# Patient Record
Sex: MALE | Race: WHITE | NOT HISPANIC OR LATINO | Employment: OTHER | URBAN - METROPOLITAN AREA
[De-identification: names, ages, dates, MRNs, and addresses within clinical notes are randomized per-mention and may not be internally consistent; named-entity substitution may affect disease eponyms.]

---

## 2023-10-02 ENCOUNTER — OFFICE VISIT (OUTPATIENT)
Dept: PRIMARY CARE | Facility: CLINIC | Age: 63
End: 2023-10-02
Payer: MEDICAID

## 2023-10-02 VITALS
DIASTOLIC BLOOD PRESSURE: 72 MMHG | BODY MASS INDEX: 22.76 KG/M2 | HEIGHT: 70 IN | SYSTOLIC BLOOD PRESSURE: 118 MMHG | WEIGHT: 159 LBS

## 2023-10-02 DIAGNOSIS — E78.00 HYPERCHOLESTEROLEMIA: ICD-10-CM

## 2023-10-02 DIAGNOSIS — F41.9 ANXIETY: ICD-10-CM

## 2023-10-02 DIAGNOSIS — F17.211 CIGARETTE NICOTINE DEPENDENCE IN REMISSION: ICD-10-CM

## 2023-10-02 DIAGNOSIS — Z13.220 LIPID SCREENING: ICD-10-CM

## 2023-10-02 DIAGNOSIS — C34.90 MALIGNANT NEOPLASM OF LUNG, UNSPECIFIED LATERALITY, UNSPECIFIED PART OF LUNG (MULTI): ICD-10-CM

## 2023-10-02 DIAGNOSIS — T78.40XA ALLERGY, INITIAL ENCOUNTER: ICD-10-CM

## 2023-10-02 DIAGNOSIS — J45.909 ASTHMA, UNSPECIFIED ASTHMA SEVERITY, UNSPECIFIED WHETHER COMPLICATED, UNSPECIFIED WHETHER PERSISTENT (HHS-HCC): ICD-10-CM

## 2023-10-02 DIAGNOSIS — E55.9 VITAMIN D DEFICIENCY: ICD-10-CM

## 2023-10-02 DIAGNOSIS — I67.1 BRAIN ANEURYSM (HHS-HCC): ICD-10-CM

## 2023-10-02 DIAGNOSIS — E53.9 VITAMIN B DEFICIENCY: ICD-10-CM

## 2023-10-02 DIAGNOSIS — G40.909 SEIZURE DISORDER (MULTI): Primary | ICD-10-CM

## 2023-10-02 DIAGNOSIS — R56.9 SEIZURES (MULTI): ICD-10-CM

## 2023-10-02 DIAGNOSIS — J44.9 CHRONIC OBSTRUCTIVE PULMONARY DISEASE, UNSPECIFIED COPD TYPE (MULTI): ICD-10-CM

## 2023-10-02 DIAGNOSIS — R51.9 INTRACTABLE HEADACHE, UNSPECIFIED CHRONICITY PATTERN, UNSPECIFIED HEADACHE TYPE: ICD-10-CM

## 2023-10-02 DIAGNOSIS — F10.21 HISTORY OF ALCOHOLISM (MULTI): ICD-10-CM

## 2023-10-02 DIAGNOSIS — R53.83 OTHER FATIGUE: ICD-10-CM

## 2023-10-02 PROCEDURE — 99204 OFFICE O/P NEW MOD 45 MIN: CPT | Performed by: INTERNAL MEDICINE

## 2023-10-02 RX ORDER — DIVALPROEX SODIUM 250 MG/1
250 TABLET, FILM COATED, EXTENDED RELEASE ORAL DAILY
Qty: 30 TABLET | Refills: 11 | COMMUNITY
Start: 2023-10-02 | End: 2023-10-02 | Stop reason: SDUPTHER

## 2023-10-02 RX ORDER — IPRATROPIUM BROMIDE AND ALBUTEROL SULFATE 2.5; .5 MG/3ML; MG/3ML
3 SOLUTION RESPIRATORY (INHALATION) 4 TIMES DAILY PRN
Qty: 90 ML | Refills: 1 | Status: SHIPPED | OUTPATIENT
Start: 2023-10-02 | End: 2024-10-01

## 2023-10-02 RX ORDER — ACETAMINOPHEN 500 MG
5000 TABLET ORAL DAILY
COMMUNITY
Start: 2023-10-02 | End: 2023-10-02 | Stop reason: SDUPTHER

## 2023-10-02 RX ORDER — LANOLIN ALCOHOL/MO/W.PET/CERES
100 CREAM (GRAM) TOPICAL DAILY
Qty: 90 TABLET | Refills: 1 | Status: SHIPPED | OUTPATIENT
Start: 2023-10-02 | End: 2024-04-29

## 2023-10-02 RX ORDER — LACOSAMIDE 200 MG/1
50 TABLET ORAL 2 TIMES DAILY
Refills: 0 | COMMUNITY
Start: 2023-10-02 | End: 2023-10-02 | Stop reason: SDUPTHER

## 2023-10-02 RX ORDER — NEBULIZER AND COMPRESSOR
EACH MISCELLANEOUS
Qty: 1 EACH | Refills: 0 | Status: SHIPPED | OUTPATIENT
Start: 2023-10-02

## 2023-10-02 RX ORDER — LANOLIN ALCOHOL/MO/W.PET/CERES
100 CREAM (GRAM) TOPICAL DAILY
Qty: 30 TABLET | Refills: 11 | COMMUNITY
Start: 2023-10-02 | End: 2023-10-02 | Stop reason: SDUPTHER

## 2023-10-02 RX ORDER — DIVALPROEX SODIUM 500 MG/1
500 TABLET, FILM COATED, EXTENDED RELEASE ORAL DAILY
Qty: 90 TABLET | Refills: 1 | Status: SHIPPED | OUTPATIENT
Start: 2023-10-02 | End: 2023-10-23 | Stop reason: DRUGHIGH

## 2023-10-02 RX ORDER — DIVALPROEX SODIUM 250 MG/1
250 TABLET, FILM COATED, EXTENDED RELEASE ORAL DAILY
Qty: 90 TABLET | Refills: 1 | Status: SHIPPED | OUTPATIENT
Start: 2023-10-02 | End: 2023-10-23 | Stop reason: ENTERED-IN-ERROR

## 2023-10-02 RX ORDER — ALBUTEROL SULFATE 90 UG/1
2 AEROSOL, METERED RESPIRATORY (INHALATION) EVERY 4 HOURS PRN
Qty: 6.7 G | Refills: 11 | COMMUNITY
Start: 2023-10-02 | End: 2023-10-02 | Stop reason: SDUPTHER

## 2023-10-02 RX ORDER — NAPROXEN SODIUM 220 MG/1
81 TABLET, FILM COATED ORAL DAILY
Qty: 90 TABLET | Refills: 1 | Status: SHIPPED | OUTPATIENT
Start: 2023-10-02 | End: 2023-11-02 | Stop reason: ALTCHOICE

## 2023-10-02 RX ORDER — DIVALPROEX SODIUM 500 MG/1
500 TABLET, FILM COATED, EXTENDED RELEASE ORAL DAILY
Qty: 30 TABLET | Refills: 11 | COMMUNITY
Start: 2023-10-02 | End: 2023-10-02 | Stop reason: SDUPTHER

## 2023-10-02 RX ORDER — FLUTICASONE FUROATE 27.5 UG/1
1 SPRAY, METERED NASAL
Qty: 10 G | Refills: 6 | Status: SHIPPED | OUTPATIENT
Start: 2023-10-02 | End: 2023-11-02 | Stop reason: ALTCHOICE

## 2023-10-02 RX ORDER — IBUPROFEN 200 MG
1 TABLET ORAL EVERY 24 HOURS
Qty: 30 PATCH | Refills: 0 | COMMUNITY
Start: 2023-10-02 | End: 2023-10-02 | Stop reason: SDUPTHER

## 2023-10-02 RX ORDER — NAPROXEN SODIUM 220 MG/1
81 TABLET, FILM COATED ORAL DAILY
Qty: 30 TABLET | Refills: 11 | COMMUNITY
Start: 2023-10-02 | End: 2023-10-02 | Stop reason: SDUPTHER

## 2023-10-02 RX ORDER — ATORVASTATIN CALCIUM 40 MG/1
40 TABLET, FILM COATED ORAL DAILY
Qty: 90 TABLET | Refills: 1 | Status: SHIPPED | OUTPATIENT
Start: 2023-10-02 | End: 2024-01-05 | Stop reason: SDUPTHER

## 2023-10-02 RX ORDER — FLUTICASONE FUROATE 27.5 UG/1
1 SPRAY, METERED NASAL
Qty: 10 G | Refills: 5 | COMMUNITY
Start: 2023-10-02 | End: 2023-10-02 | Stop reason: SDUPTHER

## 2023-10-02 RX ORDER — ALBUTEROL SULFATE 90 UG/1
2 AEROSOL, METERED RESPIRATORY (INHALATION) EVERY 4 HOURS PRN
Qty: 6.7 G | Refills: 6 | Status: SHIPPED | OUTPATIENT
Start: 2023-10-02 | End: 2024-02-05 | Stop reason: SDUPTHER

## 2023-10-02 RX ORDER — ATORVASTATIN CALCIUM 40 MG/1
40 TABLET, FILM COATED ORAL DAILY
Qty: 30 TABLET | Refills: 5 | COMMUNITY
Start: 2023-10-02 | End: 2023-10-02 | Stop reason: SDUPTHER

## 2023-10-02 RX ORDER — LACOSAMIDE 200 MG/1
50 TABLET ORAL 2 TIMES DAILY
Qty: 90 TABLET | Refills: 1 | Status: SHIPPED | OUTPATIENT
Start: 2023-10-02 | End: 2023-10-23 | Stop reason: SDUPTHER

## 2023-10-02 RX ORDER — ACETAMINOPHEN 500 MG
5000 TABLET ORAL DAILY
Qty: 90 TABLET | Refills: 1 | Status: SHIPPED | OUTPATIENT
Start: 2023-10-02 | End: 2023-12-12 | Stop reason: WASHOUT

## 2023-10-02 RX ORDER — IBUPROFEN 200 MG
1 TABLET ORAL EVERY 24 HOURS
Qty: 30 PATCH | Refills: 0 | Status: SHIPPED | OUTPATIENT
Start: 2023-10-02

## 2023-10-02 ASSESSMENT — ENCOUNTER SYMPTOMS
LOSS OF SENSATION IN FEET: 0
OCCASIONAL FEELINGS OF UNSTEADINESS: 0
DEPRESSION: 0

## 2023-10-02 NOTE — PROGRESS NOTES
"Subjective   Patient ID: Martínez Neri is a 63 y.o. male who presents for to [facility] care and multiple medical issues.    This 63-year-old white man came to my office first time, I welcomed him.  He is relocated from [place].  He is looking for a primary care physician.  1. He wants a refill on medication.  He is accompanied by his daughter with his permission.  2. He is undergoing through lung cancer treatment as well as brain aneurysm for which he already has a surgery.  He is looking for neurologist for the seizure.  He is feeling weak, tired, fatigued, and exhausted.     IMMUNIZATION:  We will check his immunization.         I have personally reviewed the patient's Past Medical History, Medications, Allergies, Social History, and Family History in the EMR.    Review of Systems   All other systems reviewed and are negative.  The patient denies any history of heart attack, stroke, or diabetes.    Objective   /72   Ht 1.765 m (5' 9.5\")   Wt 72.1 kg (159 lb)   BMI 23.14 kg/m²     Physical Exam  Vitals reviewed.   HENT:      Right Ear: Tympanic membrane, ear canal and external ear normal.      Left Ear: Tympanic membrane, ear canal and external ear normal.   Eyes:      General: No scleral icterus.     Pupils: Pupils are equal, round, and reactive to light.   Neck:      Vascular: No carotid bruit.   Cardiovascular:      Heart sounds: Normal heart sounds, S1 normal and S2 normal. No murmur heard.     No friction rub.   Pulmonary:      Effort: Pulmonary effort is normal.      Breath sounds: Normal breath sounds and air entry.   Abdominal:      Palpations: There is no hepatomegaly, splenomegaly or mass.   Genitourinary:     Comments: RECTAL: Deferred by the patient.  GENITAL: Deferred by the patient.  Musculoskeletal:         General: No swelling or deformity. Normal range of motion.      Cervical back: Neck supple.      Right lower leg: No edema.      Left lower leg: No edema.   Lymphadenopathy:      " Cervical: No cervical adenopathy.      Upper Body:      Right upper body: No axillary adenopathy.      Left upper body: No axillary adenopathy.      Lower Body: No right inguinal adenopathy. No left inguinal adenopathy.   Neurological:      Mental Status: He is oriented to person, place, and time.      Cranial Nerves: Cranial nerves 2-12 are intact. No cranial nerve deficit.      Sensory: No sensory deficit.      Motor: Motor function is intact. No weakness.      Gait: Gait is intact.      Deep Tendon Reflexes: Reflexes normal.   Psychiatric:         Mood and Affect: Mood normal. Mood is not anxious or depressed. Affect is not angry.         Behavior: Behavior is not agitated.         Thought Content: Thought content normal.         Judgment: Judgment normal.     LAB WORK: Laboratory testing ordered.    Assessment/Plan   Problem List Items Addressed This Visit    None  Visit Diagnoses         Codes    Asthma, unspecified asthma severity, unspecified whether complicated, unspecified whether persistent    -  Primary J45.909    Relevant Medications    ipratropium-albuteroL (Duo-Neb) 0.5-2.5 mg/3 mL nebulizer solution    nebulizer and compressor device    nebulizer accessories misc    Cigarette nicotine dependence in remission     F17.211    Relevant Medications    nicotine (Nicoderm CQ) 21 mg/24 hr patch    Lipid screening     Z13.220    Relevant Orders    Comprehensive metabolic panel    Lipid panel    Intractable headache, unspecified chronicity pattern, unspecified headache type     R51.9    Other fatigue     R53.83    Relevant Orders    CBC    Urinalysis with Reflex Microscopic    TSH    Vitamin B12    Vitamin D deficiency     E55.9    Relevant Medications    cholecalciferol (Vitamin D3) 5,000 Units tablet    Other Relevant Orders    Vitamin D 25-Hydroxy,Total (for eval of Vitamin D levels)    Vitamin B deficiency     E53.9    Relevant Medications    thiamine (Vitamin B-1) 100 mg tablet    Seizures (CMS/HCC)      R56.9    Relevant Medications    lacosamide (Vimpat) 200 mg tablet tablet    divalproex (Depakote ER) 500 mg 24 hr tablet    divalproex (Depakote ER) 250 mg 24 hr tablet    Allergy, initial encounter     T78.40XA    Relevant Medications    fluticasone (Flonase Sensimist) 27.5 mcg/actuation nasal spray    albuterol (Proventil HFA) 90 mcg/actuation inhaler    Hypercholesterolemia     E78.00    Relevant Medications    atorvastatin (Lipitor) 40 mg tablet    aspirin 81 mg chewable tablet    Seizure disorder (CMS/HCC)     G40.909    Brain aneurysm     I67.1    Malignant neoplasm of lung, unspecified laterality, unspecified part of lung (CMS/HCC)     C34.90    Chronic obstructive pulmonary disease, unspecified COPD type (CMS/HCC)     J44.9    History of alcoholism (CMS/HCC)     F10.21    Anxiety     F41.9        1. Seizure disorder, on Depakote.  We will check level.  He sees a specialist.  2. Brain aneurysm.  He sees a specialist.  3. Lung cancer.  He is going to see lung cancer team right away.  4. High cholesterol.  We will check liver.  5. COPD, on breathing treatment.  6. History of alcoholism, doing good.  7. Anxiety and stress, okay.  Blood work ordered.  Referred to a specialist.  8. I shall see him in a week after the testing.  9. Welcome to my office and [facility].    Scribe Attestation  By signing my name below, I, Chantell Guerra, Scribe attest that this documentation has been prepared under the direction and in the presence of Jayme Casey MD.

## 2023-10-03 ENCOUNTER — LAB (OUTPATIENT)
Dept: LAB | Facility: LAB | Age: 63
End: 2023-10-03
Payer: MEDICAID

## 2023-10-03 DIAGNOSIS — E55.9 VITAMIN D DEFICIENCY: ICD-10-CM

## 2023-10-03 DIAGNOSIS — Z13.220 LIPID SCREENING: ICD-10-CM

## 2023-10-03 DIAGNOSIS — R53.83 OTHER FATIGUE: ICD-10-CM

## 2023-10-03 LAB
25(OH)D3 SERPL-MCNC: 17 NG/ML (ref 30–100)
ALBUMIN SERPL BCP-MCNC: 4.4 G/DL (ref 3.4–5)
ALP SERPL-CCNC: 62 U/L (ref 33–136)
ALT SERPL W P-5'-P-CCNC: 21 U/L (ref 10–52)
ANION GAP SERPL CALC-SCNC: 14 MMOL/L (ref 10–20)
APPEARANCE UR: CLEAR
AST SERPL W P-5'-P-CCNC: 23 U/L (ref 9–39)
BILIRUB SERPL-MCNC: 0.5 MG/DL (ref 0–1.2)
BILIRUB UR STRIP.AUTO-MCNC: NEGATIVE MG/DL
BUN SERPL-MCNC: 15 MG/DL (ref 6–23)
CALCIUM SERPL-MCNC: 9.8 MG/DL (ref 8.6–10.6)
CHLORIDE SERPL-SCNC: 103 MMOL/L (ref 98–107)
CHOLEST SERPL-MCNC: 162 MG/DL (ref 0–199)
CHOLESTEROL/HDL RATIO: 2.4
CO2 SERPL-SCNC: 27 MMOL/L (ref 21–32)
COLOR UR: YELLOW
CREAT SERPL-MCNC: 0.68 MG/DL (ref 0.5–1.3)
ERYTHROCYTE [DISTWIDTH] IN BLOOD BY AUTOMATED COUNT: 13.6 % (ref 11.5–14.5)
GFR SERPL CREATININE-BSD FRML MDRD: >90 ML/MIN/1.73M*2
GLUCOSE SERPL-MCNC: 72 MG/DL (ref 74–99)
GLUCOSE UR STRIP.AUTO-MCNC: NEGATIVE MG/DL
HCT VFR BLD AUTO: 40.5 % (ref 41–52)
HDLC SERPL-MCNC: 67.1 MG/DL
HGB BLD-MCNC: 13.3 G/DL (ref 13.5–17.5)
KETONES UR STRIP.AUTO-MCNC: NEGATIVE MG/DL
LDLC SERPL CALC-MCNC: 78 MG/DL (ref 140–190)
LEUKOCYTE ESTERASE UR QL STRIP.AUTO: ABNORMAL
MCH RBC QN AUTO: 33.6 PG (ref 26–34)
MCHC RBC AUTO-ENTMCNC: 32.8 G/DL (ref 32–36)
MCV RBC AUTO: 102 FL (ref 80–100)
MUCOUS THREADS #/AREA URNS AUTO: NORMAL /LPF
NITRITE UR QL STRIP.AUTO: NEGATIVE
NON HDL CHOLESTEROL: 95 MG/DL (ref 0–149)
NRBC BLD-RTO: 0 /100 WBCS (ref 0–0)
PH UR STRIP.AUTO: 7 [PH]
PLATELET # BLD AUTO: 176 X10*3/UL (ref 150–450)
PMV BLD AUTO: 13.2 FL (ref 7.5–11.5)
POTASSIUM SERPL-SCNC: 4.8 MMOL/L (ref 3.5–5.3)
PROT SERPL-MCNC: 7.3 G/DL (ref 6.4–8.2)
PROT UR STRIP.AUTO-MCNC: NEGATIVE MG/DL
RBC # BLD AUTO: 3.96 X10*6/UL (ref 4.5–5.9)
RBC # UR STRIP.AUTO: NEGATIVE /UL
RBC #/AREA URNS AUTO: NORMAL /HPF
SODIUM SERPL-SCNC: 139 MMOL/L (ref 136–145)
SP GR UR STRIP.AUTO: 1.01
TRIGL SERPL-MCNC: 87 MG/DL (ref 0–149)
TSH SERPL-ACNC: 5.54 MIU/L (ref 0.44–3.98)
UROBILINOGEN UR STRIP.AUTO-MCNC: <2 MG/DL
VIT B12 SERPL-MCNC: 488 PG/ML (ref 211–911)
VLDL: 17 MG/DL (ref 0–40)
WBC # BLD AUTO: 7.2 X10*3/UL (ref 4.4–11.3)
WBC #/AREA URNS AUTO: NORMAL /HPF

## 2023-10-03 PROCEDURE — 36415 COLL VENOUS BLD VENIPUNCTURE: CPT

## 2023-10-05 ENCOUNTER — OFFICE VISIT (OUTPATIENT)
Dept: HEMATOLOGY/ONCOLOGY | Facility: HOSPITAL | Age: 63
End: 2023-10-05
Payer: MEDICAID

## 2023-10-05 VITALS
TEMPERATURE: 96.8 F | RESPIRATION RATE: 20 BRPM | BODY MASS INDEX: 23.35 KG/M2 | OXYGEN SATURATION: 98 % | HEIGHT: 69 IN | SYSTOLIC BLOOD PRESSURE: 132 MMHG | HEART RATE: 65 BPM | DIASTOLIC BLOOD PRESSURE: 65 MMHG | WEIGHT: 157.63 LBS

## 2023-10-05 DIAGNOSIS — C34.12 MALIGNANT NEOPLASM OF UPPER LOBE OF LEFT LUNG (MULTI): Primary | ICD-10-CM

## 2023-10-05 DIAGNOSIS — C34.12: ICD-10-CM

## 2023-10-05 PROCEDURE — 99205 OFFICE O/P NEW HI 60 MIN: CPT | Performed by: INTERNAL MEDICINE

## 2023-10-05 PROCEDURE — 99417 PROLNG OP E/M EACH 15 MIN: CPT | Performed by: INTERNAL MEDICINE

## 2023-10-05 PROCEDURE — 99215 OFFICE O/P EST HI 40 MIN: CPT | Performed by: INTERNAL MEDICINE

## 2023-10-05 ASSESSMENT — PAIN SCALES - GENERAL: PAINLEVEL: 0-NO PAIN

## 2023-10-06 ENCOUNTER — HOSPITAL ENCOUNTER (OUTPATIENT)
Dept: RADIOLOGY | Facility: HOSPITAL | Age: 63
Discharge: HOME | End: 2023-10-06
Payer: MEDICAID

## 2023-10-06 DIAGNOSIS — C34.12 MALIGNANT NEOPLASM OF UPPER LOBE OF LEFT LUNG (MULTI): ICD-10-CM

## 2023-10-06 LAB — GLUCOSE BLD MANUAL STRIP-MCNC: 80 MG/DL (ref 74–99)

## 2023-10-06 PROCEDURE — 71260 CT THORAX DX C+: CPT

## 2023-10-06 PROCEDURE — 78815 PET IMAGE W/CT SKULL-THIGH: CPT

## 2023-10-06 PROCEDURE — A9552 F18 FDG: HCPCS | Mod: SE | Performed by: INTERNAL MEDICINE

## 2023-10-06 PROCEDURE — 82947 ASSAY GLUCOSE BLOOD QUANT: CPT

## 2023-10-06 PROCEDURE — 71260 CT THORAX DX C+: CPT | Performed by: STUDENT IN AN ORGANIZED HEALTH CARE EDUCATION/TRAINING PROGRAM

## 2023-10-06 PROCEDURE — 3430000001 HC RX 343 DIAGNOSTIC RADIOPHARMACEUTICALS: Mod: SE | Performed by: INTERNAL MEDICINE

## 2023-10-06 PROCEDURE — 2550000001 HC RX 255 CONTRASTS: Mod: SE | Performed by: INTERNAL MEDICINE

## 2023-10-06 PROCEDURE — 78815 PET IMAGE W/CT SKULL-THIGH: CPT | Performed by: RADIOLOGY

## 2023-10-06 RX ORDER — FLUDEOXYGLUCOSE F 18 200 MCI/ML
10.1 INJECTION, SOLUTION INTRAVENOUS
Status: COMPLETED | OUTPATIENT
Start: 2023-10-06 | End: 2023-10-06

## 2023-10-06 RX ADMIN — FLUDEOXYGLUCOSE F 18 10.1 MILLICURIE: 200 INJECTION, SOLUTION INTRAVENOUS at 10:45

## 2023-10-06 RX ADMIN — IOHEXOL 69 ML: 350 INJECTION, SOLUTION INTRAVENOUS at 11:23

## 2023-10-07 ENCOUNTER — APPOINTMENT (OUTPATIENT)
Dept: RADIOLOGY | Facility: HOSPITAL | Age: 63
End: 2023-10-07
Payer: MEDICAID

## 2023-10-09 ENCOUNTER — APPOINTMENT (OUTPATIENT)
Dept: NEUROLOGY | Facility: CLINIC | Age: 63
End: 2023-10-09
Payer: MEDICAID

## 2023-10-09 PROBLEM — C34.12 PRIMARY CANCER OF LEFT UPPER LOBE OF LUNG (MULTI): Status: ACTIVE | Noted: 2023-10-09

## 2023-10-09 PROBLEM — R56.9 SEIZURES (MULTI): Status: ACTIVE | Noted: 2023-10-09

## 2023-10-09 ASSESSMENT — ENCOUNTER SYMPTOMS
HEMATURIA: 0
EXTREMITY WEAKNESS: 0
COUGH: 1
UNEXPECTED WEIGHT CHANGE: 1
PSYCHIATRIC NEGATIVE: 1
SEIZURES: 1
SORE THROAT: 0
PALPITATIONS: 0
GASTROINTESTINAL NEGATIVE: 1
SHORTNESS OF BREATH: 1
MUSCULOSKELETAL NEGATIVE: 1
APPETITE CHANGE: 1
HEMATOLOGIC/LYMPHATIC NEGATIVE: 1
FATIGUE: 1
DIZZINESS: 0
SCLERAL ICTERUS: 0

## 2023-10-09 NOTE — PROGRESS NOTES
[facility] - Medical Oncology New Patient Visit    Patient ID: Martínez Neri is a 63 y.o. male.  Referring Physician: No referring provider defined for this encounter.  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    HPI Mr. Neri is a 64-year-old male, smoker, 50-pack-year history), with history of COPD and seizures, who was diagnosed with a squamous cell carcinoma of the left upper lobe in [place], never treated.  Outpatient notes from [facility] and hematology clinic were reviewed.    On 06/17/2023 the patient had a CT chest for ongoing respiratory symptoms and was found to have a left suprahilar mass involving the left upper lobe bronchi with postobstructive pneumonia/collapse.  On 06/25/2023 CT chest/abdomen/pelvis demonstrated persistent masslike consolidation in the left upper lobe with no signs of extrathoracic metastatic disease.  On 06/26/2023 the patient underwent a bronchoscopy, and pathology was compatible with a squamous cell carcinoma at least in situ, with a foci highly suspicious for invasive squamous cell carcinoma.  On 07/18/2023 a PET/CT obtained, demonstrating a 6.6 cm left upper lobe mass with an SUV of 8.3 and no evidence of distant metastatic disease.  On 07/18/2023 to brain MRI was negative for intracranial metastasis.  On 07/27/2023 bronchoscopy/EBUS was performed.  Endobronchial biopsies of left upper lobe demonstrated an invasive moderately differentiated squamous cell carcinoma.  Left upper lobe hilar FNA showed atypical cells with squamous differentiation, suspicious for malignancy.  Subcarinal FNA was indeterminate for malignancy with extremely rare atypical cells with squamous differentiation.  Molecular profile: Lack of BRAF, EGFR, HER2, KRAS, MET, ALK, NTRK 1/2/3, RET or  ROS1 alterations.  PD-L1 TPS 20%.  The patient was seen by medical oncology and did not too frail for combined chemo RT (there is a performance status ECOG 3) documented on 08/17/2023.  At that time, he lived with some friends, while his family was primarily located in [place].  His daughter traveled and was able to relocate him to [place], and he presents today to clinic accompanied by his daughter, son, and daughter-in-law.  The patient states he is felt much better since his relocation, has been gaining weight, approximately 10 pounds since he moved here.  He is more active, going up and down the steps at home, but still tired and taking some breaks throughout the day.  He denies any headaches or new neurological symptoms.  His respiratory symptoms are stable.  There is no new localized pain.    Diagnosis: Non-small cell lung cancer of the left upper lobe (renal cell carcinoma), diagnosed on 06/26/2023  Current sites of disease: Left upper lobe  Reported molecular and ancillary testing performed upon diagnosis in [place]:   BRAF negative  JIPAX21D negative  EGFR classic mutations negative  HER2 negative  MET negative  ALK negative  In tract 1/2/3 negative  RET negative  ROS1 negative  PD-L1 20%    Oncologic History:  As above      Past Medical History:  No date: Allergic rhinitis  No date: Asthma  No date: Brain aneurysm  No date: Cancer (CMS/HCC)  No date: Carotid artery disease (CMS/HCC)  No date: COPD (chronic obstructive pulmonary disease) (CMS/HCC)  No date: Epilepsy (CMS/HCC)  No date: High cholesterol  No date: History of alcohol use  No date: Hypertension  No date: Lung cancer (CMS/HCC)     Past Surgical History:  No date: CAROTID ENDARTERECTOMY; N/A  No date: COLONOSCOPY  No date: HERNIA REPAIR     Social Hx:     Martínez Daron  reports that he has been smoking cigarettes. He has been exposed to tobacco smoke. He uses smokeless tobacco.  He  has no history on file for alcohol use.  He  reports no history of  drug use.    Family History   Problem Relation Name Age of Onset    Heart attack Mother      Prostate cancer Father      Aneurysm Other      Prostate cancer Other      Alcohol abuse Other          Meds (Current):    Current Outpatient Medications:     albuterol (Proventil HFA) 90 mcg/actuation inhaler, Inhale 2 puffs every 4 hours if needed for wheezing or shortness of breath., Disp: 6.7 g, Rfl: 6    aspirin 81 mg chewable tablet, Chew 1 tablet (81 mg) once daily., Disp: 90 tablet, Rfl: 1    atorvastatin (Lipitor) 40 mg tablet, Take 1 tablet (40 mg) by mouth once daily., Disp: 90 tablet, Rfl: 1    cholecalciferol (Vitamin D3) 5,000 Units tablet, Take 1 tablet (5,000 Units) by mouth once daily., Disp: 90 tablet, Rfl: 1    divalproex (Depakote ER) 250 mg 24 hr tablet, Take 1 tablet (250 mg) by mouth once daily. Do not crush, chew, or split., Disp: 90 tablet, Rfl: 1    divalproex (Depakote ER) 500 mg 24 hr tablet, Take 1 tablet (500 mg) by mouth once daily. Do not crush, chew, or split., Disp: 90 tablet, Rfl: 1    fluticasone (Flonase Sensimist) 27.5 mcg/actuation nasal spray, Administer 1 spray into each nostril once daily., Disp: 10 g, Rfl: 6    inhalat.spacing dev,med. mask spacer, , Disp: , Rfl:     ipratropium-albuteroL (Duo-Neb) 0.5-2.5 mg/3 mL nebulizer solution, Take 3 mL by nebulization 4 times a day as needed for wheezing or shortness of breath., Disp: 90 mL, Rfl: 1    lacosamide (Vimpat) 200 mg tablet tablet, Take 0.25 tablets (50 mg) by mouth 2 times a day., Disp: 90 tablet, Rfl: 1    nebulizer accessories misc, Use twice daily, Disp: 100 each, Rfl: 0    nebulizer and compressor device, Use as directed, Disp: 1 each, Rfl: 0    nicotine (Nicoderm CQ) 21 mg/24 hr patch, Place 1 patch over 24 hours on the skin once every 24 hours., Disp: 30 patch, Rfl: 0    thiamine (Vitamin B-1) 100 mg tablet, Take 1 tablet (100 mg) by mouth once daily., Disp: 90 tablet, Rfl: 1    No Known Allergies    Review of Systems  "  Constitutional:  Positive for appetite change, fatigue and unexpected weight change.   HENT:   Negative for mouth sores and sore throat.    Eyes:  Negative for icterus.   Respiratory:  Positive for cough and shortness of breath.    Cardiovascular:  Negative for chest pain and palpitations.   Gastrointestinal: Negative.    Genitourinary:  Negative for hematuria.    Musculoskeletal: Negative.  Negative for gait problem.   Skin: Negative.    Neurological:  Positive for seizures. Negative for dizziness, extremity weakness and gait problem.   Hematological: Negative.    Psychiatric/Behavioral: Negative.          Objective   BSA: 1.86 meters squared  /65 (BP Location: Left arm, Patient Position: Sitting)   Pulse 65   Temp 36 °C (96.8 °F) (Core)   Resp 20   Ht (S) 1.745 m (5' 8.7\")   Wt 71.5 kg (157 lb 10.1 oz)   SpO2 98%   BMI 23.48 kg/m²   Performance Status:  Symptomatic; in bed <50% of the day     Physical Exam     Results:  Labs:  Lab Results   Component Value Date    WBC 7.2 10/03/2023    HGB 13.3 (L) 10/03/2023    HCT 40.5 (L) 10/03/2023     (H) 10/03/2023     10/03/2023      No results found for: \"NEUTROABS\"   Lab Results   Component Value Date    GLUCOSE 72 (L) 10/03/2023    CALCIUM 9.8 10/03/2023     10/03/2023    K 4.8 10/03/2023    CO2 27 10/03/2023     10/03/2023    BUN 15 10/03/2023    CREATININE 0.68 10/03/2023     Lab Results   Component Value Date    ALT 21 10/03/2023    AST 23 10/03/2023    ALKPHOS 62 10/03/2023    BILITOT 0.5 10/03/2023        Imaging:  I have personally reviewed the below imaging and concur with the reported findings unless otherwise stated:    === Results for orders placed during the hospital encounter of 10/06/23 ===    CT chest w IV contrast [[id number]] 10/06/2023    Status: Normal  1. Redemonstrated masslike consolidation in the region of posterior  left upper lobe and lingula, which overall appears slightly smaller  as compared to prior PET-CT " 07/18/2023. Given the hypermetabolic  activity in this location, underlying viable neoplasm is not  excluded. Please correlate with prior oncological and treatment  history.  2. No other suspicious pulmonary nodules, although there is minimal  clustered micro nodularity now seen within posterior right upper  lobe, which may represent mild nonspecific bronchiolitis.  3. Redemonstrated moderate to severe diffuse emphysema within  bilateral lungs.  4. Redemonstrated severe coronary artery calcifications. Please note  that, the present study is not tailored for evaluation of coronary  arteries.  5. Additional stable chronic findings as above.    Signed by: Mariusz Terrell 10/6/2023 11:43 AM  Dictation workstation:   [id number]  No results found for this or any previous visit.  No results found for this or any previous visit.  No results found for this or any previous visit.  === Results for orders placed during the hospital encounter of 10/06/23 ===    NM PET CT lung CA staging [[id number]] 10/06/2023    Status: Normal  1. Slight interval decrease in size and hypermetabolic activity of a  masslike consolidation within the left upper lobe and lingula,  particularly along the more peripheral aspect, with similar  appearance of intense FDG uptake along the central component of the  mass.  2. No new hypermetabolic sites of disease within the neck, chest,  abdomen, or pelvis.  3. New cluster of micro nodularity/ground-glass appearance along the  posterior aspect of the right upper lobe with associated right hilar  lymph node with mild hypermetabolic activity, findings which are  likely infectious or inflammatory.    I personally reviewed the images/study, and I agree with the findings  as stated above. This study was interpreted at [facility], [place], [place].    MACRO:  None    Signed by: Flavio Lawler 10/6/2023 1:52 PM  Dictation workstation:   [id number]  No results found for this or any  "previous visit.    Pathology:    No results found for: \"KBXHX\", \"PATHREP\", \"INTERP\", \"ADD1\", \"ADD2\", \"ADD3\", \"CORRECTIONHX\", \"ASTUDIES\", \"ANCILLARY1\", \"ANCILLARY2\", \"FINALINTERP\", \"COMDX\"    Assessment/Plan      Martínez Neri is a 63 y.o. male here for recommendations and to establish care for non-small cell lung cancer (squamous cell carcinoma) of the left upper lobe, diagnosis on 06/26/2023 elsewhere ([place]),.  Patient is treatment naïve, and is seeking opinion on management of his condition, 3-1/2 months following his initial diagnosis.  I explained to the patient that at this point in time we need to repeat his staging imaging/procedures.  We requested a PET scan, as well as a brain MRI with and without contrast.  Would also like a CT chest with contrast to better evaluate the anatomy.  Depending on how his staging is confirmed, we may take the approach of concurrent chemotherapy and radiation versus neoadjuvant chemoimmunotherapy followed by surgical resection.  If this malignancy is deemed potentially resectable, PFTs will be obtained.  We are requesting a dietitian consult as well.  I will see him in follow-up in 1 to 1-1/2 weeks to discuss test results and therapy plans.        Elo Peters MD, MS  Thoracic Medical Oncology   [place] [id number]  Phone: [phone]  "

## 2023-10-10 ENCOUNTER — OFFICE VISIT (OUTPATIENT)
Dept: PRIMARY CARE | Facility: CLINIC | Age: 63
End: 2023-10-10
Payer: MEDICAID

## 2023-10-10 VITALS
BODY MASS INDEX: 24.55 KG/M2 | DIASTOLIC BLOOD PRESSURE: 76 MMHG | WEIGHT: 162 LBS | HEIGHT: 68 IN | SYSTOLIC BLOOD PRESSURE: 122 MMHG

## 2023-10-10 DIAGNOSIS — R56.9 SEIZURES (MULTI): ICD-10-CM

## 2023-10-10 DIAGNOSIS — I10 BENIGN HYPERTENSION: ICD-10-CM

## 2023-10-10 DIAGNOSIS — E78.00 HIGH CHOLESTEROL: ICD-10-CM

## 2023-10-10 DIAGNOSIS — D64.9 ANEMIA, UNSPECIFIED TYPE: ICD-10-CM

## 2023-10-10 DIAGNOSIS — F43.9 STRESS: ICD-10-CM

## 2023-10-10 DIAGNOSIS — J44.9 CHRONIC OBSTRUCTIVE PULMONARY DISEASE, UNSPECIFIED COPD TYPE (MULTI): ICD-10-CM

## 2023-10-10 DIAGNOSIS — E55.9 VITAMIN D DEFICIENCY: ICD-10-CM

## 2023-10-10 DIAGNOSIS — F41.9 ANXIETY: ICD-10-CM

## 2023-10-10 DIAGNOSIS — R79.89 HIGH THYROID STIMULATING HORMONE (TSH) LEVEL: Primary | ICD-10-CM

## 2023-10-10 PROCEDURE — 3078F DIAST BP <80 MM HG: CPT | Performed by: INTERNAL MEDICINE

## 2023-10-10 PROCEDURE — 99213 OFFICE O/P EST LOW 20 MIN: CPT | Performed by: INTERNAL MEDICINE

## 2023-10-10 PROCEDURE — 3074F SYST BP LT 130 MM HG: CPT | Performed by: INTERNAL MEDICINE

## 2023-10-10 RX ORDER — DIVALPROEX SODIUM 250 MG/1
250 TABLET, FILM COATED, EXTENDED RELEASE ORAL 2 TIMES DAILY
Qty: 180 TABLET | Refills: 1 | Status: CANCELLED | OUTPATIENT
Start: 2023-10-10

## 2023-10-10 RX ORDER — DIVALPROEX SODIUM 500 MG/1
500 TABLET, FILM COATED, EXTENDED RELEASE ORAL 2 TIMES DAILY
Qty: 180 TABLET | Refills: 1 | Status: CANCELLED | OUTPATIENT
Start: 2023-10-10

## 2023-10-10 RX ORDER — LACOSAMIDE 200 MG/1
50 TABLET ORAL 2 TIMES DAILY
Qty: 90 TABLET | Refills: 1 | Status: CANCELLED | OUTPATIENT
Start: 2023-10-10

## 2023-10-10 ASSESSMENT — ENCOUNTER SYMPTOMS
LOSS OF SENSATION IN FEET: 0
OCCASIONAL FEELINGS OF UNSTEADINESS: 0
DEPRESSION: 0

## 2023-10-11 NOTE — PROGRESS NOTES
"Subjective   Patient ID: Martínez eNri is a 63 y.o. male who presents for Follow-up (on other conditions).    1. Mr. Martínez Neri today with his daughter.  She made a survey that some prescriptions were not called in adequate quantity.  We are going to change it.  2. Follow-up on other conditions.  His cancer doctor thinks he probably has a sick thyroid syndrome and they want to recheck the thyroid.  He came for follow-up on various conditions.    I have personally reviewed the patient's Past Medical History, Medications, Allergies, Social History, and Family History in the EMR.    Review of Systems   All other systems reviewed and are negative.    Objective   /76   Ht 1.727 m (5' 8\")   Wt 73.5 kg (162 lb)   BMI 24.63 kg/m²     Physical Exam  Vitals reviewed.   HENT:      Nose: Congestion present.      Comments: Postnasal drip.     Mouth/Throat:      Comments: Throat congested.  Cardiovascular:      Heart sounds: Normal heart sounds, S1 normal and S2 normal. No murmur heard.     No friction rub.   Pulmonary:      Effort: Pulmonary effort is normal.      Breath sounds: Wheezing present.   Abdominal:      Palpations: There is no hepatomegaly, splenomegaly or mass.   Musculoskeletal:      Right lower leg: No edema.      Left lower leg: No edema.   Lymphadenopathy:      Lower Body: No right inguinal adenopathy. No left inguinal adenopathy.   Neurological:      Cranial Nerves: Cranial nerves 2-12 are intact.      Sensory: No sensory deficit.      Motor: Motor function is intact.      Deep Tendon Reflexes: Reflexes are normal and symmetric.     LAB WORK: Laboratory testing done, TSH seen.    Assessment/Plan   Problem List Items Addressed This Visit             ICD-10-CM       Neuro    Seizures (CMS/HCC) R56.9     Other Visit Diagnoses         Codes    High thyroid stimulating hormone (TSH) level    -  Primary R79.89    Anemia, unspecified type     D64.9    Relevant Orders    CBC and Auto Differential    " Comprehensive Metabolic Panel    Iron and TIBC    Thyroid Stimulating Hormone    Referral to Benign Hematology    Vitamin D deficiency     E55.9    High cholesterol     E78.00    Benign hypertension     I10    Anxiety     F41.9    Stress     F43.9    Chronic obstructive pulmonary disease, unspecified COPD type (CMS/HCC)     J44.9        1. High TSH.  It could be sick thyroid syndrome.  Repeat in a few days.  2. Anemia, it could be cancer related.  Anemia workup ordered.  Referred to GI for possible scope.  3. Seizure.  Corrected the dose of medication.  4. Low vitamin D.  Given.  5. High cholesterol, on medication.  Liver okay.  6. Hypertension, okay.  7. Anxiety and stress, okay.  8. COPD, on breathing treatment.  Monitor.  9. Refill given.  10. Follow-up in a week after tests if necessary.  Otherwise routine check in a month.    Scribe Attestation  By signing my name below, Chantell DIXON, David attest that this documentation has been prepared under the direction and in the presence of Jayme Casey MD.

## 2023-10-12 ENCOUNTER — OFFICE VISIT (OUTPATIENT)
Dept: HEMATOLOGY/ONCOLOGY | Facility: HOSPITAL | Age: 63
End: 2023-10-12
Payer: MEDICAID

## 2023-10-12 VITALS
DIASTOLIC BLOOD PRESSURE: 60 MMHG | SYSTOLIC BLOOD PRESSURE: 130 MMHG | HEIGHT: 69 IN | WEIGHT: 158.95 LBS | TEMPERATURE: 98.4 F | HEART RATE: 72 BPM | RESPIRATION RATE: 18 BRPM | BODY MASS INDEX: 23.54 KG/M2 | OXYGEN SATURATION: 97 %

## 2023-10-12 DIAGNOSIS — C34.12 MALIGNANT NEOPLASM OF UPPER LOBE OF LEFT LUNG (MULTI): ICD-10-CM

## 2023-10-12 PROCEDURE — 99215 OFFICE O/P EST HI 40 MIN: CPT | Performed by: INTERNAL MEDICINE

## 2023-10-12 ASSESSMENT — ENCOUNTER SYMPTOMS
DEPRESSION: 0
OCCASIONAL FEELINGS OF UNSTEADINESS: 0
LOSS OF SENSATION IN FEET: 0

## 2023-10-12 ASSESSMENT — PAIN SCALES - GENERAL: PAINLEVEL: 8

## 2023-10-12 NOTE — PROGRESS NOTES
Patient ID: Martínez Neri is a 63 y.o. 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    PD-L1 TPS 20%     PRIOR THERAPIES  None         CURRENT THERAPY  None        CURRENT ONCOLOGICAL PROBLEMS  Cough         HISTORY OF PRESENT ILLNESS:    Smoker (50-pack-year history), with history of COPD and seizures, who was diagnosed with a squamous cell carcinoma of the left upper lobe in [place], never treated.  Outpatient notes from [facility] and hematology clinic were reviewed.     On 06/17/2023 the patient had a CT chest for ongoing respiratory symptoms and was found to have a left suprahilar mass involving the left upper lobe bronchi with postobstructive pneumonia/collapse.  On 06/25/2023 CT chest/abdomen/pelvis demonstrated persistent masslike consolidation in the left upper lobe with no signs of extrathoracic metastatic disease.  On 06/26/2023 the patient underwent a bronchoscopy, and pathology was compatible with a squamous cell carcinoma at least in situ, with a foci highly suspicious for invasive squamous cell carcinoma.  On 07/18/2023 a PET/CT obtained, demonstrating a 6.6 cm left upper lobe mass with an SUV of 8.3 and no evidence of distant metastatic disease.  On 07/18/2023 to brain MRI was negative for intracranial metastasis.  On 07/27/2023 bronchoscopy/EBUS was performed.  Endobronchial biopsies of left upper lobe demonstrated an invasive moderately differentiated squamous cell carcinoma.  Left upper lobe hilar FNA showed atypical cells with squamous differentiation, suspicious for malignancy.  Subcarinal FNA was indeterminate for malignancy with extremely rare atypical cells with  squamous differentiation.  Molecular profile: Lack of BRAF, EGFR, HER2, KRAS, MET, ALK, NTRK 1/2/3, RET or ROS1 alterations.  PD-L1 TPS 20%.  The patient was seen by medical oncology and did not too frail for combined chemo RT (there is a performance status ECOG 3) documented on 08/17/2023.  At that time, he lived with some friends, while his family was primarily located in [place].  His daughter traveled and was able to relocate him to [place], and he presents today to clinic accompanied by his daughter, son, and daughter-in-law.  The patient states he is felt much better since his relocation, has been gaining weight, approximately 10 pounds since he moved here.  He is more active, going up and down the steps at home, but still tired and taking some breaks throughout the day.  He denies any headaches or new neurological symptoms.  His respiratory symptoms are stable.  There is no new localized pain.    Reported molecular and ancillary testing performed upon diagnosis in [place]:   BRAF negative  [id number] negative  EGFR classic mutations negative  HER2 negative  MET negative  ALK negative  In tract 1/2/3 negative  RET negative  ROS1 negative  PD-L1 20%     PAST MEDICAL HISTORY  Brain aneurysm  COPD (chronic obstructive pulmonary disease) (CMS/HCC)  Epilepsy (CMS/HCC)  High cholesterol  History of alcohol use  Hypertension    PAST SURGICAL HISTORY:    CAROTID ENDARTERECTOMY  COLONOSCOPY  HERNIA REPAIR     SOCIAL HISTORY  Tobacco (50 pack-year history) use - quit after cancer dx      CURRENT MEDS REVIEWED:  Scheduled medications  Continuous medications  PRN medications     ALLERGIES  NKDA     FAMILY HISTORY  Father had prostate CA     SUBJECTIVE:  Doing very well   Continues to endorse improvement in stamina and appetite   Here with children to go over CT and PET scan results  MRI brain not yet done as Radiology needed information on mesh/coil use for treatment of cerebral aneurysm       OBJECTIVE:    Vitals:    10/12/23  "1318   BP: 130/60   Pulse: 72   Resp: 18   Temp: 36.9 °C (98.4 °F)   SpO2: 97%      Body surface area is 1.88 meters squared.     Diagnostic Results   Results:  Labs:  Lab Results   Component Value Date    WBC 7.2 10/03/2023    HGB 13.3 (L) 10/03/2023    HCT 40.5 (L) 10/03/2023     (H) 10/03/2023     10/03/2023      No results found for: \"NEUTROABS\"   Lab Results   Component Value Date    GLUCOSE 72 (L) 10/03/2023    CALCIUM 9.8 10/03/2023     10/03/2023    K 4.8 10/03/2023    CO2 27 10/03/2023     10/03/2023    BUN 15 10/03/2023    CREATININE 0.68 10/03/2023     Lab Results   Component Value Date    ALT 21 10/03/2023    AST 23 10/03/2023    ALKPHOS 62 10/03/2023    BILITOT 0.5 10/03/2023      Lab Results   Component Value Date    TSH 5.54 (H) 10/03/2023     CT CHEST W IV CONTRAST;  10/6/2023 11:24 am      INDICATION:  Signs/Symptoms:Staging lung cancer THOMAS. No other clinical history  available at the time of study interpretation.      COMPARISON:  PET-CT 07/18/2023      ACCESSION NUMBER(S):  [id number]      ORDERING CLINICIAN:  SENIA FUNES      TECHNIQUE:  Helical data acquisition of the chest was obtained following  intravenous administration of 75 mL of Omnipaque 350 contrast. Images  were reformatted in axial, coronal, and sagittal planes.      FINDINGS:  LUNGS AND AIRWAYS:  The trachea and central airways are patent. No endobronchial lesion  is seen, although there is mild multifocal mucous plugging present.  There is also minimal nonobstructive mucus material seen within mid  to distal trachea. There is diffuse bronchial wall thickening, which  is a nonspecific finding, but may represent changes of chronic  bronchitis.      There is again demonstration of a masslike consolidation centered in  the region of posterior left upper lobe and lingula, which is  associated with obliteration of multiple adjacent bronchi. As  compared to prior study, the overall aeration of inferior " lingular  segment has improved.      There is again demonstration of moderate to severe diffuse  emphysematous changes within bilateral lungs. There is mild bandlike  atelectasis along the right lung base and to lesser extent within  medial right middle lobe and left lower lobe, slightly increased from  prior. There is mild clustered micro nodularity within posterior  right upper lobe on axial image 128 of 359, series 2, which is new  from prior examination and may represent focal bronchiolitis.      MEDIASTINUM AND WILLIAM, LOWER NECK AND AXILLA:  The visualized thyroid gland is within normal limits.  No evidence of thoracic lymphadenopathy by CT criteria.  Esophagus appears within normal limits as seen.      HEART AND VESSELS:  The thoracic aorta normal in course and caliber.There is moderate  atherosclerosis present, including calcified and noncalcified  plaques. Main pulmonary artery and its branches are normal in  caliber. Severe coronary artery calcifications are seen. Please  note,the study is not optimized for evaluation of coronary arteries.  The cardiac chambers are not enlarged. There is no pericardial  effusion seen.      UPPER ABDOMEN:  Multiple hypodensities scattered throughout visualized liver  measuring up to 1.5 cm on axial image 328 of 359, series 2, are  unchanged and favor underlying benign etiology such as cysts and/or  hemangiomas. Rest of visualized abdominal structures are grossly  unremarkable. CHEST WALL AND OSSEOUS STRUCTURES:  Chest wall is within normal limits.  No acute osseous pathology.There are no suspicious osseous lesions.       Impression   1. Redemonstrated masslike consolidation in the region of posterior  left upper lobe and lingula, which overall appears slightly smaller  as compared to prior PET-CT 07/18/2023. Given the hypermetabolic  activity in this location, underlying viable neoplasm is not  excluded. Please correlate with prior oncological and treatment  history.  2. No  other suspicious pulmonary nodules, although there is minimal  clustered micro nodularity now seen within posterior right upper  lobe, which may represent mild nonspecific bronchiolitis.  3. Redemonstrated moderate to severe diffuse emphysema within  bilateral lungs.  4. Redemonstrated severe coronary artery calcifications. Please note  that, the present study is not tailored for evaluation of coronary  arteries.  5. Additional stable chronic findings as above.      Signed by: Mariusz Terrell 10/6/2023 11:43 AM     NM PET CT LUNG CA STAGING;  10/6/2023 12:09 pm      INDICATION:  Signs/Symptoms:Staging lung cancer THOMAS.      Per medical record: 63-year-old male with history of squamous cell  carcinoma of the left lung confirmed on biopsy 06/26/2023. No  treatment.      COMPARISON:  PET-CT 07/18/2023, CT chest 10/06/2023      ACCESSION NUMBER(S):  [id number]      ORDERING CLINICIAN:  SENIA FUNES      TECHNIQUE:  DIVISION OF NUCLEAR MEDICINE  POSITRON EMISSION TOMOGRAPHY (PET-CT)      The patient received an intravenous dose of 10.1 mCi of Fluorine-18  fluorodeoxyglucose (FDG). Positron emission tomographic (PET) images  from mid-thigh to skull base were then acquired after a one hour  delay. Also acquired was a contemporaneous low dose non-contrast CT  scan performed for attenuation correction of PET images and anatomic  localization.  The PET and CT images were digitally fused for  display.  All images were acquired on a combined PET-CT scanner unit.  Some areas of FDG accumulation may be described in standardized  uptake value (SUV) units.      CODING:  Subsequent Treatment Strategy (PS)      CALIBRATION:  Dose Injection-to-Scan Interval (mins): 73 min  Mediastinal bloodpool SUV (normal 1.5-2.5): 1.6  Blood glucose: 80 mg/dL      FINDINGS:  NECK:  No focal hypermetabolic soft tissue lesion is seen in the neck.  No hypermetabolic cervical lymphadenopathy is present.      CHEST:  Slight interval decrease in size of a  masslike consolidation within  the left upper lobe and lingula with associated obliteration of  several adjacent bronchi. There is interval decrease in  hypermetabolic activity of the mass, particularly along the more  peripheral aspect with an SUV max of 3.9, previously measuring up to  7.1. The more central aspect of the masslike consolidation exhibits  similar intense FDG uptake with a SUV max of 9.2. New cluster of  nodularity along the posterior aspect of the right upper lobe with  mild increased FDG uptake and a SUV max of 1.9. Nonenlarged right  hilar lymph node with new mild increased FDG uptake and a SUV max of  2.1.      ABDOMEN AND PELVIS:  No hypermetabolic soft tissue lesion is present in the abdomen and  pelvis. No evidence of hypermetabolic lymphadenopathy.  Sigmoid colonic diverticulosis without evidence of acute  diverticulitis on low-dose CT. Physiologic radiotracer uptake is  present in the liver and spleen with excretion into the bowel loops  and the genitourinary tract.      MUSCULOSKELETAL:  There is no focal hypermetabolic lesion to suggest osseous metastasis.      IMPRESSION:  1. Slight interval decrease in size and hypermetabolic activity of a  masslike consolidation within the left upper lobe and lingula,  particularly along the more peripheral aspect, with similar  appearance of intense FDG uptake along the central component of the  mass.  2. No new hypermetabolic sites of disease within the neck, chest,  abdomen, or pelvis.  3. New cluster of micro nodularity/ground-glass appearance along the  posterior aspect of the right upper lobe with associated right hilar  lymph node with mild hypermetabolic activity, findings which are  likely infectious or inflammatory.        Assessment/Plan   Discussed with the patient that his cancer - if MRI confirms negative - may be successfully treated with surgery.  We are referring him to Thoracic Surgery and obtaining PFTs.  Referrals to Rad Onc provided  in case he does not have adequate PFTs for a THOMAS lobectomy.  Importantly, we are referring the patient to [facility] for systematic mediastinal staging.  I will see him again after his surgical evaluation.  Explained he may be a candidate for neoadjuvant chemo-immunotherapy.       Elo Peters MD, MS  Thoracic Medical Oncology   [address] [place] [id number]  Phone: [phone]

## 2023-10-13 ENCOUNTER — HOSPITAL ENCOUNTER (OUTPATIENT)
Dept: RADIOLOGY | Facility: HOSPITAL | Age: 63
Discharge: HOME | End: 2023-10-13
Payer: MEDICAID

## 2023-10-13 DIAGNOSIS — C34.12 MALIGNANT NEOPLASM OF UPPER LOBE OF LEFT LUNG (MULTI): ICD-10-CM

## 2023-10-13 PROCEDURE — 70553 MRI BRAIN STEM W/O & W/DYE: CPT

## 2023-10-13 PROCEDURE — 70553 MRI BRAIN STEM W/O & W/DYE: CPT | Performed by: RADIOLOGY

## 2023-10-13 PROCEDURE — A9575 INJ GADOTERATE MEGLUMI 0.1ML: HCPCS | Mod: SE | Performed by: INTERNAL MEDICINE

## 2023-10-13 PROCEDURE — 2550000001 HC RX 255 CONTRASTS: Mod: SE | Performed by: INTERNAL MEDICINE

## 2023-10-13 RX ORDER — GADOTERATE MEGLUMINE 376.9 MG/ML
15 INJECTION INTRAVENOUS
Status: COMPLETED | OUTPATIENT
Start: 2023-10-13 | End: 2023-10-13

## 2023-10-13 RX ADMIN — GADOTERATE MEGLUMINE 15 ML: 376.9 INJECTION INTRAVENOUS at 19:38

## 2023-10-17 ENCOUNTER — HOSPITAL ENCOUNTER (OUTPATIENT)
Dept: RESPIRATORY THERAPY | Facility: HOSPITAL | Age: 63
Discharge: HOME | End: 2023-10-17
Payer: MEDICAID

## 2023-10-17 DIAGNOSIS — C34.12 MALIGNANT NEOPLASM OF UPPER LOBE OF LEFT LUNG (MULTI): ICD-10-CM

## 2023-10-17 PROCEDURE — 94726 PLETHYSMOGRAPHY LUNG VOLUMES: CPT

## 2023-10-19 ENCOUNTER — OFFICE VISIT (OUTPATIENT)
Dept: SURGERY | Facility: HOSPITAL | Age: 63
End: 2023-10-19
Payer: MEDICAID

## 2023-10-19 VITALS
BODY MASS INDEX: 23.69 KG/M2 | HEART RATE: 72 BPM | SYSTOLIC BLOOD PRESSURE: 105 MMHG | DIASTOLIC BLOOD PRESSURE: 60 MMHG | TEMPERATURE: 97.2 F | RESPIRATION RATE: 18 BRPM | OXYGEN SATURATION: 97 % | WEIGHT: 161.6 LBS

## 2023-10-19 DIAGNOSIS — C34.12 MALIGNANT NEOPLASM OF UPPER LOBE OF LEFT LUNG (MULTI): Primary | ICD-10-CM

## 2023-10-19 PROCEDURE — 99205 OFFICE O/P NEW HI 60 MIN: CPT | Performed by: STUDENT IN AN ORGANIZED HEALTH CARE EDUCATION/TRAINING PROGRAM

## 2023-10-19 PROCEDURE — 99215 OFFICE O/P EST HI 40 MIN: CPT | Performed by: STUDENT IN AN ORGANIZED HEALTH CARE EDUCATION/TRAINING PROGRAM

## 2023-10-19 ASSESSMENT — PAIN SCALES - GENERAL: PAINLEVEL: 0-NO PAIN

## 2023-10-23 ENCOUNTER — TELEPHONE (OUTPATIENT)
Dept: RADIATION ONCOLOGY | Facility: HOSPITAL | Age: 63
End: 2023-10-23
Payer: MEDICAID

## 2023-10-23 ENCOUNTER — OFFICE VISIT (OUTPATIENT)
Dept: NEUROLOGY | Facility: HOSPITAL | Age: 63
End: 2023-10-23
Payer: MEDICAID

## 2023-10-23 VITALS
TEMPERATURE: 97.1 F | SYSTOLIC BLOOD PRESSURE: 146 MMHG | HEIGHT: 69 IN | DIASTOLIC BLOOD PRESSURE: 86 MMHG | HEART RATE: 95 BPM | BODY MASS INDEX: 23.84 KG/M2 | WEIGHT: 160.94 LBS

## 2023-10-23 DIAGNOSIS — G40.109 FOCAL EPILEPSY (MULTI): ICD-10-CM

## 2023-10-23 DIAGNOSIS — R56.9 SEIZURES (MULTI): Primary | ICD-10-CM

## 2023-10-23 DIAGNOSIS — G40.209 PARTIAL SYMPTOMATIC EPILEPSY WITH COMPLEX PARTIAL SEIZURES, NOT INTRACTABLE, WITHOUT STATUS EPILEPTICUS (MULTI): ICD-10-CM

## 2023-10-23 PROCEDURE — 99205 OFFICE O/P NEW HI 60 MIN: CPT | Performed by: STUDENT IN AN ORGANIZED HEALTH CARE EDUCATION/TRAINING PROGRAM

## 2023-10-23 PROCEDURE — 99215 OFFICE O/P EST HI 40 MIN: CPT | Performed by: STUDENT IN AN ORGANIZED HEALTH CARE EDUCATION/TRAINING PROGRAM

## 2023-10-23 RX ORDER — DIVALPROEX SODIUM 250 MG/1
250 TABLET, FILM COATED, EXTENDED RELEASE ORAL 2 TIMES DAILY
Qty: 90 TABLET | Refills: 1 | Status: SHIPPED | OUTPATIENT
Start: 2023-10-23 | End: 2024-01-12 | Stop reason: HOSPADM

## 2023-10-23 RX ORDER — LACOSAMIDE 200 MG/1
200 TABLET ORAL 2 TIMES DAILY
Qty: 30 TABLET | Refills: 5 | Status: SHIPPED | OUTPATIENT
Start: 2023-10-23 | End: 2024-01-29

## 2023-10-23 RX ORDER — DIVALPROEX SODIUM 500 MG/1
500 TABLET, FILM COATED, EXTENDED RELEASE ORAL 2 TIMES DAILY
Qty: 90 TABLET | Refills: 1 | Status: SHIPPED | OUTPATIENT
Start: 2023-10-23 | End: 2024-01-12 | Stop reason: HOSPADM

## 2023-10-23 RX ORDER — LACOSAMIDE 200 MG/1
200 TABLET ORAL 2 TIMES DAILY
Qty: 90 TABLET | Refills: 1 | Status: SHIPPED | OUTPATIENT
Start: 2023-10-23 | End: 2023-10-23 | Stop reason: SDUPTHER

## 2023-10-23 NOTE — PATIENT INSTRUCTIONS
"It was a pleasure to see you in clinic today. We have refilled your lacosamide (Vimpat) and valproic acid (Depakote). We have ordered an EEG test and an EKG test.     Compliance education: It is important to continue to try and achieve seizure control because of the potential for injury and illness due to seizures. In a very small minority of patients with generalized tonic clonic seizures (\"grand mal\"), breathing or heart function can stop during a seizure and result in demise (sudden unexpected death in epilepsy or SUDEP). [place] from seizures prevents this kind of outcome.    If you have any questions, please call my office at [phone] (Dr. Valdes). If you have any sudden new concerning or worsening symptoms, call [phone] and go to the Emergency Room. Otherwise, it was good seeing you today, and we will see you back in about 3 months.    Sincerely,  [facility]    "

## 2023-10-23 NOTE — PROGRESS NOTES
63 y.o.male with SCC lung (dx 6/26/23), bipolar d/o, COPD, Acomm aneurysm s/p coiling presenting in initial consultation for seizures.    Patient presents today with his daughter. He recently moved here from [place] where he was diagnosed with Epilepsy in 2019 (he sees Dr. Angelica Dasilva in [place]). He initially started having 5 minute staring episodes around 2016 and was eventually diagnosed with an unruptured brain aneurysm which was repaired, and continued to have seizures afterwards.     He has been on current dose of medication for 2 years, and has been seizure free for 1.5 years. He has been hospitalized and intubated before with status per daughter.     He received a letter from [place] [employer] saying his prior authorization for lacosamide was denied, and he only has 3 days left of medication.    4D CLASSIFICATION - Epileptic Paroxysmal Event  Semiology: Automotor (D)* -> GTC**  Onset: 2019  Frequency: *daily **monthly, prior to 2022  Last event: 2022  EZ: Unknown, possibly temporal  Etiology: Acomm aneurysm s/p coiling  Comorbidities: Lung cancer, COPD, Bipolar d/o    Workup:  EEG: In [place], showed seizures.   MRI w/wo: 7/18/23, negative for intracranial metastasis, shows an Acomm aneurysm with GRE signal and R mesial temporal enlarged perivascular space    Prior ASMs: Keppra, became violent.   Current ASMs: Depakote 750 ER BID, lacosamide 200 BID (last to be added)    Handedness: Right-handed  -------------------------------------------------    ANAMNESIS  PER PATIENT: He feels anxious as if he is going to have a seizure, which lasts for hours. However, this does not reliably happen before seizures. He feels this right now during the appointment.     PER WITNESS: Per daughter, he initially will stare off aimlessly, sometimes lasting 5-10 minutes, several times a day to once a week. He would be unresponsive during them. She often noticed jaw movements, an eye flutter (not to any specific direction) and hand  "movements consistent with rolling fingers. He would have no recollection of this afterwards. They can progress to \"grand mal\" seizures where he is \"flopping on the ground.\" Afterwards he is combative and confused.       EPILEPSY RISK FACTORS:   Patient states he was pre-term and had \"kaylee gump\" leg braces. After 2 years he was able to walk normally. He took normal classes in school.     There was no history of febrile convulsions or CNS infections.  Development was normal and developmental milestones were up to par with age. No severe head trauma with loss of consciousness. CNS surgery as described above. No epilepsy in the family.  Denies EtOH or substance use. There are no other risk factors for epilepsy.    Currently Driving?: No    10 point review of systems negative except per HPI.     Past Medical History:   Diagnosis Date    Allergic rhinitis     Asthma     Brain aneurysm     Cancer (CMS/HCC)     Carotid artery disease (CMS/HCC)     COPD (chronic obstructive pulmonary disease) (CMS/HCC)     Epilepsy (CMS/HCC)     High cholesterol     History of alcohol use     Hypertension     Lung cancer (CMS/HCC)        Past Surgical History:   Procedure Laterality Date    CAROTID ENDARTERECTOMY N/A     COLONOSCOPY      HERNIA REPAIR         Family History   Problem Relation Name Age of Onset    Heart attack Mother      Other (Colorectal cancer) Father      Aneurysm Other      Prostate cancer Other      Alcohol abuse Other         Social History     Tobacco Use   Smoking Status Every Day    Packs/day: 1.00    Years: 50.00    Additional pack years: 0.00    Total pack years: 50.00    Types: Cigarettes    Passive exposure: Current   Smokeless Tobacco Current     Social History     Substance and Sexual Activity   Alcohol Use Never         No Known Allergies    Medications reviewed     EXAM   height is 1.753 m (5' 9\") and weight is 73 kg (160 lb 15 oz). His temperature is 36.2 °C (97.1 °F). His blood pressure is 146/86 and " his pulse is 95.     General Appearance:  No acute distress, appears stated age.    Mental Status:  The patient was alert and oriented to person, time, and place. Speech was fluent.   The patient remembered three out of three objects after approximately 5 minutes.     Cranial Nerves:  L pupil 3mm, R pupil 2mm, reacted briskly to light bilaterally. No visual field cuts were noted on confrontation testing.   EOMI, no pathological nystagmus.   Facial light-touch sensation and motor activation was symmetric bilaterally.   Hearing intact bilaterally to finger rub.   Tongue protruded mildline with intact bilateral movements.   Shoulder shrug symmetric.     Motor Exam:  Muscle tone was normal in upper and lower extremities, proximal and distal bilaterally.   Motor power was 5/5 in bilateral upper and 5- in lower extremities (limited by pain)  Fine tremor of R>L hand noted with hands outstretched and on finger to nose.     Sensory Exam:   Sensory system was intact. The sensory exam was intact to light touch throughout.    Coordination Exam:  Finger-to-nose and heel-to-shin testing without dysmetria.    Gait Exam:   Gait was slow and unsteady with slightly wide base.       ASSESSMENT AND PLAN  63 y.o.male with PMH of Acomm aneurysm s/p coiling presenting in initial consultation for Epilepsy, which appears to be temporal based on semiology. He is well-controlled on current regimen and last seizure was 1.5 yrs ago. He needs a prior authorization for lacosamide. We will obtain baseline routine EEG and EKG.     Epileptic Paroxysmal Event  Semiology: Automotor (D)* -> GTC**  Onset: 2019  Frequency: *daily **monthly, prior to 2022  Last event: 2022  EZ: Unknown, possibly temporal  Etiology: Acomm aneurysm s/p coiling  Comorbidities: Lung cancer, COPD, Bipolar d/o    Plan:  - continue Depakote 750mg ER BID   - continue lacosamide 200mg BID (will work on prior authorization)  - routine EEG  - EKG (NE interval for lacosamide)   - RTC  3-6mo   - currently not driving, does not need seizure precautions     Tiara Sullivan MD   PGY5 Epilepsy Fellow

## 2023-10-23 NOTE — TELEPHONE ENCOUNTER
Called pt to remind of NPV appointment on 10/26/23 at 10:30. Pt's phone went to voicemail left number if needs to reschedule.

## 2023-10-25 NOTE — PROGRESS NOTES
Chief complaint:  Left upper lobe cancer    History Of Present Illness  Martínez Neri is a 63 y.o. male presenting with a left upper lobe lung cancer. Patient was referred by Dr. Peters, Oncology. In short, patient was staying with his sister out [place] and was seen in the  ED for worsening shortness of breath and at that time and found to have a THOMAS mass with post-obstructive collapse. He ultimately underwent PET and bx confirming a SCC with (+) level 11 lymph node.   His daughter traveled and was able to relocate him to [place], he presents today with his wife. Did have some weight loss over the summer, this has stabilized/slightly improved over the last few weeks. He is more active, going up and down the steps at home, but still tired and taking some breaks throughout the day.  He denies any headaches or new neurological symptoms.  His respiratory symptoms are stable.  There is no new localized pain.    Full evaluation as below:    On 06/17/2023 the patient had a CT chest for ongoing respiratory symptoms and was found to have a left suprahilar mass involving the left upper lobe bronchi with postobstructive pneumonia/collapse.  On 06/25/2023 CT chest/abdomen/pelvis demonstrated persistent masslike consolidation in the left upper lobe with no signs of extrathoracic metastatic disease.  On 06/26/2023 the patient underwent a bronchoscopy, and pathology was compatible with a squamous cell carcinoma at least in situ, with a foci highly suspicious for invasive squamous cell carcinoma.  On 07/18/2023 a PET/CT obtained, demonstrating a 6.6 cm left upper lobe mass with an SUV of 8.3 and no evidence of distant metastatic disease.  On 07/18/2023 to brain MRI was negative for intracranial metastasis.  On 07/27/2023 bronchoscopy/EBUS was performed.  Endobronchial biopsies of left upper lobe demonstrated an invasive moderately differentiated squamous cell carcinoma.  Left upper lobe hilar FNA showed atypical cells with squamous  differentiation, suspicious for malignancy.  Subcarinal FNA was indeterminate for malignancy with extremely rare atypical cells with squamous differentiation.      No history of MI or CVA. H/o  Could walk a mile or climb 2 flights of stairs: yes     Past Medical History  He has a past medical history of Allergic rhinitis, Asthma, Brain aneurysm, Cancer (CMS/HCC), Carotid artery disease (CMS/HCC), COPD (chronic obstructive pulmonary disease) (CMS/HCC), Epilepsy (CMS/HCC), High cholesterol, History of alcohol use, Hypertension, and Lung cancer (CMS/HCC).    Surgical History  He has a past surgical history that includes Carotid endarterectomy (N/A); Colonoscopy; and Hernia repair.     Social History  He reports that he has been smoking cigarettes. He has a 50.00 pack-year smoking history. He has been exposed to tobacco smoke. He uses smokeless tobacco. He reports that he does not drink alcohol and does not use drugs.    Family History  Family history of cancer: Yes  Family History   Problem Relation Name Age of Onset    Heart attack Mother      Other (Colorectal cancer) Father      Aneurysm Other      Prostate cancer Other      Alcohol abuse Other          Medications    Current Outpatient Medications:     albuterol (Proventil HFA) 90 mcg/actuation inhaler, Inhale 2 puffs every 4 hours if needed for wheezing or shortness of breath., Disp: 6.7 g, Rfl: 6    aspirin 81 mg chewable tablet, Chew 1 tablet (81 mg) once daily. (Patient not taking: Reported on 10/12/2023), Disp: 90 tablet, Rfl: 1    atorvastatin (Lipitor) 40 mg tablet, Take 1 tablet (40 mg) by mouth once daily., Disp: 90 tablet, Rfl: 1    cholecalciferol (Vitamin D3) 5,000 Units tablet, Take 1 tablet (5,000 Units) by mouth once daily., Disp: 90 tablet, Rfl: 1    divalproex (Depakote ER) 250 mg 24 hr tablet, Take 1 tablet (250 mg) by mouth 2 times a day. Do not crush, chew, or split., Disp: 90 tablet, Rfl: 1    divalproex (Depakote ER) 500 mg 24 hr tablet, Take  1 tablet (500 mg) by mouth 2 times a day. Do not crush, chew, or split., Disp: 90 tablet, Rfl: 1    fluticasone (Flonase Sensimist) 27.5 mcg/actuation nasal spray, Administer 1 spray into each nostril once daily., Disp: 10 g, Rfl: 6    inhalat.spacing dev,med. mask spacer, , Disp: , Rfl:     ipratropium-albuteroL (Duo-Neb) 0.5-2.5 mg/3 mL nebulizer solution, Take 3 mL by nebulization 4 times a day as needed for wheezing or shortness of breath., Disp: 90 mL, Rfl: 1    lacosamide (Vimpat) 200 mg tablet tablet, Take 1 tablet (200 mg) by mouth 2 times a day., Disp: 30 tablet, Rfl: 5    nebulizer accessories misc, Use twice daily, Disp: 100 each, Rfl: 0    nebulizer and compressor device, Use as directed (Patient not taking: Reported on 10/24/2023), Disp: 1 each, Rfl: 0    nicotine (Nicoderm CQ) 21 mg/24 hr patch, Place 1 patch over 24 hours on the skin once every 24 hours., Disp: 30 patch, Rfl: 0    thiamine (Vitamin B-1) 100 mg tablet, Take 1 tablet (100 mg) by mouth once daily., Disp: 90 tablet, Rfl: 1    Allergies  Patient has no allergy information on record.    Review of Systems:  Review of Systems   Constitutional: No fevers, chills, unexpected weight change  HENT: No sore throat, congestion, or nasal drainage  Eyes: No visual changes or eye itching  Respiratory:  see HPI. No cough, worsening dyspnea, wheezing  Cardiac: No chest pain, palpitations, or lower extremity edema  Gastrointestinal: No nausea, vomiting, diarrhea. No abdominal pain  Genitourinary: No dysuria or hematuria  Musculoskeletal: No back pain. No significant myalgias or arthralgias  Neurologic: No headaches, dizziness, or seizures.  Hematologic: No east bleeding or bruising.  Psychiatric: No anxiety or depression.    Physical Exam:  Physical Exam  /60 (BP Location: Left arm, Patient Position: Sitting)   Pulse 72   Temp 36.2 °C (97.2 °F) (Core)   Resp 18   Wt 73.3 kg (161 lb 9.6 oz)   SpO2 97%   BMI 23.69 kg/m²   Constitutional:        General: Patient is not in acute distress.     Appearance: Normal appearance; not ill-appearing.   HENT:      Head: Normocephalic.      Nose: No congestion or rhinorrhea.   Cardiovascular:      Rate and Rhythm: Normal rate and regular rhythm.      Pulses: Normal pulses.   Pulmonary:      Effort: Pulmonary effort is normal. No respiratory distress.  No conversational dyspnea     Breath sounds: No stridor. No wheezing.   Abdominal:      General: There is no distension.      Palpations: Abdomen is soft.      Tenderness: There is no abdominal tenderness.   Musculoskeletal:         General: No swelling, tenderness or deformity. Normal range of motion.      Cervical back: Normal range of motion. No rigidity.   Lymphadenopathy:      Cervical: No cervical adenopathy.   Skin:     General: Skin is warm and dry.   Neurological:      General: No focal deficit present.      Mental Status: Patient is alert and oriented to person, place, and time.   Psychiatric:         Mood and Affect: Mood normal.     Relevant Results  MR brain w and wo IV contrast    Result Date: 10/13/2023  Interpreted By:  Dharmesh Reddy and Ebai Jerky STUDY: MR BRAIN W AND WO IV CONTRAST;  10/13/2023 8:01 pm   INDICATION: Signs/Symptoms:Staging lung cancer THOMAS. Per EMR: 64-year-old male, smoker, 50-pack-year history), with history of COPD and seizures, who was diagnosed with a squamous cell carcinoma of the left upper lobe in [place], never treated.   COMPARISON: None.   ACCESSION NUMBER(S): [id number]   ORDERING CLINICIAN: SENIA FUNES   TECHNIQUE: Multiplanar, multi-sequence images of the brain were obtained without contrast. before and after the intravenous administration of 15 mL Dotarem gadolinium.   FINDINGS: Normal morphology of midline structures.   Diffusion weighted images show no evidence of acute ischemic infarct.   Mild generalized parenchymal volume loss. Nonspecific patchy periventricular and subcortical T2/FLAIR hyperintensity, likely  represents a sequela of chronic microvascular ischemic disease.Small focus of blooming artifact within the parasagittal left frontal lobe, may represent hemosiderin, mineralization, or a tiny cavernoma. 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   There is no abnormal intracranial enhancement or mass.   There is no evidence of an acute intraparenchymal hemorrhage, mass, mass-effect, midline shift or extra-axial fluid collection.   The ventricular size and cerebral volume are age-concordant.   Mild mucosal thickening is present within the ethmoid air cells and there are tiny mucosal retention cysts located within the left maxillary sinus. There is partial opacification of the bilateral mastoid air cells with nonspecific fluid.       1. No abnormal intracranial enhancement or mass to suggest metastases.   2. Chronic intracranial findings as above.   I personally reviewed the images/study and I agree with the findings as stated. This study was interpreted at [place], [place].   Signed by: Dharmesh Reddy 10/13/2023 8:27 PM Dictation workstation:   [id number]    NM PET CT lung CA staging    Result Date: 10/6/2023  Interpreted By:  Flavio Lawler and Meyers Emily STUDY: NM PET CT LUNG CA STAGING;  10/6/2023 12:09 pm   INDICATION: Signs/Symptoms:Staging lung cancer THOMAS.   Per medical record: 63-year-old male with history of squamous cell carcinoma of the left lung confirmed on biopsy 06/26/2023. No treatment.   COMPARISON: PET-CT 07/18/2023, CT chest 10/06/2023   ACCESSION NUMBER(S): [id number]   ORDERING CLINICIAN: SENIA FUNES   TECHNIQUE: DIVISION OF NUCLEAR MEDICINE POSITRON EMISSION TOMOGRAPHY (PET-CT)   The patient received an intravenous dose of 10.1 mCi of Fluorine-18 fluorodeoxyglucose (FDG). Positron emission tomographic (PET) images from mid-thigh to skull base were then acquired after a one hour delay. Also acquired was a contemporaneous low dose non-contrast CT  scan performed for attenuation correction of PET images and anatomic localization.  The PET and CT images were digitally fused for display.  All images were acquired on a combined PET-CT scanner unit. Some areas of FDG accumulation may be described in standardized uptake value (SUV) units.   CODING: Subsequent Treatment Strategy (PS)   CALIBRATION: Dose Injection-to-Scan Interval (mins): 73 min Mediastinal bloodpool SUV (normal 1.5-2.5): 1.6 Blood glucose: 80 mg/dL   FINDINGS: NECK: No focal hypermetabolic soft tissue lesion is seen in the neck. No hypermetabolic cervical lymphadenopathy is present.   CHEST: Slight interval decrease in size of a masslike consolidation within the left upper lobe and lingula with associated obliteration of several adjacent bronchi. There is interval decrease in hypermetabolic activity of the mass, particularly along the more peripheral aspect with an SUV max of 3.9, previously measuring up to 7.1. The more central aspect of the masslike consolidation exhibits similar intense FDG uptake with a SUV max of 9.2. New cluster of nodularity along the posterior aspect of the right upper lobe with mild increased FDG uptake and a SUV max of 1.9. Nonenlarged right hilar lymph node with new mild increased FDG uptake and a SUV max of 2.1.   ABDOMEN AND PELVIS: No hypermetabolic soft tissue lesion is present in the abdomen and pelvis. No evidence of hypermetabolic lymphadenopathy. Sigmoid colonic diverticulosis without evidence of acute diverticulitis on low-dose CT. Physiologic radiotracer uptake is present in the liver and spleen with excretion into the bowel loops and the genitourinary tract.   MUSCULOSKELETAL: There is no focal hypermetabolic lesion to suggest osseous metastasis.       1. Slight interval decrease in size and hypermetabolic activity of a masslike consolidation within the left upper lobe and lingula, particularly along the more peripheral aspect, with similar appearance of  intense FDG uptake along the central component of the mass. 2. No new hypermetabolic sites of disease within the neck, chest, abdomen, or pelvis. 3. New cluster of micro nodularity/ground-glass appearance along the posterior aspect of the right upper lobe with associated right hilar lymph node with mild hypermetabolic activity, findings which are likely infectious or inflammatory.   I personally reviewed the images/study, and I agree with the findings as stated above. This study was interpreted at [place], [place].   MACRO: None   Signed by: Flavio Lawler 10/6/2023 1:52 PM Dictation workstation:   [id number]    CT chest w IV contrast    Result Date: 10/6/2023  Interpreted By:  Mariusz Terrell, STUDY: CT CHEST W IV CONTRAST;  10/6/2023 11:24 am   INDICATION: Signs/Symptoms:Staging lung cancer THOMAS. No other clinical history available at the time of study interpretation.   COMPARISON: PET-CT 07/18/2023   ACCESSION NUMBER(S): [id number]   ORDERING CLINICIAN: SENIA FUNES   TECHNIQUE: Helical data acquisition of the chest was obtained following intravenous administration of 75 mL of Omnipaque 350 contrast. Images were reformatted in axial, coronal, and sagittal planes.   FINDINGS: LUNGS AND AIRWAYS: The trachea and central airways are patent. No endobronchial lesion is seen, although there is mild multifocal mucous plugging present. There is also minimal nonobstructive mucus material seen within mid to distal trachea. There is diffuse bronchial wall thickening, which is a nonspecific finding, but may represent changes of chronic bronchitis.   There is again demonstration of a masslike consolidation centered in the region of posterior left upper lobe and lingula, which is associated with obliteration of multiple adjacent bronchi. As compared to prior study, the overall aeration of inferior lingular segment has improved.   There is again demonstration of moderate to severe  diffuse emphysematous changes within bilateral lungs. There is mild bandlike atelectasis along the right lung base and to lesser extent within medial right middle lobe and left lower lobe, slightly increased from prior. There is mild clustered micro nodularity within posterior right upper lobe on axial image 128 of 359, series 2, which is new from prior examination and may represent focal bronchiolitis.   MEDIASTINUM AND WILLIAM, LOWER NECK AND AXILLA: The visualized thyroid gland is within normal limits. No evidence of thoracic lymphadenopathy by CT criteria. Esophagus appears within normal limits as seen.   HEART AND VESSELS: The thoracic aorta normal in course and caliber.There is moderate atherosclerosis present, including calcified and noncalcified plaques. Main pulmonary artery and its branches are normal in caliber. Severe coronary artery calcifications are seen. Please note,the study is not optimized for evaluation of coronary arteries. The cardiac chambers are not enlarged. There is no pericardial effusion seen.   UPPER ABDOMEN: Multiple hypodensities scattered throughout visualized liver measuring up to 1.5 cm on axial image 328 of 359, series 2, are unchanged and favor underlying benign etiology such as cysts and/or hemangiomas. Rest of visualized abdominal structures are grossly unremarkable. CHEST WALL AND OSSEOUS STRUCTURES: Chest wall is within normal limits. No acute osseous pathology.There are no suspicious osseous lesions.       1. Redemonstrated masslike consolidation in the region of posterior left upper lobe and lingula, which overall appears slightly smaller as compared to prior PET-CT 07/18/2023. Given the hypermetabolic activity in this location, underlying viable neoplasm is not excluded. Please correlate with prior oncological and treatment history. 2. No other suspicious pulmonary nodules, although there is minimal clustered micro nodularity now seen within posterior right upper lobe, which  may represent mild nonspecific bronchiolitis. 3. Redemonstrated moderate to severe diffuse emphysema within bilateral lungs. 4. Redemonstrated severe coronary artery calcifications. Please note that, the present study is not tailored for evaluation of coronary arteries. 5. Additional stable chronic findings as above.   Signed by: Mariusz Terrell 10/6/2023 11:43 AM Dictation workstation:   [id number]       Pulmonary Functions Testing Results:    FEV1 <30, DLCO <30.      Assessment/Plan   Diagnoses and all orders for this visit:  Malignant neoplasm of upper lobe of left lung (CMS/HCC)  -     Referral to Thoracic Surgery  -     Transthoracic Echo (TTE) Complete; Future      Initially d/w patient and wife lobectomy with adjuvant treatment. Unfortunately PFTs came through at the end of our conversation. His numbers are prohibitive for surgical resection which I did d/w them in detail. Will refer back to Oncology/Radiation Oncology for definitive treatment.        I spent 50 minutes in the professional and overall care of this patient.      Annemarie St, DO  Thoracic & Esophageal Surgery

## 2023-10-26 ENCOUNTER — HOSPITAL ENCOUNTER (OUTPATIENT)
Dept: RADIATION ONCOLOGY | Facility: HOSPITAL | Age: 63
Setting detail: RADIATION/ONCOLOGY SERIES
Discharge: STILL ADMITTED OR ADMINISTRATIVE | End: 2023-10-26
Payer: MEDICAID

## 2023-10-26 VITALS
OXYGEN SATURATION: 95 % | TEMPERATURE: 96.8 F | BODY MASS INDEX: 24.26 KG/M2 | HEIGHT: 69 IN | DIASTOLIC BLOOD PRESSURE: 78 MMHG | WEIGHT: 163.8 LBS | RESPIRATION RATE: 18 BRPM | SYSTOLIC BLOOD PRESSURE: 131 MMHG | HEART RATE: 80 BPM

## 2023-10-26 DIAGNOSIS — C34.12 MALIGNANT NEOPLASM OF UPPER LOBE OF LEFT LUNG (MULTI): ICD-10-CM

## 2023-10-26 PROCEDURE — 99205 OFFICE O/P NEW HI 60 MIN: CPT | Performed by: RADIOLOGY

## 2023-10-26 PROCEDURE — 99215 OFFICE O/P EST HI 40 MIN: CPT | Performed by: RADIOLOGY

## 2023-10-26 RX ORDER — TIOTROPIUM BROMIDE 18 UG/1
1 CAPSULE ORAL; RESPIRATORY (INHALATION) 2 TIMES DAILY
COMMUNITY
End: 2023-12-12 | Stop reason: WASHOUT

## 2023-10-26 ASSESSMENT — PATIENT HEALTH QUESTIONNAIRE - PHQ9
1. LITTLE INTEREST OR PLEASURE IN DOING THINGS: SEVERAL DAYS
SUM OF ALL RESPONSES TO PHQ9 QUESTIONS 1 AND 2: 2
10. IF YOU CHECKED OFF ANY PROBLEMS, HOW DIFFICULT HAVE THESE PROBLEMS MADE IT FOR YOU TO DO YOUR WORK, TAKE CARE OF THINGS AT HOME, OR GET ALONG WITH OTHER PEOPLE: NOT DIFFICULT AT ALL
2. FEELING DOWN, DEPRESSED OR HOPELESS: SEVERAL DAYS

## 2023-10-26 ASSESSMENT — COLUMBIA-SUICIDE SEVERITY RATING SCALE - C-SSRS
1. IN THE PAST MONTH, HAVE YOU WISHED YOU WERE DEAD OR WISHED YOU COULD GO TO SLEEP AND NOT WAKE UP?: NO
6. HAVE YOU EVER DONE ANYTHING, STARTED TO DO ANYTHING, OR PREPARED TO DO ANYTHING TO END YOUR LIFE?: NO
2. HAVE YOU ACTUALLY HAD ANY THOUGHTS OF KILLING YOURSELF?: NO

## 2023-10-26 ASSESSMENT — PAIN SCALES - GENERAL: PAINLEVEL: 0-NO PAIN

## 2023-10-26 NOTE — PROGRESS NOTES
Radiation Oncology Outpatient Consult    Patient Name:  Martínez Neri  MRN:  [id number]  :  1960    Referring Provider: Elo Peters MD  Primary Care Provider: Jayme Casey MD  Care Team: Patient Care Team:  Jayme Casey MD as PCP - General (Internal Medicine)  Elo Peters MD as Consulting Physician (Hematology and Oncology)    Date of Service: 10/26/2023     SUBJECTIVE  History of Present Illness:  Martínez Neri is a 63 y.o. male who was referred by Elo Peters MD, for a consultation to the [facility] Department of Radiation Oncology.  He is presenting for evaluation and management of a lung cancer, at least [id number] versus [id number] (outside bronchoscopy noted left upper lobe FNA with atypical cells with squamous differentiation).    His full oncology history is a follows:    Mr. Neri is a 63 year old male who was found to have a left lung mass during work-up for a syncopal episode in [facility]. At the time her was felt to have an early stage lung cancer. He was then seen by his primary care provider and specialists in [place].    His records from the [facility] were summarized by Dr. Peters from medical oncology, and are repeated below.    On 2023 he underwent a CT of his chest and was found to have a left suprahilar mass involving the left upper lobe bronchi with postobstructive pneumonia/collapse. CT chest, abdomen, and pelvis on 2023 demonstrated a persistent mass-like consolidation in the left upper lobe with no signs of extranodal disease. He underwent a bronchoscopy on 2023 with pathology being consistent with squamous cell carcinoma. PET/CT scan on 2023 demonstrated a 6.6 cm left upper lobe mass with an SUV of 8.3 and no evidence of distant metastatic disease. A bronchoscopy with EBUS was performed on 2023. Pathology demonstrated invasive moderately differentiated squamous cell carcinoma.  Left upper lobe hilar fine needle aspiration should atypical cells with squamous differentiation, and subcarinal fine needle aspiration was indeterminate for malignancy with extremely rare atypical cells with squamous differentiation. Molecular testing did not reveal any actionable mutations. PD-L1 TPS was 20%. He was felt not to be a candidate for chemotherapy and radiation due to his performance status at the time. He ultimately relocated to [place] at the request of his daughter and son.    He was seen by Dr. Peters at [facility] on 10/05/2023 with recommendation for repeat CT of his chest with contrast along with PET/CT and brain MRI. A CT of his chest with contrast was performed on 10/06/2023 that demonstrated mass-like consolidation in the region of the posterior upper lobe and lingula, which overall appears slightly smaller when compared to his prior PET/CT scan from 07/18/2023. No other suspicious pulmonary nodules were noted. PET/CT scan on 10/06/2023 demonstrated a slight interval decrease in the size and hypermetabolic activity of a mass-like consolidation within the left upper lobe and lingula, particularly along the more peripheral aspect, with similar appearance of intense FDG uptake along the central component of the mass. No new hypermetabolic sites of disease within the neck, chest, abdominal, or pelvis were noted. An MRI of the brain was performed on 10/13/2023 that did not demonstrate any intracranial metastases.    He was seen by pulmonology and underwent PFTs. He was seen by thoracic surgery on 10/19/2023, and was noted to have PFTs demonstrating an FEV1 of 30% and a DLCO of 30% (final report not available at this time).. He was not felt to be a surgical candidate due to his poor lung function. He is scheduled for bronchoscopy with EBUS on 10/31/2023.    He now presents for outpatient radiation oncology consultation with his wife. He is active smoker, and smokes about 1--3 cigarettes  per day and also uses a nicotine patch. He is able to ambulate and go up 16 flights of stairs 10 times per day without shortness of breath. He reports an intermittent cough, but denies any productive cough, hemoptysis, chest pain, abdominal pain, back pain, weight loss, nausea, vomiting, fevers, or chills.    Prior Radiotherapy:  No    Current Systemic Treatment:  No     Presence of Pacemaker or ICD:  No    Past Medical History:    Past Medical History:   Diagnosis Date    Allergic rhinitis     Asthma     Brain aneurysm     Cancer (CMS/HCC)     Carotid artery disease (CMS/HCC)     COPD (chronic obstructive pulmonary disease) (CMS/HCC)     Epilepsy (CMS/HCC)     High cholesterol     History of alcohol use     Hypertension     Lung cancer (CMS/formerly [facility])         Past Surgical History:    Past Surgical History:   Procedure Laterality Date    CAROTID ENDARTERECTOMY N/A     CEREBRAL ANEURYSM REPAIR      COLONOSCOPY      HERNIA REPAIR        Family History:  Cancer-related family history includes Prostate cancer in an other family member.    Social History:    Social History     Tobacco Use    Smoking status: Every Day     Packs/day: 1.00     Years: 50.00     Additional pack years: 0.00     Total pack years: 50.00     Types: Cigarettes     Passive exposure: Current    Smokeless tobacco: Never   Vaping Use    Vaping Use: Never used   Substance Use Topics    Alcohol use: Not Currently     Comment: quit 7 years ago    Drug use: Yes     Frequency: 4.0 times per week     Types: Marijuana       Allergies:  No Known Allergies     Medications:    Current Outpatient Medications:     albuterol (Proventil HFA) 90 mcg/actuation inhaler, Inhale 2 puffs every 4 hours if needed for wheezing or shortness of breath., Disp: 6.7 g, Rfl: 6    atorvastatin (Lipitor) 40 mg tablet, Take 1 tablet (40 mg) by mouth once daily., Disp: 90 tablet, Rfl: 1    cholecalciferol (Vitamin D3) 5,000 Units tablet, Take 1 tablet (5,000 Units) by mouth once daily.,  Disp: 90 tablet, Rfl: 1    divalproex (Depakote ER) 250 mg 24 hr tablet, Take 1 tablet (250 mg) by mouth 2 times a day. Do not crush, chew, or split., Disp: 90 tablet, Rfl: 1    divalproex (Depakote ER) 500 mg 24 hr tablet, Take 1 tablet (500 mg) by mouth 2 times a day. Do not crush, chew, or split., Disp: 90 tablet, Rfl: 1    ipratropium-albuteroL (Duo-Neb) 0.5-2.5 mg/3 mL nebulizer solution, Take 3 mL by nebulization 4 times a day as needed for wheezing or shortness of breath., Disp: 90 mL, Rfl: 1    lacosamide (Vimpat) 200 mg tablet tablet, Take 1 tablet (200 mg) by mouth 2 times a day., Disp: 30 tablet, Rfl: 5    nicotine (Nicoderm CQ) 21 mg/24 hr patch, Place 1 patch over 24 hours on the skin once every 24 hours., Disp: 30 patch, Rfl: 0    thiamine (Vitamin B-1) 100 mg tablet, Take 1 tablet (100 mg) by mouth once daily., Disp: 90 tablet, Rfl: 1    tiotropium (Spiriva) 18 mcg inhalation capsule, Place 1 capsule (18 mcg) into inhaler and inhale 2 times a day., Disp: , Rfl:     aspirin 81 mg chewable tablet, Chew 1 tablet (81 mg) once daily. (Patient not taking: Reported on 10/12/2023), Disp: 90 tablet, Rfl: 1    fluticasone (Flonase Sensimist) 27.5 mcg/actuation nasal spray, Administer 1 spray into each nostril once daily. (Patient not taking: Reported on 10/26/2023), Disp: 10 g, Rfl: 6    inhalat.spacing dev,med. mask spacer, , Disp: , Rfl:     nebulizer accessories misc, Use twice daily (Patient not taking: Reported on 10/26/2023), Disp: 100 each, Rfl: 0    nebulizer and compressor device, Use as directed (Patient not taking: Reported on 10/24/2023), Disp: 1 each, Rfl: 0      Review of Systems:  Review of Systems   All other systems reviewed and are negative.    The patient's current pain level was assessed.  They report currently having a pain of 0 out of 10.  They feel their pain is under control without the use of pain medications.    Performance Status:  The Karnofsky performance scale today is 90, Able to  "carry on normal activity; minor signs or symptoms of disease (ECOG equivalent 0).      OBJECTIVE  Physical Exam:  /78 (BP Location: Right arm, Patient Position: Sitting, BP Cuff Size: Adult)   Pulse 80   Temp 36 °C (96.8 °F) (Temporal)   Resp 18   Ht 1.753 m (5' 9\")   Wt 74.3 kg (163 lb 12.8 oz)   SpO2 95%   BMI 24.19 kg/m²    Physical Exam  Constitutional:       Appearance: Normal appearance. He is not ill-appearing.   HENT:      Head: Normocephalic and atraumatic.      Mouth/Throat:      Mouth: Mucous membranes are moist.   Eyes:      Extraocular Movements: Extraocular movements intact.      Pupils: Pupils are equal, round, and reactive to light.   Cardiovascular:      Rate and Rhythm: Normal rate and regular rhythm.   Pulmonary:      Effort: Pulmonary effort is normal.      Breath sounds: Wheezing present.      Comments: Expiratory wheezes throughout lungs bilaterally, more prominent in lung bases  Abdominal:      General: Abdomen is flat. Bowel sounds are normal.      Palpations: Abdomen is soft.   Musculoskeletal:         General: Normal range of motion.      Cervical back: Normal range of motion and neck supple.      Right lower leg: No edema.      Left lower leg: No edema.   Skin:     General: Skin is warm.   Neurological:      General: No focal deficit present.      Mental Status: He is alert.   Psychiatric:         Mood and Affect: Mood normal.      Laboratory Review:  There are no laboratory contraindications to radiation therapy.    The pertinent lab results were reviewed and discussed with the patient.      Pathology Review:  The pertinent pathology results were reviewed and discussed with the patient.      Imaging:  The pertinent imaging results were reviewed and discussed with the patient.      CT chest w IV contrast 10/06/2023  Impression  1. Redemonstrated masslike consolidation in the region of posterior  left upper lobe and lingula, which overall appears slightly smaller  as compared " to prior PET-CT 07/18/2023. Given the hypermetabolic  activity in this location, underlying viable neoplasm is not  excluded. Please correlate with prior oncological and treatment  history.  2. No other suspicious pulmonary nodules, although there is minimal  clustered micro nodularity now seen within posterior right upper  lobe, which may represent mild nonspecific bronchiolitis.  3. Redemonstrated moderate to severe diffuse emphysema within  bilateral lungs.  4. Redemonstrated severe coronary artery calcifications. Please note  that, the present study is not tailored for evaluation of coronary  arteries.  5. Additional stable chronic findings as above.      NM PET CT lung CA staging 10/06/2023  Impression  1. Slight interval decrease in size and hypermetabolic activity of a  masslike consolidation within the left upper lobe and lingula,  particularly along the more peripheral aspect, with similar  appearance of intense FDG uptake along the central component of the  mass.  2. No new hypermetabolic sites of disease within the neck, chest,  abdomen, or pelvis.  3. New cluster of micro nodularity/ground-glass appearance along the  posterior aspect of the right upper lobe with associated right hilar  lymph node with mild hypermetabolic activity, findings which are  likely infectious or inflammatory.         ASSESSMENT:  Martínez Neri is a 63 y.o. male with a [id number] versus [id number] (outside bronchoscopy noted left upper lobe hilar FNA with atypical cells with squamous differentiation) non-small cell squamous cell lung cancer. Final staging is pending a bronchoscopy with EBUS, which is scheduled for 10/31/2023. However, he is not felt to be a surgical candidate based upon his recent pulmonary function testing.     He presents today with his wife to talk about radiation treatment for his lung cancer.    We reviewed his imaging done in early October 2023, which shows a left upper lobe mass about 4-5 cm involving a left  upper lobe. We also noted small subcarinal lymph nodes. Per outside notes, a left hilar FNA was performed which showed atypical cells with squamous differentiation. However, it is unclear whether this was an extension of the mass the was biopsied or a hilar lymph node. Thus, we await his upcoming bronchoscopy and EBUS to complete his staging. However, he appears to a [id number] versus cP1qP0T0.     We discussed several treatment options for his lung cancer. While early stage node negative tumors could be treated with radiation alone, given the post-obstructive atelactasis extending to hilum, and possible concern for danny disease, we would recommend concurrent chemoradiotherapy. Goals of radiation in the setting of concurrent therapy were reviewed.    We discussed the radiation treatment planning process, including performing a radiation treatment planning CT for target delineation. We discussed that the treatment planning process takes about two weeks. We discussed that treatment is given on daily basis Monday through Friday. We recommended 30-33 radiation treatments to a total dose of 60-66 Gy with weekly carbo/taxol.    We discussed possible treatment at our [place] facility, but the patient is interested in coming to [facility].     We then reviewed possible side effects (Acute and long-term). Common and uncommon side effects with risks as relevant for his case were discussed.  After detailed discussion of risks/benefits/goals/alternatives, the patient signed the informed consent.    PLAN:    -Await results of bronchoscopy and EBUS on 10/31/2023 to complete his staging  -Schedule radiation treatment planning CT at [facility]  -Radiation treatment to left lung mass and any involved lymph nodes to 60-66 Gy in 30-33 fractions with weekly carbo/taxol versus radiation treatment alone in 10-15 fractions if node negative.    NCCN Guidelines were applicable to guide this patients treatment plan.     Thank you for allowing us to  participate in the care of this kind patient.  Patient seen and discussed with attending physician Dr. Xi Wakefield MD PGY5 Radiation Oncology   For  Jorge Layne MD, M  [facility]  Faculty, [employer]  [email or web address]  Our [place]: “To Heal, To Teach, To Discover.”  RN partner: [email or web address]    Phone (scheduling): [phone]/ [email or web address]  Phone (office): [phone]  Phone (after hours): [phone]    ATTENDING ADDENDUM:  I saw and evaluated the patient with the resident. I personally obtained the key and critical portions of the history and physical exam and directly counseled the patient of the treatment plan. I reviewed the resident documentation and discussed the patient with the resident. I agree with the resident's medical decision making as documented in the note.

## 2023-10-30 ENCOUNTER — APPOINTMENT (OUTPATIENT)
Dept: NEUROLOGY | Facility: CLINIC | Age: 63
End: 2023-10-30
Payer: MEDICAID

## 2023-10-31 ENCOUNTER — HOSPITAL ENCOUNTER (OUTPATIENT)
Dept: GASTROENTEROLOGY | Facility: HOSPITAL | Age: 63
Setting detail: OUTPATIENT SURGERY
Discharge: HOME | End: 2023-10-31
Payer: MEDICAID

## 2023-10-31 ENCOUNTER — ANESTHESIA EVENT (OUTPATIENT)
Dept: GASTROENTEROLOGY | Facility: HOSPITAL | Age: 63
End: 2023-10-31
Payer: MEDICAID

## 2023-10-31 ENCOUNTER — ANESTHESIA (OUTPATIENT)
Dept: GASTROENTEROLOGY | Facility: HOSPITAL | Age: 63
End: 2023-10-31
Payer: MEDICAID

## 2023-10-31 ENCOUNTER — TELEPHONE (OUTPATIENT)
Dept: RADIATION ONCOLOGY | Facility: HOSPITAL | Age: 63
End: 2023-10-31
Payer: MEDICAID

## 2023-10-31 VITALS
OXYGEN SATURATION: 91 % | SYSTOLIC BLOOD PRESSURE: 114 MMHG | DIASTOLIC BLOOD PRESSURE: 71 MMHG | HEART RATE: 69 BPM | TEMPERATURE: 96.8 F | RESPIRATION RATE: 20 BRPM | BODY MASS INDEX: 24.14 KG/M2 | HEIGHT: 69 IN | WEIGHT: 163 LBS

## 2023-10-31 DIAGNOSIS — C34.12 PRIMARY CANCER OF LEFT UPPER LOBE OF LUNG (MULTI): Primary | ICD-10-CM

## 2023-10-31 DIAGNOSIS — R91.1 LUNG NODULE: ICD-10-CM

## 2023-10-31 PROCEDURE — A31625 PR BRONCHOSCOPY,BIOPSY: Performed by: NURSE ANESTHETIST, CERTIFIED REGISTERED

## 2023-10-31 PROCEDURE — 88305 TISSUE EXAM BY PATHOLOGIST: CPT | Performed by: PATHOLOGY

## 2023-10-31 PROCEDURE — 2500000001 HC RX 250 WO HCPCS SELF ADMINISTERED DRUGS (ALT 637 FOR MEDICARE OP): Mod: SE

## 2023-10-31 PROCEDURE — 2720000007 HC OR 272 NO HCPCS

## 2023-10-31 PROCEDURE — 88341 IMHCHEM/IMCYTCHM EA ADD ANTB: CPT | Mod: TC,MCY | Performed by: INTERNAL MEDICINE

## 2023-10-31 PROCEDURE — 88342 IMHCHEM/IMCYTCHM 1ST ANTB: CPT | Performed by: PATHOLOGY

## 2023-10-31 PROCEDURE — 31625 BRONCHOSCOPY W/BIOPSY(S): CPT | Performed by: INTERNAL MEDICINE

## 2023-10-31 PROCEDURE — A31625 PR BRONCHOSCOPY,BIOPSY: Performed by: ANESTHESIOLOGY

## 2023-10-31 PROCEDURE — 7100000002 HC RECOVERY ROOM TIME - EACH INCREMENTAL 1 MINUTE

## 2023-10-31 PROCEDURE — 31653 BRONCH EBUS SAMPLNG 3/> NODE: CPT | Performed by: INTERNAL MEDICINE

## 2023-10-31 PROCEDURE — 2500000005 HC RX 250 GENERAL PHARMACY W/O HCPCS: Mod: SE | Performed by: NURSE ANESTHETIST, CERTIFIED REGISTERED

## 2023-10-31 PROCEDURE — 88305 TISSUE EXAM BY PATHOLOGIST: CPT | Mod: TC,SUR | Performed by: INTERNAL MEDICINE

## 2023-10-31 PROCEDURE — 31645 BRNCHSC W/THER ASPIR 1ST: CPT | Performed by: INTERNAL MEDICINE

## 2023-10-31 PROCEDURE — 2500000004 HC RX 250 GENERAL PHARMACY W/ HCPCS (ALT 636 FOR OP/ED): Mod: SE | Performed by: NURSE ANESTHETIST, CERTIFIED REGISTERED

## 2023-10-31 PROCEDURE — 7100000010 HC PHASE TWO TIME - EACH INCREMENTAL 1 MINUTE

## 2023-10-31 PROCEDURE — 3700000002 HC GENERAL ANESTHESIA TIME - EACH INCREMENTAL 1 MINUTE

## 2023-10-31 PROCEDURE — 3700000001 HC GENERAL ANESTHESIA TIME - INITIAL BASE CHARGE

## 2023-10-31 PROCEDURE — 7100000001 HC RECOVERY ROOM TIME - INITIAL BASE CHARGE

## 2023-10-31 PROCEDURE — 7100000009 HC PHASE TWO TIME - INITIAL BASE CHARGE

## 2023-10-31 PROCEDURE — P9045 ALBUMIN (HUMAN), 5%, 250 ML: HCPCS | Mod: JZ,SE | Performed by: NURSE ANESTHETIST, CERTIFIED REGISTERED

## 2023-10-31 PROCEDURE — 88173 CYTOPATH EVAL FNA REPORT: CPT | Performed by: PATHOLOGY

## 2023-10-31 PROCEDURE — 88172 CYTP DX EVAL FNA 1ST EA SITE: CPT | Performed by: PATHOLOGY

## 2023-10-31 RX ORDER — NORETHINDRONE AND ETHINYL ESTRADIOL 0.5-0.035
KIT ORAL AS NEEDED
Status: DISCONTINUED | OUTPATIENT
Start: 2023-10-31 | End: 2023-10-31

## 2023-10-31 RX ORDER — LIDOCAINE HYDROCHLORIDE 10 MG/ML
0.1 INJECTION, SOLUTION EPIDURAL; INFILTRATION; INTRACAUDAL; PERINEURAL ONCE
OUTPATIENT
Start: 2023-10-31 | End: 2023-10-31

## 2023-10-31 RX ORDER — DEXAMETHASONE SODIUM PHOSPHATE 4 MG/ML
INJECTION, SOLUTION INTRA-ARTICULAR; INTRALESIONAL; INTRAMUSCULAR; INTRAVENOUS; SOFT TISSUE AS NEEDED
Status: DISCONTINUED | OUTPATIENT
Start: 2023-10-31 | End: 2023-10-31

## 2023-10-31 RX ORDER — PROPOFOL 10 MG/ML
INJECTION, EMULSION INTRAVENOUS CONTINUOUS PRN
Status: DISCONTINUED | OUTPATIENT
Start: 2023-10-31 | End: 2023-10-31

## 2023-10-31 RX ORDER — SODIUM CHLORIDE, SODIUM LACTATE, POTASSIUM CHLORIDE, CALCIUM CHLORIDE 600; 310; 30; 20 MG/100ML; MG/100ML; MG/100ML; MG/100ML
100 INJECTION, SOLUTION INTRAVENOUS CONTINUOUS
OUTPATIENT
Start: 2023-10-31

## 2023-10-31 RX ORDER — LIDOCAINE HYDROCHLORIDE 20 MG/ML
INJECTION, SOLUTION INFILTRATION; PERINEURAL AS NEEDED
Status: DISCONTINUED | OUTPATIENT
Start: 2023-10-31 | End: 2023-10-31

## 2023-10-31 RX ORDER — ACETAMINOPHEN 325 MG/1
650 TABLET ORAL EVERY 4 HOURS PRN
OUTPATIENT
Start: 2023-10-31

## 2023-10-31 RX ORDER — OXYCODONE AND ACETAMINOPHEN 5; 325 MG/1; MG/1
1 TABLET ORAL EVERY 4 HOURS PRN
OUTPATIENT
Start: 2023-10-31

## 2023-10-31 RX ORDER — HYDROMORPHONE HYDROCHLORIDE 1 MG/ML
0.5 INJECTION, SOLUTION INTRAMUSCULAR; INTRAVENOUS; SUBCUTANEOUS EVERY 5 MIN PRN
OUTPATIENT
Start: 2023-10-31

## 2023-10-31 RX ORDER — DEXMEDETOMIDINE HYDROCHLORIDE 4 UG/ML
INJECTION, SOLUTION INTRAVENOUS CONTINUOUS PRN
Status: DISCONTINUED | OUTPATIENT
Start: 2023-10-31 | End: 2023-10-31

## 2023-10-31 RX ORDER — PROPOFOL 10 MG/ML
INJECTION, EMULSION INTRAVENOUS AS NEEDED
Status: DISCONTINUED | OUTPATIENT
Start: 2023-10-31 | End: 2023-10-31

## 2023-10-31 RX ORDER — DROPERIDOL 2.5 MG/ML
0.62 INJECTION, SOLUTION INTRAMUSCULAR; INTRAVENOUS ONCE AS NEEDED
OUTPATIENT
Start: 2023-10-31

## 2023-10-31 RX ORDER — ALBUMIN HUMAN 50 G/1000ML
SOLUTION INTRAVENOUS AS NEEDED
Status: DISCONTINUED | OUTPATIENT
Start: 2023-10-31 | End: 2023-10-31

## 2023-10-31 RX ORDER — MIDAZOLAM HYDROCHLORIDE 1 MG/ML
1 INJECTION INTRAMUSCULAR; INTRAVENOUS ONCE AS NEEDED
OUTPATIENT
Start: 2023-10-31

## 2023-10-31 RX ORDER — SODIUM CHLORIDE, SODIUM LACTATE, POTASSIUM CHLORIDE, CALCIUM CHLORIDE 600; 310; 30; 20 MG/100ML; MG/100ML; MG/100ML; MG/100ML
INJECTION, SOLUTION INTRAVENOUS CONTINUOUS PRN
Status: DISCONTINUED | OUTPATIENT
Start: 2023-10-31 | End: 2023-10-31

## 2023-10-31 RX ORDER — LABETALOL HYDROCHLORIDE 5 MG/ML
5 INJECTION, SOLUTION INTRAVENOUS ONCE AS NEEDED
OUTPATIENT
Start: 2023-10-31

## 2023-10-31 RX ORDER — METOCLOPRAMIDE HYDROCHLORIDE 5 MG/ML
10 INJECTION INTRAMUSCULAR; INTRAVENOUS ONCE AS NEEDED
OUTPATIENT
Start: 2023-10-31

## 2023-10-31 RX ORDER — HYDROMORPHONE HYDROCHLORIDE 1 MG/ML
0.2 INJECTION, SOLUTION INTRAMUSCULAR; INTRAVENOUS; SUBCUTANEOUS EVERY 5 MIN PRN
OUTPATIENT
Start: 2023-10-31

## 2023-10-31 RX ORDER — GLYCOPYRROLATE 0.2 MG/ML
INJECTION INTRAMUSCULAR; INTRAVENOUS AS NEEDED
Status: DISCONTINUED | OUTPATIENT
Start: 2023-10-31 | End: 2023-10-31

## 2023-10-31 RX ORDER — PHENYLEPHRINE HCL IN 0.9% NACL 0.4MG/10ML
SYRINGE (ML) INTRAVENOUS AS NEEDED
Status: DISCONTINUED | OUTPATIENT
Start: 2023-10-31 | End: 2023-10-31

## 2023-10-31 RX ORDER — FENTANYL CITRATE 50 UG/ML
INJECTION, SOLUTION INTRAMUSCULAR; INTRAVENOUS AS NEEDED
Status: DISCONTINUED | OUTPATIENT
Start: 2023-10-31 | End: 2023-10-31

## 2023-10-31 RX ORDER — OXYCODONE HYDROCHLORIDE 5 MG/1
10 TABLET ORAL EVERY 4 HOURS PRN
OUTPATIENT
Start: 2023-10-31

## 2023-10-31 RX ORDER — ROCURONIUM BROMIDE 10 MG/ML
INJECTION, SOLUTION INTRAVENOUS AS NEEDED
Status: DISCONTINUED | OUTPATIENT
Start: 2023-10-31 | End: 2023-10-31

## 2023-10-31 RX ORDER — ONDANSETRON HYDROCHLORIDE 2 MG/ML
INJECTION, SOLUTION INTRAVENOUS AS NEEDED
Status: DISCONTINUED | OUTPATIENT
Start: 2023-10-31 | End: 2023-10-31

## 2023-10-31 RX ORDER — LIDOCAINE HYDROCHLORIDE 40 MG/ML
SOLUTION TOPICAL AS NEEDED
Status: DISCONTINUED | OUTPATIENT
Start: 2023-10-31 | End: 2023-10-31

## 2023-10-31 RX ORDER — MIDAZOLAM HYDROCHLORIDE 1 MG/ML
INJECTION, SOLUTION INTRAMUSCULAR; INTRAVENOUS AS NEEDED
Status: DISCONTINUED | OUTPATIENT
Start: 2023-10-31 | End: 2023-10-31

## 2023-10-31 RX ADMIN — LIDOCAINE HYDROCHLORIDE 100 MG: 20 INJECTION, SOLUTION INFILTRATION; PERINEURAL at 10:02

## 2023-10-31 RX ADMIN — Medication 80 MCG: at 11:00

## 2023-10-31 RX ADMIN — FENTANYL CITRATE 100 MCG: 50 INJECTION, SOLUTION INTRAMUSCULAR; INTRAVENOUS at 10:02

## 2023-10-31 RX ADMIN — Medication 80 MCG: at 10:45

## 2023-10-31 RX ADMIN — PROPOFOL 80 MG: 10 INJECTION, EMULSION INTRAVENOUS at 10:02

## 2023-10-31 RX ADMIN — Medication 120 MCG: at 10:08

## 2023-10-31 RX ADMIN — EPHEDRINE SULFATE 5 MG: 50 INJECTION, SOLUTION INTRAVENOUS at 11:14

## 2023-10-31 RX ADMIN — PROPOFOL 100 MCG/KG/MIN: 10 INJECTION, EMULSION INTRAVENOUS at 10:03

## 2023-10-31 RX ADMIN — ALBUMIN (HUMAN) 250 ML: 12.5 SOLUTION INTRAVENOUS at 10:19

## 2023-10-31 RX ADMIN — EPHEDRINE SULFATE 10 MG: 50 INJECTION, SOLUTION INTRAVENOUS at 10:19

## 2023-10-31 RX ADMIN — ROCURONIUM BROMIDE 50 MG: 50 INJECTION, SOLUTION INTRAVENOUS at 10:02

## 2023-10-31 RX ADMIN — EPHEDRINE SULFATE 5 MG: 50 INJECTION, SOLUTION INTRAVENOUS at 11:11

## 2023-10-31 RX ADMIN — Medication 80 MCG: at 11:14

## 2023-10-31 RX ADMIN — Medication 120 MCG: at 10:27

## 2023-10-31 RX ADMIN — SUGAMMADEX 400 MG: 100 INJECTION, SOLUTION INTRAVENOUS at 11:07

## 2023-10-31 RX ADMIN — MIDAZOLAM 2 MG: 1 INJECTION INTRAMUSCULAR; INTRAVENOUS at 09:59

## 2023-10-31 RX ADMIN — ONDANSETRON 4 MG: 2 INJECTION INTRAMUSCULAR; INTRAVENOUS at 11:04

## 2023-10-31 RX ADMIN — DEXAMETHASONE SODIUM PHOSPHATE 4 MG: 4 INJECTION, SOLUTION INTRAMUSCULAR; INTRAVENOUS at 10:09

## 2023-10-31 RX ADMIN — Medication 200 MCG: at 10:11

## 2023-10-31 RX ADMIN — SODIUM CHLORIDE, POTASSIUM CHLORIDE, SODIUM LACTATE AND CALCIUM CHLORIDE: 600; 310; 30; 20 INJECTION, SOLUTION INTRAVENOUS at 09:56

## 2023-10-31 RX ADMIN — ONDANSETRON 4 MG: 2 INJECTION INTRAMUSCULAR; INTRAVENOUS at 10:56

## 2023-10-31 RX ADMIN — LIDOCAINE HYDROCHLORIDE 160 ML: 40 SOLUTION TOPICAL at 10:04

## 2023-10-31 RX ADMIN — ROCURONIUM BROMIDE 20 MG: 50 INJECTION, SOLUTION INTRAVENOUS at 10:32

## 2023-10-31 RX ADMIN — DEXMEDETOMIDINE HYDROCHLORIDE 4 MCG: 100 INJECTION, SOLUTION INTRAVENOUS at 10:59

## 2023-10-31 RX ADMIN — Medication 120 MCG: at 10:51

## 2023-10-31 RX ADMIN — DEXMEDETOMIDINE HYDROCHLORIDE 4 MCG: 100 INJECTION, SOLUTION INTRAVENOUS at 11:02

## 2023-10-31 RX ADMIN — DEXMEDETOMIDINE HYDROCHLORIDE 4 MCG: 100 INJECTION, SOLUTION INTRAVENOUS at 10:57

## 2023-10-31 RX ADMIN — GLYCOPYRROLATE 0.2 MG: 0.2 INJECTION INTRAMUSCULAR; INTRAVENOUS at 10:12

## 2023-10-31 RX ADMIN — Medication 80 MCG: at 10:16

## 2023-10-31 SDOH — HEALTH STABILITY: MENTAL HEALTH: CURRENT SMOKER: 1

## 2023-10-31 ASSESSMENT — COLUMBIA-SUICIDE SEVERITY RATING SCALE - C-SSRS
1. IN THE PAST MONTH, HAVE YOU WISHED YOU WERE DEAD OR WISHED YOU COULD GO TO SLEEP AND NOT WAKE UP?: NO
2. HAVE YOU ACTUALLY HAD ANY THOUGHTS OF KILLING YOURSELF?: NO
6. HAVE YOU EVER DONE ANYTHING, STARTED TO DO ANYTHING, OR PREPARED TO DO ANYTHING TO END YOUR LIFE?: NO

## 2023-10-31 ASSESSMENT — PAIN SCALES - GENERAL
PAINLEVEL_OUTOF10: 0 - NO PAIN
PAIN_LEVEL: 0
PAINLEVEL_OUTOF10: 0 - NO PAIN

## 2023-10-31 ASSESSMENT — ENCOUNTER SYMPTOMS
EYES NEGATIVE: 1
RESPIRATORY NEGATIVE: 1
GASTROINTESTINAL NEGATIVE: 1
ENDOCRINE NEGATIVE: 1
NEUROLOGICAL NEGATIVE: 1
CARDIOVASCULAR NEGATIVE: 1
CONSTITUTIONAL NEGATIVE: 1
PSYCHIATRIC NEGATIVE: 1

## 2023-10-31 ASSESSMENT — PAIN - FUNCTIONAL ASSESSMENT
PAIN_FUNCTIONAL_ASSESSMENT: 0-10

## 2023-10-31 NOTE — TELEPHONE ENCOUNTER
Called pt to remind of CT SIM appointment on 11/1/23 at 1:30. Pt's phone went to voicemail left number if needs to reschedule.

## 2023-10-31 NOTE — DISCHARGE INSTRUCTIONS
The anesthetics, sedatives or narcotics which were given to you today will be acting in your body for the next 24 hours, so you might feel a little sleepy or groggy. This feeling should slowly wear off.   Carefully read and follow the instructions below:   You received sedation today.   Do not drive or operate machinery or power tools of any kind.   No alcoholic beverages today, not even beer or wine.   No over the counter medications that contain alcohol or may cause drowsiness.   Do not make important decisions or sign legal documents.     Do not use Aspirin containing products or non-steroidal medications for the next 24 hours.  (Examples of these types of medications include: Advil, Aleve, Ecotrin, Ibuprofen, Motrin or Naprosyn.  This list is not all-inclusive.  Check with your physician or pharmacist before resuming these medications. ***  Tylenol, cough medicine, cough drops or throat lozenges may be used when you are allowed to resume eating and drinking.     Call your physician if any of these symptoms occur:   High fever over 101 degrees or chills (a low grade fever is common for 24 hours)   Rash or hives   Persistent nausea or vomiting   Inability to urinate within 8 hours after the procedure    Go directly to the emergency room if you notice any of the following:   Shortness of breath   Chest pain              Coughing up large amounts of bright red blood greater than a teaspoonful of blood clots (about a teaspoonful for the next 24-48 hours is normal, especially if you had a biopsy)    Resume all normal medications unless directed otherwise by your doctor.     Your doctor recommends these additional instructions:    Follow up with your referring physician as previously scheduled.    If you experience any problems or have any questions following discharge, please call:   Before 5 pm: [phone]   After 5pm and on weekends: [phone] / [phone] and ask for the Pulmonary Fellow on-call (Pager  Number: [phone])

## 2023-10-31 NOTE — ANESTHESIA PROCEDURE NOTES
Airway  Date/Time: 10/31/2023 10:06 AM  Urgency: elective    Difficult airway    Staffing  Performed: MARIA ELENA, CRNA and attending   Authorized by: Jorge Curry MD    Performed by: ARIC Chapman-MICKEY  Patient location during procedure: OR    Indications and Patient Condition  Indications for airway management: anesthesia and airway protection  Spontaneous ventilation: present  Sedation level: deep  Preoxygenated: yes  Patient position: ramp  Mask difficulty assessment: 1 - vent by mask  Planned trial extubation    Final Airway Details  Final airway type: endotracheal airway      Successful airway: ETT     Successful intubation technique: direct laryngoscopy  Blade: Sean  Blade size: #4  ETT size (mm): 8.5  Cormack-Lehane Classification: grade IIa - partial view of glottis  Placement verified by: chest auscultation, capnometry and palpation of cuff   Measured from: lips  ETT to lips (cm): 21  Number of attempts at approach: 2    Additional Comments  Lips and teeth in preinduction condition.

## 2023-10-31 NOTE — H&P
"History Of Present Illness  Martínez Neri is a 63 y.o. male with 50 pack-year history with Tentative stage IIB ([id number]) Non-small cell lung cancer of the left upper lobe (squamous cell carcinoma), diagnosed on 06/26/2023.  Patient presents for Bronchoscopy for restaging.     Past Medical History  Past Medical History:   Diagnosis Date    Allergic rhinitis     Asthma     Brain aneurysm     Cancer (CMS/HCC)     Carotid artery disease (CMS/HCC)     COPD (chronic obstructive pulmonary disease) (CMS/HCC)     Epilepsy (CMS/HCC)     High cholesterol     History of alcohol use     Hypertension     Lung cancer (CMS/HCC)        Surgical History  Past Surgical History:   Procedure Laterality Date    CAROTID ENDARTERECTOMY N/A     CEREBRAL ANEURYSM REPAIR      COLONOSCOPY      HERNIA REPAIR          Social History  He reports that he has been smoking cigarettes. He has a 50.00 pack-year smoking history. He has been exposed to tobacco smoke. He has never used smokeless tobacco. He reports that he does not currently use alcohol. He reports current drug use. Frequency: 4.00 times per week. Drug: Marijuana.    Family History  Family History   Problem Relation Name Age of Onset    Heart attack Mother      Other (Colorectal cancer) Father      Aneurysm Other      Prostate cancer Other      Alcohol abuse Other          Allergies  Patient has no known allergies.    Review of Systems   Constitutional: Negative.    HENT: Negative.     Eyes: Negative.    Respiratory: Negative.     Cardiovascular: Negative.    Gastrointestinal: Negative.    Endocrine: Negative.    Genitourinary: Negative.    Neurological: Negative.    Psychiatric/Behavioral: Negative.          Physical Exam     Last Recorded Vitals  Blood pressure 116/66, pulse 70, temperature 36.4 °C (97.6 °F), temperature source Temporal, resp. rate 18, height 1.753 m (5' 9.02\"), weight 73.9 kg (163 lb), SpO2 96 %.    Relevant Results  CT chest 10/6/23  Impression   1. Redemonstrated " masslike consolidation in the region of posterior  left upper lobe and lingula, which overall appears slightly smaller  as compared to prior PET-CT 07/18/2023. Given the hypermetabolic  activity in this location, underlying viable neoplasm is not  excluded. Please correlate with prior oncological and treatment  history.  2. No other suspicious pulmonary nodules, although there is minimal  clustered micro nodularity now seen within posterior right upper  lobe, which may represent mild nonspecific bronchiolitis.  3. Redemonstrated moderate to severe diffuse emphysema within  bilateral lungs.  4. Redemonstrated severe coronary artery calcifications. Please note  that, the present study is not tailored for evaluation of coronary  arteries.  5. Additional stable chronic findings as above.       PET scan 10/6/23  IMPRESSION:  1. Slight interval decrease in size and hypermetabolic activity of a  masslike consolidation within the left upper lobe and lingula,  particularly along the more peripheral aspect, with similar  appearance of intense FDG uptake along the central component of the  mass.  2. No new hypermetabolic sites of disease within the neck, chest,  abdomen, or pelvis.  3. New cluster of micro nodularity/ground-glass appearance along the  posterior aspect of the right upper lobe with associated right hilar  lymph node with mild hypermetabolic activity, findings which are  likely infectious or inflammatory.     Assessment/Plan   Active Problems:  There are no active Hospital Problems.      Martínez Neri is a 63 y.o. male with 50 pack-year history with Tentative stage IIB ([id number]) Non-small cell lung cancer of the left upper lobe (squamous cell carcinoma), diagnosed on 06/26/2023.  Patient presents for Bronchoscopy for restaging.  Will proceed for bronchoscopy with biopsy & staging EBUS.           Tyrone Zamora MD

## 2023-10-31 NOTE — ANESTHESIA POSTPROCEDURE EVALUATION
Patient: Martínez Neri    Procedure Summary       Date: 10/31/23 Room / Location: [facility]    Anesthesia Start: 0940 Anesthesia Stop: 1135    Procedure: BRONCHOSCOPY Diagnosis: Lung nodule    Scheduled Providers: Los Dickens MD; Andre Fairchild, RN Responsible Provider: Jorge Curry MD    Anesthesia Type: general ASA Status: 3            Anesthesia Type: general    Vitals Value Taken Time   /68 10/31/23 1135   Temp 36.0 10/31/23 1135   Pulse 73 10/31/23 1135   Resp 18 10/31/23 1135   SpO2 100 10/31/23 1135       Anesthesia Post Evaluation    Patient location during evaluation: PACU  Patient participation: complete - patient participated  Level of consciousness: awake and alert  Pain score: 0  Pain management: adequate  Airway patency: patent  Two or more strategies used to mitigate risk of obstructive sleep apnea  Cardiovascular status: acceptable  Respiratory status: acceptable and face mask  Hydration status: acceptable  Comments: Pt I in pacu, report given to PACU RN, VVS, denies any pain.        No notable events documented.

## 2023-10-31 NOTE — ANESTHESIA PREPROCEDURE EVALUATION
Patient: Martínez Neri    Procedure Information       Date/Time: 10/31/23 0900    Scheduled providers: Los Dickens MD; Andre Fairchild RN    Procedure: BRONCHOSCOPY    Location: [facility]            Relevant Problems   Neuro/Psych   (+) Partial symptomatic epilepsy with complex partial seizures, not intractable, without status epilepticus (CMS/HCC)   (+) Seizures (CMS/HCC)      Pulmonary   (+) Malignant neoplasm of upper lobe of left lung (CMS/HCC)   (+) Primary cancer of left upper lobe of lung (CMS/HCC)       Clinical information reviewed:   Tobacco  Allergies  Meds   Med Hx  Surg Hx   Fam Hx  Soc Hx        NPO Detail:  NPO/Void Status  Carbonhydrate Drink Given Prior to Surgery? : N  Date of Last Liquid: 10/31/23  Time of Last Liquid: 0600  Date of Last Solid: 10/30/23  Time of Last Solid: 2000  Last Intake Type: Clear fluids  Time of Last Void: 0600         Physical Exam    Airway  Mallampati: III  TM distance: >3 FB  Neck ROM: full     Cardiovascular - normal exam     Dental   Comments: Missing upper and lower teeth; patient reports none loose   Pulmonary - normal exam     Abdominal - normal exam         Anesthesia Plan    ASA 3     general     The patient is a current smoker. (1 cigarette a day)    intravenous induction   Anesthetic plan and risks discussed with patient.  Use of blood products discussed with patient who consented to blood products.    Plan discussed with attending and CAA.

## 2023-11-01 ENCOUNTER — HOSPITAL ENCOUNTER (OUTPATIENT)
Dept: CARDIOLOGY | Facility: HOSPITAL | Age: 63
Discharge: HOME | End: 2023-11-01
Payer: MEDICAID

## 2023-11-01 ENCOUNTER — HOSPITAL ENCOUNTER (OUTPATIENT)
Dept: RADIOLOGY | Facility: EXTERNAL LOCATION | Age: 63
Discharge: HOME | End: 2023-11-01

## 2023-11-01 ENCOUNTER — HOSPITAL ENCOUNTER (OUTPATIENT)
Dept: RADIATION ONCOLOGY | Facility: HOSPITAL | Age: 63
Setting detail: RADIATION/ONCOLOGY SERIES
Discharge: STILL ADMITTED OR ADMINISTRATIVE | End: 2023-11-01
Payer: MEDICAID

## 2023-11-01 ENCOUNTER — HOSPITAL ENCOUNTER (OUTPATIENT)
Dept: NEUROLOGY | Facility: HOSPITAL | Age: 63
Discharge: HOME | End: 2023-11-01
Payer: MEDICAID

## 2023-11-01 DIAGNOSIS — G40.209 PARTIAL SYMPTOMATIC EPILEPSY WITH COMPLEX PARTIAL SEIZURES, NOT INTRACTABLE, WITHOUT STATUS EPILEPTICUS (MULTI): ICD-10-CM

## 2023-11-01 DIAGNOSIS — C34.12 PANCOASTS SYNDROME, LEFT (MULTI): ICD-10-CM

## 2023-11-01 DIAGNOSIS — G40.109 FOCAL EPILEPSY (MULTI): ICD-10-CM

## 2023-11-01 DIAGNOSIS — C34.12 PRIMARY CANCER OF LEFT UPPER LOBE OF LUNG (MULTI): Primary | ICD-10-CM

## 2023-11-01 PROCEDURE — 95816 EEG AWAKE AND DROWSY: CPT | Performed by: PSYCHIATRY & NEUROLOGY

## 2023-11-01 PROCEDURE — 93010 ELECTROCARDIOGRAM REPORT: CPT | Performed by: INTERNAL MEDICINE

## 2023-11-01 PROCEDURE — 93005 ELECTROCARDIOGRAM TRACING: CPT

## 2023-11-01 RX ORDER — LORAZEPAM 0.5 MG/1
0.5 TABLET ORAL AS NEEDED
Status: DISCONTINUED | OUTPATIENT
Start: 2023-11-13 | End: 2023-11-30

## 2023-11-01 RX ORDER — LORAZEPAM 0.5 MG/1
0.5 TABLET ORAL AS NEEDED
Status: DISCONTINUED | OUTPATIENT
Start: 2023-11-13 | End: 2023-11-01

## 2023-11-02 ENCOUNTER — OFFICE VISIT (OUTPATIENT)
Dept: HEMATOLOGY/ONCOLOGY | Facility: HOSPITAL | Age: 63
End: 2023-11-02
Payer: MEDICAID

## 2023-11-02 ENCOUNTER — ONCOLOGY MEDICATION OUTREACH (OUTPATIENT)
Dept: HEMATOLOGY/ONCOLOGY | Facility: HOSPITAL | Age: 63
End: 2023-11-02
Payer: MEDICAID

## 2023-11-02 VITALS
TEMPERATURE: 97.5 F | OXYGEN SATURATION: 95 % | BODY MASS INDEX: 23.89 KG/M2 | HEART RATE: 70 BPM | WEIGHT: 161.82 LBS | SYSTOLIC BLOOD PRESSURE: 119 MMHG | RESPIRATION RATE: 20 BRPM | DIASTOLIC BLOOD PRESSURE: 73 MMHG

## 2023-11-02 DIAGNOSIS — C34.12 MALIGNANT NEOPLASM OF UPPER LOBE OF LEFT LUNG (MULTI): Primary | ICD-10-CM

## 2023-11-02 PROCEDURE — 99215 OFFICE O/P EST HI 40 MIN: CPT | Performed by: INTERNAL MEDICINE

## 2023-11-02 RX ORDER — ONDANSETRON HYDROCHLORIDE 8 MG/1
8 TABLET, FILM COATED ORAL EVERY 8 HOURS PRN
Qty: 30 TABLET | Refills: 5 | Status: SHIPPED | OUTPATIENT
Start: 2023-11-02

## 2023-11-02 RX ORDER — PROCHLORPERAZINE MALEATE 10 MG
10 TABLET ORAL EVERY 6 HOURS PRN
Qty: 30 TABLET | Refills: 5 | Status: SHIPPED | OUTPATIENT
Start: 2023-11-02

## 2023-11-02 ASSESSMENT — COLUMBIA-SUICIDE SEVERITY RATING SCALE - C-SSRS
6. HAVE YOU EVER DONE ANYTHING, STARTED TO DO ANYTHING, OR PREPARED TO DO ANYTHING TO END YOUR LIFE?: NO
2. HAVE YOU ACTUALLY HAD ANY THOUGHTS OF KILLING YOURSELF?: NO
1. IN THE PAST MONTH, HAVE YOU WISHED YOU WERE DEAD OR WISHED YOU COULD GO TO SLEEP AND NOT WAKE UP?: NO

## 2023-11-02 ASSESSMENT — ENCOUNTER SYMPTOMS
OCCASIONAL FEELINGS OF UNSTEADINESS: 0
LOSS OF SENSATION IN FEET: 0
DEPRESSION: 0

## 2023-11-02 ASSESSMENT — PAIN SCALES - GENERAL: PAINLEVEL: 0-NO PAIN

## 2023-11-02 ASSESSMENT — PATIENT HEALTH QUESTIONNAIRE - PHQ9
2. FEELING DOWN, DEPRESSED OR HOPELESS: NOT AT ALL
SUM OF ALL RESPONSES TO PHQ9 QUESTIONS 1 AND 2: 0
1. LITTLE INTEREST OR PLEASURE IN DOING THINGS: NOT AT ALL

## 2023-11-02 NOTE — PROGRESS NOTES
ONCOLOGY CLINICAL PHARMACY NOTE     Subjective  Martínez Neri is a 63 y.o. male with squamous NSCLC, here for education.        Objective  There were no vitals taken for this visit.  Lab Results   Component Value Date    WBC 7.2 10/03/2023    HGB 13.3 (L) 10/03/2023    HCT 40.5 (L) 10/03/2023     (H) 10/03/2023     10/03/2023      Lab Results   Component Value Date    GLUCOSE 72 (L) 10/03/2023    CALCIUM 9.8 10/03/2023     10/03/2023    K 4.8 10/03/2023    CO2 27 10/03/2023     10/03/2023    BUN 15 10/03/2023    CREATININE 0.68 10/03/2023     Lab Results   Component Value Date    ALT 21 10/03/2023    AST 23 10/03/2023    ALKPHOS 62 10/03/2023    BILITOT 0.5 10/03/2023       Allergies  No Known Allergies    Medications  Were medications reconciled this encounter: Yes (of note, patient is no longer taking ASA 81 mg)    Current Outpatient Medications:     albuterol (Proventil HFA) 90 mcg/actuation inhaler, Inhale 2 puffs every 4 hours if needed for wheezing or shortness of breath., Disp: 6.7 g, Rfl: 6    atorvastatin (Lipitor) 40 mg tablet, Take 1 tablet (40 mg) by mouth once daily., Disp: 90 tablet, Rfl: 1    cholecalciferol (Vitamin D3) 5,000 Units tablet, Take 1 tablet (5,000 Units) by mouth once daily., Disp: 90 tablet, Rfl: 1    divalproex (Depakote ER) 250 mg 24 hr tablet, Take 1 tablet (250 mg) by mouth 2 times a day. Do not crush, chew, or split., Disp: 90 tablet, Rfl: 1    divalproex (Depakote ER) 500 mg 24 hr tablet, Take 1 tablet (500 mg) by mouth 2 times a day. Do not crush, chew, or split., Disp: 90 tablet, Rfl: 1    inhalat.spacing dev,med. mask spacer, , Disp: , Rfl:     ipratropium-albuteroL (Duo-Neb) 0.5-2.5 mg/3 mL nebulizer solution, Take 3 mL by nebulization 4 times a day as needed for wheezing or shortness of breath., Disp: 90 mL, Rfl: 1    lacosamide (Vimpat) 200 mg tablet tablet, Take 1 tablet (200 mg) by mouth 2 times a day., Disp: 30 tablet, Rfl: 5    nebulizer  accessories misc, Use twice daily, Disp: 100 each, Rfl: 0    nebulizer and compressor device, Use as directed, Disp: 1 each, Rfl: 0    nicotine (Nicoderm CQ) 21 mg/24 hr patch, Place 1 patch over 24 hours on the skin once every 24 hours., Disp: 30 patch, Rfl: 0    thiamine (Vitamin B-1) 100 mg tablet, Take 1 tablet (100 mg) by mouth once daily., Disp: 90 tablet, Rfl: 1    tiotropium (Spiriva) 18 mcg inhalation capsule, Place 1 capsule (18 mcg) into inhaler and inhale 2 times a day., Disp: , Rfl:     Current Facility-Administered Medications:     [START ON 11/13/2023] LORazepam (Ativan) tablet 0.5 mg, 0.5 mg, oral, PRN, Felisha Knox MD    Assessment and Plan  Martínez Neri is a 63 y.o. male with recently diagnosed squamous NSCLC (tentative stage IIB), to be treated with chemoRT (determined not to be a surgical candidate d/t PFTs).    Chemotherapy:  Tentative start date 11/13  Drug interactions identified: None  Dose adjustments (renal/hepatic, toxicities): None  Patient was counseled on the following:   Carboplatin  Administration schedule: goal AUC of 2 weekly with concurrent radiation  Side effects counseled on: decreased cell counts (thrombocytopenia, neutropenia, anemia), nausea  Paclitaxel  Administration schedule: 45 mg/m2 weekly with concurrent radiation  Side effects counseled on: decreased cell counts, infusion reactions (dizziness/hypotension, fever/chills, urticaria, flushing, swelling, back pain), full body alopecia, neuropathy, edema    Supportive Care:  CINV:  Pre-meds day of treatment (prevention):  Infusion reactions: Benadryl, Famotidine, dexamethasone  Nausea: Aloxi, dexamethasone  Prescriptions released from [place] to patient's home pharmacy: Zofran/Compazine prn  Growth Factor Support: n/a    Instructed patient to alert team and go to Emergency Department for fevers of at least 100.4 degrees F, or other changes in clinical status. All questions were answered and my contact information was provided  to patient.    Kierra Mendez [facility]  Clinical Pharmacist - Thoracic

## 2023-11-02 NOTE — PROGRESS NOTES
Patient ID: Martínez Neri is a 63 y.o. male    Primary Care Provider: Jayme Casey MD    DIAGNOSIS AND STAGING  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    PD-L1 TPS 20%     PRIOR THERAPIES  None         CURRENT THERAPY  Anticipating concurrent chemo-RT using weekly carbo/taxol followed by consolidative durvalumab        CURRENT ONCOLOGICAL PROBLEMS  Cough         HISTORY OF PRESENT ILLNESS:  Current smoker (50-pack-year history), with history of COPD and seizures, who was diagnosed with a squamous cell carcinoma of the left upper lobe in [place], never treated.  Outpatient notes from [facility] and hematology clinic were reviewed.     On 06/17/2023 the patient had a CT chest for ongoing respiratory symptoms and was found to have a left suprahilar mass involving the left upper lobe bronchi with postobstructive pneumonia/collapse.  On 06/25/2023 CT chest/abdomen/pelvis demonstrated persistent masslike consolidation in the left upper lobe with no signs of extrathoracic metastatic disease.    On 06/26/2023 the patient underwent a bronchoscopy, and pathology was compatible with a squamous cell carcinoma at least in situ, with a foci highly suspicious for invasive squamous cell carcinoma.    On 07/18/2023 a PET/CT obtained, demonstrating a 6.6 cm left upper lobe mass with an SUV of 8.3 and no evidence of distant metastatic disease.    On 07/18/2023 to brain MRI was negative for intracranial metastasis.    On 07/27/2023 bronchoscopy/EBUS was performed.  Endobronchial biopsies of left upper lobe demonstrated an invasive moderately differentiated squamous cell carcinoma.  Left upper lobe hilar FNA showed atypical cells with squamous differentiation,  suspicious for malignancy.     Subcarinal FNA was indeterminate for malignancy with extremely rare atypical cells with squamous differentiation.  Molecular profile: Lack of BRAF, EGFR, HER2, KRAS, MET, ALK, NTRK 1/2/3, RET or ROS1 alterations.  PD-L1 TPS 20%.  The patient was seen by medical oncology and did not too frail for combined chemo RT (there is a performance status ECOG 3) documented on 08/17/2023.  At that time, he lived with some friends, while his family was primarily located in [place].    His daughter traveled and was able to relocate him to [place], and he presents today to clinic accompanied by his daughter, son, and daughter-in-law.    The patient states he is felt much better since his relocation, has been gaining weight, approximately 10 pounds since he moved here.  He is more active, going up and down the steps at home, but still tired and taking some breaks throughout the day.    He denies any headaches or new neurological symptoms.  His respiratory symptoms are stable. There is no new localized pain.    Reported molecular and ancillary testing performed upon diagnosis in [place]:   BRAF negative  BBVMO61C negative  EGFR classic mutations negative  HER2 negative  MET negative  ALK negative  In tract 1/2/3 negative  RET negative  ROS1 negative  PD-L1 20%     Referred to surgery - PFTs were considered prohibitive for surgical resection-     10/31/23: Repeat EBUS for systematic staging:  Final Cytological Interpretation      A. LYMPH NODE 7 PULMONARY FINE NEEDLE ASPIRATION, CYTOLOGY AND CELL BLOCK:  --  No malignant cells identified.  --  Lymphoid sample.     B. LYMPH NODE 4 L PULMONARY FINE NEEDLE ASPIRATION, CYTOLOGY AND CELL BLOCK:  --  No malignant cells identified.  --  Lymphoid sample.           C. LYMPH NODE 11 L PULMONARY FINE NEEDLE ASPIRATION, CYTOLOGY AND CELL BLOCK:  --  A rare cluster of malignant cells derived from squamous cell carcinoma, see note  -- Also, please refer to concurrent  "biopsy surgical pathology report ([id number]).     Note:  The malignant squamous cell carcinoma cells are represented in the cell block only.  Immunostain for p40 is positive. The cytomorphology and immunoprofile support the above diagnosis.  The material is not sufficient for molecular testing.     Component    FINAL DIAGNOSIS   Left lung, upper lobe, biopsy:  -- Superficial fragment of squamous cell carcinoma, keratinizing; see note.     Note: According to clinician's note, PD-L1 and NGS have been performed at an outside institution. These studies can be repeated, if clinically indicated.     NGS and PD-L1 not repeated in this specimen as it was done in outside institution already and the purpose of this procedure was not for diagnosis but systematic mediastinal re-staging.     PAST MEDICAL HISTORY  Brain aneurysm  COPD (chronic obstructive pulmonary disease) (CMS/HCC)  Epilepsy (CMS/HCC)  High cholesterol  History of alcohol use  Hypertension    PAST SURGICAL HISTORY:  CAROTID ENDARTERECTOMY  COLONOSCOPY  HERNIA REPAIR     SOCIAL HISTORY  Tobacco (50 pack-year history) use - quit after cancer dx      CURRENT MEDS REVIEWED:  Scheduled medications  Continuous medications  PRN medications     ALLERGIES  NKDA     FAMILY HISTORY  Father had prostate CA     SUBJECTIVE:  Here to go over treatment plans   Has met with surgery and not considered operable        OBJECTIVE:    Vitals:    11/02/23 1351   BP: 119/73   Pulse: 70   Resp: 20   Temp: 36.4 °C (97.5 °F)   SpO2: 95%      Body surface area is 1.89 meters squared.     Diagnostic Results   Results:  Labs:  Lab Results   Component Value Date    WBC 7.2 10/03/2023    HGB 13.3 (L) 10/03/2023    HCT 40.5 (L) 10/03/2023     (H) 10/03/2023     10/03/2023      No results found for: \"NEUTROABS\"   Lab Results   Component Value Date    GLUCOSE 72 (L) 10/03/2023    CALCIUM 9.8 10/03/2023     10/03/2023    K 4.8 10/03/2023    CO2 27 10/03/2023     " 10/03/2023    BUN 15 10/03/2023    CREATININE 0.68 10/03/2023     Lab Results   Component Value Date    ALT 21 10/03/2023    AST 23 10/03/2023    ALKPHOS 62 10/03/2023    BILITOT 0.5 10/03/2023      Lab Results   Component Value Date    TSH 5.54 (H) 10/03/2023     CT CHEST W IV CONTRAST;  10/6/2023 11:24 am      INDICATION:  Signs/Symptoms:Staging lung cancer THOMAS. No other clinical history  available at the time of study interpretation.      COMPARISON:  PET-CT 07/18/2023      ACCESSION NUMBER(S):  [id number]      ORDERING CLINICIAN:  SENIA FUNES      TECHNIQUE:  Helical data acquisition of the chest was obtained following  intravenous administration of 75 mL of Omnipaque 350 contrast. Images  were reformatted in axial, coronal, and sagittal planes.      FINDINGS:  LUNGS AND AIRWAYS:  The trachea and central airways are patent. No endobronchial lesion  is seen, although there is mild multifocal mucous plugging present.  There is also minimal nonobstructive mucus material seen within mid  to distal trachea. There is diffuse bronchial wall thickening, which  is a nonspecific finding, but may represent changes of chronic  bronchitis.      There is again demonstration of a masslike consolidation centered in  the region of posterior left upper lobe and lingula, which is  associated with obliteration of multiple adjacent bronchi. As  compared to prior study, the overall aeration of inferior lingular  segment has improved.      There is again demonstration of moderate to severe diffuse  emphysematous changes within bilateral lungs. There is mild bandlike  atelectasis along the right lung base and to lesser extent within  medial right middle lobe and left lower lobe, slightly increased from  prior. There is mild clustered micro nodularity within posterior  right upper lobe on axial image 128 of 359, series 2, which is new  from prior examination and may represent focal bronchiolitis.      MEDIASTINUM AND WILLIAM, LOWER NECK  AND AXILLA:  The visualized thyroid gland is within normal limits.  No evidence of thoracic lymphadenopathy by CT criteria.  Esophagus appears within normal limits as seen.      HEART AND VESSELS:  The thoracic aorta normal in course and caliber.There is moderate  atherosclerosis present, including calcified and noncalcified  plaques. Main pulmonary artery and its branches are normal in  caliber. Severe coronary artery calcifications are seen. Please  note,the study is not optimized for evaluation of coronary arteries.  The cardiac chambers are not enlarged. There is no pericardial  effusion seen.      UPPER ABDOMEN:  Multiple hypodensities scattered throughout visualized liver  measuring up to 1.5 cm on axial image 328 of 359, series 2, are  unchanged and favor underlying benign etiology such as cysts and/or  hemangiomas. Rest of visualized abdominal structures are grossly  unremarkable. CHEST WALL AND OSSEOUS STRUCTURES:  Chest wall is within normal limits.  No acute osseous pathology.There are no suspicious osseous lesions.       Impression   1. Redemonstrated masslike consolidation in the region of posterior  left upper lobe and lingula, which overall appears slightly smaller  as compared to prior PET-CT 07/18/2023. Given the hypermetabolic  activity in this location, underlying viable neoplasm is not  excluded. Please correlate with prior oncological and treatment  history.  2. No other suspicious pulmonary nodules, although there is minimal  clustered micro nodularity now seen within posterior right upper  lobe, which may represent mild nonspecific bronchiolitis.  3. Redemonstrated moderate to severe diffuse emphysema within  bilateral lungs.  4. Redemonstrated severe coronary artery calcifications. Please note  that, the present study is not tailored for evaluation of coronary  arteries.  5. Additional stable chronic findings as above.      Signed by: Mariusz Terrell 10/6/2023 11:43 AM     NM PET CT LUNG CA  STAGING;  10/6/2023 12:09 pm      INDICATION:  Signs/Symptoms:Staging lung cancer THOMAS.      Per medical record: 63-year-old male with history of squamous cell  carcinoma of the left lung confirmed on biopsy 06/26/2023. No  treatment.      COMPARISON:  PET-CT 07/18/2023, CT chest 10/06/2023      ACCESSION NUMBER(S):  [id number]      ORDERING CLINICIAN:  SENIA FUNES      TECHNIQUE:  DIVISION OF NUCLEAR MEDICINE  POSITRON EMISSION TOMOGRAPHY (PET-CT)      The patient received an intravenous dose of 10.1 mCi of Fluorine-18  fluorodeoxyglucose (FDG). Positron emission tomographic (PET) images  from mid-thigh to skull base were then acquired after a one hour  delay. Also acquired was a contemporaneous low dose non-contrast CT  scan performed for attenuation correction of PET images and anatomic  localization.  The PET and CT images were digitally fused for  display.  All images were acquired on a combined PET-CT scanner unit.  Some areas of FDG accumulation may be described in standardized  uptake value (SUV) units.      CODING:  Subsequent Treatment Strategy (PS)      CALIBRATION:  Dose Injection-to-Scan Interval (mins): 73 min  Mediastinal bloodpool SUV (normal 1.5-2.5): 1.6  Blood glucose: 80 mg/dL      FINDINGS:  NECK:  No focal hypermetabolic soft tissue lesion is seen in the neck.  No hypermetabolic cervical lymphadenopathy is present.      CHEST:  Slight interval decrease in size of a masslike consolidation within  the left upper lobe and lingula with associated obliteration of  several adjacent bronchi. There is interval decrease in  hypermetabolic activity of the mass, particularly along the more  peripheral aspect with an SUV max of 3.9, previously measuring up to  7.1. The more central aspect of the masslike consolidation exhibits  similar intense FDG uptake with a SUV max of 9.2. New cluster of  nodularity along the posterior aspect of the right upper lobe with  mild increased FDG uptake and a SUV max of  1.9. Nonenlarged right  hilar lymph node with new mild increased FDG uptake and a SUV max of  2.1.      ABDOMEN AND PELVIS:  No hypermetabolic soft tissue lesion is present in the abdomen and  pelvis. No evidence of hypermetabolic lymphadenopathy.  Sigmoid colonic diverticulosis without evidence of acute  diverticulitis on low-dose CT. Physiologic radiotracer uptake is  present in the liver and spleen with excretion into the bowel loops  and the genitourinary tract.      MUSCULOSKELETAL:  There is no focal hypermetabolic lesion to suggest osseous metastasis.      IMPRESSION:  1. Slight interval decrease in size and hypermetabolic activity of a  masslike consolidation within the left upper lobe and lingula,  particularly along the more peripheral aspect, with similar  appearance of intense FDG uptake along the central component of the  mass.  2. No new hypermetabolic sites of disease within the neck, chest,  abdomen, or pelvis.  3. New cluster of micro nodularity/ground-glass appearance along the  posterior aspect of the right upper lobe with associated right hilar  lymph node with mild hypermetabolic activity, findings which are  likely infectious or inflammatory.    MR BRAIN W AND WO IV CONTRAST;  10/13/2023 8:01 pm      INDICATION:  Signs/Symptoms:Staging lung cancer THOMAS.  Per EMR: 64-year-old male, smoker, 50-pack-year history), with  history of COPD and seizures, who was diagnosed with a squamous cell  carcinoma of the left upper lobe in [place], never treated.      COMPARISON:  None.      ACCESSION NUMBER(S):  [id number]      ORDERING CLINICIAN:  SENIA FUNES      TECHNIQUE:  Multiplanar, multi-sequence images of the brain were obtained without  contrast. before and after the intravenous administration of 15 mL  Dotarem gadolinium.      FINDINGS:  Normal morphology of midline structures.      Diffusion weighted images show no evidence of acute ischemic infarct.      Mild generalized parenchymal volume loss.  Nonspecific patchy  periventricular and subcortical T2/FLAIR hyperintensity, likely  represents a sequela of chronic microvascular ischemic disease.Small  focus of blooming artifact within the parasagittal left frontal lobe,  may represent hemosiderin, mineralization, or a tiny cavernoma. 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      There is no abnormal intracranial enhancement or mass.      There is no evidence of an acute intraparenchymal hemorrhage, mass,  mass-effect, midline shift or extra-axial fluid collection.      The ventricular size and cerebral volume are age-concordant.      Mild mucosal thickening is present within the ethmoid air cells and  there are tiny mucosal retention cysts located within the left  maxillary sinus. There is partial opacification of the bilateral  mastoid air cells with nonspecific fluid.      IMPRESSION:  1. No abnormal intracranial enhancement or mass to suggest metastases.      2. Chronic intracranial findings as above.        Assessment/Plan   Reviewed with the patient the treatment plan consisting of concurrent chemo-RT.  This will be followed by consolidative durvalumab.  We discussed potential side effects related to carboplatin and paclitaxel weekly and he was able to sign a consent.  He has been seen by radiation oncology (Dr. Layne) and underwent CT simulation yesterday.       Elo Peters MD, MS  Thoracic Medical Oncology   [address] [place] [address]  Phone: [phone]

## 2023-11-03 LAB
LAB AP ASR DISCLAIMER: NORMAL
LABORATORY COMMENT REPORT: NORMAL
PATH REPORT.FINAL DX SPEC: NORMAL
PATH REPORT.FINAL DX SPEC: NORMAL
PATH REPORT.GROSS SPEC: NORMAL
PATH REPORT.GROSS SPEC: NORMAL
PATH REPORT.INTRAOP OBS SPEC DOC: NORMAL
PATH REPORT.TOTAL CANCER: NORMAL
PATH REPORT.TOTAL CANCER: NORMAL

## 2023-11-05 LAB
ATRIAL RATE: 73 BPM
P AXIS: 72 DEGREES
P OFFSET: 186 MS
P ONSET: 135 MS
PR INTERVAL: 150 MS
Q ONSET: 210 MS
QRS COUNT: 12 BEATS
QRS DURATION: 90 MS
QT INTERVAL: 376 MS
QTC CALCULATION(BAZETT): 414 MS
QTC FREDERICIA: 401 MS
R AXIS: -62 DEGREES
T AXIS: 45 DEGREES
T OFFSET: 398 MS
VENTRICULAR RATE: 73 BPM

## 2023-11-06 DIAGNOSIS — C34.12 MALIGNANT NEOPLASM OF UPPER LOBE OF LEFT LUNG (MULTI): Primary | ICD-10-CM

## 2023-11-08 RX ORDER — DIPHENHYDRAMINE HYDROCHLORIDE 50 MG/ML
50 INJECTION INTRAMUSCULAR; INTRAVENOUS AS NEEDED
Status: CANCELLED | OUTPATIENT
Start: 2023-12-12

## 2023-11-08 RX ORDER — EPINEPHRINE 0.3 MG/.3ML
0.3 INJECTION SUBCUTANEOUS EVERY 5 MIN PRN
Status: CANCELLED | OUTPATIENT
Start: 2023-12-19

## 2023-11-08 RX ORDER — DIPHENHYDRAMINE HCL 50 MG
50 CAPSULE ORAL ONCE
Status: CANCELLED | OUTPATIENT
Start: 2023-11-13

## 2023-11-08 RX ORDER — FAMOTIDINE 10 MG/ML
20 INJECTION INTRAVENOUS ONCE AS NEEDED
Status: CANCELLED | OUTPATIENT
Start: 2024-01-08

## 2023-11-08 RX ORDER — FAMOTIDINE 10 MG/ML
20 INJECTION INTRAVENOUS ONCE AS NEEDED
Status: CANCELLED | OUTPATIENT
Start: 2023-12-19

## 2023-11-08 RX ORDER — FAMOTIDINE 10 MG/ML
20 INJECTION INTRAVENOUS ONCE
Status: CANCELLED | OUTPATIENT
Start: 2024-01-08

## 2023-11-08 RX ORDER — PROCHLORPERAZINE MALEATE 10 MG
10 TABLET ORAL EVERY 6 HOURS PRN
Status: CANCELLED | OUTPATIENT
Start: 2023-11-28

## 2023-11-08 RX ORDER — PALONOSETRON 0.05 MG/ML
0.25 INJECTION, SOLUTION INTRAVENOUS ONCE
Status: CANCELLED | OUTPATIENT
Start: 2023-12-19

## 2023-11-08 RX ORDER — PROCHLORPERAZINE EDISYLATE 5 MG/ML
10 INJECTION INTRAMUSCULAR; INTRAVENOUS EVERY 6 HOURS PRN
Status: CANCELLED | OUTPATIENT
Start: 2023-11-13

## 2023-11-08 RX ORDER — PROCHLORPERAZINE EDISYLATE 5 MG/ML
10 INJECTION INTRAMUSCULAR; INTRAVENOUS EVERY 6 HOURS PRN
Status: CANCELLED | OUTPATIENT
Start: 2023-12-12

## 2023-11-08 RX ORDER — FAMOTIDINE 10 MG/ML
20 INJECTION INTRAVENOUS ONCE
Status: CANCELLED | OUTPATIENT
Start: 2023-11-28

## 2023-11-08 RX ORDER — DIPHENHYDRAMINE HYDROCHLORIDE 50 MG/ML
50 INJECTION INTRAMUSCULAR; INTRAVENOUS AS NEEDED
Status: CANCELLED | OUTPATIENT
Start: 2023-12-05

## 2023-11-08 RX ORDER — DIPHENHYDRAMINE HYDROCHLORIDE 50 MG/ML
50 INJECTION INTRAMUSCULAR; INTRAVENOUS AS NEEDED
Status: CANCELLED | OUTPATIENT
Start: 2023-12-19

## 2023-11-08 RX ORDER — ALBUTEROL SULFATE 0.83 MG/ML
3 SOLUTION RESPIRATORY (INHALATION) AS NEEDED
Status: CANCELLED | OUTPATIENT
Start: 2023-11-21

## 2023-11-08 RX ORDER — PROCHLORPERAZINE MALEATE 10 MG
10 TABLET ORAL EVERY 6 HOURS PRN
Status: CANCELLED | OUTPATIENT
Start: 2024-01-08

## 2023-11-08 RX ORDER — EPINEPHRINE 0.3 MG/.3ML
0.3 INJECTION SUBCUTANEOUS EVERY 5 MIN PRN
Status: CANCELLED | OUTPATIENT
Start: 2023-11-13

## 2023-11-08 RX ORDER — EPINEPHRINE 0.3 MG/.3ML
0.3 INJECTION SUBCUTANEOUS EVERY 5 MIN PRN
Status: CANCELLED | OUTPATIENT
Start: 2024-01-08

## 2023-11-08 RX ORDER — ALBUTEROL SULFATE 0.83 MG/ML
3 SOLUTION RESPIRATORY (INHALATION) AS NEEDED
Status: CANCELLED | OUTPATIENT
Start: 2023-11-13

## 2023-11-08 RX ORDER — PROCHLORPERAZINE EDISYLATE 5 MG/ML
10 INJECTION INTRAMUSCULAR; INTRAVENOUS EVERY 6 HOURS PRN
Status: CANCELLED | OUTPATIENT
Start: 2023-11-28

## 2023-11-08 RX ORDER — ALBUTEROL SULFATE 0.83 MG/ML
3 SOLUTION RESPIRATORY (INHALATION) AS NEEDED
Status: CANCELLED | OUTPATIENT
Start: 2024-01-08

## 2023-11-08 RX ORDER — FAMOTIDINE 10 MG/ML
20 INJECTION INTRAVENOUS ONCE
Status: CANCELLED | OUTPATIENT
Start: 2023-11-13

## 2023-11-08 RX ORDER — FAMOTIDINE 10 MG/ML
20 INJECTION INTRAVENOUS ONCE
Status: CANCELLED | OUTPATIENT
Start: 2023-12-05

## 2023-11-08 RX ORDER — ALBUTEROL SULFATE 0.83 MG/ML
3 SOLUTION RESPIRATORY (INHALATION) AS NEEDED
Status: CANCELLED | OUTPATIENT
Start: 2023-11-28

## 2023-11-08 RX ORDER — DIPHENHYDRAMINE HCL 50 MG
50 CAPSULE ORAL ONCE
Status: CANCELLED | OUTPATIENT
Start: 2023-11-21

## 2023-11-08 RX ORDER — DIPHENHYDRAMINE HCL 50 MG
50 CAPSULE ORAL ONCE
Status: CANCELLED | OUTPATIENT
Start: 2024-01-08

## 2023-11-08 RX ORDER — DIPHENHYDRAMINE HCL 50 MG
50 CAPSULE ORAL ONCE
Status: CANCELLED | OUTPATIENT
Start: 2023-12-19

## 2023-11-08 RX ORDER — DIPHENHYDRAMINE HCL 50 MG
50 CAPSULE ORAL ONCE
Status: CANCELLED | OUTPATIENT
Start: 2023-12-12

## 2023-11-08 RX ORDER — FAMOTIDINE 10 MG/ML
20 INJECTION INTRAVENOUS ONCE AS NEEDED
Status: CANCELLED | OUTPATIENT
Start: 2023-11-28

## 2023-11-08 RX ORDER — PROCHLORPERAZINE EDISYLATE 5 MG/ML
10 INJECTION INTRAMUSCULAR; INTRAVENOUS EVERY 6 HOURS PRN
Status: CANCELLED | OUTPATIENT
Start: 2023-12-05

## 2023-11-08 RX ORDER — EPINEPHRINE 0.3 MG/.3ML
0.3 INJECTION SUBCUTANEOUS EVERY 5 MIN PRN
Status: CANCELLED | OUTPATIENT
Start: 2023-12-12

## 2023-11-08 RX ORDER — PALONOSETRON 0.05 MG/ML
0.25 INJECTION, SOLUTION INTRAVENOUS ONCE
Status: CANCELLED | OUTPATIENT
Start: 2023-12-05

## 2023-11-08 RX ORDER — PROCHLORPERAZINE EDISYLATE 5 MG/ML
10 INJECTION INTRAMUSCULAR; INTRAVENOUS EVERY 6 HOURS PRN
Status: CANCELLED | OUTPATIENT
Start: 2023-12-19

## 2023-11-08 RX ORDER — ALBUTEROL SULFATE 0.83 MG/ML
3 SOLUTION RESPIRATORY (INHALATION) AS NEEDED
Status: CANCELLED | OUTPATIENT
Start: 2023-12-05

## 2023-11-08 RX ORDER — PROCHLORPERAZINE MALEATE 10 MG
10 TABLET ORAL EVERY 6 HOURS PRN
Status: CANCELLED | OUTPATIENT
Start: 2023-12-19

## 2023-11-08 RX ORDER — DIPHENHYDRAMINE HCL 50 MG
50 CAPSULE ORAL ONCE
Status: CANCELLED | OUTPATIENT
Start: 2023-12-05

## 2023-11-08 RX ORDER — DIPHENHYDRAMINE HCL 50 MG
50 CAPSULE ORAL ONCE
Status: CANCELLED | OUTPATIENT
Start: 2023-11-28

## 2023-11-08 RX ORDER — FAMOTIDINE 10 MG/ML
20 INJECTION INTRAVENOUS ONCE AS NEEDED
Status: CANCELLED | OUTPATIENT
Start: 2023-12-05

## 2023-11-08 RX ORDER — EPINEPHRINE 0.3 MG/.3ML
0.3 INJECTION SUBCUTANEOUS EVERY 5 MIN PRN
Status: CANCELLED | OUTPATIENT
Start: 2023-12-05

## 2023-11-08 RX ORDER — FAMOTIDINE 10 MG/ML
20 INJECTION INTRAVENOUS ONCE AS NEEDED
Status: CANCELLED | OUTPATIENT
Start: 2023-11-13

## 2023-11-08 RX ORDER — DIPHENHYDRAMINE HYDROCHLORIDE 50 MG/ML
50 INJECTION INTRAMUSCULAR; INTRAVENOUS AS NEEDED
Status: CANCELLED | OUTPATIENT
Start: 2023-11-21

## 2023-11-08 RX ORDER — FAMOTIDINE 10 MG/ML
20 INJECTION INTRAVENOUS ONCE
Status: CANCELLED | OUTPATIENT
Start: 2023-11-21

## 2023-11-08 RX ORDER — PROCHLORPERAZINE MALEATE 10 MG
10 TABLET ORAL EVERY 6 HOURS PRN
Status: CANCELLED | OUTPATIENT
Start: 2023-12-12

## 2023-11-08 RX ORDER — DIPHENHYDRAMINE HYDROCHLORIDE 50 MG/ML
50 INJECTION INTRAMUSCULAR; INTRAVENOUS AS NEEDED
Status: CANCELLED | OUTPATIENT
Start: 2023-11-28

## 2023-11-08 RX ORDER — PROCHLORPERAZINE EDISYLATE 5 MG/ML
10 INJECTION INTRAMUSCULAR; INTRAVENOUS EVERY 6 HOURS PRN
Status: CANCELLED | OUTPATIENT
Start: 2023-11-21

## 2023-11-08 RX ORDER — PALONOSETRON 0.05 MG/ML
0.25 INJECTION, SOLUTION INTRAVENOUS ONCE
Status: CANCELLED | OUTPATIENT
Start: 2023-12-12

## 2023-11-08 RX ORDER — EPINEPHRINE 0.3 MG/.3ML
0.3 INJECTION SUBCUTANEOUS EVERY 5 MIN PRN
Status: CANCELLED | OUTPATIENT
Start: 2023-11-21

## 2023-11-08 RX ORDER — PROCHLORPERAZINE EDISYLATE 5 MG/ML
10 INJECTION INTRAMUSCULAR; INTRAVENOUS EVERY 6 HOURS PRN
Status: CANCELLED | OUTPATIENT
Start: 2024-01-08

## 2023-11-08 RX ORDER — PALONOSETRON 0.05 MG/ML
0.25 INJECTION, SOLUTION INTRAVENOUS ONCE
Status: CANCELLED | OUTPATIENT
Start: 2023-11-13

## 2023-11-08 RX ORDER — ALBUTEROL SULFATE 0.83 MG/ML
3 SOLUTION RESPIRATORY (INHALATION) AS NEEDED
Status: CANCELLED | OUTPATIENT
Start: 2023-12-19

## 2023-11-08 RX ORDER — PROCHLORPERAZINE MALEATE 10 MG
10 TABLET ORAL EVERY 6 HOURS PRN
Status: CANCELLED | OUTPATIENT
Start: 2023-12-05

## 2023-11-08 RX ORDER — DIPHENHYDRAMINE HYDROCHLORIDE 50 MG/ML
50 INJECTION INTRAMUSCULAR; INTRAVENOUS AS NEEDED
Status: CANCELLED | OUTPATIENT
Start: 2023-11-13

## 2023-11-08 RX ORDER — FAMOTIDINE 10 MG/ML
20 INJECTION INTRAVENOUS ONCE AS NEEDED
Status: CANCELLED | OUTPATIENT
Start: 2023-11-21

## 2023-11-08 RX ORDER — DIPHENHYDRAMINE HYDROCHLORIDE 50 MG/ML
50 INJECTION INTRAMUSCULAR; INTRAVENOUS AS NEEDED
Status: CANCELLED | OUTPATIENT
Start: 2024-01-08

## 2023-11-08 RX ORDER — FAMOTIDINE 10 MG/ML
20 INJECTION INTRAVENOUS ONCE
Status: CANCELLED | OUTPATIENT
Start: 2023-12-19

## 2023-11-08 RX ORDER — EPINEPHRINE 0.3 MG/.3ML
0.3 INJECTION SUBCUTANEOUS EVERY 5 MIN PRN
Status: CANCELLED | OUTPATIENT
Start: 2023-11-28

## 2023-11-08 RX ORDER — PALONOSETRON 0.05 MG/ML
0.25 INJECTION, SOLUTION INTRAVENOUS ONCE
Status: CANCELLED | OUTPATIENT
Start: 2023-11-28

## 2023-11-08 RX ORDER — PALONOSETRON 0.05 MG/ML
0.25 INJECTION, SOLUTION INTRAVENOUS ONCE
Status: CANCELLED | OUTPATIENT
Start: 2023-11-21

## 2023-11-08 RX ORDER — FAMOTIDINE 10 MG/ML
20 INJECTION INTRAVENOUS ONCE AS NEEDED
Status: CANCELLED | OUTPATIENT
Start: 2023-12-12

## 2023-11-08 RX ORDER — PROCHLORPERAZINE MALEATE 10 MG
10 TABLET ORAL EVERY 6 HOURS PRN
Status: CANCELLED | OUTPATIENT
Start: 2023-11-13

## 2023-11-08 RX ORDER — ALBUTEROL SULFATE 0.83 MG/ML
3 SOLUTION RESPIRATORY (INHALATION) AS NEEDED
Status: CANCELLED | OUTPATIENT
Start: 2023-12-12

## 2023-11-08 RX ORDER — PALONOSETRON 0.05 MG/ML
0.25 INJECTION, SOLUTION INTRAVENOUS ONCE
Status: CANCELLED | OUTPATIENT
Start: 2024-01-08

## 2023-11-08 RX ORDER — PROCHLORPERAZINE MALEATE 10 MG
10 TABLET ORAL EVERY 6 HOURS PRN
Status: CANCELLED | OUTPATIENT
Start: 2023-11-21

## 2023-11-08 RX ORDER — FAMOTIDINE 10 MG/ML
20 INJECTION INTRAVENOUS ONCE
Status: CANCELLED | OUTPATIENT
Start: 2023-12-12

## 2023-11-09 ENCOUNTER — TELEPHONE (OUTPATIENT)
Dept: HEMATOLOGY/ONCOLOGY | Facility: HOSPITAL | Age: 63
End: 2023-11-09
Payer: MEDICAID

## 2023-11-13 ENCOUNTER — APPOINTMENT (OUTPATIENT)
Dept: HEMATOLOGY/ONCOLOGY | Facility: HOSPITAL | Age: 63
End: 2023-11-13
Payer: MEDICAID

## 2023-11-13 ENCOUNTER — LAB (OUTPATIENT)
Dept: LAB | Facility: LAB | Age: 63
End: 2023-11-13
Payer: MEDICAID

## 2023-11-13 DIAGNOSIS — C34.12 MALIGNANT NEOPLASM OF UPPER LOBE OF LEFT LUNG (MULTI): ICD-10-CM

## 2023-11-13 LAB
ALBUMIN SERPL BCP-MCNC: 4 G/DL (ref 3.4–5)
ALP SERPL-CCNC: 59 U/L (ref 33–136)
ALT SERPL W P-5'-P-CCNC: 12 U/L (ref 10–52)
ANION GAP SERPL CALC-SCNC: 12 MMOL/L (ref 10–20)
AST SERPL W P-5'-P-CCNC: 19 U/L (ref 9–39)
BASOPHILS # BLD AUTO: 0.05 X10*3/UL (ref 0–0.1)
BASOPHILS NFR BLD AUTO: 0.7 %
BILIRUB SERPL-MCNC: 0.3 MG/DL (ref 0–1.2)
BUN SERPL-MCNC: 12 MG/DL (ref 6–23)
CALCIUM SERPL-MCNC: 9.6 MG/DL (ref 8.6–10.6)
CHLORIDE SERPL-SCNC: 103 MMOL/L (ref 98–107)
CO2 SERPL-SCNC: 31 MMOL/L (ref 21–32)
CREAT SERPL-MCNC: 0.84 MG/DL (ref 0.5–1.3)
EOSINOPHIL # BLD AUTO: 0.05 X10*3/UL (ref 0–0.7)
EOSINOPHIL NFR BLD AUTO: 0.7 %
ERYTHROCYTE [DISTWIDTH] IN BLOOD BY AUTOMATED COUNT: 13.2 % (ref 11.5–14.5)
GFR SERPL CREATININE-BSD FRML MDRD: >90 ML/MIN/1.73M*2
GLUCOSE SERPL-MCNC: 102 MG/DL (ref 74–99)
HBV CORE AB SER QL: NONREACTIVE
HBV SURFACE AB SER-ACNC: <3.1 MIU/ML
HBV SURFACE AG SERPL QL IA: NONREACTIVE
HCT VFR BLD AUTO: 38.2 % (ref 41–52)
HGB BLD-MCNC: 12.1 G/DL (ref 13.5–17.5)
IMM GRANULOCYTES # BLD AUTO: 0.02 X10*3/UL (ref 0–0.7)
IMM GRANULOCYTES NFR BLD AUTO: 0.3 % (ref 0–0.9)
LYMPHOCYTES # BLD AUTO: 2.88 X10*3/UL (ref 1.2–4.8)
LYMPHOCYTES NFR BLD AUTO: 38.7 %
MCH RBC QN AUTO: 32.3 PG (ref 26–34)
MCHC RBC AUTO-ENTMCNC: 31.7 G/DL (ref 32–36)
MCV RBC AUTO: 102 FL (ref 80–100)
MONOCYTES # BLD AUTO: 0.67 X10*3/UL (ref 0.1–1)
MONOCYTES NFR BLD AUTO: 9 %
NEUTROPHILS # BLD AUTO: 3.77 X10*3/UL (ref 1.2–7.7)
NEUTROPHILS NFR BLD AUTO: 50.6 %
NRBC BLD-RTO: 0 /100 WBCS (ref 0–0)
PLATELET # BLD AUTO: 298 X10*3/UL (ref 150–450)
POTASSIUM SERPL-SCNC: 4.5 MMOL/L (ref 3.5–5.3)
PROT SERPL-MCNC: 6.5 G/DL (ref 6.4–8.2)
RBC # BLD AUTO: 3.75 X10*6/UL (ref 4.5–5.9)
SODIUM SERPL-SCNC: 141 MMOL/L (ref 136–145)
WBC # BLD AUTO: 7.4 X10*3/UL (ref 4.4–11.3)

## 2023-11-13 PROCEDURE — 80053 COMPREHEN METABOLIC PANEL: CPT

## 2023-11-13 PROCEDURE — 36415 COLL VENOUS BLD VENIPUNCTURE: CPT

## 2023-11-13 PROCEDURE — 87340 HEPATITIS B SURFACE AG IA: CPT

## 2023-11-13 PROCEDURE — 86704 HEP B CORE ANTIBODY TOTAL: CPT

## 2023-11-13 PROCEDURE — 86706 HEP B SURFACE ANTIBODY: CPT

## 2023-11-13 PROCEDURE — 85025 COMPLETE CBC W/AUTO DIFF WBC: CPT

## 2023-11-13 NOTE — RESULT ENCOUNTER NOTE
Hollis Maldonado,    I called and discussed the results with Seble Neri (Martínez's daughter) on 11/13/23. Feel free to let me know if there is anything else I can do to assist with their care.    Los

## 2023-11-14 ENCOUNTER — APPOINTMENT (OUTPATIENT)
Dept: HEMATOLOGY/ONCOLOGY | Facility: HOSPITAL | Age: 63
End: 2023-11-14
Payer: MEDICAID

## 2023-11-17 RX ORDER — HEPARIN SODIUM,PORCINE/PF 10 UNIT/ML
50 SYRINGE (ML) INTRAVENOUS AS NEEDED
Status: CANCELLED | OUTPATIENT
Start: 2023-11-20

## 2023-11-17 RX ORDER — HEPARIN 100 UNIT/ML
500 SYRINGE INTRAVENOUS AS NEEDED
Status: CANCELLED | OUTPATIENT
Start: 2023-11-20

## 2023-11-20 ENCOUNTER — INFUSION (OUTPATIENT)
Dept: HEMATOLOGY/ONCOLOGY | Facility: HOSPITAL | Age: 63
End: 2023-11-20
Payer: MEDICAID

## 2023-11-20 ENCOUNTER — NUTRITION (OUTPATIENT)
Dept: HEMATOLOGY/ONCOLOGY | Facility: HOSPITAL | Age: 63
End: 2023-11-20

## 2023-11-20 VITALS
OXYGEN SATURATION: 100 % | HEIGHT: 69 IN | BODY MASS INDEX: 24.36 KG/M2 | RESPIRATION RATE: 16 BRPM | SYSTOLIC BLOOD PRESSURE: 131 MMHG | WEIGHT: 164.46 LBS | TEMPERATURE: 96.6 F | HEART RATE: 79 BPM | DIASTOLIC BLOOD PRESSURE: 73 MMHG

## 2023-11-20 DIAGNOSIS — C34.12 MALIGNANT NEOPLASM OF UPPER LOBE OF LEFT LUNG (MULTI): ICD-10-CM

## 2023-11-20 PROCEDURE — 96417 CHEMO IV INFUS EACH ADDL SEQ: CPT

## 2023-11-20 PROCEDURE — 2500000004 HC RX 250 GENERAL PHARMACY W/ HCPCS (ALT 636 FOR OP/ED): Performed by: INTERNAL MEDICINE

## 2023-11-20 PROCEDURE — 96375 TX/PRO/DX INJ NEW DRUG ADDON: CPT | Mod: INF

## 2023-11-20 PROCEDURE — 96413 CHEMO IV INFUSION 1 HR: CPT

## 2023-11-20 PROCEDURE — 2500000001 HC RX 250 WO HCPCS SELF ADMINISTERED DRUGS (ALT 637 FOR MEDICARE OP): Performed by: INTERNAL MEDICINE

## 2023-11-20 RX ORDER — ALBUTEROL SULFATE 0.83 MG/ML
3 SOLUTION RESPIRATORY (INHALATION) AS NEEDED
Status: DISCONTINUED | OUTPATIENT
Start: 2023-11-20 | End: 2023-11-20 | Stop reason: HOSPADM

## 2023-11-20 RX ORDER — DIPHENHYDRAMINE HCL 50 MG
50 CAPSULE ORAL ONCE
Status: COMPLETED | OUTPATIENT
Start: 2023-11-20 | End: 2023-11-20

## 2023-11-20 RX ORDER — DIPHENHYDRAMINE HYDROCHLORIDE 50 MG/ML
50 INJECTION INTRAMUSCULAR; INTRAVENOUS AS NEEDED
Status: DISCONTINUED | OUTPATIENT
Start: 2023-11-20 | End: 2023-11-20 | Stop reason: HOSPADM

## 2023-11-20 RX ORDER — HEPARIN SODIUM,PORCINE/PF 10 UNIT/ML
50 SYRINGE (ML) INTRAVENOUS AS NEEDED
Status: DISCONTINUED | OUTPATIENT
Start: 2023-11-20 | End: 2023-11-20 | Stop reason: HOSPADM

## 2023-11-20 RX ORDER — FAMOTIDINE 10 MG/ML
20 INJECTION INTRAVENOUS ONCE
Status: COMPLETED | OUTPATIENT
Start: 2023-11-20 | End: 2023-11-20

## 2023-11-20 RX ORDER — PROCHLORPERAZINE EDISYLATE 5 MG/ML
10 INJECTION INTRAMUSCULAR; INTRAVENOUS EVERY 6 HOURS PRN
Status: DISCONTINUED | OUTPATIENT
Start: 2023-11-20 | End: 2023-11-20 | Stop reason: HOSPADM

## 2023-11-20 RX ORDER — PROCHLORPERAZINE MALEATE 10 MG
10 TABLET ORAL EVERY 6 HOURS PRN
Status: DISCONTINUED | OUTPATIENT
Start: 2023-11-20 | End: 2023-11-20 | Stop reason: HOSPADM

## 2023-11-20 RX ORDER — PALONOSETRON 0.05 MG/ML
0.25 INJECTION, SOLUTION INTRAVENOUS ONCE
Status: COMPLETED | OUTPATIENT
Start: 2023-11-20 | End: 2023-11-20

## 2023-11-20 RX ORDER — HEPARIN 100 UNIT/ML
500 SYRINGE INTRAVENOUS AS NEEDED
Status: CANCELLED | OUTPATIENT
Start: 2023-11-20

## 2023-11-20 RX ORDER — HEPARIN SODIUM,PORCINE/PF 10 UNIT/ML
50 SYRINGE (ML) INTRAVENOUS AS NEEDED
Status: CANCELLED | OUTPATIENT
Start: 2023-11-20

## 2023-11-20 RX ORDER — EPINEPHRINE 0.3 MG/.3ML
0.3 INJECTION SUBCUTANEOUS EVERY 5 MIN PRN
Status: DISCONTINUED | OUTPATIENT
Start: 2023-11-20 | End: 2023-11-20 | Stop reason: HOSPADM

## 2023-11-20 RX ORDER — FAMOTIDINE 10 MG/ML
20 INJECTION INTRAVENOUS ONCE AS NEEDED
Status: DISCONTINUED | OUTPATIENT
Start: 2023-11-20 | End: 2023-11-20 | Stop reason: HOSPADM

## 2023-11-20 RX ORDER — HEPARIN 100 UNIT/ML
500 SYRINGE INTRAVENOUS AS NEEDED
Status: DISCONTINUED | OUTPATIENT
Start: 2023-11-20 | End: 2023-11-20 | Stop reason: HOSPADM

## 2023-11-20 RX ADMIN — PACLITAXEL 84 MG: 6 INJECTION, SOLUTION INTRAVENOUS at 13:38

## 2023-11-20 RX ADMIN — FAMOTIDINE 20 MG: 10 INJECTION INTRAVENOUS at 12:47

## 2023-11-20 RX ADMIN — CARBOPLATIN 240 MG: 10 INJECTION INTRAVENOUS at 14:42

## 2023-11-20 RX ADMIN — DIPHENHYDRAMINE HYDROCHLORIDE 50 MG: 50 CAPSULE ORAL at 12:47

## 2023-11-20 RX ADMIN — DEXAMETHASONE SODIUM PHOSPHATE 12 MG: 10 INJECTION, SOLUTION INTRAMUSCULAR; INTRAVENOUS at 12:47

## 2023-11-20 RX ADMIN — PALONOSETRON HYDROCHLORIDE 250 MCG: 0.25 INJECTION INTRAVENOUS at 12:47

## 2023-11-20 ASSESSMENT — PAIN SCALES - GENERAL: PAINLEVEL: 0-NO PAIN

## 2023-11-20 NOTE — PROGRESS NOTES
Patient arrived this morning at 0815. Prior to calling PICC team, patient consented to having Nurse Costello placed peripheral IV which was done successfully in her first attempt. Patient made aware blood will be sent to the lab and will await results. Patient irately stated he is not waiting more than one hour to get treated. Lab resulted a few minutes and Treatment plan was released to pharmacy. Pre-hydration started and pre-medications given as released by pharmacy. Ten minutes after Pre-hydration finished patient called out requesting for his IV to be discontinued as he is not waiting for his Chemo to come out. Spoke to pharmacy and asked how much longer for his chemo to be ready. Pharmacy stated it shouldn't take long. Charge nurse came and spoke to patient where he stated he is not coming back for any future treatment. Peripheral IV discontinued and discharged to home. Provider and nurse practitioner made aware.

## 2023-11-20 NOTE — PROGRESS NOTES
"NUTRITION Assessment NOTE  Reason for Visit:  Martínez Neri is a 63 y.o. 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     Spoke with patient and family in infusion. Patient on C1D1 carbo/taxol. Family interested in education for nutrition during cancer treatment.     Lab Results   Component Value Date/Time    GLUCOSE 102 (H) 11/13/2023 1318     11/13/2023 1318    K 4.5 11/13/2023 1318     11/13/2023 1318    CO2 31 11/13/2023 1318    ANIONGAP 12 11/13/2023 1318    BUN 12 11/13/2023 1318    CREATININE 0.84 11/13/2023 1318    EGFR >90 11/13/2023 1318    CALCIUM 9.6 11/13/2023 1318    ALBUMIN 4.0 11/13/2023 1318    ALKPHOS 59 11/13/2023 1318    PROT 6.5 11/13/2023 1318    AST 19 11/13/2023 1318    BILITOT 0.3 11/13/2023 1318    ALT 12 11/13/2023 1318     Lab Results   Component Value Date/Time    VITD25 17 (L) 10/03/2023 0828       Nutrition Assessment     Anthropometrics:  Anthropometrics  IBW/kg (Dietitian Calculated): 73.4 kg  Percent of IBW: 101 %  Weight Change  Weight History / % Weight Change: UBW of 147 lbs; since moving back to [place] in August has risen to 164# to prepare for treatment. Discussed goal of maintaining weight throughout treatment.  Significant Weight Loss: No  Significant Weight Gain: Non-fluid related        Wt Readings from Last 10 Encounters:   11/20/23 74.6 kg (164 lb 7.4 oz)   11/02/23 73.4 kg (161 lb 13.1 oz)   10/31/23 73.9 kg (163 lb)   10/26/23 74.3 kg (163 lb 12.8 oz)   10/23/23 73 kg (160 lb 15 oz)   10/19/23 73.3 kg (161 lb 9.6 oz)   10/12/23 72.1 kg (158 lb 15.2 oz)   10/10/23 73.5 kg (162 lb)   10/05/23 71.5 kg (157 lb 10.1 oz)   10/02/23 72.1 kg (159 lb)        Food And Nutrient Intake:  Food and Nutrient History  Food and Nutrient History: Patient likes \"junk\" food. Reports foods such as potato chips and McDonalds. Family states they are following a diet similar to the Whole 30 at home trying to eliminate " sugar. Patient reports some depression which has left him hungrier. Eats 4x/day (breakfast, dinner, and snacks).  Energy Intake: Good > 75 %  Fluid Intake: Family pushes patient to drink more water. Quit drinking alcohol 7 years ago. Drinks 4 cups of caffeinated coffee + some decaf occasionally. Drinks 1 bottle of coke daily.  Food Allergy: NFKA.  GI Symptoms: none  Oral Problems: denies  Dentition: own     Food Intake  Meal 1: Sausage biscuit, eggs  Meal 2: Devan sarina sandwich  Meal 3: Cheddar bratwurst  Snacks: Mcdonalds, chips    Food Preparation  Dining Out: More than 3 times a week    Micronutrient Intake  Vitamin Intake: Multivitamin, Thiamin, D    Food Supplement Intake  Oral Nutrition Supplements:  (Had questions about protein shakes)      Nutrition Focused Physical Exam:  Subcutaneous Fat Loss  Orbital Fat Pads: Well nourshed (slightly bulging fat pads)  Buccal Fat Pads: Well nourished (full, rounded cheeks)  Triceps: Defer  Ribs: Defer  Muscle Wasting  Temporalis: Well nourished (well-defined muscle)  Pectoralis (Clavicular Region): Well nourished (clavicle not visible)  Deltoid/Trapezius: Well nourished (rounded appearance at arm, shoulder, neck)  Interosseous: Defer  Trapezius/Infraspinatus/Supraspinatus (Scapular Region): Defer  Quadriceps: Defer  Gastrocnemius: Defer        Energy Needs  Estimated Energy Needs  Total Energy Estimated Needs (kCal): 2200 kCal  Method for Estimating Needs: 30 kcal/kg IBW  Estimated Fluid Needs  Total Fluid Estimated Needs (mL): 2200 mL  Method for Estimating Needs: 1 ml/kcal or per team  Estimated Protein Needs  Total Protein Estimated Needs (g): 80 g  Method for Estimating Needs: 1.1 g/kg IBW        Nutrition Diagnosis   Malnutrition Diagnosis  Patient has Malnutrition Diagnosis: No  Nutrition Diagnosis  Patient has Nutrition Diagnosis: Yes  Diagnosis Status (1): New  Nutrition Diagnosis 1: Food and nutrition related knowledge deficit  Related to (1): cancer  treatment  As Evidenced by (1): current intake and questions regarding modifications.    Nutrition Interventions/Recommendations   Food and Nutrition Delivery  Meals & Snacks: General Healthful Diet, Protein-modified diet, Fluid-modified diet  Goals: Include protein with each meal/snack; aim for 8 cups of water daily (may use Carroll if this helps improve intake).  Nutrition Education  Nutrition Education Content: Content related nutrition education  Goals: Food safety; protein needs; importance of weight maintenance        There are no Patient Instructions on file for this visit.    Nutrition Monitoring and Evaluation   Food/Nutrient Related History Monitoring  Monitoring and Evaluation Plan: Energy intake, Fluid intake, Protein intake, Vitamin intake  Energy Intake: Estimated energy intake  Criteria: 24 hour recall  Fluid Intake: Estimated fluid intake  Criteria: 24 hour recall  Estimated protein intake: Estimated protein intake  Criteria: 24 hour recall  Vitamin Intake: Measured vitamin intake  Criteria: 24 h our recall  Knowledge/Beliefs/Attitudes  Monitoring and Evaluation Plan: Food and nutrition knowledge  Food and nutrition knowledge: Nutrition knowledge of individual client        Time Spent  Prep time on day of patient encounter: 10 minutes  Time spent directly with patient, family or caregiver: 20 minutes  Additional Time Spent on Patient Care Activities: 0 minutes  Documentation Time: 20 minutes  Other Time Spent: 0 minutes  Total: 50 minutes

## 2023-11-21 ENCOUNTER — HOSPITAL ENCOUNTER (OUTPATIENT)
Dept: RADIATION ONCOLOGY | Facility: HOSPITAL | Age: 63
Setting detail: RADIATION/ONCOLOGY SERIES
Discharge: HOME | End: 2023-11-21
Payer: MEDICAID

## 2023-11-21 ENCOUNTER — OFFICE VISIT (OUTPATIENT)
Dept: HEMATOLOGY/ONCOLOGY | Facility: HOSPITAL | Age: 63
End: 2023-11-21
Payer: MEDICAID

## 2023-11-21 VITALS
OXYGEN SATURATION: 98 % | DIASTOLIC BLOOD PRESSURE: 51 MMHG | TEMPERATURE: 96.1 F | BODY MASS INDEX: 24.16 KG/M2 | WEIGHT: 164.6 LBS | SYSTOLIC BLOOD PRESSURE: 121 MMHG | RESPIRATION RATE: 18 BRPM | HEART RATE: 77 BPM

## 2023-11-21 DIAGNOSIS — C34.12 MALIGNANT NEOPLASM OF UPPER LOBE OF LEFT LUNG (MULTI): ICD-10-CM

## 2023-11-21 DIAGNOSIS — C34.12 MALIGNANT NEOPLASM OF UPPER LOBE, LEFT BRONCHUS OR LUNG (MULTI): ICD-10-CM

## 2023-11-21 DIAGNOSIS — Z51.11 ENCOUNTER FOR ANTINEOPLASTIC CHEMOTHERAPY: Primary | ICD-10-CM

## 2023-11-21 LAB
RAD ONC MSQ ACTUAL FRACTIONS DELIVERED: 2
RAD ONC MSQ ACTUAL SESSION DELIVERED DOSE: 200 CGRAY
RAD ONC MSQ ACTUAL TOTAL DOSE: 400 CGRAY
RAD ONC MSQ ELAPSED DAYS: 1
RAD ONC MSQ LAST DATE: NORMAL
RAD ONC MSQ PRESCRIBED FRACTIONAL DOSE: 200 CGRAY
RAD ONC MSQ PRESCRIBED NUMBER OF FRACTIONS: 33
RAD ONC MSQ PRESCRIBED TECHNIQUE: NORMAL
RAD ONC MSQ PRESCRIBED TOTAL DOSE: 6600 CGRAY
RAD ONC MSQ PRESCRIPTION PATTERN COMMENT: NORMAL
RAD ONC MSQ START DATE: NORMAL
RAD ONC MSQ TREATMENT COURSE NUMBER: 1
RAD ONC MSQ TREATMENT SITE: NORMAL

## 2023-11-21 PROCEDURE — 77336 RADIATION PHYSICS CONSULT: CPT | Performed by: RADIOLOGY

## 2023-11-21 PROCEDURE — 99214 OFFICE O/P EST MOD 30 MIN: CPT | Performed by: NURSE PRACTITIONER

## 2023-11-21 PROCEDURE — 77014 CHG CT GUIDANCE RADIATION THERAPY FLDS PLACEMENT: CPT | Performed by: STUDENT IN AN ORGANIZED HEALTH CARE EDUCATION/TRAINING PROGRAM

## 2023-11-21 PROCEDURE — 77386 HC INTENSITY-MODULATED RADIATION THERAPY (IMRT), COMPLEX: CPT | Performed by: RADIOLOGY

## 2023-11-21 PROCEDURE — 99214 OFFICE O/P EST MOD 30 MIN: CPT | Mod: 25 | Performed by: NURSE PRACTITIONER

## 2023-11-21 ASSESSMENT — PAIN SCALES - GENERAL: PAINLEVEL: 0-NO PAIN

## 2023-11-21 NOTE — PROGRESS NOTES
[facility] - Medical Oncology Follow-Up Visit    Patient ID: Martínez Neri is a 63 y.o. male      Current therapy: CARBOplatin (Paraplatin) 240 mg in sodium chloride 0.9% 124 mL IV, 240 mg (87.3 % of original dose 271.4 mg), intravenous, Once, 1 of 1 cycle    Dose modification: 237 mg (original dose 271.4 mg, Cycle 1, Reason: Protocol Driven)    Administration: 240 mg (11/20/2023)        PACLitaxeL (Taxol) 84 mg in dextrose 5 % in water (D5W) 114 mL IV, 45 mg/m2 = 84 mg, intravenous, Once, 1 of 1 cycle    Administration: 84 mg (11/20/2023)        palonosetron (Aloxi) injection 250 mcg, 250 mcg, intravenous, Once, 1 of 1 cycle    Administration: 250 mcg (11/20/2023)      Oncologic History:     DIAGNOSIS AND STAGING  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    PD-L1 TPS 20%      PRIOR THERAPIES  None         CURRENT THERAPY  Anticipating concurrent chemo-RT using weekly carbo/taxol followed by consolidative durvalumab         CURRENT ONCOLOGICAL PROBLEMS  Cough         HISTORY OF PRESENT ILLNESS:  Current smoker (50-pack-year history), with history of COPD and seizures, who was diagnosed with a squamous cell carcinoma of the left upper lobe in [place], never treated.  Outpatient notes from [facility] cancer care and hematology clinic were reviewed.     On 06/17/2023 the patient had a CT chest for ongoing respiratory symptoms and was found to have a left suprahilar mass involving the left upper lobe bronchi with postobstructive pneumonia/collapse.  On 06/25/2023 CT chest/abdomen/pelvis demonstrated persistent masslike consolidation in the left upper lobe with no signs of extrathoracic metastatic disease.     On 06/26/2023 the patient  underwent a bronchoscopy, and pathology was compatible with a squamous cell carcinoma at least in situ, with a foci highly suspicious for invasive squamous cell carcinoma.     On 07/18/2023 a PET/CT obtained, demonstrating a 6.6 cm left upper lobe mass with an SUV of 8.3 and no evidence of distant metastatic disease.     On 07/18/2023 to brain MRI was negative for intracranial metastasis.     On 07/27/2023 bronchoscopy/EBUS was performed.  Endobronchial biopsies of left upper lobe demonstrated an invasive moderately differentiated squamous cell carcinoma.  Left upper lobe hilar FNA showed atypical cells with squamous differentiation, suspicious for malignancy.      Subcarinal FNA was indeterminate for malignancy with extremely rare atypical cells with squamous differentiation.  Molecular profile: Lack of BRAF, EGFR, HER2, KRAS, MET, ALK, NTRK 1/2/3, RET or ROS1 alterations.  PD-L1 TPS 20%.  The patient was seen by medical oncology and did not too frail for combined chemo RT (there is a performance status ECOG 3) documented on 08/17/2023.  At that time, he lived with some friends, while his family was primarily located in [place].     His daughter traveled and was able to relocate him to [place], and he presents today to clinic accompanied by his daughter, son, and daughter-in-law.     The patient states he is felt much better since his relocation, has been gaining weight, approximately 10 pounds since he moved here.  He is more active, going up and down the steps at home, but still tired and taking some breaks throughout the day.     He denies any headaches or new neurological symptoms.  His respiratory symptoms are stable. There is no new localized pain.     Reported molecular and ancillary testing performed upon diagnosis in [place]:   BRAF negative  [id number] negative  EGFR classic mutations negative  HER2 negative  MET negative  ALK negative  In tract 1/2/3 negative  RET negative  ROS1 negative  PD-L1 20%     Referred  to surgery - PFTs were considered prohibitive for surgical resection-      10/31/23: Repeat EBUS for systematic staging:  Final Cytological Interpretation      A. LYMPH NODE 7 PULMONARY FINE NEEDLE ASPIRATION, CYTOLOGY AND CELL BLOCK:  --  No malignant cells identified.  --  Lymphoid sample.     B. LYMPH NODE 4 L PULMONARY FINE NEEDLE ASPIRATION, CYTOLOGY AND CELL BLOCK:  --  No malignant cells identified.  --  Lymphoid sample.           C. LYMPH NODE 11 L PULMONARY FINE NEEDLE ASPIRATION, CYTOLOGY AND CELL BLOCK:  --  A rare cluster of malignant cells derived from squamous cell carcinoma, see note  -- Also, please refer to concurrent biopsy surgical pathology report ([id number]).     Note:  The malignant squamous cell carcinoma cells are represented in the cell block only.  Immunostain for p40 is positive. The cytomorphology and immunoprofile support the above diagnosis.  The material is not sufficient for molecular testing.      Component     FINAL DIAGNOSIS   Left lung, upper lobe, biopsy:  -- Superficial fragment of squamous cell carcinoma, keratinizing; see note.     Note: According to clinician's note, PD-L1 and NGS have been performed at an outside institution. These studies can be repeated, if clinically indicated.      NGS and PD-L1 not repeated in this specimen as it was done in outside institution already and the purpose of this procedure was not for diagnosis but systematic mediastinal re-staging.      PAST MEDICAL HISTORY  Brain aneurysm  COPD (chronic obstructive pulmonary disease) (CMS/HCC)  Epilepsy (CMS/HCC)  High cholesterol  History of alcohol use  Hypertension     PAST SURGICAL HISTORY:  CAROTID ENDARTERECTOMY  COLONOSCOPY  HERNIA REPAIR      SOCIAL HISTORY  Tobacco (50 pack-year history) use - quit after cancer dx      CURRENT MEDS REVIEWED:  Scheduled medications  Continuous medications  PRN medications     ALLERGIES  NKDA     FAMILY HISTORY  Father had prostate CA        Chief Concern: Here in  "clinic for follow-up on concurrent chemo/RT with weekly Carbo/Taxol (week 1)    HPI Patient \"Amaury\" is here today in clinic with his wife Keri for follow-up on concurrent chemo/RT with weekly Carbo/Taxol (week 1). He is feeling well. Breathing is good, no CP or SOB. No GI symptoms. Eating/drinking ok. No new aches/pains. No fevers, chills, or cold symptoms. No other concerns or complaints.       Meds (Current):    Current Outpatient Medications:     albuterol (Proventil HFA) 90 mcg/actuation inhaler, Inhale 2 puffs every 4 hours if needed for wheezing or shortness of breath., Disp: 6.7 g, Rfl: 6    atorvastatin (Lipitor) 40 mg tablet, Take 1 tablet (40 mg) by mouth once daily., Disp: 90 tablet, Rfl: 1    cholecalciferol (Vitamin D3) 5,000 Units tablet, Take 1 tablet (5,000 Units) by mouth once daily., Disp: 90 tablet, Rfl: 1    divalproex (Depakote ER) 250 mg 24 hr tablet, Take 1 tablet (250 mg) by mouth 2 times a day. Do not crush, chew, or split., Disp: 90 tablet, Rfl: 1    divalproex (Depakote ER) 500 mg 24 hr tablet, Take 1 tablet (500 mg) by mouth 2 times a day. Do not crush, chew, or split., Disp: 90 tablet, Rfl: 1    inhalat.spacing dev,med. mask spacer, , Disp: , Rfl:     ipratropium-albuteroL (Duo-Neb) 0.5-2.5 mg/3 mL nebulizer solution, Take 3 mL by nebulization 4 times a day as needed for wheezing or shortness of breath., Disp: 90 mL, Rfl: 1    lacosamide (Vimpat) 200 mg tablet tablet, Take 1 tablet (200 mg) by mouth 2 times a day., Disp: 30 tablet, Rfl: 5    nebulizer accessories misc, Use twice daily, Disp: 100 each, Rfl: 0    nebulizer and compressor device, Use as directed, Disp: 1 each, Rfl: 0    nicotine (Nicoderm CQ) 21 mg/24 hr patch, Place 1 patch over 24 hours on the skin once every 24 hours., Disp: 30 patch, Rfl: 0    ondansetron (Zofran) 8 mg tablet, Take 1 tablet (8 mg) by mouth every 8 hours if needed for nausea or vomiting., Disp: 30 tablet, Rfl: 5    prochlorperazine (Compazine) 10 mg " tablet, Take 1 tablet (10 mg) by mouth every 6 hours if needed for nausea or vomiting., Disp: 30 tablet, Rfl: 5    thiamine (Vitamin B-1) 100 mg tablet, Take 1 tablet (100 mg) by mouth once daily., Disp: 90 tablet, Rfl: 1    tiotropium (Spiriva) 18 mcg inhalation capsule, Place 1 capsule (18 mcg) into inhaler and inhale 2 times a day., Disp: , Rfl:     Current Facility-Administered Medications:     LORazepam (Ativan) tablet 0.5 mg, 0.5 mg, oral, PRN, Felisha Knox MD    Review of Systems - Oncology 12 point ROS was obtained and negative unless otherwise mentioned in the above HPI.      Objective   BSA: 1.91 meters squared  Wt Readings from Last 5 Encounters:   11/21/23 74.7 kg (164 lb 9.6 oz)   11/20/23 74.6 kg (164 lb 7.4 oz)   11/02/23 73.4 kg (161 lb 13.1 oz)   10/31/23 73.9 kg (163 lb)   10/26/23 74.3 kg (163 lb 12.8 oz)     /51 (BP Location: Left arm, Patient Position: Sitting)   Pulse 77   Temp 35.6 °C (96.1 °F) (Core)   Resp 18   Wt 74.7 kg (164 lb 9.6 oz)   SpO2 98%   BMI 24.16 kg/m²          Physical Exam  Constitutional:       Appearance: Normal appearance.   Eyes:      Pupils: Pupils are equal, round, and reactive to light.   Cardiovascular:      Rate and Rhythm: Normal rate and regular rhythm.   Pulmonary:      Effort: Pulmonary effort is normal.      Breath sounds: Normal breath sounds.   Abdominal:      General: Bowel sounds are normal.      Palpations: Abdomen is soft.   Musculoskeletal:         General: No swelling.   Skin:     General: Skin is warm and dry.   Neurological:      General: No focal deficit present.      Mental Status: He is alert and oriented to person, place, and time.   Psychiatric:         Mood and Affect: Mood normal.         Behavior: Behavior normal.          Results:  Labs:  Lab Results   Component Value Date    WBC 7.4 11/13/2023    HGB 12.1 (L) 11/13/2023    HCT 38.2 (L) 11/13/2023     (H) 11/13/2023     11/13/2023      Lab Results   Component Value Date     NEUTROABS 3.77 11/13/2023      Lab Results   Component Value Date    GLUCOSE 102 (H) 11/13/2023    CALCIUM 9.6 11/13/2023     11/13/2023    K 4.5 11/13/2023    CO2 31 11/13/2023     11/13/2023    BUN 12 11/13/2023    CREATININE 0.84 11/13/2023     Lab Results   Component Value Date    ALT 12 11/13/2023    AST 19 11/13/2023    ALKPHOS 59 11/13/2023    BILITOT 0.3 11/13/2023      Lab Results   Component Value Date    TSH 5.54 (H) 10/03/2023       Imaging:           === Results for orders placed during the hospital encounter of 10/13/23 ===    MR brain w and wo IV contrast [[id number]] 10/13/2023    Status: Normal  1. No abnormal intracranial enhancement or mass to suggest metastases.    2. Chronic intracranial findings as above.    I personally reviewed the images/study and I agree with the findings  as stated. This study was interpreted at [place], [place].    Signed by: Dharmesh Reddy 10/13/2023 8:27 PM  Dictation workstation:   [id number]    Assessment/Plan      Martínez Neri is a 63 y.o. male here for follow up     Reviewed with the patient the treatment plan consisting of concurrent chemo-RT.  This will be followed by consolidative durvalumab.  We discussed potential side effects related to carboplatin and paclitaxel weekly and he was able to sign a consent.  He has been seen by radiation oncology (Dr. Layne) and underwent CT simulation.  RT start date 11/20/23 and C1 Carbo/Taxol

## 2023-11-22 ENCOUNTER — HOSPITAL ENCOUNTER (OUTPATIENT)
Dept: RADIATION ONCOLOGY | Facility: HOSPITAL | Age: 63
Setting detail: RADIATION/ONCOLOGY SERIES
Discharge: HOME | End: 2023-11-22
Payer: MEDICAID

## 2023-11-22 DIAGNOSIS — Z51.0 ENCOUNTER FOR ANTINEOPLASTIC RADIATION THERAPY: ICD-10-CM

## 2023-11-22 DIAGNOSIS — C34.12 MALIGNANT NEOPLASM OF UPPER LOBE, LEFT BRONCHUS OR LUNG (MULTI): ICD-10-CM

## 2023-11-22 LAB
RAD ONC MSQ ACTUAL FRACTIONS DELIVERED: 3
RAD ONC MSQ ACTUAL SESSION DELIVERED DOSE: 200 CGRAY
RAD ONC MSQ ACTUAL TOTAL DOSE: 600 CGRAY
RAD ONC MSQ ELAPSED DAYS: 2
RAD ONC MSQ LAST DATE: NORMAL
RAD ONC MSQ PRESCRIBED FRACTIONAL DOSE: 200 CGRAY
RAD ONC MSQ PRESCRIBED NUMBER OF FRACTIONS: 33
RAD ONC MSQ PRESCRIBED TECHNIQUE: NORMAL
RAD ONC MSQ PRESCRIBED TOTAL DOSE: 6600 CGRAY
RAD ONC MSQ PRESCRIPTION PATTERN COMMENT: NORMAL
RAD ONC MSQ START DATE: NORMAL
RAD ONC MSQ TREATMENT COURSE NUMBER: 1
RAD ONC MSQ TREATMENT SITE: NORMAL

## 2023-11-22 PROCEDURE — 77386 HC INTENSITY-MODULATED RADIATION THERAPY (IMRT), COMPLEX: CPT | Performed by: RADIOLOGY

## 2023-11-22 PROCEDURE — 77014 CHG CT GUIDANCE RADIATION THERAPY FLDS PLACEMENT: CPT | Performed by: RADIOLOGY

## 2023-11-27 ENCOUNTER — NUTRITION (OUTPATIENT)
Dept: HEMATOLOGY/ONCOLOGY | Facility: HOSPITAL | Age: 63
End: 2023-11-27

## 2023-11-27 ENCOUNTER — INFUSION (OUTPATIENT)
Dept: HEMATOLOGY/ONCOLOGY | Facility: HOSPITAL | Age: 63
End: 2023-11-27
Payer: MEDICAID

## 2023-11-27 ENCOUNTER — HOSPITAL ENCOUNTER (OUTPATIENT)
Dept: RADIATION ONCOLOGY | Facility: HOSPITAL | Age: 63
Setting detail: RADIATION/ONCOLOGY SERIES
Discharge: HOME | End: 2023-11-27
Payer: MEDICAID

## 2023-11-27 VITALS
BODY MASS INDEX: 23.68 KG/M2 | OXYGEN SATURATION: 98 % | SYSTOLIC BLOOD PRESSURE: 101 MMHG | HEART RATE: 78 BPM | RESPIRATION RATE: 18 BRPM | DIASTOLIC BLOOD PRESSURE: 58 MMHG | WEIGHT: 161.38 LBS | TEMPERATURE: 98.6 F

## 2023-11-27 DIAGNOSIS — C34.12 PRIMARY CANCER OF LEFT UPPER LOBE OF LUNG (MULTI): ICD-10-CM

## 2023-11-27 DIAGNOSIS — Z51.0 ENCOUNTER FOR ANTINEOPLASTIC RADIATION THERAPY: ICD-10-CM

## 2023-11-27 DIAGNOSIS — C34.12 PRIMARY CANCER OF LEFT UPPER LOBE OF LUNG (MULTI): Primary | ICD-10-CM

## 2023-11-27 DIAGNOSIS — C34.12 MALIGNANT NEOPLASM OF UPPER LOBE OF LEFT LUNG (MULTI): ICD-10-CM

## 2023-11-27 DIAGNOSIS — C34.12 MALIGNANT NEOPLASM OF UPPER LOBE, LEFT BRONCHUS OR LUNG (MULTI): ICD-10-CM

## 2023-11-27 LAB
ALBUMIN SERPL BCP-MCNC: 3.9 G/DL (ref 3.4–5)
ALP SERPL-CCNC: 52 U/L (ref 33–136)
ALT SERPL W P-5'-P-CCNC: 19 U/L (ref 10–52)
ANION GAP SERPL CALC-SCNC: 14 MMOL/L (ref 10–20)
AST SERPL W P-5'-P-CCNC: 43 U/L (ref 9–39)
BASOPHILS # BLD AUTO: 0.02 X10*3/UL (ref 0–0.1)
BASOPHILS NFR BLD AUTO: 0.5 %
BILIRUB SERPL-MCNC: 0.5 MG/DL (ref 0–1.2)
BUN SERPL-MCNC: 12 MG/DL (ref 6–23)
CALCIUM SERPL-MCNC: 9.2 MG/DL (ref 8.6–10.3)
CHLORIDE SERPL-SCNC: 100 MMOL/L (ref 98–107)
CO2 SERPL-SCNC: 27 MMOL/L (ref 21–32)
CREAT SERPL-MCNC: 0.67 MG/DL (ref 0.5–1.3)
EOSINOPHIL # BLD AUTO: 0.03 X10*3/UL (ref 0–0.7)
EOSINOPHIL NFR BLD AUTO: 0.7 %
ERYTHROCYTE [DISTWIDTH] IN BLOOD BY AUTOMATED COUNT: 13.4 % (ref 11.5–14.5)
GFR SERPL CREATININE-BSD FRML MDRD: >90 ML/MIN/1.73M*2
GLUCOSE SERPL-MCNC: 87 MG/DL (ref 74–99)
HCT VFR BLD AUTO: 35.1 % (ref 41–52)
HGB BLD-MCNC: 11.9 G/DL (ref 13.5–17.5)
IMM GRANULOCYTES # BLD AUTO: 0.02 X10*3/UL (ref 0–0.7)
IMM GRANULOCYTES NFR BLD AUTO: 0.5 % (ref 0–0.9)
LYMPHOCYTES # BLD AUTO: 1.32 X10*3/UL (ref 1.2–4.8)
LYMPHOCYTES NFR BLD AUTO: 30.1 %
MCH RBC QN AUTO: 33 PG (ref 26–34)
MCHC RBC AUTO-ENTMCNC: 33.9 G/DL (ref 32–36)
MCV RBC AUTO: 97 FL (ref 80–100)
MONOCYTES # BLD AUTO: 0.59 X10*3/UL (ref 0.1–1)
MONOCYTES NFR BLD AUTO: 13.5 %
NEUTROPHILS # BLD AUTO: 2.4 X10*3/UL (ref 1.2–7.7)
NEUTROPHILS NFR BLD AUTO: 54.7 %
NRBC BLD-RTO: 0 /100 WBCS (ref 0–0)
PLATELET # BLD AUTO: 174 X10*3/UL (ref 150–450)
POTASSIUM SERPL-SCNC: 5.4 MMOL/L (ref 3.5–5.3)
PROT SERPL-MCNC: 7.1 G/DL (ref 6.4–8.2)
RAD ONC MSQ ACTUAL FRACTIONS DELIVERED: 4
RAD ONC MSQ ACTUAL SESSION DELIVERED DOSE: 200 CGRAY
RAD ONC MSQ ACTUAL TOTAL DOSE: 800 CGRAY
RAD ONC MSQ ELAPSED DAYS: 7
RAD ONC MSQ LAST DATE: NORMAL
RAD ONC MSQ PRESCRIBED FRACTIONAL DOSE: 200 CGRAY
RAD ONC MSQ PRESCRIBED NUMBER OF FRACTIONS: 33
RAD ONC MSQ PRESCRIBED TECHNIQUE: NORMAL
RAD ONC MSQ PRESCRIBED TOTAL DOSE: 6600 CGRAY
RAD ONC MSQ PRESCRIPTION PATTERN COMMENT: NORMAL
RAD ONC MSQ START DATE: NORMAL
RAD ONC MSQ TREATMENT COURSE NUMBER: 1
RAD ONC MSQ TREATMENT SITE: NORMAL
RBC # BLD AUTO: 3.61 X10*6/UL (ref 4.5–5.9)
SODIUM SERPL-SCNC: 136 MMOL/L (ref 136–145)
WBC # BLD AUTO: 4.4 X10*3/UL (ref 4.4–11.3)

## 2023-11-27 PROCEDURE — 2500000004 HC RX 250 GENERAL PHARMACY W/ HCPCS (ALT 636 FOR OP/ED)

## 2023-11-27 PROCEDURE — 2500000004 HC RX 250 GENERAL PHARMACY W/ HCPCS (ALT 636 FOR OP/ED): Performed by: INTERNAL MEDICINE

## 2023-11-27 PROCEDURE — 80053 COMPREHEN METABOLIC PANEL: CPT

## 2023-11-27 PROCEDURE — 36415 COLL VENOUS BLD VENIPUNCTURE: CPT

## 2023-11-27 PROCEDURE — 96413 CHEMO IV INFUSION 1 HR: CPT

## 2023-11-27 PROCEDURE — 2500000001 HC RX 250 WO HCPCS SELF ADMINISTERED DRUGS (ALT 637 FOR MEDICARE OP)

## 2023-11-27 PROCEDURE — 77014 CHG CT GUIDANCE RADIATION THERAPY FLDS PLACEMENT: CPT | Performed by: RADIOLOGY

## 2023-11-27 PROCEDURE — 85025 COMPLETE CBC W/AUTO DIFF WBC: CPT

## 2023-11-27 PROCEDURE — 96417 CHEMO IV INFUS EACH ADDL SEQ: CPT

## 2023-11-27 PROCEDURE — 96375 TX/PRO/DX INJ NEW DRUG ADDON: CPT | Mod: INF

## 2023-11-27 PROCEDURE — 77386 HC INTENSITY-MODULATED RADIATION THERAPY (IMRT), COMPLEX: CPT | Performed by: RADIOLOGY

## 2023-11-27 RX ORDER — PROCHLORPERAZINE EDISYLATE 5 MG/ML
10 INJECTION INTRAMUSCULAR; INTRAVENOUS EVERY 6 HOURS PRN
Status: DISCONTINUED | OUTPATIENT
Start: 2023-11-27 | End: 2023-11-27 | Stop reason: HOSPADM

## 2023-11-27 RX ORDER — FAMOTIDINE 10 MG/ML
20 INJECTION INTRAVENOUS ONCE AS NEEDED
Status: DISCONTINUED | OUTPATIENT
Start: 2023-11-27 | End: 2023-11-27 | Stop reason: HOSPADM

## 2023-11-27 RX ORDER — HEPARIN 100 UNIT/ML
500 SYRINGE INTRAVENOUS AS NEEDED
Status: CANCELLED | OUTPATIENT
Start: 2023-11-27

## 2023-11-27 RX ORDER — DIPHENHYDRAMINE HCL 50 MG
50 CAPSULE ORAL ONCE
Status: COMPLETED | OUTPATIENT
Start: 2023-11-27 | End: 2023-11-27

## 2023-11-27 RX ORDER — PROCHLORPERAZINE MALEATE 10 MG
10 TABLET ORAL EVERY 6 HOURS PRN
Status: DISCONTINUED | OUTPATIENT
Start: 2023-11-27 | End: 2023-11-27 | Stop reason: HOSPADM

## 2023-11-27 RX ORDER — FAMOTIDINE 10 MG/ML
INJECTION INTRAVENOUS
Status: COMPLETED
Start: 2023-11-27 | End: 2023-11-27

## 2023-11-27 RX ORDER — PALONOSETRON 0.05 MG/ML
0.25 INJECTION, SOLUTION INTRAVENOUS ONCE
Status: COMPLETED | OUTPATIENT
Start: 2023-11-27 | End: 2023-11-27

## 2023-11-27 RX ORDER — DIPHENHYDRAMINE HCL 50 MG
CAPSULE ORAL
Status: COMPLETED
Start: 2023-11-27 | End: 2023-11-27

## 2023-11-27 RX ORDER — PALONOSETRON 0.05 MG/ML
INJECTION, SOLUTION INTRAVENOUS
Status: COMPLETED
Start: 2023-11-27 | End: 2023-11-27

## 2023-11-27 RX ORDER — FAMOTIDINE 10 MG/ML
20 INJECTION INTRAVENOUS ONCE
Status: COMPLETED | OUTPATIENT
Start: 2023-11-27 | End: 2023-11-27

## 2023-11-27 RX ORDER — DIPHENHYDRAMINE HYDROCHLORIDE 50 MG/ML
50 INJECTION INTRAMUSCULAR; INTRAVENOUS AS NEEDED
Status: DISCONTINUED | OUTPATIENT
Start: 2023-11-27 | End: 2023-11-27 | Stop reason: HOSPADM

## 2023-11-27 RX ORDER — EPINEPHRINE 0.3 MG/.3ML
0.3 INJECTION SUBCUTANEOUS EVERY 5 MIN PRN
Status: DISCONTINUED | OUTPATIENT
Start: 2023-11-27 | End: 2023-11-27 | Stop reason: HOSPADM

## 2023-11-27 RX ORDER — HEPARIN SODIUM,PORCINE/PF 10 UNIT/ML
50 SYRINGE (ML) INTRAVENOUS AS NEEDED
Status: CANCELLED | OUTPATIENT
Start: 2023-11-27

## 2023-11-27 RX ORDER — ALBUTEROL SULFATE 0.83 MG/ML
3 SOLUTION RESPIRATORY (INHALATION) AS NEEDED
Status: DISCONTINUED | OUTPATIENT
Start: 2023-11-27 | End: 2023-11-27 | Stop reason: HOSPADM

## 2023-11-27 RX ADMIN — DIPHENHYDRAMINE HYDROCHLORIDE 50 MG: 50 CAPSULE ORAL at 12:09

## 2023-11-27 RX ADMIN — PALONOSETRON 250 MCG: 0.05 INJECTION, SOLUTION INTRAVENOUS at 12:05

## 2023-11-27 RX ADMIN — FAMOTIDINE 20 MG: 10 INJECTION INTRAVENOUS at 12:07

## 2023-11-27 RX ADMIN — CARBOPLATIN 271 MG: 10 INJECTION INTRAVENOUS at 13:46

## 2023-11-27 RX ADMIN — PALONOSETRON HYDROCHLORIDE 250 MCG: 0.25 INJECTION INTRAVENOUS at 12:05

## 2023-11-27 RX ADMIN — Medication 50 MG: at 12:09

## 2023-11-27 RX ADMIN — PACLITAXEL 84 MG: 6 INJECTION, SOLUTION INTRAVENOUS at 12:43

## 2023-11-27 RX ADMIN — DEXAMETHASONE SODIUM PHOSPHATE 12 MG: 10 INJECTION, SOLUTION INTRAMUSCULAR; INTRAVENOUS at 12:07

## 2023-11-27 ASSESSMENT — PAIN SCALES - GENERAL: PAINLEVEL: 0-NO PAIN

## 2023-11-27 NOTE — PROGRESS NOTES
"NUTRITION Follow-up NOTE  Reason for Visit:  Martínez Neri is a 63 y.o. male with stage IIB malignant neoplasm of upper lobe of left lung who presents for nutrition follow up.     Spoke with patient and family in infusion. Patient on C1D18 carbo/taxol.      Lab Results   Component Value Date/Time    GLUCOSE 87 11/27/2023 1032     11/27/2023 1032    K 5.4 (H) 11/27/2023 1032     11/27/2023 1032    CO2 27 11/27/2023 1032    ANIONGAP 14 11/27/2023 1032    BUN 12 11/27/2023 1032    CREATININE 0.67 11/27/2023 1032    EGFR >90 11/27/2023 1032    CALCIUM 9.2 11/27/2023 1032    ALBUMIN 3.9 11/27/2023 1032    ALKPHOS 52 11/27/2023 1032    PROT 7.1 11/27/2023 1032    AST 43 (H) 11/27/2023 1032    BILITOT 0.5 11/27/2023 1032    ALT 19 11/27/2023 1032     Lab Results   Component Value Date/Time    VITD25 17 (L) 10/03/2023 0828       Nutrition Assessment     Anthropometrics:  Anthropometrics  IBW/kg (Dietitian Calculated): 73.4 kg  Percent of IBW: 99 %  Weight Change  Weight History / % Weight Change: UBW of 147 lbs; since moving back to [place] in August has risen to 164# to prepare for treatment. Discussed goal of maintaining weight throughout treatment.3 lb weight loss in 1 week.  Significant Weight Loss: No        Wt Readings from Last 10 Encounters:   11/27/23 73.2 kg (161 lb 6 oz)   11/21/23 74.7 kg (164 lb 9.6 oz)   11/20/23 74.6 kg (164 lb 7.4 oz)   11/02/23 73.4 kg (161 lb 13.1 oz)   10/31/23 73.9 kg (163 lb)   10/26/23 74.3 kg (163 lb 12.8 oz)   10/23/23 73 kg (160 lb 15 oz)   10/19/23 73.3 kg (161 lb 9.6 oz)   10/12/23 72.1 kg (158 lb 15.2 oz)   10/10/23 73.5 kg (162 lb)        Food And Nutrient Intake:  Food and Nutrient History  Food and Nutrient History: No changes in intake. Patient likes \"junk\" food. Reports foods such as potato chips and McDonalds. Tried eating doritos today but stated they tasted weird.  Energy Intake: Good > 75 %  Fluid Intake: Family pushes patient to drink more water. Quit " drinking alcohol 7 years ago. Drinks 4 cups of caffeinated coffee + some decaf occasionally. Drinks 1 bottle of coke daily.  Food Allergy: NFKA.  GI Symptoms: none  Oral Problems: dysgeusia  Dentition: own     Food Intake  Meal 1: Sausage biscuit, eggs  Meal 2: Devan sarina sandwich  Meal 3: Cheddar bratwurst  Snacks: Mcdonalds, chips    Food Preparation  Dining Out: More than 3 times a week    Micronutrient Intake  Vitamin Intake: Multivitamin, Thiamin, D    Food Supplement Intake  Oral Nutrition Supplements:  (Had questions about protein shakes)      Nutrition Focused Physical Exam:     See note from 11/21.       Energy Needs  Estimated Energy Needs  Total Energy Estimated Needs (kCal): 2200 kCal  Method for Estimating Needs: 30 kcal/kg IBW  Estimated Fluid Needs  Total Fluid Estimated Needs (mL): 2200 mL  Method for Estimating Needs: 1 ml/kcal or per team  Estimated Protein Needs  Total Protein Estimated Needs (g): 80 g  Method for Estimating Needs: 1.1 g/kg IBW        Nutrition Diagnosis   Malnutrition Diagnosis  Patient has Malnutrition Diagnosis: No  Nutrition Diagnosis  Patient has Nutrition Diagnosis: Yes  Diagnosis Status (1): Ongoing  Nutrition Diagnosis 1: Food and nutrition related knowledge deficit  Related to (1): cancer treatment  As Evidenced by (1): current intake and questions regarding modifications.    Nutrition Interventions/Recommendations   Food and Nutrition Delivery  Meals & Snacks: General Healthful Diet, Protein-modified diet, Fluid-modified diet  Goals: Include protein with each meal/snack; aim for 8 cups of water daily (may use Carroll if this helps improve intake).  Nutrition Education  Nutrition Education Content: Content related nutrition education  Goals: Food safety; protein needs; importance of weight maintenance; taste changes        There are no Patient Instructions on file for this visit.    Nutrition Monitoring and Evaluation   Food/Nutrient Related History Monitoring  Monitoring  and Evaluation Plan: Energy intake, Fluid intake, Protein intake, Vitamin intake  Energy Intake: Estimated energy intake  Criteria: 24 hour recall  Fluid Intake: Estimated fluid intake  Criteria: 24 hour recall  Estimated protein intake: Estimated protein intake  Criteria: 24 hour recall  Vitamin Intake: Measured vitamin intake  Criteria: 24 h our recall  Knowledge/Beliefs/Attitudes  Monitoring and Evaluation Plan: Food and nutrition knowledge  Food and nutrition knowledge: Nutrition knowledge of individual client        Time Spent  Prep time on day of patient encounter: 10 minutes  Time spent directly with patient, family or caregiver: 10 minutes  Additional Time Spent on Patient Care Activities: 5 minutes  Documentation Time: 15 minutes  Other Time Spent: 0 minutes  Total: 40 minutes

## 2023-11-28 ENCOUNTER — APPOINTMENT (OUTPATIENT)
Dept: RADIATION ONCOLOGY | Facility: HOSPITAL | Age: 63
End: 2023-11-28
Payer: MEDICAID

## 2023-11-28 ENCOUNTER — APPOINTMENT (OUTPATIENT)
Dept: HEMATOLOGY/ONCOLOGY | Facility: HOSPITAL | Age: 63
End: 2023-11-28
Payer: MEDICAID

## 2023-11-29 ENCOUNTER — HOSPITAL ENCOUNTER (OUTPATIENT)
Dept: RADIATION ONCOLOGY | Facility: HOSPITAL | Age: 63
Setting detail: RADIATION/ONCOLOGY SERIES
Discharge: HOME | End: 2023-11-29
Payer: MEDICAID

## 2023-11-29 DIAGNOSIS — Z51.0 ENCOUNTER FOR ANTINEOPLASTIC RADIATION THERAPY: ICD-10-CM

## 2023-11-29 DIAGNOSIS — C34.12 MALIGNANT NEOPLASM OF UPPER LOBE, LEFT BRONCHUS OR LUNG (MULTI): ICD-10-CM

## 2023-11-29 LAB
RAD ONC MSQ ACTUAL FRACTIONS DELIVERED: 5
RAD ONC MSQ ACTUAL SESSION DELIVERED DOSE: 200 CGRAY
RAD ONC MSQ ACTUAL TOTAL DOSE: 1000 CGRAY
RAD ONC MSQ ELAPSED DAYS: 9
RAD ONC MSQ LAST DATE: NORMAL
RAD ONC MSQ PRESCRIBED FRACTIONAL DOSE: 200 CGRAY
RAD ONC MSQ PRESCRIBED NUMBER OF FRACTIONS: 33
RAD ONC MSQ PRESCRIBED TECHNIQUE: NORMAL
RAD ONC MSQ PRESCRIBED TOTAL DOSE: 6600 CGRAY
RAD ONC MSQ PRESCRIPTION PATTERN COMMENT: NORMAL
RAD ONC MSQ START DATE: NORMAL
RAD ONC MSQ TREATMENT COURSE NUMBER: 1
RAD ONC MSQ TREATMENT SITE: NORMAL

## 2023-11-29 PROCEDURE — 77386 HC INTENSITY-MODULATED RADIATION THERAPY (IMRT), COMPLEX: CPT | Performed by: RADIOLOGY

## 2023-11-29 PROCEDURE — 77014 CHG CT GUIDANCE RADIATION THERAPY FLDS PLACEMENT: CPT | Performed by: RADIOLOGY

## 2023-11-30 ENCOUNTER — OFFICE VISIT (OUTPATIENT)
Dept: HEMATOLOGY/ONCOLOGY | Facility: HOSPITAL | Age: 63
End: 2023-11-30
Payer: MEDICAID

## 2023-11-30 ENCOUNTER — HOSPITAL ENCOUNTER (OUTPATIENT)
Dept: RADIATION ONCOLOGY | Facility: HOSPITAL | Age: 63
Setting detail: RADIATION/ONCOLOGY SERIES
Discharge: HOME | End: 2023-11-30
Payer: MEDICAID

## 2023-11-30 ENCOUNTER — HOSPITAL ENCOUNTER (OUTPATIENT)
Dept: RADIOLOGY | Facility: EXTERNAL LOCATION | Age: 63
Discharge: HOME | End: 2023-11-30

## 2023-11-30 ENCOUNTER — RADIATION ONCOLOGY OTV (OUTPATIENT)
Dept: RADIATION ONCOLOGY | Facility: HOSPITAL | Age: 63
End: 2023-11-30

## 2023-11-30 VITALS
OXYGEN SATURATION: 97 % | SYSTOLIC BLOOD PRESSURE: 125 MMHG | RESPIRATION RATE: 18 BRPM | HEIGHT: 69 IN | WEIGHT: 162.48 LBS | HEART RATE: 94 BPM | DIASTOLIC BLOOD PRESSURE: 80 MMHG | TEMPERATURE: 96.6 F | BODY MASS INDEX: 24.07 KG/M2

## 2023-11-30 DIAGNOSIS — Z51.0 ENCOUNTER FOR ANTINEOPLASTIC RADIATION THERAPY: ICD-10-CM

## 2023-11-30 DIAGNOSIS — C34.12 MALIGNANT NEOPLASM OF UPPER LOBE OF LEFT LUNG (MULTI): ICD-10-CM

## 2023-11-30 DIAGNOSIS — Z51.11 ENCOUNTER FOR CHEMOTHERAPY MANAGEMENT: Primary | ICD-10-CM

## 2023-11-30 DIAGNOSIS — C34.12 MALIGNANT NEOPLASM OF UPPER LOBE OF LEFT LUNG (MULTI): Primary | ICD-10-CM

## 2023-11-30 DIAGNOSIS — C34.12 MALIGNANT NEOPLASM OF UPPER LOBE, LEFT BRONCHUS OR LUNG (MULTI): ICD-10-CM

## 2023-11-30 LAB
RAD ONC MSQ ACTUAL FRACTIONS DELIVERED: 6
RAD ONC MSQ ACTUAL SESSION DELIVERED DOSE: 200 CGRAY
RAD ONC MSQ ACTUAL TOTAL DOSE: 1200 CGRAY
RAD ONC MSQ ELAPSED DAYS: 10
RAD ONC MSQ LAST DATE: NORMAL
RAD ONC MSQ PRESCRIBED FRACTIONAL DOSE: 200 CGRAY
RAD ONC MSQ PRESCRIBED NUMBER OF FRACTIONS: 33
RAD ONC MSQ PRESCRIBED TECHNIQUE: NORMAL
RAD ONC MSQ PRESCRIBED TOTAL DOSE: 6600 CGRAY
RAD ONC MSQ PRESCRIPTION PATTERN COMMENT: NORMAL
RAD ONC MSQ START DATE: NORMAL
RAD ONC MSQ TREATMENT COURSE NUMBER: 1
RAD ONC MSQ TREATMENT SITE: NORMAL

## 2023-11-30 PROCEDURE — 77386 HC INTENSITY-MODULATED RADIATION THERAPY (IMRT), COMPLEX: CPT | Performed by: RADIOLOGY

## 2023-11-30 PROCEDURE — 99213 OFFICE O/P EST LOW 20 MIN: CPT | Performed by: NURSE PRACTITIONER

## 2023-11-30 PROCEDURE — 77427 RADIATION TX MANAGEMENT X5: CPT | Performed by: NEUROLOGICAL SURGERY

## 2023-11-30 PROCEDURE — 77014 CHG CT GUIDANCE RADIATION THERAPY FLDS PLACEMENT: CPT | Performed by: RADIOLOGY

## 2023-11-30 RX ORDER — LORAZEPAM 0.5 MG/1
0.5 TABLET ORAL DAILY PRN
Qty: 5 TABLET | Refills: 0 | Status: SHIPPED | OUTPATIENT
Start: 2023-11-30 | End: 2023-12-05

## 2023-11-30 NOTE — PROGRESS NOTES
"Radiation Oncology On Treatment Visit    Patient Name:  Martínez Neri  MRN:  [id number]  :  1960    Referring Provider: No ref. provider found  Primary Care Provider: Jayme Casey MD  Care Team: Patient Care Team:  Jayme Casey MD as PCP - General (Internal Medicine)  Elo Peters MD as Consulting Physician (Hematology and Oncology)    Date of Service: 2023     Diagnosis:   Specialty Problems          Radiation Oncology Problems    Primary cancer of left upper lobe of lung (CMS/HCC)        Malignant neoplasm of upper lobe of left lung (CMS/HCC)         Treatment Summary:  Radiation Treatments       Active   LUL_Hilum (Started on 2023)   Most recent fraction: 200 cGy given on 2023   Total given: 1,200 cGy / 6,600 cGy  (6 of 33 fractions)   Elapsed Days: 10   Technique: IMRT                   SUBJECTIVE: Mr. Neri is accompanied by his daughter at today's visit. He believes he caught a cold recently and has had slightly increased coughing and fatigue. Is continuing his attempts to quit smoking.      OBJECTIVE:   Vital Signs:  /80 (BP Location: Right arm, Patient Position: Sitting, BP Cuff Size: Adult)   Pulse 94   Temp 35.9 °C (96.6 °F) (Temporal)   Resp 18   Ht 1.753 m (5' 9\")   Wt 73.7 kg (162 lb 7.7 oz)   SpO2 97%   BMI 23.99 kg/m²    Pain Scale: The patient's current pain level was assessed.  They report currently having a pain of 0 out of 10.    Other Pertinent Findings:     Toxicity Assessment          2023    11:25   Toxicity Assessment   Adverse Events Reviewed (WDL) No (Exceptions to WDL)   Treatment Site Thoracic   Anxiety Grade 1       pt. given Ativan for anxiety before treatments   Dehydration Grade 0   Dermatitis Radiation Grade 0   Fatigue Grade 0   Nausea Grade 0   Pain Grade 0   Dysphagia Grade 0   Cough Grade 1       mild \"smokers\" cough   Dyspnea Grade 1       chronic CHAPIN   Hypoxia Grade 0        Assessment / Plan:  The patient is tolerating " radiation therapy as anticipated. Discussed with the patient and his daughter Seble the plan to redo simulation on 12/2/23 due to setup difficulties. Lorazepam ordered for PRN prior to re-simulation and subsequent treatments. Continue per current treatment plan.       The patient was assessed and plan discussed with the attending radiation oncologist Dr. Christie.    Felisha Knox MD  PGY-2 Radiation Oncology Resident  On-call pager [phone]  Available on Epic Secure Chat      63yoM s/p 6/33 fx of IMRT for LUL_hilar disease with weekly carbo/taxol.  Discussed radiation therapy risks of fatigue and pneumonitis with patient and his daughter to their understanding.   Resim tomorrow (12/2/23) as planned  Continue weekly carbo/taxol    Raffy Christie III, M.D.  Director of Spine Oncology   of Radiation Oncology and Neurological Surgery  [facility] [employer]

## 2023-11-30 NOTE — PROGRESS NOTES
[facility] - Medical Oncology Follow-Up Visit    Patient ID: Martínez Neri is a 63 y.o. male      Current therapy: CARBOplatin (Paraplatin) 240 mg in sodium chloride 0.9% 124 mL IV, 240 mg (87.3 % of original dose 271.4 mg), intravenous, Once, 1 of 1 cycle    Dose modification: 237 mg (original dose 271.4 mg, Cycle 1, Reason: Protocol Driven), 271 mg (original dose 271.4 mg, Cycle 1, Reason: Protocol Driven)    Administration: 240 mg (11/20/2023), 271 mg (11/27/2023)        PACLitaxeL (Taxol) 84 mg in dextrose 5 % in water (D5W) 114 mL IV, 45 mg/m2 = 84 mg, intravenous, Once, 1 of 1 cycle    Administration: 84 mg (11/20/2023), 84 mg (11/27/2023)        palonosetron (Aloxi) injection 250 mcg, 250 mcg, intravenous, Once, 1 of 1 cycle    Administration: 250 mcg (11/20/2023), 250 mcg (11/27/2023)      Oncologic History:     DIAGNOSIS AND STAGING  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    PD-L1 TPS 20%      PRIOR THERAPIES  None         CURRENT THERAPY  concurrent chemo-RT using weekly carbo/taxol followed by consolidative durvalumab         CURRENT ONCOLOGICAL PROBLEMS  Cough         HISTORY OF PRESENT ILLNESS:  Current smoker (50-pack-year history), with history of COPD and seizures, who was diagnosed with a squamous cell carcinoma of the left upper lobe in [place], never treated.  Outpatient notes from [facility] cancer care and hematology clinic were reviewed.     On 06/17/2023 the patient had a CT chest for ongoing respiratory symptoms and was found to have a left suprahilar mass involving the left upper lobe bronchi with postobstructive pneumonia/collapse.  On 06/25/2023 CT chest/abdomen/pelvis demonstrated persistent masslike  consolidation in the left upper lobe with no signs of extrathoracic metastatic disease.     On 06/26/2023 the patient underwent a bronchoscopy, and pathology was compatible with a squamous cell carcinoma at least in situ, with a foci highly suspicious for invasive squamous cell carcinoma.     On 07/18/2023 a PET/CT obtained, demonstrating a 6.6 cm left upper lobe mass with an SUV of 8.3 and no evidence of distant metastatic disease.     On 07/18/2023 to brain MRI was negative for intracranial metastasis.     On 07/27/2023 bronchoscopy/EBUS was performed.  Endobronchial biopsies of left upper lobe demonstrated an invasive moderately differentiated squamous cell carcinoma.  Left upper lobe hilar FNA showed atypical cells with squamous differentiation, suspicious for malignancy.      Subcarinal FNA was indeterminate for malignancy with extremely rare atypical cells with squamous differentiation.  Molecular profile: Lack of BRAF, EGFR, HER2, KRAS, MET, ALK, NTRK 1/2/3, RET or ROS1 alterations.  PD-L1 TPS 20%.  The patient was seen by medical oncology and did not too frail for combined chemo RT (there is a performance status ECOG 3) documented on 08/17/2023.  At that time, he lived with some friends, while his family was primarily located in [place].     His daughter traveled and was able to relocate him to [place], and he presents today to clinic accompanied by his daughter, son, and daughter-in-law.     The patient states he is felt much better since his relocation, has been gaining weight, approximately 10 pounds since he moved here.  He is more active, going up and down the steps at home, but still tired and taking some breaks throughout the day.     He denies any headaches or new neurological symptoms.  His respiratory symptoms are stable. There is no new localized pain.     Reported molecular and ancillary testing performed upon diagnosis in [place]:   BRAF negative  [id number] negative  EGFR classic mutations  negative  HER2 negative  MET negative  ALK negative  In tract 1/2/3 negative  RET negative  ROS1 negative  PD-L1 20%     Referred to surgery - PFTs were considered prohibitive for surgical resection-      10/31/23: Repeat EBUS for systematic staging:  Final Cytological Interpretation      A. LYMPH NODE 7 PULMONARY FINE NEEDLE ASPIRATION, CYTOLOGY AND CELL BLOCK:  --  No malignant cells identified.  --  Lymphoid sample.     B. LYMPH NODE 4 L PULMONARY FINE NEEDLE ASPIRATION, CYTOLOGY AND CELL BLOCK:  --  No malignant cells identified.  --  Lymphoid sample.           C. LYMPH NODE 11 L PULMONARY FINE NEEDLE ASPIRATION, CYTOLOGY AND CELL BLOCK:  --  A rare cluster of malignant cells derived from squamous cell carcinoma, see note  -- Also, please refer to concurrent biopsy surgical pathology report ([id number]).     Note:  The malignant squamous cell carcinoma cells are represented in the cell block only.  Immunostain for p40 is positive. The cytomorphology and immunoprofile support the above diagnosis.  The material is not sufficient for molecular testing.      Component     FINAL DIAGNOSIS   Left lung, upper lobe, biopsy:  -- Superficial fragment of squamous cell carcinoma, keratinizing; see note.     Note: According to clinician's note, PD-L1 and NGS have been performed at an outside institution. These studies can be repeated, if clinically indicated.      NGS and PD-L1 not repeated in this specimen as it was done in outside institution already and the purpose of this procedure was not for diagnosis but systematic mediastinal re-staging.      PAST MEDICAL HISTORY  Brain aneurysm  COPD (chronic obstructive pulmonary disease) (CMS/HCC)  Epilepsy (CMS/HCC)  High cholesterol  History of alcohol use  Hypertension     PAST SURGICAL HISTORY:  CAROTID ENDARTERECTOMY  COLONOSCOPY  HERNIA REPAIR      SOCIAL HISTORY  Tobacco (50 pack-year history) use - quit after cancer dx      CURRENT MEDS REVIEWED:  Scheduled  "medications  Continuous medications  PRN medications     ALLERGIES  NKDA     FAMILY HISTORY  Father had prostate CA        Chief Concern: Here in clinic for follow-up on concurrent chemo/RT with weekly Carbo/Taxol (week 2)    HPI Patient \"Amaury\" is here today in clinic with his daughter Seble for follow-up on concurrent chemo/RT with weekly Carbo/Taxol (week 2). He is feeling well overall. Breathing is good, no CP or SOB. No GI symptoms. Eating/drinking ok. Weight stable. No new aches/pains. No fevers, chills, or cold symptoms. No other concerns or complaints.       Meds (Current):    Current Outpatient Medications:     albuterol (Proventil HFA) 90 mcg/actuation inhaler, Inhale 2 puffs every 4 hours if needed for wheezing or shortness of breath., Disp: 6.7 g, Rfl: 6    atorvastatin (Lipitor) 40 mg tablet, Take 1 tablet (40 mg) by mouth once daily., Disp: 90 tablet, Rfl: 1    cholecalciferol (Vitamin D3) 5,000 Units tablet, Take 1 tablet (5,000 Units) by mouth once daily., Disp: 90 tablet, Rfl: 1    divalproex (Depakote ER) 250 mg 24 hr tablet, Take 1 tablet (250 mg) by mouth 2 times a day. Do not crush, chew, or split., Disp: 90 tablet, Rfl: 1    divalproex (Depakote ER) 500 mg 24 hr tablet, Take 1 tablet (500 mg) by mouth 2 times a day. Do not crush, chew, or split., Disp: 90 tablet, Rfl: 1    inhalat.spacing dev,med. mask spacer, , Disp: , Rfl:     ipratropium-albuteroL (Duo-Neb) 0.5-2.5 mg/3 mL nebulizer solution, Take 3 mL by nebulization 4 times a day as needed for wheezing or shortness of breath., Disp: 90 mL, Rfl: 1    lacosamide (Vimpat) 200 mg tablet tablet, Take 1 tablet (200 mg) by mouth 2 times a day., Disp: 30 tablet, Rfl: 5    LORazepam (Ativan) 0.5 mg tablet, Take 1 tablet (0.5 mg) by mouth once daily as needed for anxiety for up to 5 days., Disp: 5 tablet, Rfl: 0    nebulizer accessories misc, Use twice daily, Disp: 100 each, Rfl: 0    nebulizer and compressor device, Use as directed, Disp: 1 each, " Rfl: 0    nicotine (Nicoderm CQ) 21 mg/24 hr patch, Place 1 patch over 24 hours on the skin once every 24 hours., Disp: 30 patch, Rfl: 0    ondansetron (Zofran) 8 mg tablet, Take 1 tablet (8 mg) by mouth every 8 hours if needed for nausea or vomiting., Disp: 30 tablet, Rfl: 5    prochlorperazine (Compazine) 10 mg tablet, Take 1 tablet (10 mg) by mouth every 6 hours if needed for nausea or vomiting., Disp: 30 tablet, Rfl: 5    thiamine (Vitamin B-1) 100 mg tablet, Take 1 tablet (100 mg) by mouth once daily., Disp: 90 tablet, Rfl: 1    tiotropium (Spiriva) 18 mcg inhalation capsule, Place 1 capsule (18 mcg) into inhaler and inhale 2 times a day., Disp: , Rfl:   No current facility-administered medications for this visit.    Review of Systems - Oncology 12 point ROS was obtained and negative unless otherwise mentioned in the above HPI.      Objective   BSA: There is no height or weight on file to calculate BSA.  Wt Readings from Last 5 Encounters:   11/30/23 73.7 kg (162 lb 7.7 oz)   11/27/23 73.2 kg (161 lb 6 oz)   11/21/23 74.7 kg (164 lb 9.6 oz)   11/20/23 74.6 kg (164 lb 7.4 oz)   11/02/23 73.4 kg (161 lb 13.1 oz)     There were no vitals taken for this visit.         Physical Exam  Constitutional:       Appearance: Normal appearance.   Eyes:      Pupils: Pupils are equal, round, and reactive to light.   Cardiovascular:      Rate and Rhythm: Normal rate and regular rhythm.   Pulmonary:      Effort: Pulmonary effort is normal.      Breath sounds: Normal breath sounds.   Abdominal:      General: Bowel sounds are normal.      Palpations: Abdomen is soft.   Musculoskeletal:         General: No swelling.   Skin:     General: Skin is warm and dry.   Neurological:      General: No focal deficit present.      Mental Status: He is alert and oriented to person, place, and time.   Psychiatric:         Mood and Affect: Mood normal.         Behavior: Behavior normal.        Results:  Labs:  Lab Results   Component Value Date     WBC 4.4 11/27/2023    HGB 11.9 (L) 11/27/2023    HCT 35.1 (L) 11/27/2023    MCV 97 11/27/2023     11/27/2023      Lab Results   Component Value Date    NEUTROABS 2.40 11/27/2023      Lab Results   Component Value Date    GLUCOSE 87 11/27/2023    CALCIUM 9.2 11/27/2023     11/27/2023    K 5.4 (H) 11/27/2023    CO2 27 11/27/2023     11/27/2023    BUN 12 11/27/2023    CREATININE 0.67 11/27/2023     Lab Results   Component Value Date    ALT 19 11/27/2023    AST 43 (H) 11/27/2023    ALKPHOS 52 11/27/2023    BILITOT 0.5 11/27/2023      Lab Results   Component Value Date    TSH 5.54 (H) 10/03/2023       Imaging:           === Results for orders placed during the hospital encounter of 10/13/23 ===    MR brain w and wo IV contrast [[id number]] 10/13/2023    Status: Normal  1. No abnormal intracranial enhancement or mass to suggest metastases.    2. Chronic intracranial findings as above.    I personally reviewed the images/study and I agree with the findings  as stated. This study was interpreted at [facility], [place], [place].    Signed by: Dharmesh Reddy 10/13/2023 8:27 PM  Dictation workstation:   [id number]    Assessment/Plan      Martínez Neri is a 63 y.o. male here for follow up     Reviewed with the patient the treatment plan consisting of concurrent chemo-RT.  This will be followed by consolidative durvalumab.  We discussed potential side effects related to carboplatin and paclitaxel weekly and he was able to sign a consent.  He has been seen by radiation oncology (Dr. Layne) and underwent CT simulation.  RT start date 11/20/23 and C1 Carbo/Taxol

## 2023-12-01 ENCOUNTER — HOSPITAL ENCOUNTER (OUTPATIENT)
Dept: RADIATION ONCOLOGY | Facility: HOSPITAL | Age: 63
Setting detail: RADIATION/ONCOLOGY SERIES
Discharge: HOME | End: 2023-12-01
Payer: MEDICAID

## 2023-12-01 ENCOUNTER — SOCIAL WORK (OUTPATIENT)
Dept: CASE MANAGEMENT | Facility: HOSPITAL | Age: 63
End: 2023-12-01
Payer: MEDICAID

## 2023-12-01 DIAGNOSIS — C34.12 MALIGNANT NEOPLASM OF UPPER LOBE OF LEFT LUNG (MULTI): Primary | ICD-10-CM

## 2023-12-01 DIAGNOSIS — Z51.0 ENCOUNTER FOR ANTINEOPLASTIC RADIATION THERAPY: ICD-10-CM

## 2023-12-01 DIAGNOSIS — C34.12 MALIGNANT NEOPLASM OF UPPER LOBE, LEFT BRONCHUS OR LUNG (MULTI): ICD-10-CM

## 2023-12-01 LAB
RAD ONC MSQ ACTUAL FRACTIONS DELIVERED: 7
RAD ONC MSQ ACTUAL SESSION DELIVERED DOSE: 200 CGRAY
RAD ONC MSQ ACTUAL TOTAL DOSE: 1400 CGRAY
RAD ONC MSQ ELAPSED DAYS: 11
RAD ONC MSQ LAST DATE: NORMAL
RAD ONC MSQ PRESCRIBED FRACTIONAL DOSE: 200 CGRAY
RAD ONC MSQ PRESCRIBED NUMBER OF FRACTIONS: 33
RAD ONC MSQ PRESCRIBED TECHNIQUE: NORMAL
RAD ONC MSQ PRESCRIBED TOTAL DOSE: 6600 CGRAY
RAD ONC MSQ PRESCRIPTION PATTERN COMMENT: NORMAL
RAD ONC MSQ START DATE: NORMAL
RAD ONC MSQ TREATMENT COURSE NUMBER: 1
RAD ONC MSQ TREATMENT SITE: NORMAL

## 2023-12-01 PROCEDURE — 77014 CHG CT GUIDANCE RADIATION THERAPY FLDS PLACEMENT: CPT | Performed by: RADIOLOGY

## 2023-12-01 PROCEDURE — 77386 HC INTENSITY-MODULATED RADIATION THERAPY (IMRT), COMPLEX: CPT | Performed by: RADIOLOGY

## 2023-12-01 NOTE — PROGRESS NOTES
Pt's alberto had asked to speak with a SW; this SW met her during pt's radiation tx on this date. She reports feeling overwhelmed as pt's primary caregiver. She has a brother who is a full-time student and his wife, and relatives are visiting this weekend. Since pt has been ill, alberto had intermittent leave and has since lost her job.   SW provided Cancer-Now What? Book and [facility] brochure for general support and to learn what to expect, how to manage her own feelings as well as pt's behavior. Enc her to reach out to this SW as needed.   On 12/12/23, this SW checked in with pt and alberto to see how things were going. No particular concerns at this time.

## 2023-12-04 ENCOUNTER — HOSPITAL ENCOUNTER (OUTPATIENT)
Dept: RADIATION ONCOLOGY | Facility: HOSPITAL | Age: 63
Setting detail: RADIATION/ONCOLOGY SERIES
Discharge: HOME | End: 2023-12-04
Payer: MEDICAID

## 2023-12-04 ENCOUNTER — INFUSION (OUTPATIENT)
Dept: HEMATOLOGY/ONCOLOGY | Facility: HOSPITAL | Age: 63
End: 2023-12-04
Payer: MEDICAID

## 2023-12-04 VITALS
TEMPERATURE: 98.2 F | OXYGEN SATURATION: 91 % | BODY MASS INDEX: 23.64 KG/M2 | DIASTOLIC BLOOD PRESSURE: 75 MMHG | RESPIRATION RATE: 18 BRPM | SYSTOLIC BLOOD PRESSURE: 116 MMHG | WEIGHT: 160.1 LBS | HEART RATE: 81 BPM

## 2023-12-04 DIAGNOSIS — Z51.0 ENCOUNTER FOR ANTINEOPLASTIC RADIATION THERAPY: ICD-10-CM

## 2023-12-04 DIAGNOSIS — C34.12 MALIGNANT NEOPLASM OF UPPER LOBE OF LEFT LUNG (MULTI): ICD-10-CM

## 2023-12-04 DIAGNOSIS — C34.12 MALIGNANT NEOPLASM OF UPPER LOBE OF LEFT LUNG (MULTI): Primary | ICD-10-CM

## 2023-12-04 DIAGNOSIS — C34.12 MALIGNANT NEOPLASM OF UPPER LOBE, LEFT BRONCHUS OR LUNG (MULTI): ICD-10-CM

## 2023-12-04 LAB
ALBUMIN SERPL BCP-MCNC: 3.6 G/DL (ref 3.4–5)
ALP SERPL-CCNC: 51 U/L (ref 33–136)
ALT SERPL W P-5'-P-CCNC: 11 U/L (ref 10–52)
ANION GAP SERPL CALC-SCNC: 13 MMOL/L (ref 10–20)
AST SERPL W P-5'-P-CCNC: 17 U/L (ref 9–39)
BASOPHILS # BLD AUTO: 0.02 X10*3/UL (ref 0–0.1)
BASOPHILS NFR BLD AUTO: 0.4 %
BILIRUB SERPL-MCNC: 0.3 MG/DL (ref 0–1.2)
BUN SERPL-MCNC: 11 MG/DL (ref 6–23)
CALCIUM SERPL-MCNC: 9.1 MG/DL (ref 8.6–10.3)
CHLORIDE SERPL-SCNC: 102 MMOL/L (ref 98–107)
CO2 SERPL-SCNC: 24 MMOL/L (ref 21–32)
CREAT SERPL-MCNC: 0.7 MG/DL (ref 0.5–1.3)
EOSINOPHIL # BLD AUTO: 0.02 X10*3/UL (ref 0–0.7)
EOSINOPHIL NFR BLD AUTO: 0.4 %
ERYTHROCYTE [DISTWIDTH] IN BLOOD BY AUTOMATED COUNT: 13.2 % (ref 11.5–14.5)
GFR SERPL CREATININE-BSD FRML MDRD: >90 ML/MIN/1.73M*2
GLUCOSE SERPL-MCNC: 87 MG/DL (ref 74–99)
HCT VFR BLD AUTO: 31.6 % (ref 41–52)
HGB BLD-MCNC: 10.7 G/DL (ref 13.5–17.5)
HOLD SPECIMEN: NORMAL
IMM GRANULOCYTES # BLD AUTO: 0.03 X10*3/UL (ref 0–0.7)
IMM GRANULOCYTES NFR BLD AUTO: 0.6 % (ref 0–0.9)
LYMPHOCYTES # BLD AUTO: 0.94 X10*3/UL (ref 1.2–4.8)
LYMPHOCYTES NFR BLD AUTO: 17.5 %
MCH RBC QN AUTO: 32.9 PG (ref 26–34)
MCHC RBC AUTO-ENTMCNC: 33.9 G/DL (ref 32–36)
MCV RBC AUTO: 97 FL (ref 80–100)
MONOCYTES # BLD AUTO: 0.63 X10*3/UL (ref 0.1–1)
MONOCYTES NFR BLD AUTO: 11.7 %
NEUTROPHILS # BLD AUTO: 3.74 X10*3/UL (ref 1.2–7.7)
NEUTROPHILS NFR BLD AUTO: 69.4 %
NRBC BLD-RTO: 0 /100 WBCS (ref 0–0)
PLATELET # BLD AUTO: 387 X10*3/UL (ref 150–450)
POTASSIUM SERPL-SCNC: 4.4 MMOL/L (ref 3.5–5.3)
PROT SERPL-MCNC: 6.3 G/DL (ref 6.4–8.2)
RAD ONC MSQ ACTUAL FRACTIONS DELIVERED: 8
RAD ONC MSQ ACTUAL SESSION DELIVERED DOSE: 200 CGRAY
RAD ONC MSQ ACTUAL TOTAL DOSE: 1600 CGRAY
RAD ONC MSQ ELAPSED DAYS: 14
RAD ONC MSQ LAST DATE: NORMAL
RAD ONC MSQ PRESCRIBED FRACTIONAL DOSE: 200 CGRAY
RAD ONC MSQ PRESCRIBED NUMBER OF FRACTIONS: 33
RAD ONC MSQ PRESCRIBED TECHNIQUE: NORMAL
RAD ONC MSQ PRESCRIBED TOTAL DOSE: 6600 CGRAY
RAD ONC MSQ PRESCRIPTION PATTERN COMMENT: NORMAL
RAD ONC MSQ START DATE: NORMAL
RAD ONC MSQ TREATMENT COURSE NUMBER: 1
RAD ONC MSQ TREATMENT SITE: NORMAL
RBC # BLD AUTO: 3.25 X10*6/UL (ref 4.5–5.9)
SODIUM SERPL-SCNC: 135 MMOL/L (ref 136–145)
WBC # BLD AUTO: 5.4 X10*3/UL (ref 4.4–11.3)

## 2023-12-04 PROCEDURE — 96413 CHEMO IV INFUSION 1 HR: CPT

## 2023-12-04 PROCEDURE — 96367 TX/PROPH/DG ADDL SEQ IV INF: CPT

## 2023-12-04 PROCEDURE — 76937 US GUIDE VASCULAR ACCESS: CPT

## 2023-12-04 PROCEDURE — 96375 TX/PRO/DX INJ NEW DRUG ADDON: CPT | Mod: INF

## 2023-12-04 PROCEDURE — 80053 COMPREHEN METABOLIC PANEL: CPT

## 2023-12-04 PROCEDURE — 36415 COLL VENOUS BLD VENIPUNCTURE: CPT

## 2023-12-04 PROCEDURE — 77014 CHG CT GUIDANCE RADIATION THERAPY FLDS PLACEMENT: CPT | Performed by: RADIOLOGY

## 2023-12-04 PROCEDURE — 85025 COMPLETE CBC W/AUTO DIFF WBC: CPT

## 2023-12-04 PROCEDURE — 77386 HC INTENSITY-MODULATED RADIATION THERAPY (IMRT), COMPLEX: CPT | Performed by: RADIOLOGY

## 2023-12-04 PROCEDURE — 2500000004 HC RX 250 GENERAL PHARMACY W/ HCPCS (ALT 636 FOR OP/ED): Performed by: INTERNAL MEDICINE

## 2023-12-04 PROCEDURE — 2500000001 HC RX 250 WO HCPCS SELF ADMINISTERED DRUGS (ALT 637 FOR MEDICARE OP): Performed by: INTERNAL MEDICINE

## 2023-12-04 RX ORDER — FAMOTIDINE 10 MG/ML
20 INJECTION INTRAVENOUS ONCE AS NEEDED
Status: DISCONTINUED | OUTPATIENT
Start: 2023-12-04 | End: 2023-12-04 | Stop reason: HOSPADM

## 2023-12-04 RX ORDER — DIPHENHYDRAMINE HCL 50 MG
50 CAPSULE ORAL ONCE
Status: COMPLETED | OUTPATIENT
Start: 2023-12-04 | End: 2023-12-04

## 2023-12-04 RX ORDER — ALBUTEROL SULFATE 0.83 MG/ML
3 SOLUTION RESPIRATORY (INHALATION) AS NEEDED
Status: DISCONTINUED | OUTPATIENT
Start: 2023-12-04 | End: 2023-12-04 | Stop reason: HOSPADM

## 2023-12-04 RX ORDER — FAMOTIDINE 10 MG/ML
20 INJECTION INTRAVENOUS ONCE
Status: COMPLETED | OUTPATIENT
Start: 2023-12-04 | End: 2023-12-04

## 2023-12-04 RX ORDER — PROCHLORPERAZINE MALEATE 10 MG
10 TABLET ORAL EVERY 6 HOURS PRN
Status: DISCONTINUED | OUTPATIENT
Start: 2023-12-04 | End: 2023-12-04 | Stop reason: HOSPADM

## 2023-12-04 RX ORDER — EPINEPHRINE 0.3 MG/.3ML
0.3 INJECTION SUBCUTANEOUS EVERY 5 MIN PRN
Status: DISCONTINUED | OUTPATIENT
Start: 2023-12-04 | End: 2023-12-04 | Stop reason: HOSPADM

## 2023-12-04 RX ORDER — PALONOSETRON 0.05 MG/ML
0.25 INJECTION, SOLUTION INTRAVENOUS ONCE
Status: COMPLETED | OUTPATIENT
Start: 2023-12-04 | End: 2023-12-04

## 2023-12-04 RX ORDER — DIPHENHYDRAMINE HYDROCHLORIDE 50 MG/ML
50 INJECTION INTRAMUSCULAR; INTRAVENOUS AS NEEDED
Status: DISCONTINUED | OUTPATIENT
Start: 2023-12-04 | End: 2023-12-04 | Stop reason: HOSPADM

## 2023-12-04 RX ORDER — PROCHLORPERAZINE EDISYLATE 5 MG/ML
10 INJECTION INTRAMUSCULAR; INTRAVENOUS EVERY 6 HOURS PRN
Status: DISCONTINUED | OUTPATIENT
Start: 2023-12-04 | End: 2023-12-04 | Stop reason: HOSPADM

## 2023-12-04 RX ADMIN — PALONOSETRON HYDROCHLORIDE 250 MCG: 0.25 INJECTION INTRAVENOUS at 11:51

## 2023-12-04 RX ADMIN — FAMOTIDINE 20 MG: 10 INJECTION INTRAVENOUS at 11:51

## 2023-12-04 RX ADMIN — CARBOPLATIN 271.4 MG: 10 INJECTION INTRAVENOUS at 14:52

## 2023-12-04 RX ADMIN — DIPHENHYDRAMINE HYDROCHLORIDE 50 MG: 50 CAPSULE ORAL at 11:59

## 2023-12-04 RX ADMIN — PACLITAXEL 84 MG: 6 INJECTION, SOLUTION INTRAVENOUS at 12:34

## 2023-12-04 RX ADMIN — DEXAMETHASONE SODIUM PHOSPHATE 12 MG: 10 INJECTION, SOLUTION INTRAMUSCULAR; INTRAVENOUS at 11:58

## 2023-12-04 ASSESSMENT — PAIN SCALES - GENERAL: PAINLEVEL: 0-NO PAIN

## 2023-12-04 NOTE — PROGRESS NOTES
Martínez Neri, 63 y.o. male is here for chemotherapy infusion of: Taxol. A&Ox4. VSS. PIV placed in RAC. Blood return noted and flushed.   Cycle: 1,  Day: 22     O:   Lab Results   Component Value Date    WBC 5.4 12/04/2023    HGB 10.7 (L) 12/04/2023    HCT 31.6 (L) 12/04/2023    MCV 97 12/04/2023     12/04/2023     Lab Results   Component Value Date    NEUTROABS 3.74 12/04/2023     Lab Results   Component Value Date    CREATININE 0.70 12/04/2023     A:  Encounter Diagnosis   Name Primary?    Malignant neoplasm of upper lobe of left lung (CMS/HCC)      Meets parameters to proceed with treatment.  Prechemo check completed.   IV device: PIV RAC  IV therapy site and patency: RAC, CDI  IV Fluid given: normal saline 250  Premeds given: Yes (Benedryl 50 mg., Pepcid 20 IV, Aloxi IV, Dex 12 mg bag IV)  Taxol infusion starting at: 1234, stopped at 1300. Pt fell in route to the bathroom. PIV pulled out. Pt Stable, A&O x 4. Staff at chairside. Mgr and NP.   PIV team started new PIV at 1358. Taxol restarted. And finished at 1445.  Carboplatin 271.4 mg started at 1452. Blood return noted in PIV. Completed at 1525.   Followed by:  flush.  Tolerated procedure well. IV removed. Pressure dressing applied.  Time patient discharged: 1535

## 2023-12-05 ENCOUNTER — HOSPITAL ENCOUNTER (OUTPATIENT)
Dept: RADIATION ONCOLOGY | Facility: HOSPITAL | Age: 63
Setting detail: RADIATION/ONCOLOGY SERIES
Discharge: HOME | End: 2023-12-05
Payer: MEDICAID

## 2023-12-05 ENCOUNTER — OFFICE VISIT (OUTPATIENT)
Dept: HEMATOLOGY/ONCOLOGY | Facility: HOSPITAL | Age: 63
End: 2023-12-05
Payer: MEDICAID

## 2023-12-05 ENCOUNTER — RADIATION ONCOLOGY OTV (OUTPATIENT)
Dept: RADIATION ONCOLOGY | Facility: HOSPITAL | Age: 63
End: 2023-12-05
Payer: MEDICAID

## 2023-12-05 VITALS
OXYGEN SATURATION: 97 % | WEIGHT: 160.5 LBS | DIASTOLIC BLOOD PRESSURE: 64 MMHG | HEIGHT: 69 IN | BODY MASS INDEX: 23.77 KG/M2 | SYSTOLIC BLOOD PRESSURE: 106 MMHG | TEMPERATURE: 96.6 F | HEART RATE: 80 BPM | RESPIRATION RATE: 18 BRPM

## 2023-12-05 DIAGNOSIS — Z51.0 ENCOUNTER FOR ANTINEOPLASTIC RADIATION THERAPY: ICD-10-CM

## 2023-12-05 DIAGNOSIS — Z51.11 ENCOUNTER FOR CHEMOTHERAPY MANAGEMENT: Primary | ICD-10-CM

## 2023-12-05 DIAGNOSIS — C34.12 MALIGNANT NEOPLASM OF UPPER LOBE OF LEFT LUNG (MULTI): ICD-10-CM

## 2023-12-05 DIAGNOSIS — C34.12 MALIGNANT NEOPLASM OF UPPER LOBE, LEFT BRONCHUS OR LUNG (MULTI): ICD-10-CM

## 2023-12-05 LAB
RAD ONC MSQ ACTUAL FRACTIONS DELIVERED: 9
RAD ONC MSQ ACTUAL SESSION DELIVERED DOSE: 200 CGRAY
RAD ONC MSQ ACTUAL TOTAL DOSE: 1800 CGRAY
RAD ONC MSQ ELAPSED DAYS: 15
RAD ONC MSQ LAST DATE: NORMAL
RAD ONC MSQ PRESCRIBED FRACTIONAL DOSE: 200 CGRAY
RAD ONC MSQ PRESCRIBED NUMBER OF FRACTIONS: 33
RAD ONC MSQ PRESCRIBED TECHNIQUE: NORMAL
RAD ONC MSQ PRESCRIBED TOTAL DOSE: 6600 CGRAY
RAD ONC MSQ PRESCRIPTION PATTERN COMMENT: NORMAL
RAD ONC MSQ START DATE: NORMAL
RAD ONC MSQ TREATMENT COURSE NUMBER: 1
RAD ONC MSQ TREATMENT SITE: NORMAL

## 2023-12-05 PROCEDURE — 77014 CHG CT GUIDANCE RADIATION THERAPY FLDS PLACEMENT: CPT | Performed by: RADIOLOGY

## 2023-12-05 PROCEDURE — 77386 HC INTENSITY-MODULATED RADIATION THERAPY (IMRT), COMPLEX: CPT | Performed by: RADIOLOGY

## 2023-12-05 PROCEDURE — 99214 OFFICE O/P EST MOD 30 MIN: CPT | Performed by: NURSE PRACTITIONER

## 2023-12-05 ASSESSMENT — PAIN SCALES - GENERAL: PAINLEVEL: 0-NO PAIN

## 2023-12-05 NOTE — PROGRESS NOTES
[facility] - Medical Oncology Follow-Up Visit    Patient ID: Martínez Neri is a 63 y.o. male      Current therapy: CARBOplatin (Paraplatin) 240 mg in sodium chloride 0.9% 124 mL IV, 240 mg (87.3 % of original dose 271.4 mg), intravenous, Once, 1 of 1 cycle    Dose modification: 237 mg (original dose 271.4 mg, Cycle 1, Reason: Protocol Driven), 271 mg (original dose 271.4 mg, Cycle 1, Reason: Protocol Driven)    Administration: 240 mg (11/20/2023), 271.4 mg (12/4/2023), 271 mg (11/27/2023)        PACLitaxeL (Taxol) 84 mg in dextrose 5 % in water (D5W) 114 mL IV, 45 mg/m2 = 84 mg, intravenous, Once, 1 of 1 cycle    Administration: 84 mg (11/20/2023), 84 mg (11/27/2023), 84 mg (12/4/2023)        palonosetron (Aloxi) injection 250 mcg, 250 mcg, intravenous, Once, 1 of 1 cycle    Administration: 250 mcg (11/20/2023), 250 mcg (11/27/2023), 250 mcg (12/4/2023)      Oncologic History:     DIAGNOSIS AND STAGING  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    PD-L1 TPS 20%      PRIOR THERAPIES  None         CURRENT THERAPY  concurrent chemo-RT using weekly carbo/taxol followed by consolidative durvalumab         CURRENT ONCOLOGICAL PROBLEMS  Cough         HISTORY OF PRESENT ILLNESS:  Current smoker (50-pack-year history), with history of COPD and seizures, who was diagnosed with a squamous cell carcinoma of the left upper lobe in [place], never treated.  Outpatient notes from [facility] cancer care and hematology clinic were reviewed.     On 06/17/2023 the patient had a CT chest for ongoing respiratory symptoms and was found to have a left suprahilar mass involving the left upper lobe bronchi with postobstructive pneumonia/collapse.  On 06/25/2023  CT chest/abdomen/pelvis demonstrated persistent masslike consolidation in the left upper lobe with no signs of extrathoracic metastatic disease.     On 06/26/2023 the patient underwent a bronchoscopy, and pathology was compatible with a squamous cell carcinoma at least in situ, with a foci highly suspicious for invasive squamous cell carcinoma.     On 07/18/2023 a PET/CT obtained, demonstrating a 6.6 cm left upper lobe mass with an SUV of 8.3 and no evidence of distant metastatic disease.     On 07/18/2023 to brain MRI was negative for intracranial metastasis.     On 07/27/2023 bronchoscopy/EBUS was performed.  Endobronchial biopsies of left upper lobe demonstrated an invasive moderately differentiated squamous cell carcinoma.  Left upper lobe hilar FNA showed atypical cells with squamous differentiation, suspicious for malignancy.      Subcarinal FNA was indeterminate for malignancy with extremely rare atypical cells with squamous differentiation.  Molecular profile: Lack of BRAF, EGFR, HER2, KRAS, MET, ALK, NTRK 1/2/3, RET or ROS1 alterations.  PD-L1 TPS 20%.  The patient was seen by medical oncology and did not too frail for combined chemo RT (there is a performance status ECOG 3) documented on 08/17/2023.  At that time, he lived with some friends, while his family was primarily located in [place].     His daughter traveled and was able to relocate him to [place], and he presents today to clinic accompanied by his daughter, son, and daughter-in-law.     The patient states he is felt much better since his relocation, has been gaining weight, approximately 10 pounds since he moved here.  He is more active, going up and down the steps at home, but still tired and taking some breaks throughout the day.     He denies any headaches or new neurological symptoms.  His respiratory symptoms are stable. There is no new localized pain.     Reported molecular and ancillary testing performed upon diagnosis in [place]:   BRAF  negative  DECKC06L negative  EGFR classic mutations negative  HER2 negative  MET negative  ALK negative  In tract 1/2/3 negative  RET negative  ROS1 negative  PD-L1 20%     Referred to surgery - PFTs were considered prohibitive for surgical resection-      10/31/23: Repeat EBUS for systematic staging:  Final Cytological Interpretation      A. LYMPH NODE 7 PULMONARY FINE NEEDLE ASPIRATION, CYTOLOGY AND CELL BLOCK:  --  No malignant cells identified.  --  Lymphoid sample.     B. LYMPH NODE 4 L PULMONARY FINE NEEDLE ASPIRATION, CYTOLOGY AND CELL BLOCK:  --  No malignant cells identified.  --  Lymphoid sample.           C. LYMPH NODE 11 L PULMONARY FINE NEEDLE ASPIRATION, CYTOLOGY AND CELL BLOCK:  --  A rare cluster of malignant cells derived from squamous cell carcinoma, see note  -- Also, please refer to concurrent biopsy surgical pathology report ([id number]).     Note:  The malignant squamous cell carcinoma cells are represented in the cell block only.  Immunostain for p40 is positive. The cytomorphology and immunoprofile support the above diagnosis.  The material is not sufficient for molecular testing.      Component     FINAL DIAGNOSIS   Left lung, upper lobe, biopsy:  -- Superficial fragment of squamous cell carcinoma, keratinizing; see note.     Note: According to clinician's note, PD-L1 and NGS have been performed at an outside institution. These studies can be repeated, if clinically indicated.      NGS and PD-L1 not repeated in this specimen as it was done in outside institution already and the purpose of this procedure was not for diagnosis but systematic mediastinal re-staging.      PAST MEDICAL HISTORY  Brain aneurysm  COPD (chronic obstructive pulmonary disease) (CMS/HCC)  Epilepsy (CMS/HCC)  High cholesterol  History of alcohol use  Hypertension     PAST SURGICAL HISTORY:  CAROTID ENDARTERECTOMY  COLONOSCOPY  HERNIA REPAIR      SOCIAL HISTORY  Tobacco (50 pack-year history) use - quit after cancer dx       CURRENT MEDS REVIEWED:  Scheduled medications  Continuous medications  PRN medications     ALLERGIES  NKDA     FAMILY HISTORY  Father had prostate CA        Chief Concern: Here in clinic for follow-up on concurrent chemo/RT with weekly Carbo/Taxol (week 3)    HPI Patient is here today in clinic with his daughter Seble for follow-up on concurrent chemo/RT with weekly Carbo/Taxol (week 3). He continues to feel well overall. Breathing is good, no CP or SOB. Cough has improved. No GI symptoms. Eating/drinking ok. Weight stable. No new aches/pains. No fevers, chills, or cold symptoms. No other concerns or complaints.       Meds (Current):    Current Outpatient Medications:     albuterol (Proventil HFA) 90 mcg/actuation inhaler, Inhale 2 puffs every 4 hours if needed for wheezing or shortness of breath., Disp: 6.7 g, Rfl: 6    atorvastatin (Lipitor) 40 mg tablet, Take 1 tablet (40 mg) by mouth once daily., Disp: 90 tablet, Rfl: 1    cholecalciferol (Vitamin D3) 5,000 Units tablet, Take 1 tablet (5,000 Units) by mouth once daily., Disp: 90 tablet, Rfl: 1    divalproex (Depakote ER) 250 mg 24 hr tablet, Take 1 tablet (250 mg) by mouth 2 times a day. Do not crush, chew, or split., Disp: 90 tablet, Rfl: 1    divalproex (Depakote ER) 500 mg 24 hr tablet, Take 1 tablet (500 mg) by mouth 2 times a day. Do not crush, chew, or split., Disp: 90 tablet, Rfl: 1    inhalat.spacing dev,med. mask spacer, , Disp: , Rfl:     ipratropium-albuteroL (Duo-Neb) 0.5-2.5 mg/3 mL nebulizer solution, Take 3 mL by nebulization 4 times a day as needed for wheezing or shortness of breath., Disp: 90 mL, Rfl: 1    lacosamide (Vimpat) 200 mg tablet tablet, Take 1 tablet (200 mg) by mouth 2 times a day., Disp: 30 tablet, Rfl: 5    LORazepam (Ativan) 0.5 mg tablet, Take 1 tablet (0.5 mg) by mouth once daily as needed for anxiety for up to 5 days., Disp: 5 tablet, Rfl: 0    nebulizer accessories misc, Use twice daily, Disp: 100 each, Rfl: 0    nebulizer  and compressor device, Use as directed, Disp: 1 each, Rfl: 0    nicotine (Nicoderm CQ) 21 mg/24 hr patch, Place 1 patch over 24 hours on the skin once every 24 hours., Disp: 30 patch, Rfl: 0    ondansetron (Zofran) 8 mg tablet, Take 1 tablet (8 mg) by mouth every 8 hours if needed for nausea or vomiting., Disp: 30 tablet, Rfl: 5    prochlorperazine (Compazine) 10 mg tablet, Take 1 tablet (10 mg) by mouth every 6 hours if needed for nausea or vomiting., Disp: 30 tablet, Rfl: 5    thiamine (Vitamin B-1) 100 mg tablet, Take 1 tablet (100 mg) by mouth once daily., Disp: 90 tablet, Rfl: 1    tiotropium (Spiriva) 18 mcg inhalation capsule, Place 1 capsule (18 mcg) into inhaler and inhale 2 times a day., Disp: , Rfl:   No current facility-administered medications for this visit.    Review of Systems - Oncology 12 point ROS was obtained and negative unless otherwise mentioned in the above HPI.      Objective   BSA: There is no height or weight on file to calculate BSA.  Wt Readings from Last 5 Encounters:   12/05/23 72.8 kg (160 lb 7.9 oz)   12/04/23 72.6 kg (160 lb 1.6 oz)   11/30/23 73.7 kg (162 lb 7.7 oz)   11/27/23 73.2 kg (161 lb 6 oz)   11/21/23 74.7 kg (164 lb 9.6 oz)     There were no vitals taken for this visit.         Physical Exam  Constitutional:       Appearance: Normal appearance.   Eyes:      Pupils: Pupils are equal, round, and reactive to light.   Cardiovascular:      Rate and Rhythm: Normal rate and regular rhythm.   Pulmonary:      Effort: Pulmonary effort is normal.      Breath sounds: Normal breath sounds.   Abdominal:      General: Bowel sounds are normal.      Palpations: Abdomen is soft.   Musculoskeletal:         General: No swelling.   Skin:     General: Skin is warm and dry.   Neurological:      General: No focal deficit present.      Mental Status: He is alert and oriented to person, place, and time.   Psychiatric:         Mood and Affect: Mood normal.         Behavior: Behavior normal.           Results:  Labs:  Lab Results   Component Value Date    WBC 5.4 12/04/2023    HGB 10.7 (L) 12/04/2023    HCT 31.6 (L) 12/04/2023    MCV 97 12/04/2023     12/04/2023      Lab Results   Component Value Date    NEUTROABS 3.74 12/04/2023      Lab Results   Component Value Date    GLUCOSE 87 12/04/2023    CALCIUM 9.1 12/04/2023     (L) 12/04/2023    K 4.4 12/04/2023    CO2 24 12/04/2023     12/04/2023    BUN 11 12/04/2023    CREATININE 0.70 12/04/2023     Lab Results   Component Value Date    ALT 11 12/04/2023    AST 17 12/04/2023    ALKPHOS 51 12/04/2023    BILITOT 0.3 12/04/2023      Lab Results   Component Value Date    TSH 5.54 (H) 10/03/2023       Imaging:           === Results for orders placed during the hospital encounter of 10/13/23 ===    MR brain w and wo IV contrast [[id number]] 10/13/2023    Status: Normal  1. No abnormal intracranial enhancement or mass to suggest metastases.    2. Chronic intracranial findings as above.    I personally reviewed the images/study and I agree with the findings  as stated. This study was interpreted at [place] [facility], [place], [place].    Signed by: Dharmesh Reddy 10/13/2023 8:27 PM  Dictation workstation:   [id number]    Assessment/Plan      Martínez Neri is a 63 y.o. male here for follow up     Reviewed with the patient the treatment plan consisting of concurrent chemo-RT.  This will be followed by consolidative durvalumab.  We discussed potential side effects related to carboplatin and paclitaxel weekly and he was able to sign a consent.  He has been seen by radiation oncology (Dr. Layne) and underwent CT simulation.  RT start date 11/20/23 and C1 Carbo/Taxol   -RTC weekly for follow-up  -[facility] paperwork filled out for spouse, signed, faxed and original copy given to patient/daughter

## 2023-12-05 NOTE — PROGRESS NOTES
"Radiation Oncology On Treatment Visit    Patient Name:  Martínez Neri  MRN:  [id number]  :  1960    Referring Provider: Erica Peters MD  Primary Care Provider: Jayme Casey MD  Care Team: Patient Care Team:  Jayme Casey MD as PCP - General (Internal Medicine)  Elo Peters MD as Consulting Physician (Hematology and Oncology)    Date of Service: 2023     Diagnosis:   Specialty Problems          Radiation Oncology Problems    Primary cancer of left upper lobe of lung (CMS/HCC)        Malignant neoplasm of upper lobe of left lung (CMS/HCC)         Treatment Summary:  Radiation Treatments       Active   LUL_Hilum (Started on 2023)   Most recent fraction: 200 cGy given on 2023   Total given: 1,800 cGy / 6,600 cGy  (9 of 33 fractions)   Elapsed Days: 15   Technique: IMRT           Completed   No historical radiation treatments to show.             SUBJECTIVE: Tolerating well. Symptoms at baseline (cough, SOB). Some fatigue. Denies new esophagitis, airway or breathing symptoms. Tolerating chemotherapy fairly with some loss of appetite.    OBJECTIVE:   Vital Signs:  /64 (BP Location: Right arm, Patient Position: Sitting, BP Cuff Size: Adult)   Pulse 80   Temp 35.9 °C (96.6 °F) (Temporal)   Resp 18   Ht 1.753 m (5' 9\")   Wt 72.8 kg (160 lb 7.9 oz)   SpO2 97%   BMI 23.70 kg/m²    Pain Scale: The patient's current pain level was assessed.  They report currently having a pain of 0 out of 10.    Other Pertinent Findings: Sitting comfortably. Alert, oriented. No wheezing. Breathing well on room air.     Toxicity Assessment          2023    11:25 2023    11:12   Toxicity Assessment   Adverse Events Reviewed (WDL) No (Exceptions to WDL) No (Exceptions to WDL)   Treatment Site Thoracic Thoracic   Anorexia  Grade 0   Anxiety Grade 1       pt. given Ativan for anxiety before treatments Grade 1       pt taking ativan prior to each treatment   Dehydration Grade 0 Grade 0 " "  Depression  Grade 0   Dermatitis Radiation Grade 0 Grade 0   Diarrhea  Grade 0   Fatigue Grade 0 Grade 1       pt with increased fatigue, pt napping during day   Nausea Grade 0 Grade 0   Pain Grade 0 Grade 0   Tumor Pain  Grade 0   Vomiting  Grade 0   Constipation  Grade 0   Dysphagia Grade 0 Grade 0   Esophagitis  Grade 0   Mucositis Oral  Grade 0   Myocardial Infarction  Grade 0   Pericardial Effusion  Grade 0   Pericarditis  Grade 0   Esophageal Obstruction  Grade 0   Esophageal Pain  Grade 0   Cough Grade 1       mild \"smokers\" cough Grade 1       baseline coughing with white phlem   Dyspnea Grade 1       chronic CHAPIN Grade 1       pt with SOB with exertion   Epistaxis  Grade 0   Hypoxia Grade 0 Grade 0        Assessment / Plan:  The patient is tolerating radiation therapy as anticipated.  Continue per current treatment plan.     Dosimetry/IGRT reviewed.      Jorge Layne MD, MMM  [facility]  Faculty, [employer]  [email or web address]  Our [place]: “To Heal, To Teach, To Discover.”  RN partner: [email or web address]    Phone (scheduling): [phone]/ [email or web address]  Phone (office): [phone]  Phone (after hours): [phone]         "

## 2023-12-06 ENCOUNTER — HOSPITAL ENCOUNTER (OUTPATIENT)
Dept: RADIATION ONCOLOGY | Facility: HOSPITAL | Age: 63
Setting detail: RADIATION/ONCOLOGY SERIES
Discharge: HOME | End: 2023-12-06
Payer: MEDICAID

## 2023-12-06 DIAGNOSIS — Z51.0 ENCOUNTER FOR ANTINEOPLASTIC RADIATION THERAPY: ICD-10-CM

## 2023-12-06 DIAGNOSIS — C34.12 MALIGNANT NEOPLASM OF UPPER LOBE, LEFT BRONCHUS OR LUNG (MULTI): ICD-10-CM

## 2023-12-06 LAB
RAD ONC MSQ ACTUAL FRACTIONS DELIVERED: 10
RAD ONC MSQ ACTUAL SESSION DELIVERED DOSE: 200 CGRAY
RAD ONC MSQ ACTUAL TOTAL DOSE: 2000 CGRAY
RAD ONC MSQ ELAPSED DAYS: 16
RAD ONC MSQ LAST DATE: NORMAL
RAD ONC MSQ PRESCRIBED FRACTIONAL DOSE: 200 CGRAY
RAD ONC MSQ PRESCRIBED NUMBER OF FRACTIONS: 10
RAD ONC MSQ PRESCRIBED TECHNIQUE: NORMAL
RAD ONC MSQ PRESCRIBED TOTAL DOSE: 2000 CGRAY
RAD ONC MSQ PRESCRIPTION PATTERN COMMENT: NORMAL
RAD ONC MSQ START DATE: NORMAL
RAD ONC MSQ TREATMENT COURSE NUMBER: 1
RAD ONC MSQ TREATMENT SITE: NORMAL

## 2023-12-06 PROCEDURE — 77293 RESPIRATOR MOTION MGMT SIMUL: CPT | Performed by: RADIOLOGY

## 2023-12-06 PROCEDURE — 77338 DESIGN MLC DEVICE FOR IMRT: CPT | Performed by: RADIOLOGY

## 2023-12-06 PROCEDURE — 77301 RADIOTHERAPY DOSE PLAN IMRT: CPT | Performed by: RADIOLOGY

## 2023-12-06 PROCEDURE — 77386 HC INTENSITY-MODULATED RADIATION THERAPY (IMRT), COMPLEX: CPT | Performed by: RADIOLOGY

## 2023-12-06 PROCEDURE — 77336 RADIATION PHYSICS CONSULT: CPT | Mod: 59 | Performed by: RADIOLOGY

## 2023-12-06 PROCEDURE — 77300 RADIATION THERAPY DOSE PLAN: CPT | Performed by: RADIOLOGY

## 2023-12-07 ENCOUNTER — HOSPITAL ENCOUNTER (OUTPATIENT)
Dept: RADIATION ONCOLOGY | Facility: HOSPITAL | Age: 63
Setting detail: RADIATION/ONCOLOGY SERIES
Discharge: HOME | End: 2023-12-07
Payer: MEDICAID

## 2023-12-07 DIAGNOSIS — Z51.0 ENCOUNTER FOR ANTINEOPLASTIC RADIATION THERAPY: ICD-10-CM

## 2023-12-07 DIAGNOSIS — C34.12 MALIGNANT NEOPLASM OF UPPER LOBE, LEFT BRONCHUS OR LUNG (MULTI): ICD-10-CM

## 2023-12-07 LAB
RAD ONC MSQ ACTUAL FRACTIONS DELIVERED: 1
RAD ONC MSQ ACTUAL SESSION DELIVERED DOSE: 200 CGRAY
RAD ONC MSQ ACTUAL TOTAL DOSE: 200 CGRAY
RAD ONC MSQ ELAPSED DAYS: 0
RAD ONC MSQ LAST DATE: NORMAL
RAD ONC MSQ PRESCRIBED FRACTIONAL DOSE: 200 CGRAY
RAD ONC MSQ PRESCRIBED NUMBER OF FRACTIONS: 23
RAD ONC MSQ PRESCRIBED TECHNIQUE: NORMAL
RAD ONC MSQ PRESCRIBED TOTAL DOSE: 4600 CGRAY
RAD ONC MSQ PRESCRIPTION PATTERN COMMENT: NORMAL
RAD ONC MSQ START DATE: NORMAL
RAD ONC MSQ TREATMENT COURSE NUMBER: 1
RAD ONC MSQ TREATMENT SITE: NORMAL

## 2023-12-07 PROCEDURE — 77014 CHG CT GUIDANCE RADIATION THERAPY FLDS PLACEMENT: CPT | Performed by: RADIOLOGY

## 2023-12-07 PROCEDURE — 77386 HC INTENSITY-MODULATED RADIATION THERAPY (IMRT), COMPLEX: CPT | Performed by: RADIOLOGY

## 2023-12-08 ENCOUNTER — HOSPITAL ENCOUNTER (OUTPATIENT)
Dept: RADIATION ONCOLOGY | Facility: HOSPITAL | Age: 63
Setting detail: RADIATION/ONCOLOGY SERIES
Discharge: HOME | End: 2023-12-08
Payer: MEDICAID

## 2023-12-08 DIAGNOSIS — C34.12 MALIGNANT NEOPLASM OF UPPER LOBE, LEFT BRONCHUS OR LUNG (MULTI): ICD-10-CM

## 2023-12-08 DIAGNOSIS — Z51.0 ENCOUNTER FOR ANTINEOPLASTIC RADIATION THERAPY: ICD-10-CM

## 2023-12-08 LAB
RAD ONC MSQ ACTUAL FRACTIONS DELIVERED: 2
RAD ONC MSQ ACTUAL SESSION DELIVERED DOSE: 200 CGRAY
RAD ONC MSQ ACTUAL TOTAL DOSE: 400 CGRAY
RAD ONC MSQ ELAPSED DAYS: 1
RAD ONC MSQ LAST DATE: NORMAL
RAD ONC MSQ PRESCRIBED FRACTIONAL DOSE: 200 CGRAY
RAD ONC MSQ PRESCRIBED NUMBER OF FRACTIONS: 23
RAD ONC MSQ PRESCRIBED TECHNIQUE: NORMAL
RAD ONC MSQ PRESCRIBED TOTAL DOSE: 4600 CGRAY
RAD ONC MSQ PRESCRIPTION PATTERN COMMENT: NORMAL
RAD ONC MSQ START DATE: NORMAL
RAD ONC MSQ TREATMENT COURSE NUMBER: 1
RAD ONC MSQ TREATMENT SITE: NORMAL

## 2023-12-08 PROCEDURE — 77386 HC INTENSITY-MODULATED RADIATION THERAPY (IMRT), COMPLEX: CPT | Performed by: RADIOLOGY

## 2023-12-08 PROCEDURE — 77014 CHG CT GUIDANCE RADIATION THERAPY FLDS PLACEMENT: CPT | Performed by: RADIOLOGY

## 2023-12-11 ENCOUNTER — RADIATION ONCOLOGY OTV (OUTPATIENT)
Dept: RADIATION ONCOLOGY | Facility: HOSPITAL | Age: 63
End: 2023-12-11
Payer: MEDICAID

## 2023-12-11 ENCOUNTER — APPOINTMENT (OUTPATIENT)
Dept: HEMATOLOGY/ONCOLOGY | Facility: HOSPITAL | Age: 63
End: 2023-12-11
Payer: MEDICAID

## 2023-12-11 ENCOUNTER — NUTRITION (OUTPATIENT)
Dept: HEMATOLOGY/ONCOLOGY | Facility: HOSPITAL | Age: 63
End: 2023-12-11
Payer: MEDICAID

## 2023-12-11 ENCOUNTER — TELEPHONE (OUTPATIENT)
Dept: ADMISSION | Facility: HOSPITAL | Age: 63
End: 2023-12-11
Payer: MEDICAID

## 2023-12-11 ENCOUNTER — HOSPITAL ENCOUNTER (OUTPATIENT)
Dept: RADIATION ONCOLOGY | Facility: HOSPITAL | Age: 63
Setting detail: RADIATION/ONCOLOGY SERIES
Discharge: HOME | End: 2023-12-11
Payer: MEDICAID

## 2023-12-11 VITALS
TEMPERATURE: 97.3 F | SYSTOLIC BLOOD PRESSURE: 116 MMHG | DIASTOLIC BLOOD PRESSURE: 65 MMHG | RESPIRATION RATE: 18 BRPM | WEIGHT: 157.41 LBS | BODY MASS INDEX: 23.25 KG/M2 | HEART RATE: 93 BPM | OXYGEN SATURATION: 97 %

## 2023-12-11 DIAGNOSIS — Z51.0 ENCOUNTER FOR ANTINEOPLASTIC RADIATION THERAPY: ICD-10-CM

## 2023-12-11 DIAGNOSIS — C34.12 MALIGNANT NEOPLASM OF UPPER LOBE, LEFT BRONCHUS OR LUNG (MULTI): ICD-10-CM

## 2023-12-11 LAB
RAD ONC MSQ ACTUAL FRACTIONS DELIVERED: 3
RAD ONC MSQ ACTUAL SESSION DELIVERED DOSE: 200 CGRAY
RAD ONC MSQ ACTUAL TOTAL DOSE: 600 CGRAY
RAD ONC MSQ ELAPSED DAYS: 4
RAD ONC MSQ LAST DATE: NORMAL
RAD ONC MSQ PRESCRIBED FRACTIONAL DOSE: 200 CGRAY
RAD ONC MSQ PRESCRIBED NUMBER OF FRACTIONS: 23
RAD ONC MSQ PRESCRIBED TECHNIQUE: NORMAL
RAD ONC MSQ PRESCRIBED TOTAL DOSE: 4600 CGRAY
RAD ONC MSQ PRESCRIPTION PATTERN COMMENT: NORMAL
RAD ONC MSQ START DATE: NORMAL
RAD ONC MSQ TREATMENT COURSE NUMBER: 1
RAD ONC MSQ TREATMENT SITE: NORMAL

## 2023-12-11 PROCEDURE — 77386 HC INTENSITY-MODULATED RADIATION THERAPY (IMRT), COMPLEX: CPT | Performed by: RADIOLOGY

## 2023-12-11 PROCEDURE — 77014 CHG CT GUIDANCE RADIATION THERAPY FLDS PLACEMENT: CPT | Performed by: RADIOLOGY

## 2023-12-11 PROCEDURE — 77427 RADIATION TX MANAGEMENT X5: CPT | Performed by: RADIOLOGY

## 2023-12-11 ASSESSMENT — PAIN SCALES - GENERAL: PAINLEVEL: 0-NO PAIN

## 2023-12-11 NOTE — TELEPHONE ENCOUNTER
Pt is receiving concurrent chemo-radiation and was supposed to have infusion today but it was not scheduled.  He was offered an infusion spot today at 1245 but they went home while waiting to hear if infusion could add him on.  Infusion was subsequently rescheduled for tomorrow at 0830.  Keri agreeable.  She asks about scheduling subsequent infusions.  Request sent to scheduling to complete outstanding requests.

## 2023-12-11 NOTE — PROGRESS NOTES
Radiation Oncology On Treatment Visit    Patient Name:  Martínez Neri  MRN:  [id number]  :  1960    Referring Provider: Elo Peters MD  Primary Care Provider: Jayme Casey MD  Care Team: Patient Care Team:  Jayme Casey MD as PCP - General (Internal Medicine)  Elo Peters MD as Consulting Physician (Hematology and Oncology)    Date of Service: 2023     Diagnosis:   Specialty Problems          Radiation Oncology Problems    Primary cancer of left upper lobe of lung (CMS/HCC)        Malignant neoplasm of upper lobe of left lung (CMS/HCC)         Treatment Summary:  Radiation Treatments       Active   Replan_LUL Hilum (Started on 2023)   Most recent fraction: 200 cGy given on 2023   Total given: 600 cGy / 4,600 cGy  (3 of 23 fractions)   Elapsed Days: 4   Technique: IMRT           Completed   LUL_Hilum (Started on 2023)   Most recent fraction: 200 cGy given on 2023   Total given: 2,000 cGy / 2,000 cGy  (10 of 10 fractions)   Elapsed Days: 16   Technique: IMRT                   SUBJECTIVE: Patient presents today accompanied by his wife. Of note, he has lost about 7lb since 23. He endorses fatigue, decreased appetite, and decreased fluid intake since starting chemotheapy. Denies dysphagia and esophagitis symptoms, breathing changes, or other side effects from radiation.       OBJECTIVE:   Vital Signs:  /65   Pulse 93   Temp 36.3 °C (97.3 °F)   Resp 18   Wt 71.4 kg (157 lb 6.5 oz)   SpO2 97%   BMI 23.25 kg/m²    Pain Scale: The patient's current pain level was assessed.  They report currently having a pain of 0 out of 10.    Other Pertinent Findings: Sitting comfortably. Alert, oriented. No wheezing. Looks more fatigued than baseline. Ambulating well. Not in distress.  Accompanied by wife.    Toxicity Assessment          2023    11:25 2023    11:12 2023    11:52   Toxicity Assessment   Adverse Events Reviewed (WDL) No (Exceptions to WDL)  "No (Exceptions to WDL) No (Exceptions to WDL)   Treatment Site Thoracic Thoracic Thoracic   Anorexia  Grade 0 Grade 1       pt. with decreased appetite   Anxiety Grade 1       pt. given Ativan for anxiety before treatments Grade 1       pt taking ativan prior to each treatment    Dehydration Grade 0 Grade 0 Grade 0   Depression  Grade 0 Grade 0   Dermatitis Radiation Grade 0 Grade 0    Diarrhea  Grade 0    Fatigue Grade 0 Grade 1       pt with increased fatigue, pt napping during day Grade 1       pt. mildly fatigued   Nausea Grade 0 Grade 0 Grade 0   Pain Grade 0 Grade 0 Grade 0   Tumor Pain  Grade 0    Vomiting  Grade 0 Grade 0   Constipation  Grade 0    Dysphagia Grade 0 Grade 0    Esophagitis  Grade 0    Mucositis Oral  Grade 0    Myocardial Infarction  Grade 0    Pericardial Effusion  Grade 0    Pericarditis  Grade 0    Esophageal Obstruction  Grade 0 Grade 0   Esophageal Pain  Grade 0 Grade 0   Cough Grade 1       mild \"smokers\" cough Grade 1       baseline coughing with white phlem Grade 1       chronic cough   Dyspnea Grade 1       chronic CHAPIN Grade 1       pt with SOB with exertion Grade 0   Epistaxis  Grade 0    Hypoxia Grade 0 Grade 0 Grade 0        Assessment / Plan:  The patient is tolerating radiation therapy as anticipated, however, his weight loss and poor PO intake are of concern. We will monitor closely during upcoming on-treatment visits. Nutritionist consult took place at today's visit. Continue per current treatment plan.       The plan of care was discussed with the attending radiation oncologist Dr. Layne.    Felisha Knox MD  PGY-2 Radiation Oncology Resident  On-call pager [phone]  Available on Epic Secure Chat  For  Jorge Layne MD, MMM  [facility] [place]  Faculty, [employer]  [email or web address]  Our [place]: “To Heal, To Teach, To Discover.”  RN partner: [email or web address]    Phone (scheduling): " [phone]/ [email or web address]  Phone (office): [phone]  Phone (after hours): [phone]      ATTENDING ADDENDUM:  I saw and evaluated the patient with the resident. I personally obtained the key and critical portions of the history and physical exam, reviewed IGRT/dosimetry and directly counseled the patient of the treatment plan. I reviewed the resident documentation and discussed the patient with the resident. I agree with the resident/fellow's medical decision making as documented in the note.

## 2023-12-11 NOTE — PROGRESS NOTES
NUTRITION Follow-up NOTE    Nutrition Assessment     Reason for Visit:  Martínez Neri is a 63 y.o. male with stage IIB malignant neoplasm of upper lobe of left lung who presents for nutrition follow up.      Spoke with patient and family in [facility]. Patient receiving 23 fractions; 3 completed. Patient on carbo/taxol.    Lab Results   Component Value Date/Time    GLUCOSE 87 12/04/2023 1055     (L) 12/04/2023 1055    K 4.4 12/04/2023 1055     12/04/2023 1055    CO2 24 12/04/2023 1055    ANIONGAP 13 12/04/2023 1055    BUN 11 12/04/2023 1055    CREATININE 0.70 12/04/2023 1055    EGFR >90 12/04/2023 1055    CALCIUM 9.1 12/04/2023 1055    ALBUMIN 3.6 12/04/2023 1055    ALKPHOS 51 12/04/2023 1055    PROT 6.3 (L) 12/04/2023 1055    AST 17 12/04/2023 1055    BILITOT 0.3 12/04/2023 1055    ALT 11 12/04/2023 1055     Lab Results   Component Value Date/Time    VITD25 17 (L) 10/03/2023 0828       Anthropometrics:  Anthropometrics  IBW/kg (Dietitian Calculated): 73.4 kg  Percent of IBW: 96 %  Weight Change  Weight History / % Weight Change: 3# weight loss in 1 week.  Significant Weight Loss: Yes  Interpretation of Weight Loss: 1-2% in 1 week        Wt Readings from Last 10 Encounters:   12/12/23 71.7 kg (158 lb 1.6 oz)   12/11/23 71.4 kg (157 lb 6.5 oz)   12/05/23 72.8 kg (160 lb 7.9 oz)   12/04/23 72.6 kg (160 lb 1.6 oz)   11/30/23 73.7 kg (162 lb 7.7 oz)   11/27/23 73.2 kg (161 lb 6 oz)   11/21/23 74.7 kg (164 lb 9.6 oz)   11/20/23 74.6 kg (164 lb 7.4 oz)   11/02/23 73.4 kg (161 lb 13.1 oz)   10/31/23 73.9 kg (163 lb)        Food And Nutrient Intake:  Food and Nutrient History  Food and Nutrient History: Patient states one day, foods are okay and then the same food will taste bad 3 days later. Eating tacos often. Struggling to find foods that don't cause symptoms.  Energy Intake: Fair 50-75 %  Fluid Intake: Quit drinking alcohol 7 years ago. 48 oz water. Coffee. Coke.  Food Allergy: NKFA.  GI Symptoms:  reflux, nausea (Taking zofran.)  Oral Problems: dysgeusia  Dentition: own     Food Intake  Amount of Food: See 11/27 note for full diet recall.    Food Preparation  Dining Out: More than 3 times a week    Micronutrient Intake  Vitamin Intake: Multivitamin, Thiamin, D      Nutrition Focused Physical Exam Findings:  defer: See note from 11/21                        Energy Needs  Estimated Energy Needs  Total Energy Estimated Needs (kCal): 2200 kCal  Method for Estimating Needs: 30 kcal/kg IBW  Estimated Fluid Needs  Total Fluid Estimated Needs (mL): 2200 mL  Method for Estimating Needs: 1 ml/kcal or per team  Estimated Protein Needs  Total Protein Estimated Needs (g): 80 g  Method for Estimating Needs: 1.1 g/kg IBW        Nutrition Diagnosis   Malnutrition Diagnosis  Patient has Malnutrition Diagnosis: No    Nutrition Diagnosis  Patient has Nutrition Diagnosis: No  Diagnosis Status (1): New  Nutrition Diagnosis 1: Increased nutrient needs  Related to (1): cancer treatment  As Evidenced by (1): unintended weight loss.    Nutrition Interventions/Recommendations   Food and Nutrition Delivery  Food and Nutrition Delivery  Meals & Snacks: Specific foods/beverages or groups:, Energy-modified diet  Goals: Reviewed foods that increase reflux; recommend limiting carbonation, caffeine, fried foods, spicy foods, acidic foods. Add calories with olive oil.    Nutrition Education  Nutrition Education  Nutrition Education Content: Content related nutrition education  Goals: Importance of taking zofran regularly.    Coordination of Care       There are no Patient Instructions on file for this visit.    Nutrition Monitoring and Evaluation   Food/Nutrient Related History Monitoring  Monitoring and Evaluation Plan: Energy intake, Fluid intake, Protein intake, Vitamin intake  Energy Intake: Estimated energy intake  Criteria: 24 hour recall  Fluid Intake: Estimated fluid intake  Criteria: 24 hour recall  Estimated protein intake:  Estimated protein intake  Criteria: 24 hour recall  Vitamin Intake: Measured vitamin intake  Criteria: 24 h our recall  Knowledge/Beliefs/Attitudes  Monitoring and Evaluation Plan: Food and nutrition knowledge  Food and nutrition knowledge: Nutrition knowledge of individual client        Time Spent  Prep time on day of patient encounter: 10 minutes  Time spent directly with patient, family or caregiver: 20 minutes  Additional Time Spent on Patient Care Activities: 5 minutes  Documentation Time: 20 minutes  Other Time Spent: 0 minutes  Total: 55 minutes        Readiness to Change : Good  Level of Understanding : Good  Anticipated Compliant : Good

## 2023-12-12 ENCOUNTER — TELEPHONE (OUTPATIENT)
Dept: HEMATOLOGY/ONCOLOGY | Facility: HOSPITAL | Age: 63
End: 2023-12-12

## 2023-12-12 ENCOUNTER — OFFICE VISIT (OUTPATIENT)
Dept: HEMATOLOGY/ONCOLOGY | Facility: HOSPITAL | Age: 63
End: 2023-12-12
Payer: MEDICAID

## 2023-12-12 ENCOUNTER — HOSPITAL ENCOUNTER (OUTPATIENT)
Dept: RADIATION ONCOLOGY | Facility: HOSPITAL | Age: 63
Setting detail: RADIATION/ONCOLOGY SERIES
Discharge: HOME | End: 2023-12-12
Payer: MEDICAID

## 2023-12-12 ENCOUNTER — INFUSION (OUTPATIENT)
Dept: HEMATOLOGY/ONCOLOGY | Facility: HOSPITAL | Age: 63
End: 2023-12-12
Payer: MEDICAID

## 2023-12-12 VITALS
BODY MASS INDEX: 23.35 KG/M2 | HEART RATE: 95 BPM | WEIGHT: 158.1 LBS | SYSTOLIC BLOOD PRESSURE: 115 MMHG | TEMPERATURE: 97.7 F | OXYGEN SATURATION: 99 % | DIASTOLIC BLOOD PRESSURE: 68 MMHG | RESPIRATION RATE: 18 BRPM

## 2023-12-12 VITALS — BODY MASS INDEX: 23.31 KG/M2 | WEIGHT: 157.85 LBS

## 2023-12-12 DIAGNOSIS — C34.12 MALIGNANT NEOPLASM OF UPPER LOBE, LEFT BRONCHUS OR LUNG (MULTI): ICD-10-CM

## 2023-12-12 DIAGNOSIS — C34.12 MALIGNANT NEOPLASM OF UPPER LOBE OF LEFT LUNG (MULTI): Primary | ICD-10-CM

## 2023-12-12 DIAGNOSIS — C34.12 MALIGNANT NEOPLASM OF UPPER LOBE OF LEFT LUNG (MULTI): ICD-10-CM

## 2023-12-12 DIAGNOSIS — Z51.0 ENCOUNTER FOR ANTINEOPLASTIC RADIATION THERAPY: ICD-10-CM

## 2023-12-12 DIAGNOSIS — Z51.11 ENCOUNTER FOR CHEMOTHERAPY MANAGEMENT: ICD-10-CM

## 2023-12-12 LAB
ALBUMIN SERPL BCP-MCNC: 3.7 G/DL (ref 3.4–5)
ALP SERPL-CCNC: 60 U/L (ref 33–136)
ALT SERPL W P-5'-P-CCNC: 10 U/L (ref 10–52)
ANION GAP SERPL CALC-SCNC: 14 MMOL/L (ref 10–20)
AST SERPL W P-5'-P-CCNC: 21 U/L (ref 9–39)
BASOPHILS # BLD AUTO: 0.02 X10*3/UL (ref 0–0.1)
BASOPHILS NFR BLD AUTO: 0.6 %
BILIRUB SERPL-MCNC: 0.3 MG/DL (ref 0–1.2)
BUN SERPL-MCNC: 12 MG/DL (ref 6–23)
CALCIUM SERPL-MCNC: 8.7 MG/DL (ref 8.6–10.3)
CHLORIDE SERPL-SCNC: 99 MMOL/L (ref 98–107)
CO2 SERPL-SCNC: 24 MMOL/L (ref 21–32)
CREAT SERPL-MCNC: 0.67 MG/DL (ref 0.5–1.3)
EOSINOPHIL # BLD AUTO: 0.01 X10*3/UL (ref 0–0.7)
EOSINOPHIL NFR BLD AUTO: 0.3 %
ERYTHROCYTE [DISTWIDTH] IN BLOOD BY AUTOMATED COUNT: 13.3 % (ref 11.5–14.5)
GFR SERPL CREATININE-BSD FRML MDRD: >90 ML/MIN/1.73M*2
GLUCOSE SERPL-MCNC: 77 MG/DL (ref 74–99)
HCT VFR BLD AUTO: 34.3 % (ref 41–52)
HGB BLD-MCNC: 11.7 G/DL (ref 13.5–17.5)
IMM GRANULOCYTES # BLD AUTO: 0.02 X10*3/UL (ref 0–0.7)
IMM GRANULOCYTES NFR BLD AUTO: 0.6 % (ref 0–0.9)
LYMPHOCYTES # BLD AUTO: 0.59 X10*3/UL (ref 1.2–4.8)
LYMPHOCYTES NFR BLD AUTO: 18.2 %
MCH RBC QN AUTO: 33.4 PG (ref 26–34)
MCHC RBC AUTO-ENTMCNC: 34.1 G/DL (ref 32–36)
MCV RBC AUTO: 98 FL (ref 80–100)
MONOCYTES # BLD AUTO: 0.34 X10*3/UL (ref 0.1–1)
MONOCYTES NFR BLD AUTO: 10.5 %
NEUTROPHILS # BLD AUTO: 2.26 X10*3/UL (ref 1.2–7.7)
NEUTROPHILS NFR BLD AUTO: 69.8 %
NRBC BLD-RTO: 0 /100 WBCS (ref 0–0)
PLATELET # BLD AUTO: 331 X10*3/UL (ref 150–450)
POTASSIUM SERPL-SCNC: 4.7 MMOL/L (ref 3.5–5.3)
PROT SERPL-MCNC: 6.5 G/DL (ref 6.4–8.2)
RAD ONC MSQ ACTUAL FRACTIONS DELIVERED: 4
RAD ONC MSQ ACTUAL SESSION DELIVERED DOSE: 200 CGRAY
RAD ONC MSQ ACTUAL TOTAL DOSE: 800 CGRAY
RAD ONC MSQ ELAPSED DAYS: 5
RAD ONC MSQ LAST DATE: NORMAL
RAD ONC MSQ PRESCRIBED FRACTIONAL DOSE: 200 CGRAY
RAD ONC MSQ PRESCRIBED NUMBER OF FRACTIONS: 23
RAD ONC MSQ PRESCRIBED TECHNIQUE: NORMAL
RAD ONC MSQ PRESCRIBED TOTAL DOSE: 4600 CGRAY
RAD ONC MSQ PRESCRIPTION PATTERN COMMENT: NORMAL
RAD ONC MSQ START DATE: NORMAL
RAD ONC MSQ TREATMENT COURSE NUMBER: 1
RAD ONC MSQ TREATMENT SITE: NORMAL
RBC # BLD AUTO: 3.5 X10*6/UL (ref 4.5–5.9)
SODIUM SERPL-SCNC: 132 MMOL/L (ref 136–145)
WBC # BLD AUTO: 3.2 X10*3/UL (ref 4.4–11.3)

## 2023-12-12 PROCEDURE — 96417 CHEMO IV INFUS EACH ADDL SEQ: CPT

## 2023-12-12 PROCEDURE — 99214 OFFICE O/P EST MOD 30 MIN: CPT | Performed by: NURSE PRACTITIONER

## 2023-12-12 PROCEDURE — 36415 COLL VENOUS BLD VENIPUNCTURE: CPT

## 2023-12-12 PROCEDURE — 77386 HC INTENSITY-MODULATED RADIATION THERAPY (IMRT), COMPLEX: CPT | Performed by: RADIOLOGY

## 2023-12-12 PROCEDURE — 80053 COMPREHEN METABOLIC PANEL: CPT

## 2023-12-12 PROCEDURE — 96375 TX/PRO/DX INJ NEW DRUG ADDON: CPT | Mod: INF

## 2023-12-12 PROCEDURE — 2500000001 HC RX 250 WO HCPCS SELF ADMINISTERED DRUGS (ALT 637 FOR MEDICARE OP): Performed by: INTERNAL MEDICINE

## 2023-12-12 PROCEDURE — 85025 COMPLETE CBC W/AUTO DIFF WBC: CPT

## 2023-12-12 PROCEDURE — 77014 CHG CT GUIDANCE RADIATION THERAPY FLDS PLACEMENT: CPT | Performed by: RADIOLOGY

## 2023-12-12 PROCEDURE — 2500000004 HC RX 250 GENERAL PHARMACY W/ HCPCS (ALT 636 FOR OP/ED): Performed by: INTERNAL MEDICINE

## 2023-12-12 PROCEDURE — 2500000004 HC RX 250 GENERAL PHARMACY W/ HCPCS (ALT 636 FOR OP/ED): Mod: JZ | Performed by: INTERNAL MEDICINE

## 2023-12-12 PROCEDURE — 96413 CHEMO IV INFUSION 1 HR: CPT

## 2023-12-12 PROCEDURE — 96367 TX/PROPH/DG ADDL SEQ IV INF: CPT

## 2023-12-12 RX ORDER — ALBUTEROL SULFATE 0.83 MG/ML
3 SOLUTION RESPIRATORY (INHALATION) AS NEEDED
Status: DISCONTINUED | OUTPATIENT
Start: 2023-12-12 | End: 2023-12-12 | Stop reason: HOSPADM

## 2023-12-12 RX ORDER — DIPHENHYDRAMINE HCL 50 MG
50 CAPSULE ORAL ONCE
Status: COMPLETED | OUTPATIENT
Start: 2023-12-12 | End: 2023-12-12

## 2023-12-12 RX ORDER — PROCHLORPERAZINE EDISYLATE 5 MG/ML
10 INJECTION INTRAMUSCULAR; INTRAVENOUS EVERY 6 HOURS PRN
Status: DISCONTINUED | OUTPATIENT
Start: 2023-12-12 | End: 2023-12-12 | Stop reason: HOSPADM

## 2023-12-12 RX ORDER — FAMOTIDINE 10 MG/ML
20 INJECTION INTRAVENOUS ONCE
Status: COMPLETED | OUTPATIENT
Start: 2023-12-12 | End: 2023-12-12

## 2023-12-12 RX ORDER — PALONOSETRON 0.05 MG/ML
0.25 INJECTION, SOLUTION INTRAVENOUS ONCE
Status: COMPLETED | OUTPATIENT
Start: 2023-12-12 | End: 2023-12-12

## 2023-12-12 RX ORDER — HEPARIN 100 UNIT/ML
500 SYRINGE INTRAVENOUS AS NEEDED
Status: CANCELLED | OUTPATIENT
Start: 2023-12-12

## 2023-12-12 RX ORDER — PROCHLORPERAZINE MALEATE 10 MG
10 TABLET ORAL EVERY 6 HOURS PRN
Status: DISCONTINUED | OUTPATIENT
Start: 2023-12-12 | End: 2023-12-12 | Stop reason: HOSPADM

## 2023-12-12 RX ORDER — DIPHENHYDRAMINE HYDROCHLORIDE 50 MG/ML
50 INJECTION INTRAMUSCULAR; INTRAVENOUS AS NEEDED
Status: DISCONTINUED | OUTPATIENT
Start: 2023-12-12 | End: 2023-12-12 | Stop reason: HOSPADM

## 2023-12-12 RX ORDER — TIOTROPIUM BROMIDE INHALATION SPRAY 3.12 UG/1
SPRAY, METERED RESPIRATORY (INHALATION)
COMMUNITY
Start: 2023-10-03

## 2023-12-12 RX ORDER — HEPARIN SODIUM,PORCINE/PF 10 UNIT/ML
50 SYRINGE (ML) INTRAVENOUS AS NEEDED
Status: CANCELLED | OUTPATIENT
Start: 2023-12-12

## 2023-12-12 RX ORDER — FAMOTIDINE 10 MG/ML
20 INJECTION INTRAVENOUS ONCE AS NEEDED
Status: DISCONTINUED | OUTPATIENT
Start: 2023-12-12 | End: 2023-12-12 | Stop reason: HOSPADM

## 2023-12-12 RX ORDER — CHOLECALCIFEROL (VITAMIN D3) 125 MCG
CAPSULE ORAL
COMMUNITY
Start: 2023-10-02

## 2023-12-12 RX ORDER — EPINEPHRINE 0.3 MG/.3ML
0.3 INJECTION SUBCUTANEOUS EVERY 5 MIN PRN
Status: DISCONTINUED | OUTPATIENT
Start: 2023-12-12 | End: 2023-12-12 | Stop reason: HOSPADM

## 2023-12-12 RX ADMIN — CARBOPLATIN 271.4 MG: 10 INJECTION INTRAVENOUS at 12:44

## 2023-12-12 RX ADMIN — PALONOSETRON HYDROCHLORIDE 250 MCG: 0.25 INJECTION INTRAVENOUS at 11:10

## 2023-12-12 RX ADMIN — DIPHENHYDRAMINE HYDROCHLORIDE 50 MG: 50 CAPSULE ORAL at 11:10

## 2023-12-12 RX ADMIN — FAMOTIDINE 20 MG: 10 INJECTION INTRAVENOUS at 11:10

## 2023-12-12 RX ADMIN — DEXAMETHASONE SODIUM PHOSPHATE 12 MG: 10 INJECTION, SOLUTION INTRAMUSCULAR; INTRAVENOUS at 11:10

## 2023-12-12 RX ADMIN — PACLITAXEL 84 MG: 6 INJECTION, SOLUTION INTRAVENOUS at 11:35

## 2023-12-12 ASSESSMENT — PAIN SCALES - GENERAL
PAINLEVEL: 0-NO PAIN
PAINLEVEL: 0-NO PAIN

## 2023-12-12 ASSESSMENT — ENCOUNTER SYMPTOMS: OCCASIONAL FEELINGS OF UNSTEADINESS: 1

## 2023-12-12 NOTE — PROGRESS NOTES
[facility] - Medical Oncology Follow-Up Visit    Patient ID: Martínez Neri is a 63 y.o. male      Current therapy: CARBOplatin (Paraplatin) 240 mg in sodium chloride 0.9% 124 mL IV, 240 mg (87.3 % of original dose 271.4 mg), intravenous, Once, 1 of 1 cycle    Dose modification: 237 mg (original dose 271.4 mg, Cycle 1, Reason: Protocol Driven), 271 mg (original dose 271.4 mg, Cycle 1, Reason: Protocol Driven)    Administration: 240 mg (11/20/2023), 271.4 mg (12/4/2023), 271 mg (11/27/2023)        PACLitaxeL (Taxol) 84 mg in dextrose 5 % in water (D5W) 114 mL IV, 45 mg/m2 = 84 mg, intravenous, Once, 1 of 1 cycle    Administration: 84 mg (11/20/2023), 84 mg (11/27/2023), 84 mg (12/4/2023)        palonosetron (Aloxi) injection 250 mcg, 250 mcg, intravenous, Once, 1 of 1 cycle    Administration: 250 mcg (11/20/2023), 250 mcg (11/27/2023), 250 mcg (12/4/2023)      Oncologic History:     DIAGNOSIS AND STAGING  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    PD-L1 TPS 20%      PRIOR THERAPIES  None         CURRENT THERAPY  concurrent chemo-RT using weekly carbo/taxol followed by consolidative durvalumab         CURRENT ONCOLOGICAL PROBLEMS  Cough         HISTORY OF PRESENT ILLNESS:  Current smoker (50-pack-year history), with history of COPD and seizures, who was diagnosed with a squamous cell carcinoma of the left upper lobe in [place], never treated.  Outpatient notes from [facility] cancer care and hematology clinic were reviewed.     On 06/17/2023 the patient had a CT chest for ongoing respiratory symptoms and was found to have a left suprahilar mass involving the left upper lobe bronchi with postobstructive pneumonia/collapse.  On 06/25/2023  CT chest/abdomen/pelvis demonstrated persistent masslike consolidation in the left upper lobe with no signs of extrathoracic metastatic disease.     On 06/26/2023 the patient underwent a bronchoscopy, and pathology was compatible with a squamous cell carcinoma at least in situ, with a foci highly suspicious for invasive squamous cell carcinoma.     On 07/18/2023 a PET/CT obtained, demonstrating a 6.6 cm left upper lobe mass with an SUV of 8.3 and no evidence of distant metastatic disease.     On 07/18/2023 to brain MRI was negative for intracranial metastasis.     On 07/27/2023 bronchoscopy/EBUS was performed.  Endobronchial biopsies of left upper lobe demonstrated an invasive moderately differentiated squamous cell carcinoma.  Left upper lobe hilar FNA showed atypical cells with squamous differentiation, suspicious for malignancy.      Subcarinal FNA was indeterminate for malignancy with extremely rare atypical cells with squamous differentiation.  Molecular profile: Lack of BRAF, EGFR, HER2, KRAS, MET, ALK, NTRK 1/2/3, RET or ROS1 alterations.  PD-L1 TPS 20%.  The patient was seen by medical oncology and did not too frail for combined chemo RT (there is a performance status ECOG 3) documented on 08/17/2023.  At that time, he lived with some friends, while his family was primarily located in [place].     His daughter traveled and was able to relocate him to [place], and he presents today to clinic accompanied by his daughter, son, and daughter-in-law.     The patient states he is felt much better since his relocation, has been gaining weight, approximately 10 pounds since he moved here.  He is more active, going up and down the steps at home, but still tired and taking some breaks throughout the day.     He denies any headaches or new neurological symptoms.  His respiratory symptoms are stable. There is no new localized pain.     Reported molecular and ancillary testing performed upon diagnosis in [place]:   BRAF  negative  [id number] negative  EGFR classic mutations negative  HER2 negative  MET negative  ALK negative  In tract 1/2/3 negative  RET negative  ROS1 negative  PD-L1 20%     Referred to surgery - PFTs were considered prohibitive for surgical resection-      10/31/23: Repeat EBUS for systematic staging:  Final Cytological Interpretation      A. LYMPH NODE 7 PULMONARY FINE NEEDLE ASPIRATION, CYTOLOGY AND CELL BLOCK:  --  No malignant cells identified.  --  Lymphoid sample.     B. LYMPH NODE 4 L PULMONARY FINE NEEDLE ASPIRATION, CYTOLOGY AND CELL BLOCK:  --  No malignant cells identified.  --  Lymphoid sample.           C. LYMPH NODE 11 L PULMONARY FINE NEEDLE ASPIRATION, CYTOLOGY AND CELL BLOCK:  --  A rare cluster of malignant cells derived from squamous cell carcinoma, see note  -- Also, please refer to concurrent biopsy surgical pathology report ([id number]).     Note:  The malignant squamous cell carcinoma cells are represented in the cell block only.  Immunostain for p40 is positive. The cytomorphology and immunoprofile support the above diagnosis.  The material is not sufficient for molecular testing.      Component     FINAL DIAGNOSIS   Left lung, upper lobe, biopsy:  -- Superficial fragment of squamous cell carcinoma, keratinizing; see note.     Note: According to clinician's note, PD-L1 and NGS have been performed at an outside institution. These studies can be repeated, if clinically indicated.      NGS and PD-L1 not repeated in this specimen as it was done in outside institution already and the purpose of this procedure was not for diagnosis but systematic mediastinal re-staging.      PAST MEDICAL HISTORY  Brain aneurysm  COPD (chronic obstructive pulmonary disease) (CMS/HCC)  Epilepsy (CMS/HCC)  High cholesterol  History of alcohol use  Hypertension     PAST SURGICAL HISTORY:  CAROTID ENDARTERECTOMY  COLONOSCOPY  HERNIA REPAIR      SOCIAL HISTORY  Tobacco (50 pack-year history) use - quit after cancer dx       CURRENT MEDS REVIEWED:  Scheduled medications  Continuous medications  PRN medications     ALLERGIES  NKDA     FAMILY HISTORY  Father had prostate CA        Chief Concern: Here in clinic for follow-up on concurrent chemo/RT with weekly Carbo/Taxol (week 4)    HPI Patient is here today in clinic with his daughter Seble for follow-up on concurrent chemo/RT with weekly Carbo/Taxol (week 4). He is feeling well overall. Breathing stable, no CP or SOB. Reports reflux symptoms, was prescribed PPI from rad onc and symptoms have resolved. Cough continues. Denies N/V/D/C.  Eating/drinking ok, following with nutrition.  No new aches/pains. No fevers, chills, or cold symptoms. No other concerns or complaints.       Meds (Current):    Current Outpatient Medications:     albuterol (Proventil HFA) 90 mcg/actuation inhaler, Inhale 2 puffs every 4 hours if needed for wheezing or shortness of breath., Disp: 6.7 g, Rfl: 6    atorvastatin (Lipitor) 40 mg tablet, Take 1 tablet (40 mg) by mouth once daily., Disp: 90 tablet, Rfl: 1    cholecalciferol (Vitamin D-3) 125 MCG (5000 UT) capsule, , Disp: , Rfl:     divalproex (Depakote ER) 250 mg 24 hr tablet, Take 1 tablet (250 mg) by mouth 2 times a day. Do not crush, chew, or split., Disp: 90 tablet, Rfl: 1    divalproex (Depakote ER) 500 mg 24 hr tablet, Take 1 tablet (500 mg) by mouth 2 times a day. Do not crush, chew, or split., Disp: 90 tablet, Rfl: 1    inhalat.spacing dev,med. mask spacer, , Disp: , Rfl:     ipratropium-albuteroL (Duo-Neb) 0.5-2.5 mg/3 mL nebulizer solution, Take 3 mL by nebulization 4 times a day as needed for wheezing or shortness of breath., Disp: 90 mL, Rfl: 1    lacosamide (Vimpat) 200 mg tablet tablet, Take 1 tablet (200 mg) by mouth 2 times a day., Disp: 30 tablet, Rfl: 5    nebulizer accessories misc, Use twice daily, Disp: 100 each, Rfl: 0    nebulizer and compressor device, Use as directed, Disp: 1 each, Rfl: 0    nicotine (Nicoderm CQ) 21 mg/24 hr patch,  Place 1 patch over 24 hours on the skin once every 24 hours., Disp: 30 patch, Rfl: 0    ondansetron (Zofran) 8 mg tablet, Take 1 tablet (8 mg) by mouth every 8 hours if needed for nausea or vomiting., Disp: 30 tablet, Rfl: 5    prochlorperazine (Compazine) 10 mg tablet, Take 1 tablet (10 mg) by mouth every 6 hours if needed for nausea or vomiting., Disp: 30 tablet, Rfl: 5    Spiriva Respimat 2.5 mcg/actuation inhaler, , Disp: , Rfl:     thiamine (Vitamin B-1) 100 mg tablet, Take 1 tablet (100 mg) by mouth once daily., Disp: 90 tablet, Rfl: 1    LORazepam (Ativan) 0.5 mg tablet, Take 1 tablet (0.5 mg) by mouth once daily as needed for anxiety for up to 5 days., Disp: 5 tablet, Rfl: 0  No current facility-administered medications for this visit.    Facility-Administered Medications Ordered in Other Visits:     albuterol 2.5 mg /3 mL (0.083 %) nebulizer solution 3 mL, 3 mL, nebulization, PRN, Elo Peters MD    CARBOplatin (Paraplatin) 271.4 mg in sodium chloride 0.9% 127.14 mL IV, 271.4 mg, intravenous, Once, Elo Peters MD    dexAMETHasone (Decadron) in dextrose 5% 50 mL IV 12 mg, 12 mg, intravenous, Once, Elo Peters MD    dextrose 5 % in water (D5W) bolus, 500 mL, intravenous, PRN, Elo Peters MD    diphenhydrAMINE (BENADryl) capsule 50 mg, 50 mg, oral, Once, Elo Peters MD    diphenhydrAMINE (BENADryl) injection 50 mg, 50 mg, intravenous, PRN, Elo Peters MD    EPINEPHrine (Epipen) injection syringe 0.3 mg, 0.3 mg, intramuscular, q5 min PRN, Elo Peters MD    famotidine PF (Pepcid) injection 20 mg, 20 mg, intravenous, Once, Elo Peters MD    famotidine PF (Pepcid) injection 20 mg, 20 mg, intravenous, Once PRN, Elo Petesr MD    methylPREDNISolone sod succinate (PF) (SOLU-Medrol) 40 mg/mL injection 40 mg, 40 mg, intravenous, PRN, Elo Peters MD    PACLitaxeL (Taxol) 84 mg in dextrose 5 % in water (D5W) 114 mL IV, 45 mg/m2 (Treatment Plan Recorded), intravenous, Once, Elo CLARK  MD Fran    palonosetron (Aloxi) injection 250 mcg, 0.25 mg, intravenous, Once, Elo Peters MD    prochlorperazine (Compazine) injection 10 mg, 10 mg, intravenous, q6h PRN, Elo Peters MD    prochlorperazine (Compazine) tablet 10 mg, 10 mg, oral, q6h PRN, Eol Peters MD    sodium chloride 0.9 % bolus 500 mL, 500 mL, intravenous, PRN, Elo Peters MD    Review of Systems - Oncology 12 point ROS was obtained and negative unless otherwise mentioned in the above HPI.      Objective   BSA: 1.87 meters squared  Wt Readings from Last 5 Encounters:   12/12/23 71.6 kg (157 lb 13.6 oz)   12/12/23 71.7 kg (158 lb 1.6 oz)   12/11/23 71.4 kg (157 lb 6.5 oz)   12/05/23 72.8 kg (160 lb 7.9 oz)   12/04/23 72.6 kg (160 lb 1.6 oz)     Wt 71.6 kg (157 lb 13.6 oz)   BMI 23.31 kg/m²          Physical Exam  Constitutional:       Appearance: Normal appearance.   Eyes:      Conjunctiva/sclera: Conjunctivae normal.      Pupils: Pupils are equal, round, and reactive to light.   Cardiovascular:      Rate and Rhythm: Normal rate and regular rhythm.   Pulmonary:      Effort: Pulmonary effort is normal.      Breath sounds: Normal breath sounds.   Abdominal:      General: Bowel sounds are normal.      Palpations: Abdomen is soft.   Musculoskeletal:         General: No swelling.      Right lower leg: No edema.      Left lower leg: No edema.   Skin:     General: Skin is warm and dry.   Neurological:      General: No focal deficit present.      Mental Status: He is alert and oriented to person, place, and time.   Psychiatric:         Mood and Affect: Mood normal.         Behavior: Behavior normal.          Results:  Labs:  Lab Results   Component Value Date    WBC 3.2 (L) 12/12/2023    HGB 11.7 (L) 12/12/2023    HCT 34.3 (L) 12/12/2023    MCV 98 12/12/2023     12/12/2023      Lab Results   Component Value Date    NEUTROABS 2.26 12/12/2023      Lab Results   Component Value Date    GLUCOSE 77 12/12/2023    CALCIUM 8.7  12/12/2023     (L) 12/12/2023    K 4.7 12/12/2023    CO2 24 12/12/2023    CL 99 12/12/2023    BUN 12 12/12/2023    CREATININE 0.67 12/12/2023     Lab Results   Component Value Date    ALT 10 12/12/2023    AST 21 12/12/2023    ALKPHOS 60 12/12/2023    BILITOT 0.3 12/12/2023      Lab Results   Component Value Date    TSH 5.54 (H) 10/03/2023       Imaging:           === Results for orders placed during the hospital encounter of 10/13/23 ===    MR brain w and wo IV contrast [[id number]] 10/13/2023    Status: Normal  1. No abnormal intracranial enhancement or mass to suggest metastases.    2. Chronic intracranial findings as above.    I personally reviewed the images/study and I agree with the findings  as stated. This study was interpreted at [place], [place].    Signed by: Dharmesh Reddy 10/13/2023 8:27 PM  Dictation workstation:   [id number]    Assessment/Plan      Martínez Neri is a 63 y.o. male here for follow up     Reviewed with the patient the treatment plan consisting of concurrent chemo-RT.  This will be followed by consolidative durvalumab.  We discussed potential side effects related to carboplatin and paclitaxel weekly and he was able to sign a consent.  He has been seen by radiation oncology (Dr. Layne) and underwent CT simulation.  RT start date 11/20/23 and C1 Carbo/Taxol   -RTC weekly for follow-up  -[facility] paperwork filled out for spouse, signed, faxed and original copy given to patient/daughter

## 2023-12-12 NOTE — TELEPHONE ENCOUNTER
Patient spouse is requesting orders for Infusion and labs in January. She would like 01/02 and 01/08. Please call to schedule.

## 2023-12-13 ENCOUNTER — HOSPITAL ENCOUNTER (OUTPATIENT)
Dept: RADIATION ONCOLOGY | Facility: HOSPITAL | Age: 63
Setting detail: RADIATION/ONCOLOGY SERIES
Discharge: HOME | End: 2023-12-13
Payer: MEDICAID

## 2023-12-13 DIAGNOSIS — C34.12 MALIGNANT NEOPLASM OF UPPER LOBE, LEFT BRONCHUS OR LUNG (MULTI): ICD-10-CM

## 2023-12-13 DIAGNOSIS — Z51.0 ENCOUNTER FOR ANTINEOPLASTIC RADIATION THERAPY: ICD-10-CM

## 2023-12-13 LAB
RAD ONC MSQ ACTUAL FRACTIONS DELIVERED: 5
RAD ONC MSQ ACTUAL SESSION DELIVERED DOSE: 200 CGRAY
RAD ONC MSQ ACTUAL TOTAL DOSE: 1000 CGRAY
RAD ONC MSQ ELAPSED DAYS: 6
RAD ONC MSQ LAST DATE: NORMAL
RAD ONC MSQ PRESCRIBED FRACTIONAL DOSE: 200 CGRAY
RAD ONC MSQ PRESCRIBED NUMBER OF FRACTIONS: 23
RAD ONC MSQ PRESCRIBED TECHNIQUE: NORMAL
RAD ONC MSQ PRESCRIBED TOTAL DOSE: 4600 CGRAY
RAD ONC MSQ PRESCRIPTION PATTERN COMMENT: NORMAL
RAD ONC MSQ START DATE: NORMAL
RAD ONC MSQ TREATMENT COURSE NUMBER: 1
RAD ONC MSQ TREATMENT SITE: NORMAL

## 2023-12-13 PROCEDURE — 77300 RADIATION THERAPY DOSE PLAN: CPT | Performed by: RADIOLOGY

## 2023-12-13 PROCEDURE — 77338 DESIGN MLC DEVICE FOR IMRT: CPT | Performed by: RADIOLOGY

## 2023-12-13 PROCEDURE — 77386 HC INTENSITY-MODULATED RADIATION THERAPY (IMRT), COMPLEX: CPT | Performed by: RADIOLOGY

## 2023-12-13 PROCEDURE — 77293 RESPIRATOR MOTION MGMT SIMUL: CPT | Performed by: RADIOLOGY

## 2023-12-13 PROCEDURE — 77301 RADIOTHERAPY DOSE PLAN IMRT: CPT | Performed by: RADIOLOGY

## 2023-12-13 PROCEDURE — 77338 DESIGN MLC DEVICE FOR IMRT: CPT | Mod: 59 | Performed by: RADIOLOGY

## 2023-12-13 PROCEDURE — 77336 RADIATION PHYSICS CONSULT: CPT | Mod: 59 | Performed by: RADIOLOGY

## 2023-12-13 PROCEDURE — 77336 RADIATION PHYSICS CONSULT: CPT | Mod: 59,MUE | Performed by: RADIOLOGY

## 2023-12-14 ENCOUNTER — HOSPITAL ENCOUNTER (OUTPATIENT)
Dept: RADIATION ONCOLOGY | Facility: HOSPITAL | Age: 63
Setting detail: RADIATION/ONCOLOGY SERIES
Discharge: HOME | End: 2023-12-14
Payer: MEDICAID

## 2023-12-14 DIAGNOSIS — C34.12 MALIGNANT NEOPLASM OF UPPER LOBE, LEFT BRONCHUS OR LUNG (MULTI): ICD-10-CM

## 2023-12-14 DIAGNOSIS — Z51.0 ENCOUNTER FOR ANTINEOPLASTIC RADIATION THERAPY: ICD-10-CM

## 2023-12-14 LAB
RAD ONC MSQ ACTUAL FRACTIONS DELIVERED: 1
RAD ONC MSQ ACTUAL SESSION DELIVERED DOSE: 200 CGRAY
RAD ONC MSQ ACTUAL TOTAL DOSE: 200 CGRAY
RAD ONC MSQ ELAPSED DAYS: 0
RAD ONC MSQ LAST DATE: NORMAL
RAD ONC MSQ PRESCRIBED FRACTIONAL DOSE: 200 CGRAY
RAD ONC MSQ PRESCRIBED NUMBER OF FRACTIONS: 18
RAD ONC MSQ PRESCRIBED TECHNIQUE: NORMAL
RAD ONC MSQ PRESCRIBED TOTAL DOSE: 3600 CGRAY
RAD ONC MSQ PRESCRIPTION PATTERN COMMENT: NORMAL
RAD ONC MSQ START DATE: NORMAL
RAD ONC MSQ TREATMENT COURSE NUMBER: 1
RAD ONC MSQ TREATMENT SITE: NORMAL

## 2023-12-14 PROCEDURE — 77386 HC INTENSITY-MODULATED RADIATION THERAPY (IMRT), COMPLEX: CPT | Performed by: RADIOLOGY

## 2023-12-14 PROCEDURE — 77014 CHG CT GUIDANCE RADIATION THERAPY FLDS PLACEMENT: CPT | Performed by: RADIOLOGY

## 2023-12-15 ENCOUNTER — HOSPITAL ENCOUNTER (OUTPATIENT)
Dept: RADIATION ONCOLOGY | Facility: HOSPITAL | Age: 63
Setting detail: RADIATION/ONCOLOGY SERIES
Discharge: HOME | End: 2023-12-15
Payer: MEDICAID

## 2023-12-15 DIAGNOSIS — Z51.0 ENCOUNTER FOR ANTINEOPLASTIC RADIATION THERAPY: ICD-10-CM

## 2023-12-15 DIAGNOSIS — C34.12 MALIGNANT NEOPLASM OF UPPER LOBE, LEFT BRONCHUS OR LUNG (MULTI): ICD-10-CM

## 2023-12-15 LAB
RAD ONC MSQ ACTUAL FRACTIONS DELIVERED: 2
RAD ONC MSQ ACTUAL SESSION DELIVERED DOSE: 200 CGRAY
RAD ONC MSQ ACTUAL TOTAL DOSE: 400 CGRAY
RAD ONC MSQ ELAPSED DAYS: 1
RAD ONC MSQ LAST DATE: NORMAL
RAD ONC MSQ PRESCRIBED FRACTIONAL DOSE: 200 CGRAY
RAD ONC MSQ PRESCRIBED NUMBER OF FRACTIONS: 18
RAD ONC MSQ PRESCRIBED TECHNIQUE: NORMAL
RAD ONC MSQ PRESCRIBED TOTAL DOSE: 3600 CGRAY
RAD ONC MSQ PRESCRIPTION PATTERN COMMENT: NORMAL
RAD ONC MSQ START DATE: NORMAL
RAD ONC MSQ TREATMENT COURSE NUMBER: 1
RAD ONC MSQ TREATMENT SITE: NORMAL

## 2023-12-15 PROCEDURE — 77014 CHG CT GUIDANCE RADIATION THERAPY FLDS PLACEMENT: CPT | Performed by: STUDENT IN AN ORGANIZED HEALTH CARE EDUCATION/TRAINING PROGRAM

## 2023-12-15 PROCEDURE — 77386 HC INTENSITY-MODULATED RADIATION THERAPY (IMRT), COMPLEX: CPT | Performed by: RADIOLOGY

## 2023-12-18 ENCOUNTER — INFUSION (OUTPATIENT)
Dept: HEMATOLOGY/ONCOLOGY | Facility: HOSPITAL | Age: 63
End: 2023-12-18
Payer: MEDICAID

## 2023-12-18 ENCOUNTER — HOSPITAL ENCOUNTER (OUTPATIENT)
Dept: RADIATION ONCOLOGY | Facility: HOSPITAL | Age: 63
Setting detail: RADIATION/ONCOLOGY SERIES
Discharge: HOME | End: 2023-12-18
Payer: MEDICAID

## 2023-12-18 ENCOUNTER — NUTRITION (OUTPATIENT)
Dept: HEMATOLOGY/ONCOLOGY | Facility: HOSPITAL | Age: 63
End: 2023-12-18
Payer: MEDICAID

## 2023-12-18 ENCOUNTER — RADIATION ONCOLOGY OTV (OUTPATIENT)
Dept: RADIATION ONCOLOGY | Facility: HOSPITAL | Age: 63
End: 2023-12-18

## 2023-12-18 VITALS
BODY MASS INDEX: 23.48 KG/M2 | OXYGEN SATURATION: 95 % | DIASTOLIC BLOOD PRESSURE: 72 MMHG | HEIGHT: 69 IN | RESPIRATION RATE: 18 BRPM | SYSTOLIC BLOOD PRESSURE: 121 MMHG | WEIGHT: 158.51 LBS | HEART RATE: 72 BPM | TEMPERATURE: 97.5 F

## 2023-12-18 DIAGNOSIS — C34.12 MALIGNANT NEOPLASM OF UPPER LOBE OF LEFT LUNG (MULTI): ICD-10-CM

## 2023-12-18 DIAGNOSIS — Z51.0 ENCOUNTER FOR ANTINEOPLASTIC RADIATION THERAPY: ICD-10-CM

## 2023-12-18 DIAGNOSIS — C34.12 MALIGNANT NEOPLASM OF UPPER LOBE, LEFT BRONCHUS OR LUNG (MULTI): ICD-10-CM

## 2023-12-18 LAB
ALBUMIN SERPL BCP-MCNC: 3.6 G/DL (ref 3.4–5)
ALP SERPL-CCNC: 50 U/L (ref 33–136)
ALT SERPL W P-5'-P-CCNC: 10 U/L (ref 10–52)
ANION GAP SERPL CALC-SCNC: 13 MMOL/L (ref 10–20)
AST SERPL W P-5'-P-CCNC: 23 U/L (ref 9–39)
BASOPHILS # BLD AUTO: 0.01 X10*3/UL (ref 0–0.1)
BASOPHILS NFR BLD AUTO: 0.4 %
BILIRUB SERPL-MCNC: 0.4 MG/DL (ref 0–1.2)
BUN SERPL-MCNC: 14 MG/DL (ref 6–23)
CALCIUM SERPL-MCNC: 8.9 MG/DL (ref 8.6–10.3)
CHLORIDE SERPL-SCNC: 100 MMOL/L (ref 98–107)
CO2 SERPL-SCNC: 24 MMOL/L (ref 21–32)
CREAT SERPL-MCNC: 0.6 MG/DL (ref 0.5–1.3)
EOSINOPHIL # BLD AUTO: 0.01 X10*3/UL (ref 0–0.7)
EOSINOPHIL NFR BLD AUTO: 0.4 %
ERYTHROCYTE [DISTWIDTH] IN BLOOD BY AUTOMATED COUNT: 13.6 % (ref 11.5–14.5)
GFR SERPL CREATININE-BSD FRML MDRD: >90 ML/MIN/1.73M*2
GLUCOSE SERPL-MCNC: 89 MG/DL (ref 74–99)
HCT VFR BLD AUTO: 30 % (ref 41–52)
HGB BLD-MCNC: 10.1 G/DL (ref 13.5–17.5)
IMM GRANULOCYTES # BLD AUTO: 0.02 X10*3/UL (ref 0–0.7)
IMM GRANULOCYTES NFR BLD AUTO: 0.7 % (ref 0–0.9)
LYMPHOCYTES # BLD AUTO: 0.66 X10*3/UL (ref 1.2–4.8)
LYMPHOCYTES NFR BLD AUTO: 23.7 %
MCH RBC QN AUTO: 32.7 PG (ref 26–34)
MCHC RBC AUTO-ENTMCNC: 33.7 G/DL (ref 32–36)
MCV RBC AUTO: 97 FL (ref 80–100)
MONOCYTES # BLD AUTO: 0.32 X10*3/UL (ref 0.1–1)
MONOCYTES NFR BLD AUTO: 11.5 %
NEUTROPHILS # BLD AUTO: 1.76 X10*3/UL (ref 1.2–7.7)
NEUTROPHILS NFR BLD AUTO: 63.3 %
NRBC BLD-RTO: 0 /100 WBCS (ref 0–0)
PLATELET # BLD AUTO: 170 X10*3/UL (ref 150–450)
POTASSIUM SERPL-SCNC: 5.3 MMOL/L (ref 3.5–5.3)
PROT SERPL-MCNC: 6.7 G/DL (ref 6.4–8.2)
RAD ONC MSQ ACTUAL FRACTIONS DELIVERED: 3
RAD ONC MSQ ACTUAL SESSION DELIVERED DOSE: 200 CGRAY
RAD ONC MSQ ACTUAL TOTAL DOSE: 600 CGRAY
RAD ONC MSQ ELAPSED DAYS: 4
RAD ONC MSQ LAST DATE: NORMAL
RAD ONC MSQ PRESCRIBED FRACTIONAL DOSE: 200 CGRAY
RAD ONC MSQ PRESCRIBED NUMBER OF FRACTIONS: 18
RAD ONC MSQ PRESCRIBED TECHNIQUE: NORMAL
RAD ONC MSQ PRESCRIBED TOTAL DOSE: 3600 CGRAY
RAD ONC MSQ PRESCRIPTION PATTERN COMMENT: NORMAL
RAD ONC MSQ START DATE: NORMAL
RAD ONC MSQ TREATMENT COURSE NUMBER: 1
RAD ONC MSQ TREATMENT SITE: NORMAL
RBC # BLD AUTO: 3.09 X10*6/UL (ref 4.5–5.9)
SODIUM SERPL-SCNC: 132 MMOL/L (ref 136–145)
WBC # BLD AUTO: 2.8 X10*3/UL (ref 4.4–11.3)

## 2023-12-18 PROCEDURE — 96413 CHEMO IV INFUSION 1 HR: CPT

## 2023-12-18 PROCEDURE — 96375 TX/PRO/DX INJ NEW DRUG ADDON: CPT | Mod: INF

## 2023-12-18 PROCEDURE — 96417 CHEMO IV INFUS EACH ADDL SEQ: CPT

## 2023-12-18 PROCEDURE — 80053 COMPREHEN METABOLIC PANEL: CPT

## 2023-12-18 PROCEDURE — 2500000001 HC RX 250 WO HCPCS SELF ADMINISTERED DRUGS (ALT 637 FOR MEDICARE OP): Performed by: INTERNAL MEDICINE

## 2023-12-18 PROCEDURE — 85025 COMPLETE CBC W/AUTO DIFF WBC: CPT

## 2023-12-18 PROCEDURE — 2500000004 HC RX 250 GENERAL PHARMACY W/ HCPCS (ALT 636 FOR OP/ED): Mod: JZ | Performed by: INTERNAL MEDICINE

## 2023-12-18 PROCEDURE — 77386 HC INTENSITY-MODULATED RADIATION THERAPY (IMRT), COMPLEX: CPT | Performed by: RADIOLOGY

## 2023-12-18 PROCEDURE — 77427 RADIATION TX MANAGEMENT X5: CPT | Performed by: RADIOLOGY

## 2023-12-18 PROCEDURE — 36415 COLL VENOUS BLD VENIPUNCTURE: CPT

## 2023-12-18 PROCEDURE — 77014 CHG CT GUIDANCE RADIATION THERAPY FLDS PLACEMENT: CPT | Performed by: RADIOLOGY

## 2023-12-18 PROCEDURE — 2500000004 HC RX 250 GENERAL PHARMACY W/ HCPCS (ALT 636 FOR OP/ED): Performed by: INTERNAL MEDICINE

## 2023-12-18 RX ORDER — HEPARIN SODIUM,PORCINE/PF 10 UNIT/ML
50 SYRINGE (ML) INTRAVENOUS AS NEEDED
OUTPATIENT
Start: 2023-12-18

## 2023-12-18 RX ORDER — PROCHLORPERAZINE EDISYLATE 5 MG/ML
10 INJECTION INTRAMUSCULAR; INTRAVENOUS EVERY 6 HOURS PRN
Status: DISCONTINUED | OUTPATIENT
Start: 2023-12-18 | End: 2023-12-18 | Stop reason: HOSPADM

## 2023-12-18 RX ORDER — EPINEPHRINE 0.3 MG/.3ML
0.3 INJECTION SUBCUTANEOUS EVERY 5 MIN PRN
Status: DISCONTINUED | OUTPATIENT
Start: 2023-12-18 | End: 2023-12-18 | Stop reason: HOSPADM

## 2023-12-18 RX ORDER — PROCHLORPERAZINE MALEATE 10 MG
10 TABLET ORAL EVERY 6 HOURS PRN
Status: DISCONTINUED | OUTPATIENT
Start: 2023-12-18 | End: 2023-12-18 | Stop reason: HOSPADM

## 2023-12-18 RX ORDER — DIPHENHYDRAMINE HCL 50 MG
50 CAPSULE ORAL ONCE
Status: COMPLETED | OUTPATIENT
Start: 2023-12-18 | End: 2023-12-18

## 2023-12-18 RX ORDER — FAMOTIDINE 10 MG/ML
20 INJECTION INTRAVENOUS ONCE AS NEEDED
Status: DISCONTINUED | OUTPATIENT
Start: 2023-12-18 | End: 2023-12-18 | Stop reason: HOSPADM

## 2023-12-18 RX ORDER — HEPARIN 100 UNIT/ML
500 SYRINGE INTRAVENOUS AS NEEDED
OUTPATIENT
Start: 2023-12-18

## 2023-12-18 RX ORDER — PALONOSETRON 0.05 MG/ML
0.25 INJECTION, SOLUTION INTRAVENOUS ONCE
Status: COMPLETED | OUTPATIENT
Start: 2023-12-18 | End: 2023-12-18

## 2023-12-18 RX ORDER — ALBUTEROL SULFATE 0.83 MG/ML
3 SOLUTION RESPIRATORY (INHALATION) AS NEEDED
Status: DISCONTINUED | OUTPATIENT
Start: 2023-12-18 | End: 2023-12-18 | Stop reason: HOSPADM

## 2023-12-18 RX ORDER — DIPHENHYDRAMINE HYDROCHLORIDE 50 MG/ML
50 INJECTION INTRAMUSCULAR; INTRAVENOUS AS NEEDED
Status: DISCONTINUED | OUTPATIENT
Start: 2023-12-18 | End: 2023-12-18 | Stop reason: HOSPADM

## 2023-12-18 RX ORDER — FAMOTIDINE 10 MG/ML
20 INJECTION INTRAVENOUS ONCE
Status: COMPLETED | OUTPATIENT
Start: 2023-12-18 | End: 2023-12-18

## 2023-12-18 RX ADMIN — PACLITAXEL 84 MG: 6 INJECTION, SOLUTION INTRAVENOUS at 14:05

## 2023-12-18 RX ADMIN — DEXAMETHASONE SODIUM PHOSPHATE 12 MG: 10 INJECTION, SOLUTION INTRAMUSCULAR; INTRAVENOUS at 13:11

## 2023-12-18 RX ADMIN — CARBOPLATIN 271.4 MG: 10 INJECTION INTRAVENOUS at 15:19

## 2023-12-18 RX ADMIN — FAMOTIDINE 20 MG: 10 INJECTION INTRAVENOUS at 13:11

## 2023-12-18 RX ADMIN — PALONOSETRON HYDROCHLORIDE 250 MCG: 0.25 INJECTION INTRAVENOUS at 13:11

## 2023-12-18 RX ADMIN — DIPHENHYDRAMINE HYDROCHLORIDE 50 MG: 50 CAPSULE ORAL at 13:11

## 2023-12-18 ASSESSMENT — PAIN SCALES - GENERAL: PAINLEVEL: 0-NO PAIN

## 2023-12-18 NOTE — PROGRESS NOTES
NUTRITION Follow-up NOTE    Nutrition Assessment     Reason for Visit:  Martínez Neri is a 63 y.o. male with stage IIB malignant neoplasm of upper lobe of left lung who presents for nutrition follow up.       Spoke with patient and family in [facility] clinic. Patient on carbo/taxol.    Lab Results   Component Value Date/Time    GLUCOSE 77 12/12/2023 1017     (L) 12/12/2023 1017    K 4.7 12/12/2023 1017    CL 99 12/12/2023 1017    CO2 24 12/12/2023 1017    ANIONGAP 14 12/12/2023 1017    BUN 12 12/12/2023 1017    CREATININE 0.67 12/12/2023 1017    EGFR >90 12/12/2023 1017    CALCIUM 8.7 12/12/2023 1017    ALBUMIN 3.7 12/12/2023 1017    ALKPHOS 60 12/12/2023 1017    PROT 6.5 12/12/2023 1017    AST 21 12/12/2023 1017    BILITOT 0.3 12/12/2023 1017    ALT 10 12/12/2023 1017     Lab Results   Component Value Date/Time    VITD25 17 (L) 10/03/2023 0828       Anthropometrics:  Anthropometrics  IBW/kg (Dietitian Calculated): 73.4 kg  Percent of IBW: 97.9 %  Weight Change  Weight History / % Weight Change: Weight has stabilized since initial drop at beginning of treatment.        Wt Readings from Last 10 Encounters:   12/18/23 71.9 kg (158 lb 8.2 oz)   12/12/23 71.6 kg (157 lb 13.6 oz)   12/12/23 71.7 kg (158 lb 1.6 oz)   12/11/23 71.4 kg (157 lb 6.5 oz)   12/05/23 72.8 kg (160 lb 7.9 oz)   12/04/23 72.6 kg (160 lb 1.6 oz)   11/30/23 73.7 kg (162 lb 7.7 oz)   11/27/23 73.2 kg (161 lb 6 oz)   11/21/23 74.7 kg (164 lb 9.6 oz)   11/20/23 74.6 kg (164 lb 7.4 oz)        Food And Nutrient Intake:  Food and Nutrient History  Food and Nutrient History: Patient has been consistent with using antiacids and antinausea medication, which has improved intake and symptoms of acid reflux and nausea. Eating lots of chicken. Patient had questions regading V8. Discussed trying small amount, and discontinuing if it causes any discomfort. Eating plenty of chicken. Answered questions regarding red meat intake.  Energy Intake: Good > 75 %  Fluid  Intake: Quit drinking alcohol 7 years ago. Patient's water intake has improved (closer to 48 oz). Some coke and 7 up. Discussed letting the carbonation out before drinking due to heartburn.  Food Allergy: NKFA.  GI Symptoms: reflux, nausea  GI Symptoms greater than 2 weeks:  (Symptoms improving. Will continue to monitor.)  Oral Problems: dysgeusia (Taste changes are day-to-day.)     Food Intake  Amount of Food: See 11/27 note for full diet recall.    Food Preparation  Dining Out: More than 3 times a week    Micronutrient Intake  Vitamin Intake: Multivitamin, Thiamin, D    Nutrition Focused Physical Exam Findings:  defer: See note from 11/21                        Energy Needs  Estimated Energy Needs  Total Energy Estimated Needs (kCal): 2200 kCal  Method for Estimating Needs: 30 kcal/kg IBW  Estimated Fluid Needs  Total Fluid Estimated Needs (mL): 2200 mL  Method for Estimating Needs: 1 ml/kcal or per team  Estimated Protein Needs  Total Protein Estimated Needs (g): 80 g  Method for Estimating Needs: 1.1 g/kg IBW        Nutrition Diagnosis   Malnutrition Diagnosis  Patient has Malnutrition Diagnosis: No    Nutrition Diagnosis  Patient has Nutrition Diagnosis: Yes  Diagnosis Status (1): Ongoing  Nutrition Diagnosis 1: Increased nutrient needs  Related to (1): cancer treatment  As Evidenced by (1): unintended weight loss.    Nutrition Interventions/Recommendations   Nutrition Prescription       Food and Nutrition Delivery  Food and Nutrition Delivery  Meals & Snacks: Specific foods/beverages or groups:, Energy-modified diet  Goals: Reviewed foods that increase reflux; recommend limiting carbonation, caffeine, fried foods, spicy foods, acidic foods. Add calories with olive oil.    Nutrition Education  Nutrition Education  Nutrition Education Content: Content related nutrition education  Goals: Health benefits of different protein sources.    Coordination of Care       There are no Patient Instructions on file for this  visit.    Nutrition Monitoring and Evaluation   Food/Nutrient Related History Monitoring  Monitoring and Evaluation Plan: Energy intake, Fluid intake, Protein intake, Vitamin intake  Energy Intake: Estimated energy intake  Criteria: 24 hour recall  Fluid Intake: Estimated fluid intake  Criteria: 24 hour recall  Estimated protein intake: Estimated protein intake  Criteria: 24 hour recall  Vitamin Intake: Measured vitamin intake  Criteria: 24 h our recall  Knowledge/Beliefs/Attitudes  Monitoring and Evaluation Plan: Food and nutrition knowledge  Food and nutrition knowledge: Nutrition knowledge of individual client        Time Spent  Prep time on day of patient encounter: 5 minutes  Time spent directly with patient, family or caregiver: 10 minutes  Additional Time Spent on Patient Care Activities: 0 minutes  Documentation Time: 15 minutes  Other Time Spent: 0 minutes  Total: 30 minutes        Readiness to Change : Good  Level of Understanding : Good  Anticipated Compliant : Good

## 2023-12-19 ENCOUNTER — OFFICE VISIT (OUTPATIENT)
Dept: HEMATOLOGY/ONCOLOGY | Facility: HOSPITAL | Age: 63
End: 2023-12-19
Payer: MEDICAID

## 2023-12-19 ENCOUNTER — HOSPITAL ENCOUNTER (OUTPATIENT)
Dept: RADIATION ONCOLOGY | Facility: HOSPITAL | Age: 63
Setting detail: RADIATION/ONCOLOGY SERIES
Discharge: HOME | End: 2023-12-19
Payer: MEDICAID

## 2023-12-19 VITALS
BODY MASS INDEX: 22.59 KG/M2 | WEIGHT: 153 LBS | RESPIRATION RATE: 19 BRPM | TEMPERATURE: 96.6 F | SYSTOLIC BLOOD PRESSURE: 120 MMHG | DIASTOLIC BLOOD PRESSURE: 67 MMHG | OXYGEN SATURATION: 96 % | HEART RATE: 91 BPM

## 2023-12-19 DIAGNOSIS — C34.12 MALIGNANT NEOPLASM OF UPPER LOBE, LEFT BRONCHUS OR LUNG (MULTI): ICD-10-CM

## 2023-12-19 DIAGNOSIS — C34.12 MALIGNANT NEOPLASM OF UPPER LOBE OF LEFT LUNG (MULTI): ICD-10-CM

## 2023-12-19 DIAGNOSIS — Z51.0 ENCOUNTER FOR ANTINEOPLASTIC RADIATION THERAPY: ICD-10-CM

## 2023-12-19 DIAGNOSIS — Z51.11 ENCOUNTER FOR CHEMOTHERAPY MANAGEMENT: Primary | ICD-10-CM

## 2023-12-19 LAB
RAD ONC MSQ ACTUAL FRACTIONS DELIVERED: 4
RAD ONC MSQ ACTUAL SESSION DELIVERED DOSE: 200 CGRAY
RAD ONC MSQ ACTUAL TOTAL DOSE: 800 CGRAY
RAD ONC MSQ ELAPSED DAYS: 5
RAD ONC MSQ LAST DATE: NORMAL
RAD ONC MSQ PRESCRIBED FRACTIONAL DOSE: 200 CGRAY
RAD ONC MSQ PRESCRIBED NUMBER OF FRACTIONS: 18
RAD ONC MSQ PRESCRIBED TECHNIQUE: NORMAL
RAD ONC MSQ PRESCRIBED TOTAL DOSE: 3600 CGRAY
RAD ONC MSQ PRESCRIPTION PATTERN COMMENT: NORMAL
RAD ONC MSQ START DATE: NORMAL
RAD ONC MSQ TREATMENT COURSE NUMBER: 1
RAD ONC MSQ TREATMENT SITE: NORMAL

## 2023-12-19 PROCEDURE — 77386 HC INTENSITY-MODULATED RADIATION THERAPY (IMRT), COMPLEX: CPT | Performed by: RADIOLOGY

## 2023-12-19 PROCEDURE — 99213 OFFICE O/P EST LOW 20 MIN: CPT | Performed by: NURSE PRACTITIONER

## 2023-12-19 PROCEDURE — 77014 CHG CT GUIDANCE RADIATION THERAPY FLDS PLACEMENT: CPT | Performed by: RADIOLOGY

## 2023-12-19 ASSESSMENT — PAIN SCALES - GENERAL: PAINLEVEL: 0-NO PAIN

## 2023-12-19 NOTE — PROGRESS NOTES
"Radiation Oncology On Treatment Visit    Patient Name:  Martínez Neri  MRN:  [id number]  :  1960    Referring Provider: Elo Peters MD   Primary Care Provider: Jayme Casey MD  Care Team: Patient Care Team:  Jayme Casey MD as PCP - General (Internal Medicine)  Elo Peters MD as Consulting Physician (Hematology and Oncology)  MARITZA Galeana as  ()    Date of Service: 2023     Diagnosis:   Specialty Problems          Radiation Oncology Problems    Primary cancer of left upper lobe of lung (CMS/HCC)        Malignant neoplasm of upper lobe of left lung (CMS/HCC)         Treatment Summary:  Radiation Treatments       Active   THOMAS Hilum RePlan2 (Started on 2023)   Most recent fraction: 200 cGy given on 2023   Total given: 600 cGy / 3,600 cGy  (3 of 18 fractions)   Elapsed Days: 4   Technique: IMRT        Replan_LUL Hilum (Started on 2023)   Most recent fraction: 200 cGy given on 2023   Total given: 1,000 cGy / 4,600 cGy  (5 of 23 fractions)   Elapsed Days: 6   Technique: IMRT           Completed   LUL_Hilum (Started on 2023)   Most recent fraction: 200 cGy given on 2023   Total given: 2,000 cGy / 2,000 cGy  (10 of 10 fractions)   Elapsed Days: 16   Technique: IMRT                   SUBJECTIVE: Patient presents with his sister at this visit. Endorses feeling well, having some coughing when he laughs very hard. Denies new chest pain or shortness of breath, dysphagia, or skin irritation. Wt loss still an issue, though getting better with dietary recommendations. Denies esophagitis symptoms or worsening cough.     OBJECTIVE:   Vital Signs:  /72   Pulse 72   Temp 36.4 °C (97.5 °F) (Skin)   Resp 18   Ht 1.753 m (5' 9\")   Wt 71.9 kg (158 lb 8.2 oz)   SpO2 95%   BMI 23.41 kg/m²    Pain Scale: The patient's current pain level was assessed.  They report currently having a pain of 0 out of 10.    Other Pertinent Findings: " "Sitting comfortably. Alert, oriented. No wheezing. Ambulating well. Not in distress.     Toxicity Assessment          11/30/2023    11:25 12/5/2023    11:12 12/11/2023    11:52 12/18/2023    11:14   Toxicity Assessment   Adverse Events Reviewed (WDL) No (Exceptions to WDL) No (Exceptions to WDL) No (Exceptions to WDL) No (Exceptions to WDL)   Treatment Site Thoracic Thoracic Thoracic Thoracic   Anorexia  Grade 0 Grade 1       pt. with decreased appetite Grade 0   Anxiety Grade 1       pt. given Ativan for anxiety before treatments Grade 1       pt taking ativan prior to each treatment     Dehydration Grade 0 Grade 0 Grade 0 Grade 0   Depression  Grade 0 Grade 0    Dermatitis Radiation Grade 0 Grade 0     Diarrhea  Grade 0     Fatigue Grade 0 Grade 1       pt with increased fatigue, pt napping during day Grade 1       pt. mildly fatigued Grade 1   Nausea Grade 0 Grade 0 Grade 0 Grade 0   Pain Grade 0 Grade 0 Grade 0 Grade 0   Tumor Pain  Grade 0     Vomiting  Grade 0 Grade 0 Grade 0   Constipation  Grade 0     Dysphagia Grade 0 Grade 0     Esophagitis  Grade 0     Mucositis Oral  Grade 0     Myocardial Infarction  Grade 0     Pericardial Effusion  Grade 0     Pericarditis  Grade 0     Esophageal Obstruction  Grade 0 Grade 0    Esophageal Pain  Grade 0 Grade 0 Grade 0   Cough Grade 1       mild \"smokers\" cough Grade 1       baseline coughing with white phlem Grade 1       chronic cough Grade 1   Dyspnea Grade 1       chronic CHAPIN Grade 1       pt with SOB with exertion Grade 0 Grade 0   Epistaxis  Grade 0     Hypoxia Grade 0 Grade 0 Grade 0 Grade 0        Assessment / Plan:  The patient is tolerating radiation therapy as anticipated.  Continue per current treatment plan.       The patient was assessed and plan discussed with the attending radiation oncologist Dr. Layne.    Felisha Knox MD  PGY-2 Radiation Oncology Resident  On-call pager [phone]  Available on Epic Secure Chat    For  Jorge Layne MD, [facility]  Faculty, [employer]  [email or web address]  Our [place]: “To Heal, To Teach, To Discover.”  RN partner: [email or web address]    Phone (scheduling): [phone]/ [email or web address]  Phone (office): [phone]  Phone (after hours): [phone]      ATTENDING ADDENDUM:  I saw and evaluated the patient with the resident. I personally obtained the key and critical portions of the history and physical exam, reviewed IGRT/dosimetry and directly counseled the patient of the treatment plan. I reviewed the resident documentation and discussed the patient with the resident. I agree with the resident/fellow's medical decision making as documented in the note.

## 2023-12-19 NOTE — PROGRESS NOTES
[facility] - Medical Oncology Follow-Up Visit    Patient ID: Martínez Neri is a 63 y.o. male      Current therapy: CARBOplatin (Paraplatin) 240 mg in sodium chloride 0.9% 124 mL IV, 240 mg (87.3 % of original dose 271.4 mg), intravenous, Once, 1 of 1 cycle    Dose modification: 237 mg (original dose 271.4 mg, Cycle 1, Reason: Protocol Driven), 271 mg (original dose 271.4 mg, Cycle 1, Reason: Protocol Driven)    Administration: 240 mg (11/20/2023), 271.4 mg (12/4/2023), 271.4 mg (12/12/2023), 271.4 mg (12/18/2023), 271 mg (11/27/2023)        PACLitaxeL (Taxol) 84 mg in dextrose 5 % in water (D5W) 114 mL IV, 45 mg/m2 = 84 mg, intravenous, Once, 1 of 1 cycle    Administration: 84 mg (11/20/2023), 84 mg (11/27/2023), 84 mg (12/4/2023), 84 mg (12/12/2023), 84 mg (12/18/2023)        palonosetron (Aloxi) injection 250 mcg, 250 mcg, intravenous, Once, 1 of 1 cycle    Administration: 250 mcg (11/20/2023), 250 mcg (11/27/2023), 250 mcg (12/4/2023), 250 mcg (12/12/2023), 250 mcg (12/18/2023)      Oncologic History:     DIAGNOSIS AND STAGING  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    PD-L1 TPS 20%      PRIOR THERAPIES  None         CURRENT THERAPY  concurrent chemo-RT using weekly carbo/taxol followed by consolidative durvalumab         CURRENT ONCOLOGICAL PROBLEMS  Cough         HISTORY OF PRESENT ILLNESS:  Current smoker (50-pack-year history), with history of COPD and seizures, who was diagnosed with a squamous cell carcinoma of the left upper lobe in [place], never treated.  Outpatient notes from [facility] cancer care and hematology clinic were reviewed.     On 06/17/2023 the patient had a CT chest for ongoing respiratory symptoms and was  found to have a left suprahilar mass involving the left upper lobe bronchi with postobstructive pneumonia/collapse.  On 06/25/2023 CT chest/abdomen/pelvis demonstrated persistent masslike consolidation in the left upper lobe with no signs of extrathoracic metastatic disease.     On 06/26/2023 the patient underwent a bronchoscopy, and pathology was compatible with a squamous cell carcinoma at least in situ, with a foci highly suspicious for invasive squamous cell carcinoma.     On 07/18/2023 a PET/CT obtained, demonstrating a 6.6 cm left upper lobe mass with an SUV of 8.3 and no evidence of distant metastatic disease.     On 07/18/2023 to brain MRI was negative for intracranial metastasis.     On 07/27/2023 bronchoscopy/EBUS was performed.  Endobronchial biopsies of left upper lobe demonstrated an invasive moderately differentiated squamous cell carcinoma.  Left upper lobe hilar FNA showed atypical cells with squamous differentiation, suspicious for malignancy.      Subcarinal FNA was indeterminate for malignancy with extremely rare atypical cells with squamous differentiation.  Molecular profile: Lack of BRAF, EGFR, HER2, KRAS, MET, ALK, NTRK 1/2/3, RET or ROS1 alterations.  PD-L1 TPS 20%.  The patient was seen by medical oncology and did not too frail for combined chemo RT (there is a performance status ECOG 3) documented on 08/17/2023.  At that time, he lived with some friends, while his family was primarily located in [place].     His daughter traveled and was able to relocate him to [place], and he presents today to clinic accompanied by his daughter, son, and daughter-in-law.     The patient states he is felt much better since his relocation, has been gaining weight, approximately 10 pounds since he moved here.  He is more active, going up and down the steps at home, but still tired and taking some breaks throughout the day.     He denies any headaches or new neurological symptoms.  His respiratory symptoms are  stable. There is no new localized pain.     Reported molecular and ancillary testing performed upon diagnosis in [place]:   BRAF negative  [id number] negative  EGFR classic mutations negative  HER2 negative  MET negative  ALK negative  In tract 1/2/3 negative  RET negative  ROS1 negative  PD-L1 20%     Referred to surgery - PFTs were considered prohibitive for surgical resection-      10/31/23: Repeat EBUS for systematic staging:  Final Cytological Interpretation      A. LYMPH NODE 7 PULMONARY FINE NEEDLE ASPIRATION, CYTOLOGY AND CELL BLOCK:  --  No malignant cells identified.  --  Lymphoid sample.     B. LYMPH NODE 4 L PULMONARY FINE NEEDLE ASPIRATION, CYTOLOGY AND CELL BLOCK:  --  No malignant cells identified.  --  Lymphoid sample.           C. LYMPH NODE 11 L PULMONARY FINE NEEDLE ASPIRATION, CYTOLOGY AND CELL BLOCK:  --  A rare cluster of malignant cells derived from squamous cell carcinoma, see note  -- Also, please refer to concurrent biopsy surgical pathology report ([id number]).     Note:  The malignant squamous cell carcinoma cells are represented in the cell block only.  Immunostain for p40 is positive. The cytomorphology and immunoprofile support the above diagnosis.  The material is not sufficient for molecular testing.      Component     FINAL DIAGNOSIS   Left lung, upper lobe, biopsy:  -- Superficial fragment of squamous cell carcinoma, keratinizing; see note.     Note: According to clinician's note, PD-L1 and NGS have been performed at an outside institution. These studies can be repeated, if clinically indicated.      NGS and PD-L1 not repeated in this specimen as it was done in outside institution already and the purpose of this procedure was not for diagnosis but systematic mediastinal re-staging.      PAST MEDICAL HISTORY  Brain aneurysm  COPD (chronic obstructive pulmonary disease) (CMS/HCC)  Epilepsy (CMS/HCC)  High cholesterol  History of alcohol use  Hypertension     PAST SURGICAL  HISTORY:  CAROTID ENDARTERECTOMY  COLONOSCOPY  HERNIA REPAIR      SOCIAL HISTORY  Tobacco (50 pack-year history) use - quit after cancer dx      CURRENT MEDS REVIEWED:  Scheduled medications  Continuous medications  PRN medications     ALLERGIES  NKDA     FAMILY HISTORY  Father had prostate CA        Chief Concern: Here in clinic for follow-up on concurrent chemo/RT with weekly Carbo/Taxol (week 5)    HPI Patient is here today in clinic with his daughter Seble for follow-up on concurrent chemo/RT with weekly Carbo/Taxol (week 5). He continues to feel well overall. Breathing stable, no CP or SOB. States he is eating/drinking ok, daughter thinks he could eat more, we discussed supplementing with boost/ensure. He is following with nutrition as well. He states it is getting harder to obtain IV access, will provide information on PORT placement, he will discuss with his family. No new aches/pains. No fevers, chills, or cold symptoms. No other concerns or complaints.       Meds (Current):    Current Outpatient Medications:     albuterol (Proventil HFA) 90 mcg/actuation inhaler, Inhale 2 puffs every 4 hours if needed for wheezing or shortness of breath., Disp: 6.7 g, Rfl: 6    atorvastatin (Lipitor) 40 mg tablet, Take 1 tablet (40 mg) by mouth once daily., Disp: 90 tablet, Rfl: 1    cholecalciferol (Vitamin D-3) 125 MCG (5000 UT) capsule, , Disp: , Rfl:     divalproex (Depakote ER) 250 mg 24 hr tablet, Take 1 tablet (250 mg) by mouth 2 times a day. Do not crush, chew, or split., Disp: 90 tablet, Rfl: 1    divalproex (Depakote ER) 500 mg 24 hr tablet, Take 1 tablet (500 mg) by mouth 2 times a day. Do not crush, chew, or split., Disp: 90 tablet, Rfl: 1    inhalat.spacing dev,med. mask spacer, , Disp: , Rfl:     ipratropium-albuteroL (Duo-Neb) 0.5-2.5 mg/3 mL nebulizer solution, Take 3 mL by nebulization 4 times a day as needed for wheezing or shortness of breath., Disp: 90 mL, Rfl: 1    lacosamide (Vimpat) 200 mg tablet  tablet, Take 1 tablet (200 mg) by mouth 2 times a day., Disp: 30 tablet, Rfl: 5    LORazepam (Ativan) 0.5 mg tablet, Take 1 tablet (0.5 mg) by mouth once daily as needed for anxiety for up to 5 days., Disp: 5 tablet, Rfl: 0    nebulizer accessories misc, Use twice daily, Disp: 100 each, Rfl: 0    nebulizer and compressor device, Use as directed, Disp: 1 each, Rfl: 0    nicotine (Nicoderm CQ) 21 mg/24 hr patch, Place 1 patch over 24 hours on the skin once every 24 hours., Disp: 30 patch, Rfl: 0    ondansetron (Zofran) 8 mg tablet, Take 1 tablet (8 mg) by mouth every 8 hours if needed for nausea or vomiting., Disp: 30 tablet, Rfl: 5    prochlorperazine (Compazine) 10 mg tablet, Take 1 tablet (10 mg) by mouth every 6 hours if needed for nausea or vomiting., Disp: 30 tablet, Rfl: 5    Spiriva Respimat 2.5 mcg/actuation inhaler, , Disp: , Rfl:     thiamine (Vitamin B-1) 100 mg tablet, Take 1 tablet (100 mg) by mouth once daily., Disp: 90 tablet, Rfl: 1  No current facility-administered medications for this visit.    Review of Systems - Oncology 12 point ROS was obtained and negative unless otherwise mentioned in the above HPI.      Objective   BSA: 1.84 meters squared  Wt Readings from Last 5 Encounters:   12/19/23 69.4 kg (153 lb)   12/18/23 71.9 kg (158 lb 8.2 oz)   12/12/23 71.6 kg (157 lb 13.6 oz)   12/12/23 71.7 kg (158 lb 1.6 oz)   12/11/23 71.4 kg (157 lb 6.5 oz)     /67 (BP Location: Left arm, Patient Position: Sitting)   Pulse 91   Temp 35.9 °C (96.6 °F) (Core)   Resp 19   Wt 69.4 kg (153 lb)   SpO2 96%   BMI 22.59 kg/m²          Physical Exam  Constitutional:       Appearance: Normal appearance.   Eyes:      Conjunctiva/sclera: Conjunctivae normal.      Pupils: Pupils are equal, round, and reactive to light.   Cardiovascular:      Rate and Rhythm: Normal rate and regular rhythm.   Pulmonary:      Effort: Pulmonary effort is normal.      Breath sounds: Normal breath sounds.      Comments: +rhonci,  clears with cough  Abdominal:      General: Bowel sounds are normal.      Palpations: Abdomen is soft.   Musculoskeletal:         General: No swelling.      Right lower leg: No edema.      Left lower leg: No edema.   Skin:     General: Skin is warm and dry.   Neurological:      General: No focal deficit present.      Mental Status: He is alert and oriented to person, place, and time.   Psychiatric:         Mood and Affect: Mood normal.         Behavior: Behavior normal.          Results:  Labs:  Lab Results   Component Value Date    WBC 2.8 (L) 12/18/2023    HGB 10.1 (L) 12/18/2023    HCT 30.0 (L) 12/18/2023    MCV 97 12/18/2023     12/18/2023      Lab Results   Component Value Date    NEUTROABS 1.76 12/18/2023      Lab Results   Component Value Date    GLUCOSE 89 12/18/2023    CALCIUM 8.9 12/18/2023     (L) 12/18/2023    K 5.3 12/18/2023    CO2 24 12/18/2023     12/18/2023    BUN 14 12/18/2023    CREATININE 0.60 12/18/2023     Lab Results   Component Value Date    ALT 10 12/18/2023    AST 23 12/18/2023    ALKPHOS 50 12/18/2023    BILITOT 0.4 12/18/2023      Lab Results   Component Value Date    TSH 5.54 (H) 10/03/2023       Imaging:           === Results for orders placed during the hospital encounter of 10/13/23 ===    MR brain w and wo IV contrast [[id number]] 10/13/2023    Status: Normal  1. No abnormal intracranial enhancement or mass to suggest metastases.    2. Chronic intracranial findings as above.    I personally reviewed the images/study and I agree with the findings  as stated. This study was interpreted at [facility], [place], [place].    Signed by: Dharmesh Reddy 10/13/2023 8:27 PM  Dictation workstation:   [id number]    Assessment/Plan      Martínez Neri is a 63 y.o. male here for follow up     Reviewed with the patient the treatment plan consisting of concurrent chemo-RT.  This will be followed by consolidative durvalumab.  We discussed potential  side effects related to carboplatin and paclitaxel weekly and he was able to sign a consent.  He has been seen by radiation oncology (Dr. Layne) and underwent CT simulation.  RT start date 11/20/23 and C1 Carbo/Taxol   -RTC weekly for follow-up  -[facility] paperwork filled out for spouse, signed, faxed and original copy given to patient/daughter  -RTC next week (week 6)            GENERAL: NAD  HEENT: Abrasion present in the back of the head, no active bleeding  CHEST/LUNG: Chest rise equal bilaterally  HEART: Regular rate and rhythm  ABDOMEN: Soft, Nontender, Nondistended  EXTREMITIES:  Extremities warm  PSYCH: A&Ox3  SKIN: No obvious rashes or lesions  MSK: No cervical spine TTP, able to range neck to the left and right/ No midline spinal TTP  NEUROLOGY: strength and sensation intact in all extremities. Ambulatory without difficulty.

## 2023-12-20 ENCOUNTER — HOSPITAL ENCOUNTER (OUTPATIENT)
Dept: RADIATION ONCOLOGY | Facility: HOSPITAL | Age: 63
Setting detail: RADIATION/ONCOLOGY SERIES
Discharge: HOME | End: 2023-12-20
Payer: MEDICAID

## 2023-12-20 DIAGNOSIS — C34.12 MALIGNANT NEOPLASM OF UPPER LOBE, LEFT BRONCHUS OR LUNG (MULTI): ICD-10-CM

## 2023-12-20 DIAGNOSIS — Z51.0 ENCOUNTER FOR ANTINEOPLASTIC RADIATION THERAPY: ICD-10-CM

## 2023-12-20 LAB
RAD ONC MSQ ACTUAL FRACTIONS DELIVERED: 5
RAD ONC MSQ ACTUAL SESSION DELIVERED DOSE: 200 CGRAY
RAD ONC MSQ ACTUAL TOTAL DOSE: 1000 CGRAY
RAD ONC MSQ ELAPSED DAYS: 6
RAD ONC MSQ LAST DATE: NORMAL
RAD ONC MSQ PRESCRIBED FRACTIONAL DOSE: 200 CGRAY
RAD ONC MSQ PRESCRIBED NUMBER OF FRACTIONS: 18
RAD ONC MSQ PRESCRIBED TECHNIQUE: NORMAL
RAD ONC MSQ PRESCRIBED TOTAL DOSE: 3600 CGRAY
RAD ONC MSQ PRESCRIPTION PATTERN COMMENT: NORMAL
RAD ONC MSQ START DATE: NORMAL
RAD ONC MSQ TREATMENT COURSE NUMBER: 1
RAD ONC MSQ TREATMENT SITE: NORMAL

## 2023-12-20 PROCEDURE — 77386 HC INTENSITY-MODULATED RADIATION THERAPY (IMRT), COMPLEX: CPT | Performed by: RADIOLOGY

## 2023-12-20 PROCEDURE — 77014 CHG CT GUIDANCE RADIATION THERAPY FLDS PLACEMENT: CPT | Performed by: RADIOLOGY

## 2023-12-21 ENCOUNTER — HOSPITAL ENCOUNTER (OUTPATIENT)
Dept: RADIATION ONCOLOGY | Facility: HOSPITAL | Age: 63
Setting detail: RADIATION/ONCOLOGY SERIES
Discharge: HOME | End: 2023-12-21
Payer: MEDICAID

## 2023-12-21 DIAGNOSIS — Z51.0 ENCOUNTER FOR ANTINEOPLASTIC RADIATION THERAPY: ICD-10-CM

## 2023-12-21 DIAGNOSIS — C34.12 MALIGNANT NEOPLASM OF UPPER LOBE, LEFT BRONCHUS OR LUNG (MULTI): ICD-10-CM

## 2023-12-21 LAB
RAD ONC MSQ ACTUAL FRACTIONS DELIVERED: 6
RAD ONC MSQ ACTUAL SESSION DELIVERED DOSE: 200 CGRAY
RAD ONC MSQ ACTUAL TOTAL DOSE: 1200 CGRAY
RAD ONC MSQ ELAPSED DAYS: 7
RAD ONC MSQ LAST DATE: NORMAL
RAD ONC MSQ PRESCRIBED FRACTIONAL DOSE: 200 CGRAY
RAD ONC MSQ PRESCRIBED NUMBER OF FRACTIONS: 18
RAD ONC MSQ PRESCRIBED TECHNIQUE: NORMAL
RAD ONC MSQ PRESCRIBED TOTAL DOSE: 3600 CGRAY
RAD ONC MSQ PRESCRIPTION PATTERN COMMENT: NORMAL
RAD ONC MSQ START DATE: NORMAL
RAD ONC MSQ TREATMENT COURSE NUMBER: 1
RAD ONC MSQ TREATMENT SITE: NORMAL

## 2023-12-21 PROCEDURE — 77014 CHG CT GUIDANCE RADIATION THERAPY FLDS PLACEMENT: CPT | Performed by: RADIOLOGY

## 2023-12-21 PROCEDURE — 77386 HC INTENSITY-MODULATED RADIATION THERAPY (IMRT), COMPLEX: CPT | Performed by: RADIOLOGY

## 2023-12-22 ENCOUNTER — HOSPITAL ENCOUNTER (OUTPATIENT)
Dept: RADIATION ONCOLOGY | Facility: HOSPITAL | Age: 63
Setting detail: RADIATION/ONCOLOGY SERIES
Discharge: HOME | End: 2023-12-22
Payer: MEDICAID

## 2023-12-22 DIAGNOSIS — C34.12 MALIGNANT NEOPLASM OF UPPER LOBE, LEFT BRONCHUS OR LUNG (MULTI): ICD-10-CM

## 2023-12-22 DIAGNOSIS — Z51.0 ENCOUNTER FOR ANTINEOPLASTIC RADIATION THERAPY: ICD-10-CM

## 2023-12-22 LAB
RAD ONC MSQ ACTUAL FRACTIONS DELIVERED: 7
RAD ONC MSQ ACTUAL SESSION DELIVERED DOSE: 200 CGRAY
RAD ONC MSQ ACTUAL TOTAL DOSE: 1400 CGRAY
RAD ONC MSQ ELAPSED DAYS: 8
RAD ONC MSQ LAST DATE: NORMAL
RAD ONC MSQ PRESCRIBED FRACTIONAL DOSE: 200 CGRAY
RAD ONC MSQ PRESCRIBED NUMBER OF FRACTIONS: 18
RAD ONC MSQ PRESCRIBED TECHNIQUE: NORMAL
RAD ONC MSQ PRESCRIBED TOTAL DOSE: 3600 CGRAY
RAD ONC MSQ PRESCRIPTION PATTERN COMMENT: NORMAL
RAD ONC MSQ START DATE: NORMAL
RAD ONC MSQ TREATMENT COURSE NUMBER: 1
RAD ONC MSQ TREATMENT SITE: NORMAL

## 2023-12-22 PROCEDURE — 77014 CHG CT GUIDANCE RADIATION THERAPY FLDS PLACEMENT: CPT | Performed by: RADIOLOGY

## 2023-12-22 PROCEDURE — 77386 HC INTENSITY-MODULATED RADIATION THERAPY (IMRT), COMPLEX: CPT | Performed by: RADIOLOGY

## 2023-12-26 ENCOUNTER — HOSPITAL ENCOUNTER (OUTPATIENT)
Dept: RADIATION ONCOLOGY | Facility: HOSPITAL | Age: 63
Setting detail: RADIATION/ONCOLOGY SERIES
Discharge: HOME | End: 2023-12-26
Payer: MEDICAID

## 2023-12-26 ENCOUNTER — OFFICE VISIT (OUTPATIENT)
Dept: HEMATOLOGY/ONCOLOGY | Facility: HOSPITAL | Age: 63
End: 2023-12-26
Payer: MEDICAID

## 2023-12-26 ENCOUNTER — LAB (OUTPATIENT)
Dept: LAB | Facility: HOSPITAL | Age: 63
End: 2023-12-26
Payer: MEDICAID

## 2023-12-26 ENCOUNTER — INFUSION (OUTPATIENT)
Dept: HEMATOLOGY/ONCOLOGY | Facility: HOSPITAL | Age: 63
End: 2023-12-26
Payer: MEDICAID

## 2023-12-26 VITALS
TEMPERATURE: 98.1 F | HEART RATE: 75 BPM | WEIGHT: 157.7 LBS | RESPIRATION RATE: 18 BRPM | OXYGEN SATURATION: 94 % | BODY MASS INDEX: 23.29 KG/M2 | SYSTOLIC BLOOD PRESSURE: 93 MMHG | DIASTOLIC BLOOD PRESSURE: 59 MMHG

## 2023-12-26 DIAGNOSIS — Z51.0 ENCOUNTER FOR ANTINEOPLASTIC RADIATION THERAPY: ICD-10-CM

## 2023-12-26 DIAGNOSIS — C34.12 MALIGNANT NEOPLASM OF UPPER LOBE OF LEFT LUNG (MULTI): Primary | ICD-10-CM

## 2023-12-26 DIAGNOSIS — C34.12 MALIGNANT NEOPLASM OF UPPER LOBE OF LEFT LUNG (MULTI): ICD-10-CM

## 2023-12-26 DIAGNOSIS — Z51.11 ENCOUNTER FOR CHEMOTHERAPY MANAGEMENT: ICD-10-CM

## 2023-12-26 DIAGNOSIS — C34.12 MALIGNANT NEOPLASM OF UPPER LOBE, LEFT BRONCHUS OR LUNG (MULTI): ICD-10-CM

## 2023-12-26 DIAGNOSIS — D64.9 ANEMIA, UNSPECIFIED TYPE: ICD-10-CM

## 2023-12-26 LAB
ALBUMIN SERPL BCP-MCNC: 3.9 G/DL (ref 3.4–5)
ALP SERPL-CCNC: 55 U/L (ref 33–136)
ALT SERPL W P-5'-P-CCNC: 9 U/L (ref 10–52)
ANION GAP SERPL CALC-SCNC: 14 MMOL/L (ref 10–20)
AST SERPL W P-5'-P-CCNC: 17 U/L (ref 9–39)
BASOPHILS # BLD AUTO: 0.01 X10*3/UL (ref 0–0.1)
BASOPHILS NFR BLD AUTO: 0.6 %
BILIRUB SERPL-MCNC: 0.5 MG/DL (ref 0–1.2)
BUN SERPL-MCNC: 13 MG/DL (ref 6–23)
CALCIUM SERPL-MCNC: 9.2 MG/DL (ref 8.6–10.3)
CHLORIDE SERPL-SCNC: 98 MMOL/L (ref 98–107)
CO2 SERPL-SCNC: 25 MMOL/L (ref 21–32)
CREAT SERPL-MCNC: 0.68 MG/DL (ref 0.5–1.3)
DACRYOCYTES BLD QL SMEAR: NORMAL
EOSINOPHIL # BLD AUTO: 0.01 X10*3/UL (ref 0–0.7)
EOSINOPHIL NFR BLD AUTO: 0.6 %
ERYTHROCYTE [DISTWIDTH] IN BLOOD BY AUTOMATED COUNT: 13.7 % (ref 11.5–14.5)
GFR SERPL CREATININE-BSD FRML MDRD: >90 ML/MIN/1.73M*2
GLUCOSE SERPL-MCNC: 72 MG/DL (ref 74–99)
HCT VFR BLD AUTO: 27.9 % (ref 41–52)
HGB BLD-MCNC: 9.5 G/DL (ref 13.5–17.5)
IMM GRANULOCYTES # BLD AUTO: 0 X10*3/UL (ref 0–0.7)
IMM GRANULOCYTES NFR BLD AUTO: 0 % (ref 0–0.9)
IRON SATN MFR SERPL: 12 % (ref 25–45)
IRON SERPL-MCNC: 45 UG/DL (ref 35–150)
LYMPHOCYTES # BLD AUTO: 0.3 X10*3/UL (ref 1.2–4.8)
LYMPHOCYTES NFR BLD AUTO: 17 %
MCH RBC QN AUTO: 33.2 PG (ref 26–34)
MCHC RBC AUTO-ENTMCNC: 34.1 G/DL (ref 32–36)
MCV RBC AUTO: 98 FL (ref 80–100)
MONOCYTES # BLD AUTO: 0.08 X10*3/UL (ref 0.1–1)
MONOCYTES NFR BLD AUTO: 4.5 %
NEUTROPHILS # BLD AUTO: 1.36 X10*3/UL (ref 1.2–7.7)
NEUTROPHILS NFR BLD AUTO: 77.3 %
NRBC BLD-RTO: 0 /100 WBCS (ref 0–0)
OVALOCYTES BLD QL SMEAR: NORMAL
PLATELET # BLD AUTO: 80 X10*3/UL (ref 150–450)
POLYCHROMASIA BLD QL SMEAR: NORMAL
POTASSIUM SERPL-SCNC: 4.8 MMOL/L (ref 3.5–5.3)
PROT SERPL-MCNC: 6.9 G/DL (ref 6.4–8.2)
RAD ONC MSQ ACTUAL FRACTIONS DELIVERED: 8
RAD ONC MSQ ACTUAL SESSION DELIVERED DOSE: 200 CGRAY
RAD ONC MSQ ACTUAL TOTAL DOSE: 1600 CGRAY
RAD ONC MSQ ELAPSED DAYS: 12
RAD ONC MSQ LAST DATE: NORMAL
RAD ONC MSQ PRESCRIBED FRACTIONAL DOSE: 200 CGRAY
RAD ONC MSQ PRESCRIBED NUMBER OF FRACTIONS: 18
RAD ONC MSQ PRESCRIBED TECHNIQUE: NORMAL
RAD ONC MSQ PRESCRIBED TOTAL DOSE: 3600 CGRAY
RAD ONC MSQ PRESCRIPTION PATTERN COMMENT: NORMAL
RAD ONC MSQ START DATE: NORMAL
RAD ONC MSQ TREATMENT COURSE NUMBER: 1
RAD ONC MSQ TREATMENT SITE: NORMAL
RBC # BLD AUTO: 2.86 X10*6/UL (ref 4.5–5.9)
RBC MORPH BLD: NORMAL
SODIUM SERPL-SCNC: 132 MMOL/L (ref 136–145)
TIBC SERPL-MCNC: 375 UG/DL (ref 240–445)
TSH SERPL-ACNC: 2.27 MIU/L (ref 0.44–3.98)
UIBC SERPL-MCNC: 330 UG/DL (ref 110–370)
WBC # BLD AUTO: 1.8 X10*3/UL (ref 4.4–11.3)

## 2023-12-26 PROCEDURE — 85025 COMPLETE CBC W/AUTO DIFF WBC: CPT

## 2023-12-26 PROCEDURE — 80053 COMPREHEN METABOLIC PANEL: CPT

## 2023-12-26 PROCEDURE — 77386 HC INTENSITY-MODULATED RADIATION THERAPY (IMRT), COMPLEX: CPT | Performed by: RADIOLOGY

## 2023-12-26 PROCEDURE — 84443 ASSAY THYROID STIM HORMONE: CPT

## 2023-12-26 PROCEDURE — 77014 CHG CT GUIDANCE RADIATION THERAPY FLDS PLACEMENT: CPT | Performed by: RADIOLOGY

## 2023-12-26 PROCEDURE — 99213 OFFICE O/P EST LOW 20 MIN: CPT | Performed by: NURSE PRACTITIONER

## 2023-12-26 PROCEDURE — 36415 COLL VENOUS BLD VENIPUNCTURE: CPT

## 2023-12-26 PROCEDURE — 83540 ASSAY OF IRON: CPT

## 2023-12-26 PROCEDURE — 99211 OFF/OP EST MAY X REQ PHY/QHP: CPT | Mod: 25

## 2023-12-26 ASSESSMENT — PATIENT HEALTH QUESTIONNAIRE - PHQ9
2. FEELING DOWN, DEPRESSED OR HOPELESS: SEVERAL DAYS
1. LITTLE INTEREST OR PLEASURE IN DOING THINGS: NOT AT ALL
10. IF YOU CHECKED OFF ANY PROBLEMS, HOW DIFFICULT HAVE THESE PROBLEMS MADE IT FOR YOU TO DO YOUR WORK, TAKE CARE OF THINGS AT HOME, OR GET ALONG WITH OTHER PEOPLE: SOMEWHAT DIFFICULT
SUM OF ALL RESPONSES TO PHQ9 QUESTIONS 1 AND 2: 1

## 2023-12-26 ASSESSMENT — ENCOUNTER SYMPTOMS
DEPRESSION: 1
LOSS OF SENSATION IN FEET: 0
OCCASIONAL FEELINGS OF UNSTEADINESS: 1

## 2023-12-26 ASSESSMENT — PAIN SCALES - GENERAL: PAINLEVEL: 5

## 2023-12-26 NOTE — PROGRESS NOTES
[facility] - Medical Oncology Follow-Up Visit    Patient ID: Martínez Neri is a 63 y.o. male      Current therapy: CARBOplatin (Paraplatin) 240 mg in sodium chloride 0.9% 124 mL IV, 240 mg (87.3 % of original dose 271.4 mg), intravenous, Once, 1 of 1 cycle    Dose modification: 237 mg (original dose 271.4 mg, Cycle 1, Reason: Protocol Driven), 271 mg (original dose 271.4 mg, Cycle 1, Reason: Protocol Driven)    Administration: 240 mg (11/20/2023), 271.4 mg (12/4/2023), 271.4 mg (12/12/2023), 271.4 mg (12/18/2023), 271 mg (11/27/2023)        PACLitaxeL (Taxol) 84 mg in dextrose 5 % in water (D5W) 114 mL IV, 45 mg/m2 = 84 mg, intravenous, Once, 1 of 1 cycle    Administration: 84 mg (11/20/2023), 84 mg (11/27/2023), 84 mg (12/4/2023), 84 mg (12/12/2023), 84 mg (12/18/2023)        palonosetron (Aloxi) injection 250 mcg, 250 mcg, intravenous, Once, 1 of 1 cycle    Administration: 250 mcg (11/20/2023), 250 mcg (11/27/2023), 250 mcg (12/4/2023), 250 mcg (12/12/2023), 250 mcg (12/18/2023)      Oncologic History:     DIAGNOSIS AND STAGING  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    PD-L1 TPS 20%      PRIOR THERAPIES  None         CURRENT THERAPY  concurrent chemo-RT using weekly carbo/taxol followed by consolidative durvalumab         CURRENT ONCOLOGICAL PROBLEMS  Cough         HISTORY OF PRESENT ILLNESS:  Current smoker (50-pack-year history), with history of COPD and seizures, who was diagnosed with a squamous cell carcinoma of the left upper lobe in [place], never treated.  Outpatient notes from [facility] cancer care and hematology clinic were reviewed.     On 06/17/2023 the patient had a CT chest for ongoing respiratory symptoms and was  found to have a left suprahilar mass involving the left upper lobe bronchi with postobstructive pneumonia/collapse.  On 06/25/2023 CT chest/abdomen/pelvis demonstrated persistent masslike consolidation in the left upper lobe with no signs of extrathoracic metastatic disease.     On 06/26/2023 the patient underwent a bronchoscopy, and pathology was compatible with a squamous cell carcinoma at least in situ, with a foci highly suspicious for invasive squamous cell carcinoma.     On 07/18/2023 a PET/CT obtained, demonstrating a 6.6 cm left upper lobe mass with an SUV of 8.3 and no evidence of distant metastatic disease.     On 07/18/2023 to brain MRI was negative for intracranial metastasis.     On 07/27/2023 bronchoscopy/EBUS was performed.  Endobronchial biopsies of left upper lobe demonstrated an invasive moderately differentiated squamous cell carcinoma.  Left upper lobe hilar FNA showed atypical cells with squamous differentiation, suspicious for malignancy.      Subcarinal FNA was indeterminate for malignancy with extremely rare atypical cells with squamous differentiation.  Molecular profile: Lack of BRAF, EGFR, HER2, KRAS, MET, ALK, NTRK 1/2/3, RET or ROS1 alterations.  PD-L1 TPS 20%.  The patient was seen by medical oncology and did not too frail for combined chemo RT (there is a performance status ECOG 3) documented on 08/17/2023.  At that time, he lived with some friends, while his family was primarily located in [place].     His daughter traveled and was able to relocate him to [place], and he presents today to clinic accompanied by his daughter, son, and daughter-in-law.     The patient states he is felt much better since his relocation, has been gaining weight, approximately 10 pounds since he moved here.  He is more active, going up and down the steps at home, but still tired and taking some breaks throughout the day.     He denies any headaches or new neurological symptoms.  His respiratory symptoms are  stable. There is no new localized pain.     Reported molecular and ancillary testing performed upon diagnosis in [place]:   BRAF negative  GYEDB21E negative  EGFR classic mutations negative  HER2 negative  MET negative  ALK negative  In tract 1/2/3 negative  RET negative  ROS1 negative  PD-L1 20%     Referred to surgery - PFTs were considered prohibitive for surgical resection-      10/31/23: Repeat EBUS for systematic staging:  Final Cytological Interpretation      A. LYMPH NODE 7 PULMONARY FINE NEEDLE ASPIRATION, CYTOLOGY AND CELL BLOCK:  --  No malignant cells identified.  --  Lymphoid sample.     B. LYMPH NODE 4 L PULMONARY FINE NEEDLE ASPIRATION, CYTOLOGY AND CELL BLOCK:  --  No malignant cells identified.  --  Lymphoid sample.           C. LYMPH NODE 11 L PULMONARY FINE NEEDLE ASPIRATION, CYTOLOGY AND CELL BLOCK:  --  A rare cluster of malignant cells derived from squamous cell carcinoma, see note  -- Also, please refer to concurrent biopsy surgical pathology report ([id number]).     Note:  The malignant squamous cell carcinoma cells are represented in the cell block only.  Immunostain for p40 is positive. The cytomorphology and immunoprofile support the above diagnosis.  The material is not sufficient for molecular testing.      Component     FINAL DIAGNOSIS   Left lung, upper lobe, biopsy:  -- Superficial fragment of squamous cell carcinoma, keratinizing; see note.     Note: According to clinician's note, PD-L1 and NGS have been performed at an outside institution. These studies can be repeated, if clinically indicated.      NGS and PD-L1 not repeated in this specimen as it was done in outside institution already and the purpose of this procedure was not for diagnosis but systematic mediastinal re-staging.      PAST MEDICAL HISTORY  Brain aneurysm  COPD (chronic obstructive pulmonary disease) (CMS/HCC)  Epilepsy (CMS/HCC)  High cholesterol  History of alcohol use  Hypertension     PAST SURGICAL  HISTORY:  CAROTID ENDARTERECTOMY  COLONOSCOPY  HERNIA REPAIR      SOCIAL HISTORY  Tobacco (50 pack-year history) use - quit after cancer dx      CURRENT MEDS REVIEWED:  Scheduled medications  Continuous medications  PRN medications     ALLERGIES  NKDA     FAMILY HISTORY  Father had prostate CA        Chief Concern: Here in clinic for follow-up on concurrent chemo/RT with weekly Carbo/Taxol (week 6)    HPI Patient is here today in clinic with his daughter Seble for follow-up on concurrent chemo/RT with weekly Carbo/Taxol (week 6). Feeling well for treatment today. States he didn't sleep well last night, endorses fatigue, not interfering with ADL's. Breathing stable, no CP or SOB. States he is eating/drinking ok, +dysgeusia. No new aches/pains.       Meds (Current):    Current Outpatient Medications:     albuterol (Proventil HFA) 90 mcg/actuation inhaler, Inhale 2 puffs every 4 hours if needed for wheezing or shortness of breath., Disp: 6.7 g, Rfl: 6    atorvastatin (Lipitor) 40 mg tablet, Take 1 tablet (40 mg) by mouth once daily., Disp: 90 tablet, Rfl: 1    cholecalciferol (Vitamin D-3) 125 MCG (5000 UT) capsule, , Disp: , Rfl:     divalproex (Depakote ER) 250 mg 24 hr tablet, Take 1 tablet (250 mg) by mouth 2 times a day. Do not crush, chew, or split., Disp: 90 tablet, Rfl: 1    divalproex (Depakote ER) 500 mg 24 hr tablet, Take 1 tablet (500 mg) by mouth 2 times a day. Do not crush, chew, or split., Disp: 90 tablet, Rfl: 1    inhalat.spacing dev,med. mask spacer, , Disp: , Rfl:     ipratropium-albuteroL (Duo-Neb) 0.5-2.5 mg/3 mL nebulizer solution, Take 3 mL by nebulization 4 times a day as needed for wheezing or shortness of breath., Disp: 90 mL, Rfl: 1    lacosamide (Vimpat) 200 mg tablet tablet, Take 1 tablet (200 mg) by mouth 2 times a day., Disp: 30 tablet, Rfl: 5    LORazepam (Ativan) 0.5 mg tablet, Take 1 tablet (0.5 mg) by mouth once daily as needed for anxiety for up to 5 days., Disp: 5 tablet, Rfl: 0     nebulizer accessories misc, Use twice daily, Disp: 100 each, Rfl: 0    nebulizer and compressor device, Use as directed, Disp: 1 each, Rfl: 0    nicotine (Nicoderm CQ) 21 mg/24 hr patch, Place 1 patch over 24 hours on the skin once every 24 hours., Disp: 30 patch, Rfl: 0    ondansetron (Zofran) 8 mg tablet, Take 1 tablet (8 mg) by mouth every 8 hours if needed for nausea or vomiting., Disp: 30 tablet, Rfl: 5    prochlorperazine (Compazine) 10 mg tablet, Take 1 tablet (10 mg) by mouth every 6 hours if needed for nausea or vomiting., Disp: 30 tablet, Rfl: 5    Spiriva Respimat 2.5 mcg/actuation inhaler, , Disp: , Rfl:     thiamine (Vitamin B-1) 100 mg tablet, Take 1 tablet (100 mg) by mouth once daily., Disp: 90 tablet, Rfl: 1    Review of Systems - Oncology 12 point ROS was obtained and negative unless otherwise mentioned in the above HPI.      Objective   BSA: 1.87 meters squared  Wt Readings from Last 5 Encounters:   12/26/23 71.5 kg (157 lb 11.2 oz)   12/19/23 69.4 kg (153 lb)   12/18/23 71.9 kg (158 lb 8.2 oz)   12/12/23 71.6 kg (157 lb 13.6 oz)   12/12/23 71.7 kg (158 lb 1.6 oz)     BP 93/59 (BP Location: Left arm, Patient Position: Sitting, BP Cuff Size: Adult)   Pulse 75   Temp 36.7 °C (98.1 °F)   Resp 18   Wt 71.5 kg (157 lb 11.2 oz)   SpO2 94%   BMI 23.29 kg/m²          Physical Exam  Constitutional:       Appearance: Normal appearance.   Eyes:      Conjunctiva/sclera: Conjunctivae normal.      Pupils: Pupils are equal, round, and reactive to light.   Cardiovascular:      Rate and Rhythm: Normal rate and regular rhythm.   Pulmonary:      Effort: Pulmonary effort is normal.      Breath sounds: Normal breath sounds.   Abdominal:      General: Bowel sounds are normal.      Palpations: Abdomen is soft.   Musculoskeletal:         General: No swelling.      Right lower leg: No edema.      Left lower leg: No edema.   Skin:     General: Skin is warm and dry.   Neurological:      General: No focal deficit  present.      Mental Status: He is alert and oriented to person, place, and time.   Psychiatric:         Mood and Affect: Mood normal.         Behavior: Behavior normal.          Results:  Labs:  Lab Results   Component Value Date    WBC 2.8 (L) 12/18/2023    HGB 10.1 (L) 12/18/2023    HCT 30.0 (L) 12/18/2023    MCV 97 12/18/2023     12/18/2023      Lab Results   Component Value Date    NEUTROABS 1.76 12/18/2023      Lab Results   Component Value Date    GLUCOSE 89 12/18/2023    CALCIUM 8.9 12/18/2023     (L) 12/18/2023    K 5.3 12/18/2023    CO2 24 12/18/2023     12/18/2023    BUN 14 12/18/2023    CREATININE 0.60 12/18/2023     Lab Results   Component Value Date    ALT 10 12/18/2023    AST 23 12/18/2023    ALKPHOS 50 12/18/2023    BILITOT 0.4 12/18/2023      Lab Results   Component Value Date    TSH 5.54 (H) 10/03/2023       Imaging:           === Results for orders placed during the hospital encounter of 10/13/23 ===    MR brain w and wo IV contrast [[id number]] 10/13/2023    Status: Normal  1. No abnormal intracranial enhancement or mass to suggest metastases.    2. Chronic intracranial findings as above.    I personally reviewed the images/study and I agree with the findings  as stated. This study was interpreted at [place], [place].    Signed by: Dharmesh Reddy 10/13/2023 8:27 PM  Dictation workstation:   [id number]    Assessment/Plan      Martínez Neri is a 63 y.o. male here for follow up     Reviewed with the patient the treatment plan consisting of concurrent chemo-RT.  This will be followed by consolidative durvalumab.  We discussed potential side effects related to carboplatin and paclitaxel weekly and he was able to sign a consent.  He has been seen by radiation oncology (Dr. Layne) and underwent CT simulation.  RT start date 11/20/23 and C1 Carbo/Taxol   -RTC weekly for follow-up  -[facility] paperwork filled out for spouse, signed, faxed and  original copy given to patient/daughter  -12/26/23 Treatment held today for neutropenia (ANC 1.36) and thrombocytopenia (platelets 80)  -RTC next week (week 7)

## 2023-12-27 ENCOUNTER — RADIATION ONCOLOGY OTV (OUTPATIENT)
Dept: RADIATION ONCOLOGY | Facility: HOSPITAL | Age: 63
End: 2023-12-27

## 2023-12-27 ENCOUNTER — HOSPITAL ENCOUNTER (OUTPATIENT)
Dept: RADIATION ONCOLOGY | Facility: HOSPITAL | Age: 63
Setting detail: RADIATION/ONCOLOGY SERIES
Discharge: HOME | End: 2023-12-27
Payer: MEDICAID

## 2023-12-27 VITALS
HEART RATE: 85 BPM | BODY MASS INDEX: 23.05 KG/M2 | OXYGEN SATURATION: 95 % | RESPIRATION RATE: 18 BRPM | TEMPERATURE: 97.3 F | DIASTOLIC BLOOD PRESSURE: 57 MMHG | SYSTOLIC BLOOD PRESSURE: 104 MMHG | WEIGHT: 155.65 LBS | HEIGHT: 69 IN

## 2023-12-27 DIAGNOSIS — C34.12 MALIGNANT NEOPLASM OF UPPER LOBE, LEFT BRONCHUS OR LUNG (MULTI): ICD-10-CM

## 2023-12-27 DIAGNOSIS — Z51.0 ENCOUNTER FOR ANTINEOPLASTIC RADIATION THERAPY: ICD-10-CM

## 2023-12-27 LAB
RAD ONC MSQ ACTUAL FRACTIONS DELIVERED: 9
RAD ONC MSQ ACTUAL SESSION DELIVERED DOSE: 200 CGRAY
RAD ONC MSQ ACTUAL TOTAL DOSE: 1800 CGRAY
RAD ONC MSQ ELAPSED DAYS: 13
RAD ONC MSQ LAST DATE: NORMAL
RAD ONC MSQ PRESCRIBED FRACTIONAL DOSE: 200 CGRAY
RAD ONC MSQ PRESCRIBED NUMBER OF FRACTIONS: 18
RAD ONC MSQ PRESCRIBED TECHNIQUE: NORMAL
RAD ONC MSQ PRESCRIBED TOTAL DOSE: 3600 CGRAY
RAD ONC MSQ PRESCRIPTION PATTERN COMMENT: NORMAL
RAD ONC MSQ START DATE: NORMAL
RAD ONC MSQ TREATMENT COURSE NUMBER: 1
RAD ONC MSQ TREATMENT SITE: NORMAL

## 2023-12-27 PROCEDURE — 77336 RADIATION PHYSICS CONSULT: CPT | Performed by: RADIOLOGY

## 2023-12-27 PROCEDURE — 77386 HC INTENSITY-MODULATED RADIATION THERAPY (IMRT), COMPLEX: CPT | Performed by: RADIOLOGY

## 2023-12-27 PROCEDURE — 77427 RADIATION TX MANAGEMENT X5: CPT | Performed by: RADIOLOGY

## 2023-12-27 PROCEDURE — 77014 CHG CT GUIDANCE RADIATION THERAPY FLDS PLACEMENT: CPT | Performed by: RADIOLOGY

## 2023-12-27 ASSESSMENT — PAIN SCALES - GENERAL: PAINLEVEL: 0-NO PAIN

## 2023-12-27 NOTE — PROGRESS NOTES
"RADIATION ONCOLOGY ON-TREATMENT VISIT NOTE  Patient Name:  Martínez Neri  MRN:  [id number]  :  1960    Radiation Oncologist: Rosanna Hart MD, MSCI  Resident Physician: Fred Alcantara MD PGY-3  Referring Provider: No ref. provider found  Primary Care Provider: Jayme Casey MD  Care Team: Patient Care Team:  Jayme Casey MD as PCP - General (Internal Medicine)  Elo Peters MD as Consulting Physician (Hematology and Oncology)  MARITZA Galeana as  ()    Date of Service: 2023     Martínez Neri is a 63 y.o.-year-old with:  No matching staging information was found for the patient.    Specialty Problems          Radiation Oncology Problems    Primary cancer of left upper lobe of lung (CMS/HCC)        Malignant neoplasm of upper lobe of left lung (CMS/HCC)           Treatment Summary:  Radiation Treatments       Active   THOMAS Hilum RePlan2 (Started on 2023)   Most recent fraction: 200 cGy given on 2023   Total given: 1,800 cGy / 3,600 cGy  (9 of 18 fractions)   Elapsed Days: 13   Technique: IMRT        Replan_LUL Hilum (Started on 2023)   Most recent fraction: 200 cGy given on 2023   Total given: 1,000 cGy / 4,600 cGy  (5 of 23 fractions)   Elapsed Days: 6   Technique: IMRT                     SUBJECTIVE: The patient was seen and examined. The patient noted to have some ongoing constipation, but has had a BM yesterday. Denies nausea/vomiting. Endorses ongoing GERD symptoms, has been intermittently taking omeprazole but felt it was causing the constipation. He notes to have had some frequent falls in the setting of an epilepsy disorder, but has not had an LOC or head trauma. Has not seen his neurologist in quite sometime.     OBJECTIVE:   Vital Signs:  /57   Pulse 85   Temp 36.3 °C (97.3 °F) (Temporal)   Resp 18   Ht 1.753 m (5' 9\")   Wt 70.6 kg (155 lb 10.3 oz)   SpO2 95%   BMI 22.98 kg/m²    Pain Scale: 5 /10.    Vitals WNLs, " "labs reviewed, thrombocytopenia to 80 and ANC to 1.38, otherwise WNL. Grade 1 fatigue, grade 2 esophagitis. Lungs clear with some mild congestion. Breathing comfortably on room air.     Toxicity Assessment          11/30/2023    11:25 12/5/2023    11:12 12/11/2023    11:52 12/18/2023    11:14 12/27/2023    11:03   Toxicity Assessment   Adverse Events Reviewed (WDL) No (Exceptions to WDL) No (Exceptions to WDL) No (Exceptions to WDL) No (Exceptions to WDL) No (Exceptions to WDL)   Treatment Site Thoracic Thoracic Thoracic Thoracic Thoracic   Anorexia  Grade 0 Grade 1       pt. with decreased appetite Grade 0 Grade 0   Anxiety Grade 1       pt. given Ativan for anxiety before treatments Grade 1       pt taking ativan prior to each treatment      Dehydration Grade 0 Grade 0 Grade 0 Grade 0 Grade 0   Depression  Grade 0 Grade 0     Dermatitis Radiation Grade 0 Grade 0   Grade 0   Diarrhea  Grade 0      Fatigue Grade 0 Grade 1       pt with increased fatigue, pt napping during day Grade 1       pt. mildly fatigued Grade 1 Grade 1   Nausea Grade 0 Grade 0 Grade 0 Grade 0 Grade 0   Pain Grade 0 Grade 0 Grade 0 Grade 0 Grade 0   Tumor Pain  Grade 0      Vomiting  Grade 0 Grade 0 Grade 0    Constipation  Grade 0      Dysphagia Grade 0 Grade 0      Esophagitis  Grade 0      Mucositis Oral  Grade 0      Myocardial Infarction  Grade 0      Pericardial Effusion  Grade 0      Pericarditis  Grade 0      Esophageal Obstruction  Grade 0 Grade 0     Esophageal Pain  Grade 0 Grade 0 Grade 0 Grade 0   Cough Grade 1       mild \"smokers\" cough Grade 1       baseline coughing with white phlem Grade 1       chronic cough Grade 1 Grade 1   Dyspnea Grade 1       chronic CHAPIN Grade 1       pt with SOB with exertion Grade 0 Grade 0 Grade 1   Epistaxis  Grade 0      Hypoxia Grade 0 Grade 0 Grade 0 Grade 0 Grade 0        ASSESSMENT/PLAN:  The patient is tolerating radiation therapy as anticipated. Discussed with patient to reach out to neurology " for a FUV due to concerns for falling in the setting of epilespy. Discussed taking omeprazole for GERD, miralax to help with constipation, and adjusting diet to assist with his ongoing reflux symptoms. Encouraged patient to continue with Robitussin DM at night for cough and mucinex to assist with sputum clearance.  Continue per current treatment plan.        Fred Alcantara MD   PGY-3 Radiation Oncology

## 2023-12-28 ENCOUNTER — HOSPITAL ENCOUNTER (OUTPATIENT)
Dept: RADIATION ONCOLOGY | Facility: HOSPITAL | Age: 63
Setting detail: RADIATION/ONCOLOGY SERIES
Discharge: HOME | End: 2023-12-28
Payer: MEDICAID

## 2023-12-28 DIAGNOSIS — Z51.0 ENCOUNTER FOR ANTINEOPLASTIC RADIATION THERAPY: ICD-10-CM

## 2023-12-28 DIAGNOSIS — C34.12 MALIGNANT NEOPLASM OF UPPER LOBE, LEFT BRONCHUS OR LUNG (MULTI): ICD-10-CM

## 2023-12-28 LAB
RAD ONC MSQ ACTUAL FRACTIONS DELIVERED: 10
RAD ONC MSQ ACTUAL SESSION DELIVERED DOSE: 200 CGRAY
RAD ONC MSQ ACTUAL TOTAL DOSE: 2000 CGRAY
RAD ONC MSQ ELAPSED DAYS: 14
RAD ONC MSQ LAST DATE: NORMAL
RAD ONC MSQ PRESCRIBED FRACTIONAL DOSE: 200 CGRAY
RAD ONC MSQ PRESCRIBED NUMBER OF FRACTIONS: 18
RAD ONC MSQ PRESCRIBED TECHNIQUE: NORMAL
RAD ONC MSQ PRESCRIBED TOTAL DOSE: 3600 CGRAY
RAD ONC MSQ PRESCRIPTION PATTERN COMMENT: NORMAL
RAD ONC MSQ START DATE: NORMAL
RAD ONC MSQ TREATMENT COURSE NUMBER: 1
RAD ONC MSQ TREATMENT SITE: NORMAL

## 2023-12-28 PROCEDURE — 77386 HC INTENSITY-MODULATED RADIATION THERAPY (IMRT), COMPLEX: CPT | Performed by: RADIOLOGY

## 2023-12-28 PROCEDURE — 77014 CHG CT GUIDANCE RADIATION THERAPY FLDS PLACEMENT: CPT | Performed by: RADIOLOGY

## 2023-12-29 ENCOUNTER — HOSPITAL ENCOUNTER (OUTPATIENT)
Dept: RADIATION ONCOLOGY | Facility: HOSPITAL | Age: 63
Setting detail: RADIATION/ONCOLOGY SERIES
Discharge: HOME | End: 2023-12-29
Payer: MEDICAID

## 2023-12-29 DIAGNOSIS — C34.12 MALIGNANT NEOPLASM OF UPPER LOBE, LEFT BRONCHUS OR LUNG (MULTI): ICD-10-CM

## 2023-12-29 DIAGNOSIS — Z51.0 ENCOUNTER FOR ANTINEOPLASTIC RADIATION THERAPY: ICD-10-CM

## 2023-12-29 LAB
RAD ONC MSQ ACTUAL FRACTIONS DELIVERED: 11
RAD ONC MSQ ACTUAL SESSION DELIVERED DOSE: 200 CGRAY
RAD ONC MSQ ACTUAL TOTAL DOSE: 2200 CGRAY
RAD ONC MSQ ELAPSED DAYS: 15
RAD ONC MSQ LAST DATE: NORMAL
RAD ONC MSQ PRESCRIBED FRACTIONAL DOSE: 200 CGRAY
RAD ONC MSQ PRESCRIBED NUMBER OF FRACTIONS: 18
RAD ONC MSQ PRESCRIBED TECHNIQUE: NORMAL
RAD ONC MSQ PRESCRIBED TOTAL DOSE: 3600 CGRAY
RAD ONC MSQ PRESCRIPTION PATTERN COMMENT: NORMAL
RAD ONC MSQ START DATE: NORMAL
RAD ONC MSQ TREATMENT COURSE NUMBER: 1
RAD ONC MSQ TREATMENT SITE: NORMAL

## 2023-12-29 PROCEDURE — 77386 HC INTENSITY-MODULATED RADIATION THERAPY (IMRT), COMPLEX: CPT | Performed by: RADIOLOGY

## 2023-12-29 PROCEDURE — 77014 CHG CT GUIDANCE RADIATION THERAPY FLDS PLACEMENT: CPT | Performed by: RADIOLOGY

## 2024-01-02 ENCOUNTER — NUTRITION (OUTPATIENT)
Dept: HEMATOLOGY/ONCOLOGY | Facility: HOSPITAL | Age: 64
End: 2024-01-02
Payer: MEDICAID

## 2024-01-02 ENCOUNTER — APPOINTMENT (OUTPATIENT)
Dept: HEMATOLOGY/ONCOLOGY | Facility: HOSPITAL | Age: 64
End: 2024-01-02
Payer: MEDICAID

## 2024-01-02 ENCOUNTER — LAB (OUTPATIENT)
Dept: LAB | Facility: HOSPITAL | Age: 64
End: 2024-01-02
Payer: MEDICAID

## 2024-01-02 ENCOUNTER — TELEPHONE (OUTPATIENT)
Dept: HEMATOLOGY/ONCOLOGY | Facility: CLINIC | Age: 64
End: 2024-01-02
Payer: MEDICAID

## 2024-01-02 ENCOUNTER — TELEPHONE (OUTPATIENT)
Dept: ADMISSION | Facility: HOSPITAL | Age: 64
End: 2024-01-02
Payer: MEDICAID

## 2024-01-02 ENCOUNTER — HOSPITAL ENCOUNTER (OUTPATIENT)
Dept: RADIATION ONCOLOGY | Facility: HOSPITAL | Age: 64
Setting detail: RADIATION/ONCOLOGY SERIES
Discharge: HOME | End: 2024-01-02
Payer: MEDICAID

## 2024-01-02 DIAGNOSIS — D64.9 ANEMIA, UNSPECIFIED TYPE: ICD-10-CM

## 2024-01-02 DIAGNOSIS — C34.12 MALIGNANT NEOPLASM OF UPPER LOBE OF LEFT LUNG (MULTI): ICD-10-CM

## 2024-01-02 DIAGNOSIS — C34.12 MALIGNANT NEOPLASM OF UPPER LOBE, LEFT BRONCHUS OR LUNG (MULTI): ICD-10-CM

## 2024-01-02 DIAGNOSIS — Z51.0 ENCOUNTER FOR ANTINEOPLASTIC RADIATION THERAPY: ICD-10-CM

## 2024-01-02 LAB
ALBUMIN SERPL BCP-MCNC: 4.1 G/DL (ref 3.4–5)
ALP SERPL-CCNC: 63 U/L (ref 33–136)
ALT SERPL W P-5'-P-CCNC: 7 U/L (ref 10–52)
ANION GAP SERPL CALC-SCNC: 15 MMOL/L (ref 10–20)
AST SERPL W P-5'-P-CCNC: 15 U/L (ref 9–39)
BASOPHILS # BLD AUTO: 0.01 X10*3/UL (ref 0–0.1)
BASOPHILS NFR BLD AUTO: 0.5 %
BILIRUB SERPL-MCNC: 0.3 MG/DL (ref 0–1.2)
BUN SERPL-MCNC: 13 MG/DL (ref 6–23)
BURR CELLS BLD QL SMEAR: NORMAL
CALCIUM SERPL-MCNC: 9.3 MG/DL (ref 8.6–10.3)
CHLORIDE SERPL-SCNC: 99 MMOL/L (ref 98–107)
CO2 SERPL-SCNC: 25 MMOL/L (ref 21–32)
CREAT SERPL-MCNC: 0.73 MG/DL (ref 0.5–1.3)
EOSINOPHIL # BLD AUTO: 0.02 X10*3/UL (ref 0–0.7)
EOSINOPHIL NFR BLD AUTO: 1 %
ERYTHROCYTE [DISTWIDTH] IN BLOOD BY AUTOMATED COUNT: 14.7 % (ref 11.5–14.5)
GFR SERPL CREATININE-BSD FRML MDRD: >90 ML/MIN/1.73M*2
GLUCOSE SERPL-MCNC: 66 MG/DL (ref 74–99)
HCT VFR BLD AUTO: 30.6 % (ref 41–52)
HGB BLD-MCNC: 10.3 G/DL (ref 13.5–17.5)
IMM GRANULOCYTES # BLD AUTO: 0.01 X10*3/UL (ref 0–0.7)
IMM GRANULOCYTES NFR BLD AUTO: 0.5 % (ref 0–0.9)
LYMPHOCYTES # BLD AUTO: 0.61 X10*3/UL (ref 1.2–4.8)
LYMPHOCYTES NFR BLD AUTO: 31.9 %
MCH RBC QN AUTO: 33.4 PG (ref 26–34)
MCHC RBC AUTO-ENTMCNC: 33.7 G/DL (ref 32–36)
MCV RBC AUTO: 99 FL (ref 80–100)
MONOCYTES # BLD AUTO: 0.33 X10*3/UL (ref 0.1–1)
MONOCYTES NFR BLD AUTO: 17.3 %
NEUTROPHILS # BLD AUTO: 0.93 X10*3/UL (ref 1.2–7.7)
NEUTROPHILS NFR BLD AUTO: 48.8 %
NRBC BLD-RTO: 0 /100 WBCS (ref 0–0)
OVALOCYTES BLD QL SMEAR: NORMAL
PLATELET # BLD AUTO: 144 X10*3/UL (ref 150–450)
POLYCHROMASIA BLD QL SMEAR: NORMAL
POTASSIUM SERPL-SCNC: 4.4 MMOL/L (ref 3.5–5.3)
PROT SERPL-MCNC: 7.3 G/DL (ref 6.4–8.2)
RAD ONC MSQ ACTUAL FRACTIONS DELIVERED: 12
RAD ONC MSQ ACTUAL SESSION DELIVERED DOSE: 200 CGRAY
RAD ONC MSQ ACTUAL TOTAL DOSE: 2400 CGRAY
RAD ONC MSQ ELAPSED DAYS: 19
RAD ONC MSQ LAST DATE: NORMAL
RAD ONC MSQ PRESCRIBED FRACTIONAL DOSE: 200 CGRAY
RAD ONC MSQ PRESCRIBED NUMBER OF FRACTIONS: 18
RAD ONC MSQ PRESCRIBED TECHNIQUE: NORMAL
RAD ONC MSQ PRESCRIBED TOTAL DOSE: 3600 CGRAY
RAD ONC MSQ PRESCRIPTION PATTERN COMMENT: NORMAL
RAD ONC MSQ START DATE: NORMAL
RAD ONC MSQ TREATMENT COURSE NUMBER: 1
RAD ONC MSQ TREATMENT SITE: NORMAL
RBC # BLD AUTO: 3.08 X10*6/UL (ref 4.5–5.9)
RBC MORPH BLD: NORMAL
SODIUM SERPL-SCNC: 135 MMOL/L (ref 136–145)
WBC # BLD AUTO: 1.9 X10*3/UL (ref 4.4–11.3)

## 2024-01-02 PROCEDURE — 77014 CHG CT GUIDANCE RADIATION THERAPY FLDS PLACEMENT: CPT | Performed by: RADIOLOGY

## 2024-01-02 PROCEDURE — 85025 COMPLETE CBC W/AUTO DIFF WBC: CPT

## 2024-01-02 PROCEDURE — 77386 HC INTENSITY-MODULATED RADIATION THERAPY (IMRT), COMPLEX: CPT | Performed by: RADIOLOGY

## 2024-01-02 PROCEDURE — 80053 COMPREHEN METABOLIC PANEL: CPT

## 2024-01-02 PROCEDURE — 36415 COLL VENOUS BLD VENIPUNCTURE: CPT

## 2024-01-02 NOTE — TELEPHONE ENCOUNTER
Spoke at length with wife who was unaware of the encounter between daughter and nursing staff at [facility] today. Patient was offered an appointment and infusion spot for Thursday which daughter said she no longer cared anymore and walked away. Thus appointment was not scheduled. Advsied wife that patient would not have been able to receive treatment today due to his blood counts which were not in range to get chemotherapy. Wife verbalized understanding and asked for NP FUV and Infusion to be scheduled for Thursday and she would discuss with her daughter when she gets home. Voicemail left for wife explaining Thursdsays appts as follows: 845a labs, Della visit at 930, tx at 10.

## 2024-01-02 NOTE — TELEPHONE ENCOUNTER
Spouse called today to state that patient will not be at 3pm appt today with АНДРЕЙ Tejeda NP.  Spouse states that the patient was due for infusion today, and her daughter brought the patient, but there was not an infusion appt scheduled.   Spouse is asking that someone from the team follow up with her today.

## 2024-01-02 NOTE — PROGRESS NOTES
NUTRITION Follow-up NOTE    Nutrition Assessment     Reason for Visit:  Martínez Neri is a 63 y.o. male with stage IIB malignant neoplasm of upper lobe of left lung who presents for nutrition follow up.       Spoke with patient and family in [facility] clinic. Patient on carbo/taxol.    Patient with no nutritional concerns at this time.    Will meet with patient again if symptoms resurface or weight drops.     Lab Results   Component Value Date/Time    GLUCOSE 66 (L) 01/02/2024 1126     (L) 01/02/2024 1126    K 4.4 01/02/2024 1126    CL 99 01/02/2024 1126    CO2 25 01/02/2024 1126    ANIONGAP 15 01/02/2024 1126    BUN 13 01/02/2024 1126    CREATININE 0.73 01/02/2024 1126    EGFR >90 01/02/2024 1126    CALCIUM 9.3 01/02/2024 1126    ALBUMIN 4.1 01/02/2024 1126    ALKPHOS 63 01/02/2024 1126    PROT 7.3 01/02/2024 1126    AST 15 01/02/2024 1126    BILITOT 0.3 01/02/2024 1126    ALT 7 (L) 01/02/2024 1126     Lab Results   Component Value Date/Time    VITD25 17 (L) 10/03/2023 0828       Anthropometrics:  Anthropometrics  IBW/kg (Dietitian Calculated): 73.4 kg  Percent of IBW: 97.4 %  Weight Change  Weight History / % Weight Change: Weight has stabilized since initial drop at beginning of treatment.        Wt Readings from Last 10 Encounters:   12/27/23 70.6 kg (155 lb 10.3 oz)   12/26/23 71.5 kg (157 lb 11.2 oz)   12/19/23 69.4 kg (153 lb)   12/18/23 71.9 kg (158 lb 8.2 oz)   12/12/23 71.6 kg (157 lb 13.6 oz)   12/12/23 71.7 kg (158 lb 1.6 oz)   12/11/23 71.4 kg (157 lb 6.5 oz)   12/05/23 72.8 kg (160 lb 7.9 oz)   12/04/23 72.6 kg (160 lb 1.6 oz)   11/30/23 73.7 kg (162 lb 7.7 oz)        Food And Nutrient Intake:  Food and Nutrient History  Food and Nutrient History: Patient has been consistent with using antiacids and antinausea medication, which has improved intake and symptoms of acid reflux and nausea. Regular bowel movements. No troubles with swallowing and continues eating all foods.  Energy Intake: Good > 75  %  Fluid Intake: Quit drinking alcohol 7 years ago. Patient's water intake has improved (2 large 32 oz water bottles daily).  Food Allergy: NKFA.  GI Symptoms: none  GI Symptoms greater than 2 weeks:  (Symptoms of acid reflux and nausea have subsided with consistent use of medication.)  Oral Problems: denies  Dentition: own     Food Intake  Amount of Food: See 11/27 note for full diet recall.    Food Preparation  Dining Out: More than 3 times a week    Micronutrient Intake  Vitamin Intake: Multivitamin, Thiamin, D    Nutrition Focused Physical Exam Findings:  defer: See note from 11/21                        Energy Needs  Estimated Energy Needs  Total Energy Estimated Needs (kCal): 2200 kCal  Method for Estimating Needs: 30 kcal/kg IBW  Estimated Fluid Needs  Total Fluid Estimated Needs (mL): 2200 mL  Method for Estimating Needs: 1 ml/kcal or per team  Estimated Protein Needs  Total Protein Estimated Needs (g): 80 g  Method for Estimating Needs: 1.1 g/kg IBW        Nutrition Diagnosis   Malnutrition Diagnosis  Patient has Malnutrition Diagnosis: No    Nutrition Diagnosis  Patient has Nutrition Diagnosis: Yes  Diagnosis Status (1): Resolved  Nutrition Diagnosis 1: Increased nutrient needs  Related to (1): cancer treatment  As Evidenced by (1): unintended weight loss  Additional Nutrition Diagnosis: Diagnosis 2  Diagnosis Status (2): New  Nutrition Diagnosis 2: Increased nutrient needs  Related to (2): cancer treatment  As Evidenced by (2): need for increased calorie intake to maintain weight.    Nutrition Interventions/Recommendations   Nutrition Prescription       Food and Nutrition Delivery  Food and Nutrition Delivery  Meals & Snacks: Specific foods/beverages or groups:, Energy-modified diet  Goals: Reviewed foods that increase reflux; recommend limiting carbonation, caffeine, fried foods, spicy foods, acidic foods. Add calories with olive oil.    Nutrition Education  Nutrition Education  Nutrition Education  Content: Content related nutrition education  Goals: Health benefits of different protein sources.    Coordination of Care       There are no Patient Instructions on file for this visit.    Nutrition Monitoring and Evaluation   Food/Nutrient Related History Monitoring  Monitoring and Evaluation Plan: Energy intake, Fluid intake, Protein intake, Vitamin intake  Energy Intake: Estimated energy intake  Criteria: 24 hour recall  Fluid Intake: Estimated fluid intake  Criteria: 24 hour recall  Estimated protein intake: Estimated protein intake  Criteria: 24 hour recall  Vitamin Intake: Measured vitamin intake  Criteria: 24 h our recall  Knowledge/Beliefs/Attitudes  Monitoring and Evaluation Plan: Food and nutrition knowledge  Food and nutrition knowledge: Nutrition knowledge of individual client        Time Spent  Prep time on day of patient encounter: 10 minutes  Time spent directly with patient, family or caregiver: 10 minutes  Additional Time Spent on Patient Care Activities: 0 minutes  Documentation Time: 10 minutes  Other Time Spent: 0 minutes  Total: 30 minutes        Readiness to Change : Good  Level of Understanding : Good  Anticipated Compliant : Good

## 2024-01-03 ENCOUNTER — HOSPITAL ENCOUNTER (OUTPATIENT)
Dept: RADIATION ONCOLOGY | Facility: HOSPITAL | Age: 64
Setting detail: RADIATION/ONCOLOGY SERIES
Discharge: HOME | End: 2024-01-03
Payer: MEDICAID

## 2024-01-03 ENCOUNTER — RADIATION ONCOLOGY OTV (OUTPATIENT)
Dept: RADIATION ONCOLOGY | Facility: HOSPITAL | Age: 64
End: 2024-01-03
Payer: MEDICAID

## 2024-01-03 VITALS
WEIGHT: 157.19 LBS | HEART RATE: 103 BPM | RESPIRATION RATE: 18 BRPM | BODY MASS INDEX: 23.28 KG/M2 | TEMPERATURE: 96.8 F | OXYGEN SATURATION: 99 % | SYSTOLIC BLOOD PRESSURE: 112 MMHG | HEIGHT: 69 IN | DIASTOLIC BLOOD PRESSURE: 72 MMHG

## 2024-01-03 DIAGNOSIS — C34.12 MALIGNANT NEOPLASM OF UPPER LOBE, LEFT BRONCHUS OR LUNG (MULTI): ICD-10-CM

## 2024-01-03 DIAGNOSIS — Z51.0 ENCOUNTER FOR ANTINEOPLASTIC RADIATION THERAPY: ICD-10-CM

## 2024-01-03 LAB
RAD ONC MSQ ACTUAL FRACTIONS DELIVERED: 13
RAD ONC MSQ ACTUAL SESSION DELIVERED DOSE: 200 CGRAY
RAD ONC MSQ ACTUAL TOTAL DOSE: 2600 CGRAY
RAD ONC MSQ ELAPSED DAYS: 20
RAD ONC MSQ LAST DATE: NORMAL
RAD ONC MSQ PRESCRIBED FRACTIONAL DOSE: 200 CGRAY
RAD ONC MSQ PRESCRIBED NUMBER OF FRACTIONS: 18
RAD ONC MSQ PRESCRIBED TECHNIQUE: NORMAL
RAD ONC MSQ PRESCRIBED TOTAL DOSE: 3600 CGRAY
RAD ONC MSQ PRESCRIPTION PATTERN COMMENT: NORMAL
RAD ONC MSQ START DATE: NORMAL
RAD ONC MSQ TREATMENT COURSE NUMBER: 1
RAD ONC MSQ TREATMENT SITE: NORMAL

## 2024-01-03 PROCEDURE — 77427 RADIATION TX MANAGEMENT X5: CPT | Performed by: RADIOLOGY

## 2024-01-03 PROCEDURE — 77014 CHG CT GUIDANCE RADIATION THERAPY FLDS PLACEMENT: CPT | Performed by: RADIOLOGY

## 2024-01-03 PROCEDURE — 77386 HC INTENSITY-MODULATED RADIATION THERAPY (IMRT), COMPLEX: CPT | Performed by: RADIOLOGY

## 2024-01-03 PROCEDURE — 77336 RADIATION PHYSICS CONSULT: CPT | Performed by: RADIOLOGY

## 2024-01-03 ASSESSMENT — PAIN SCALES - GENERAL: PAINLEVEL: 0-NO PAIN

## 2024-01-03 NOTE — PROGRESS NOTES
RADIATION ONCOLOGY ON-TREATMENT VISIT NOTE  Patient Name:  Martínez Neri  MRN:  [id number]  :  1960    Radiation Oncologist: Jorge Layne MD MM  Referring Provider: Elo Peters MD    Primary Care Provider: Jayme Casey MD  Care Team: Patient Care Team:  Jayme Casey MD as PCP - General (Internal Medicine)  Elo Peters MD as Consulting Physician (Hematology and Oncology)  MARITZA Galeana as  ()    Date of Service: 1/3/2024     Martínez Neri is a 63 y.o.-year-old with:  Specialty Problems          Radiation Oncology Problems    Primary cancer of left upper lobe of lung (CMS/HCC)        Malignant neoplasm of upper lobe of left lung (CMS/HCC)         Treatment Summary:  Radiation Treatments       Active   THOMAS Hilum RePlan2 (Started on 2023)   Most recent fraction: 200 cGy given on 1/3/2024   Total given: 2,600 cGy / 3,600 cGy  (13 of 18 fractions)   Elapsed Days: 20   Technique: IMRT        Replan_LUL Hilum (Started on 2023)   Most recent fraction: 200 cGy given on 2023   Total given: 1,000 cGy / 4,600 cGy  (5 of 23 fractions)   Elapsed Days: 6   Technique: IMRT                     Chemotherapy Summary (if applicable):   CARBOplatin (Paraplatin) 240 mg in sodium chloride 0.9% 124 mL IV, 240 mg (87.3 % of original dose 271.4 mg), intravenous, Once, 1 of 1 cycle    Dose modification: 237 mg (original dose 271.4 mg, Cycle 1, Reason: Protocol Driven), 271 mg (original dose 271.4 mg, Cycle 1, Reason: Protocol Driven)    Administration: 240 mg (2023), 271.4 mg (2023), 271.4 mg (2023), 271.4 mg (2023), 271 mg (2023)        PACLitaxeL (Taxol) 84 mg in dextrose 5 % in water (D5W) 114 mL IV, 45 mg/m2 = 84 mg, intravenous, Once, 1 of 1 cycle    Administration: 84 mg (2023), 84 mg (2023), 84 mg (2023), 84 mg (2023), 84 mg (2023)        palonosetron (Aloxi) injection 250 mcg, 250 mcg,  "intravenous, Once, 1 of 1 cycle    Administration: 250 mcg (11/20/2023), 250 mcg (11/27/2023), 250 mcg (12/4/2023), 250 mcg (12/12/2023), 250 mcg (12/18/2023)       SUBJECTIVE: Patient was seen and examined, currently doing well. The patient notes to have not received chemotherapy yesterday due to ongoing neutropenia with ANC 0.93. His platelet counts are improving.  Primary symptom is continued fatigue, cough at baseline.  He notes to have no issues with fevers, chills, lightheadedness. He notes to have no issues with swallowing, concern with esophagitis, and is eating his diet without significant changes. His appetite is stable, and skin without any itching.      OBJECTIVE:   Vital Signs:  /72 (BP Location: Right arm, Patient Position: Sitting, BP Cuff Size: Small adult)   Pulse 103   Temp 36 °C (96.8 °F) (Skin)   Resp 18   Ht 1.753 m (5' 9\")   Wt 71.3 kg (157 lb 3 oz)   SpO2 99%   BMI 23.21 kg/m²    Pain Scale: 2 /10.    Vitals WNL, weight stable. Labs reviewed, PLT improved to 144, ANC 0.93. Lungs with some congestion in bilateral lung bases but no SOB, respirating well on RA.     Toxicity Assessment          12/5/2023    11:12 12/11/2023    11:52 12/18/2023    11:14 12/27/2023    11:03 1/3/2024    11:41   Toxicity Assessment   Adverse Events Reviewed (WDL) No (Exceptions to WDL) No (Exceptions to WDL) No (Exceptions to WDL) No (Exceptions to WDL) No (Exceptions to WDL)   Treatment Site Thoracic Thoracic Thoracic Thoracic Thoracic   Anorexia Grade 0 Grade 1       pt. with decreased appetite Grade 0 Grade 0 Grade 0   Anxiety Grade 1       pt taking ativan prior to each treatment       Dehydration Grade 0 Grade 0 Grade 0 Grade 0    Depression Grade 0 Grade 0      Dermatitis Radiation Grade 0   Grade 0 Grade 0   Diarrhea Grade 0       Fatigue Grade 1       pt with increased fatigue, pt napping during day Grade 1       pt. mildly fatigued Grade 1 Grade 1 Grade 1   Nausea Grade 0 Grade 0 Grade 0 Grade 0 " Grade 0   Pain Grade 0 Grade 0 Grade 0 Grade 0 Grade 0   Tumor Pain Grade 0       Vomiting Grade 0 Grade 0 Grade 0     Constipation Grade 0       Dysphagia Grade 0       Esophagitis Grade 0       Mucositis Oral Grade 0       Myocardial Infarction Grade 0       Pericardial Effusion Grade 0       Pericarditis Grade 0       Esophageal Obstruction Grade 0 Grade 0   Grade 0   Esophageal Pain Grade 0 Grade 0 Grade 0 Grade 0 Grade 0   Cough Grade 1       baseline coughing with white phlem Grade 1       chronic cough Grade 1 Grade 1 Grade 1   Dyspnea Grade 1       pt with SOB with exertion Grade 0 Grade 0 Grade 1 Grade 1   Epistaxis Grade 0       Hypoxia Grade 0 Grade 0 Grade 0 Grade 0 Grade 0        ASSESSMENT/PLAN:  The patient is tolerating radiation therapy as anticipated. Reviewed with patient that we will continue to watch his CBC for his white count and platelets, and despite not receiving chemotherapy, will continue with RT at this time. Encouraged patient to continue with diet modification if he has pain with swallowing, and if he has any increasing pain we can prescribe BMX.     Fred Alcantara MD  PGY-3 Radiation Oncology    For  Jorge Layne MD, MMM  [facility]  Faculty, [employer]  [email or web address]  Our [place]: “To Heal, To Teach, To Discover.”  RN partner: [email or web address]    Phone (scheduling): [phone]/ [email or web address]  Phone (office): [phone]  Phone (after hours): [phone]    ATTENDING ADDENDUM:  I saw and evaluated the patient with the resident. I personally obtained the key and critical portions of the history and physical exam, reviewed IGRT/dosimetry and directly counseled the patient of the treatment plan. I reviewed the resident documentation and discussed the patient with the resident. I agree with the resident/fellow's medical decision making as documented  in the note.

## 2024-01-04 ENCOUNTER — LAB (OUTPATIENT)
Dept: LAB | Facility: HOSPITAL | Age: 64
End: 2024-01-04
Payer: MEDICAID

## 2024-01-04 ENCOUNTER — HOSPITAL ENCOUNTER (OUTPATIENT)
Dept: RADIATION ONCOLOGY | Facility: HOSPITAL | Age: 64
Setting detail: RADIATION/ONCOLOGY SERIES
Discharge: HOME | End: 2024-01-04
Payer: MEDICAID

## 2024-01-04 ENCOUNTER — OFFICE VISIT (OUTPATIENT)
Dept: HEMATOLOGY/ONCOLOGY | Facility: HOSPITAL | Age: 64
End: 2024-01-04
Payer: MEDICAID

## 2024-01-04 VITALS
DIASTOLIC BLOOD PRESSURE: 56 MMHG | HEART RATE: 86 BPM | SYSTOLIC BLOOD PRESSURE: 97 MMHG | WEIGHT: 158.29 LBS | OXYGEN SATURATION: 96 % | BODY MASS INDEX: 23.38 KG/M2 | RESPIRATION RATE: 20 BRPM | TEMPERATURE: 97.7 F

## 2024-01-04 DIAGNOSIS — C34.12 PRIMARY CANCER OF LEFT UPPER LOBE OF LUNG (MULTI): Primary | ICD-10-CM

## 2024-01-04 DIAGNOSIS — C34.12 PRIMARY CANCER OF LEFT UPPER LOBE OF LUNG (MULTI): ICD-10-CM

## 2024-01-04 DIAGNOSIS — C34.12 MALIGNANT NEOPLASM OF UPPER LOBE, LEFT BRONCHUS OR LUNG (MULTI): ICD-10-CM

## 2024-01-04 DIAGNOSIS — Z51.0 ENCOUNTER FOR ANTINEOPLASTIC RADIATION THERAPY: ICD-10-CM

## 2024-01-04 DIAGNOSIS — Z51.11 ENCOUNTER FOR CHEMOTHERAPY MANAGEMENT: ICD-10-CM

## 2024-01-04 DIAGNOSIS — C34.12 MALIGNANT NEOPLASM OF UPPER LOBE OF LEFT LUNG (MULTI): ICD-10-CM

## 2024-01-04 LAB
ALBUMIN SERPL BCP-MCNC: 3.9 G/DL (ref 3.4–5)
ALP SERPL-CCNC: 55 U/L (ref 33–136)
ALT SERPL W P-5'-P-CCNC: 8 U/L (ref 10–52)
ANION GAP SERPL CALC-SCNC: 12 MMOL/L (ref 10–20)
AST SERPL W P-5'-P-CCNC: 14 U/L (ref 9–39)
BASOPHILS # BLD AUTO: 0.01 X10*3/UL (ref 0–0.1)
BASOPHILS NFR BLD AUTO: 0.6 %
BILIRUB SERPL-MCNC: 0.3 MG/DL (ref 0–1.2)
BUN SERPL-MCNC: 16 MG/DL (ref 6–23)
BURR CELLS BLD QL SMEAR: NORMAL
CALCIUM SERPL-MCNC: 9.5 MG/DL (ref 8.6–10.3)
CHLORIDE SERPL-SCNC: 99 MMOL/L (ref 98–107)
CO2 SERPL-SCNC: 27 MMOL/L (ref 21–32)
CREAT SERPL-MCNC: 0.75 MG/DL (ref 0.5–1.3)
EOSINOPHIL # BLD AUTO: 0.02 X10*3/UL (ref 0–0.7)
EOSINOPHIL NFR BLD AUTO: 1.1 %
ERYTHROCYTE [DISTWIDTH] IN BLOOD BY AUTOMATED COUNT: 15 % (ref 11.5–14.5)
GFR SERPL CREATININE-BSD FRML MDRD: >90 ML/MIN/1.73M*2
GLUCOSE SERPL-MCNC: 87 MG/DL (ref 74–99)
HCT VFR BLD AUTO: 30 % (ref 41–52)
HGB BLD-MCNC: 10.1 G/DL (ref 13.5–17.5)
IMM GRANULOCYTES # BLD AUTO: 0.01 X10*3/UL (ref 0–0.7)
IMM GRANULOCYTES NFR BLD AUTO: 0.6 % (ref 0–0.9)
LYMPHOCYTES # BLD AUTO: 0.57 X10*3/UL (ref 1.2–4.8)
LYMPHOCYTES NFR BLD AUTO: 32.2 %
MCH RBC QN AUTO: 32.9 PG (ref 26–34)
MCHC RBC AUTO-ENTMCNC: 33.7 G/DL (ref 32–36)
MCV RBC AUTO: 98 FL (ref 80–100)
MONOCYTES # BLD AUTO: 0.26 X10*3/UL (ref 0.1–1)
MONOCYTES NFR BLD AUTO: 14.7 %
NEUTROPHILS # BLD AUTO: 0.9 X10*3/UL (ref 1.2–7.7)
NEUTROPHILS NFR BLD AUTO: 50.8 %
NRBC BLD-RTO: 0 /100 WBCS (ref 0–0)
OVALOCYTES BLD QL SMEAR: NORMAL
PLATELET # BLD AUTO: 188 X10*3/UL (ref 150–450)
POLYCHROMASIA BLD QL SMEAR: NORMAL
POTASSIUM SERPL-SCNC: 4.4 MMOL/L (ref 3.5–5.3)
PROT SERPL-MCNC: 6.9 G/DL (ref 6.4–8.2)
RAD ONC MSQ ACTUAL FRACTIONS DELIVERED: 14
RAD ONC MSQ ACTUAL SESSION DELIVERED DOSE: 200 CGRAY
RAD ONC MSQ ACTUAL TOTAL DOSE: 2800 CGRAY
RAD ONC MSQ ELAPSED DAYS: 21
RAD ONC MSQ LAST DATE: NORMAL
RAD ONC MSQ PRESCRIBED FRACTIONAL DOSE: 200 CGRAY
RAD ONC MSQ PRESCRIBED NUMBER OF FRACTIONS: 18
RAD ONC MSQ PRESCRIBED TECHNIQUE: NORMAL
RAD ONC MSQ PRESCRIBED TOTAL DOSE: 3600 CGRAY
RAD ONC MSQ PRESCRIPTION PATTERN COMMENT: NORMAL
RAD ONC MSQ START DATE: NORMAL
RAD ONC MSQ TREATMENT COURSE NUMBER: 1
RAD ONC MSQ TREATMENT SITE: NORMAL
RBC # BLD AUTO: 3.07 X10*6/UL (ref 4.5–5.9)
RBC MORPH BLD: NORMAL
SCHISTOCYTES BLD QL SMEAR: NORMAL
SODIUM SERPL-SCNC: 134 MMOL/L (ref 136–145)
WBC # BLD AUTO: 1.8 X10*3/UL (ref 4.4–11.3)

## 2024-01-04 PROCEDURE — 80053 COMPREHEN METABOLIC PANEL: CPT

## 2024-01-04 PROCEDURE — 99214 OFFICE O/P EST MOD 30 MIN: CPT | Performed by: NURSE PRACTITIONER

## 2024-01-04 PROCEDURE — 77014 CHG CT GUIDANCE RADIATION THERAPY FLDS PLACEMENT: CPT | Performed by: RADIOLOGY

## 2024-01-04 PROCEDURE — 36415 COLL VENOUS BLD VENIPUNCTURE: CPT

## 2024-01-04 PROCEDURE — 85025 COMPLETE CBC W/AUTO DIFF WBC: CPT

## 2024-01-04 PROCEDURE — 77386 HC INTENSITY-MODULATED RADIATION THERAPY (IMRT), COMPLEX: CPT | Performed by: RADIOLOGY

## 2024-01-04 RX ORDER — ALBUTEROL SULFATE 0.83 MG/ML
3 SOLUTION RESPIRATORY (INHALATION) AS NEEDED
Status: CANCELLED | OUTPATIENT
Start: 2024-01-08

## 2024-01-04 RX ORDER — DIPHENHYDRAMINE HYDROCHLORIDE 50 MG/ML
50 INJECTION INTRAMUSCULAR; INTRAVENOUS AS NEEDED
Status: CANCELLED | OUTPATIENT
Start: 2024-01-08

## 2024-01-04 RX ORDER — EPINEPHRINE 0.3 MG/.3ML
0.3 INJECTION SUBCUTANEOUS EVERY 5 MIN PRN
Status: CANCELLED | OUTPATIENT
Start: 2024-01-08

## 2024-01-04 RX ORDER — FAMOTIDINE 10 MG/ML
20 INJECTION INTRAVENOUS ONCE
Status: CANCELLED | OUTPATIENT
Start: 2024-01-08

## 2024-01-04 RX ORDER — PROCHLORPERAZINE MALEATE 10 MG
10 TABLET ORAL EVERY 6 HOURS PRN
Status: CANCELLED | OUTPATIENT
Start: 2024-01-08

## 2024-01-04 RX ORDER — PROCHLORPERAZINE EDISYLATE 5 MG/ML
10 INJECTION INTRAMUSCULAR; INTRAVENOUS EVERY 6 HOURS PRN
Status: CANCELLED | OUTPATIENT
Start: 2024-01-08

## 2024-01-04 RX ORDER — PALONOSETRON 0.05 MG/ML
0.25 INJECTION, SOLUTION INTRAVENOUS ONCE
Status: CANCELLED | OUTPATIENT
Start: 2024-01-08

## 2024-01-04 RX ORDER — FAMOTIDINE 10 MG/ML
20 INJECTION INTRAVENOUS ONCE AS NEEDED
Status: CANCELLED | OUTPATIENT
Start: 2024-01-08

## 2024-01-04 RX ORDER — DIPHENHYDRAMINE HCL 50 MG
50 CAPSULE ORAL ONCE
Status: CANCELLED | OUTPATIENT
Start: 2024-01-08

## 2024-01-04 ASSESSMENT — COLUMBIA-SUICIDE SEVERITY RATING SCALE - C-SSRS
2. HAVE YOU ACTUALLY HAD ANY THOUGHTS OF KILLING YOURSELF?: NO
6. HAVE YOU EVER DONE ANYTHING, STARTED TO DO ANYTHING, OR PREPARED TO DO ANYTHING TO END YOUR LIFE?: NO
1. IN THE PAST MONTH, HAVE YOU WISHED YOU WERE DEAD OR WISHED YOU COULD GO TO SLEEP AND NOT WAKE UP?: NO

## 2024-01-04 ASSESSMENT — ENCOUNTER SYMPTOMS
LOSS OF SENSATION IN FEET: 0
DEPRESSION: 0
OCCASIONAL FEELINGS OF UNSTEADINESS: 0

## 2024-01-04 ASSESSMENT — PAIN SCALES - GENERAL: PAINLEVEL: 0-NO PAIN

## 2024-01-04 NOTE — PROGRESS NOTES
[facility] - Medical Oncology Follow-Up Visit    Patient ID: Martínez Neri is a 63 y.o. male      Current therapy: CARBOplatin (Paraplatin) 240 mg in sodium chloride 0.9% 124 mL IV, 240 mg (87.3 % of original dose 271.4 mg), intravenous, Once, 1 of 1 cycle    Dose modification: 237 mg (original dose 271.4 mg, Cycle 1, Reason: Protocol Driven), 271 mg (original dose 271.4 mg, Cycle 1, Reason: Protocol Driven)    Administration: 240 mg (11/20/2023), 271.4 mg (12/4/2023), 271.4 mg (12/12/2023), 271.4 mg (12/18/2023), 271 mg (11/27/2023)        PACLitaxeL (Taxol) 84 mg in dextrose 5 % in water (D5W) 114 mL IV, 45 mg/m2 = 84 mg, intravenous, Once, 1 of 1 cycle    Administration: 84 mg (11/20/2023), 84 mg (11/27/2023), 84 mg (12/4/2023), 84 mg (12/12/2023), 84 mg (12/18/2023)        palonosetron (Aloxi) injection 250 mcg, 250 mcg, intravenous, Once, 1 of 1 cycle    Administration: 250 mcg (11/20/2023), 250 mcg (11/27/2023), 250 mcg (12/4/2023), 250 mcg (12/12/2023), 250 mcg (12/18/2023)      Oncologic History:     DIAGNOSIS AND STAGING  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    PD-L1 TPS 20%      PRIOR THERAPIES  None         CURRENT THERAPY  concurrent chemo-RT using weekly carbo/taxol followed by consolidative durvalumab         CURRENT ONCOLOGICAL PROBLEMS  Cough         HISTORY OF PRESENT ILLNESS:  Current smoker (50-pack-year history), with history of COPD and seizures, who was diagnosed with a squamous cell carcinoma of the left upper lobe in [place], never treated.  Outpatient notes from [facility] cancer care and hematology clinic were reviewed.     On 06/17/2023 the patient had a CT chest for ongoing respiratory symptoms and was  found to have a left suprahilar mass involving the left upper lobe bronchi with postobstructive pneumonia/collapse.  On 06/25/2023 CT chest/abdomen/pelvis demonstrated persistent masslike consolidation in the left upper lobe with no signs of extrathoracic metastatic disease.     On 06/26/2023 the patient underwent a bronchoscopy, and pathology was compatible with a squamous cell carcinoma at least in situ, with a foci highly suspicious for invasive squamous cell carcinoma.     On 07/18/2023 a PET/CT obtained, demonstrating a 6.6 cm left upper lobe mass with an SUV of 8.3 and no evidence of distant metastatic disease.     On 07/18/2023 to brain MRI was negative for intracranial metastasis.     On 07/27/2023 bronchoscopy/EBUS was performed.  Endobronchial biopsies of left upper lobe demonstrated an invasive moderately differentiated squamous cell carcinoma.  Left upper lobe hilar FNA showed atypical cells with squamous differentiation, suspicious for malignancy.      Subcarinal FNA was indeterminate for malignancy with extremely rare atypical cells with squamous differentiation.  Molecular profile: Lack of BRAF, EGFR, HER2, KRAS, MET, ALK, NTRK 1/2/3, RET or ROS1 alterations.  PD-L1 TPS 20%.  The patient was seen by medical oncology and did not too frail for combined chemo RT (there is a performance status ECOG 3) documented on 08/17/2023.  At that time, he lived with some friends, while his family was primarily located in [place].     His daughter traveled and was able to relocate him to [place], and he presents today to clinic accompanied by his daughter, son, and daughter-in-law.     The patient states he is felt much better since his relocation, has been gaining weight, approximately 10 pounds since he moved here.  He is more active, going up and down the steps at home, but still tired and taking some breaks throughout the day.     He denies any headaches or new neurological symptoms.  His respiratory symptoms are  stable. There is no new localized pain.     Reported molecular and ancillary testing performed upon diagnosis in [place]:   BRAF negative  [id number] negative  EGFR classic mutations negative  HER2 negative  MET negative  ALK negative  In tract 1/2/3 negative  RET negative  ROS1 negative  PD-L1 20%     Referred to surgery - PFTs were considered prohibitive for surgical resection-      10/31/23: Repeat EBUS for systematic staging:  Final Cytological Interpretation      A. LYMPH NODE 7 PULMONARY FINE NEEDLE ASPIRATION, CYTOLOGY AND CELL BLOCK:  --  No malignant cells identified.  --  Lymphoid sample.     B. LYMPH NODE 4 L PULMONARY FINE NEEDLE ASPIRATION, CYTOLOGY AND CELL BLOCK:  --  No malignant cells identified.  --  Lymphoid sample.           C. LYMPH NODE 11 L PULMONARY FINE NEEDLE ASPIRATION, CYTOLOGY AND CELL BLOCK:  --  A rare cluster of malignant cells derived from squamous cell carcinoma, see note  -- Also, please refer to concurrent biopsy surgical pathology report ([id number]).     Note:  The malignant squamous cell carcinoma cells are represented in the cell block only.  Immunostain for p40 is positive. The cytomorphology and immunoprofile support the above diagnosis.  The material is not sufficient for molecular testing.      Component     FINAL DIAGNOSIS   Left lung, upper lobe, biopsy:  -- Superficial fragment of squamous cell carcinoma, keratinizing; see note.     Note: According to clinician's note, PD-L1 and NGS have been performed at an outside institution. These studies can be repeated, if clinically indicated.      NGS and PD-L1 not repeated in this specimen as it was done in outside institution already and the purpose of this procedure was not for diagnosis but systematic mediastinal re-staging.      PAST MEDICAL HISTORY  Brain aneurysm  COPD (chronic obstructive pulmonary disease) (CMS/HCC)  Epilepsy (CMS/HCC)  High cholesterol  History of alcohol use  Hypertension     PAST SURGICAL  HISTORY:  CAROTID ENDARTERECTOMY  COLONOSCOPY  HERNIA REPAIR      SOCIAL HISTORY  Tobacco (50 pack-year history) use - quit after cancer dx      CURRENT MEDS REVIEWED:  Scheduled medications  Continuous medications  PRN medications     ALLERGIES  NKDA     FAMILY HISTORY  Father had prostate CA        Chief Concern: Here in clinic for follow-up on concurrent chemo/RT with weekly Carbo/Taxol (week 7)    HPI Patient is here today in clinic with his wife for follow-up on concurrent chemo/RT with weekly Carbo/Taxol (week 7). Treatment held this week d/t decreased ANC. Fatigued, not interfering with ADL's. Mildly hypotensive today upon arrival, states he is staying well hydrated, declined IVF today. Breathing stable, no CP or SOB. States his skin is intact. No new aches/pains.       Meds (Current):    Current Outpatient Medications:     albuterol (Proventil HFA) 90 mcg/actuation inhaler, Inhale 2 puffs every 4 hours if needed for wheezing or shortness of breath., Disp: 6.7 g, Rfl: 6    atorvastatin (Lipitor) 40 mg tablet, Take 1 tablet (40 mg) by mouth once daily., Disp: 90 tablet, Rfl: 1    cholecalciferol (Vitamin D-3) 125 MCG (5000 UT) capsule, , Disp: , Rfl:     divalproex (Depakote ER) 250 mg 24 hr tablet, Take 1 tablet (250 mg) by mouth 2 times a day. Do not crush, chew, or split., Disp: 90 tablet, Rfl: 1    divalproex (Depakote ER) 500 mg 24 hr tablet, Take 1 tablet (500 mg) by mouth 2 times a day. Do not crush, chew, or split., Disp: 90 tablet, Rfl: 1    inhalat.spacing dev,med. mask spacer, , Disp: , Rfl:     ipratropium-albuteroL (Duo-Neb) 0.5-2.5 mg/3 mL nebulizer solution, Take 3 mL by nebulization 4 times a day as needed for wheezing or shortness of breath., Disp: 90 mL, Rfl: 1    lacosamide (Vimpat) 200 mg tablet tablet, Take 1 tablet (200 mg) by mouth 2 times a day., Disp: 30 tablet, Rfl: 5    LORazepam (Ativan) 0.5 mg tablet, Take 1 tablet (0.5 mg) by mouth once daily as needed for anxiety for up to 5  days., Disp: 5 tablet, Rfl: 0    nebulizer accessories misc, Use twice daily, Disp: 100 each, Rfl: 0    nebulizer and compressor device, Use as directed, Disp: 1 each, Rfl: 0    nicotine (Nicoderm CQ) 21 mg/24 hr patch, Place 1 patch over 24 hours on the skin once every 24 hours., Disp: 30 patch, Rfl: 0    ondansetron (Zofran) 8 mg tablet, Take 1 tablet (8 mg) by mouth every 8 hours if needed for nausea or vomiting., Disp: 30 tablet, Rfl: 5    prochlorperazine (Compazine) 10 mg tablet, Take 1 tablet (10 mg) by mouth every 6 hours if needed for nausea or vomiting., Disp: 30 tablet, Rfl: 5    Spiriva Respimat 2.5 mcg/actuation inhaler, , Disp: , Rfl:     thiamine (Vitamin B-1) 100 mg tablet, Take 1 tablet (100 mg) by mouth once daily., Disp: 90 tablet, Rfl: 1    Review of Systems - Oncology 12 point ROS was obtained and negative unless otherwise mentioned in the above HPI.      Objective   BSA: 1.87 meters squared  Wt Readings from Last 5 Encounters:   01/04/24 71.8 kg (158 lb 4.6 oz)   01/03/24 71.3 kg (157 lb 3 oz)   12/27/23 70.6 kg (155 lb 10.3 oz)   12/26/23 71.5 kg (157 lb 11.2 oz)   12/19/23 69.4 kg (153 lb)     BP 97/56 Comment: standing bp is 93/56 pulse 98  Pulse 86   Temp 36.5 °C (97.7 °F) (Core)   Resp 20   Wt 71.8 kg (158 lb 4.6 oz)   SpO2 96%   BMI 23.38 kg/m²          Physical Exam  Constitutional:       Appearance: Normal appearance.   Eyes:      Conjunctiva/sclera: Conjunctivae normal.      Pupils: Pupils are equal, round, and reactive to light.   Cardiovascular:      Rate and Rhythm: Normal rate and regular rhythm.   Pulmonary:      Effort: Pulmonary effort is normal.      Breath sounds: Normal breath sounds.   Abdominal:      General: Bowel sounds are normal.      Palpations: Abdomen is soft.   Musculoskeletal:         General: No swelling.      Right lower leg: No edema.      Left lower leg: No edema.   Skin:     General: Skin is warm and dry.   Neurological:      General: No focal deficit  present.      Mental Status: He is alert and oriented to person, place, and time.   Psychiatric:         Mood and Affect: Mood normal.         Behavior: Behavior normal.        Results:  Labs:  Lab Results   Component Value Date    WBC 1.8 (L) 01/04/2024    HGB 10.1 (L) 01/04/2024    HCT 30.0 (L) 01/04/2024    MCV 98 01/04/2024     01/04/2024      Lab Results   Component Value Date    NEUTROABS 0.90 (L) 01/04/2024      Lab Results   Component Value Date    GLUCOSE 87 01/04/2024    CALCIUM 9.5 01/04/2024     (L) 01/04/2024    K 4.4 01/04/2024    CO2 27 01/04/2024    CL 99 01/04/2024    BUN 16 01/04/2024    CREATININE 0.75 01/04/2024     Lab Results   Component Value Date    ALT 8 (L) 01/04/2024    AST 14 01/04/2024    ALKPHOS 55 01/04/2024    BILITOT 0.3 01/04/2024      Lab Results   Component Value Date    TSH 2.27 12/26/2023       Imaging:           === Results for orders placed during the hospital encounter of 10/13/23 ===    MR brain w and wo IV contrast [[id number]] 10/13/2023    Status: Normal  1. No abnormal intracranial enhancement or mass to suggest metastases.    2. Chronic intracranial findings as above.    I personally reviewed the images/study and I agree with the findings  as stated. This study was interpreted at [facility], [place], [place].    Signed by: Dharmesh Reddy 10/13/2023 8:27 PM  Dictation workstation:   [id number]    Assessment/Plan      Martínez Neri is a 63 y.o. male here for follow up     Reviewed with the patient the treatment plan consisting of concurrent chemo-RT.  This will be followed by consolidative durvalumab.  We discussed potential side effects related to carboplatin and paclitaxel weekly and he was able to sign a consent.  He has been seen by radiation oncology (Dr. Layne) and underwent CT simulation.  RT start date 11/20/23 and C1 Carbo/Taxol   -RTC weekly for follow-up  -[facility] paperwork filled out for spouse, signed, faxed and  original copy given to patient/daughter  -12/26/23 Treatment held today for neutropenia (ANC 1.36) and thrombocytopenia (platelets 80)  -RTC next week (week 8), anticipated completion of radiation 1/10/24, will arrange for treatment Monday/Tuesday next week if labs allow.

## 2024-01-05 ENCOUNTER — DOCUMENTATION (OUTPATIENT)
Dept: RADIATION ONCOLOGY | Facility: HOSPITAL | Age: 64
End: 2024-01-05

## 2024-01-05 ENCOUNTER — APPOINTMENT (OUTPATIENT)
Dept: CARDIOLOGY | Facility: HOSPITAL | Age: 64
End: 2024-01-05
Payer: MEDICAID

## 2024-01-05 ENCOUNTER — APPOINTMENT (OUTPATIENT)
Dept: RADIOLOGY | Facility: HOSPITAL | Age: 64
End: 2024-01-05
Payer: MEDICAID

## 2024-01-05 ENCOUNTER — HOSPITAL ENCOUNTER (INPATIENT)
Facility: HOSPITAL | Age: 64
LOS: 4 days | Discharge: HOME | End: 2024-01-12
Attending: INTERNAL MEDICINE | Admitting: INTERNAL MEDICINE
Payer: MEDICAID

## 2024-01-05 ENCOUNTER — HOSPITAL ENCOUNTER (OUTPATIENT)
Dept: RADIATION ONCOLOGY | Facility: HOSPITAL | Age: 64
Setting detail: RADIATION/ONCOLOGY SERIES
Discharge: HOME | End: 2024-01-05
Payer: MEDICAID

## 2024-01-05 ENCOUNTER — OFFICE VISIT (OUTPATIENT)
Dept: PRIMARY CARE | Facility: CLINIC | Age: 64
End: 2024-01-05
Payer: MEDICAID

## 2024-01-05 VITALS
BODY MASS INDEX: 23.85 KG/M2 | HEIGHT: 69 IN | WEIGHT: 161 LBS | DIASTOLIC BLOOD PRESSURE: 74 MMHG | SYSTOLIC BLOOD PRESSURE: 128 MMHG

## 2024-01-05 DIAGNOSIS — J44.9 CHRONIC OBSTRUCTIVE PULMONARY DISEASE, UNSPECIFIED COPD TYPE (MULTI): Primary | ICD-10-CM

## 2024-01-05 DIAGNOSIS — R53.1 GENERALIZED WEAKNESS: Primary | ICD-10-CM

## 2024-01-05 DIAGNOSIS — C34.90 MALIGNANT NEOPLASM OF LUNG, UNSPECIFIED LATERALITY, UNSPECIFIED PART OF LUNG (MULTI): ICD-10-CM

## 2024-01-05 DIAGNOSIS — Z51.0 ENCOUNTER FOR ANTINEOPLASTIC RADIATION THERAPY: ICD-10-CM

## 2024-01-05 DIAGNOSIS — C34.12 MALIGNANT NEOPLASM OF UPPER LOBE OF LEFT LUNG (MULTI): ICD-10-CM

## 2024-01-05 DIAGNOSIS — E78.00 HYPERCHOLESTEROLEMIA: ICD-10-CM

## 2024-01-05 DIAGNOSIS — C34.12 MALIGNANT NEOPLASM OF UPPER LOBE, LEFT BRONCHUS OR LUNG (MULTI): ICD-10-CM

## 2024-01-05 DIAGNOSIS — R50.9 FEVER, UNSPECIFIED FEVER CAUSE: ICD-10-CM

## 2024-01-05 DIAGNOSIS — D70.9 NEUTROPENIC FEVER (CMS-HCC): ICD-10-CM

## 2024-01-05 DIAGNOSIS — D72.819 LEUKOPENIA, UNSPECIFIED TYPE: ICD-10-CM

## 2024-01-05 DIAGNOSIS — R50.81 NEUTROPENIC FEVER (CMS-HCC): ICD-10-CM

## 2024-01-05 DIAGNOSIS — R56.9 SEIZURES (MULTI): ICD-10-CM

## 2024-01-05 LAB
ALBUMIN SERPL-MCNC: 3.9 G/DL (ref 3.5–5)
ALP BLD-CCNC: 62 U/L (ref 35–125)
ALT SERPL-CCNC: 9 U/L (ref 5–40)
ANION GAP SERPL CALC-SCNC: 11 MMOL/L
APPEARANCE UR: CLEAR
AST SERPL-CCNC: 19 U/L (ref 5–40)
BASOPHILS # BLD MANUAL: 0 X10*3/UL (ref 0–0.1)
BASOPHILS NFR BLD MANUAL: 0 %
BILIRUB SERPL-MCNC: 0.3 MG/DL (ref 0.1–1.2)
BILIRUB UR STRIP.AUTO-MCNC: NEGATIVE MG/DL
BUN SERPL-MCNC: 15 MG/DL (ref 8–25)
CALCIUM SERPL-MCNC: 9.5 MG/DL (ref 8.5–10.4)
CHLORIDE SERPL-SCNC: 96 MMOL/L (ref 97–107)
CO2 SERPL-SCNC: 22 MMOL/L (ref 24–31)
COLOR UR: YELLOW
CREAT SERPL-MCNC: 0.7 MG/DL (ref 0.4–1.6)
EOSINOPHIL # BLD MANUAL: 0 X10*3/UL (ref 0–0.7)
EOSINOPHIL NFR BLD MANUAL: 0 %
ERYTHROCYTE [DISTWIDTH] IN BLOOD BY AUTOMATED COUNT: 15.6 % (ref 11.5–14.5)
FLUAV RNA RESP QL NAA+PROBE: NOT DETECTED
FLUBV RNA RESP QL NAA+PROBE: NOT DETECTED
GFR SERPL CREATININE-BSD FRML MDRD: >90 ML/MIN/1.73M*2
GLUCOSE SERPL-MCNC: 142 MG/DL (ref 65–99)
GLUCOSE UR STRIP.AUTO-MCNC: NORMAL MG/DL
HCT VFR BLD AUTO: 26.4 % (ref 41–52)
HGB BLD-MCNC: 9.2 G/DL (ref 13.5–17.5)
IMM GRANULOCYTES # BLD AUTO: 0.01 X10*3/UL (ref 0–0.7)
IMM GRANULOCYTES NFR BLD AUTO: 0.6 % (ref 0–0.9)
KETONES UR STRIP.AUTO-MCNC: NEGATIVE MG/DL
LACTATE BLDV-SCNC: 0.8 MMOL/L (ref 0.4–2)
LACTATE BLDV-SCNC: 2.7 MMOL/L (ref 0.4–2)
LEUKOCYTE ESTERASE UR QL STRIP.AUTO: NEGATIVE
LYMPHOCYTES # BLD MANUAL: 0.18 X10*3/UL (ref 1.2–4.8)
LYMPHOCYTES NFR BLD MANUAL: 11 %
MCH RBC QN AUTO: 33.6 PG (ref 26–34)
MCHC RBC AUTO-ENTMCNC: 34.8 G/DL (ref 32–36)
MCV RBC AUTO: 96 FL (ref 80–100)
MONOCYTES # BLD MANUAL: 0.18 X10*3/UL (ref 0.1–1)
MONOCYTES NFR BLD MANUAL: 11 %
NEUTROPHILS # BLD MANUAL: 1.24 X10*3/UL (ref 1.2–7.7)
NEUTS BAND # BLD MANUAL: 0.14 X10*3/UL (ref 0–0.7)
NEUTS BAND NFR BLD MANUAL: 9 %
NEUTS SEG # BLD MANUAL: 1.1 X10*3/UL (ref 1.2–7)
NEUTS SEG NFR BLD MANUAL: 69 %
NITRITE UR QL STRIP.AUTO: NEGATIVE
NRBC BLD-RTO: 0 /100 WBCS (ref 0–0)
PH UR STRIP.AUTO: 7.5 [PH]
PLATELET # BLD AUTO: 200 X10*3/UL (ref 150–450)
POLYCHROMASIA BLD QL SMEAR: ABNORMAL
POTASSIUM SERPL-SCNC: 4.3 MMOL/L (ref 3.4–5.1)
PROT SERPL-MCNC: 7.3 G/DL (ref 5.9–7.9)
PROT UR STRIP.AUTO-MCNC: NORMAL MG/DL
RAD ONC MSQ ACTUAL FRACTIONS DELIVERED: 15
RAD ONC MSQ ACTUAL SESSION DELIVERED DOSE: 200 CGRAY
RAD ONC MSQ ACTUAL TOTAL DOSE: 3000 CGRAY
RAD ONC MSQ ELAPSED DAYS: 22
RAD ONC MSQ LAST DATE: NORMAL
RAD ONC MSQ PRESCRIBED FRACTIONAL DOSE: 200 CGRAY
RAD ONC MSQ PRESCRIBED NUMBER OF FRACTIONS: 18
RAD ONC MSQ PRESCRIBED TECHNIQUE: NORMAL
RAD ONC MSQ PRESCRIBED TOTAL DOSE: 3600 CGRAY
RAD ONC MSQ PRESCRIPTION PATTERN COMMENT: NORMAL
RAD ONC MSQ START DATE: NORMAL
RAD ONC MSQ TREATMENT COURSE NUMBER: 1
RAD ONC MSQ TREATMENT SITE: NORMAL
RBC # BLD AUTO: 2.74 X10*6/UL (ref 4.5–5.9)
RBC # UR STRIP.AUTO: NEGATIVE /UL
RBC #/AREA URNS AUTO: NORMAL /HPF
RBC MORPH BLD: ABNORMAL
ROULEAUX BLD QL SMEAR: PRESENT
RSV RNA RESP QL NAA+PROBE: NOT DETECTED
SARS-COV-2 RNA RESP QL NAA+PROBE: NOT DETECTED
SODIUM SERPL-SCNC: 129 MMOL/L (ref 133–145)
SP GR UR STRIP.AUTO: 1.02
TOTAL CELLS COUNTED BLD: 100
UROBILINOGEN UR STRIP.AUTO-MCNC: NORMAL MG/DL
WBC # BLD AUTO: 1.6 X10*3/UL (ref 4.4–11.3)
WBC #/AREA URNS AUTO: NORMAL /HPF

## 2024-01-05 PROCEDURE — 93005 ELECTROCARDIOGRAM TRACING: CPT

## 2024-01-05 PROCEDURE — 36415 COLL VENOUS BLD VENIPUNCTURE: CPT | Performed by: PHYSICIAN ASSISTANT

## 2024-01-05 PROCEDURE — 2500000004 HC RX 250 GENERAL PHARMACY W/ HCPCS (ALT 636 FOR OP/ED): Performed by: PHYSICIAN ASSISTANT

## 2024-01-05 PROCEDURE — 71045 X-RAY EXAM CHEST 1 VIEW: CPT

## 2024-01-05 PROCEDURE — 85027 COMPLETE CBC AUTOMATED: CPT | Performed by: PHYSICIAN ASSISTANT

## 2024-01-05 PROCEDURE — G0378 HOSPITAL OBSERVATION PER HR: HCPCS

## 2024-01-05 PROCEDURE — 85007 BL SMEAR W/DIFF WBC COUNT: CPT | Performed by: PHYSICIAN ASSISTANT

## 2024-01-05 PROCEDURE — 99285 EMERGENCY DEPT VISIT HI MDM: CPT

## 2024-01-05 PROCEDURE — 81001 URINALYSIS AUTO W/SCOPE: CPT | Performed by: PHYSICIAN ASSISTANT

## 2024-01-05 PROCEDURE — 99213 OFFICE O/P EST LOW 20 MIN: CPT | Performed by: INTERNAL MEDICINE

## 2024-01-05 PROCEDURE — 96365 THER/PROPH/DIAG IV INF INIT: CPT

## 2024-01-05 PROCEDURE — 83605 ASSAY OF LACTIC ACID: CPT | Performed by: PHYSICIAN ASSISTANT

## 2024-01-05 PROCEDURE — 77386 HC INTENSITY-MODULATED RADIATION THERAPY (IMRT), COMPLEX: CPT | Performed by: RADIOLOGY

## 2024-01-05 PROCEDURE — 87637 SARSCOV2&INF A&B&RSV AMP PRB: CPT | Performed by: PHYSICIAN ASSISTANT

## 2024-01-05 PROCEDURE — 77014 CHG CT GUIDANCE RADIATION THERAPY FLDS PLACEMENT: CPT | Performed by: RADIOLOGY

## 2024-01-05 PROCEDURE — 84075 ASSAY ALKALINE PHOSPHATASE: CPT | Performed by: PHYSICIAN ASSISTANT

## 2024-01-05 PROCEDURE — 71045 X-RAY EXAM CHEST 1 VIEW: CPT | Mod: FOREIGN READ | Performed by: RADIOLOGY

## 2024-01-05 PROCEDURE — 93010 ELECTROCARDIOGRAM REPORT: CPT | Performed by: INTERNAL MEDICINE

## 2024-01-05 PROCEDURE — 87040 BLOOD CULTURE FOR BACTERIA: CPT | Mod: TRILAB | Performed by: PHYSICIAN ASSISTANT

## 2024-01-05 RX ORDER — ATORVASTATIN CALCIUM 40 MG/1
40 TABLET, FILM COATED ORAL DAILY
Qty: 90 TABLET | Refills: 1 | Status: SHIPPED | OUTPATIENT
Start: 2024-01-05

## 2024-01-05 RX ORDER — ACETAMINOPHEN 325 MG/1
975 TABLET ORAL ONCE
Status: COMPLETED | OUTPATIENT
Start: 2024-01-05 | End: 2024-01-05

## 2024-01-05 RX ADMIN — ACETAMINOPHEN 975 MG: 325 TABLET ORAL at 20:54

## 2024-01-05 RX ADMIN — PIPERACILLIN SODIUM AND TAZOBACTAM SODIUM 3.38 G: 3; .375 INJECTION, SOLUTION INTRAVENOUS at 21:20

## 2024-01-05 RX ADMIN — SODIUM CHLORIDE 1000 ML: 900 INJECTION, SOLUTION INTRAVENOUS at 20:54

## 2024-01-05 ASSESSMENT — ENCOUNTER SYMPTOMS
OCCASIONAL FEELINGS OF UNSTEADINESS: 0
LOSS OF SENSATION IN FEET: 0
DEPRESSION: 0

## 2024-01-05 ASSESSMENT — PAIN SCALES - GENERAL
PAINLEVEL_OUTOF10: 8
PAINLEVEL_OUTOF10: 4

## 2024-01-05 ASSESSMENT — PAIN - FUNCTIONAL ASSESSMENT
PAIN_FUNCTIONAL_ASSESSMENT: 0-10
PAIN_FUNCTIONAL_ASSESSMENT: 0-10

## 2024-01-05 ASSESSMENT — COLUMBIA-SUICIDE SEVERITY RATING SCALE - C-SSRS
2. HAVE YOU ACTUALLY HAD ANY THOUGHTS OF KILLING YOURSELF?: NO
1. IN THE PAST MONTH, HAVE YOU WISHED YOU WERE DEAD OR WISHED YOU COULD GO TO SLEEP AND NOT WAKE UP?: NO
6. HAVE YOU EVER DONE ANYTHING, STARTED TO DO ANYTHING, OR PREPARED TO DO ANYTHING TO END YOUR LIFE?: NO

## 2024-01-06 LAB
ALBUMIN SERPL-MCNC: 3.2 G/DL (ref 3.5–5)
ALP BLD-CCNC: 49 U/L (ref 35–125)
ALT SERPL-CCNC: 8 U/L (ref 5–40)
ANION GAP SERPL CALC-SCNC: 12 MMOL/L
APPEARANCE UR: CLEAR
AST SERPL-CCNC: 24 U/L (ref 5–40)
BASOPHILS # BLD MANUAL: 0.01 X10*3/UL (ref 0–0.1)
BASOPHILS NFR BLD MANUAL: 1 %
BILIRUB SERPL-MCNC: 0.3 MG/DL (ref 0.1–1.2)
BILIRUB UR STRIP.AUTO-MCNC: NEGATIVE MG/DL
BUN SERPL-MCNC: 12 MG/DL (ref 8–25)
CALCIUM SERPL-MCNC: 8.9 MG/DL (ref 8.5–10.4)
CHLORIDE SERPL-SCNC: 100 MMOL/L (ref 97–107)
CO2 SERPL-SCNC: 21 MMOL/L (ref 24–31)
COLOR UR: NORMAL
CREAT SERPL-MCNC: 0.7 MG/DL (ref 0.4–1.6)
EOSINOPHIL # BLD MANUAL: 0 X10*3/UL (ref 0–0.7)
EOSINOPHIL NFR BLD MANUAL: 0 %
ERYTHROCYTE [DISTWIDTH] IN BLOOD BY AUTOMATED COUNT: 15.8 % (ref 11.5–14.5)
GFR SERPL CREATININE-BSD FRML MDRD: >90 ML/MIN/1.73M*2
GLUCOSE BLD MANUAL STRIP-MCNC: 111 MG/DL (ref 74–99)
GLUCOSE BLD MANUAL STRIP-MCNC: 111 MG/DL (ref 74–99)
GLUCOSE BLD MANUAL STRIP-MCNC: 119 MG/DL (ref 74–99)
GLUCOSE BLD MANUAL STRIP-MCNC: 85 MG/DL (ref 74–99)
GLUCOSE BLD MANUAL STRIP-MCNC: 95 MG/DL (ref 74–99)
GLUCOSE SERPL-MCNC: 109 MG/DL (ref 65–99)
GLUCOSE UR STRIP.AUTO-MCNC: NORMAL MG/DL
HCT VFR BLD AUTO: 25.3 % (ref 41–52)
HGB BLD-MCNC: 8.4 G/DL (ref 13.5–17.5)
IMM GRANULOCYTES # BLD AUTO: 0.01 X10*3/UL (ref 0–0.7)
IMM GRANULOCYTES NFR BLD AUTO: 0.7 % (ref 0–0.9)
KETONES UR STRIP.AUTO-MCNC: NEGATIVE MG/DL
LEUKOCYTE ESTERASE UR QL STRIP.AUTO: NEGATIVE
LYMPHOCYTES # BLD MANUAL: 0.13 X10*3/UL (ref 1.2–4.8)
LYMPHOCYTES NFR BLD MANUAL: 10 %
MCH RBC QN AUTO: 32.7 PG (ref 26–34)
MCHC RBC AUTO-ENTMCNC: 33.2 G/DL (ref 32–36)
MCV RBC AUTO: 98 FL (ref 80–100)
METAMYELOCYTES # BLD MANUAL: 0.01 X10*3/UL
METAMYELOCYTES NFR BLD MANUAL: 1 %
MONOCYTES # BLD MANUAL: 0.13 X10*3/UL (ref 0.1–1)
MONOCYTES NFR BLD MANUAL: 10 %
MYELOCYTES # BLD MANUAL: 0.03 X10*3/UL
MYELOCYTES NFR BLD MANUAL: 2 %
NEUTROPHILS # BLD MANUAL: 0.99 X10*3/UL (ref 1.2–7.7)
NEUTS BAND # BLD MANUAL: 0.61 X10*3/UL (ref 0–0.7)
NEUTS BAND NFR BLD MANUAL: 47 %
NEUTS SEG # BLD MANUAL: 0.38 X10*3/UL (ref 1.2–7)
NEUTS SEG NFR BLD MANUAL: 29 %
NEUTS VAC BLD QL SMEAR: PRESENT
NITRITE UR QL STRIP.AUTO: NEGATIVE
NRBC BLD-RTO: 0 /100 WBCS (ref 0–0)
PH UR STRIP.AUTO: 7 [PH]
PLATELET # BLD AUTO: 178 X10*3/UL (ref 150–450)
POLYCHROMASIA BLD QL SMEAR: ABNORMAL
POTASSIUM SERPL-SCNC: 4 MMOL/L (ref 3.4–5.1)
PROT SERPL-MCNC: 6.2 G/DL (ref 5.9–7.9)
PROT UR STRIP.AUTO-MCNC: NEGATIVE MG/DL
RBC # BLD AUTO: 2.57 X10*6/UL (ref 4.5–5.9)
RBC # UR STRIP.AUTO: NEGATIVE /UL
RBC MORPH BLD: ABNORMAL
SODIUM SERPL-SCNC: 133 MMOL/L (ref 133–145)
SP GR UR STRIP.AUTO: 1.02
TOTAL CELLS COUNTED BLD: 100
UROBILINOGEN UR STRIP.AUTO-MCNC: NORMAL MG/DL
VANCOMYCIN SERPL-MCNC: 13 UG/ML (ref 10–20)
WBC # BLD AUTO: 1.3 X10*3/UL (ref 4.4–11.3)

## 2024-01-06 PROCEDURE — 2500000002 HC RX 250 W HCPCS SELF ADMINISTERED DRUGS (ALT 637 FOR MEDICARE OP, ALT 636 FOR OP/ED): Performed by: INTERNAL MEDICINE

## 2024-01-06 PROCEDURE — G0378 HOSPITAL OBSERVATION PER HR: HCPCS

## 2024-01-06 PROCEDURE — 97165 OT EVAL LOW COMPLEX 30 MIN: CPT | Mod: GO

## 2024-01-06 PROCEDURE — 96367 TX/PROPH/DG ADDL SEQ IV INF: CPT

## 2024-01-06 PROCEDURE — 85007 BL SMEAR W/DIFF WBC COUNT: CPT | Performed by: INTERNAL MEDICINE

## 2024-01-06 PROCEDURE — 96375 TX/PRO/DX INJ NEW DRUG ADDON: CPT

## 2024-01-06 PROCEDURE — 2500000001 HC RX 250 WO HCPCS SELF ADMINISTERED DRUGS (ALT 637 FOR MEDICARE OP): Performed by: INTERNAL MEDICINE

## 2024-01-06 PROCEDURE — 80053 COMPREHEN METABOLIC PANEL: CPT | Performed by: INTERNAL MEDICINE

## 2024-01-06 PROCEDURE — 81003 URINALYSIS AUTO W/O SCOPE: CPT | Performed by: INTERNAL MEDICINE

## 2024-01-06 PROCEDURE — 85027 COMPLETE CBC AUTOMATED: CPT | Performed by: INTERNAL MEDICINE

## 2024-01-06 PROCEDURE — 82947 ASSAY GLUCOSE BLOOD QUANT: CPT

## 2024-01-06 PROCEDURE — 2500000004 HC RX 250 GENERAL PHARMACY W/ HCPCS (ALT 636 FOR OP/ED): Performed by: INTERNAL MEDICINE

## 2024-01-06 PROCEDURE — 2500000004 HC RX 250 GENERAL PHARMACY W/ HCPCS (ALT 636 FOR OP/ED)

## 2024-01-06 PROCEDURE — 36415 COLL VENOUS BLD VENIPUNCTURE: CPT | Performed by: INTERNAL MEDICINE

## 2024-01-06 PROCEDURE — 9420000001 HC RT PATIENT EDUCATION 5 MIN

## 2024-01-06 PROCEDURE — 94664 DEMO&/EVAL PT USE INHALER: CPT

## 2024-01-06 PROCEDURE — 80202 ASSAY OF VANCOMYCIN: CPT

## 2024-01-06 PROCEDURE — 94640 AIRWAY INHALATION TREATMENT: CPT

## 2024-01-06 RX ORDER — DEXTROSE MONOHYDRATE AND SODIUM CHLORIDE 5; .45 G/100ML; G/100ML
80 INJECTION, SOLUTION INTRAVENOUS CONTINUOUS
Status: DISCONTINUED | OUTPATIENT
Start: 2024-01-06 | End: 2024-01-12 | Stop reason: HOSPADM

## 2024-01-06 RX ORDER — ACETAMINOPHEN 325 MG/1
650 TABLET ORAL EVERY 4 HOURS PRN
Status: DISCONTINUED | OUTPATIENT
Start: 2024-01-06 | End: 2024-01-12 | Stop reason: HOSPADM

## 2024-01-06 RX ORDER — IPRATROPIUM BROMIDE AND ALBUTEROL SULFATE 2.5; .5 MG/3ML; MG/3ML
3 SOLUTION RESPIRATORY (INHALATION) EVERY 2 HOUR PRN
Status: DISCONTINUED | OUTPATIENT
Start: 2024-01-06 | End: 2024-01-12 | Stop reason: HOSPADM

## 2024-01-06 RX ORDER — VANCOMYCIN 1 G/200ML
1000 INJECTION, SOLUTION INTRAVENOUS EVERY 12 HOURS
Status: DISCONTINUED | OUTPATIENT
Start: 2024-01-06 | End: 2024-01-11

## 2024-01-06 RX ORDER — ONDANSETRON 4 MG/1
8 TABLET, FILM COATED ORAL EVERY 8 HOURS PRN
Status: DISCONTINUED | OUTPATIENT
Start: 2024-01-06 | End: 2024-01-06 | Stop reason: CLARIF

## 2024-01-06 RX ORDER — POLYETHYLENE GLYCOL 3350 17 G/17G
17 POWDER, FOR SOLUTION ORAL DAILY PRN
Status: DISCONTINUED | OUTPATIENT
Start: 2024-01-06 | End: 2024-01-12 | Stop reason: HOSPADM

## 2024-01-06 RX ORDER — ONDANSETRON 4 MG/1
8 TABLET, ORALLY DISINTEGRATING ORAL EVERY 8 HOURS PRN
Status: DISCONTINUED | OUTPATIENT
Start: 2024-01-06 | End: 2024-01-12 | Stop reason: HOSPADM

## 2024-01-06 RX ORDER — PROCHLORPERAZINE MALEATE 10 MG
10 TABLET ORAL EVERY 6 HOURS PRN
Status: DISCONTINUED | OUTPATIENT
Start: 2024-01-06 | End: 2024-01-12 | Stop reason: HOSPADM

## 2024-01-06 RX ORDER — IBUPROFEN 200 MG
1 TABLET ORAL EVERY 24 HOURS
Status: DISCONTINUED | OUTPATIENT
Start: 2024-01-06 | End: 2024-01-12 | Stop reason: HOSPADM

## 2024-01-06 RX ORDER — GUAIFENESIN 600 MG/1
600 TABLET, EXTENDED RELEASE ORAL EVERY 12 HOURS PRN
Status: DISCONTINUED | OUTPATIENT
Start: 2024-01-06 | End: 2024-01-12 | Stop reason: HOSPADM

## 2024-01-06 RX ORDER — ATORVASTATIN CALCIUM 40 MG/1
40 TABLET, FILM COATED ORAL DAILY
Status: DISCONTINUED | OUTPATIENT
Start: 2024-01-06 | End: 2024-01-12 | Stop reason: HOSPADM

## 2024-01-06 RX ORDER — ALBUTEROL SULFATE 0.83 MG/ML
2.5 SOLUTION RESPIRATORY (INHALATION) EVERY 4 HOURS PRN
Status: DISCONTINUED | OUTPATIENT
Start: 2024-01-06 | End: 2024-01-06

## 2024-01-06 RX ORDER — DIVALPROEX SODIUM 250 MG/1
250 TABLET, FILM COATED, EXTENDED RELEASE ORAL 2 TIMES DAILY
Status: DISCONTINUED | OUTPATIENT
Start: 2024-01-06 | End: 2024-01-06 | Stop reason: DRUGHIGH

## 2024-01-06 RX ORDER — CHOLECALCIFEROL (VITAMIN D3) 50 MCG
2000 TABLET ORAL DAILY
Status: DISCONTINUED | OUTPATIENT
Start: 2024-01-06 | End: 2024-01-12 | Stop reason: HOSPADM

## 2024-01-06 RX ORDER — ACETAMINOPHEN 650 MG/1
650 SUPPOSITORY RECTAL EVERY 4 HOURS PRN
Status: DISCONTINUED | OUTPATIENT
Start: 2024-01-06 | End: 2024-01-12 | Stop reason: HOSPADM

## 2024-01-06 RX ORDER — IPRATROPIUM BROMIDE AND ALBUTEROL SULFATE 2.5; .5 MG/3ML; MG/3ML
3 SOLUTION RESPIRATORY (INHALATION) 4 TIMES DAILY PRN
Status: DISCONTINUED | OUTPATIENT
Start: 2024-01-06 | End: 2024-01-06

## 2024-01-06 RX ORDER — LANOLIN ALCOHOL/MO/W.PET/CERES
100 CREAM (GRAM) TOPICAL DAILY
Status: DISCONTINUED | OUTPATIENT
Start: 2024-01-06 | End: 2024-01-12 | Stop reason: HOSPADM

## 2024-01-06 RX ORDER — ALBUTEROL SULFATE 90 UG/1
2 AEROSOL, METERED RESPIRATORY (INHALATION) EVERY 4 HOURS PRN
Status: DISCONTINUED | OUTPATIENT
Start: 2024-01-06 | End: 2024-01-06 | Stop reason: CLARIF

## 2024-01-06 RX ORDER — LACOSAMIDE 100 MG/1
200 TABLET ORAL 2 TIMES DAILY
Status: DISCONTINUED | OUTPATIENT
Start: 2024-01-06 | End: 2024-01-12 | Stop reason: HOSPADM

## 2024-01-06 RX ORDER — HEPARIN SODIUM 5000 [USP'U]/ML
5000 INJECTION, SOLUTION INTRAVENOUS; SUBCUTANEOUS EVERY 8 HOURS SCHEDULED
Status: DISCONTINUED | OUTPATIENT
Start: 2024-01-06 | End: 2024-01-07

## 2024-01-06 RX ORDER — GUAIFENESIN/DEXTROMETHORPHAN 100-10MG/5
5 SYRUP ORAL EVERY 4 HOURS PRN
Status: DISCONTINUED | OUTPATIENT
Start: 2024-01-06 | End: 2024-01-12 | Stop reason: HOSPADM

## 2024-01-06 RX ORDER — DIVALPROEX SODIUM 500 MG/1
500 TABLET, FILM COATED, EXTENDED RELEASE ORAL 2 TIMES DAILY
Status: DISCONTINUED | OUTPATIENT
Start: 2024-01-06 | End: 2024-01-06 | Stop reason: DRUGHIGH

## 2024-01-06 RX ORDER — LORAZEPAM 0.5 MG/1
0.5 TABLET ORAL DAILY PRN
Status: DISCONTINUED | OUTPATIENT
Start: 2024-01-06 | End: 2024-01-12 | Stop reason: HOSPADM

## 2024-01-06 RX ORDER — ACETAMINOPHEN 160 MG/5ML
650 SOLUTION ORAL EVERY 4 HOURS PRN
Status: DISCONTINUED | OUTPATIENT
Start: 2024-01-06 | End: 2024-01-12 | Stop reason: HOSPADM

## 2024-01-06 RX ADMIN — VANCOMYCIN 1000 MG: 1 INJECTION, SOLUTION INTRAVENOUS at 02:19

## 2024-01-06 RX ADMIN — THIAMINE HCL TAB 100 MG 100 MG: 100 TAB at 09:07

## 2024-01-06 RX ADMIN — ATORVASTATIN CALCIUM 40 MG: 40 TABLET, FILM COATED ORAL at 09:07

## 2024-01-06 RX ADMIN — LACOSAMIDE 200 MG: 100 TABLET, FILM COATED ORAL at 09:07

## 2024-01-06 RX ADMIN — PIPERACILLIN SODIUM AND TAZOBACTAM SODIUM 4.5 G: 4; .5 INJECTION, SOLUTION INTRAVENOUS at 09:07

## 2024-01-06 RX ADMIN — PIPERACILLIN SODIUM AND TAZOBACTAM SODIUM 4.5 G: 4; .5 INJECTION, SOLUTION INTRAVENOUS at 16:00

## 2024-01-06 RX ADMIN — DIVALPROEX SODIUM 750 MG: 250 TABLET, EXTENDED RELEASE ORAL at 19:45

## 2024-01-06 RX ADMIN — LACOSAMIDE 200 MG: 100 TABLET, FILM COATED ORAL at 19:50

## 2024-01-06 RX ADMIN — PIPERACILLIN SODIUM AND TAZOBACTAM SODIUM 4.5 G: 4; .5 INJECTION, SOLUTION INTRAVENOUS at 22:16

## 2024-01-06 RX ADMIN — DIVALPROEX SODIUM 750 MG: 250 TABLET, EXTENDED RELEASE ORAL at 09:07

## 2024-01-06 RX ADMIN — TIOTROPIUM BROMIDE INHALATION SPRAY 2 PUFF: 3.12 SPRAY, METERED RESPIRATORY (INHALATION) at 08:13

## 2024-01-06 RX ADMIN — ACETAMINOPHEN 650 MG: 325 TABLET ORAL at 11:00

## 2024-01-06 RX ADMIN — HEPARIN SODIUM 5000 UNITS: 5000 INJECTION, SOLUTION INTRAVENOUS; SUBCUTANEOUS at 19:51

## 2024-01-06 RX ADMIN — HEPARIN SODIUM 5000 UNITS: 5000 INJECTION, SOLUTION INTRAVENOUS; SUBCUTANEOUS at 06:32

## 2024-01-06 RX ADMIN — DEXTROSE MONOHYDRATE AND SODIUM CHLORIDE 80 ML/HR: 5; .45 INJECTION, SOLUTION INTRAVENOUS at 02:49

## 2024-01-06 RX ADMIN — Medication 2000 UNITS: at 09:07

## 2024-01-06 RX ADMIN — VANCOMYCIN 1000 MG: 1 INJECTION, SOLUTION INTRAVENOUS at 14:00

## 2024-01-06 RX ADMIN — PIPERACILLIN SODIUM AND TAZOBACTAM SODIUM 4.5 G: 4; .5 INJECTION, SOLUTION INTRAVENOUS at 04:28

## 2024-01-06 SDOH — SOCIAL STABILITY: SOCIAL INSECURITY: HAVE YOU HAD THOUGHTS OF HARMING ANYONE ELSE?: NO

## 2024-01-06 SDOH — SOCIAL STABILITY: SOCIAL INSECURITY: DO YOU FEEL ANYONE HAS EXPLOITED OR TAKEN ADVANTAGE OF YOU FINANCIALLY OR OF YOUR PERSONAL PROPERTY?: NO

## 2024-01-06 SDOH — SOCIAL STABILITY: SOCIAL INSECURITY: ARE THERE ANY APPARENT SIGNS OF INJURIES/BEHAVIORS THAT COULD BE RELATED TO ABUSE/NEGLECT?: NO

## 2024-01-06 SDOH — HEALTH STABILITY: MENTAL HEALTH: EXPERIENCED ANY OF THE FOLLOWING LIFE EVENTS: DEATH OF FAMILY/FRIEND;LIFE-THREATENING ILLNESS OR INJURY

## 2024-01-06 SDOH — SOCIAL STABILITY: SOCIAL INSECURITY: WERE YOU ABLE TO COMPLETE ALL THE BEHAVIORAL HEALTH SCREENINGS?: NO

## 2024-01-06 SDOH — SOCIAL STABILITY: SOCIAL INSECURITY: DOES ANYONE TRY TO KEEP YOU FROM HAVING/CONTACTING OTHER FRIENDS OR DOING THINGS OUTSIDE YOUR HOME?: NO

## 2024-01-06 SDOH — SOCIAL STABILITY: SOCIAL INSECURITY: ARE YOU OR HAVE YOU BEEN THREATENED OR ABUSED PHYSICALLY, EMOTIONALLY, OR SEXUALLY BY ANYONE?: NO

## 2024-01-06 SDOH — SOCIAL STABILITY: SOCIAL INSECURITY: ABUSE: ADULT

## 2024-01-06 SDOH — SOCIAL STABILITY: SOCIAL INSECURITY: DO YOU FEEL UNSAFE GOING BACK TO THE PLACE WHERE YOU ARE LIVING?: NO

## 2024-01-06 SDOH — SOCIAL STABILITY: SOCIAL INSECURITY: HAS ANYONE EVER THREATENED TO HURT YOUR FAMILY OR YOUR PETS?: NO

## 2024-01-06 ASSESSMENT — COGNITIVE AND FUNCTIONAL STATUS - GENERAL
CLIMB 3 TO 5 STEPS WITH RAILING: A LOT
PATIENT BASELINE BEDBOUND: NO
HELP NEEDED FOR BATHING: A LITTLE
TURNING FROM BACK TO SIDE WHILE IN FLAT BAD: A LITTLE
MOVING TO AND FROM BED TO CHAIR: A LITTLE
STANDING UP FROM CHAIR USING ARMS: A LITTLE
TOILETING: A LITTLE
EATING MEALS: A LITTLE
DRESSING REGULAR UPPER BODY CLOTHING: A LITTLE
HELP NEEDED FOR BATHING: A LITTLE
DAILY ACTIVITIY SCORE: 19
DAILY ACTIVITIY SCORE: 18
DRESSING REGULAR UPPER BODY CLOTHING: A LITTLE
DRESSING REGULAR LOWER BODY CLOTHING: A LITTLE
PERSONAL GROOMING: A LITTLE
TOILETING: A LITTLE
PERSONAL GROOMING: A LITTLE
DRESSING REGULAR LOWER BODY CLOTHING: A LITTLE
MOBILITY SCORE: 18
WALKING IN HOSPITAL ROOM: A LITTLE

## 2024-01-06 ASSESSMENT — ACTIVITIES OF DAILY LIVING (ADL)
FEEDING YOURSELF: INDEPENDENT
TOILETING: NEEDS ASSISTANCE
PATIENT'S MEMORY ADEQUATE TO SAFELY COMPLETE DAILY ACTIVITIES?: YES
HEARING - RIGHT EAR: FUNCTIONAL
JUDGMENT_ADEQUATE_SAFELY_COMPLETE_DAILY_ACTIVITIES: NO
ADL_ASSISTANCE: INDEPENDENT
ADEQUATE_TO_COMPLETE_ADL: NO
BATHING: NEEDS ASSISTANCE
HEARING - LEFT EAR: FUNCTIONAL
DRESSING YOURSELF: NEEDS ASSISTANCE
WALKS IN HOME: INDEPENDENT
BATHING_ASSISTANCE: MINIMAL
GROOMING: NEEDS ASSISTANCE
LACK_OF_TRANSPORTATION: PATIENT DECLINED

## 2024-01-06 ASSESSMENT — PATIENT HEALTH QUESTIONNAIRE - PHQ9
1. LITTLE INTEREST OR PLEASURE IN DOING THINGS: SEVERAL DAYS
2. FEELING DOWN, DEPRESSED OR HOPELESS: SEVERAL DAYS
SUM OF ALL RESPONSES TO PHQ9 QUESTIONS 1 & 2: 2

## 2024-01-06 ASSESSMENT — PAIN - FUNCTIONAL ASSESSMENT: PAIN_FUNCTIONAL_ASSESSMENT: 0-10

## 2024-01-06 ASSESSMENT — PAIN SCALES - GENERAL
PAINLEVEL_OUTOF10: 0 - NO PAIN
PAINLEVEL_OUTOF10: 0 - NO PAIN

## 2024-01-06 ASSESSMENT — LIFESTYLE VARIABLES
PRESCIPTION_ABUSE_PAST_12_MONTHS: NO
SUBSTANCE_ABUSE_PAST_12_MONTHS: NO

## 2024-01-06 NOTE — NURSING NOTE
Late entry:  0700 assumed care of pt, pt is agitated and doesn't want nursing staff to get vitals, informed patient that it is important we measure his vitals. VSS.   0930 went to give pt medications that are ordered. Wife had informed me she had already given his medications... I informed the wife that he shouldn't be taking any medications that are not verified b pharmacy and that we have medications here for him. She understood and bagged up his home medications and is packing them to take home with her. He did not take any narcotics just his seizure medications and statin.   1100 pt spiked fever 102. Gave prn tylenol and informed MD.   1200 pt responded to tylenol to 99. MD aware.   Pt has not been pleasant to work with - refusing most therapy and assistance with bathroom- pt attempted to get up by himself and ripped IV out. This RN replaced IV using aseptic technique- good blood return and flushes appropriately. Pt continues IVF and IV AB.  Will continue to monitor.

## 2024-01-06 NOTE — CONSULTS
"Nutrition Assessment Note    Pt seen for MST 2. Pt asleep, spoke with Pt's wife. Since chemo radiation started for Pt on November 21, 2023, Pt's wife reports, “Appetite is a little less these days, but he still eats the same foods, just in slightly smaller portions.” Pt drinks Boost at home, when he is up to drinking them.    Nutrition Assessment    Reason for Assessment: Admission nursing screening (MST 2)    Reason for Hospital Admission:  Martínez Neri is a 63 y.o. male who is admitted for Fever in adult. Pt has lung cancer and Hx of seizures, COPD, and HTN.    Nutrition History:  Food and Nutrient History: Pt's wife notes decreased appetite since chemo radiation began 11/21/23, but isn't down \"too much\".  Energy Intake: Fair 50-75 %      Anthropometrics:  Ht: 175.3 cm (5' 9.02\"), Wt: 76.5 kg (168 lb 10.4 oz), BMI: 24.89  IBW/kg (Dietitian Calculated): 72.72 kg          Weight Change:  Weight History / % Weight Change: Pt's wife reports Pt UBW of 163#, and was down to 158#.             Nutrition Focused Physical Exam Findings: defer: Unable at this time       Nutrition Significant Labs:  Lab Results   Component Value Date    WBC 1.3 (L) 01/06/2024    HGB 8.4 (L) 01/06/2024    HCT 25.3 (L) 01/06/2024     01/06/2024    CHOL 162 10/03/2023    TRIG 87 10/03/2023    HDL 67.1 10/03/2023    ALT 8 01/06/2024    AST 24 01/06/2024     01/06/2024    K 4.0 01/06/2024     01/06/2024    CREATININE 0.70 01/06/2024    BUN 12 01/06/2024    CO2 21 (L) 01/06/2024    TSH 2.27 12/26/2023       Current Facility-Administered Medications:     acetaminophen (Tylenol) tablet 650 mg, 650 mg, oral, q4h PRN, 650 mg at 01/06/24 1100 **OR** acetaminophen (Tylenol) oral liquid 650 mg, 650 mg, oral, q4h PRN **OR** acetaminophen (Tylenol) suppository 650 mg, 650 mg, rectal, q4h PRN, Sarath Alvares MD    atorvastatin (Lipitor) tablet 40 mg, 40 mg, oral, Daily, Sarath Alvares MD, 40 mg at 01/06/24 0907    " benzocaine-menthol (Cepastat Sore Throat) 15-3.6 mg lozenge 1 lozenge, 1 lozenge, Mouth/Throat, q2h PRN, Sarath Alvares MD    cholecalciferol (Vitamin D-3) tablet 2,000 Units, 2,000 Units, oral, Daily, Sarath Alvares MD, 2,000 Units at 01/06/24 0907    dextromethorphan-guaifenesin (Robitussin DM)  mg/5 mL oral liquid 5 mL, 5 mL, oral, q4h PRN, Sarath Alvares MD    dextrose 5%-0.45 % sodium chloride infusion, 80 mL/hr, intravenous, Continuous, Sarath Alvares MD, Last Rate: 80 mL/hr at 01/06/24 0249, 80 mL/hr at 01/06/24 0249    divalproex (Depakote ER) 24 hr tablet 750 mg, 750 mg, oral, BID, Sarath Alvares MD, 750 mg at 01/06/24 0907    guaiFENesin (Mucinex) 12 hr tablet 600 mg, 600 mg, oral, q12h PRN, Sarath Alvares MD    heparin (porcine) injection 5,000 Units, 5,000 Units, subcutaneous, q8h CHAPARRITA, Sarath Alvares MD, 5,000 Units at 01/06/24 0632    ipratropium-albuteroL (Duo-Neb) 0.5-2.5 mg/3 mL nebulizer solution 3 mL, 3 mL, nebulization, q2h PRN, Sarath Alvares MD    lacosamide (Vimpat) tablet 200 mg, 200 mg, oral, BID, Sarath Alvares MD, 200 mg at 01/06/24 0907    LORazepam (Ativan) tablet 0.5 mg, 0.5 mg, oral, Daily PRN, Sarath Alvares MD    nicotine (Nicoderm CQ) 21 mg/24 hr patch 1 patch, 1 patch, transdermal, q24h, Sarath Alvares MD    ondansetron ODT (Zofran-ODT) disintegrating tablet 8 mg, 8 mg, oral, q8h PRN, Sarath Alvares MD    piperacillin-tazobactam-dextrose (Zosyn) IV 4.5 g, 4.5 g, intravenous, q6h, Sarath Alvares MD, Stopped at 01/06/24 1630    polyethylene glycol (Glycolax, Miralax) packet 17 g, 17 g, oral, Daily PRN, Sarath Alvares MD    prochlorperazine (Compazine) tablet 10 mg, 10 mg, oral, q6h PRN, Sarath Alvares MD    thiamine (Vitamin B-1) tablet 100 mg, 100 mg, oral, Daily, Sarath Alvares MD, 100 mg at 01/06/24 0907    tiotropium (Spiriva Respimat) 2.5 mcg/actuation inhaler 2 puff, 2 Inhalation, inhalation,  Daily, Sarath Alvares MD, 2 puff at 01/06/24 0813    vancomycin (Xellia) 1 g in 200 mL (Xellia) IVPB 1,000 mg, 1,000 mg, intravenous, q12h, Lakeshia Oconnor, PharmD, Stopped at 01/06/24 1500    Dietary Orders (From admission, onward)       Start     Ordered    01/06/24 0341  Adult diet Regular  Diet effective now        Question:  Diet type  Answer:  Regular    01/06/24 0340                  Estimated Needs:   Estimated Energy Needs  Total Energy Estimated Needs (kCal):  (1329-7240 kcals)  Total Estimated Energy Need per Day (kCal/kg):  (30-35 kcals/kg)  Method for Estimating Needs: actual wt    Estimated Protein Needs  Total Protein Estimated Needs (g):  (92-115g Protein)  Total Protein Estimated Needs (g/kg):  (1.2-1.5 g/kg)  Method for Estimating Needs: actual wt    Estimated Fluid Needs  Total Fluid Estimated Needs (mL):  (0324-1998 mL fluid)  Method for Estimating Needs: 1mL/kcal        Nutrition Diagnosis   Nutrition Diagnosis:       Nutrition Diagnosis  Patient has Nutrition Diagnosis: Yes  Diagnosis Status (1): New  Nutrition Diagnosis 1: Inadequate energy intake  Related to (1): cancer treatments  As Evidenced by (1): intake less than estimated needs with appetite changes       Nutrition Interventions/Recommendations   Nutrition Interventions and Recommendations:    Nutrition Prescription:  Individualized Nutrition Prescription Provided for : 9353-8021 kcals, 92-115g Protein, 1144-7201 mL fluid, to be provided with diet and supplements    Nutrition Interventions:   Food and/or Nutrient Delivery Interventions  Interventions: Meals and snacks, Medical food supplement  Meals and Snacks: General healthful diet  Goal: Provide diet as ordered  Medical Food Supplement: Commercial beverage  Goal: Ensure PLUS HP TID, provides 350 kcals and 20g Protein per carton.    Education Documentation  No documentation found.           Nutrition Monitoring and Evaluation   Monitoring/Evaluation:   Food/Nutrient Related  History Monitoring  Monitoring and Evaluation Plan: Energy intake, Amount of food  Energy Intake: Estimated energy intake  Criteria: Patient will consume >/= 75% of estimated needs with PO intake  Amount of Food: Estimated amout of food  Criteria: Patient will consume >/= 75% of meals, snacks, and supplements    Body Composition/Growth/Weight History  Monitoring and Evaluation Plan: Weight  Weight: Measured weight  Criteria: Patient will maintain/gain weight    __       Time Spent/Follow-up:   Follow Up  Time Spent (min): 30 minutes  Last Date of Nutrition Visit: 01/06/24  Nutrition Follow-Up Needed?: 3-5 days  Follow up Comment: 1/9/24

## 2024-01-06 NOTE — CARE PLAN
The patient's goals for the shift include get stronger to walk    The clinical goals for the shift include monitor labs      Problem: Fall/Injury  Goal: Verbalize understanding of risk factor reduction measures to prevent injury from fall in the home  Outcome: Progressing     Problem: Fall/Injury  Goal: Verbalize understanding of personal risk factors for fall in the hospital  Outcome: Progressing     Problem: Fall/Injury  Goal: Be free from injury by end of the shift  Outcome: Progressing

## 2024-01-06 NOTE — CARE PLAN
Problem: Respiratory  Goal: Clear secretions with interventions this shift  Outcome: Progressing  Goal: Minimal/no exertional discomfort or dyspnea this shift  Outcome: Progressing  Goal: No signs of respiratory distress (eg. Use of accessory muscles. Peds grunting)  Outcome: Progressing  Goal: Wean oxygen to maintain O2 saturation per order/standard this shift  Outcome: Progressing

## 2024-01-06 NOTE — PROGRESS NOTES
"Subjective   Patient ID: Martínez Neri is a 63 y.o. male who presents for Follow-up (Various conditions) and multiple medical issues.     Mr. Neri today came here for multiple medical issues.  He is undergoing through the radiation therapy and chemo.  He is recovering.  Very tired, fatigued, and exhausted.  He came here for follow-up on various conditions.    I have personally reviewed the patient's Past Medical History, Medications, Allergies, Social History, and Family History in the EMR.    Review of Systems   All other systems reviewed and are negative.    Objective   /74   Ht 1.753 m (5' 9\")   Wt 73 kg (161 lb)   BMI 23.78 kg/m²     Physical Exam  Vitals reviewed.   Cardiovascular:      Heart sounds: Normal heart sounds, S1 normal and S2 normal. No murmur heard.     No friction rub.   Pulmonary:      Effort: Pulmonary effort is normal.      Breath sounds: Normal breath sounds and air entry.   Abdominal:      Palpations: There is no hepatomegaly, splenomegaly or mass.   Musculoskeletal:      Right lower leg: No edema.      Left lower leg: No edema.   Lymphadenopathy:      Lower Body: No right inguinal adenopathy. No left inguinal adenopathy.   Neurological:      Cranial Nerves: Cranial nerves 2-12 are intact.      Sensory: No sensory deficit.      Motor: Motor function is intact.      Deep Tendon Reflexes: Reflexes are normal and symmetric.     LAB WORK:  Laboratory testing discussed.    Assessment/Plan   Problem List Items Addressed This Visit             ICD-10-CM       Neuro    Seizures (CMS/HCC) R56.9    Relevant Orders    Valproic Acid, Free Serum     Other Visit Diagnoses         Codes    Chronic obstructive pulmonary disease, unspecified COPD type (CMS/HCC)    -  Primary J44.9    Hypercholesterolemia     E78.00    Relevant Medications    atorvastatin (Lipitor) 40 mg tablet    Other Relevant Orders    Comprehensive Metabolic Panel    Lipid Panel    Malignant neoplasm of lung, unspecified " laterality, unspecified part of lung (CMS/HCC)     C34.90        1. Seizure disorder.  Monitor.  2. Lung cancer, under treatment.  3. High cholesterol, on medication.  4. ______.  5. COPD, on breathing treatment.  6. Blood work ordered.  7. ______ other blood work.  8. I shall see him in a month.  9. Today’s blood work we will communicate over the phone.    Scribe Attestation  By signing my name below, I, David Chapman attest that this documentation has been prepared under the direction and in the presence of Jayme Casey MD.

## 2024-01-06 NOTE — PROGRESS NOTES
Vancomycin Dosing by Pharmacy- INITIAL    Martínez Neri is a 63 y.o. year old male who Pharmacy has been consulted for vancomycin dosing for medical prophylaxis for neutropenic fever. Based on the patient's indication and renal status this patient will be dosed based on a goal AUC of 400-600.     Renal function is currently stable.    Visit Vitals  /51 (BP Location: Left arm, Patient Position: Lying)   Pulse 107   Temp 38.2 °C (100.8 °F) (Oral)   Resp 20        Lab Results   Component Value Date    CREATININE 0.70 01/05/2024    CREATININE 0.75 01/04/2024    CREATININE 0.73 01/02/2024    CREATININE 0.68 12/26/2023        Patient weight is 73kg        Assessment/Plan     Patient will not be given a loading dose.  Will initiate vancomycin maintenance,  1000 mg every 12 hours.    This dosing regimen is predicted by [employer] to result in the following pharmacokinetic parameters:  Loading dose: N/A  Regimen: 1000 mg IV every 12 hours.  Start time: 01:24 on 01/06/2024  Exposure target: AUC24 (range)400-600 mg/L.hr   AUC24,ss: 467 mg/L.hr  Probability of AUC24 > 400: 67 %  Ctrough,ss: 14.1 mg/L  Probability of Ctrough,ss > 20: 22 %  Probability of nephrotoxicity (Lodise ANGE 2009): 9 %    Follow-up level will be ordered on 1/6/24 at 0500 unless clinically indicated sooner.  Will continue to monitor renal function daily while on vancomycin and order serum creatinine at least every 48 hours if not already ordered.  Follow for continued vancomycin needs, clinical response, and signs/symptoms of toxicity.       Lakeshia Oconnor, PharmD

## 2024-01-06 NOTE — H&P
History Of Present Illness  Martínez Neri is a 63 y.o. male presenting with fever.  Patient was diagnosed with lung cancer in the end of June 2023.  Patient was in [place] at that time.  Patient came to [place].  Patient was started on treatment for lung cancer in November 2023.  Patient has non-small cell carcinoma of the lung.  Patient is undergoing chemotherapy and radiation therapy.  On the day of admission patient was not feeling well.  He was not eating or drinking and he developed fever.  With this complaint he was brought to the emergency room.  Emergency room initial workup was done and patient was found to have leukopenia with a white cell count of 1.2.  Patient has been admitted to the floor for further management.  Patient denies any nausea or vomiting.  No diarrhea, dysuria, hematuria or frequency.  No hematemesis, hematochezia or melena.     Past Medical History  Past Medical History:   Diagnosis Date    Allergic rhinitis     Asthma     Brain aneurysm     Cancer (CMS/HCC)     Carotid artery disease (CMS/HCC)     COPD (chronic obstructive pulmonary disease) (CMS/HCC)     Epilepsy (CMS/HCC)     High cholesterol     History of alcohol use     Hypertension     Lung cancer (CMS/HCC)    Non-small cell carcinoma of lung  Carotid stenosis  Brain aneurysm  Coronary artery disease    Surgical History  Past Surgical History:   Procedure Laterality Date    CAROTID ENDARTERECTOMY N/A     CEREBRAL ANEURYSM REPAIR      COLONOSCOPY      HERNIA REPAIR          Social History  He reports that he has been smoking cigarettes. He has a 50.00 pack-year smoking history. He has been exposed to tobacco smoke. He has never used smokeless tobacco. He reports that he does not currently use alcohol. He reports current drug use. Frequency: 4.00 times per week. Drug: Marijuana.  Is an alcoholic but quit 7 years ago.  He used to be a .  Now on disability.  Lives with his wife.  Family History  Family History   Problem  "Relation Name Age of Onset    Heart attack Mother      Other (Colorectal cancer) Father      Aneurysm Other      Prostate cancer Other      Alcohol abuse Other          Allergies  Patient has no known allergies.    Review of Systems  I reviewed all systems reviewed as above otherwise is negative.  Physical Exam  HEENT:  Head externally atraumatic, no pallor, no icterus, extraocular movements intact, pupils reactive to light, oral mucosa moist and throat clear.  Neck:  Supple, no JVP, no palpable adenopathy or thyromegaly.  No carotid bruit.  Chest:  Clear to auscultation and resonant.  Heart:  Regular rate and rhythm, no murmur or gallop could be appreciated.  Abdomen:  Soft, nontender, bowel sounds present, normoactive, no palpable hepatosplenomegaly.  Extremities:  No edema, pulses present, no cyanosis or clubbing.  CNS:  Patient alert, oriented to time, place and person.  Power 5/5 all over and deep tendon reflexes symmetrical, cranial nerves 2-12 grossly intact.  Skin:  No active rash.  Musculoskeletal:  No joint swelling or erythema, range of movement normal.  Last Recorded Vitals  Heart Rate:  []   Temp:  [37 °C (98.6 °F)-39.1 °C (102.4 °F)]   Resp:  [18-22]   BP: ()/(45-65)   Height:  [175.3 cm (5' 9\")-175.3 cm (5' 9.02\")]   Weight:  [73 kg (161 lb)-76.5 kg (168 lb 10.4 oz)]   SpO2:  [90 %-99 %]       Relevant Results        Results for orders placed or performed during the hospital encounter of 01/05/24 (from the past 24 hour(s))   CBC and Auto Differential   Result Value Ref Range    WBC 1.6 (L) 4.4 - 11.3 x10*3/uL    nRBC 0.0 0.0 - 0.0 /100 WBCs    RBC 2.74 (L) 4.50 - 5.90 x10*6/uL    Hemoglobin 9.2 (L) 13.5 - 17.5 g/dL    Hematocrit 26.4 (L) 41.0 - 52.0 %    MCV 96 80 - 100 fL    MCH 33.6 26.0 - 34.0 pg    MCHC 34.8 32.0 - 36.0 g/dL    RDW 15.6 (H) 11.5 - 14.5 %    Platelets 200 150 - 450 x10*3/uL    Immature Granulocytes %, Automated 0.6 0.0 - 0.9 %    Immature Granulocytes Absolute, " Automated 0.01 0.00 - 0.70 x10*3/uL   Comprehensive metabolic panel   Result Value Ref Range    Glucose 142 (H) 65 - 99 mg/dL    Sodium 129 (L) 133 - 145 mmol/L    Potassium 4.3 3.4 - 5.1 mmol/L    Chloride 96 (L) 97 - 107 mmol/L    Bicarbonate 22 (L) 24 - 31 mmol/L    Urea Nitrogen 15 8 - 25 mg/dL    Creatinine 0.70 0.40 - 1.60 mg/dL    eGFR >90 >60 mL/min/1.73m*2    Calcium 9.5 8.5 - 10.4 mg/dL    Albumin 3.9 3.5 - 5.0 g/dL    Alkaline Phosphatase 62 35 - 125 U/L    Total Protein 7.3 5.9 - 7.9 g/dL    AST 19 5 - 40 U/L    Bilirubin, Total 0.3 0.1 - 1.2 mg/dL    ALT 9 5 - 40 U/L    Anion Gap 11 <=19 mmol/L   Blood Gas Lactic Acid, Venous   Result Value Ref Range    POCT Lactate, Venous 2.7 (H) 0.4 - 2.0 mmol/L   Manual Differential   Result Value Ref Range    Neutrophils %, Manual 69.0 40.0 - 80.0 %    Bands %, Manual 9.0 0.0 - 5.0 %    Lymphocytes %, Manual 11.0 13.0 - 44.0 %    Monocytes %, Manual 11.0 2.0 - 10.0 %    Eosinophils %, Manual 0.0 0.0 - 6.0 %    Basophils %, Manual 0.0 0.0 - 2.0 %    Seg Neutrophils Absolute, Manual 1.10 (L) 1.20 - 7.00 x10*3/uL    Bands Absolute, Manual 0.14 0.00 - 0.70 x10*3/uL    Lymphocytes Absolute, Manual 0.18 (L) 1.20 - 4.80 x10*3/uL    Monocytes Absolute, Manual 0.18 0.10 - 1.00 x10*3/uL    Eosinophils Absolute, Manual 0.00 0.00 - 0.70 x10*3/uL    Basophils Absolute, Manual 0.00 0.00 - 0.10 x10*3/uL    Total Cells Counted 100     Neutrophils Absolute, Manual 1.24 1.20 - 7.70 x10*3/uL    RBC Morphology See Below     Polychromasia Mild     Rouleaux Present    RSV PCR   Result Value Ref Range    RSV PCR Not Detected Not Detected   Sars-CoV-2 and Influenza A/B PCR   Result Value Ref Range    Flu A Result Not Detected Not Detected    Flu B Result Not Detected Not Detected    Coronavirus 2019, PCR Not Detected Not Detected   Urinalysis with Reflex Microscopic   Result Value Ref Range    Color, Urine Yellow Light-Yellow, Yellow, Dark-Yellow    Appearance, Urine Clear Clear     Specific Gravity, Urine 1.023 1.005 - 1.035    pH, Urine 7.5 5.0, 5.5, 6.0, 6.5, 7.0, 7.5, 8.0    Protein, Urine 10 (TRACE) NEGATIVE, 10 (TRACE), 20 (TRACE) mg/dL    Glucose, Urine Normal Normal mg/dL    Blood, Urine NEGATIVE NEGATIVE    Ketones, Urine NEGATIVE NEGATIVE mg/dL    Bilirubin, Urine NEGATIVE NEGATIVE    Urobilinogen, Urine Normal Normal mg/dL    Nitrite, Urine NEGATIVE NEGATIVE    Leukocyte Esterase, Urine NEGATIVE NEGATIVE   Microscopic Only, Urine   Result Value Ref Range    WBC, Urine NONE 1-5, NONE /HPF    RBC, Urine NONE NONE, 1-2, 3-5 /HPF   Blood Gas Lactic Acid, Venous   Result Value Ref Range    POCT Lactate, Venous 0.8 0.4 - 2.0 mmol/L   POCT GLUCOSE   Result Value Ref Range    POCT Glucose 85 74 - 99 mg/dL   Vancomycin   Result Value Ref Range    Vancomycin 13.0 10.0 - 20.0 ug/mL   Comprehensive metabolic panel   Result Value Ref Range    Glucose 109 (H) 65 - 99 mg/dL    Sodium 133 133 - 145 mmol/L    Potassium 4.0 3.4 - 5.1 mmol/L    Chloride 100 97 - 107 mmol/L    Bicarbonate 21 (L) 24 - 31 mmol/L    Urea Nitrogen 12 8 - 25 mg/dL    Creatinine 0.70 0.40 - 1.60 mg/dL    eGFR >90 >60 mL/min/1.73m*2    Calcium 8.9 8.5 - 10.4 mg/dL    Albumin 3.2 (L) 3.5 - 5.0 g/dL    Alkaline Phosphatase 49 35 - 125 U/L    Total Protein 6.2 5.9 - 7.9 g/dL    AST 24 5 - 40 U/L    Bilirubin, Total 0.3 0.1 - 1.2 mg/dL    ALT 8 5 - 40 U/L    Anion Gap 12 <=19 mmol/L   CBC and Auto Differential   Result Value Ref Range    WBC 1.3 (L) 4.4 - 11.3 x10*3/uL    nRBC 0.0 0.0 - 0.0 /100 WBCs    RBC 2.57 (L) 4.50 - 5.90 x10*6/uL    Hemoglobin 8.4 (L) 13.5 - 17.5 g/dL    Hematocrit 25.3 (L) 41.0 - 52.0 %    MCV 98 80 - 100 fL    MCH 32.7 26.0 - 34.0 pg    MCHC 33.2 32.0 - 36.0 g/dL    RDW 15.8 (H) 11.5 - 14.5 %    Platelets 178 150 - 450 x10*3/uL    Immature Granulocytes %, Automated 0.7 0.0 - 0.9 %    Immature Granulocytes Absolute, Automated 0.01 0.00 - 0.70 x10*3/uL   Manual Differential   Result Value Ref Range     Neutrophils %, Manual 29.0 40.0 - 80.0 %    Bands %, Manual 47.0 0.0 - 5.0 %    Lymphocytes %, Manual 10.0 13.0 - 44.0 %    Monocytes %, Manual 10.0 2.0 - 10.0 %    Eosinophils %, Manual 0.0 0.0 - 6.0 %    Basophils %, Manual 1.0 0.0 - 2.0 %    Metamyelocytes %, Manual 1.0 0.0 - 0.0 %    Myelocytes %, Manual 2.0 0.0 - 0.0 %    Seg Neutrophils Absolute, Manual 0.38 (L) 1.20 - 7.00 x10*3/uL    Bands Absolute, Manual 0.61 0.00 - 0.70 x10*3/uL    Lymphocytes Absolute, Manual 0.13 (L) 1.20 - 4.80 x10*3/uL    Monocytes Absolute, Manual 0.13 0.10 - 1.00 x10*3/uL    Eosinophils Absolute, Manual 0.00 0.00 - 0.70 x10*3/uL    Basophils Absolute, Manual 0.01 0.00 - 0.10 x10*3/uL    Metamyelocytes Absolute, Manual 0.01 0.00 - 0.00 x10*3/uL    Myelocytes Absolute, Manual 0.03 0.00 - 0.00 x10*3/uL    Total Cells Counted 100     Neutrophils Absolute, Manual 0.99 (L) 1.20 - 7.70 x10*3/uL    RBC Morphology See Below     Polychromasia Mild     Vacuolated Neutrophils Present    Urinalysis with Reflex Microscopic   Result Value Ref Range    Color, Urine Light-Yellow Light-Yellow, Yellow, Dark-Yellow    Appearance, Urine Clear Clear    Specific Gravity, Urine 1.020 1.005 - 1.035    pH, Urine 7.0 5.0, 5.5, 6.0, 6.5, 7.0, 7.5, 8.0    Protein, Urine NEGATIVE NEGATIVE, 10 (TRACE), 20 (TRACE) mg/dL    Glucose, Urine Normal Normal mg/dL    Blood, Urine NEGATIVE NEGATIVE    Ketones, Urine NEGATIVE NEGATIVE mg/dL    Bilirubin, Urine NEGATIVE NEGATIVE    Urobilinogen, Urine Normal Normal mg/dL    Nitrite, Urine NEGATIVE NEGATIVE    Leukocyte Esterase, Urine NEGATIVE NEGATIVE   POCT GLUCOSE   Result Value Ref Range    POCT Glucose 95 74 - 99 mg/dL   POCT GLUCOSE   Result Value Ref Range    POCT Glucose 111 (H) 74 - 99 mg/dL     Prior to Admission medications    Medication Sig Start Date End Date Taking? Authorizing Provider   albuterol (Proventil HFA) 90 mcg/actuation inhaler Inhale 2 puffs every 4 hours if needed for wheezing or shortness of  breath. 10/2/23   Jayme Casey MD   atorvastatin (Lipitor) 40 mg tablet Take 1 tablet (40 mg) by mouth once daily. 1/5/24   Jayme Casey MD   cholecalciferol (Vitamin D-3) 125 MCG (5000 UT) capsule  10/2/23   Historical Provider, MD   divalproex (Depakote ER) 250 mg 24 hr tablet Take 1 tablet (250 mg) by mouth 2 times a day. Do not crush, chew, or split. 10/23/23   Garret Ghotra MD   divalproex (Depakote ER) 500 mg 24 hr tablet Take 1 tablet (500 mg) by mouth 2 times a day. Do not crush, chew, or split. 10/23/23   Garret Ghotra MD   inhalat.spacing dev,med. mask spacer  10/2/23   Jayme Casey MD   ipratropium-albuteroL (Duo-Neb) 0.5-2.5 mg/3 mL nebulizer solution Take 3 mL by nebulization 4 times a day as needed for wheezing or shortness of breath. 10/2/23 10/1/24  Jayme Casey MD   lacosamide (Vimpat) 200 mg tablet tablet Take 1 tablet (200 mg) by mouth 2 times a day. 10/23/23   Garret Ghotra MD   LORazepam (Ativan) 0.5 mg tablet Take 1 tablet (0.5 mg) by mouth once daily as needed for anxiety for up to 5 days. 11/30/23 12/5/23  Felisha Knox MD   nebulizer accessories misc Use twice daily 10/2/23   Jayme Casey MD   nebulizer and compressor device Use as directed 10/2/23   Jayme Casey MD   nicotine (Nicoderm CQ) 21 mg/24 hr patch Place 1 patch over 24 hours on the skin once every 24 hours. 10/2/23   Jayme Casey MD   ondansetron (Zofran) 8 mg tablet Take 1 tablet (8 mg) by mouth every 8 hours if needed for nausea or vomiting. 11/2/23   Elo Peters MD   prochlorperazine (Compazine) 10 mg tablet Take 1 tablet (10 mg) by mouth every 6 hours if needed for nausea or vomiting. 11/2/23   Elo Peters MD   Spiriva Respimat 2.5 mcg/actuation inhaler  10/3/23   Historical Provider, MD   thiamine (Vitamin B-1) 100 mg tablet Take 1 tablet (100 mg) by mouth once daily. 10/2/23   Jayme Casey MD   atorvastatin (Lipitor) 40 mg tablet Take 1 tablet (40 mg) by mouth  once daily. 10/2/23 1/5/24  Jayme Casey MD       Current Facility-Administered Medications:     acetaminophen (Tylenol) tablet 650 mg, 650 mg, oral, q4h PRN, 650 mg at 01/06/24 1100 **OR** acetaminophen (Tylenol) oral liquid 650 mg, 650 mg, oral, q4h PRN **OR** acetaminophen (Tylenol) suppository 650 mg, 650 mg, rectal, q4h PRN, Sarath Alvares MD    atorvastatin (Lipitor) tablet 40 mg, 40 mg, oral, Daily, Sarath Alvares MD, 40 mg at 01/06/24 0907    benzocaine-menthol (Cepastat Sore Throat) 15-3.6 mg lozenge 1 lozenge, 1 lozenge, Mouth/Throat, q2h PRN, Sarath Alvares MD    cholecalciferol (Vitamin D-3) tablet 2,000 Units, 2,000 Units, oral, Daily, Sarath Alvares MD, 2,000 Units at 01/06/24 0907    dextromethorphan-guaifenesin (Robitussin DM)  mg/5 mL oral liquid 5 mL, 5 mL, oral, q4h PRN, Sarath Alvares MD    dextrose 5%-0.45 % sodium chloride infusion, 80 mL/hr, intravenous, Continuous, Sarath Alvares MD, Last Rate: 80 mL/hr at 01/06/24 0249, 80 mL/hr at 01/06/24 0249    divalproex (Depakote ER) 24 hr tablet 750 mg, 750 mg, oral, BID, Sarath Alvares MD, 750 mg at 01/06/24 0907    guaiFENesin (Mucinex) 12 hr tablet 600 mg, 600 mg, oral, q12h PRN, Sarath Alvares MD    heparin (porcine) injection 5,000 Units, 5,000 Units, subcutaneous, q8h CHAPARRITA, Sarath Alvares MD, 5,000 Units at 01/06/24 0632    ipratropium-albuteroL (Duo-Neb) 0.5-2.5 mg/3 mL nebulizer solution 3 mL, 3 mL, nebulization, q2h PRN, Sarath Alvares MD    lacosamide (Vimpat) tablet 200 mg, 200 mg, oral, BID, Sarath Alvares MD, 200 mg at 01/06/24 0907    LORazepam (Ativan) tablet 0.5 mg, 0.5 mg, oral, Daily PRN, Sarath Alvares MD    nicotine (Nicoderm CQ) 21 mg/24 hr patch 1 patch, 1 patch, transdermal, q24h, Sarath Alvares MD    ondansetron ODT (Zofran-ODT) disintegrating tablet 8 mg, 8 mg, oral, q8h PRN, Sarath Alvares MD    piperacillin-tazobactam-dextrose (Zosyn) IV 4.5 g,  4.5 g, intravenous, q6h, Sarath Alvares MD, Stopped at 01/06/24 0937    polyethylene glycol (Glycolax, Miralax) packet 17 g, 17 g, oral, Daily PRN, Sarath Alvares MD    prochlorperazine (Compazine) tablet 10 mg, 10 mg, oral, q6h PRN, Sarath Alvares MD    thiamine (Vitamin B-1) tablet 100 mg, 100 mg, oral, Daily, Saarth Alvares MD, 100 mg at 01/06/24 0907    tiotropium (Spiriva Respimat) 2.5 mcg/actuation inhaler 2 puff, 2 Inhalation, inhalation, Daily, Sarath Alvares MD, 2 puff at 01/06/24 0813    vancomycin (Xellia) 1 g in 200 mL (Xellia) IVPB 1,000 mg, 1,000 mg, intravenous, q12h, Lakeshia Oconnor, PharmD, Stopped at 01/06/24 1500  XR chest 1 view    Result Date: 1/5/2024  STUDY: Chest Radiograph;  01/05/2024 9:39 PM INDICATION: Cough, fever.  Additional History:  Lung cancer. COMPARISON: CT chest 10/06/2023.  NM PET/CT 10/06/2023.  ACCESSION NUMBER(S): [id number] ORDERING CLINICIAN: ARTEM MOSER TECHNIQUE:  Frontal chest was obtained at 2139 hours. FINDINGS: CARDIOMEDIASTINAL SILHOUETTE: Cardiomediastinal silhouette is normal in size and configuration.  LUNGS: Bandlike scarring/atelectasis LEFT midlung noted.  ABDOMEN: No remarkable upper abdominal findings.  BONES: No acute osseous changes.    Bandlike scarring/atelectasis LEFT midlung. Signed by Artem Krause II, MD    No results found for the last 90 days.       Assessment/Plan   Principal Problem:    Fever in adult  Neutropenic fever  Non-small cell cancer of lung  Seizure disorder intracranial aneurysm status post coiling  COPD  Seizure disorder  Asthma  Tobacco abuse  Peripheral vascular disease  Carotid stenosis status post endarterectomy  Coronary artery disease    Plan: Continue current medication.  Supportive care.  Patient has been started on broad-spectrum antibiotics.  Patient was also hypoglycemic last night and has been started on D5 half-normal saline drip for volume maintenance and hypoglycemia.  Encourage p.o.  intake.  Continue the Vimpat and take seizure precautions.  Patient got neutropenic.  Patient has been started on broad-spectrum antibiotic.  Check blood culture.  Check sputum culture and urine culture.  Consult ID.  Consult oncology for lung cancer.  We will take DVT, fall, aspiration, decubitus and DVT precautions.  For details see the order sheet.  This has been discussed with the patient and his wife at bedside.  They are agreeable to it.        Sarath Alvares MD

## 2024-01-06 NOTE — PROGRESS NOTES
Occupational Therapy    Evaluation    Patient Name: Martínez Neri  MRN: [id number]  Today's Date: 1/6/2024  Time Calculation  Start Time: 1425  Stop Time: 1437  Time Calculation (min): 12 min    Assessment  IP OT Assessment  OT Assessment: Patient is a 63 year old male admitted with fever. Patient is presenting with a decline in strength, balance, activity tolerance, and function, resulting in an increased need for assistance with ADL tasks. Skilled therapy to address the above deficits in order to increase patient's safety and independence with daily tasks.  Prognosis: Good  Evaluation/Treatment Tolerance: Treatment limited secondary to agitation  End of Session Communication: Bedside nurse  End of Session Patient Position: Bed, 4 rail up, Alarm on  Plan:  Treatment Interventions: ADL retraining, Functional transfer training, UE strengthening/ROM, Endurance training, Patient/family training, Compensatory technique education  OT Frequency: 2 times per week  OT Discharge Recommendations: Low intensity level of continued care, 24 hr supervision due to cognition  OT Recommended Transfer Status: Assist of 1  OT - OK to Discharge: Yes    Subjective   Current Problem:  1. Generalized weakness        2. Fever, unspecified fever cause        3. Leukopenia, unspecified type          General:  General  Reason for Referral: decline in ADLs  Referred By: Dr. Alvares  Past Medical History Relevant to Rehab: Patient is a 63 year old male who presented to the ED with c/o of fever. Patient admitted with fever in adult. PMH: lung cancer  Family/Caregiver Present: Yes  Caregiver Feedback: Spouse  Prior to Session Communication: Bedside nurse  Patient Position Received: Bed, 4 rail up, Alarm on  Preferred Learning Style: verbal  General Comment: Patient cleared by nursing for therapy. Therapist responds to patient's call light and he reports he has to use the restroom  Precautions:  Medical Precautions: Fall precautions  Pain:  Pain  Assessment  Pain Assessment: 0-10  Pain Score: 0 - No pain    Objective   Cognition:  Overall Cognitive Status: Impaired (therapist does not ask AxO questions as patient is agitated by therapists questions and does not want to respond)  Insight: Mild  Impulsive: Mildly     Home Living:  Type of Home: House  Lives With: Spouse, Adult children, Grandchildren (Son, his wife, their 3 kids. daughter)  Home Adaptive Equipment: None  Home Layout: Multi-level  Home Access: Stairs to enter without rails  Entrance Stairs-Rails: None  Entrance Stairs-Number of Steps: 2  Bathroom Shower/Tub: Tub/shower unit, Walk-in shower  Bathroom Toilet: Standard  Bathroom Equipment: None   Prior Function:  Level of [place]: Independent with homemaking with ambulation, Independent with ADLs and functional transfers  Receives Help From: Family  ADL Assistance: Independent  Homemaking Assistance: Independent  Ambulatory Assistance: Independent  Prior Function Comments: family completes IADLs  IADL History:  Homemaking Responsibilities: No  IADL Comments: family completes IADLs  ADL:  Eating Assistance: Stand by  Eating Deficit: Setup (seated, items within reach, per clincial judgement)  Grooming Assistance: Stand by  Grooming Deficit: Steadying, Increased time to complete, Supervision/safety  Bathing Assistance: Minimal  Bathing Deficit: Buttocks, Setup, Supervision/safety, Increased time to complete , Left lower leg including foot, Right lower leg including foot (per clincial judgement)  UE Dressing Assistance: Stand by  UE Dressing Deficit: Setup (per clincial judgement)  LE Dressing Assistance: Total  LE Dressing Deficit: Don/doff L sock, Don/doff R sock  Toileting Assistance with Device: Stand by  Toileting Deficit: Steadying, Supervison/safety  Activity Tolerance:  Endurance: Decreased tolerance for upright activites  Bed Mobility/Transfers: Bed Mobility  Bed Mobility: Yes  Bed Mobility 1  Bed Mobility 1: Supine to sitting  Level of  Assistance 1: Close supervision  Bed Mobility 2  Bed Mobility  2: Sitting to supine  Level of Assistance 2: Close supervision    Transfers  Transfer: Yes  Transfer 1  Transfer From 1: Bed to  Transfer to 1: Stand  Technique 1: Sit to stand  Transfer Device 1:  (no AD)  Transfer Level of Assistance 1: Contact guard  Trials/Comments 1: patient completes functional mobility to/from the bathroom with CGA without AD, stands at toilet to use restroom. Supervision for safety    IADL's:   Homemaking Responsibilities: No  IADL Comments: family completes IADLs  Vision: Vision - Basic Assessment  Current Vision: Wears glasses only for reading  Sensation:  Sensation Comment: patient denies numbness/tingling  Strength:  Strength Comments: B UE at least >/= 3/5 grossly. observed functionally    Extremities: RUE   RUE : Within Functional Limits (observed functionally) and LUE   LUE: Within Functional Limits (observed functionally)    Outcome Measures: [facility] Daily Activity  Putting on and taking off regular lower body clothing: A little  Bathing (including washing, rinsing, drying): A little  Putting on and taking off regular upper body clothing: A little  Toileting, which includes using toilet, bedpan or urinal: A little  Taking care of personal grooming such as brushing teeth: A little  Eating Meals: A little  Daily Activity - Total Score: 18    Education Documentation  Body Mechanics, taught by Cecilia Jasso OT at 1/6/2024  2:56 PM.  Learner: Patient  Readiness: Acceptance  Method: Explanation, Demonstration  Response: Needs Reinforcement    Precautions, taught by Cecilia Jasso OT at 1/6/2024  2:56 PM.  Learner: Patient  Readiness: Acceptance  Method: Explanation, Demonstration  Response: Needs Reinforcement    ADL Training, taught by Cecilia Jasso OT at 1/6/2024  2:56 PM.  Learner: Patient  Readiness: Acceptance  Method: Explanation, Demonstration  Response: Needs Reinforcement    Education Comments  No comments  found.      Goals:   Encounter Problems       Encounter Problems (Active)       OT Goals       ADLs (Progressing)       Start:  01/06/24    Expected End:  01/31/24       Patient will complete ADL tasks with [place] in order to safely return to PLOF.         Functional Transfers (Progressing)       Start:  01/06/24    Expected End:  01/31/24       Patient will complete functional transfers with [place] in order to increase patient's safety and independence with daily tasks.         B UE Strength  (Progressing)       Start:  01/06/24    Expected End:  01/31/24       Patient will increase B UE strength to 4+/5 for functional transfers.         Functional Mobility (Progressing)       Start:  01/06/24    Expected End:  01/31/24       Patient will demonstrate the ability to complete item retrieval and functional mobility with [place] in order to increase patient's safety and independence with daily tasks.

## 2024-01-06 NOTE — PROGRESS NOTES
Physical Therapy                 Therapy Communication Note    Patient Name: Martínez Neri  MRN: [id number]  Today's Date: 1/6/2024     Discipline: Physical Therapy    Missed Visit Reason:  pt sound asleep, unable to awake despite two different attempts, will try another date    Missed Time: Attempt    Comment:

## 2024-01-06 NOTE — PROGRESS NOTES
"Vancomycin Dosing by Pharmacy- FOLLOW UP    Martínez Neri is a 63 y.o. year old male who Pharmacy has been consulted for vancomycin dosing for neutropenic fever by Dr Alvares. Based on the patient's indication and renal status this patient is being dosed based on a goal AUC of 400-600.     Renal function is currently stable.    Current vancomycin dose: 1000 mg given every 12 hours    Estimated vancomycin AUC on current dose: 458 mg/L.hr     Visit Vitals  BP (!) 91/45 (BP Location: Left arm, Patient Position: Lying)   Pulse 88   Temp 39.1 °C (102.4 °F) (Rectal)   Resp 20        Lab Results   Component Value Date    CREATININE 0.70 01/06/2024    CREATININE 0.70 01/05/2024    CREATININE 0.75 01/04/2024    CREATININE 0.73 01/02/2024        Patient weight is No results found for: \"PTWEIGHT\"    No results found for: \"CULTURE\"     I/O last 3 completed shifts:  In: 598 (7.8 mL/kg) [P.O.:50; I.V.:248 (3.2 mL/kg); IV Piggyback:300]  Out: 150 (2 mL/kg) [Urine:150 (0.1 mL/kg/hr)]  Weight: 76.5 kg   [unfilled]    No results found for: \"PATIENTTEMP\"     Assessment/Plan    Within goal AUC range. Continue current vancomycin regimen.    This dosing regimen is predicted by [employer] to result in the following pharmacokinetic parameters:  Loading dose: N/A  Regimen: 1000 mg IV every 12 hours.  Start time: 14:19 on 01/06/2024  Exposure target: AUC24 (range)400-600 mg/L.hr   AUC24,ss: 458 mg/L.hr  Probability of AUC24 > 400: 70 %  Ctrough,ss: 13.7 mg/L  Probability of Ctrough,ss > 20: 13 %  Probability of nephrotoxicity (Lodise ANGE 2009): 9 %    The next level will be obtained on 1/13 at 0500. May be obtained sooner if clinically indicated.   Will continue to monitor renal function daily while on vancomycin and order serum creatinine at least every 48 hours if not already ordered.  Follow for continued vancomycin needs, clinical response, and signs/symptoms of toxicity.       Dora Higuera, PharmD           "

## 2024-01-06 NOTE — NURSING NOTE
Pt arrived to room via cart. Wife at bedside. Oriented to room AND Call bell. Bed in lowest  locked position. Pt is BR at this time. PT/OT to evaluate.

## 2024-01-07 ENCOUNTER — APPOINTMENT (OUTPATIENT)
Dept: RADIOLOGY | Facility: HOSPITAL | Age: 64
End: 2024-01-07
Payer: MEDICAID

## 2024-01-07 ENCOUNTER — DOCUMENTATION (OUTPATIENT)
Dept: HEMATOLOGY/ONCOLOGY | Facility: CLINIC | Age: 64
End: 2024-01-07
Payer: MEDICAID

## 2024-01-07 PROBLEM — D70.9 NEUTROPENIC FEVER (CMS-HCC): Status: ACTIVE | Noted: 2024-01-07

## 2024-01-07 PROBLEM — K55.069 SUPERIOR MESENTERIC ARTERY THROMBOSIS (MULTI): Status: ACTIVE | Noted: 2024-01-07

## 2024-01-07 PROBLEM — E87.1 HYPONATREMIA: Status: ACTIVE | Noted: 2024-01-07

## 2024-01-07 PROBLEM — R50.81 NEUTROPENIC FEVER (CMS-HCC): Status: ACTIVE | Noted: 2024-01-07

## 2024-01-07 LAB
ALBUMIN SERPL-MCNC: 2.8 G/DL (ref 3.5–5)
ALP BLD-CCNC: 42 U/L (ref 35–125)
ALT SERPL-CCNC: 40 U/L (ref 5–40)
ANION GAP SERPL CALC-SCNC: 8 MMOL/L
AST SERPL-CCNC: 178 U/L (ref 5–40)
BASOPHILS # BLD MANUAL: 0 X10*3/UL (ref 0–0.1)
BASOPHILS NFR BLD MANUAL: 0 %
BILIRUB SERPL-MCNC: 0.3 MG/DL (ref 0.1–1.2)
BUN SERPL-MCNC: 8 MG/DL (ref 8–25)
CALCIUM SERPL-MCNC: 8.3 MG/DL (ref 8.5–10.4)
CHLORIDE SERPL-SCNC: 100 MMOL/L (ref 97–107)
CO2 SERPL-SCNC: 24 MMOL/L (ref 24–31)
CREAT SERPL-MCNC: 0.9 MG/DL (ref 0.4–1.6)
D DIMER PPP FEU-MCNC: 0.48 MG/L FEU (ref 0.19–0.5)
EOSINOPHIL # BLD MANUAL: 0 X10*3/UL (ref 0–0.7)
EOSINOPHIL NFR BLD MANUAL: 0 %
ERYTHROCYTE [DISTWIDTH] IN BLOOD BY AUTOMATED COUNT: 15.8 % (ref 11.5–14.5)
GFR SERPL CREATININE-BSD FRML MDRD: >90 ML/MIN/1.73M*2
GLUCOSE BLD MANUAL STRIP-MCNC: 108 MG/DL (ref 74–99)
GLUCOSE BLD MANUAL STRIP-MCNC: 121 MG/DL (ref 74–99)
GLUCOSE BLD MANUAL STRIP-MCNC: 132 MG/DL (ref 74–99)
GLUCOSE BLD MANUAL STRIP-MCNC: 140 MG/DL (ref 74–99)
GLUCOSE SERPL-MCNC: 115 MG/DL (ref 65–99)
HCT VFR BLD AUTO: 22.3 % (ref 41–52)
HGB BLD-MCNC: 7.7 G/DL (ref 13.5–17.5)
IMM GRANULOCYTES # BLD AUTO: 0.28 X10*3/UL (ref 0–0.7)
IMM GRANULOCYTES NFR BLD AUTO: 7.8 % (ref 0–0.9)
LYMPHOCYTES # BLD MANUAL: 0.29 X10*3/UL (ref 1.2–4.8)
LYMPHOCYTES NFR BLD MANUAL: 8 %
MCH RBC QN AUTO: 33.9 PG (ref 26–34)
MCHC RBC AUTO-ENTMCNC: 34.5 G/DL (ref 32–36)
MCV RBC AUTO: 98 FL (ref 80–100)
METAMYELOCYTES # BLD MANUAL: 0.04 X10*3/UL
METAMYELOCYTES NFR BLD MANUAL: 1 %
MONOCYTES # BLD MANUAL: 0.11 X10*3/UL (ref 0.1–1)
MONOCYTES NFR BLD MANUAL: 3 %
MYELOCYTES # BLD MANUAL: 0.07 X10*3/UL
MYELOCYTES NFR BLD MANUAL: 2 %
NEUTROPHILS # BLD MANUAL: 3.09 X10*3/UL (ref 1.2–7.7)
NEUTS BAND # BLD MANUAL: 0.61 X10*3/UL (ref 0–0.7)
NEUTS BAND NFR BLD MANUAL: 17 %
NEUTS SEG # BLD MANUAL: 2.48 X10*3/UL (ref 1.2–7)
NEUTS SEG NFR BLD MANUAL: 69 %
NRBC BLD-RTO: 0 /100 WBCS (ref 0–0)
PLATELET # BLD AUTO: 195 X10*3/UL (ref 150–450)
POTASSIUM SERPL-SCNC: 3.4 MMOL/L (ref 3.4–5.1)
PROT SERPL-MCNC: 5.5 G/DL (ref 5.9–7.9)
RBC # BLD AUTO: 2.27 X10*6/UL (ref 4.5–5.9)
RBC MORPH BLD: ABNORMAL
SODIUM SERPL-SCNC: 132 MMOL/L (ref 133–145)
TOTAL CELLS COUNTED BLD: 100
WBC # BLD AUTO: 3.6 X10*3/UL (ref 4.4–11.3)

## 2024-01-07 PROCEDURE — G0378 HOSPITAL OBSERVATION PER HR: HCPCS

## 2024-01-07 PROCEDURE — 87070 CULTURE OTHR SPECIMN AEROBIC: CPT | Mod: TRILAB,WESLAB | Performed by: INTERNAL MEDICINE

## 2024-01-07 PROCEDURE — 2500000004 HC RX 250 GENERAL PHARMACY W/ HCPCS (ALT 636 FOR OP/ED): Performed by: INTERNAL MEDICINE

## 2024-01-07 PROCEDURE — 85300 ANTITHROMBIN III ACTIVITY: CPT | Performed by: INTERNAL MEDICINE

## 2024-01-07 PROCEDURE — 2550000001 HC RX 255 CONTRASTS: Performed by: INTERNAL MEDICINE

## 2024-01-07 PROCEDURE — 82947 ASSAY GLUCOSE BLOOD QUANT: CPT

## 2024-01-07 PROCEDURE — 80053 COMPREHEN METABOLIC PANEL: CPT | Performed by: INTERNAL MEDICINE

## 2024-01-07 PROCEDURE — 85027 COMPLETE CBC AUTOMATED: CPT | Performed by: INTERNAL MEDICINE

## 2024-01-07 PROCEDURE — 2500000004 HC RX 250 GENERAL PHARMACY W/ HCPCS (ALT 636 FOR OP/ED): Mod: JZ

## 2024-01-07 PROCEDURE — 36415 COLL VENOUS BLD VENIPUNCTURE: CPT | Performed by: INTERNAL MEDICINE

## 2024-01-07 PROCEDURE — 2500000001 HC RX 250 WO HCPCS SELF ADMINISTERED DRUGS (ALT 637 FOR MEDICARE OP): Performed by: INTERNAL MEDICINE

## 2024-01-07 PROCEDURE — 9420000001 HC RT PATIENT EDUCATION 5 MIN

## 2024-01-07 PROCEDURE — 94640 AIRWAY INHALATION TREATMENT: CPT

## 2024-01-07 PROCEDURE — 74177 CT ABD & PELVIS W/CONTRAST: CPT

## 2024-01-07 PROCEDURE — 85007 BL SMEAR W/DIFF WBC COUNT: CPT | Performed by: INTERNAL MEDICINE

## 2024-01-07 PROCEDURE — 99233 SBSQ HOSP IP/OBS HIGH 50: CPT | Performed by: INTERNAL MEDICINE

## 2024-01-07 PROCEDURE — 2500000004 HC RX 250 GENERAL PHARMACY W/ HCPCS (ALT 636 FOR OP/ED)

## 2024-01-07 RX ORDER — HEPARIN SODIUM 5000 [USP'U]/ML
3000-6000 INJECTION, SOLUTION INTRAVENOUS; SUBCUTANEOUS AS NEEDED
Status: DISCONTINUED | OUTPATIENT
Start: 2024-01-07 | End: 2024-01-12 | Stop reason: HOSPADM

## 2024-01-07 RX ORDER — HEPARIN SODIUM 5000 [USP'U]/ML
80 INJECTION, SOLUTION INTRAVENOUS; SUBCUTANEOUS ONCE
Status: COMPLETED | OUTPATIENT
Start: 2024-01-07 | End: 2024-01-07

## 2024-01-07 RX ORDER — HEPARIN SODIUM 10000 [USP'U]/100ML
0-4500 INJECTION, SOLUTION INTRAVENOUS CONTINUOUS
Status: DISCONTINUED | OUTPATIENT
Start: 2024-01-07 | End: 2024-01-12 | Stop reason: HOSPADM

## 2024-01-07 RX ADMIN — ACETAMINOPHEN 650 MG: 325 TABLET ORAL at 11:58

## 2024-01-07 RX ADMIN — HEPARIN SODIUM 6000 UNITS: 5000 INJECTION, SOLUTION INTRAVENOUS; SUBCUTANEOUS at 20:12

## 2024-01-07 RX ADMIN — THIAMINE HCL TAB 100 MG 100 MG: 100 TAB at 08:00

## 2024-01-07 RX ADMIN — LACOSAMIDE 200 MG: 100 TABLET, FILM COATED ORAL at 07:44

## 2024-01-07 RX ADMIN — PIPERACILLIN SODIUM AND TAZOBACTAM SODIUM 4.5 G: 4; .5 INJECTION, SOLUTION INTRAVENOUS at 16:00

## 2024-01-07 RX ADMIN — HEPARIN SODIUM 1400 UNITS/HR: 10000 INJECTION, SOLUTION INTRAVENOUS at 20:13

## 2024-01-07 RX ADMIN — BENZOCAINE AND MENTHOL 1 LOZENGE: 15; 3.6 LOZENGE ORAL at 06:17

## 2024-01-07 RX ADMIN — TIOTROPIUM BROMIDE INHALATION SPRAY 2 PUFF: 3.12 SPRAY, METERED RESPIRATORY (INHALATION) at 07:42

## 2024-01-07 RX ADMIN — SARGRAMOSTIM 500 MCG: 250 INJECTION, POWDER, LYOPHILIZED, FOR SOLUTION INTRAVENOUS; SUBCUTANEOUS at 10:00

## 2024-01-07 RX ADMIN — ATORVASTATIN CALCIUM 40 MG: 40 TABLET, FILM COATED ORAL at 08:00

## 2024-01-07 RX ADMIN — HEPARIN SODIUM 5000 UNITS: 5000 INJECTION, SOLUTION INTRAVENOUS; SUBCUTANEOUS at 14:00

## 2024-01-07 RX ADMIN — VANCOMYCIN 1000 MG: 1 INJECTION, SOLUTION INTRAVENOUS at 14:00

## 2024-01-07 RX ADMIN — DIVALPROEX SODIUM 750 MG: 250 TABLET, EXTENDED RELEASE ORAL at 07:44

## 2024-01-07 RX ADMIN — PIPERACILLIN SODIUM AND TAZOBACTAM SODIUM 4.5 G: 4; .5 INJECTION, SOLUTION INTRAVENOUS at 05:04

## 2024-01-07 RX ADMIN — LACOSAMIDE 200 MG: 100 TABLET, FILM COATED ORAL at 19:00

## 2024-01-07 RX ADMIN — HEPARIN SODIUM 5000 UNITS: 5000 INJECTION, SOLUTION INTRAVENOUS; SUBCUTANEOUS at 06:24

## 2024-01-07 RX ADMIN — IOHEXOL 75 ML: 350 INJECTION, SOLUTION INTRAVENOUS at 12:21

## 2024-01-07 RX ADMIN — Medication 2000 UNITS: at 08:00

## 2024-01-07 RX ADMIN — PIPERACILLIN SODIUM AND TAZOBACTAM SODIUM 4.5 G: 4; .5 INJECTION, SOLUTION INTRAVENOUS at 10:00

## 2024-01-07 RX ADMIN — PIPERACILLIN SODIUM AND TAZOBACTAM SODIUM 4.5 G: 4; .5 INJECTION, SOLUTION INTRAVENOUS at 22:21

## 2024-01-07 RX ADMIN — VANCOMYCIN 1000 MG: 1 INJECTION, SOLUTION INTRAVENOUS at 01:29

## 2024-01-07 RX ADMIN — DIVALPROEX SODIUM 750 MG: 250 TABLET, EXTENDED RELEASE ORAL at 19:00

## 2024-01-07 ASSESSMENT — COGNITIVE AND FUNCTIONAL STATUS - GENERAL
HELP NEEDED FOR BATHING: A LITTLE
PERSONAL GROOMING: A LITTLE
CLIMB 3 TO 5 STEPS WITH RAILING: A LOT
TOILETING: A LITTLE
WALKING IN HOSPITAL ROOM: A LITTLE
EATING MEALS: A LITTLE
MOBILITY SCORE: 18
DAILY ACTIVITIY SCORE: 18
HELP NEEDED FOR BATHING: A LITTLE
TURNING FROM BACK TO SIDE WHILE IN FLAT BAD: A LITTLE
CLIMB 3 TO 5 STEPS WITH RAILING: A LOT
STANDING UP FROM CHAIR USING ARMS: A LITTLE
TURNING FROM BACK TO SIDE WHILE IN FLAT BAD: A LITTLE
STANDING UP FROM CHAIR USING ARMS: A LITTLE
MOVING TO AND FROM BED TO CHAIR: A LITTLE
EATING MEALS: A LITTLE
MOBILITY SCORE: 18
WALKING IN HOSPITAL ROOM: A LITTLE
DRESSING REGULAR UPPER BODY CLOTHING: A LITTLE
DRESSING REGULAR UPPER BODY CLOTHING: A LITTLE
MOVING TO AND FROM BED TO CHAIR: A LITTLE
DRESSING REGULAR LOWER BODY CLOTHING: A LITTLE
DRESSING REGULAR LOWER BODY CLOTHING: A LITTLE
TOILETING: A LITTLE
PERSONAL GROOMING: A LITTLE
DAILY ACTIVITIY SCORE: 18

## 2024-01-07 ASSESSMENT — ENCOUNTER SYMPTOMS
FATIGUE: 1
FEVER: 1
CHILLS: 1
COUGH: 1

## 2024-01-07 ASSESSMENT — PAIN - FUNCTIONAL ASSESSMENT: PAIN_FUNCTIONAL_ASSESSMENT: 0-10

## 2024-01-07 ASSESSMENT — PAIN SCALES - GENERAL
PAINLEVEL_OUTOF10: 0 - NO PAIN
PAINLEVEL_OUTOF10: 0 - NO PAIN

## 2024-01-07 NOTE — NURSING NOTE
Family provided this RN with shunt info however with putting in implant info in mri safety form it didn't save because family is missing information - this rn sent what family provided to mri tech dilcia and also a copy in chart.

## 2024-01-07 NOTE — CARE PLAN
The patient's goals for the shift include get stronger to walk    The clinical goals for the shift include monitor labs , safety      Problem: Pain  Goal: Walks with improved pain control throughout the shift  Outcome: Progressing     Problem: Pain  Goal: Takes deep breaths with improved pain control throughout the shift  Outcome: Progressing     Problem: Safety  Goal: Patient will be injury free during hospitalization  Outcome: Progressing     Problem: Respiratory  Goal: No signs of respiratory distress (eg. Use of accessory muscles. Peds grunting)  Outcome: Progressing

## 2024-01-07 NOTE — PROGRESS NOTES
"Martínez Neri is a 63 y.o. male on day 0 of admission presenting with Neutropenic fever (CMS/HCC).    Subjective   H&P reviewed.  Patient PCP is Dr. SAE Casey.  Patient admitted to Dr. Parmer by mistake.    Today patient is more alert and more less confused  He has history of cough for a while today no fever.  Generalized weakness  Patient has history of lung cancer and receiving chemotherapy radiation therapy.  His last treatment was 2 weeks ago.  Day before evening he developed fever.  He was started on empirical antibiotics.  He denies any new symptoms       Objective     Physical Exam  Vitals reviewed.   Constitutional:       Appearance: Normal appearance.   HENT:      Head: Normocephalic and atraumatic.      Right Ear: Tympanic membrane, ear canal and external ear normal.      Left Ear: Tympanic membrane, ear canal and external ear normal.      Nose: Nose normal.      Mouth/Throat:      Pharynx: Oropharynx is clear.   Eyes:      Extraocular Movements: Extraocular movements intact.      Conjunctiva/sclera: Conjunctivae normal.      Pupils: Pupils are equal, round, and reactive to light.   Cardiovascular:      Rate and Rhythm: Normal rate and regular rhythm.      Pulses: Normal pulses.      Heart sounds: Normal heart sounds.   Pulmonary:      Effort: Pulmonary effort is normal.      Breath sounds: Normal breath sounds.   Abdominal:      General: Abdomen is flat. Bowel sounds are normal.      Palpations: Abdomen is soft.   Musculoskeletal:      Cervical back: Normal range of motion and neck supple.   Skin:     General: Skin is warm and dry.   Neurological:      General: No focal deficit present.      Mental Status: He is alert and oriented to person, place, and time.   Psychiatric:         Mood and Affect: Mood normal.         Last Recorded Vitals  Blood pressure (!) 97/48, pulse 81, temperature 37 °C (98.6 °F), temperature source Oral, resp. rate 20, height 1.753 m (5' 9.02\"), weight 76.5 kg (168 lb 10.4 oz), SpO2 " 97 %.  Intake/Output last 3 Shifts:  I/O last 3 completed shifts:  In: 648 (8.5 mL/kg) [P.O.:100; I.V.:248 (3.2 mL/kg); IV Piggyback:300]  Out: 350 (4.6 mL/kg) [Urine:350 (0.1 mL/kg/hr)]  Weight: 76.5 kg     Relevant Results              Scheduled medications  atorvastatin, 40 mg, oral, Daily  cholecalciferol, 2,000 Units, oral, Daily  divalproex, 750 mg, oral, BID  heparin (porcine), 5,000 Units, subcutaneous, q8h CHAPARRITA  lacosamide, 200 mg, oral, BID  nicotine, 1 patch, transdermal, q24h  piperacillin-tazobactam, 4.5 g, intravenous, q6h  sargramostim, 500 mcg, subcutaneous, q24h CHAPARRITA  thiamine, 100 mg, oral, Daily  tiotropium, 2 Inhalation, inhalation, Daily  vancomycin, 1,000 mg, intravenous, q12h      Continuous medications  dextrose 5%-0.45 % sodium chloride, 80 mL/hr, Last Rate: 80 mL/hr (01/06/24 0249)      PRN medications  PRN medications: acetaminophen **OR** acetaminophen **OR** acetaminophen, benzocaine-menthol, dextromethorphan-guaifenesin, guaiFENesin, ipratropium-albuteroL, LORazepam, ondansetron ODT, polyethylene glycol, prochlorperazine   Results for orders placed or performed during the hospital encounter of 01/05/24 (from the past 24 hour(s))   POCT GLUCOSE   Result Value Ref Range    POCT Glucose 119 (H) 74 - 99 mg/dL   POCT GLUCOSE   Result Value Ref Range    POCT Glucose 140 (H) 74 - 99 mg/dL   POCT GLUCOSE   Result Value Ref Range    POCT Glucose 121 (H) 74 - 99 mg/dL   POCT GLUCOSE   Result Value Ref Range    POCT Glucose 108 (H) 74 - 99 mg/dL   D-dimer, Non VTE   Result Value Ref Range    D-Dimer Non VTE, Quant (mg/L FEU) 0.48 0.19 - 0.50 mg/L FEU   CBC and Auto Differential   Result Value Ref Range    WBC 3.6 (L) 4.4 - 11.3 x10*3/uL    nRBC 0.0 0.0 - 0.0 /100 WBCs    RBC 2.27 (L) 4.50 - 5.90 x10*6/uL    Hemoglobin 7.7 (L) 13.5 - 17.5 g/dL    Hematocrit 22.3 (L) 41.0 - 52.0 %    MCV 98 80 - 100 fL    MCH 33.9 26.0 - 34.0 pg    MCHC 34.5 32.0 - 36.0 g/dL    RDW 15.8 (H) 11.5 - 14.5 %    Platelets  195 150 - 450 x10*3/uL    Immature Granulocytes %, Automated 7.8 (H) 0.0 - 0.9 %    Immature Granulocytes Absolute, Automated 0.28 0.00 - 0.70 x10*3/uL   Comprehensive Metabolic Panel   Result Value Ref Range    Glucose 115 (H) 65 - 99 mg/dL    Sodium 132 (L) 133 - 145 mmol/L    Potassium 3.4 3.4 - 5.1 mmol/L    Chloride 100 97 - 107 mmol/L    Bicarbonate 24 24 - 31 mmol/L    Urea Nitrogen 8 8 - 25 mg/dL    Creatinine 0.90 0.40 - 1.60 mg/dL    eGFR >90 >60 mL/min/1.73m*2    Calcium 8.3 (L) 8.5 - 10.4 mg/dL    Albumin 2.8 (L) 3.5 - 5.0 g/dL    Alkaline Phosphatase 42 35 - 125 U/L    Total Protein 5.5 (L) 5.9 - 7.9 g/dL     (H) 5 - 40 U/L    Bilirubin, Total 0.3 0.1 - 1.2 mg/dL    ALT 40 5 - 40 U/L    Anion Gap 8 <=19 mmol/L   Manual Differential   Result Value Ref Range    Neutrophils %, Manual 69.0 40.0 - 80.0 %    Bands %, Manual 17.0 0.0 - 5.0 %    Lymphocytes %, Manual 8.0 13.0 - 44.0 %    Monocytes %, Manual 3.0 2.0 - 10.0 %    Eosinophils %, Manual 0.0 0.0 - 6.0 %    Basophils %, Manual 0.0 0.0 - 2.0 %    Metamyelocytes %, Manual 1.0 0.0 - 0.0 %    Myelocytes %, Manual 2.0 0.0 - 0.0 %    Seg Neutrophils Absolute, Manual 2.48 1.20 - 7.00 x10*3/uL    Bands Absolute, Manual 0.61 0.00 - 0.70 x10*3/uL    Lymphocytes Absolute, Manual 0.29 (L) 1.20 - 4.80 x10*3/uL    Monocytes Absolute, Manual 0.11 0.10 - 1.00 x10*3/uL    Eosinophils Absolute, Manual 0.00 0.00 - 0.70 x10*3/uL    Basophils Absolute, Manual 0.00 0.00 - 0.10 x10*3/uL    Metamyelocytes Absolute, Manual 0.04 0.00 - 0.00 x10*3/uL    Myelocytes Absolute, Manual 0.07 0.00 - 0.00 x10*3/uL    Total Cells Counted 100     Neutrophils Absolute, Manual 3.09 1.20 - 7.70 x10*3/uL    RBC Morphology No significant RBC morphology present      CT abdomen pelvis w IV contrast    Result Date: 1/7/2024  Interpreted By:  Lisa Lezama, STUDY: CT ABDOMEN PELVIS W IV CONTRAST;  1/7/2024 12:26 pm   INDICATION: Abdominal pain   COMPARISON: Fused PET/CT study of 10/06/2023    ACCESSION NUMBER(S): [id number]   ORDERING CLINICIAN: JF ALEMAN   TECHNIQUE: CT of the abdomen and pelvis was performed with intravenous contrast administered. 75 mL of Omnipaque 350 was injected intravenously for the study with sagittal and coronal reformations performed by the technologist at the acquisition scanner.   All CT examinations are performed with 1 or more of the following dose reduction techniques: Automated exposure control, adjustment of mA and/or kv according to patient's size, or use of iterative reconstruction techniques.   FINDINGS: LOWER CHEST: There are severe emphysematous changes seen within the lower lung zones with minimal right pleural effusion and with mild atelectasis at the right lung base.   ABDOMEN:   LIVER: There are several hypodense space-occupying liver lesions measuring up to 2.3 cm in size without interval change compared with prior study. These are most consistent with stable hepatic cysts.   BILE DUCTS: No intrahepatic or extrahepatic biliary ductal dilatation.   GALLBLADDER: Unremarkable   PANCREAS: Unremarkable   SPLEEN: Unremarkable   ADRENAL GLANDS: Unremarkable   KIDNEYS AND URETERS: There is a 1.7 cm right renal cyst seen with left kidney unremarkable. There is mild bilateral perinephric scarring. MRCP   PELVIS:   BLADDER: No abnormality of the urinary bladder is seen.   REPRODUCTIVE ORGANS: Prostate gland is enlarged with a transverse diameter of 4.8 cm.   BOWEL: Colonic diverticulosis is seen without evidence for diverticulitis. The bowel is difficult to accurately evaluate due to patient motion. No pneumatosis intestinalis is seen. No portal venous gas is observed.   VESSELS: There is atherosclerosis of the abdominal aorta without aneurysmal dilatation. Calcified plaque is visible within the superior mesenteric artery. There is a filling defect seen within the proximal superior mesenteric artery identified roughly 7 mm distal to its origin. I believe that this  represents partial thrombosis. There is flow seen more distally within the superior mesenteric artery.   PERITONEUM/RETROPERITONEUM/LYMPH NODES: A small amount of free fluid within the pelvic cul-de-sac is identified.   BONES AND ABDOMINAL WALL: Small bilateral fat containing inguinal hernias are present with left inguinal hernia larger than right. A very tiny fat containing umbilical hernia is observed.       1.  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 However, there is flow noted within the distal SMA. 2. Small amount of free fluid within the pelvic cul-de-sac. 3. Colonic diverticulosis without diverticulitis. 4. Hepatic cysts measuring up to 2.3 cm in size with 1.7 cm right renal cyst. 5. Severe pulmonary emphysema with minimal right pleural effusion and with atelectasis at right lung base. 6. Small bilateral fat containing inguinal hernias with very tiny fat containing umbilical hernia     MACRO: None   Signed by: Lisa Lezama 1/7/2024 12:53 PM Dictation workstation:   [id number]    XR chest 1 view    Result Date: 1/5/2024  STUDY: Chest Radiograph;  01/05/2024 9:39 PM INDICATION: Cough, fever.  Additional History:  Lung cancer. COMPARISON: CT chest 10/06/2023.  NM PET/CT 10/06/2023.  ACCESSION NUMBER(S): [id number] ORDERING CLINICIAN: ARTEM MOSER TECHNIQUE:  Frontal chest was obtained at 2139 hours. FINDINGS: CARDIOMEDIASTINAL SILHOUETTE: Cardiomediastinal silhouette is normal in size and configuration.  LUNGS: Bandlike scarring/atelectasis LEFT midlung noted.  ABDOMEN: No remarkable upper abdominal findings.  BONES: No acute osseous changes.    Bandlike scarring/atelectasis LEFT midlung. Signed by Artem Krause II, MD                  Assessment/Plan   Principal Problem:    Neutropenic fever (CMS/HCC)  Active Problems:    Partial symptomatic epilepsy with complex partial seizures, not intractable, without status epilepticus (CMS/HCC)     Fever in adult    Superior mesenteric artery thrombosis (CMS/HCC)    Hyponatremia    Malignant neoplasm of upper lobe of left lung (CMS/HCC)    No definite source of infection  ID on consult  Hematology on consult  SMA thrombosis.  IV heparin started  Will check MRI of the brain to assess  shunt make sure there is no infection  Continue home medications  Discussed care with the family         I spent  minutes in the professional and overall care of this patient.      Radha Casey MD

## 2024-01-07 NOTE — NURSING NOTE
Recheck of vitals. Pt balligerent screaming at aid and myself.  Constant treatment throughout  the shift by the pt. Nursing supervisor aware.

## 2024-01-07 NOTE — CONSULTS
Inpatient consult to Infectious Diseases  Consult performed by: Roe Graham MD  Consult ordered by: Sarath Alvares MD            Primary MD: Jayme Casey MD    Reason For Consult  Febrile neutropenia    History Of Present Illness  Martínez Neri is a 63 y.o. male presenting with fever.  History was obtained from his wife mainly and also from patient.  He was diagnosed with cancer per chart in June 2023.  He was started on chemotherapy for non-small cell lung cancer in November 2023.  He is getting chemotherapy and radiation therapy.  His last treatment was about 2 weeks ago.  He is on a weekly regimen, but he did not get his chemotherapy last week because his numbers were off per his wife.  He still got his radiation therapy treatment.  He developed fever on day of admission.  He denies any chills.  He has mild intermittently productive cough.  His wife reports that some family members had the sniffles.  His workup for coronavirus, RSV and influenza were negative.  He is on empiric antibiotic therapy.  Chest imaging was negative for infiltrate, but remarkable COPD.  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    Surgical History  He has a past surgical history that includes Carotid endarterectomy (N/A); Colonoscopy; Hernia repair; and Cerebral aneurysm repair.     Social History     Occupational History    Not on file   Tobacco Use    Smoking status: Every Day     Packs/day: 1.00     Years: 50.00     Additional pack years: 0.00     Total pack years: 50.00     Types: Cigarettes     Passive exposure:  Current    Smokeless tobacco: Never   Vaping Use    Vaping Use: Never used   Substance and Sexual Activity    Alcohol use: Not Currently     Comment: quit 7 years ago    Drug use: Yes     Frequency: 4.0 times per week     Types: Marijuana    Sexual activity: Defer     Travel History   Travel since 12/07/23    No documented travel since 12/07/23        Service:  Branch Years Served Period          Comments:          Family History  Family History   Problem Relation Name Age of Onset    Heart attack Mother      Other (Colorectal cancer) Father      Aneurysm Other      Prostate cancer Other      Alcohol abuse Other       Allergies  Patient has no known allergies.       There is no immunization history on file for this patient.  Medications  Home medications:  Medications Prior to Admission   Medication Sig Dispense Refill Last Dose    albuterol (Proventil HFA) 90 mcg/actuation inhaler Inhale 2 puffs every 4 hours if needed for wheezing or shortness of breath. 6.7 g 6     atorvastatin (Lipitor) 40 mg tablet Take 1 tablet (40 mg) by mouth once daily. 90 tablet 1     cholecalciferol (Vitamin D-3) 125 MCG (5000 UT) capsule        divalproex (Depakote ER) 250 mg 24 hr tablet Take 1 tablet (250 mg) by mouth 2 times a day. Do not crush, chew, or split. 90 tablet 1     divalproex (Depakote ER) 500 mg 24 hr tablet Take 1 tablet (500 mg) by mouth 2 times a day. Do not crush, chew, or split. 90 tablet 1     inhalat.spacing dev,med. mask spacer        ipratropium-albuteroL (Duo-Neb) 0.5-2.5 mg/3 mL nebulizer solution Take 3 mL by nebulization 4 times a day as needed for wheezing or shortness of breath. 90 mL 1     lacosamide (Vimpat) 200 mg tablet tablet Take 1 tablet (200 mg) by mouth 2 times a day. 30 tablet 5     LORazepam (Ativan) 0.5 mg tablet Take 1 tablet (0.5 mg) by mouth once daily as needed for anxiety for up to 5 days. 5 tablet 0     nebulizer accessories misc Use twice daily 100 each 0     nebulizer and  compressor device Use as directed 1 each 0     nicotine (Nicoderm CQ) 21 mg/24 hr patch Place 1 patch over 24 hours on the skin once every 24 hours. 30 patch 0     ondansetron (Zofran) 8 mg tablet Take 1 tablet (8 mg) by mouth every 8 hours if needed for nausea or vomiting. 30 tablet 5     prochlorperazine (Compazine) 10 mg tablet Take 1 tablet (10 mg) by mouth every 6 hours if needed for nausea or vomiting. 30 tablet 5     Spiriva Respimat 2.5 mcg/actuation inhaler        thiamine (Vitamin B-1) 100 mg tablet Take 1 tablet (100 mg) by mouth once daily. 90 tablet 1      Current medications:  Scheduled medications  atorvastatin, 40 mg, oral, Daily  cholecalciferol, 2,000 Units, oral, Daily  divalproex, 750 mg, oral, BID  heparin (porcine), 5,000 Units, subcutaneous, q8h CHAPARRITA  lacosamide, 200 mg, oral, BID  nicotine, 1 patch, transdermal, q24h  piperacillin-tazobactam, 4.5 g, intravenous, q6h  sargramostim, 500 mcg, subcutaneous, q24h CHAPARRITA  thiamine, 100 mg, oral, Daily  tiotropium, 2 Inhalation, inhalation, Daily  vancomycin, 1,000 mg, intravenous, q12h      Continuous medications  dextrose 5%-0.45 % sodium chloride, 80 mL/hr, Last Rate: 80 mL/hr (01/06/24 0249)      PRN medications  PRN medications: acetaminophen **OR** acetaminophen **OR** acetaminophen, benzocaine-menthol, dextromethorphan-guaifenesin, guaiFENesin, ipratropium-albuteroL, LORazepam, ondansetron ODT, polyethylene glycol, prochlorperazine    Review of Systems   Constitutional:  Positive for chills, fatigue and fever.   Respiratory:  Positive for cough.         Objective  Range of Vitals (last 24 hours)  Heart Rate:  [77-87]   Temp:  [36.3 °C (97.3 °F)-37.3 °C (99.1 °F)]   Resp:  [18-20]   BP: ()/(43-62)   SpO2:  [87 %-97 %]   Daily Weight  01/06/24 : 76.5 kg (168 lb 10.4 oz)    Body mass index is 24.89 kg/m².     Physical Exam     Relevant Results  Outside Hospital Results    Labs  Results from last 72 hours   Lab Units 01/06/24  6726  "01/05/24 2050   WBC AUTO x10*3/uL 1.3* 1.6*   HEMOGLOBIN g/dL 8.4* 9.2*   HEMATOCRIT % 25.3* 26.4*   PLATELETS AUTO x10*3/uL 178 200   LYMPHO PCT MAN % 10.0 11.0   MONO PCT MAN % 10.0 11.0   EOSINO PCT MAN % 0.0 0.0     Results from last 72 hours   Lab Units 01/06/24 0443 01/05/24 2050   SODIUM mmol/L 133 129*   POTASSIUM mmol/L 4.0 4.3   CHLORIDE mmol/L 100 96*   CO2 mmol/L 21* 22*   BUN mg/dL 12 15   CREATININE mg/dL 0.70 0.70   GLUCOSE mg/dL 109* 142*   CALCIUM mg/dL 8.9 9.5   ANION GAP mmol/L 12 11   EGFR mL/min/1.73m*2 >90 >90     Results from last 72 hours   Lab Units 01/06/24 0443 01/05/24 2050   ALK PHOS U/L 49 62   BILIRUBIN TOTAL mg/dL 0.3 0.3   PROTEIN TOTAL g/dL 6.2 7.3   ALT U/L 8 9   AST U/L 24 19   ALBUMIN g/dL 3.2* 3.9     Estimated Creatinine Clearance: 106.6 mL/min (by C-G formula based on SCr of 0.7 mg/dL).  No results found for: \"CRP\", \"SEDRATE\"  No results found for: \"HIV1X2\", \"HIVCONF\", \"RQYARV4UI\"  No results found for: \"HEPCABINIT\", \"HEPCAB\", \"HCVPCRQUANT\"  Microbiology  Reviewed-blood cultures pending  Imaging  CT abdomen pelvis w IV contrast    Result Date: 1/7/2024  Interpreted By:  Lisa Lezama, STUDY: CT ABDOMEN PELVIS W IV CONTRAST;  1/7/2024 12:26 pm   INDICATION: Abdominal pain   COMPARISON: Fused PET/CT study of 10/06/2023   ACCESSION NUMBER(S): [id number]   ORDERING CLINICIAN: JF ALEMAN   TECHNIQUE: CT of the abdomen and pelvis was performed with intravenous contrast administered. 75 mL of Omnipaque 350 was injected intravenously for the study with sagittal and coronal reformations performed by the technologist at the acquisition scanner.   All CT examinations are performed with 1 or more of the following dose reduction techniques: Automated exposure control, adjustment of mA and/or kv according to patient's size, or use of iterative reconstruction techniques.   FINDINGS: LOWER CHEST: There are severe emphysematous changes seen within the lower lung zones with minimal right " pleural effusion and with mild atelectasis at the right lung base.   ABDOMEN:   LIVER: There are several hypodense space-occupying liver lesions measuring up to 2.3 cm in size without interval change compared with prior study. These are most consistent with stable hepatic cysts.   BILE DUCTS: No intrahepatic or extrahepatic biliary ductal dilatation.   GALLBLADDER: Unremarkable   PANCREAS: Unremarkable   SPLEEN: Unremarkable   ADRENAL GLANDS: Unremarkable   KIDNEYS AND URETERS: There is a 1.7 cm right renal cyst seen with left kidney unremarkable. There is mild bilateral perinephric scarring. MRCP   PELVIS:   BLADDER: No abnormality of the urinary bladder is seen.   REPRODUCTIVE ORGANS: Prostate gland is enlarged with a transverse diameter of 4.8 cm.   BOWEL: Colonic diverticulosis is seen without evidence for diverticulitis. The bowel is difficult to accurately evaluate due to patient motion. No pneumatosis intestinalis is seen. No portal venous gas is observed.   VESSELS: There is atherosclerosis of the abdominal aorta without aneurysmal dilatation. Calcified plaque is visible within the superior mesenteric artery. There is a filling defect seen within the proximal superior mesenteric artery identified roughly 7 mm distal to its origin. I believe that this represents partial thrombosis. There is flow seen more distally within the superior mesenteric artery.   PERITONEUM/RETROPERITONEUM/LYMPH NODES: A small amount of free fluid within the pelvic cul-de-sac is identified.   BONES AND ABDOMINAL WALL: Small bilateral fat containing inguinal hernias are present with left inguinal hernia larger than right. A very tiny fat containing umbilical hernia is observed.       1.  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 However, there is flow noted within the distal SMA. 2. Small amount of free fluid within the pelvic cul-de-sac. 3. Colonic diverticulosis  without diverticulitis. 4. Hepatic cysts measuring up to 2.3 cm in size with 1.7 cm right renal cyst. 5. Severe pulmonary emphysema with minimal right pleural effusion and with atelectasis at right lung base. 6. Small bilateral fat containing inguinal hernias with very tiny fat containing umbilical hernia     MACRO: None   Signed by: Lisa Lezama 1/7/2024 12:53 PM Dictation workstation:   [id number]    XR chest 1 view    Result Date: 1/5/2024  STUDY: Chest Radiograph;  01/05/2024 9:39 PM INDICATION: Cough, fever.  Additional History:  Lung cancer. COMPARISON: CT chest 10/06/2023.  NM PET/CT 10/06/2023.  ACCESSION NUMBER(S): [id number] ORDERING CLINICIAN: ARTEM MOSER TECHNIQUE:  Frontal chest was obtained at 2139 hours. FINDINGS: CARDIOMEDIASTINAL SILHOUETTE: Cardiomediastinal silhouette is normal in size and configuration.  LUNGS: Bandlike scarring/atelectasis LEFT midlung noted.  ABDOMEN: No remarkable upper abdominal findings.  BONES: No acute osseous changes.    Bandlike scarring/atelectasis LEFT midlung. 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

## 2024-01-08 ENCOUNTER — APPOINTMENT (OUTPATIENT)
Dept: RADIATION ONCOLOGY | Facility: HOSPITAL | Age: 64
End: 2024-01-08
Payer: MEDICAID

## 2024-01-08 PROBLEM — R53.1 GENERALIZED WEAKNESS: Status: ACTIVE | Noted: 2024-01-08

## 2024-01-08 LAB
ANION GAP SERPL CALC-SCNC: 8 MMOL/L
APTT PPP: 108.1 SECONDS (ref 22–32.5)
APTT PPP: 50.1 SECONDS (ref 22–32.5)
APTT PPP: 58.1 SECONDS (ref 22–32.5)
B2 GLYCOPROT1 IGA SER-ACNC: 1.3 U/ML
B2 GLYCOPROT1 IGG SER-ACNC: <1.4 U/ML
B2 GLYCOPROT1 IGM SER-ACNC: 4.7 U/ML
BUN SERPL-MCNC: 5 MG/DL (ref 8–25)
CALCIUM SERPL-MCNC: 8.1 MG/DL (ref 8.5–10.4)
CARDIOLIPIN IGA SERPL-ACNC: 1.5 APL U/ML
CARDIOLIPIN IGG SER IA-ACNC: <1.6 GPL U/ML
CARDIOLIPIN IGM SER IA-ACNC: 3 MPL U/ML
CHLORIDE SERPL-SCNC: 101 MMOL/L (ref 97–107)
CO2 SERPL-SCNC: 24 MMOL/L (ref 24–31)
CREAT SERPL-MCNC: 0.7 MG/DL (ref 0.4–1.6)
ERYTHROCYTE [DISTWIDTH] IN BLOOD BY AUTOMATED COUNT: 15.6 % (ref 11.5–14.5)
ERYTHROCYTE [DISTWIDTH] IN BLOOD BY AUTOMATED COUNT: 15.7 % (ref 11.5–14.5)
GFR SERPL CREATININE-BSD FRML MDRD: >90 ML/MIN/1.73M*2
GLUCOSE BLD MANUAL STRIP-MCNC: 103 MG/DL (ref 74–99)
GLUCOSE BLD MANUAL STRIP-MCNC: 104 MG/DL (ref 74–99)
GLUCOSE BLD MANUAL STRIP-MCNC: 118 MG/DL (ref 74–99)
GLUCOSE BLD MANUAL STRIP-MCNC: 160 MG/DL (ref 74–99)
GLUCOSE SERPL-MCNC: 120 MG/DL (ref 65–99)
HCT VFR BLD AUTO: 21.3 % (ref 41–52)
HCT VFR BLD AUTO: 23.3 % (ref 41–52)
HGB BLD-MCNC: 7.4 G/DL (ref 13.5–17.5)
HGB BLD-MCNC: 8.2 G/DL (ref 13.5–17.5)
HGB RETIC QN: 29 PG (ref 28–38)
IMMATURE RETIC FRACTION: 19.6 %
INR PPP: 1.1 (ref 0.9–1.2)
LDH SERPL-CCNC: 295 U/L (ref 91–227)
MCH RBC QN AUTO: 33.8 PG (ref 26–34)
MCH RBC QN AUTO: 34.3 PG (ref 26–34)
MCHC RBC AUTO-ENTMCNC: 34.7 G/DL (ref 32–36)
MCHC RBC AUTO-ENTMCNC: 35.2 G/DL (ref 32–36)
MCV RBC AUTO: 97 FL (ref 80–100)
MCV RBC AUTO: 98 FL (ref 80–100)
NRBC BLD-RTO: 0 /100 WBCS (ref 0–0)
NRBC BLD-RTO: 0 /100 WBCS (ref 0–0)
PLATELET # BLD AUTO: 192 X10*3/UL (ref 150–450)
PLATELET # BLD AUTO: 217 X10*3/UL (ref 150–450)
POTASSIUM SERPL-SCNC: 3.3 MMOL/L (ref 3.4–5.1)
PROTHROMBIN TIME: 11.4 SECONDS (ref 9.3–12.7)
RBC # BLD AUTO: 2.19 X10*6/UL (ref 4.5–5.9)
RBC # BLD AUTO: 2.39 X10*6/UL (ref 4.5–5.9)
RETICS #: 0.04 X10*6/UL (ref 0.02–0.12)
RETICS/RBC NFR AUTO: 1.9 % (ref 0.5–2)
SODIUM SERPL-SCNC: 133 MMOL/L (ref 133–145)
WBC # BLD AUTO: 4.9 X10*3/UL (ref 4.4–11.3)
WBC # BLD AUTO: 5.9 X10*3/UL (ref 4.4–11.3)

## 2024-01-08 PROCEDURE — 85730 THROMBOPLASTIN TIME PARTIAL: CPT | Performed by: INTERNAL MEDICINE

## 2024-01-08 PROCEDURE — 2500000004 HC RX 250 GENERAL PHARMACY W/ HCPCS (ALT 636 FOR OP/ED)

## 2024-01-08 PROCEDURE — 88185 FLOWCYTOMETRY/TC ADD-ON: CPT | Mod: TC,TRILAB,WESLAB | Performed by: INTERNAL MEDICINE

## 2024-01-08 PROCEDURE — 83615 LACTATE (LD) (LDH) ENZYME: CPT | Performed by: INTERNAL MEDICINE

## 2024-01-08 PROCEDURE — 86147 CARDIOLIPIN ANTIBODY EA IG: CPT | Mod: TRILAB | Performed by: INTERNAL MEDICINE

## 2024-01-08 PROCEDURE — 2500000001 HC RX 250 WO HCPCS SELF ADMINISTERED DRUGS (ALT 637 FOR MEDICARE OP): Performed by: INTERNAL MEDICINE

## 2024-01-08 PROCEDURE — 2500000004 HC RX 250 GENERAL PHARMACY W/ HCPCS (ALT 636 FOR OP/ED): Performed by: INTERNAL MEDICINE

## 2024-01-08 PROCEDURE — 2500000004 HC RX 250 GENERAL PHARMACY W/ HCPCS (ALT 636 FOR OP/ED): Mod: JZ

## 2024-01-08 PROCEDURE — 86146 BETA-2 GLYCOPROTEIN ANTIBODY: CPT | Mod: TRILAB | Performed by: INTERNAL MEDICINE

## 2024-01-08 PROCEDURE — 85027 COMPLETE CBC AUTOMATED: CPT | Performed by: INTERNAL MEDICINE

## 2024-01-08 PROCEDURE — 88187 FLOWCYTOMETRY/READ 2-8: CPT | Performed by: INTERNAL MEDICINE

## 2024-01-08 PROCEDURE — 36415 COLL VENOUS BLD VENIPUNCTURE: CPT | Performed by: INTERNAL MEDICINE

## 2024-01-08 PROCEDURE — 99233 SBSQ HOSP IP/OBS HIGH 50: CPT | Performed by: INTERNAL MEDICINE

## 2024-01-08 PROCEDURE — 80048 BASIC METABOLIC PNL TOTAL CA: CPT | Performed by: INTERNAL MEDICINE

## 2024-01-08 PROCEDURE — 1200000002 HC GENERAL ROOM WITH TELEMETRY DAILY

## 2024-01-08 PROCEDURE — 82947 ASSAY GLUCOSE BLOOD QUANT: CPT

## 2024-01-08 PROCEDURE — 9420000001 HC RT PATIENT EDUCATION 5 MIN

## 2024-01-08 PROCEDURE — 85045 AUTOMATED RETICULOCYTE COUNT: CPT | Performed by: INTERNAL MEDICINE

## 2024-01-08 PROCEDURE — 85610 PROTHROMBIN TIME: CPT | Performed by: INTERNAL MEDICINE

## 2024-01-08 PROCEDURE — 94640 AIRWAY INHALATION TREATMENT: CPT

## 2024-01-08 RX ADMIN — PIPERACILLIN SODIUM AND TAZOBACTAM SODIUM 4.5 G: 4; .5 INJECTION, SOLUTION INTRAVENOUS at 10:44

## 2024-01-08 RX ADMIN — PIPERACILLIN SODIUM AND TAZOBACTAM SODIUM 4.5 G: 4; .5 INJECTION, SOLUTION INTRAVENOUS at 21:50

## 2024-01-08 RX ADMIN — DEXTROSE MONOHYDRATE AND SODIUM CHLORIDE 80 ML/HR: 5; .45 INJECTION, SOLUTION INTRAVENOUS at 18:37

## 2024-01-08 RX ADMIN — HEPARIN SODIUM 1100 UNITS/HR: 10000 INJECTION, SOLUTION INTRAVENOUS at 18:38

## 2024-01-08 RX ADMIN — PIPERACILLIN SODIUM AND TAZOBACTAM SODIUM 4.5 G: 4; .5 INJECTION, SOLUTION INTRAVENOUS at 04:03

## 2024-01-08 RX ADMIN — THIAMINE HCL TAB 100 MG 100 MG: 100 TAB at 08:19

## 2024-01-08 RX ADMIN — DIVALPROEX SODIUM 750 MG: 250 TABLET, EXTENDED RELEASE ORAL at 18:39

## 2024-01-08 RX ADMIN — VANCOMYCIN 1000 MG: 1 INJECTION, SOLUTION INTRAVENOUS at 02:19

## 2024-01-08 RX ADMIN — LACOSAMIDE 200 MG: 100 TABLET, FILM COATED ORAL at 06:43

## 2024-01-08 RX ADMIN — SARGRAMOSTIM 500 MCG: 250 INJECTION, POWDER, LYOPHILIZED, FOR SOLUTION INTRAVENOUS; SUBCUTANEOUS at 10:44

## 2024-01-08 RX ADMIN — PIPERACILLIN SODIUM AND TAZOBACTAM SODIUM 4.5 G: 4; .5 INJECTION, SOLUTION INTRAVENOUS at 16:25

## 2024-01-08 RX ADMIN — DEXTROSE MONOHYDRATE AND SODIUM CHLORIDE 80 ML/HR: 5; .45 INJECTION, SOLUTION INTRAVENOUS at 06:47

## 2024-01-08 RX ADMIN — ATORVASTATIN CALCIUM 40 MG: 40 TABLET, FILM COATED ORAL at 08:20

## 2024-01-08 RX ADMIN — TIOTROPIUM BROMIDE INHALATION SPRAY 2 PUFF: 3.12 SPRAY, METERED RESPIRATORY (INHALATION) at 07:38

## 2024-01-08 RX ADMIN — DIVALPROEX SODIUM 750 MG: 250 TABLET, EXTENDED RELEASE ORAL at 06:43

## 2024-01-08 RX ADMIN — VANCOMYCIN 1000 MG: 1 INJECTION, SOLUTION INTRAVENOUS at 13:59

## 2024-01-08 RX ADMIN — LACOSAMIDE 200 MG: 100 TABLET, FILM COATED ORAL at 18:39

## 2024-01-08 RX ADMIN — Medication 2000 UNITS: at 08:19

## 2024-01-08 SDOH — SOCIAL STABILITY: SOCIAL NETWORK: ARE YOU MARRIED, WIDOWED, DIVORCED, SEPARATED, NEVER MARRIED, OR LIVING WITH A PARTNER?: MARRIED

## 2024-01-08 SDOH — SOCIAL STABILITY: SOCIAL NETWORK: HOW OFTEN DO YOU GET TOGETHER WITH FRIENDS OR RELATIVES?: MORE THAN THREE TIMES A WEEK

## 2024-01-08 SDOH — ECONOMIC STABILITY: FOOD INSECURITY: WITHIN THE PAST 12 MONTHS, THE FOOD YOU BOUGHT JUST DIDN'T LAST AND YOU DIDN'T HAVE MONEY TO GET MORE.: NEVER TRUE

## 2024-01-08 SDOH — SOCIAL STABILITY: SOCIAL INSECURITY: WITHIN THE LAST YEAR, HAVE YOU BEEN HUMILIATED OR EMOTIONALLY ABUSED IN OTHER WAYS BY YOUR PARTNER OR EX-PARTNER?: NO

## 2024-01-08 SDOH — HEALTH STABILITY: MENTAL HEALTH: HOW OFTEN DO YOU HAVE 6 OR MORE DRINKS ON ONE OCCASION?: PATIENT DECLINED

## 2024-01-08 SDOH — SOCIAL STABILITY: SOCIAL INSECURITY
WITHIN THE LAST YEAR, HAVE YOU BEEN KICKED, HIT, SLAPPED, OR OTHERWISE PHYSICALLY HURT BY YOUR PARTNER OR EX-PARTNER?: NO

## 2024-01-08 SDOH — ECONOMIC STABILITY: INCOME INSECURITY: IN THE PAST 12 MONTHS, HAS THE ELECTRIC, GAS, OIL, OR WATER COMPANY THREATENED TO SHUT OFF SERVICE IN YOUR HOME?: YES

## 2024-01-08 SDOH — SOCIAL STABILITY: SOCIAL INSECURITY
WITHIN THE LAST YEAR, HAVE TO BEEN RAPED OR FORCED TO HAVE ANY KIND OF SEXUAL ACTIVITY BY YOUR PARTNER OR EX-PARTNER?: NO

## 2024-01-08 SDOH — HEALTH STABILITY: PHYSICAL HEALTH: ON AVERAGE, HOW MANY MINUTES DO YOU ENGAGE IN EXERCISE AT THIS LEVEL?: 0 MIN

## 2024-01-08 SDOH — HEALTH STABILITY: MENTAL HEALTH: HOW OFTEN DO YOU HAVE A DRINK CONTAINING ALCOHOL?: NEVER

## 2024-01-08 SDOH — SOCIAL STABILITY: SOCIAL INSECURITY: WITHIN THE LAST YEAR, HAVE YOU BEEN AFRAID OF YOUR PARTNER OR EX-PARTNER?: NO

## 2024-01-08 SDOH — SOCIAL STABILITY: SOCIAL NETWORK
DO YOU BELONG TO ANY CLUBS OR ORGANIZATIONS SUCH AS CHURCH GROUPS UNIONS, FRATERNAL OR ATHLETIC GROUPS, OR SCHOOL GROUPS?: YES

## 2024-01-08 SDOH — ECONOMIC STABILITY: FOOD INSECURITY: WITHIN THE PAST 12 MONTHS, YOU WORRIED THAT YOUR FOOD WOULD RUN OUT BEFORE YOU GOT MONEY TO BUY MORE.: NEVER TRUE

## 2024-01-08 SDOH — SOCIAL STABILITY: SOCIAL NETWORK
IN A TYPICAL WEEK, HOW MANY TIMES DO YOU TALK ON THE PHONE WITH FAMILY, FRIENDS, OR NEIGHBORS?: MORE THAN THREE TIMES A WEEK

## 2024-01-08 SDOH — SOCIAL STABILITY: SOCIAL NETWORK: HOW OFTEN DO YOU ATTEND CHURCH OR RELIGIOUS SERVICES?: 1 TO 4 TIMES PER YEAR

## 2024-01-08 SDOH — HEALTH STABILITY: MENTAL HEALTH
STRESS IS WHEN SOMEONE FEELS TENSE, NERVOUS, ANXIOUS, OR CAN'T SLEEP AT NIGHT BECAUSE THEIR MIND IS TROUBLED. HOW STRESSED ARE YOU?: TO SOME EXTENT

## 2024-01-08 SDOH — HEALTH STABILITY: MENTAL HEALTH: HOW MANY STANDARD DRINKS CONTAINING ALCOHOL DO YOU HAVE ON A TYPICAL DAY?: PATIENT DECLINED

## 2024-01-08 SDOH — SOCIAL STABILITY: SOCIAL NETWORK: HOW OFTEN DO YOU ATTENT MEETINGS OF THE CLUB OR ORGANIZATION YOU BELONG TO?: 1 TO 4 TIMES PER YEAR

## 2024-01-08 SDOH — HEALTH STABILITY: PHYSICAL HEALTH: ON AVERAGE, HOW MANY DAYS PER WEEK DO YOU ENGAGE IN MODERATE TO STRENUOUS EXERCISE (LIKE A BRISK WALK)?: 0 DAYS

## 2024-01-08 ASSESSMENT — PAIN - FUNCTIONAL ASSESSMENT
PAIN_FUNCTIONAL_ASSESSMENT: 0-10
PAIN_FUNCTIONAL_ASSESSMENT: 0-10

## 2024-01-08 ASSESSMENT — COGNITIVE AND FUNCTIONAL STATUS - GENERAL
TOILETING: A LITTLE
DRESSING REGULAR UPPER BODY CLOTHING: A LITTLE
DAILY ACTIVITIY SCORE: 18
EATING MEALS: A LITTLE
DAILY ACTIVITIY SCORE: 19
TURNING FROM BACK TO SIDE WHILE IN FLAT BAD: A LITTLE
WALKING IN HOSPITAL ROOM: A LITTLE
HELP NEEDED FOR BATHING: A LITTLE
PERSONAL GROOMING: A LITTLE
MOBILITY SCORE: 18
MOBILITY SCORE: 18
PERSONAL GROOMING: A LITTLE
TURNING FROM BACK TO SIDE WHILE IN FLAT BAD: A LITTLE
STANDING UP FROM CHAIR USING ARMS: A LITTLE
MOVING TO AND FROM BED TO CHAIR: A LITTLE
CLIMB 3 TO 5 STEPS WITH RAILING: A LOT
MOVING TO AND FROM BED TO CHAIR: A LITTLE
CLIMB 3 TO 5 STEPS WITH RAILING: A LOT
DRESSING REGULAR LOWER BODY CLOTHING: A LITTLE
TOILETING: A LITTLE
WALKING IN HOSPITAL ROOM: A LITTLE
DRESSING REGULAR LOWER BODY CLOTHING: A LITTLE
HELP NEEDED FOR BATHING: A LITTLE
STANDING UP FROM CHAIR USING ARMS: A LITTLE
DRESSING REGULAR UPPER BODY CLOTHING: A LITTLE

## 2024-01-08 ASSESSMENT — LIFESTYLE VARIABLES
SKIP TO QUESTIONS 9-10: 0
AUDIT-C TOTAL SCORE: -1

## 2024-01-08 ASSESSMENT — PAIN SCALES - GENERAL
PAINLEVEL_OUTOF10: 0 - NO PAIN

## 2024-01-08 NOTE — NURSING NOTE
Assumed care of patient. Patient is A&Ox3 and is resting in bed. Call light in reach. Patient is anxious and wife is in room. Patient is on room air.

## 2024-01-08 NOTE — PROGRESS NOTES
Occupational Therapy                 Therapy Communication Note    Patient Name: Martínez Neri  MRN: [id number]  Today's Date: 1/8/2024     Discipline: Occupational Therapy    Missed Visit Reason: Missed Visit Reason: Patient placed on medical hold (Heparin started ~10pm last night. PTT over 100 on last draw. OT tx deferred)    Missed Time: Cancel    Comment: OT tx deferred

## 2024-01-08 NOTE — PROGRESS NOTES
Martínez Neri is a 63 y.o. male on day 0 of admission presenting with Neutropenic fever (CMS/HCC).    Subjective   Interval History:   Afebrile, no chills  On 4 liters oxygen  Reports mild to moderate intermittent productive cough  wife at bedside  Nursing at bedside  Denies nausea, vomiting diarrhea      Objective   Range of Vitals (last 24 hours)  Heart Rate:  [67-84]   Temp:  [36.6 °C (97.9 °F)-37.2 °C (99 °F)]   Resp:  [16-20]   BP: ()/(44-75)   SpO2:  [94 %-96 %]   Daily Weight  01/06/24 : 76.5 kg (168 lb 10.4 oz)    Body mass index is 24.89 kg/m².    Physical Exam  Constitutional:       Appearance: Normal appearance.   HENT:      Head: Normocephalic and atraumatic.      Nose: Nose normal.      Mouth/Throat:      Mouth: Mucous membranes are moist.      Pharynx: Oropharynx is clear.   Eyes:      Extraocular Movements: Extraocular movements intact.      Conjunctiva/sclera: Conjunctivae normal.   Cardiovascular:      Rate and Rhythm: Normal rate and regular rhythm.   Pulmonary:      Effort: Pulmonary effort is normal.      Breath sounds: Normal breath sounds.   Abdominal:      General: Bowel sounds are normal.      Palpations: Abdomen is soft.   Musculoskeletal:         General: Normal range of motion.      Cervical back: Normal range of motion and neck supple.   Skin:     General: Skin is warm and dry.   Neurological:      General: No focal deficit present.      Mental Status: He is alert and oriented to person, place, and time. Mental status is at baseline.   Psychiatric:         Mood and Affect: Mood normal.         Behavior: Behavior normal.           Antibiotics  acetaminophen (Tylenol) tablet 975 mg  sodium chloride 0.9 % bolus 1,000 mL  piperacillin-tazobactam-dextrose (Zosyn) IV 3.375 g  thiamine (Vitamin B-1) tablet 100 mg  nicotine (Nicoderm CQ) 21 mg/24 hr patch 1 patch  albuterol 90 mcg/actuation inhaler 2 puff  ipratropium-albuteroL (Duo-Neb) 0.5-2.5 mg/3 mL nebulizer solution 3 mL  divalproex  (Depakote ER) 24 hr tablet 250 mg  divalproex (Depakote ER) 24 hr tablet 500 mg  lacosamide (Vimpat) tablet 200 mg  ondansetron (Zofran) tablet 8 mg  prochlorperazine (Compazine) tablet 10 mg  cholecalciferol (Vitamin D-3) tablet 2,000 Units  tiotropium (Spiriva Respimat) 2.5 mcg/actuation inhaler 2 puff  atorvastatin (Lipitor) tablet 40 mg  LORazepam (Ativan) tablet 0.5 mg  acetaminophen (Tylenol) tablet 650 mg  acetaminophen (Tylenol) oral liquid 650 mg  acetaminophen (Tylenol) suppository 650 mg  polyethylene glycol (Glycolax, Miralax) packet 17 g  benzocaine-menthol (Cepastat Sore Throat) 15-3.6 mg lozenge 1 lozenge  dextromethorphan-guaifenesin (Robitussin DM)  mg/5 mL oral liquid 5 mL  guaiFENesin (Mucinex) 12 hr tablet 600 mg  heparin (porcine) injection 5,000 Units  piperacillin-tazobactam-dextrose (Zosyn) IV 4.5 g  vancomycin (Xellia) 1 g in 200 mL (Xellia) IVPB 1,000 mg  dextrose 5%-0.45 % sodium chloride infusion  ondansetron ODT (Zofran-ODT) disintegrating tablet 8 mg  albuterol 2.5 mg /3 mL (0.083 %) nebulizer solution 2.5 mg  divalproex (Depakote ER) 24 hr tablet 750 mg  ipratropium-albuteroL (Duo-Neb) 0.5-2.5 mg/3 mL nebulizer solution 3 mL  sargramostim (Leukine) 500 mcg in sodium chloride 0.9% 25 mL  sargramostim (Leukine) injection 500 mcg  iohexol (OMNIPaque) 350 mg iodine/mL solution 75 mL  heparin (porcine) injection 6,000 Units  heparin 25,000 Units in dextrose 5% 250 mL (100 Units/mL) infusion (premix)  heparin (porcine) injection 3,000-6,000 Units      Relevant Results  Labs  Results from last 72 hours   Lab Units 01/08/24  0428 01/08/24  0249 01/07/24  1724 01/06/24  0444 01/05/24  2050   WBC AUTO x10*3/uL 4.9 5.9 3.6* 1.3* 1.6*   HEMOGLOBIN g/dL 7.4* 8.2* 7.7* 8.4* 9.2*   HEMATOCRIT % 21.3* 23.3* 22.3* 25.3* 26.4*   PLATELETS AUTO x10*3/uL 192 217 195 178 200   LYMPHO PCT MAN %  --   --  8.0 10.0 11.0   MONO PCT MAN %  --   --  3.0 10.0 11.0   EOSINO PCT MAN %  --   --  0.0 0.0 0.0  "    Results from last 72 hours   Lab Units 01/08/24  0428 01/07/24  1724 01/06/24 0443   SODIUM mmol/L 133 132* 133   POTASSIUM mmol/L 3.3* 3.4 4.0   CHLORIDE mmol/L 101 100 100   CO2 mmol/L 24 24 21*   BUN mg/dL 5* 8 12   CREATININE mg/dL 0.70 0.90 0.70   GLUCOSE mg/dL 120* 115* 109*   CALCIUM mg/dL 8.1* 8.3* 8.9   ANION GAP mmol/L 8 8 12   EGFR mL/min/1.73m*2 >90 >90 >90     Results from last 72 hours   Lab Units 01/07/24  1724 01/06/24 0443 01/05/24 2050   ALK PHOS U/L 42 49 62   BILIRUBIN TOTAL mg/dL 0.3 0.3 0.3   PROTEIN TOTAL g/dL 5.5* 6.2 7.3   ALT U/L 40 8 9   AST U/L 178* 24 19   ALBUMIN g/dL 2.8* 3.2* 3.9     Estimated Creatinine Clearance: 106.6 mL/min (by C-G formula based on SCr of 0.7 mg/dL).  No results found for: \"CRP\"  Microbiology  reviewed  Imaging  CT abdomen pelvis w IV contrast    Result Date: 1/7/2024  Interpreted By:  Lisa Lezama, STUDY: CT ABDOMEN PELVIS W IV CONTRAST;  1/7/2024 12:26 pm   INDICATION: Abdominal pain   COMPARISON: Fused PET/CT study of 10/06/2023   ACCESSION NUMBER(S): [id number]   ORDERING CLINICIAN: JF ALEMAN   TECHNIQUE: CT of the abdomen and pelvis was performed with intravenous contrast administered. 75 mL of Omnipaque 350 was injected intravenously for the study with sagittal and coronal reformations performed by the technologist at the acquisition scanner.   All CT examinations are performed with 1 or more of the following dose reduction techniques: Automated exposure control, adjustment of mA and/or kv according to patient's size, or use of iterative reconstruction techniques.   FINDINGS: LOWER CHEST: There are severe emphysematous changes seen within the lower lung zones with minimal right pleural effusion and with mild atelectasis at the right lung base.   ABDOMEN:   LIVER: There are several hypodense space-occupying liver lesions measuring up to 2.3 cm in size without interval change compared with prior study. These are most consistent with stable hepatic " cysts.   BILE DUCTS: No intrahepatic or extrahepatic biliary ductal dilatation.   GALLBLADDER: Unremarkable   PANCREAS: Unremarkable   SPLEEN: Unremarkable   ADRENAL GLANDS: Unremarkable   KIDNEYS AND URETERS: There is a 1.7 cm right renal cyst seen with left kidney unremarkable. There is mild bilateral perinephric scarring. MRCP   PELVIS:   BLADDER: No abnormality of the urinary bladder is seen.   REPRODUCTIVE ORGANS: Prostate gland is enlarged with a transverse diameter of 4.8 cm.   BOWEL: Colonic diverticulosis is seen without evidence for diverticulitis. The bowel is difficult to accurately evaluate due to patient motion. No pneumatosis intestinalis is seen. No portal venous gas is observed.   VESSELS: There is atherosclerosis of the abdominal aorta without aneurysmal dilatation. Calcified plaque is visible within the superior mesenteric artery. There is a filling defect seen within the proximal superior mesenteric artery identified roughly 7 mm distal to its origin. I believe that this represents partial thrombosis. There is flow seen more distally within the superior mesenteric artery.   PERITONEUM/RETROPERITONEUM/LYMPH NODES: A small amount of free fluid within the pelvic cul-de-sac is identified.   BONES AND ABDOMINAL WALL: Small bilateral fat containing inguinal hernias are present with left inguinal hernia larger than right. A very tiny fat containing umbilical hernia is observed.       1.  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 However, there is flow noted within the distal SMA. 2. Small amount of free fluid within the pelvic cul-de-sac. 3. Colonic diverticulosis without diverticulitis. 4. Hepatic cysts measuring up to 2.3 cm in size with 1.7 cm right renal cyst. 5. Severe pulmonary emphysema with minimal right pleural effusion and with atelectasis at right lung base. 6. Small bilateral fat containing inguinal hernias with very tiny fat  containing umbilical hernia     MACRO: None   Signed by: Lisa Lezama 1/7/2024 12:53 PM Dictation workstation:   [id number]    XR chest 1 view    Result Date: 1/5/2024  STUDY: Chest Radiograph;  01/05/2024 9:39 PM INDICATION: Cough, fever.  Additional History:  Lung cancer. COMPARISON: CT chest 10/06/2023.  NM PET/CT 10/06/2023.  ACCESSION NUMBER(S): [id number] ORDERING CLINICIAN: ARTEM MOSER TECHNIQUE:  Frontal chest was obtained at 2139 hours. FINDINGS: CARDIOMEDIASTINAL SILHOUETTE: Cardiomediastinal silhouette is normal in size and configuration.  LUNGS: Bandlike scarring/atelectasis LEFT midlung noted.  ABDOMEN: No remarkable upper abdominal findings.  BONES: No acute osseous changes.    Bandlike scarring/atelectasis LEFT midlung. 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

## 2024-01-08 NOTE — CONSULTS
Reason For Consult  Hematology oncology is consulted for lung cancer with neutropenia     History Of Present Illness  Martínez Neri is a 63 y.o. 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     In clinic (1-4-24) noted to be mildly hypotensive.       Past Medical History  He has a past medical history of Allergic rhinitis, Asthma, Brain aneurysm, Cancer (CMS/HCC), Carotid artery disease (CMS/HCC), COPD (chronic obstructive pulmonary disease) (CMS/HCC), Epilepsy (CMS/HCC), High cholesterol, History of alcohol use, Hypertension, and Lung cancer (CMS/HCC).    Surgical History  He has a past surgical history that includes Carotid endarterectomy (N/A); Colonoscopy; Hernia repair; and Cerebral aneurysm repair.     Social History  He reports that he has been smoking cigarettes. He has a 50.00 pack-year smoking history. He has been exposed to tobacco smoke. He has never used smokeless tobacco. He reports that he does not currently use alcohol. He reports current drug use. Frequency: 4.00 times per week. Drug: Marijuana.    Family History  Family History   Problem Relation Name Age of Onset    Heart attack Mother      Other (Colorectal cancer) Father      Aneurysm Other      Prostate cancer Other      Alcohol abuse Other          Allergies  Patient has no known allergies.    Review of Systems  General: No weight changes , positive  "for fatigue , no difficulty sleeping , no chronic pain    Vision: No double vision , no loss of vision    Head and Neck (H&N), no sores masses or growths   Pulmonary: Some mildly productive cough, chest pain , no wheezing , some hoarseness , no hemoptysis    Cardiovascular (C/V): Some mild chest pain no palpitations    Gastrointestinal: No current abdominal pain , previously some nausea but no vomiting, no diarrhea or constipation, no blood in the stools   Genito-Urinary: No burning on urination , no blood in the urine , no blocked urine    Hematology/Oncology: No anemia    Ob/Gyn/Breast: No breast lumps    Neurological: No weakness or numbness on 1 side of the body versus another , no problem with thinking    Endocrine: No diabetes or thyroid disease    Infectious Diseases: No recent infections    Musculoskeletal: No arthritis    Mental Health: Some depression and irritable this  Skin : No rashes or itching       Physical Exam  Gen: no acute distress   HEENT: EOM+ PERRL   Nodes: not palpable in the neck, armpit, or groin   Lung: Clear to auscultation, resonant to percussion   CV: Regular rate and rhythm, no murmurs, rubs, or gallops   Abd: soft, nontender, nondistended, no Hepatosplenomegaly   Ext: no cyanosis, clubbing, or edema.   Neuro:CN 2-12 intact, coordination grossly intact   Skin: no bruises,   Affect: calm      Last Recorded Vitals  Blood pressure (!) 103/46, pulse 67, temperature 36.8 °C (98.2 °F), temperature source Oral, resp. rate 20, height 1.753 m (5' 9.02\"), weight 76.5 kg (168 lb 10.4 oz), SpO2 96 %.    Relevant Results  Laboratory review reveals glucose 109, creatinine 0.7, albumin 3.2, normal transaminases     TIBC 375 with 12% saturation     B12 488     TSH 2.27     White blood count 1.3 with absolute neutrophil count 900 and absolute lymphocyte count 570     Hemoglobin 8.4 with MCV 98, platelet count 178     Microbiology: Blood culture from 1/5/2024 pending venous lactate 0.8     Urinalysis " negative     Chest x-ray, 1/5/2024,Bandlike scarring/atelectasis LEFT midlung noted.     CT abdomen pelvis with IV contrast, 1/7/2024,     There are severe emphysematous changes seen within the lower lung     zones with minimal right pleural effusion     several hypodense space-occupying liver lesions measuring     up to 2.3 cm in size without interval change     PANCREAS:     Unremarkable     SPLEEN:     Unremarkable     ADRENAL GLANDS:     Unremarkable     1.7 cm right renal cyst seen with left kidney     Prostate gland is enlarged with a transverse diameter of 4.8 cm.     No pneumatosis intestinalis is seen. No portal venous gas is observed.     atherosclerosis of the abdominal aorta without aneurysmal     dilatation. Calcified plaque is visible within the superior     mesenteric artery. There is a filling defect seen within the proximal     superior mesenteric artery identified roughly 7 mm distal to its     origin. I believe that this represents partial thrombosis. There is     flow seen more distally within the superior mesenteric artery.     Small bilateral fat containing inguinal hernias are present with left     inguinal hernia larger than right. A very tiny fat containing     umbilical hernia is observed.               CT chest 1/6/2023 with IV contrast     Masslike consolidation in the posterior left upper lobe and lingula slightly smaller compared to PET scan 7/18/2023     Severe coronary artery calcifications     Emphysema           MRI brain 10/13/2023 no pathology     PET scan 10/6/2023 slight decrease in hypermetabolic activity in a masslike and elevation in the left upper lobe and lingula new cluster of micro nodularity with groundglass appearance along the posterior aspect right upper lobe           Left lung biopsy 10/31/2023      Assessment/Plan     IIB (cT2 pN1 cM0) Non-small cell lung cancer of the left upper lobe (squamous cell carcinoma), diagnosed on 06/26/2023   SITES OF DISEASE   Left  upper lobe     MOLECULAR GENOMICS (reported in [place]):   Lack of BRAF, EGFR, HER2, KRAS, MET, ALK, NTRK 1/2/3, RET or ROS1 alterations.     PD-L1 TPS 20%     PRIOR THERAPIES   None     CURRENT THERAPY   concurrent chemo-RT using weekly carbo/taxol followed by consolidative durvalumab   On 06/17/2023 the patient had a CT chest for ongoing respiratory symptoms and was found to have a left suprahilar mass involving the left upper lobe bronchi with postobstructive pneumonia/collapse.  On 06/25/2023 CT chest/abdomen/pelvis demonstrated persistent masslike consolidation in the left upper lobe with no signs of extrathoracic metastatic disease.   On 06/26/2023 the patient underwent a bronchoscopy, and pathology was compatible with a squamous cell carcinoma at least in situ, with a foci highly suspicious for invasive squamous cell carcinoma.   On 07/18/2023 a PET/CT obtained, demonstrating a 6.6 cm left upper lobe mass with an SUV of 8.3 and no evidence of distant metastatic disease.   On 07/18/2023 to brain MRI was negative for intracranial metastasis.   On 07/27/2023 bronchoscopy/EBUS was performed.  Endobronchial biopsies of left upper lobe demonstrated an invasive moderately differentiated squamous cell carcinoma.  Left upper lobe hilar FNA showed atypical cells with squamous differentiation, suspicious for malignancy.    Subcarinal FNA was indeterminate for malignancy with extremely rare atypical cells with squamous differentiation.  Molecular profile: Lack of BRAF, EGFR, HER2, KRAS, MET, ALK, NTRK 1/2/3, RET or ROS1 alterations.  PD-L1 TPS 20%.  The patient was seen by medical oncology and did not too frail for combined chemo RT (there is a performance status ECOG 3) documented on 08/17/2023.  At that time, he lived with some friends, while his family was primarily located in [place].   BRAF negative   IIWHP87J negative   EGFR classic mutations negative   HER2 negative   MET negative   ALK negative   In tract 1/2/3 negative    RET negative   ROS1 negative   PD-L1 20%   10/31/23: Repeat EBUS for systematic staging:   Final Cytological Interpretation   A. LYMPH NODE 7 PULMONARY FINE NEEDLE ASPIRATION, CYTOLOGY AND CELL BLOCK:   --  No malignant cells identified.   --  Lymphoid sample.   B. LYMPH NODE 4 L PULMONARY FINE NEEDLE ASPIRATION, CYTOLOGY AND CELL BLOCK:   --  No malignant cells identified.   --  Lymphoid sample.         C. LYMPH NODE 11 L PULMONARY FINE NEEDLE ASPIRATION, CYTOLOGY AND CELL BLOCK:   --  A rare cluster of malignant cells derived from squamous cell carcinoma, see note   -- Also, please refer to concurrent biopsy surgical pathology report ([id number]).   Note:  The malignant squamous cell carcinoma cells are represented in the cell block only.  Immunostain for p40 is positive. The cytomorphology and immunoprofile support the above diagnosis.  The material is not sufficient for molecular testing.   Plan:  Hold radiation therapy today(3 more doses due)  Patient Dr. Gemma Peters MD    Febrile neutropenia:   Continue Leukine   Patient on vancomycin and Zosyn   Monitor CBC daily   Discontinue Leukine when white blood count greater than 5000          Superior mesenteric artery thrombosis   CT abdomen pelvis with IV contrast, 1/7/2024,   atherosclerosis of the abdominal aorta without aneurysmal   dilatation. Calcified plaque is visible within the superior   mesenteric artery. There is a filling defect seen within the proximal   superior mesenteric artery identified roughly 7 mm distal to its   origin. I believe that this represents partial thrombosis. There is   flow seen more distally within the superior mesenteric artery.   Assessment: Hemodynamically stable, no signs of sepsis but admitted with febrile neutropenia   The patient presents with nonocclusive mesenteric arterial thrombosis currently denying abdominal symptoms of ischemia   Plan: Evaluate for atrial fibrillation, monitor for severe abdominal pain, assess  cholesterol, lupus anticoagulant assessment, PNH, D-dimer, PT, PTT   No sign of bowel infarction or peritonitis   Patient tends to lay on his right side   CT angiography is the initial study of choice   Will discuss with radiology whether ultrasound with Doppler should be performed   Recommend full dose anticoagulation with IV heparin   Recommend observation   No indication for embolectomy at this time     I spent 80 minutes in the professional and overall care of this patient.      Parish Alexandra MD

## 2024-01-08 NOTE — PROGRESS NOTES
Physical Therapy                 Therapy Communication Note    Patient Name: Martínez Neri  MRN: [id number]  Today's Date: 1/8/2024     Discipline: Physical Therapy    Missed Visit Reason:      Missed Time: Cancel    Comment: Acute Thrombosis. Discussed case with RN. Heparin started ~10pm last night. PTT over 100 on last draw. Hold eval for today.

## 2024-01-08 NOTE — PROGRESS NOTES
01/08/24 1623   Physical Activity   On average, how many days per week do you engage in moderate to strenuous exercise (like a brisk walk)? 0 days   On average, how many minutes do you engage in exercise at this level? 0 min   Food Insecurity   Within the past 12 months, you worried that your food would run out before you got the money to buy more. Never true   Within the past 12 months, the food you bought just didn't last and you didn't have money to get more. Never true   Stress   Do you feel stress - tense, restless, nervous, or anxious, or unable to sleep at night because your mind is troubled all the time - these days? To some exte   Social Connections   In a typical week, how many times do you talk on the phone with family, friends, or neighbors? More than 3   How often do you get together with friends or relatives? More than 3   How often do you attend Rastafari or Mosque services? 1 to 4   Do you belong to any clubs or organizations such as Rastafari groups, unions, fraternal or athletic groups, or school groups? Yes   How often do you attend meetings of the clubs or organizations you belong to? 1 to 4   Are you , , , , never , or living with a partner?    Intimate Partner Violence   Within the last year, have you been afraid of your partner or ex-partner? No   Within the last year, have you been humiliated or emotionally abused in other ways by your partner or ex-partner? No   Within the last year, have you been kicked, hit, slapped, or otherwise physically hurt by your partner or ex-partner? No   Within the last year, have you been raped or forced to have any kind of sexual activity by your partner or ex-partner? No   Alcohol Use   Q1: How often do you have a drink containing alcohol? Never   Q2: How many drinks containing alcohol do you have on a typical day when you are drinking? Pt Declined   Q3: How often do you have six or more drinks on one occasion? Pt  Declined   Utilities   In the past 12 months has the electric, gas, oil, or water company threatened to shut off services in your home? Yes

## 2024-01-08 NOTE — PROGRESS NOTES
01/08/24 1624   Discharge Planning   Living Arrangements Spouse/significant other   Support Systems Spouse/significant other   Type of Residence Private residence   Number of Stairs to Enter Residence 2   Number of Stairs Within Residence 13   Do you have animals or pets at home? Yes   Type of Animals or Pets 1 cat 1 dog   Who is requesting discharge planning? Patient   Home or Post Acute Services In home services   Type of Home Care Services Home OT;Home PT   Patient expects to be discharged to: Home with C   Does the patient need discharge transport arranged? No

## 2024-01-08 NOTE — PROGRESS NOTES
"Vancomycin Dosing by Pharmacy- FOLLOW UP    Martínez Neri is a 63 y.o. year old male who Pharmacy has been consulted for vancomycin dosing for other medical prophylaxis . Based on the patient's indication and renal status this patient is being dosed based on a goal AUC of 400-600.     Renal function is currently improving.    Current vancomycin dose: 1000 mg given every 12 hours    Estimated vancomycin AUC on current dose: 464 mg/L.hr     Visit Vitals  BP (!) 103/46 (BP Location: Left arm, Patient Position: Lying)   Pulse 67   Temp 36.8 °C (98.2 °F) (Oral)   Resp 20        Lab Results   Component Value Date    CREATININE 0.70 01/08/2024    CREATININE 0.90 01/07/2024    CREATININE 0.70 01/06/2024    CREATININE 0.70 01/05/2024        Patient weight is No results found for: \"PTWEIGHT\"    No results found for: \"CULTURE\"     I/O last 3 completed shifts:  In: 690 (9 mL/kg) [P.O.:290; IV Piggyback:400]  Out: 200 (2.6 mL/kg) [Urine:200 (0.1 mL/kg/hr)]  Weight: 76.5 kg   [unfilled]    No results found for: \"PATIENTTEMP\"     Assessment/Plan    Within goal AUC range. Continue current vancomycin regimen.    This dosing regimen is predicted by [employer] to result in the following pharmacokinetic parameters:  Loading dose: N/A  Regimen: 1000 mg IV every 12 hours.  Start time: 14:19 on 01/08/2024  Exposure target: AUC24 (range)400-600 mg/L.hr   AUC24,ss: 464 mg/L.hr  Probability of AUC24 > 400: 70 %  Ctrough,ss: 13.8 mg/L  Probability of Ctrough,ss > 20: 15 %  Probability of nephrotoxicity (Lodise ANGE 2009): 9 %    The next level will be obtained on 1/13 at 0500. May be obtained sooner if clinically indicated.   Will continue to monitor renal function daily while on vancomycin and order serum creatinine at least every 48 hours if not already ordered.  Follow for continued vancomycin needs, clinical response, and signs/symptoms of toxicity.       WHIT MONTELONGO, PharmD           "

## 2024-01-08 NOTE — PROGRESS NOTES
"Martínez Neri is a 63 y.o. male on day 0 of admission presenting with Neutropenic fever (CMS/HCC).    Subjective   TCSW engaged with pt and spouse at bedside to discuss discharge planning. Pt lives home with his spouse and is agreeable to [facility], but would like his daughter to oversee all the fine details.   TCSW will place TCT pt's daughter tomorrow to discuss the final details.        Objective     Physical Exam    Last Recorded Vitals  Blood pressure 104/75, pulse 73, temperature 36.6 °C (97.9 °F), temperature source Oral, resp. rate 18, height 1.753 m (5' 9.02\"), weight 76.5 kg (168 lb 10.4 oz), SpO2 94 %.  Intake/Output last 3 Shifts:  I/O last 3 completed shifts:  In: 690 (9 mL/kg) [P.O.:290; IV Piggyback:400]  Out: 200 (2.6 mL/kg) [Urine:200 (0.1 mL/kg/hr)]  Weight: 76.5 kg     Relevant Results                             Assessment/Plan   Principal Problem:    Neutropenic fever (CMS/HCC)  Active Problems:    Partial symptomatic epilepsy with complex partial seizures, not intractable, without status epilepticus (CMS/HCC)    Fever in adult    Superior mesenteric artery thrombosis (CMS/HCC)    Hyponatremia    Generalized weakness    Malignant neoplasm of upper lobe of left lung (CMS/HCC)               Chema Malik, LSW      "

## 2024-01-09 ENCOUNTER — DOCUMENTATION (OUTPATIENT)
Dept: RADIATION ONCOLOGY | Facility: HOSPITAL | Age: 64
End: 2024-01-09
Payer: MEDICAID

## 2024-01-09 ENCOUNTER — APPOINTMENT (OUTPATIENT)
Dept: RADIOLOGY | Facility: HOSPITAL | Age: 64
End: 2024-01-09
Payer: MEDICAID

## 2024-01-09 ENCOUNTER — APPOINTMENT (OUTPATIENT)
Dept: RADIATION ONCOLOGY | Facility: HOSPITAL | Age: 64
End: 2024-01-09
Payer: MEDICAID

## 2024-01-09 LAB
ANION GAP SERPL CALC-SCNC: 9 MMOL/L
APTT PPP: 60.5 SECONDS (ref 22–32.5)
BUN SERPL-MCNC: 3 MG/DL (ref 8–25)
CALCIUM SERPL-MCNC: 8.4 MG/DL (ref 8.5–10.4)
CHLORIDE SERPL-SCNC: 102 MMOL/L (ref 97–107)
CO2 SERPL-SCNC: 25 MMOL/L (ref 24–31)
CREAT SERPL-MCNC: 0.6 MG/DL (ref 0.4–1.6)
EGFRCR SERPLBLD CKD-EPI 2021: >90 ML/MIN/1.73M*2
ERYTHROCYTE [DISTWIDTH] IN BLOOD BY AUTOMATED COUNT: 15.9 % (ref 11.5–14.5)
GLUCOSE BLD MANUAL STRIP-MCNC: 107 MG/DL (ref 74–99)
GLUCOSE BLD MANUAL STRIP-MCNC: 111 MG/DL (ref 74–99)
GLUCOSE BLD MANUAL STRIP-MCNC: 131 MG/DL (ref 74–99)
GLUCOSE BLD MANUAL STRIP-MCNC: 96 MG/DL (ref 74–99)
GLUCOSE SERPL-MCNC: 113 MG/DL (ref 65–99)
HCT VFR BLD AUTO: 22.7 % (ref 41–52)
HGB BLD-MCNC: 7.5 G/DL (ref 13.5–17.5)
MCH RBC QN AUTO: 32.9 PG (ref 26–34)
MCHC RBC AUTO-ENTMCNC: 33 G/DL (ref 32–36)
MCV RBC AUTO: 100 FL (ref 80–100)
NRBC BLD-RTO: 0 /100 WBCS (ref 0–0)
PLATELET # BLD AUTO: 220 X10*3/UL (ref 150–450)
POTASSIUM SERPL-SCNC: 3.1 MMOL/L (ref 3.4–5.1)
RBC # BLD AUTO: 2.28 X10*6/UL (ref 4.5–5.9)
SODIUM SERPL-SCNC: 136 MMOL/L (ref 133–145)
WBC # BLD AUTO: 5.2 X10*3/UL (ref 4.4–11.3)

## 2024-01-09 PROCEDURE — 94640 AIRWAY INHALATION TREATMENT: CPT

## 2024-01-09 PROCEDURE — 82947 ASSAY GLUCOSE BLOOD QUANT: CPT

## 2024-01-09 PROCEDURE — 2500000004 HC RX 250 GENERAL PHARMACY W/ HCPCS (ALT 636 FOR OP/ED)

## 2024-01-09 PROCEDURE — 2550000001 HC RX 255 CONTRASTS: Performed by: INTERNAL MEDICINE

## 2024-01-09 PROCEDURE — 97110 THERAPEUTIC EXERCISES: CPT | Mod: GO

## 2024-01-09 PROCEDURE — 70250 X-RAY EXAM OF SKULL: CPT

## 2024-01-09 PROCEDURE — 97530 THERAPEUTIC ACTIVITIES: CPT | Mod: GP

## 2024-01-09 PROCEDURE — 97530 THERAPEUTIC ACTIVITIES: CPT | Mod: GO

## 2024-01-09 PROCEDURE — 70553 MRI BRAIN STEM W/O & W/DYE: CPT | Mod: FOREIGN READ | Performed by: RADIOLOGY

## 2024-01-09 PROCEDURE — 85730 THROMBOPLASTIN TIME PARTIAL: CPT | Performed by: INTERNAL MEDICINE

## 2024-01-09 PROCEDURE — 85027 COMPLETE CBC AUTOMATED: CPT | Performed by: INTERNAL MEDICINE

## 2024-01-09 PROCEDURE — 2500000001 HC RX 250 WO HCPCS SELF ADMINISTERED DRUGS (ALT 637 FOR MEDICARE OP): Performed by: INTERNAL MEDICINE

## 2024-01-09 PROCEDURE — 80048 BASIC METABOLIC PNL TOTAL CA: CPT | Performed by: INTERNAL MEDICINE

## 2024-01-09 PROCEDURE — 97162 PT EVAL MOD COMPLEX 30 MIN: CPT | Mod: GP

## 2024-01-09 PROCEDURE — 1100000001 HC PRIVATE ROOM DAILY

## 2024-01-09 PROCEDURE — 9420000001 HC RT PATIENT EDUCATION 5 MIN

## 2024-01-09 PROCEDURE — 2500000004 HC RX 250 GENERAL PHARMACY W/ HCPCS (ALT 636 FOR OP/ED): Performed by: INTERNAL MEDICINE

## 2024-01-09 PROCEDURE — 99233 SBSQ HOSP IP/OBS HIGH 50: CPT | Performed by: INTERNAL MEDICINE

## 2024-01-09 PROCEDURE — A9575 INJ GADOTERATE MEGLUMI 0.1ML: HCPCS | Performed by: INTERNAL MEDICINE

## 2024-01-09 PROCEDURE — 36415 COLL VENOUS BLD VENIPUNCTURE: CPT | Performed by: INTERNAL MEDICINE

## 2024-01-09 PROCEDURE — 2500000004 HC RX 250 GENERAL PHARMACY W/ HCPCS (ALT 636 FOR OP/ED): Mod: JZ

## 2024-01-09 PROCEDURE — 71275 CT ANGIOGRAPHY CHEST: CPT | Mod: FR

## 2024-01-09 PROCEDURE — 71275 CT ANGIOGRAPHY CHEST: CPT | Mod: FOREIGN READ | Performed by: RADIOLOGY

## 2024-01-09 PROCEDURE — 70553 MRI BRAIN STEM W/O & W/DYE: CPT

## 2024-01-09 PROCEDURE — 97116 GAIT TRAINING THERAPY: CPT | Mod: GP

## 2024-01-09 RX ORDER — POTASSIUM CHLORIDE 20 MEQ/1
40 TABLET, EXTENDED RELEASE ORAL ONCE
Status: COMPLETED | OUTPATIENT
Start: 2024-01-09 | End: 2024-01-09

## 2024-01-09 RX ORDER — POTASSIUM CHLORIDE 20 MEQ/1
40 TABLET, EXTENDED RELEASE ORAL ONCE
Status: DISCONTINUED | OUTPATIENT
Start: 2024-01-09 | End: 2024-01-12 | Stop reason: HOSPADM

## 2024-01-09 RX ORDER — GADOTERATE MEGLUMINE 376.9 MG/ML
20 INJECTION INTRAVENOUS
Status: COMPLETED | OUTPATIENT
Start: 2024-01-09 | End: 2024-01-09

## 2024-01-09 RX ADMIN — PIPERACILLIN SODIUM AND TAZOBACTAM SODIUM 4.5 G: 4; .5 INJECTION, SOLUTION INTRAVENOUS at 09:18

## 2024-01-09 RX ADMIN — DIVALPROEX SODIUM 750 MG: 250 TABLET, EXTENDED RELEASE ORAL at 18:55

## 2024-01-09 RX ADMIN — IOHEXOL 75 ML: 350 INJECTION, SOLUTION INTRAVENOUS at 20:47

## 2024-01-09 RX ADMIN — LACOSAMIDE 200 MG: 100 TABLET, FILM COATED ORAL at 18:55

## 2024-01-09 RX ADMIN — THIAMINE HCL TAB 100 MG 100 MG: 100 TAB at 08:09

## 2024-01-09 RX ADMIN — VANCOMYCIN 1000 MG: 1 INJECTION, SOLUTION INTRAVENOUS at 15:06

## 2024-01-09 RX ADMIN — DIVALPROEX SODIUM 750 MG: 250 TABLET, EXTENDED RELEASE ORAL at 08:08

## 2024-01-09 RX ADMIN — TIOTROPIUM BROMIDE INHALATION SPRAY 2 PUFF: 3.12 SPRAY, METERED RESPIRATORY (INHALATION) at 08:07

## 2024-01-09 RX ADMIN — Medication 2000 UNITS: at 08:09

## 2024-01-09 RX ADMIN — DEXTROSE MONOHYDRATE AND SODIUM CHLORIDE 80 ML/HR: 5; .45 INJECTION, SOLUTION INTRAVENOUS at 11:54

## 2024-01-09 RX ADMIN — HEPARIN SODIUM 1100 UNITS/HR: 10000 INJECTION, SOLUTION INTRAVENOUS at 18:13

## 2024-01-09 RX ADMIN — LACOSAMIDE 200 MG: 100 TABLET, FILM COATED ORAL at 08:09

## 2024-01-09 RX ADMIN — VANCOMYCIN 1000 MG: 1 INJECTION, SOLUTION INTRAVENOUS at 02:11

## 2024-01-09 RX ADMIN — ATORVASTATIN CALCIUM 40 MG: 40 TABLET, FILM COATED ORAL at 08:09

## 2024-01-09 RX ADMIN — PIPERACILLIN SODIUM AND TAZOBACTAM SODIUM 4.5 G: 4; .5 INJECTION, SOLUTION INTRAVENOUS at 16:35

## 2024-01-09 RX ADMIN — SARGRAMOSTIM 500 MCG: 250 INJECTION, POWDER, LYOPHILIZED, FOR SOLUTION INTRAVENOUS; SUBCUTANEOUS at 09:18

## 2024-01-09 RX ADMIN — PIPERACILLIN SODIUM AND TAZOBACTAM SODIUM 4.5 G: 4; .5 INJECTION, SOLUTION INTRAVENOUS at 04:36

## 2024-01-09 RX ADMIN — PIPERACILLIN SODIUM AND TAZOBACTAM SODIUM 4.5 G: 4; .5 INJECTION, SOLUTION INTRAVENOUS at 22:10

## 2024-01-09 RX ADMIN — GADOTERATE MEGLUMINE 16 ML: 376.9 INJECTION INTRAVENOUS at 21:38

## 2024-01-09 RX ADMIN — POTASSIUM CHLORIDE 40 MEQ: 1500 TABLET, EXTENDED RELEASE ORAL at 15:06

## 2024-01-09 ASSESSMENT — COGNITIVE AND FUNCTIONAL STATUS - GENERAL
MOBILITY SCORE: 20
DRESSING REGULAR UPPER BODY CLOTHING: A LITTLE
DRESSING REGULAR LOWER BODY CLOTHING: A LITTLE
CLIMB 3 TO 5 STEPS WITH RAILING: A LITTLE
WALKING IN HOSPITAL ROOM: A LITTLE
TOILETING: A LITTLE
STANDING UP FROM CHAIR USING ARMS: A LITTLE
PERSONAL GROOMING: A LITTLE
EATING MEALS: A LITTLE
DAILY ACTIVITIY SCORE: 18
MOVING TO AND FROM BED TO CHAIR: A LITTLE
HELP NEEDED FOR BATHING: A LITTLE

## 2024-01-09 ASSESSMENT — PAIN - FUNCTIONAL ASSESSMENT
PAIN_FUNCTIONAL_ASSESSMENT: 0-10
PAIN_FUNCTIONAL_ASSESSMENT: 0-10

## 2024-01-09 ASSESSMENT — PAIN SCALES - GENERAL
PAINLEVEL_OUTOF10: 0 - NO PAIN
PAINLEVEL_OUTOF10: 0 - NO PAIN

## 2024-01-09 ASSESSMENT — ACTIVITIES OF DAILY LIVING (ADL): ADL_ASSISTANCE: INDEPENDENT

## 2024-01-09 NOTE — PROGRESS NOTES
"Nutrition Follow up Note    Nutrition Assessment      Pt continues to eat well. Supplements being provided TID. Plan for discharge home with [facility]. Pt is followed by RD at [facility].     Nutrition History:  Energy Intake: Good > 75 %    Anthropometrics:  Ht: 175.3 cm (5' 9.02\"), Wt: 76.5 kg (168 lb 10.4 oz), BMI: 24.89  IBW/kg (Dietitian Calculated): 72.73 kg  Percent of IBW: 105 %    Weight Change:  Daily Weight  01/06/24 : 76.5 kg (168 lb 10.4 oz)  01/05/24 : 73 kg (161 lb)  01/04/24 : 71.8 kg (158 lb 4.6 oz)  01/03/24 : 71.3 kg (157 lb 3 oz)  12/27/23 : 70.6 kg (155 lb 10.3 oz)  12/26/23 : 71.5 kg (157 lb 11.2 oz)  12/19/23 : 69.4 kg (153 lb)  12/18/23 : 71.9 kg (158 lb 8.2 oz)  12/12/23 : 71.6 kg (157 lb 13.6 oz)  12/12/23 : 71.7 kg (158 lb 1.6 oz)    Weight History / % Weight Change: 10# (6.3%) wt gain noted during this admission - possibly fluid related. usual wt of 163# noted in initial nutrition assessment.    Nutrition Focused Physical Exam Findings:   Subcutaneous Fat Loss  Orbital Fat Pads: Well nourshed (slightly bulging fat pads)  Buccal Fat Pads: Well nourished (full, rounded cheeks)    Muscle Wasting  Temporalis: Well nourished (well-defined muscle)  Pectoralis (Clavicular Region): Well nourished (clavicle not visible)  Deltoid/Trapezius: Well nourished (rounded appearance at arm, shoulder, neck)    Nutrition Significant Labs:  Lab Results   Component Value Date    WBC 4.9 01/08/2024    HGB 7.4 (L) 01/08/2024    HCT 21.3 (L) 01/08/2024     01/08/2024    CHOL 162 10/03/2023    TRIG 87 10/03/2023    HDL 67.1 10/03/2023    ALT 40 01/07/2024     (H) 01/07/2024     01/09/2024    K 3.1 (L) 01/09/2024     01/09/2024    CREATININE 0.60 01/09/2024    BUN 3 (L) 01/09/2024    CO2 25 01/09/2024    TSH 2.27 12/26/2023    INR 1.1 01/08/2024       Current Facility-Administered Medications:     acetaminophen (Tylenol) tablet 650 mg, 650 mg, oral, q4h PRN, 650 mg at 01/07/24 1158 **OR** " acetaminophen (Tylenol) oral liquid 650 mg, 650 mg, oral, q4h PRN **OR** acetaminophen (Tylenol) suppository 650 mg, 650 mg, rectal, q4h PRN, Sarath Alvares MD    atorvastatin (Lipitor) tablet 40 mg, 40 mg, oral, Daily, Sarath Alvares MD, 40 mg at 01/09/24 0809    benzocaine-menthol (Cepastat Sore Throat) 15-3.6 mg lozenge 1 lozenge, 1 lozenge, Mouth/Throat, q2h PRN, Sarath Alvares MD, 1 lozenge at 01/07/24 0617    cholecalciferol (Vitamin D-3) tablet 2,000 Units, 2,000 Units, oral, Daily, Sarath Alvares MD, 2,000 Units at 01/09/24 0809    dextromethorphan-guaifenesin (Robitussin DM)  mg/5 mL oral liquid 5 mL, 5 mL, oral, q4h PRN, Sarath Alvares MD    dextrose 5%-0.45 % sodium chloride infusion, 80 mL/hr, intravenous, Continuous, Sarath Alvares MD, Last Rate: 80 mL/hr at 01/08/24 1837, 80 mL/hr at 01/08/24 1837    divalproex (Depakote ER) 24 hr tablet 750 mg, 750 mg, oral, BID, Sarath Alvares MD, 750 mg at 01/09/24 0808    guaiFENesin (Mucinex) 12 hr tablet 600 mg, 600 mg, oral, q12h PRN, Sarath Alvares MD    heparin (porcine) injection 3,000-6,000 Units, 3,000-6,000 Units, intravenous, PRN, Radha Casey MD    heparin 25,000 Units in dextrose 5% 250 mL (100 Units/mL) infusion (premix), 0-4,500 Units/hr, intravenous, Continuous, Radha Casey MD, Last Rate: 11 mL/hr at 01/09/24 0811, 1,100 Units/hr at 01/09/24 0811    ipratropium-albuteroL (Duo-Neb) 0.5-2.5 mg/3 mL nebulizer solution 3 mL, 3 mL, nebulization, q2h PRN, Sarath Alvares MD    lacosamide (Vimpat) tablet 200 mg, 200 mg, oral, BID, Sarath Alvares MD, 200 mg at 01/09/24 0809    LORazepam (Ativan) tablet 0.5 mg, 0.5 mg, oral, Daily PRN, Sarath Alvares MD    nicotine (Nicoderm CQ) 21 mg/24 hr patch 1 patch, 1 patch, transdermal, q24h, Sarath Alvares MD    ondansetron ODT (Zofran-ODT) disintegrating tablet 8 mg, 8 mg, oral, q8h PRN, Sarath Alvares MD    piperacillin-tazobactam-dextrose  (Zosyn) IV 4.5 g, 4.5 g, intravenous, q6h, Sarath Alvares MD, Stopped at 01/09/24 0948    polyethylene glycol (Glycolax, Miralax) packet 17 g, 17 g, oral, Daily PRN, Sarath Alvares MD    prochlorperazine (Compazine) tablet 10 mg, 10 mg, oral, q6h PRN, Sarath Alvares MD    sargramostim (Leukine) injection 500 mcg, 500 mcg, subcutaneous, q24h CHAPARRITA, Dora Higuera PharmD, 500 mcg at 01/09/24 0918    thiamine (Vitamin B-1) tablet 100 mg, 100 mg, oral, Daily, Sarath Alvares MD, 100 mg at 01/09/24 0809    tiotropium (Spiriva Respimat) 2.5 mcg/actuation inhaler 2 puff, 2 Inhalation, inhalation, Daily, Sarath Alvares MD, 2 puff at 01/09/24 0807    vancomycin (Xellia) 1 g in 200 mL (Xellia) IVPB 1,000 mg, 1,000 mg, intravenous, q12h, Lakeshia Oconnor, PharmD, Stopped at 01/09/24 0311    Dietary Orders (From admission, onward)       Start     Ordered    01/08/24 0815  Oral nutritional supplements  Until discontinued        Comments: chocolate   Question Answer Comment   Deliver with All meals    Select supplement: Ensure Plus High Protein        01/08/24 0814    01/06/24 0341  Adult diet Regular  Diet effective now        Question:  Diet type  Answer:  Regular    01/06/24 0340                  Estimated Needs:   Estimated Energy Needs  Total Energy Estimated Needs (kCal): 2291 kCal  Total Estimated Energy Need per Day (kCal/kg): 30 kCal/kg  Method for Estimating Needs: actual wt    Estimated Protein Needs  Total Protein Estimated Needs (g): 92 g  Total Protein Estimated Needs (g/kg): 1.2 g/kg  Method for Estimating Needs: actual wt    Estimated Fluid Needs  Total Fluid Estimated Needs (mL): 2291 mL  Method for Estimating Needs: 1 ml/kcal        Nutrition Diagnosis   Nutrition Diagnosis:       Nutrition Diagnosis  Patient has Nutrition Diagnosis: Yes  Diagnosis Status (1): Resolved  Nutrition Diagnosis 1: Inadequate energy intake  Related to (1): cancer treatments  As Evidenced by (1): intake less  than estimated needs with appetite changes  Additional Nutrition Diagnosis: Diagnosis 2  Diagnosis Status (2): New  Nutrition Diagnosis 2: Increased nutrient needs  Related to (2): increased demand for nutreints  As Evidenced by (2): conditions associated with dx       Nutrition Interventions/Recommendations   Nutrition Interventions and Recommendations:    Nutrition Prescription:  Individualized Nutrition Prescription Provided for : 2291 kcals and 92g protein to be provided via diet and supplements    Nutrition Interventions:   Food and/or Nutrient Delivery Interventions  Interventions: Meals and snacks  Meals and Snacks: General healthful diet  Goal: provide as ordered  Medical Food Supplement: Commercial beverage  Goal: ensure plus high protein TID to provide 350 kcals and 20g protein each    Education Documentation  No documentation found.           Nutrition Monitoring and Evaluation   Monitoring/Evaluation:   Food/Nutrient Related History Monitoring  Monitoring and Evaluation Plan: Energy intake  Energy Intake: Estimated energy intake  Criteria: pt to consume >/= 75% estimated needs    Body Composition/Growth/Weight History  Monitoring and Evaluation Plan: Weight  Weight: Measured weight  Criteria: Patient will maintain/gain weight       Time Spent/Follow-up:   Follow Up  Time Spent (min): 20 minutes  Last Date of Nutrition Visit: 01/09/24  Nutrition Follow-Up Needed?: 5-7 days  Follow up Comment: 1/15/24

## 2024-01-09 NOTE — PROGRESS NOTES
Physical Therapy    Physical Therapy Evaluation & Treatment    Patient Name: Martínez Neri  MRN: [id number]  Today's Date: 1/9/2024   Time Calculation  Start Time: 0830  Stop Time: 0913  Time Calculation (min): 43 min    Pt is agreeable to PT follow ups during acute care stay. Reports interest in future hallway ambulation, endurance building, and monitoring of O2    Assessment/Plan   PT Assessment  PT Assessment Results: Decreased strength, Decreased endurance, Impaired balance, Decreased mobility, Decreased safety awareness, Impaired judgement  Rehab Prognosis: Fair  Evaluation/Treatment Tolerance: Patient limited by fatigue  Medical Staff Made Aware: Yes  End of Session Communication: Bedside nurse  End of Session Patient Position: Bed, 3 rail up   IP OR SWING BED PT PLAN  Inpatient or Swing Bed: Inpatient  PT Plan  Treatment/Interventions: Transfer training, Gait training, Balance training, Strengthening, Endurance training, Therapeutic exercise, Therapeutic activity, Home exercise program  PT Plan: Skilled PT  PT Frequency: 4 times per week  PT Discharge Recommendations: Low intensity level of continued care  PT Recommended Transfer Status: Contact guard  PT - OK to Discharge: Yes      Subjective     General Visit Information:  General  Reason for Referral: Impaired Mobility  Referred By: Dr. Alvares  Family/Caregiver Present: Yes  Caregiver Feedback: spouse  Prior to Session Communication: Bedside nurse  Patient Position Received: Bed, 4 rail up  Preferred Learning Style: verbal  General Comment: 63 year old male admits with neutropenic fever, epilepsy, SMA thrombus, hyponatremia, generalized weakness, and lung neoplasm  Home Living:  Home Living  Type of Home: House  Lives With: Spouse, Adult children, Grandchildren  Home Adaptive Equipment: None  Home Layout: Multi-level  Home Access: Stairs to enter without rails  Entrance Stairs-Rails: None  Entrance Stairs-Number of Steps: 2  Bathroom Shower/Tub: Tub/shower  unit, Walk-in shower  Bathroom Toilet: Standard  Prior Level of Function:  Prior Function Per Pt/Caregiver Report  Level of [place]: Independent with ADLs and functional transfers, Independent with homemaking with ambulation  Receives Help From: Family  ADL Assistance: Independent  Homemaking Assistance: Independent  Ambulatory Assistance: Independent  Precautions:  Precautions  Medical Precautions: Fall precautions, Seizure precautions  Precautions Comment: Reverse Precautions  Vital Signs:   RN made aware of vitals today. 5L NC in room with SPO2 WNLs. Switched to O2 tank for mobilization. As the tank does not have a 5L NC option, Pt is temporarily given 6L--SPO2 99/100% throughout. SPO2 100% at end of session with Pt back supine in bed    Objective   Pain:  Pain Assessment  Pain Assessment: 0-10  Pain Score: 0 - No pain  Cognition:  Cognition  Overall Cognitive Status: Within Functional Limits (Pt easily aggitated. No AxO questions asked today to avoid further upsetting Pt. Limited interest in education being provided.)    General Assessments:  Activity Tolerance  Endurance: Decreased tolerance for upright activites (SOB with activity)    Sensation  Light Touch: No apparent deficits  Functional Assessments:  Bed Mobility  Bed Mobility: Yes  Bed Mobility 1  Bed Mobility 1: Supine to sitting (flat bed)  Level of Assistance 1: Independent  Bed Mobility 2  Bed Mobility  2: Sitting to supine  Level of Assistance 2: Independent  Bed Mobility Comments 2: flat bed    Transfers  Transfer: Yes  Transfer 1  Transfer From 1: Bed to  Transfer to 1: Stand  Technique 1: Sit to stand  Transfer Level of Assistance 1: Close supervision  Trials/Comments 1: laborous and shaky but self completed  Transfers 2  Transfer From 2: Commode-standard to  Transfer to 2: Stand  Technique 2: Sit to stand  Transfer Level of Assistance 2: Close supervision  Trials/Comments 2: laborous but self completed    Ambulation/Gait  Training  Ambulation/Gait Training Performed: Yes  Ambulation/Gait Training 1  Surface 1: Level tile  Device 1: No device  Assistance 1: Close supervision  Quality of Gait 1:  (slow with increased sway, increased trunk sway, intermittent shuffle without LOB)  Comments/Distance (ft) 1: 10'x2  Extremity/Trunk Assessments:  RLE   RLE : Exceptions to WFL  Strength RLE  RLE Overall Strength:  (Grossly at least 4/5)  LLE   LLE : Exceptions to WFL  Strength LLE  LLE Overall Strength:  (Grossly at least 4/5)  Treatments:  Ambulation/Gait Training  Ambulation/Gait Training Performed: Yes  Ambulation/Gait Training 1  Surface 1: Level tile  Device 1: No device  Assistance 1: Close supervision  Quality of Gait 1:  (slow with increased sway, increased trunk sway, intermittent shuffle without LOB)  Comments/Distance (ft) 1: 10'x2  Transfers  Transfer: Yes  Transfer 1  Transfer From 1: Bed to  Transfer to 1: Stand  Technique 1: Sit to stand  Transfer Level of Assistance 1: Close supervision  Trials/Comments 1: laborous and shaky but self completed  Transfers 2  Transfer From 2: Commode-standard to  Transfer to 2: Stand  Technique 2: Sit to stand  Transfer Level of Assistance 2: Close supervision  Trials/Comments 2: laborous but self completed    Significant time spent on education as detailed below       Outcome Measures:  [facility] Basic Mobility  Turning from your back to your side while in a flat bed without using bedrails: None  Moving from lying on your back to sitting on the side of a flat bed without using bedrails: None  Moving to and from bed to chair (including a wheelchair): A little  Standing up from a chair using your arms (e.g. wheelchair or bedside chair): A little  To walk in hospital room: A little  Climbing 3-5 steps with railing: A little  Basic Mobility - Total Score: 20    Encounter Problems       Encounter Problems (Active)       Balance       Standing Balance (Progressing)       Start:  01/09/24    Expected End:   01/23/24       Pt will demonstrate good static standing balance to promote safe participation with out of bed activity, transfers, and mobility              Mobility       Ambulation (Progressing)       Start:  01/09/24    Expected End:  01/23/24       Pt will ambulate 50' modified independent assist with no device to promote safe home mobility              Safety       Safe Mobility Techniques (Progressing)       Start:  01/09/24    Expected End:  01/23/24       Pt will correctly identify and demonstrate safe mobility techniques to reduce their risks for falls during their acute care stay            Fall Risk Management (Progressing)       Start:  01/09/24    Expected End:  01/23/24       Pt will demonstrate 14 stands from chair in 30s to achieve their CDC Steadi Goal and Reduce Risk for Falls    1.) May NOT use arms for the transfer  2.) Must achieve full standing position    Chair Stand Below Average Scores  Age-------Men----Women  (60-64)----(<14)----(<12)  (65-69)----(<12)----(<11)  (70-74)----(<12)----(<10)  (75-79)----(<11)----(<10)  (80-84)----(<10)----(<9 )  (85-89)----(<8 )----(<8 )  (90-94)----(<7 )----(<4 )    A below average score indicates a risk for falls  www.cdc.gov/steadi              Transfers       Sit to stand (Progressing)       Start:  01/09/24    Expected End:  01/23/24       Pt will transfer sit to standing position with modified independent assist and no device to promote safe out of bed activity                  Education Documentation  Mobility Training, taught by Rosendo Moses, PT at 1/9/2024  9:44 AM.  Learner: Significant Other, Patient  Readiness: Nonacceptance  Method: Explanation, Demonstration  Response: Needs Reinforcement  Comment: safe mobility techniques, SPO2 norms, SPO2 management with activity, endurance building, SOB management with activity, energy conservation, fall risk management, importance of OOB activity, risks of prolonged bedrest    Education Comments  No  comments found.

## 2024-01-09 NOTE — PROGRESS NOTES
Occupational Therapy    Occupational Therapy Treatment    Name: Martínez Neri  MRN: [id number]  : 1960  Date: 24  Time Calculation  Start Time: 1028  Stop Time: 1057  Time Calculation (min): 29 min    Assessment:  OT Assessment: Patient agreeable to work with therapy this date. More approachable and pleasant to work with. Showing progress with functional transfers. Continue to increase patient's endurance.  Prognosis: Good  Evaluation/Treatment Tolerance: Patient tolerated treatment well  End of Session Communication: Bedside nurse  End of Session Patient Position: Bed, 4 rail up  Plan:  Treatment Interventions: ADL retraining, Functional transfer training, UE strengthening/ROM, Endurance training, Patient/family training, Compensatory technique education  OT Frequency: 2 times per week  OT Discharge Recommendations: Low intensity level of continued care, 24 hr supervision due to cognition  OT Recommended Transfer Status: Assist of 1  OT - OK to Discharge: Yes    Subjective   Previous Visit Info:  OT Last Visit  OT Received On: 24  General:  General  Missed Visit: Yes  Missed Visit Reason: Patient placed on medical hold (Heparin started ~10pm last night. PTT over 100 on last draw. OT tx deferred)  Family/Caregiver Present: Yes  Caregiver Feedback: spouse - Keri  Prior to Session Communication: Bedside nurse  Patient Position Received: Bed, 4 rail up  Preferred Learning Style: verbal  General Comment: Patient cleared by nursing for therapy. Patient in bed upon arrival and agreeable to participate  Precautions:  Medical Precautions: Fall precautions, Seizure precautions, Oxygen therapy device and L/min (5L O2 via NC)  Precautions Comment: Reverse Precautions  Pain Assessment:  Pain Assessment  Pain Assessment: 0-10  Pain Score: 0 - No pain     Objective   Activities of Daily Living: Grooming  Grooming Comments: patient reports he completed earlier    Functional Standing Tolerance:  Functional  Standing Tolerance  Time: ~2 minutes and 30 seconds  Activity: Ther ex  Functional Standing Tolerance Comments: close supervision standing, x5 trials. best time of 2 minutes and 30 seconds. Fair+ balance. rest breaks during ther ex  Bed Mobility/Transfers: Bed Mobility  Bed Mobility: Yes  Bed Mobility 1  Bed Mobility 1: Supine to sitting  Level of Assistance 1: Independent  Bed Mobility 2  Bed Mobility  2: Sitting to supine  Level of Assistance 2: Independent    Transfers  Transfer: Yes  Transfer 1  Transfer From 1: Bed to  Transfer to 1: Stand  Technique 1: Sit to stand  Transfer Device 1:  (no AD)  Transfer Level of Assistance 1: Close supervision  Trials/Comments 1: x5 trials    Therapy/Activity: Therapeutic Exercise  Therapeutic Exercise Performed: Yes  Therapeutic Exercise Activity 1: patient completes 1x10 B UE AROM exercises. exercises completed include shoulder flex/ext, shoulder horizontal abduction/adduction, shoulder reverse rolls, shoulder elevation/depression, scapular protraction/retraction. patient encouraged to complete exercises in standing, seated rest breaks throughout required    Therapeutic Activity  Therapeutic Activity Performed: Yes  Therapeutic Activity 1: patient completes sit to stand txfer from EOB without AD 5x with close supervision    Outcome Measures:  [facility] Daily Activity  Putting on and taking off regular lower body clothing: A little  Bathing (including washing, rinsing, drying): A little  Putting on and taking off regular upper body clothing: A little  Toileting, which includes using toilet, bedpan or urinal: A little  Taking care of personal grooming such as brushing teeth: A little  Eating Meals: A little  Daily Activity - Total Score: 18    Education Documentation  Body Mechanics, taught by Cecilia Jasso OT at 1/9/2024 11:46 AM.  Learner: Patient  Readiness: Acceptance  Method: Explanation, Demonstration  Response: Needs Reinforcement    Precautions, taught by Cecilia Jasso  OT at 1/9/2024 11:46 AM.  Learner: Patient  Readiness: Acceptance  Method: Explanation, Demonstration  Response: Needs Reinforcement    Home Exercise Program, taught by Cecilia Jasso OT at 1/9/2024 11:46 AM.  Learner: Patient  Readiness: Acceptance  Method: Explanation, Demonstration  Response: Needs Reinforcement    Education Comments  No comments found.      Goals:  Encounter Problems       Encounter Problems (Active)       OT Goals       ADLs (Progressing)       Start:  01/06/24    Expected End:  01/31/24       Patient will complete ADL tasks with Dane in order to safely return to PLOF.         Functional Transfers (Progressing)       Start:  01/06/24    Expected End:  01/31/24       Patient will complete functional transfers with Dane in order to increase patient's safety and independence with daily tasks.         B UE Strength  (Progressing)       Start:  01/06/24    Expected End:  01/31/24       Patient will increase B UE strength to 4+/5 for functional transfers.         Functional Mobility (Progressing)       Start:  01/06/24    Expected End:  01/31/24       Patient will demonstrate the ability to complete item retrieval and functional mobility with Dane in order to increase patient's safety and independence with daily tasks.

## 2024-01-09 NOTE — NURSING NOTE
Notified dr stevens of mri/ radiology department needing ap lateral x ray in order to complete mri

## 2024-01-09 NOTE — NURSING NOTE
40 meq potassium given as per order, pharmacy had to re-enter order as one pill was dropped and I was not able to remove another from the omnicell without a new order.

## 2024-01-09 NOTE — NURSING NOTE
"Patient's wife asked to speak to RN, patient's wife asked RN why patient had not gotten the mri yet, rn explained to family that because the patient has a shunt there is more paperwork that needs to be done in order to receive the test    Patient's two family members and patient began to yell, asking why the shunt info had not been accepted as it has been two days since the info was given to the nurses    Rn explained to family that they had reached out to mri and that was the info they had been told    Family asked to speak to head of radiology, dr stevens, and the oncologist who per family \"told us he would be getting a cat scan 3 days ago\"    Rn confirmed with the family they would look into it  "

## 2024-01-09 NOTE — PROGRESS NOTES
Martínez Neri is a 63 y.o. male on day 1 of admission presenting with Neutropenic fever (CMS/HCC).    Subjective   Interval History:   Afebrile, no chills  On 5 liters oxygen  Reports mild more productive cough  wife at bedside  Nursing at bedside  Denies nausea, vomiting diarrhea  Awaiting imaging    Objective   Range of Vitals (last 24 hours)  Heart Rate:  [69-87]   Temp:  [36.6 °C (97.8 °F)-37.2 °C (99 °F)]   Resp:  [14-18]   BP: ()/(38-75)   SpO2:  [87 %-95 %]   Daily Weight  01/06/24 : 76.5 kg (168 lb 10.4 oz)    Body mass index is 24.89 kg/m².    Physical Exam  Constitutional:       Appearance: Normal appearance.   HENT:      Head: Normocephalic and atraumatic.      Nose: Nose normal.      Mouth/Throat:      Mouth: Mucous membranes are moist.      Pharynx: Oropharynx is clear.   Eyes:      Extraocular Movements: Extraocular movements intact.      Conjunctiva/sclera: Conjunctivae normal.   Cardiovascular:      Rate and Rhythm: Normal rate and regular rhythm.   Pulmonary:      Effort: Pulmonary effort is normal.      Breath sounds: Normal breath sounds.   Abdominal:      General: Bowel sounds are normal.      Palpations: Abdomen is soft.   Musculoskeletal:         General: Normal range of motion.      Cervical back: Normal range of motion and neck supple.   Skin:     General: Skin is warm and dry.   Neurological:      General: No focal deficit present.      Mental Status: He is alert and oriented to person, place, and time. Mental status is at baseline.   Psychiatric:         Mood and Affect: Mood normal.         Behavior: Behavior normal.         Antibiotics  acetaminophen (Tylenol) tablet 975 mg  sodium chloride 0.9 % bolus 1,000 mL  piperacillin-tazobactam-dextrose (Zosyn) IV 3.375 g  thiamine (Vitamin B-1) tablet 100 mg  nicotine (Nicoderm CQ) 21 mg/24 hr patch 1 patch  albuterol 90 mcg/actuation inhaler 2 puff  ipratropium-albuteroL (Duo-Neb) 0.5-2.5 mg/3 mL nebulizer solution 3 mL  divalproex  (Depakote ER) 24 hr tablet 250 mg  divalproex (Depakote ER) 24 hr tablet 500 mg  lacosamide (Vimpat) tablet 200 mg  ondansetron (Zofran) tablet 8 mg  prochlorperazine (Compazine) tablet 10 mg  cholecalciferol (Vitamin D-3) tablet 2,000 Units  tiotropium (Spiriva Respimat) 2.5 mcg/actuation inhaler 2 puff  atorvastatin (Lipitor) tablet 40 mg  LORazepam (Ativan) tablet 0.5 mg  acetaminophen (Tylenol) tablet 650 mg  acetaminophen (Tylenol) oral liquid 650 mg  acetaminophen (Tylenol) suppository 650 mg  polyethylene glycol (Glycolax, Miralax) packet 17 g  benzocaine-menthol (Cepastat Sore Throat) 15-3.6 mg lozenge 1 lozenge  dextromethorphan-guaifenesin (Robitussin DM)  mg/5 mL oral liquid 5 mL  guaiFENesin (Mucinex) 12 hr tablet 600 mg  heparin (porcine) injection 5,000 Units  piperacillin-tazobactam-dextrose (Zosyn) IV 4.5 g  vancomycin (Xellia) 1 g in 200 mL (Xellia) IVPB 1,000 mg  dextrose 5%-0.45 % sodium chloride infusion  ondansetron ODT (Zofran-ODT) disintegrating tablet 8 mg  albuterol 2.5 mg /3 mL (0.083 %) nebulizer solution 2.5 mg  divalproex (Depakote ER) 24 hr tablet 750 mg  ipratropium-albuteroL (Duo-Neb) 0.5-2.5 mg/3 mL nebulizer solution 3 mL  sargramostim (Leukine) 500 mcg in sodium chloride 0.9% 25 mL  sargramostim (Leukine) injection 500 mcg  iohexol (OMNIPaque) 350 mg iodine/mL solution 75 mL  heparin (porcine) injection 6,000 Units  heparin 25,000 Units in dextrose 5% 250 mL (100 Units/mL) infusion (premix)  heparin (porcine) injection 3,000-6,000 Units      Relevant Results  Labs  Results from last 72 hours   Lab Units 01/08/24  0428 01/08/24  0249 01/07/24  1724   WBC AUTO x10*3/uL 4.9 5.9 3.6*   HEMOGLOBIN g/dL 7.4* 8.2* 7.7*   HEMATOCRIT % 21.3* 23.3* 22.3*   PLATELETS AUTO x10*3/uL 192 217 195   LYMPHO PCT MAN %  --   --  8.0   MONO PCT MAN %  --   --  3.0   EOSINO PCT MAN %  --   --  0.0       Results from last 72 hours   Lab Units 01/09/24  0431 01/08/24  0428 01/07/24  1724   SODIUM  "mmol/L 136 133 132*   POTASSIUM mmol/L 3.1* 3.3* 3.4   CHLORIDE mmol/L 102 101 100   CO2 mmol/L 25 24 24   BUN mg/dL 3* 5* 8   CREATININE mg/dL 0.60 0.70 0.90   GLUCOSE mg/dL 113* 120* 115*   CALCIUM mg/dL 8.4* 8.1* 8.3*   ANION GAP mmol/L 9 8 8   EGFR mL/min/1.73m*2 >90 >90 >90       Results from last 72 hours   Lab Units 01/07/24  1724   ALK PHOS U/L 42   BILIRUBIN TOTAL mg/dL 0.3   PROTEIN TOTAL g/dL 5.5*   ALT U/L 40   AST U/L 178*   ALBUMIN g/dL 2.8*       Estimated Creatinine Clearance: 124.4 mL/min (by C-G formula based on SCr of 0.6 mg/dL).  No results found for: \"CRP\"  Microbiology  reviewed  Imaging  CT abdomen pelvis w IV contrast    Result Date: 1/7/2024  Interpreted By:  Lisa Lezama, STUDY: CT ABDOMEN PELVIS W IV CONTRAST;  1/7/2024 12:26 pm   INDICATION: Abdominal pain   COMPARISON: Fused PET/CT study of 10/06/2023   ACCESSION NUMBER(S): [id number]   ORDERING CLINICIAN: JF ALEMAN   TECHNIQUE: CT of the abdomen and pelvis was performed with intravenous contrast administered. 75 mL of Omnipaque 350 was injected intravenously for the study with sagittal and coronal reformations performed by the technologist at the acquisition scanner.   All CT examinations are performed with 1 or more of the following dose reduction techniques: Automated exposure control, adjustment of mA and/or kv according to patient's size, or use of iterative reconstruction techniques.   FINDINGS: LOWER CHEST: There are severe emphysematous changes seen within the lower lung zones with minimal right pleural effusion and with mild atelectasis at the right lung base.   ABDOMEN:   LIVER: There are several hypodense space-occupying liver lesions measuring up to 2.3 cm in size without interval change compared with prior study. These are most consistent with stable hepatic cysts.   BILE DUCTS: No intrahepatic or extrahepatic biliary ductal dilatation.   GALLBLADDER: Unremarkable   PANCREAS: Unremarkable   SPLEEN: Unremarkable   ADRENAL " GLANDS: Unremarkable   KIDNEYS AND URETERS: There is a 1.7 cm right renal cyst seen with left kidney unremarkable. There is mild bilateral perinephric scarring. MRCP   PELVIS:   BLADDER: No abnormality of the urinary bladder is seen.   REPRODUCTIVE ORGANS: Prostate gland is enlarged with a transverse diameter of 4.8 cm.   BOWEL: Colonic diverticulosis is seen without evidence for diverticulitis. The bowel is difficult to accurately evaluate due to patient motion. No pneumatosis intestinalis is seen. No portal venous gas is observed.   VESSELS: There is atherosclerosis of the abdominal aorta without aneurysmal dilatation. Calcified plaque is visible within the superior mesenteric artery. There is a filling defect seen within the proximal superior mesenteric artery identified roughly 7 mm distal to its origin. I believe that this represents partial thrombosis. There is flow seen more distally within the superior mesenteric artery.   PERITONEUM/RETROPERITONEUM/LYMPH NODES: A small amount of free fluid within the pelvic cul-de-sac is identified.   BONES AND ABDOMINAL WALL: Small bilateral fat containing inguinal hernias are present with left inguinal hernia larger than right. A very tiny fat containing umbilical hernia is observed.       1.  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 However, there is flow noted within the distal SMA. 2. Small amount of free fluid within the pelvic cul-de-sac. 3. Colonic diverticulosis without diverticulitis. 4. Hepatic cysts measuring up to 2.3 cm in size with 1.7 cm right renal cyst. 5. Severe pulmonary emphysema with minimal right pleural effusion and with atelectasis at right lung base. 6. Small bilateral fat containing inguinal hernias with very tiny fat containing umbilical hernia     MACRO: None   Signed by: Lisa Lezama 1/7/2024 12:53 PM Dictation workstation:   [id number]    XR chest 1 view    Result Date:  1/5/2024  STUDY: Chest Radiograph;  01/05/2024 9:39 PM INDICATION: Cough, fever.  Additional History:  Lung cancer. COMPARISON: CT chest 10/06/2023.  NM PET/CT 10/06/2023.  ACCESSION NUMBER(S): [id number] ORDERING CLINICIAN: ARTEM MOSER TECHNIQUE:  Frontal chest was obtained at 2139 hours. FINDINGS: CARDIOMEDIASTINAL SILHOUETTE: Cardiomediastinal silhouette is normal in size and configuration.  LUNGS: Bandlike scarring/atelectasis LEFT midlung noted.  ABDOMEN: No remarkable upper abdominal findings.  BONES: No acute osseous changes.    Bandlike scarring/atelectasis LEFT midlung. 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

## 2024-01-09 NOTE — NURSING NOTE
Assumed care of patient, bssr done, Wife at bedside, call light within reach, no needs at this time, will cont to monitor.

## 2024-01-09 NOTE — PROGRESS NOTES
"Martínez Neri is a 63 y.o. male on day 1 of admission presenting with Neutropenic fever (CMS/HCC).    Subjective   Patient is still requiring oxygen.  At home he does not need oxygen.  Patient is quite agitated today.  He says he thinks his diagnosis is wrong and he does not have lung cancer but has blood clot.  He is not getting checked for it       Objective     Physical Exam  Vitals reviewed.   Constitutional:       Appearance: Normal appearance.   HENT:      Head: Normocephalic and atraumatic.      Right Ear: Tympanic membrane, ear canal and external ear normal.      Left Ear: Tympanic membrane, ear canal and external ear normal.      Nose: Nose normal.      Mouth/Throat:      Pharynx: Oropharynx is clear.   Eyes:      Extraocular Movements: Extraocular movements intact.      Conjunctiva/sclera: Conjunctivae normal.      Pupils: Pupils are equal, round, and reactive to light.   Cardiovascular:      Rate and Rhythm: Normal rate and regular rhythm.      Pulses: Normal pulses.      Heart sounds: Normal heart sounds.   Pulmonary:      Effort: Pulmonary effort is normal.      Breath sounds: Rhonchi present.   Abdominal:      General: Abdomen is flat. Bowel sounds are normal.      Palpations: Abdomen is soft.   Musculoskeletal:      Cervical back: Normal range of motion and neck supple.   Skin:     General: Skin is warm and dry.   Neurological:      General: No focal deficit present.      Mental Status: He is alert and oriented to person, place, and time.   Psychiatric:         Mood and Affect: Mood normal.         Last Recorded Vitals  Blood pressure (!) 96/35, pulse 81, temperature 36.9 °C (98.4 °F), temperature source Oral, resp. rate 18, height 1.753 m (5' 9.02\"), weight 76.5 kg (168 lb 10.4 oz), SpO2 96 %.  Intake/Output last 3 Shifts:  I/O last 3 completed shifts:  In: 3247.9 (42.5 mL/kg) [P.O.:240; I.V.:2107.9 (27.6 mL/kg); IV Piggyback:900]  Out: 0 (0 mL/kg)   Weight: 76.5 kg     Relevant Results            "   Scheduled medications  atorvastatin, 40 mg, oral, Daily  cholecalciferol, 2,000 Units, oral, Daily  divalproex, 750 mg, oral, BID  lacosamide, 200 mg, oral, BID  nicotine, 1 patch, transdermal, q24h  piperacillin-tazobactam, 4.5 g, intravenous, q6h  potassium chloride CR, 40 mEq, oral, Once  sargramostim, 500 mcg, subcutaneous, q24h CHAPARRITA  thiamine, 100 mg, oral, Daily  tiotropium, 2 Inhalation, inhalation, Daily  vancomycin, 1,000 mg, intravenous, q12h      Continuous medications  dextrose 5%-0.45 % sodium chloride, 80 mL/hr, Last Rate: 80 mL/hr (01/09/24 1608)  heparin, 0-4,500 Units/hr, Last Rate: 1,100 Units/hr (01/09/24 1813)      PRN medications  PRN medications: acetaminophen **OR** acetaminophen **OR** acetaminophen, benzocaine-menthol, dextromethorphan-guaifenesin, guaiFENesin, heparin (porcine), ipratropium-albuteroL, LORazepam, ondansetron ODT, polyethylene glycol, prochlorperazine  Results for orders placed or performed during the hospital encounter of 01/05/24 (from the past 24 hour(s))   POCT GLUCOSE   Result Value Ref Range    POCT Glucose 160 (H) 74 - 99 mg/dL   Basic Metabolic Panel   Result Value Ref Range    Glucose 113 (H) 65 - 99 mg/dL    Sodium 136 133 - 145 mmol/L    Potassium 3.1 (L) 3.4 - 5.1 mmol/L    Chloride 102 97 - 107 mmol/L    Bicarbonate 25 24 - 31 mmol/L    Urea Nitrogen 3 (L) 8 - 25 mg/dL    Creatinine 0.60 0.40 - 1.60 mg/dL    eGFR >90 >60 mL/min/1.73m*2    Calcium 8.4 (L) 8.5 - 10.4 mg/dL    Anion Gap 9 <=19 mmol/L   aPTT   Result Value Ref Range    aPTT 60.5 (H) 22.0 - 32.5 seconds   POCT GLUCOSE   Result Value Ref Range    POCT Glucose 107 (H) 74 - 99 mg/dL   POCT GLUCOSE   Result Value Ref Range    POCT Glucose 96 74 - 99 mg/dL   POCT GLUCOSE   Result Value Ref Range    POCT Glucose 131 (H) 74 - 99 mg/dL   CBC   Result Value Ref Range    WBC 5.2 4.4 - 11.3 x10*3/uL    nRBC 0.0 0.0 - 0.0 /100 WBCs    RBC 2.28 (L) 4.50 - 5.90 x10*6/uL    Hemoglobin 7.5 (L) 13.5 - 17.5 g/dL     Hematocrit 22.7 (L) 41.0 - 52.0 %     80 - 100 fL    MCH 32.9 26.0 - 34.0 pg    MCHC 33.0 32.0 - 36.0 g/dL    RDW 15.9 (H) 11.5 - 14.5 %    Platelets 220 150 - 450 x10*3/uL                      Assessment/Plan   Principal Problem:    Neutropenic fever (CMS/HCC)  Active Problems:    Partial symptomatic epilepsy with complex partial seizures, not intractable, without status epilepticus (CMS/HCC)    Fever in adult    Superior mesenteric artery thrombosis (CMS/HCC)    Hyponatremia    Generalized weakness    Malignant neoplasm of upper lobe of left lung (CMS/HCC)    Family is very frustrated because MRI is not done yet  They are also upset that the oncologist has not come back to talk to them and has not repeated the test for the blood clot in abdomen  Explained that I had sent messages to the oncologist to discuss with him but I cannot make decision in that regard  Will do CT chest angio to rule out blood clot in the chest  Replace potassium  Encourage p.o. intake as patient's p.o. intake is still very poor  Sodium is doing better  Fever is gone  Empiric antibiotics per ID  Had long talks with the wife and daughter       I spent  minutes in the professional and overall care of this patient.      Radha Casey MD

## 2024-01-09 NOTE — NURSING NOTE
Assumed care of patient, family at bedside, no needs at this time, call light and possessions within reach    Dr stevens notified of potassium of 3.1

## 2024-01-09 NOTE — NURSING NOTE
Went in to obtain manual BP and the patient c/o not being able to sleep because the IV pumps were making a noise. He asked me to shut them off I explained that I couldn't, but I would look for some ear plugs for him so he could get some sleep. Returned to the room with ear plugs.

## 2024-01-09 NOTE — PROGRESS NOTES
"Martínez Neri is a 63 y.o. male on day 0 of admission presenting with Neutropenic fever (CMS/HCC).    Subjective   Patient is little upset about having home care at home.  He seems little confused at times       Objective     Physical Exam  Vitals reviewed.   Constitutional:       Appearance: Normal appearance.   HENT:      Head: Normocephalic and atraumatic.      Right Ear: Tympanic membrane, ear canal and external ear normal.      Left Ear: Tympanic membrane, ear canal and external ear normal.      Nose: Nose normal.      Mouth/Throat:      Pharynx: Oropharynx is clear.   Eyes:      Extraocular Movements: Extraocular movements intact.      Conjunctiva/sclera: Conjunctivae normal.      Pupils: Pupils are equal, round, and reactive to light.   Cardiovascular:      Rate and Rhythm: Normal rate and regular rhythm.      Pulses: Normal pulses.      Heart sounds: Normal heart sounds.   Pulmonary:      Effort: Pulmonary effort is normal.      Breath sounds: Normal breath sounds.   Abdominal:      General: Abdomen is flat. Bowel sounds are normal.      Palpations: Abdomen is soft.   Musculoskeletal:      Cervical back: Normal range of motion and neck supple.   Skin:     General: Skin is warm and dry.   Neurological:      General: No focal deficit present.      Mental Status: He is alert and oriented to person, place, and time.   Psychiatric:         Mood and Affect: Mood normal.         Last Recorded Vitals  Blood pressure 98/58, pulse 83, temperature 37.2 °C (99 °F), temperature source Oral, resp. rate 16, height 1.753 m (5' 9.02\"), weight 76.5 kg (168 lb 10.4 oz), SpO2 95 %.  Intake/Output last 3 Shifts:  I/O last 3 completed shifts:  In: 3387.9 (44.3 mL/kg) [P.O.:480; I.V.:2107.9 (27.6 mL/kg); IV Piggyback:800]  Out: 0 (0 mL/kg)   Weight: 76.5 kg     Relevant Results              Scheduled medications  atorvastatin, 40 mg, oral, Daily  cholecalciferol, 2,000 Units, oral, Daily  divalproex, 750 mg, oral, " BID  lacosamide, 200 mg, oral, BID  nicotine, 1 patch, transdermal, q24h  piperacillin-tazobactam, 4.5 g, intravenous, q6h  sargramostim, 500 mcg, subcutaneous, q24h CHAPARRITA  thiamine, 100 mg, oral, Daily  tiotropium, 2 Inhalation, inhalation, Daily  vancomycin, 1,000 mg, intravenous, q12h      Continuous medications  dextrose 5%-0.45 % sodium chloride, 80 mL/hr, Last Rate: 80 mL/hr (01/08/24 1837)  heparin, 0-4,500 Units/hr, Last Rate: 1,100 Units/hr (01/08/24 1838)      PRN medications  PRN medications: acetaminophen **OR** acetaminophen **OR** acetaminophen, benzocaine-menthol, dextromethorphan-guaifenesin, guaiFENesin, heparin (porcine), ipratropium-albuteroL, LORazepam, ondansetron ODT, polyethylene glycol, prochlorperazine  Results for orders placed or performed during the hospital encounter of 01/05/24 (from the past 24 hour(s))   POCT GLUCOSE   Result Value Ref Range    POCT Glucose 132 (H) 74 - 99 mg/dL   aPTT   Result Value Ref Range    aPTT 58.1 (H) 22.0 - 32.5 seconds   CBC   Result Value Ref Range    WBC 5.9 4.4 - 11.3 x10*3/uL    nRBC 0.0 0.0 - 0.0 /100 WBCs    RBC 2.39 (L) 4.50 - 5.90 x10*6/uL    Hemoglobin 8.2 (L) 13.5 - 17.5 g/dL    Hematocrit 23.3 (L) 41.0 - 52.0 %    MCV 98 80 - 100 fL    MCH 34.3 (H) 26.0 - 34.0 pg    MCHC 35.2 32.0 - 36.0 g/dL    RDW 15.6 (H) 11.5 - 14.5 %    Platelets 217 150 - 450 x10*3/uL   CBC   Result Value Ref Range    WBC 4.9 4.4 - 11.3 x10*3/uL    nRBC 0.0 0.0 - 0.0 /100 WBCs    RBC 2.19 (L) 4.50 - 5.90 x10*6/uL    Hemoglobin 7.4 (L) 13.5 - 17.5 g/dL    Hematocrit 21.3 (L) 41.0 - 52.0 %    MCV 97 80 - 100 fL    MCH 33.8 26.0 - 34.0 pg    MCHC 34.7 32.0 - 36.0 g/dL    RDW 15.7 (H) 11.5 - 14.5 %    Platelets 192 150 - 450 x10*3/uL   Basic Metabolic Panel   Result Value Ref Range    Glucose 120 (H) 65 - 99 mg/dL    Sodium 133 133 - 145 mmol/L    Potassium 3.3 (L) 3.4 - 5.1 mmol/L    Chloride 101 97 - 107 mmol/L    Bicarbonate 24 24 - 31 mmol/L    Urea Nitrogen 5 (L) 8 - 25  mg/dL    Creatinine 0.70 0.40 - 1.60 mg/dL    eGFR >90 >60 mL/min/1.73m*2    Calcium 8.1 (L) 8.5 - 10.4 mg/dL    Anion Gap 8 <=19 mmol/L   POCT GLUCOSE   Result Value Ref Range    POCT Glucose 118 (H) 74 - 99 mg/dL   aPTT   Result Value Ref Range    aPTT 108.1 (HH) 22.0 - 32.5 seconds   Reticulocytes   Result Value Ref Range    Retic % 1.9 0.5 - 2.0 %    Retic Absolute 0.042 0.022 - 0.118 x10*6/uL    Reticulocyte Hemoglobin 29 28 - 38 pg    Immature Retic fraction 19.6 (H) <=16.0 %   Lactate dehydrogenase   Result Value Ref Range     (H) 91 - 227 U/L   Beta-2 glycoprotein antibodies   Result Value Ref Range    Beta-2 Glyco 1 IgG <1.4 <20.0 U/mL    Beta-2 Glyco 1 IgA 1.3 <20.0 U/mL    Beta 2 Glyco 1 IgM 4.7 <20.0 U/mL   Cardiolipin antibody   Result Value Ref Range    Anticardiolipin IgA 1.5 <20.0 APL U/mL    Anticardiolipin IgG <1.6 <20.0 GPL U/mL    Anticardiolipin IgM 3.0 <20.0 MPL U/mL   POCT GLUCOSE   Result Value Ref Range    POCT Glucose 103 (H) 74 - 99 mg/dL   aPTT   Result Value Ref Range    aPTT 50.1 (H) 22.0 - 32.5 seconds   Protime-INR   Result Value Ref Range    Protime 11.4 9.3 - 12.7 seconds    INR 1.1 0.9 - 1.2   POCT GLUCOSE   Result Value Ref Range    POCT Glucose 104 (H) 74 - 99 mg/dL     CT abdomen pelvis w IV contrast    Result Date: 1/7/2024  Interpreted By:  Lisa Lezama, STUDY: CT ABDOMEN PELVIS W IV CONTRAST;  1/7/2024 12:26 pm   INDICATION: Abdominal pain   COMPARISON: Fused PET/CT study of 10/06/2023   ACCESSION NUMBER(S): [id number]   ORDERING CLINICIAN: JF ALEMAN   TECHNIQUE: CT of the abdomen and pelvis was performed with intravenous contrast administered. 75 mL of Omnipaque 350 was injected intravenously for the study with sagittal and coronal reformations performed by the technologist at the acquisition scanner.   All CT examinations are performed with 1 or more of the following dose reduction techniques: Automated exposure control, adjustment of mA and/or kv according to  patient's size, or use of iterative reconstruction techniques.   FINDINGS: LOWER CHEST: There are severe emphysematous changes seen within the lower lung zones with minimal right pleural effusion and with mild atelectasis at the right lung base.   ABDOMEN:   LIVER: There are several hypodense space-occupying liver lesions measuring up to 2.3 cm in size without interval change compared with prior study. These are most consistent with stable hepatic cysts.   BILE DUCTS: No intrahepatic or extrahepatic biliary ductal dilatation.   GALLBLADDER: Unremarkable   PANCREAS: Unremarkable   SPLEEN: Unremarkable   ADRENAL GLANDS: Unremarkable   KIDNEYS AND URETERS: There is a 1.7 cm right renal cyst seen with left kidney unremarkable. There is mild bilateral perinephric scarring. MRCP   PELVIS:   BLADDER: No abnormality of the urinary bladder is seen.   REPRODUCTIVE ORGANS: Prostate gland is enlarged with a transverse diameter of 4.8 cm.   BOWEL: Colonic diverticulosis is seen without evidence for diverticulitis. The bowel is difficult to accurately evaluate due to patient motion. No pneumatosis intestinalis is seen. No portal venous gas is observed.   VESSELS: There is atherosclerosis of the abdominal aorta without aneurysmal dilatation. Calcified plaque is visible within the superior mesenteric artery. There is a filling defect seen within the proximal superior mesenteric artery identified roughly 7 mm distal to its origin. I believe that this represents partial thrombosis. There is flow seen more distally within the superior mesenteric artery.   PERITONEUM/RETROPERITONEUM/LYMPH NODES: A small amount of free fluid within the pelvic cul-de-sac is identified.   BONES AND ABDOMINAL WALL: Small bilateral fat containing inguinal hernias are present with left inguinal hernia larger than right. A very tiny fat containing umbilical hernia is observed.       1.  There is partial thrombosis of the proximal superior mesenteric artery  identified 7 mm distal to the origin. However, there is flow noted within the distal SMA. 2. Small amount of free fluid within the pelvic cul-de-sac. 3. Colonic diverticulosis without diverticulitis. 4. Hepatic cysts measuring up to 2.3 cm in size with 1.7 cm right renal cyst. 5. Severe pulmonary emphysema with minimal right pleural effusion and with atelectasis at right lung base. 6. Small bilateral fat containing inguinal hernias with very tiny fat containing umbilical hernia     MACRO: None   Signed by: Lisa Lezama 1/7/2024 12:53 PM Dictation workstation:   [id number]                  Assessment/Plan   Principal Problem:    Neutropenic fever (CMS/HCC)  Active Problems:    Partial symptomatic epilepsy with complex partial seizures, not intractable, without status epilepticus (CMS/HCC)    Fever in adult    Superior mesenteric artery thrombosis (CMS/HCC)    Hyponatremia    Generalized weakness    Malignant neoplasm of upper lobe of left lung (CMS/HCC)    Patient on IV heparin for nonocclusive mesenteric arterial thrombosis  Oncology input noted  Plan for CT angiography per oncology  Clinically patient is asymptomatic  Neutropenic fever improving on broad-spectrum prophylactic antibiotics  On Leukine for neutropenia  Hyponatremia better  Seizures under control MRI brain pending  For evaluation of the patient  Spoke to him regarding home care along with the   He is okay to discuss with the daughter who is the primary care provider for him       I spent  minutes in the professional and overall care of this patient.      Radha Casey MD

## 2024-01-09 NOTE — CARE PLAN
Problem: Respiratory  Goal: No signs of respiratory distress (eg. Use of accessory muscles. Peds grunting)  Outcome: Not Progressing  Goal: Wean oxygen to maintain O2 saturation per order/standard this shift  Outcome: Not Progressing     Problem: Respiratory  Goal: Minimal/no exertional discomfort or dyspnea this shift  Outcome: Progressing     Problem: Respiratory  Goal: No signs of respiratory distress (eg. Use of accessory muscles. Peds grunting)  Outcome: Not Progressing  Goal: Wean oxygen to maintain O2 saturation per order/standard this shift  Outcome: Not Progressing

## 2024-01-09 NOTE — NURSING NOTE
"In to see pt and family regarding concerns over stay. Spoke to Dr Casey regarding this. Dr Casey is currently on the phone speaking with patients wife and daughter.     While in room, pt stated that \"If I hear her voice one more time I will find where she lives and rip her head off.\"     MRI has been reached out to multiple times regarding why pt has not had test yet. They informed myself as well as the patient and family, over the phone, that the MRI  is in the process of investigating the stent. Informed patient and family that we are required to do a full safety inquiry every time a patient receives an MRI due to policy and patient safety. Patient and family are not accepting of this information.   "

## 2024-01-10 ENCOUNTER — DOCUMENTATION (OUTPATIENT)
Dept: PALLIATIVE MEDICINE | Facility: CLINIC | Age: 64
End: 2024-01-10
Payer: MEDICAID

## 2024-01-10 ENCOUNTER — APPOINTMENT (OUTPATIENT)
Dept: RADIATION ONCOLOGY | Facility: HOSPITAL | Age: 64
End: 2024-01-10
Payer: MEDICAID

## 2024-01-10 LAB
AMMONIA PLAS-SCNC: 62 UMOL/L (ref 12–45)
APTT PPP: 33.1 SECONDS (ref 22–32.5)
APTT PPP: 47.9 SECONDS (ref 22–32.5)
APTT PPP: 57.8 SECONDS (ref 22–32.5)
APTT PPP: 62.1 SECONDS (ref 22–32.5)
APTT PPP: 62.2 SECONDS (ref 22–32.5)
BACTERIA BLD CULT: NORMAL
BACTERIA BLD CULT: NORMAL
BACTERIA SPEC RESP CULT: NORMAL
FERRITIN SERPL-MCNC: 442 NG/ML (ref 30–400)
FLOW CYTOMETRY SPECIALIST REVIEW: NORMAL
GLUCOSE BLD MANUAL STRIP-MCNC: 113 MG/DL (ref 74–99)
GLUCOSE BLD MANUAL STRIP-MCNC: 117 MG/DL (ref 74–99)
GLUCOSE BLD MANUAL STRIP-MCNC: 154 MG/DL (ref 74–99)
GRAM STN SPEC: NORMAL
GRAM STN SPEC: NORMAL
INR PPP: 1.2 (ref 0.9–1.2)
IRON SATN MFR SERPL: 27 % (ref 12–50)
IRON SERPL-MCNC: 51 UG/DL (ref 45–160)
PATH REVIEW, PNH: NORMAL
PNH RESULTS: NEGATIVE
PROTHROMBIN TIME: 12.1 SECONDS (ref 9.3–12.7)
TIBC SERPL-MCNC: 190 UG/DL (ref 228–428)
UIBC SERPL-MCNC: 139 UG/DL (ref 110–370)

## 2024-01-10 PROCEDURE — 2500000004 HC RX 250 GENERAL PHARMACY W/ HCPCS (ALT 636 FOR OP/ED)

## 2024-01-10 PROCEDURE — 85610 PROTHROMBIN TIME: CPT | Performed by: INTERNAL MEDICINE

## 2024-01-10 PROCEDURE — 36415 COLL VENOUS BLD VENIPUNCTURE: CPT | Performed by: INTERNAL MEDICINE

## 2024-01-10 PROCEDURE — 82947 ASSAY GLUCOSE BLOOD QUANT: CPT

## 2024-01-10 PROCEDURE — 82728 ASSAY OF FERRITIN: CPT | Performed by: INTERNAL MEDICINE

## 2024-01-10 PROCEDURE — 94640 AIRWAY INHALATION TREATMENT: CPT

## 2024-01-10 PROCEDURE — 2500000001 HC RX 250 WO HCPCS SELF ADMINISTERED DRUGS (ALT 637 FOR MEDICARE OP): Performed by: INTERNAL MEDICINE

## 2024-01-10 PROCEDURE — 97116 GAIT TRAINING THERAPY: CPT | Mod: GP

## 2024-01-10 PROCEDURE — 85730 THROMBOPLASTIN TIME PARTIAL: CPT | Performed by: INTERNAL MEDICINE

## 2024-01-10 PROCEDURE — 2500000004 HC RX 250 GENERAL PHARMACY W/ HCPCS (ALT 636 FOR OP/ED): Performed by: INTERNAL MEDICINE

## 2024-01-10 PROCEDURE — 92610 EVALUATE SWALLOWING FUNCTION: CPT | Mod: GN | Performed by: SPEECH-LANGUAGE PATHOLOGIST

## 2024-01-10 PROCEDURE — 99233 SBSQ HOSP IP/OBS HIGH 50: CPT | Performed by: INTERNAL MEDICINE

## 2024-01-10 PROCEDURE — 1200000002 HC GENERAL ROOM WITH TELEMETRY DAILY

## 2024-01-10 PROCEDURE — 2500000002 HC RX 250 W HCPCS SELF ADMINISTERED DRUGS (ALT 637 FOR MEDICARE OP, ALT 636 FOR OP/ED): Performed by: INTERNAL MEDICINE

## 2024-01-10 PROCEDURE — 9420000001 HC RT PATIENT EDUCATION 5 MIN

## 2024-01-10 PROCEDURE — 82140 ASSAY OF AMMONIA: CPT | Performed by: INTERNAL MEDICINE

## 2024-01-10 PROCEDURE — S4991 NICOTINE PATCH NONLEGEND: HCPCS | Performed by: INTERNAL MEDICINE

## 2024-01-10 PROCEDURE — 83540 ASSAY OF IRON: CPT | Performed by: INTERNAL MEDICINE

## 2024-01-10 RX ADMIN — PIPERACILLIN SODIUM AND TAZOBACTAM SODIUM 4.5 G: 4; .5 INJECTION, SOLUTION INTRAVENOUS at 04:31

## 2024-01-10 RX ADMIN — DIVALPROEX SODIUM 750 MG: 250 TABLET, EXTENDED RELEASE ORAL at 07:29

## 2024-01-10 RX ADMIN — TIOTROPIUM BROMIDE INHALATION SPRAY 2 PUFF: 3.12 SPRAY, METERED RESPIRATORY (INHALATION) at 08:27

## 2024-01-10 RX ADMIN — PIPERACILLIN SODIUM AND TAZOBACTAM SODIUM 4.5 G: 4; .5 INJECTION, SOLUTION INTRAVENOUS at 22:10

## 2024-01-10 RX ADMIN — PIPERACILLIN SODIUM AND TAZOBACTAM SODIUM 4.5 G: 4; .5 INJECTION, SOLUTION INTRAVENOUS at 16:00

## 2024-01-10 RX ADMIN — ATORVASTATIN CALCIUM 40 MG: 40 TABLET, FILM COATED ORAL at 07:29

## 2024-01-10 RX ADMIN — PIPERACILLIN SODIUM AND TAZOBACTAM SODIUM 4.5 G: 4; .5 INJECTION, SOLUTION INTRAVENOUS at 10:00

## 2024-01-10 RX ADMIN — LACOSAMIDE 200 MG: 100 TABLET, FILM COATED ORAL at 19:11

## 2024-01-10 RX ADMIN — Medication 2000 UNITS: at 07:29

## 2024-01-10 RX ADMIN — DEXTROSE MONOHYDRATE AND SODIUM CHLORIDE 80 ML/HR: 5; .45 INJECTION, SOLUTION INTRAVENOUS at 19:33

## 2024-01-10 RX ADMIN — LACOSAMIDE 200 MG: 100 TABLET, FILM COATED ORAL at 07:29

## 2024-01-10 RX ADMIN — HEPARIN SODIUM 1300 UNITS/HR: 10000 INJECTION, SOLUTION INTRAVENOUS at 19:33

## 2024-01-10 RX ADMIN — VANCOMYCIN 1000 MG: 1 INJECTION, SOLUTION INTRAVENOUS at 03:22

## 2024-01-10 RX ADMIN — ACETAMINOPHEN 650 MG: 325 TABLET ORAL at 19:33

## 2024-01-10 RX ADMIN — DEXTROSE MONOHYDRATE AND SODIUM CHLORIDE 80 ML/HR: 5; .45 INJECTION, SOLUTION INTRAVENOUS at 03:29

## 2024-01-10 RX ADMIN — VANCOMYCIN 1000 MG: 1 INJECTION, SOLUTION INTRAVENOUS at 14:00

## 2024-01-10 RX ADMIN — NICOTINE 1 PATCH: 21 PATCH, EXTENDED RELEASE TRANSDERMAL at 05:55

## 2024-01-10 RX ADMIN — DIVALPROEX SODIUM 750 MG: 250 TABLET, EXTENDED RELEASE ORAL at 19:11

## 2024-01-10 RX ADMIN — THIAMINE HCL TAB 100 MG 100 MG: 100 TAB at 07:29

## 2024-01-10 ASSESSMENT — COGNITIVE AND FUNCTIONAL STATUS - GENERAL
STANDING UP FROM CHAIR USING ARMS: A LITTLE
WALKING IN HOSPITAL ROOM: A LITTLE
MOBILITY SCORE: 20
TOILETING: A LITTLE
STANDING UP FROM CHAIR USING ARMS: A LITTLE
DRESSING REGULAR UPPER BODY CLOTHING: A LITTLE
CLIMB 3 TO 5 STEPS WITH RAILING: A LITTLE
MOBILITY SCORE: 20
CLIMB 3 TO 5 STEPS WITH RAILING: A LITTLE
WALKING IN HOSPITAL ROOM: A LITTLE
MOVING TO AND FROM BED TO CHAIR: A LITTLE
HELP NEEDED FOR BATHING: A LITTLE
DAILY ACTIVITIY SCORE: 18
EATING MEALS: A LITTLE
PERSONAL GROOMING: A LITTLE
DRESSING REGULAR LOWER BODY CLOTHING: A LITTLE
MOVING TO AND FROM BED TO CHAIR: A LITTLE

## 2024-01-10 ASSESSMENT — PAIN SCALES - GENERAL
PAINLEVEL_OUTOF10: 9
PAINLEVEL_OUTOF10: 0 - NO PAIN
PAINLEVEL_OUTOF10: 0 - NO PAIN
PAINLEVEL_OUTOF10: 7
PAINLEVEL_OUTOF10: 0 - NO PAIN

## 2024-01-10 ASSESSMENT — PAIN - FUNCTIONAL ASSESSMENT
PAIN_FUNCTIONAL_ASSESSMENT: 0-10
PAIN_FUNCTIONAL_ASSESSMENT: FLACC (FACE, LEGS, ACTIVITY, CRY, CONSOLABILITY)

## 2024-01-10 NOTE — CARE PLAN
Problem: Respiratory  Goal: No signs of respiratory distress (eg. Use of accessory muscles. Peds grunting)  Outcome: Progressing  Goal: Tolerate pulmonary toileting this shift  Outcome: Progressing

## 2024-01-10 NOTE — PROGRESS NOTES
"Martínez Neri is a 63 y.o. male on day 2 of admission presenting with Neutropenic fever (CMS/HCC).    Subjective   Patient is still quite agitated intermittently.  He is still mixing up information and having confusion.  Family is concerned that he is having memory issues.  Wife is present in the room and the daughter is present in the room.  They both have a lot of questions regarding MRI and CAT scan results.  Yesterday I had a long talk over the phone with them because they were very upset for not getting MRI done and not having Dr. Lugo seeing him yesterday.       Objective     Physical Exam  Vitals reviewed.   Constitutional:       Appearance: Normal appearance.   HENT:      Head: Normocephalic and atraumatic.      Right Ear: Tympanic membrane, ear canal and external ear normal.      Left Ear: Tympanic membrane, ear canal and external ear normal.      Nose: Nose normal.      Mouth/Throat:      Pharynx: Oropharynx is clear.   Eyes:      Extraocular Movements: Extraocular movements intact.      Conjunctiva/sclera: Conjunctivae normal.      Pupils: Pupils are equal, round, and reactive to light.   Cardiovascular:      Rate and Rhythm: Normal rate and regular rhythm.      Pulses: Normal pulses.      Heart sounds: Normal heart sounds.   Pulmonary:      Effort: Pulmonary effort is normal.      Breath sounds: Wheezing and rhonchi present.   Abdominal:      General: Abdomen is flat. Bowel sounds are normal.      Palpations: Abdomen is soft.   Musculoskeletal:      Cervical back: Normal range of motion and neck supple.   Skin:     General: Skin is warm and dry.   Neurological:      Mental Status: He is alert. Mental status is at baseline.   Psychiatric:         Mood and Affect: Mood normal.         Last Recorded Vitals  Blood pressure (!) 99/37, pulse 85, temperature 36.7 °C (98.1 °F), temperature source Oral, resp. rate 19, height 1.753 m (5' 9.02\"), weight 76.5 kg (168 lb 10.4 oz), SpO2 95 %.  Intake/Output last 3 " Shifts:  I/O last 3 completed shifts:  In: 2539 (33.2 mL/kg) [I.V.:2039 (26.7 mL/kg); IV Piggyback:500]  Out: - (0 mL/kg)   Weight: 76.5 kg     Relevant Results              Scheduled medications  atorvastatin, 40 mg, oral, Daily  cholecalciferol, 2,000 Units, oral, Daily  divalproex, 750 mg, oral, BID  lacosamide, 200 mg, oral, BID  nicotine, 1 patch, transdermal, q24h  piperacillin-tazobactam, 4.5 g, intravenous, q6h  potassium chloride CR, 40 mEq, oral, Once  thiamine, 100 mg, oral, Daily  tiotropium, 2 Inhalation, inhalation, Daily  vancomycin, 1,000 mg, intravenous, q12h      Continuous medications  dextrose 5%-0.45 % sodium chloride, 80 mL/hr, Last Rate: 80 mL/hr (01/10/24 0329)  heparin, 0-4,500 Units/hr, Last Rate: 1,300 Units/hr (01/10/24 0540)      PRN medications  PRN medications: acetaminophen **OR** acetaminophen **OR** acetaminophen, benzocaine-menthol, dextromethorphan-guaifenesin, guaiFENesin, heparin (porcine), ipratropium-albuteroL, LORazepam, ondansetron ODT, polyethylene glycol, prochlorperazine  Results for orders placed or performed during the hospital encounter of 01/05/24 (from the past 24 hour(s))   POCT GLUCOSE   Result Value Ref Range    POCT Glucose 131 (H) 74 - 99 mg/dL   CBC   Result Value Ref Range    WBC 5.2 4.4 - 11.3 x10*3/uL    nRBC 0.0 0.0 - 0.0 /100 WBCs    RBC 2.28 (L) 4.50 - 5.90 x10*6/uL    Hemoglobin 7.5 (L) 13.5 - 17.5 g/dL    Hematocrit 22.7 (L) 41.0 - 52.0 %     80 - 100 fL    MCH 32.9 26.0 - 34.0 pg    MCHC 33.0 32.0 - 36.0 g/dL    RDW 15.9 (H) 11.5 - 14.5 %    Platelets 220 150 - 450 x10*3/uL   POCT GLUCOSE   Result Value Ref Range    POCT Glucose 111 (H) 74 - 99 mg/dL   aPTT   Result Value Ref Range    aPTT 33.1 (H) 22.0 - 32.5 seconds   aPTT   Result Value Ref Range    aPTT 47.9 (H) 22.0 - 32.5 seconds   POCT GLUCOSE   Result Value Ref Range    POCT Glucose 113 (H) 74 - 99 mg/dL     MR brain w and wo IV contrast    Result Date: 1/9/2024  STUDY: MR BRAIN w + wo  CONTRAST; 01/09/2024 9:50 PM INDICATION:  Shunt infection.  Additional History:  Lung cancer. COMPARISON: XR skull 01/09/2024. MR brain 10/13/2023. NM PET/CT 10/06/2023.  ACCESSION NUMBER(S): [id number] ORDERING CLINICIAN: Radha Aleman TECHNIQUE:  Multi-planar, multi-sequence MR imaging of the brain was performed without and with intravenous contrast.  Dotarem 0.5 mL was administered intravenously. FINDINGS: Diffusion-weighted imaging demonstrates no area of restricted diffusion to suggest acute infarction.  There is no intracranial hemorrhage, midline shift, mass effect, or extra-axial fluid collection.  Generalized cerebral atrophy noted.  There are no areas of abnormal enhancement.  There are mild scattered patchy areas of T2 prolongation within the subcortical and deep periventricular white matter, suggesting changes of chronic microvascular ischemia  Brain parenchyma otherwise demonstrates normal morphology and signal characteristics.  Midline structures are within normal limits. Major intracranial vessels demonstrate preserved flow voids.  Brain volume and ventricular system are within normal limits for age.  Mild chronic secretions within the ethmoid air cells and maxillary sinuses noted.  Orbits, globes, and mastoid air cells are unremarkable.    Likely sequela of chronic small vessel ischemic disease. No acute intracranial findings. No suspicious region of abnormal enhancement. Signed by David Krause II, MD    CT angio chest for pulmonary embolism    Result Date: 1/9/2024  STUDY: CT Angiogram of the Chest; 01/09/2024 at 8:40 PM INDICATION: Shortness of breath. Evaluate for pulmonary embolism. COMPARISON: CT abdomen and pelvis 01/07/24. XR chest 01/05/24. CT chest, NM PET-CT 10/06/23. ACCESSION NUMBER(S): [id number] ORDERING CLINICIAN: RADHA ALEMAN TECHNIQUE:  CTA of the chest was performed with intravenous contrast. Images are reviewed and processed at a workstation according to the CT angiogram  protocol with 3-D and/or MIP post processing imaging generated.  Omnipaque-350 75 mL was administered intravenously. Automated mA/kV exposure control was utilized and patient examination was performed in strict accordance with principles of ALARA. FINDINGS: Pulmonary arteries are adequately opacified without acute or chronic filling defects.  The thoracic aorta is normal in course and caliber without dissection or aneurysm. The heart is normal in size without pericardial effusion. Atherosclerosis of the thoracic aorta and coronary arteries is present.  There appears to be thickening of the esophagus and/or adjacent adenopathy image 33 series 4.  This is new compared with prior exam. Follow-up endoscopy recommended for further assessment.  Layering debris within the LEFT mainstem bronchus appears to be present, possibly fluid.  Trace amount of fluid in the RIGHT mainstem bronchus also appears to be present.  Clinical correlation for aspiration recommended.  Follow-up endoscopy recommended for further assessment.  Small-to-moderate size right-sided pleural effusion is present.  Small LEFT effusion is present.  Emphysematous changes are present.  RIGHT middle lobe consolidation has developed.  RIGHT upper lobe consolidation also noted.  This appears related to volume loss. This is best appreciated on the coronal series.  Presumed postradiation scarring in the LEFT upper lobe also noted, similar prior study.  Bibasilar consolidation is also present.  Clinical correlation for signs of infection recommended.  Aspiration suspected. Small presumed cyst measuring about the anterior aspect hepatic lobe is present measuring 1.2 cm.  Additional smaller lesion about the anterior aspect of the medial segment LEFT hepatic lobe measures 0.9 cm. Senescent changes of the thoracic spine are present.  There are no acute fractures.  No suspicious bony lesions.    1. There is no evidence of pulmonary embolus. 2. Thickening of the  esophagus and/or adjacent adenopathy. Follow-up endoscopy recommended for further assessment. 3. Debris within the mainstem bronchi, LEFT greater than RIGHT. Bibasilar consolidation noted. Aspiration suspected. Follow-up bronchoscopy recommended. 4. Bilateral pleural effusions, RIGHT greater than LEFT. 5. Emphysematous changes are present. Presumed postradiation changes LEFT upper lobe noted. Signed by David Krause II, MD    XR skull 1-2 views    Result Date: 1/9/2024  Interpreted By:  Lisa Lezama, STUDY: XR SKULL 1-3 VIEWS 1/9/2024 7:35 pm   INDICATION: Signs/Symptoms:To check for shunt versus coil prior to MRI.   COMPARISON: None available.   ACCESSION NUMBER(S): [id number]   ORDERING CLINICIAN: JF ALEMAN   TECHNIQUE: Two views of the calvarium are completed.   FINDINGS: There is a 9 mm device implanted within the midline of the brain just anterior and superior to the sella turcica. The exact etiology of this device is unclear but clinical correlation with surgical history is advised. This does not have the appearance of a shunt device. Of note, the patient did have MRI of the brain on 10/13/2023.       A 9 mm rectangular shaped device is seen in the midline of the brain just anterior and superior to the sella turcica with exact etiology unclear. Clinical correlation is necessary. This does not have the appearance of a shunt device however.   MRI of the brain was performed on 10/13/2023.   Signed by: Lisa Lezama 1/9/2024 8:31 PM Dictation workstation:   [id number]                    Assessment/Plan   Principal Problem:    Neutropenic fever (CMS/HCC)  Active Problems:    Partial symptomatic epilepsy with complex partial seizures, not intractable, without status epilepticus (CMS/HCC)    Fever in adult    Superior mesenteric artery thrombosis (CMS/HCC)    Hyponatremia    Generalized weakness    Malignant neoplasm of upper lobe of left lung (CMS/HCC)    Had a long conversation with the wife and the daughter over  the phone last evening.  MRI of the brain was ordered for confusion  CT of the chest was ordered for persistent hypoxia.  Message sent to oncologist yesterday and he has seen the patient today and message me back  Patient can be off heparin and on aspirin at discharge  He wants him to follow-up with radiation oncologist  Patient still hypoxic and needs further workup for aspiration  He is still having confusion issues and needs further workup with neurology input  Will consult pulmonary  Consult neurology  Speech eval.  Swallow test  Today family said that patient does not have a patient he has a coil in the brain for aneurysm  Patient family has not been very polite in conversation  Spent a lot of time explaining all the results and meaning of every word from MRI and CAT scan of abdomen  Will also consult GI regarding esophageal abnormality which could be related to radiation therapy       I spent  minutes in the professional and overall care of this patient.      Radha Casey MD

## 2024-01-10 NOTE — PROGRESS NOTES
"Martínez Neri is a 63 y.o. male on day 2 of admission presenting with Neutropenic fever (CMS/HCC).    Subjective   Pt is still agreeable to [facility] upon discharge.   TCSW sent a message to Dr MINE Casey requesting internal HC orders prior to discharge. Pt is aware, his insurance in in network with [facility].        Objective     Physical Exam    Last Recorded Vitals  Blood pressure (!) 99/37, pulse 85, temperature 36.7 °C (98.1 °F), temperature source Oral, resp. rate 19, height 1.753 m (5' 9.02\"), weight 76.5 kg (168 lb 10.4 oz), SpO2 95 %.  Intake/Output last 3 Shifts:  I/O last 3 completed shifts:  In: 2539 (33.2 mL/kg) [I.V.:2039 (26.7 mL/kg); IV Piggyback:500]  Out: - (0 mL/kg)   Weight: 76.5 kg     Relevant Results                             Assessment/Plan   Principal Problem:    Neutropenic fever (CMS/HCC)  Active Problems:    Partial symptomatic epilepsy with complex partial seizures, not intractable, without status epilepticus (CMS/HCC)    Fever in adult    Superior mesenteric artery thrombosis (CMS/HCC)    Hyponatremia    Generalized weakness    Malignant neoplasm of upper lobe of left lung (CMS/HCC)               CHEMO Galindo      "

## 2024-01-10 NOTE — NURSING NOTE
"Got a call from CT tech stating patient is very upset about IV and does not want CT scan done tonight, patient wants to \"go upstairs and go to sleep\"  "

## 2024-01-10 NOTE — ED PROVIDER NOTES
HPI   Chief Complaint   Patient presents with    Weakness, Gen     History of lung cancer currently doing Chemo (last dose 12/26) and radiation (last radiation treatment today). States he woke up feeling bodyaches, SOB and all over weakness starting today       63-year-old male with a history of lung cancer on chemotherapy presented emergency department with a chief complaint of 1 to 2 days of shortness of breath, cough, body aches, fever.  He denies lightheadedness dizziness chest pain.  He is mildly short of breath.  He denies fall or injury or trauma.                          No data recorded                Patient History   Past Medical History:   Diagnosis Date    Allergic rhinitis     Asthma     Brain aneurysm     Cancer (CMS/HCC)     Carotid artery disease (CMS/HCC)     COPD (chronic obstructive pulmonary disease) (CMS/HCC)     Epilepsy (CMS/HCC)     High cholesterol     History of alcohol use     Hypertension     Lung cancer (CMS/HCC)      Past Surgical History:   Procedure Laterality Date    CAROTID ENDARTERECTOMY N/A     CEREBRAL ANEURYSM REPAIR      COLONOSCOPY      HERNIA REPAIR       Family History   Problem Relation Name Age of Onset    Heart attack Mother      Other (Colorectal cancer) Father      Aneurysm Other      Prostate cancer Other      Alcohol abuse Other       Social History     Tobacco Use    Smoking status: Every Day     Packs/day: 1.00     Years: 50.00     Additional pack years: 0.00     Total pack years: 50.00     Types: Cigarettes     Passive exposure: Current    Smokeless tobacco: Never   Vaping Use    Vaping Use: Never used   Substance Use Topics    Alcohol use: Not Currently     Comment: quit 7 years ago    Drug use: Yes     Frequency: 4.0 times per week     Types: Marijuana       Physical Exam   ED Triage Vitals [01/05/24 2034]   Temp Heart Rate Resp BP   38.2 °C (100.8 °F) (!) 121 20 129/65      SpO2 Temp Source Heart Rate Source Patient Position   99 % Oral Monitor Lying      BP  Location FiO2 (%)     Left arm --       Physical Exam  Vitals and nursing note reviewed.   Constitutional:       Appearance: Normal appearance.   HENT:      Head: Normocephalic and atraumatic.      Mouth/Throat:      Mouth: Mucous membranes are moist.   Cardiovascular:      Rate and Rhythm: Normal rate and regular rhythm.   Pulmonary:      Effort: Pulmonary effort is normal.      Breath sounds: Normal breath sounds.   Abdominal:      General: Abdomen is flat.      Palpations: Abdomen is soft.   Musculoskeletal:         General: Normal range of motion.      Cervical back: Normal range of motion.   Skin:     General: Skin is warm and dry.   Neurological:      General: No focal deficit present.      Mental Status: He is alert and oriented to person, place, and time.   Psychiatric:         Mood and Affect: Mood normal.         ED Course & MDM   Diagnoses as of 01/09/24 2026   Generalized weakness   Fever, unspecified fever cause   Leukopenia, unspecified type       Medical Decision Making  I have seen and evaluated this patient.  Physician available for consultation.  Vital signs have been reviewed.  All laboratory and diagnostic imaging is reviewed by myself and interpreted by myself unless otherwise stated.  Additionally imaging is interpreted by radiologist.    Patient is pancytopenic.  There is no identifiable bacterial infection on his emergency evaluation.  Blood cultures were sent, antibiotics were given.  He will be admitted for further treatment and management and heme-onc and infectious disease consultation.    Labs Reviewed  CBC WITH AUTO DIFFERENTIAL - Abnormal     WBC                           1.6 (*)                nRBC                          0.0                    RBC                           2.74 (*)               Hemoglobin                    9.2 (*)                Hematocrit                    26.4 (*)               MCV                           96                     MCH                            33.6                   MCHC                          34.8                   RDW                           15.6 (*)               Platelets                     200                    Immature Granulocytes %, Automated   0.6                    Immature Granulocytes Absolute, Au*   0.01                     Narrative: The previously reported component Neutrophils % is no longer being reported.The previously reported component Lymphocytes % is no longer being reported.The previously reported component Monocytes % is no longer being reported.The previously                 reported component Eosinophils % is no longer being reported.The previously reported component Basophils % is no longer being reported.The previously reported component Absolute Neutrophils is no longer being reported.The previously reported                 component Absolute Lymphocytes is no longer being reported.The previously reported component Absolute Monocytes is no longer being reported.The previously reported component Absolute Eosinophils is no longer being reported.The previously reported                 component Absolute Basophils is no longer being reported.  COMPREHENSIVE METABOLIC PANEL - Abnormal     Glucose                       142 (*)                Sodium                        129 (*)                Potassium                     4.3                    Chloride                      96 (*)                 Bicarbonate                   22 (*)                 Urea Nitrogen                 15                     Creatinine                    0.70                   eGFR                          >90                    Calcium                       9.5                    Albumin                       3.9                    Alkaline Phosphatase          62                     Total Protein                 7.3                    AST                           19                     Bilirubin, Total              0.3                    ALT                            9                      Anion Gap                     11                  BLOOD GAS LACTIC ACID, VENOUS - Abnormal     POCT Lactate, Venous          2.7 (*)             CBC WITH AUTO DIFFERENTIAL - Abnormal     WBC                           1.3 (*)                nRBC                          0.0                    RBC                           2.57 (*)               Hemoglobin                    8.4 (*)                Hematocrit                    25.3 (*)               MCV                           98                     MCH                           32.7                   MCHC                          33.2                   RDW                           15.8 (*)               Platelets                     178                    Immature Granulocytes %, Automated   0.7                    Immature Granulocytes Absolute, Au*   0.01                     Narrative: The previously reported component Neutrophils % is no longer being reported.The previously reported component Lymphocytes % is no longer being reported.The previously reported component Monocytes % is no longer being reported.The previously                 reported component Eosinophils % is no longer being reported.The previously reported component Basophils % is no longer being reported.The previously reported component Absolute Neutrophils is no longer being reported.The previously reported                 component Absolute Lymphocytes is no longer being reported.The previously reported component Absolute Monocytes is no longer being reported.The previously reported component Absolute Eosinophils is no longer being reported.The previously reported                 component Absolute Basophils is no longer being reported.  COMPREHENSIVE METABOLIC PANEL - Abnormal     Glucose                       109 (*)                Sodium                        133                    Potassium                     4.0                    Chloride                       100                    Bicarbonate                   21 (*)                 Urea Nitrogen                 12                     Creatinine                    0.70                   eGFR                          >90                    Calcium                       8.9                    Albumin                       3.2 (*)                Alkaline Phosphatase          49                     Total Protein                 6.2                    AST                           24                     Bilirubin, Total              0.3                    ALT                           8                      Anion Gap                     12                  CBC WITH AUTO DIFFERENTIAL - Abnormal     WBC                           3.6 (*)                nRBC                          0.0                    RBC                           2.27 (*)               Hemoglobin                    7.7 (*)                Hematocrit                    22.3 (*)               MCV                           98                     MCH                           33.9                   MCHC                          34.5                   RDW                           15.8 (*)               Platelets                     195                    Immature Granulocytes %, Automated   7.8 (*)                Immature Granulocytes Absolute, Au*   0.28                     Narrative: The previously reported component Neutrophils % is no longer being reported.The previously reported component Lymphocytes % is no longer being reported.The previously reported component Monocytes % is no longer being reported.The previously                 reported component Eosinophils % is no longer being reported.The previously reported component Basophils % is no longer being reported.The previously reported component Absolute Neutrophils is no longer being reported.The previously reported                 component Absolute Lymphocytes is no longer being  reported.The previously reported component Absolute Monocytes is no longer being reported.The previously reported component Absolute Eosinophils is no longer being reported.The previously reported                 component Absolute Basophils is no longer being reported.  COMPREHENSIVE METABOLIC PANEL - Abnormal     Glucose                       115 (*)                Sodium                        132 (*)                Potassium                     3.4                    Chloride                      100                    Bicarbonate                   24                     Urea Nitrogen                 8                      Creatinine                    0.90                   eGFR                          >90                    Calcium                       8.3 (*)                Albumin                       2.8 (*)                Alkaline Phosphatase          42                     Total Protein                 5.5 (*)                AST                           178 (*)                Bilirubin, Total              0.3                    ALT                           40                     Anion Gap                     8                   APTT - Abnormal     aPTT                          58.1 (*)            CBC - Abnormal     WBC                           5.9                    nRBC                          0.0                    RBC                           2.39 (*)               Hemoglobin                    8.2 (*)                Hematocrit                    23.3 (*)               MCV                           98                     MCH                           34.3 (*)               MCHC                          35.2                   RDW                           15.6 (*)               Platelets                     217                 CBC - Abnormal     WBC                           4.9                    nRBC                          0.0                    RBC                           2.19 (*)                Hemoglobin                    7.4 (*)                Hematocrit                    21.3 (*)               MCV                           97                     MCH                           33.8                   MCHC                          34.7                   RDW                           15.7 (*)               Platelets                     192                 BASIC METABOLIC PANEL - Abnormal     Glucose                       120 (*)                Sodium                        133                    Potassium                     3.3 (*)                Chloride                      101                    Bicarbonate                   24                     Urea Nitrogen                 5 (*)                  Creatinine                    0.70                   eGFR                          >90                    Calcium                       8.1 (*)                Anion Gap                     8                   APTT - Abnormal     aPTT                          50.1 (*)            APTT - Abnormal     aPTT                          108.1 (*)            RETICULOCYTES - Abnormal     Retic %                       1.9                    Retic Absolute                0.042                  Reticulocyte Hemoglobin       29                     Immature Retic fraction       19.6 (*)            LACTATE DEHYDROGENASE - Abnormal     LDH                           295 (*)             BASIC METABOLIC PANEL - Abnormal     Glucose                       113 (*)                Sodium                        136                    Potassium                     3.1 (*)                Chloride                      102                    Bicarbonate                   25                     Urea Nitrogen                 3 (*)                  Creatinine                    0.60                   eGFR                          >90                    Calcium                       8.4 (*)                Anion Gap                     9                    APTT - Abnormal     aPTT                          60.5 (*)            CBC - Abnormal     WBC                           5.2                    nRBC                          0.0                    RBC                           2.28 (*)               Hemoglobin                    7.5 (*)                Hematocrit                    22.7 (*)               MCV                           100                    MCH                           32.9                   MCHC                          33.0                   RDW                           15.9 (*)               Platelets                     220                 APTT - Abnormal     aPTT                          33.1 (*)            APTT - Abnormal     aPTT                          47.9 (*)            FERRITIN - Abnormal     Ferritin                      442 (*)             IRON AND TIBC - Abnormal     Iron                          51                     UIBC                          139                    TIBC                          190 (*)                % Saturation                  27                  APTT - Abnormal     aPTT                          57.8 (*)            AMMONIA - Abnormal     Ammonia                       62 (*)              APTT - Abnormal     aPTT                          62.2 (*)            CBC - Abnormal     WBC                           3.6 (*)                nRBC                          0.0                    RBC                           2.26 (*)               Hemoglobin                    7.4 (*)                Hematocrit                    21.9 (*)               MCV                           97                     MCH                           32.7                   MCHC                          33.8                   RDW                           15.9 (*)               Platelets                     252                 COMPREHENSIVE METABOLIC PANEL - Abnormal     Glucose                       114 (*)                Sodium                         137                    Potassium                     3.2 (*)                Chloride                      102                    Bicarbonate                   27                     Urea Nitrogen                 3 (*)                  Creatinine                    0.70                   eGFR                          >90                    Calcium                       8.4 (*)                Albumin                       2.3 (*)                Alkaline Phosphatase          43                     Total Protein                 5.2 (*)                AST                           60 (*)                 Bilirubin, Total              <0.2                   ALT                           63 (*)                 Anion Gap                     8                   APTT - Abnormal     aPTT                          62.1 (*)            APTT - Abnormal     aPTT                          71.7 (*)            TSH WITH REFLEX TO FREE T4 IF ABNORMAL - Abnormal     Thyroid Stimulating Hormone   7.63 (*)                 Narrative: TSH testing is performed using different testing methodology at [facility] than at other [facility]. Direct result comparisons should only be made within the same method.                  VITAMIN B12 - Abnormal     Vitamin B12                   1,403 (*)                 Narrative: Deficient= <174 pg/ml                Indeterminate= 175-242 pg/ml  LACOSAMIDE - Abnormal     Lacosamide                    12.8 (*)            AMMONIA - Abnormal     Ammonia                       69 (*)              APTT - Abnormal     aPTT                          45.8 (*)            CBC - Abnormal     WBC                           4.3 (*)                nRBC                          0.5 (*)                RBC                           2.25 (*)               Hemoglobin                    7.6 (*)                Hematocrit                    21.9 (*)               MCV                           97                      MCH                           33.8                   MCHC                          34.7                   RDW                           15.9 (*)               Platelets                     256                 APTT - Abnormal     aPTT                          70.3 (*)            APTT - Abnormal     aPTT                          53.2 (*)            CBC WITH AUTO DIFFERENTIAL - Abnormal     WBC                           4.4                    nRBC                          0.0                    RBC                           2.55 (*)               Hemoglobin                    8.4 (*)                Hematocrit                    25.1 (*)               MCV                           98                     MCH                           32.9                   MCHC                          33.5                   RDW                           16.6 (*)               Platelets                     289                    Immature Granulocytes %, Automated   11.7 (*)               Immature Granulocytes Absolute, Au*   0.52                     Narrative: The previously reported component Neutrophils % is no longer being reported.The previously reported component Lymphocytes % is no longer being reported.The previously reported component Monocytes % is no longer being reported.The previously                 reported component Eosinophils % is no longer being reported.The previously reported component Basophils % is no longer being reported.The previously reported component Absolute Neutrophils is no longer being reported.The previously reported                 component Absolute Lymphocytes is no longer being reported.The previously reported component Absolute Monocytes is no longer being reported.The previously reported component Absolute Eosinophils is no longer being reported.The previously reported                 component Absolute Basophils is no longer being reported.  APTT - Abnormal     aPTT                           49.7 (*)            POCT GLUCOSE - Abnormal     POCT Glucose                  111 (*)             POCT GLUCOSE - Abnormal     POCT Glucose                  111 (*)             POCT GLUCOSE - Abnormal     POCT Glucose                  119 (*)             POCT GLUCOSE - Abnormal     POCT Glucose                  140 (*)             POCT GLUCOSE - Abnormal     POCT Glucose                  121 (*)             POCT GLUCOSE - Abnormal     POCT Glucose                  108 (*)             POCT GLUCOSE - Abnormal     POCT Glucose                  132 (*)             POCT GLUCOSE - Abnormal     POCT Glucose                  118 (*)             POCT GLUCOSE - Abnormal     POCT Glucose                  103 (*)             POCT GLUCOSE - Abnormal     POCT Glucose                  104 (*)             POCT GLUCOSE - Abnormal     POCT Glucose                  160 (*)             POCT GLUCOSE - Abnormal     POCT Glucose                  107 (*)             POCT GLUCOSE - Abnormal     POCT Glucose                  131 (*)             POCT GLUCOSE - Abnormal     POCT Glucose                  111 (*)             POCT GLUCOSE - Abnormal     POCT Glucose                  113 (*)             POCT GLUCOSE - Abnormal     POCT Glucose                  117 (*)             POCT GLUCOSE - Abnormal     POCT Glucose                  154 (*)             POCT GLUCOSE - Abnormal     POCT Glucose                  108 (*)             POCT GLUCOSE - Abnormal     POCT Glucose                  111 (*)             POCT GLUCOSE - Abnormal     POCT Glucose                  115 (*)             POCT GLUCOSE - Abnormal     POCT Glucose                  108 (*)             POCT GLUCOSE - Abnormal     POCT Glucose                  107 (*)             MANUAL DIFFERENTIAL - Abnormal     Neutrophils %, Manual         69.0                   Bands %, Manual               9.0                    Lymphocytes %, Manual         11.0                    Monocytes %, Manual           11.0                   Eosinophils %, Manual         0.0                    Basophils %, Manual           0.0                    Seg Neutrophils Absolute, Manual   1.10 (*)               Bands Absolute, Manual        0.14                   Lymphocytes Absolute, Manual   0.18 (*)               Monocytes Absolute, Manual    0.18                   Eosinophils Absolute, Manual   0.00                   Basophils Absolute, Manual    0.00                   Total Cells Counted           100                    Neutrophils Absolute, Manual   1.24                   RBC Morphology                See Below                Polychromasia                 Mild                   Rouleaux                      Present             MANUAL DIFFERENTIAL - Abnormal     Neutrophils %, Manual         29.0                   Bands %, Manual               47.0                   Lymphocytes %, Manual         10.0                   Monocytes %, Manual           10.0                   Eosinophils %, Manual         0.0                    Basophils %, Manual           1.0                    Metamyelocytes %, Manual      1.0                    Myelocytes %, Manual          2.0                    Seg Neutrophils Absolute, Manual   0.38 (*)               Bands Absolute, Manual        0.61                   Lymphocytes Absolute, Manual   0.13 (*)               Monocytes Absolute, Manual    0.13                   Eosinophils Absolute, Manual   0.00                   Basophils Absolute, Manual    0.01                   Metamyelocytes Absolute, Manual   0.01                   Myelocytes Absolute, Manual   0.03                   Total Cells Counted           100                    Neutrophils Absolute, Manual   0.99 (*)               RBC Morphology                See Below                Polychromasia                 Mild                   Vacuolated Neutrophils        Present             MANUAL DIFFERENTIAL - Abnormal      Neutrophils %, Manual         69.0                   Bands %, Manual               17.0                   Lymphocytes %, Manual         8.0                    Monocytes %, Manual           3.0                    Eosinophils %, Manual         0.0                    Basophils %, Manual           0.0                    Metamyelocytes %, Manual      1.0                    Myelocytes %, Manual          2.0                    Seg Neutrophils Absolute, Manual   2.48                   Bands Absolute, Manual        0.61                   Lymphocytes Absolute, Manual   0.29 (*)               Monocytes Absolute, Manual    0.11                   Eosinophils Absolute, Manual   0.00                   Basophils Absolute, Manual    0.00                   Metamyelocytes Absolute, Manual   0.04                   Myelocytes Absolute, Manual   0.07                   Total Cells Counted           100                    Neutrophils Absolute, Manual   3.09                   RBC Morphology                                    MANUAL DIFFERENTIAL - Abnormal     Neutrophils %, Manual         44.0                   Bands %, Manual               12.0                   Lymphocytes %, Manual         15.0                   Monocytes %, Manual           19.0                   Eosinophils %, Manual         0.0                    Basophils %, Manual           0.0                    Metamyelocytes %, Manual      2.0                    Myelocytes %, Manual          8.0                    Seg Neutrophils Absolute, Manual   1.94                   Bands Absolute, Manual        0.53                   Lymphocytes Absolute, Manual   0.66 (*)               Monocytes Absolute, Manual    0.84                   Eosinophils Absolute, Manual   0.00                   Basophils Absolute, Manual    0.00                   Metamyelocytes Absolute, Manual   0.09                   Myelocytes Absolute, Manual   0.35                   Total Cells Counted           100                     Neutrophils Absolute, Manual   2.47                   RBC Morphology                See Below                Ovalocytes                    Few                    Stomatocytes                  Few                    Toxic Granulation             Present             BLOOD CULTURE - Normal     Blood Culture                                     BLOOD CULTURE - Normal     Blood Culture                                     RSV PCR - Normal     RSV PCR                                                Narrative: This assay is an FDA-cleared, in vitro diagnostic nucleic acid amplification test for the detection of RSV from nasopharyngeal specimens, and has been validated for use at [facility]. Negative results do not preclude RSV infections, and should not be used as the sole basis for diagnosis, treatment, or other management decisions. If Influenza A/B and RSV PCR results are negative, testing for Parainfluenza virus, Adenovirus and Metapneumovirus is routinely performed for [place] and [place] [facility] [place] and is available on other patients by placing an add-on request.                                  SARS-COV-2 AND INFLUENZA A/B PCR - Normal     Flu A Result                                         Flu B Result                                         Coronavirus 2019, PCR                                  Narrative: This assay has received FDA Emergency Use Authorization (EUA) and  is only authorized for the duration of time that circumstances exist to justify the authorization of the emergency use of in vitro diagnostic tests for the detection of SARS-CoV-2 virus and/or diagnosis of COVID-19 infection under section 564(b)(1) of the Act, 21 U.S.C. 360bbb-3(b)(1). Testing for SARS-CoV-2 is only recommended for patients who meet current clinical and/or epidemiological criteria as defined by federal, state, or local public health directives. This assay is an in vitro  diagnostic nucleic acid amplification test for the qualitative detection of SARS-CoV-2, Influenza A, and Influenza B from nasopharyngeal specimens and has been validated for use at [facility]. Negative results do not preclude COVID-19 infections or Influenza A/B infections, and should not be used as the sole basis for diagnosis, treatment, or other management decisions. If Influenza A/B and RSV PCR results are negative, testing for Parainfluenza virus, Adenovirus and Metapneumovirus is routinely performed for [facility] pediatric oncology and intensive care inpatients, and is available on other patients by placing an add-on request.   BLOOD GAS LACTIC ACID, VENOUS - Normal     POCT Lactate, Venous          0.8                 URINALYSIS WITH REFLEX MICROSCOPIC - Normal     Color, Urine                  Yellow                 Appearance, Urine             Clear                  Specific Gravity, Urine       1.023                  pH, Urine                     7.5                    Protein, Urine                10 (TRACE)                Glucose, Urine                Normal                 Blood, Urine                  NEGATIVE                Ketones, Urine                NEGATIVE                Bilirubin, Urine              NEGATIVE                Urobilinogen, Urine           Normal                 Nitrite, Urine                NEGATIVE                Leukocyte Esterase, Urine     NEGATIVE             MICROSCOPIC ONLY, URINE - Normal     WBC, Urine                    NONE                   RBC, Urine                    NONE                VANCOMYCIN - Normal     Vancomycin                    13.0                URINALYSIS WITH REFLEX MICROSCOPIC - Normal     Color, Urine                                         Appearance, Urine             Clear                  Specific Gravity, Urine       1.020                  pH, Urine                     7.0                    Protein, Urine                 NEGATIVE                Glucose, Urine                Normal                 Blood, Urine                  NEGATIVE                Ketones, Urine                NEGATIVE                Bilirubin, Urine              NEGATIVE                Urobilinogen, Urine           Normal                 Nitrite, Urine                NEGATIVE                Leukocyte Esterase, Urine     NEGATIVE             D-DIMER, NON VTE - Normal     D-Dimer Non VTE, Quant (mg/L FEU)   0.48                     Narrative: THROMBOEMBOLIC EVENTS CANNOT BE EXCLUDED SOLELY ON THE BASIS OF THE D-DIMER LEVEL BEING WITHIN THE NORMAL REFERENCE RANGE. D-DIMER LEVELS LESS THAN 0.5 MG/L FEU IN CONJUNCTION WITH A LOW CLINICAL PROBABILITY HAVE AN EXCELLENT NEGATIVE PREDICTIVE VALUE IN EXCLUDING A DIAGNOSIS OF PULMONARY EMBOLUS (PE) OR DEEP VEIN THROMBOSIS (DVT). ELEVATED D-DIMER LEVELS ARE NOT SPECIFIC TO PE OR DVT, AND MAY BE SEEN IN PATIENTS WITH DIC, ADVANCED AGE, PREGNANCY, MALIGNANCY, LIVER DISEASE, INFECTION, AND INFLAMMATORY CONDITIONS AMONG OTHERS. D-DIMER LEVELS MAY BE DECREASED IN PATIENTS RECEIVING ANTI-COAGULATION THERAPY.  PROTIME-INR - Normal     Protime                       11.4                   INR                           1.1                      Narrative: INR Therapeutic Range: 2.0-3.5  BETA-2 GLYCOPROTEIN ANTIBODIES - Normal     Beta-2 Glyco 1 IgG            <1.4                   Beta-2 Glyco 1 IgA            1.3                    Beta 2 Glyco 1 IgM            4.7                 ANTI-CARDIOLIPIN ANTIBODY (IGA, IGG, AND IGM) - Normal     Anticardiolipin IgA           1.5                    Anticardiolipin IgG           <1.6                   Anticardiolipin IgM           3.0                 PROTIME-INR - Normal     Protime                       12.1                   INR                           1.2                      Narrative: INR Therapeutic Range: 2.0-3.5  THYROXINE, FREE - Normal     Thyroxine, Free               0.90                 VALPROIC ACID - Normal     Valproic Acid                 70                  POCT GLUCOSE - Normal     POCT Glucose                  85                  POCT GLUCOSE - Normal     POCT Glucose                  95                  POCT GLUCOSE - Normal     POCT Glucose                  96                  POCT GLUCOSE - Normal     POCT Glucose                  96                  RESPIRATORY CULTURE/SMEAR     Respiratory Culture/Smear                            Gram Stain                                           Gram Stain                                        [facility] PROFILE  MR angio head wo IV contrast   Final Result    Susceptibility artifact is seen in the region of the anterior    communicating artery which may correlate to clinical history of    aneurysm coiling. Otherwise, unremarkable MRA of the head.          Signed by: Tonya Mccartney 1/11/2024 12:50 PM    Dictation workstation:   [id number]     MR brain w and wo IV contrast   Final Result    Likely sequela of chronic small vessel ischemic disease. No acute    intracranial findings. No suspicious region of abnormal enhancement.    Signed by David Krause II, MD     CT angio chest for pulmonary embolism   Final Result    1. There is no evidence of pulmonary embolus.    2. Thickening of the esophagus and/or adjacent adenopathy. Follow-up    endoscopy recommended for further assessment.    3. Debris within the mainstem bronchi, LEFT greater than RIGHT.    Bibasilar consolidation noted. Aspiration suspected. Follow-up    bronchoscopy recommended.    4. Bilateral pleural effusions, RIGHT greater than LEFT.    5. Emphysematous changes are present. Presumed postradiation changes    LEFT upper lobe noted.    Signed by David Krause II, MD     XR skull 1-2 views   Final Result    A 9 mm rectangular shaped device is seen in the midline of the brain    just anterior and superior to the sella turcica with exact etiology    unclear. Clinical correlation is  necessary. This does not have the    appearance of a shunt device however.          MRI of the brain was performed on 10/13/2023.          Signed by: Lisa Lezama 1/9/2024 8:31 PM    Dictation workstation:   [id number]     CT abdomen pelvis w IV contrast   Final Result    1.  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 However, there is flow    noted within the distal SMA.    2. Small amount of free fluid within the pelvic cul-de-sac.    3. Colonic diverticulosis without diverticulitis.    4. Hepatic cysts measuring up to 2.3 cm in size with 1.7 cm right    renal cyst.    5. Severe pulmonary emphysema with minimal right pleural effusion and    with atelectasis at right lung base.    6. Small bilateral fat containing inguinal hernias with very tiny fat    containing umbilical hernia                MACRO:    None          Signed by: Lisa Lezama 1/7/2024 12:53 PM    Dictation workstation:   [id number]     XR chest 1 view   Final Result    Bandlike scarring/atelectasis LEFT midlung.    Signed by David Krause II, MD     Medications  acetaminophen (Tylenol) tablet 975 mg (975 mg oral Given 1/5/24 2054)  sodium chloride 0.9 % bolus 1,000 mL (0 mL intravenous Stopped 1/5/24 2124)  piperacillin-tazobactam-dextrose (Zosyn) IV 3.375 g (0 g intravenous Stopped 1/5/24 2150)  iohexol (OMNIPaque) 350 mg iodine/mL solution 75 mL (75 mL intravenous Given 1/7/24 1221)  heparin (porcine) injection 6,000 Units (6,000 Units intravenous Given 1/7/24 2012)  potassium chloride CR (Klor-Con M20) ER tablet 40 mEq (40 mEq oral Given 1/9/24 1506)  iohexol (OMNIPaque) 350 mg iodine/mL solution 75 mL (75 mL intravenous Given 1/9/24 2047)  gadoterate meglumine (Dotarem) 0.5 mmol/mL contrast injection 20 mL (16 mL intravenous Given 1/9/24 2138)  Discharge Medication List as of 1/12/2024  2:48 PM    START taking these medications    amoxicillin-pot clavulanate (Augmentin) 875-125  mg tablet  Take 1 tablet (875 mg) by mouth 2 times a day for 14 days., Starting Fri 1/12/2024, Until Fri 1/26/2024, Normal    aspirin 81 mg chewable tablet  Chew 1 tablet (81 mg) once daily., Starting Fri 1/12/2024, Normal    sucralfate (Carafate) 100 mg/mL suspension  Take 10 mL (1 g) by mouth every 6 hours., Starting Fri 1/12/2024, Normal                  Procedure  Procedures     David Heredia PA-C  01/15/24 1466

## 2024-01-10 NOTE — PROGRESS NOTES
"Occupational Therapy    OT Treatment    Patient Name: Martínez Neri  MRN: [id number]  Today's Date: 1/10/2024    General:  General  Missed Visit: Yes  Missed Visit Reason: Parent refused  Prior to Session Communication: Bedside nurse  General Comment: Pt declined any and all therapy at this time despite encouragement and explanation of purpose/benefits:  \"It's been a revolving door today...I'm not going to do it!\"  Notified nursing of same.    "

## 2024-01-10 NOTE — PROGRESS NOTES
Speech-Language Pathology    Inpatient Speech-Language Pathology Clinical Swallow Evaluation       Patient Name: Martínez Neri  MRN: [id number]  : 1960  Today's Date: 01/10/24  Time Calculation  Start Time: 1245  Stop Time: 1320  Time Calculation (min): 35 min       Reason for Consultation:  Assess oropharyngeal swallow function in the setting of bronchial debris and esophageal thickening per CT chest.     Recommendations:  Risk for Aspiration: Yes   Solid Diet Recommendations : Regular (IDDSI Level 7)   Liquid Diet Recommendations: Thin (IDDSI Level 0)   Compensatory Swallowing Strategies:  (Oral care prior to PO trials)     Medication Administration Recommendations: Whole, With Pureed, With Liquid    Recommending pt have MBSS to further assess oropharyngeal swallow. Fluoro room currently under repair, no anticipated time of it being fixed yet per radiology. Test will be completed ASAP. Test can also be done as outpatient as needed as to not delay discharge.   Skilled dysphagia treatment is warranted  at next level of care.     Assessment:  Consistencies Trialed:     Pt participated in diagnostic trials of 5 oz thin water (3 oz successively swallowed), 3 tsp apple sauce, and 1 caterina cracker.     OME and CNE are grossly WFL. Vocal quality and cough are perceptually WFL. Pt denies any prior dysphagia, any recent rapid weight loss, and any recent pneumonias. Endorses intermittent choking on food at baseline (not a regular occurrence). Endorses period of extreme confusion and weakness on admission.     Intermittent, congested-sounding nonproductive cough at rest and throughout evaluation. Does not increase in amount/severity following swallow onset.     Oral phase - Oral mucosa moist and normal in coloration. Mastication, bolus manipulation, and oral clearance timely and functional.     Pharyngeal phase - Although it is a limited assessment technique; Hyolaryngeal elevation/excursion assessed via digital  palpation and appeared consistent across all trials. No coughing, choking, nor change in vocal quality present immediately following swallow throughout trials. No overt s/s of aspiration present at the bedside.     Difficult to discern s/s of aspiration in the setting of resting cough. Given results of CT chest in addition to pt's chronic and acute medical presentation, MBSS is indicated for further assessment of oropharyngeal swallow function. Pt and family in agreement with this plan at this time.     Relevant Imaging Results:  1/9/24 CT chest:   IMPRESSION:  1. There is no evidence of pulmonary embolus.  2. Thickening of the esophagus and/or adjacent adenopathy. Follow-up  endoscopy recommended for further assessment.  3. Debris within the mainstem bronchi, LEFT greater than RIGHT.  Bibasilar consolidation noted. Aspiration suspected. Follow-up  bronchoscopy recommended.  4. Bilateral pleural effusions, RIGHT greater than LEFT.  5. Emphysematous changes are present. Presumed postradiation changes LEFT upper lobe noted.  Signed by David Krause II, MD  1/7/24 CT chest:    IMPRESSION:  1.  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 However, there is flow noted within the distal SMA.  2. Small amount of free fluid within the pelvic cul-de-sac.  3. Colonic diverticulosis without diverticulitis.  4. Hepatic cysts measuring up to 2.3 cm in size with 1.7 cm right renal cyst.  5. Severe pulmonary emphysema with minimal right pleural effusion and  with atelectasis at right lung base.  6. Small bilateral fat containing inguinal hernias with very tiny fat containing umbilical hernia  Plan:  SLP Plan: Skilled SLP   SLP Frequency: 2x per week   Duration: Current admission       Discussed POC: Patient, Caregiver/family, Nursing, Physician   Discussed Risks/Benefits: Yes, Patient, Caregiver/Family, Nursing, Physician   Patient/Caregiver Agreeable: Yes     Skilled  dysphagia treatment is warranted due to for further education and training on dysphagia management including instruction on oropharyngeal therapeutic exercises and/or compensatory swallowing strategies    Goals:  1) Pt to tolerate recommended diet without overt s/s of aspiration in order to ensure pt is demonstrating adequate swallow function for meeting nutrition and hydration needs.   2) Pt to participate in oropharyngeal therapeutic exercises to improve oropharyngeal swallow function for return to baseline diet level.   3) Pt to participate in assessment of oropharyngeal swallow function via MBSS for assessment of possible aspiration and to determine least restrictive diet for meeting pt's nutritional and hydration needs.      Subjective   Pt is sitting upright in bed. Wants to go home. Spouse and daughter are present at bedside throughout and assist in providing pt history.       General Visit Information:  Past medical history:   Per H&P: History Of Present Illness  Martínez Neri is a 63 y.o. male presenting with fever.  Patient was diagnosed with lung cancer in the end of June 2023.  Patient was in [place] at that time.  Patient came to [place].  Patient was started on treatment for lung cancer in November 2023.  Patient has non-small cell carcinoma of the lung.  Patient is undergoing chemotherapy and radiation therapy.  On the day of admission patient was not feeling well.  He was not eating or drinking and he developed fever.  With this complaint he was brought to the emergency room.  Emergency room initial workup was done and patient was found to have leukopenia with a white cell count of 1.2.  Patient has been admitted to the floor for further management.  Patient denies any nausea or vomiting.  No diarrhea, dysuria, hematuria or frequency.  No hematemesis, hematochezia or melena.     Past Medical History  Medical History        Past Medical History:   Diagnosis Date    Allergic rhinitis      Asthma       Brain aneurysm      Cancer (CMS/HCC)      Carotid artery disease (CMS/HCC)      COPD (chronic obstructive pulmonary disease) (CMS/HCC)      Epilepsy (CMS/HCC)      High cholesterol      History of alcohol use      Hypertension      Lung cancer (CMS/HCC)        Non-small cell carcinoma of lung  Carotid stenosis  Brain aneurysm  Coronary artery disease       Prior to Session Communication: Bedside nurse       Pain:  Pain Assessment  Pain Assessment: 0-10  Pain Score: 0 - No pain       Education:   Patient education completed regarding results and recommendations of SLP evaluation. Pt verbalizes understanding with all patient questions answered to satisfaction.     Care Team involvement:   Communicated with bedside nurse prior to evaluation with clearance for patient participation obtained, Results of the bedside swallowing evaluation were discussed with nurse and verbalized understanding was noted. , and Results of bedside evaluation relayed to physician via [employer] secure chat

## 2024-01-10 NOTE — PROGRESS NOTES
"Martínez Neri is a 63 y.o. male on day 2 of admission presenting with Neutropenic fever (CMS/HCC).    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     In clinic (1-4-24) noted to be mildly hypotensive.         Objective     Physical Exam  General: Awake alert no acute distress  HEENT: EOM positive PERRL  Affect: Upset  Last Recorded Vitals  Blood pressure (!) 101/45, pulse 72, temperature 37 °C (98.6 °F), temperature source Oral, resp. rate 18, height 1.753 m (5' 9.02\"), weight 76.5 kg (168 lb 10.4 oz), SpO2 98 %.  Intake/Output last 3 Shifts:  I/O last 3 completed shifts:  In: 5187 (67.8 mL/kg) [P.O.:240; I.V.:4147 (54.2 mL/kg); IV Piggyback:800]  Out: 0 (0 mL/kg)   Weight: 76.5 kg     Relevant Results                    Glucose 113  Potassium 3.1  Albumin 2.8   with ALT 40    B12 488  Vitamin 25 D 17  Coags normal  Anticardiolipin antibodies    MRI brain with and without contrast, 1/7/2024:No suspicious region of abnormal enhancement.   Angio CT, 1/9/2024:  No pulmonary embolism  Thickening of the esophagus and adjacent adenopathy  Debris in the main same bronchi left greater than right  Bilateral pleural effusions right greater than left emphysematous changes         Assessment/Plan   Principal Problem:    Neutropenic fever (CMS/HCC)  Active Problems:    Partial symptomatic epilepsy with complex " partial seizures, not intractable, without status epilepticus (CMS/HCC)    Fever in adult    Superior mesenteric artery thrombosis (CMS/HCC)    Hyponatremia    Generalized weakness    Malignant neoplasm of upper lobe of left lung (CMS/HCC)    IIB (cT2 pN1 cM0) Non-small cell lung cancer of the left upper lobe (squamous cell carcinoma), diagnosed on 06/26/2023    SITES OF DISEASE    Left upper lobe     MOLECULAR GENOMICS (reported in [place]):    Lack of BRAF, EGFR, HER2, KRAS, MET, ALK, NTRK 1/2/3, RET or ROS1 alterations.      PD-L1 TPS 20%     CURRENT THERAPY    concurrent chemo-RT using weekly carbo/taxo  Plan:  If can be discharged today early he should follow-up with radiation oncology to complete 3 more radiation treatments  Followup with Dr. Peters    Febrile neutropenia:  White blood count 5.2, Leukine discontinued  Blood cultures negative  Has been on Zosyn and vancomycin  Recommend discharge on Augmentin p.o. to complete 2-week course  Aspiration risk is significant and therefore recommend speech therapy evaluation and their recommendations.    Superior mesenteric artery thrombosis    CT abdomen pelvis with IV contrast, 1/7/2024,    atherosclerosis of the abdominal aorta without aneurysmal    dilatation. Calcified plaque is visible within the superior    mesenteric artery. There is a filling defect seen within the proximal    superior mesenteric artery identified roughly 7 mm distal to its    origin. I believe that this represents partial thrombosis. There is    flow seen more distally within the superior mesenteric artery.    Assessment: The patient presents with nonocclusive mesenteric arterial thrombosis currently denying abdominal symptoms of ischemia    No sign of bowel infarction or peritonitis  No lupus anticoagulant  Significant smoking history  Plan:   Continue cholesterol medication  Anticoagulation: Discontinue IV heparin and transition to aspirin, if tolerated  monitor for severe abdominal pain,  I  instructed the patient and his wife that arterial disease to the bowels is analogous to arterial disease to the heart.  Should sudden severe abdominal pain develop that he should take an aspirin and go to the emergency room.    Persistently elevated AST  Patient shows no sign of delirium tremens but alcohol suspicion exists    Normocytic anemia  Date of onset: .  At least since 10/3/2023, hemoglobin 13.3  Every single CBC reveals anemia  MCV: .  100, severe.  Stable.  Associated CBC findings: none. Hgb deficit: No.   Ferritin: . Iron saturation: .  TIBC: .      Creatinine: .  0.6  GI bleeding history: no.  GI bleeding symptoms: no.  Colonoscopy: .  EGD: .     Transfusion history: not transfused   Hemolysis panel: reticulocytes: 1.9%.  LDH: 295.  Haptoglobin: not done.   Nutrition panel: B12: 48. Methylmalonic acid: not done.  Copper level: not done.   Inflammation panel: ESR: not done.  Rheumatoid factor: not done.  PRASHANT: not done.  CRP: not done.   Myeloma panel: SPEP/BENJY: not done.  FLC: not done.     flow cytometry: not done.   TSH: 2.27.   Plan:  Ferritin, TIBC  Transfuse 1 unit of packed red cells if hemoglobin less than 7.0 g/dL         I spent 60 minutes in the professional and overall care of this patient.      Parish Alexandra MD

## 2024-01-10 NOTE — PROGRESS NOTES
"Physical Therapy    Physical Therapy Treatment    Patient Name: Martínez Neri  MRN: [id number]  Today's Date: 1/10/2024  Time Calculation  Start Time: 1454  Stop Time: 1525  Time Calculation (min): 31 min       Assessment/Plan   PT Assessment  Evaluation/Treatment Tolerance: Patient limited by fatigue  Medical Staff Made Aware: Yes  End of Session Communication: Bedside nurse  End of Session Patient Position: Bed, 3 rail up, Alarm on  PT Plan  Inpatient/Swing Bed or Outpatient: Inpatient  PT Plan  Treatment/Interventions: Transfer training, Gait training, Balance training, Strengthening, Endurance training, Therapeutic exercise, Therapeutic activity, Home exercise program  PT Plan: Skilled PT  PT Frequency: 4 times per week  PT Discharge Recommendations: Low intensity level of continued care  PT Recommended Transfer Status: Contact guard  PT - OK to Discharge: Yes      General Visit Information:   PT  Visit  PT Received On: 01/10/24  Response to Previous Treatment: Patient with no complaints from previous session.  General  Family/Caregiver Present: Yes  Caregiver Feedback: Spouse+Dtr  Prior to Session Communication: Bedside nurse  Patient Position Received: Bed, 4 rail up  Preferred Learning Style: verbal  General Comment: Agreeable to PT follow up. Pleasant with me wanting to get up and ambulate in room    Subjective   Precautions:  Precautions  Medical Precautions: Fall precautions  Precautions Comment: Reverse Precautions    Objective   Pain:  Pain Assessment  Pain Assessment: 0-10  Pain Score: 0 - No pain  Cognition:  Cognition  Overall Cognitive Status: Within Functional Limits (can be impulsive at times but otherwise WNLs)    Activity Tolerance:  Activity Tolerance  Endurance: Decreased tolerance for upright activites  Rate of Perceived Exertion (RPE): \"8/10\" with gait trials    Vitals: Pt on 4L NC today with NC off of him initially when I find him supine in bed. SPO2 checked and is 99/100% on RA. NC replaced " on Pt with SPO2 still 99/100%. SPO2 checked again sitting EOB on 4L NC---100%.     Pt ambulates first trial of gait in room on RA as he was saturating well on RA in bed. SPO2 down to 73% but recovers back to WNLs ~45s. Second trial of gait occurs with 4L NC on. SPO2 93% at end of that trial. RN aware of all vitals.     Treatments:  Bed Mobility  Bed Mobility: Yes  Bed Mobility 1  Bed Mobility 1: Supine to sitting  Level of Assistance 1: Independent  Bed Mobility 2  Bed Mobility  2: Sitting to supine  Level of Assistance 2: Independent    Ambulation/Gait Training  Ambulation/Gait Training Performed: Yes  Ambulation/Gait Training 1  Surface 1: Level tile  Device 1: No device  Assistance 1: Close supervision  Quality of Gait 1:  (slow, laborous, short choppy shuffled steps, increased sway without LOB. CS for safety today)  Comments/Distance (ft) 1: '25'x2 (See vitals for details)  Transfers  Transfer: Yes  Transfer 1  Transfer From 1: Bed to  Transfer to 1: Stand  Technique 1: Sit to stand  Transfer Level of Assistance 1: Distant supervision  Trials/Comments 1: x2 trials    Outcome Measures:  [facility] Basic Mobility  Turning from your back to your side while in a flat bed without using bedrails: None  Moving from lying on your back to sitting on the side of a flat bed without using bedrails: None  Moving to and from bed to chair (including a wheelchair): A little  Standing up from a chair using your arms (e.g. wheelchair or bedside chair): A little  To walk in hospital room: A little  Climbing 3-5 steps with railing: A little  Basic Mobility - Total Score: 20    Education Documentation  Mobility Training, taught by Rosendo Moses, PT at 1/10/2024  4:32 PM.  Learner: Family, Significant Other, Patient  Readiness: Acceptance  Method: Explanation, Demonstration  Response: Verbalizes Understanding, Needs Reinforcement  Comment: safe mobility techniques, SPO2 norms, SPO2 management, endurance building  techniques    Education Comments  No comments found.        OP EDUCATION:  Outpatient Education  Individual(s) Educated: Patient, Spouse, Child  Education Provided: Fall Risk, Home Safety, Physiology  Patient/Caregiver Demonstrated Understanding: yes  Patient Response to Education: Patient/Caregiver Verbalized Understanding of Information    Encounter Problems       Encounter Problems (Active)       Balance       Standing Balance (Progressing)       Start:  01/09/24    Expected End:  01/23/24       Pt will demonstrate good static standing balance to promote safe participation with out of bed activity, transfers, and mobility              Mobility       Ambulation (Progressing)       Start:  01/09/24    Expected End:  01/23/24       Pt will ambulate 50' modified independent assist with no device to promote safe home mobility              Safety       Safe Mobility Techniques (Progressing)       Start:  01/09/24    Expected End:  01/23/24       Pt will correctly identify and demonstrate safe mobility techniques to reduce their risks for falls during their acute care stay            Fall Risk Management (Progressing)       Start:  01/09/24    Expected End:  01/23/24       Pt will demonstrate 14 stands from chair in 30s to achieve their CDC Steadi Goal and Reduce Risk for Falls    1.) May NOT use arms for the transfer  2.) Must achieve full standing position    Chair Stand Below Average Scores  Age-------Men----Women  (60-64)----(<14)----(<12)  (65-69)----(<12)----(<11)  (70-74)----(<12)----(<10)  (75-79)----(<11)----(<10)  (80-84)----(<10)----(<9 )  (85-89)----(<8 )----(<8 )  (90-94)----(<7 )----(<4 )    A below average score indicates a risk for falls  www.cdc.gov/steadi              Transfers       Sit to stand (Progressing)       Start:  01/09/24    Expected End:  01/23/24       Pt will transfer sit to standing position with modified independent assist and no device to promote safe out of bed activity

## 2024-01-10 NOTE — PROGRESS NOTES
Martínez Neri is a 63 y.o. male on day 2 of admission presenting with Neutropenic fever (CMS/HCC).    Subjective   Interval History:   Afebrile, no chills  Interval decrease to  4 liters oxygen  Reports mild more productive cough  wife at bedside  Denies nausea, vomiting diarrhea      Objective   Range of Vitals (last 24 hours)  Heart Rate:  [71-85]   Temp:  [36.7 °C (98.1 °F)-37 °C (98.6 °F)]   Resp:  [18-19]   BP: ()/(37-52)   SpO2:  [93 %-100 %]   Daily Weight  01/06/24 : 76.5 kg (168 lb 10.4 oz)    Body mass index is 24.89 kg/m².    Physical Exam  Constitutional:       Appearance: Normal appearance.   HENT:      Head: Normocephalic and atraumatic.      Nose: Nose normal.      Mouth/Throat:      Mouth: Mucous membranes are moist.      Pharynx: Oropharynx is clear.   Eyes:      Extraocular Movements: Extraocular movements intact.      Conjunctiva/sclera: Conjunctivae normal.   Cardiovascular:      Rate and Rhythm: Normal rate and regular rhythm.   Pulmonary:      Effort: Pulmonary effort is normal.      Breath sounds: Normal breath sounds.   Abdominal:      General: Bowel sounds are normal.      Palpations: Abdomen is soft.   Musculoskeletal:         General: Normal range of motion.      Cervical back: Normal range of motion and neck supple.   Skin:     General: Skin is warm and dry.   Neurological:      General: No focal deficit present.      Mental Status: He is alert and oriented to person, place, and time. Mental status is at baseline.   Psychiatric:         Mood and Affect: Mood normal.         Behavior: Behavior normal.           Antibiotics  acetaminophen (Tylenol) tablet 975 mg  sodium chloride 0.9 % bolus 1,000 mL  piperacillin-tazobactam-dextrose (Zosyn) IV 3.375 g  thiamine (Vitamin B-1) tablet 100 mg  nicotine (Nicoderm CQ) 21 mg/24 hr patch 1 patch  albuterol 90 mcg/actuation inhaler 2 puff  ipratropium-albuteroL (Duo-Neb) 0.5-2.5 mg/3 mL nebulizer solution 3 mL  divalproex (Depakote ER) 24 hr  tablet 250 mg  divalproex (Depakote ER) 24 hr tablet 500 mg  lacosamide (Vimpat) tablet 200 mg  ondansetron (Zofran) tablet 8 mg  prochlorperazine (Compazine) tablet 10 mg  cholecalciferol (Vitamin D-3) tablet 2,000 Units  tiotropium (Spiriva Respimat) 2.5 mcg/actuation inhaler 2 puff  atorvastatin (Lipitor) tablet 40 mg  LORazepam (Ativan) tablet 0.5 mg  acetaminophen (Tylenol) tablet 650 mg  acetaminophen (Tylenol) oral liquid 650 mg  acetaminophen (Tylenol) suppository 650 mg  polyethylene glycol (Glycolax, Miralax) packet 17 g  benzocaine-menthol (Cepastat Sore Throat) 15-3.6 mg lozenge 1 lozenge  dextromethorphan-guaifenesin (Robitussin DM)  mg/5 mL oral liquid 5 mL  guaiFENesin (Mucinex) 12 hr tablet 600 mg  heparin (porcine) injection 5,000 Units  piperacillin-tazobactam-dextrose (Zosyn) IV 4.5 g  vancomycin (Xellia) 1 g in 200 mL (Xellia) IVPB 1,000 mg  dextrose 5%-0.45 % sodium chloride infusion  ondansetron ODT (Zofran-ODT) disintegrating tablet 8 mg  albuterol 2.5 mg /3 mL (0.083 %) nebulizer solution 2.5 mg  divalproex (Depakote ER) 24 hr tablet 750 mg  ipratropium-albuteroL (Duo-Neb) 0.5-2.5 mg/3 mL nebulizer solution 3 mL  sargramostim (Leukine) 500 mcg in sodium chloride 0.9% 25 mL  sargramostim (Leukine) injection 500 mcg  iohexol (OMNIPaque) 350 mg iodine/mL solution 75 mL  heparin (porcine) injection 6,000 Units  heparin 25,000 Units in dextrose 5% 250 mL (100 Units/mL) infusion (premix)  heparin (porcine) injection 3,000-6,000 Units      Relevant Results  Labs  Results from last 72 hours   Lab Units 01/09/24  1749 01/08/24  0428 01/08/24  0249   WBC AUTO x10*3/uL 5.2 4.9 5.9   HEMOGLOBIN g/dL 7.5* 7.4* 8.2*   HEMATOCRIT % 22.7* 21.3* 23.3*   PLATELETS AUTO x10*3/uL 220 192 217       Results from last 72 hours   Lab Units 01/09/24  0431 01/08/24  0428   SODIUM mmol/L 136 133   POTASSIUM mmol/L 3.1* 3.3*   CHLORIDE mmol/L 102 101   CO2 mmol/L 25 24   BUN mg/dL 3* 5*   CREATININE mg/dL 0.60  "0.70   GLUCOSE mg/dL 113* 120*   CALCIUM mg/dL 8.4* 8.1*   ANION GAP mmol/L 9 8   EGFR mL/min/1.73m*2 >90 >90             Estimated Creatinine Clearance: 124.4 mL/min (by C-G formula based on SCr of 0.6 mg/dL).  No results found for: \"CRP\"  Microbiology  reviewed  Imaging  CT abdomen pelvis w IV contrast    Result Date: 1/7/2024  Interpreted By:  Lisa Lezama, STUDY: CT ABDOMEN PELVIS W IV CONTRAST;  1/7/2024 12:26 pm   INDICATION: Abdominal pain   COMPARISON: Fused PET/CT study of 10/06/2023   ACCESSION NUMBER(S): [id number]   ORDERING CLINICIAN: JF ALEMAN   TECHNIQUE: CT of the abdomen and pelvis was performed with intravenous contrast administered. 75 mL of Omnipaque 350 was injected intravenously for the study with sagittal and coronal reformations performed by the technologist at the acquisition scanner.   All CT examinations are performed with 1 or more of the following dose reduction techniques: Automated exposure control, adjustment of mA and/or kv according to patient's size, or use of iterative reconstruction techniques.   FINDINGS: LOWER CHEST: There are severe emphysematous changes seen within the lower lung zones with minimal right pleural effusion and with mild atelectasis at the right lung base.   ABDOMEN:   LIVER: There are several hypodense space-occupying liver lesions measuring up to 2.3 cm in size without interval change compared with prior study. These are most consistent with stable hepatic cysts.   BILE DUCTS: No intrahepatic or extrahepatic biliary ductal dilatation.   GALLBLADDER: Unremarkable   PANCREAS: Unremarkable   SPLEEN: Unremarkable   ADRENAL GLANDS: Unremarkable   KIDNEYS AND URETERS: There is a 1.7 cm right renal cyst seen with left kidney unremarkable. There is mild bilateral perinephric scarring. MRCP   PELVIS:   BLADDER: No abnormality of the urinary bladder is seen.   REPRODUCTIVE ORGANS: Prostate gland is enlarged with a transverse diameter of 4.8 cm.   BOWEL: Colonic " diverticulosis is seen without evidence for diverticulitis. The bowel is difficult to accurately evaluate due to patient motion. No pneumatosis intestinalis is seen. No portal venous gas is observed.   VESSELS: There is atherosclerosis of the abdominal aorta without aneurysmal dilatation. Calcified plaque is visible within the superior mesenteric artery. There is a filling defect seen within the proximal superior mesenteric artery identified roughly 7 mm distal to its origin. I believe that this represents partial thrombosis. There is flow seen more distally within the superior mesenteric artery.   PERITONEUM/RETROPERITONEUM/LYMPH NODES: A small amount of free fluid within the pelvic cul-de-sac is identified.   BONES AND ABDOMINAL WALL: Small bilateral fat containing inguinal hernias are present with left inguinal hernia larger than right. A very tiny fat containing umbilical hernia is observed.       1.  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 However, there is flow noted within the distal SMA. 2. Small amount of free fluid within the pelvic cul-de-sac. 3. Colonic diverticulosis without diverticulitis. 4. Hepatic cysts measuring up to 2.3 cm in size with 1.7 cm right renal cyst. 5. Severe pulmonary emphysema with minimal right pleural effusion and with atelectasis at right lung base. 6. Small bilateral fat containing inguinal hernias with very tiny fat containing umbilical hernia     MACRO: None   Signed by: Lisa Lezama 1/7/2024 12:53 PM Dictation workstation:   [id number]    XR chest 1 view    Result Date: 1/5/2024  STUDY: Chest Radiograph;  01/05/2024 9:39 PM INDICATION: Cough, fever.  Additional History:  Lung cancer. COMPARISON: CT chest 10/06/2023.  NM PET/CT 10/06/2023.  ACCESSION NUMBER(S): [id number] ORDERING CLINICIAN: ARTEM MOSER TECHNIQUE:  Frontal chest was obtained at 2139 hours. FINDINGS: CARDIOMEDIASTINAL SILHOUETTE:  Cardiomediastinal silhouette is normal in size and configuration.  LUNGS: Bandlike scarring/atelectasis LEFT midlung noted.  ABDOMEN: No remarkable upper abdominal findings.  BONES: No acute osseous changes.    Bandlike scarring/atelectasis LEFT midlung. Signed by David Krause II, MD           Assessment/Plan   Febrile neutropenia  Bronchitis-possibly viral  Abnormal CT abdomen pelvis-superior mesenteric artery thrombosis  Non-small cell lung cancer s/p chemoradiation     Discontinue IV vancomycin  IV Zosyn  Neutropenic precautions  Follow up blood culture  Hematology consult  Supportive care  Monitor temperature and WBC    Long term plan de-escalate to po augmentin for 2 more weeks     Lidia Estrada, APRN-CNP

## 2024-01-11 ENCOUNTER — APPOINTMENT (OUTPATIENT)
Dept: RADIATION ONCOLOGY | Facility: HOSPITAL | Age: 64
End: 2024-01-11
Payer: MEDICAID

## 2024-01-11 ENCOUNTER — APPOINTMENT (OUTPATIENT)
Dept: RADIOLOGY | Facility: HOSPITAL | Age: 64
End: 2024-01-11
Payer: MEDICAID

## 2024-01-11 PROBLEM — J43.9 EMPHYSEMA LUNG (MULTI): Status: ACTIVE | Noted: 2024-01-11

## 2024-01-11 PROBLEM — J96.00 ACUTE RESPIRATORY FAILURE (MULTI): Status: ACTIVE | Noted: 2024-01-11

## 2024-01-11 PROBLEM — I67.1 BRAIN ANEURYSM (HHS-HCC): Status: ACTIVE | Noted: 2024-01-11

## 2024-01-11 PROBLEM — J69.0 ASPIRATION PNEUMONIA (MULTI): Status: ACTIVE | Noted: 2024-01-11

## 2024-01-11 LAB
ALBUMIN SERPL-MCNC: 2.3 G/DL (ref 3.5–5)
ALP BLD-CCNC: 43 U/L (ref 35–125)
ALT SERPL-CCNC: 63 U/L (ref 5–40)
AMMONIA PLAS-SCNC: 69 UMOL/L (ref 12–45)
ANION GAP SERPL CALC-SCNC: 8 MMOL/L
APTT PPP: 45.8 SECONDS (ref 22–32.5)
APTT PPP: 71.7 SECONDS (ref 22–32.5)
AST SERPL-CCNC: 60 U/L (ref 5–40)
BILIRUB SERPL-MCNC: <0.2 MG/DL (ref 0.1–1.2)
BUN SERPL-MCNC: 3 MG/DL (ref 8–25)
CALCIUM SERPL-MCNC: 8.4 MG/DL (ref 8.5–10.4)
CHLORIDE SERPL-SCNC: 102 MMOL/L (ref 97–107)
CO2 SERPL-SCNC: 27 MMOL/L (ref 24–31)
CREAT SERPL-MCNC: 0.7 MG/DL (ref 0.4–1.6)
EGFRCR SERPLBLD CKD-EPI 2021: >90 ML/MIN/1.73M*2
ERYTHROCYTE [DISTWIDTH] IN BLOOD BY AUTOMATED COUNT: 15.9 % (ref 11.5–14.5)
ERYTHROCYTE [DISTWIDTH] IN BLOOD BY AUTOMATED COUNT: 15.9 % (ref 11.5–14.5)
GLUCOSE BLD MANUAL STRIP-MCNC: 108 MG/DL (ref 74–99)
GLUCOSE BLD MANUAL STRIP-MCNC: 108 MG/DL (ref 74–99)
GLUCOSE BLD MANUAL STRIP-MCNC: 111 MG/DL (ref 74–99)
GLUCOSE BLD MANUAL STRIP-MCNC: 115 MG/DL (ref 74–99)
GLUCOSE SERPL-MCNC: 114 MG/DL (ref 65–99)
HCT VFR BLD AUTO: 21.9 % (ref 41–52)
HCT VFR BLD AUTO: 21.9 % (ref 41–52)
HGB BLD-MCNC: 7.4 G/DL (ref 13.5–17.5)
HGB BLD-MCNC: 7.6 G/DL (ref 13.5–17.5)
MCH RBC QN AUTO: 32.7 PG (ref 26–34)
MCH RBC QN AUTO: 33.8 PG (ref 26–34)
MCHC RBC AUTO-ENTMCNC: 33.8 G/DL (ref 32–36)
MCHC RBC AUTO-ENTMCNC: 34.7 G/DL (ref 32–36)
MCV RBC AUTO: 97 FL (ref 80–100)
MCV RBC AUTO: 97 FL (ref 80–100)
NRBC BLD-RTO: 0 /100 WBCS (ref 0–0)
NRBC BLD-RTO: 0.5 /100 WBCS (ref 0–0)
PLATELET # BLD AUTO: 252 X10*3/UL (ref 150–450)
PLATELET # BLD AUTO: 256 X10*3/UL (ref 150–450)
POTASSIUM SERPL-SCNC: 3.2 MMOL/L (ref 3.4–5.1)
PROT SERPL-MCNC: 5.2 G/DL (ref 5.9–7.9)
RBC # BLD AUTO: 2.25 X10*6/UL (ref 4.5–5.9)
RBC # BLD AUTO: 2.26 X10*6/UL (ref 4.5–5.9)
SODIUM SERPL-SCNC: 137 MMOL/L (ref 133–145)
T4 FREE SERPL-MCNC: 0.9 NG/DL (ref 0.9–1.7)
TSH SERPL DL<=0.05 MIU/L-ACNC: 7.63 MIU/L (ref 0.27–4.2)
VALPROATE SERPL-MCNC: 70 UG/ML (ref 50–100)
VIT B12 SERPL-MCNC: 1403 PG/ML (ref 211–946)
WBC # BLD AUTO: 3.6 X10*3/UL (ref 4.4–11.3)
WBC # BLD AUTO: 4.3 X10*3/UL (ref 4.4–11.3)

## 2024-01-11 PROCEDURE — 84439 ASSAY OF FREE THYROXINE: CPT | Performed by: NURSE PRACTITIONER

## 2024-01-11 PROCEDURE — 85730 THROMBOPLASTIN TIME PARTIAL: CPT | Performed by: INTERNAL MEDICINE

## 2024-01-11 PROCEDURE — 1200000002 HC GENERAL ROOM WITH TELEMETRY DAILY

## 2024-01-11 PROCEDURE — 80053 COMPREHEN METABOLIC PANEL: CPT | Performed by: INTERNAL MEDICINE

## 2024-01-11 PROCEDURE — 2500000004 HC RX 250 GENERAL PHARMACY W/ HCPCS (ALT 636 FOR OP/ED)

## 2024-01-11 PROCEDURE — 82140 ASSAY OF AMMONIA: CPT | Performed by: INTERNAL MEDICINE

## 2024-01-11 PROCEDURE — 36415 COLL VENOUS BLD VENIPUNCTURE: CPT | Performed by: INTERNAL MEDICINE

## 2024-01-11 PROCEDURE — 80235 DRUG ASSAY LACOSAMIDE: CPT | Performed by: INTERNAL MEDICINE

## 2024-01-11 PROCEDURE — 2500000004 HC RX 250 GENERAL PHARMACY W/ HCPCS (ALT 636 FOR OP/ED): Performed by: NURSE PRACTITIONER

## 2024-01-11 PROCEDURE — 2500000002 HC RX 250 W HCPCS SELF ADMINISTERED DRUGS (ALT 637 FOR MEDICARE OP, ALT 636 FOR OP/ED): Performed by: INTERNAL MEDICINE

## 2024-01-11 PROCEDURE — 70544 MR ANGIOGRAPHY HEAD W/O DYE: CPT

## 2024-01-11 PROCEDURE — 80164 ASSAY DIPROPYLACETIC ACD TOT: CPT | Performed by: INTERNAL MEDICINE

## 2024-01-11 PROCEDURE — 2500000004 HC RX 250 GENERAL PHARMACY W/ HCPCS (ALT 636 FOR OP/ED): Performed by: INTERNAL MEDICINE

## 2024-01-11 PROCEDURE — 99233 SBSQ HOSP IP/OBS HIGH 50: CPT | Performed by: INTERNAL MEDICINE

## 2024-01-11 PROCEDURE — 85027 COMPLETE CBC AUTOMATED: CPT | Performed by: INTERNAL MEDICINE

## 2024-01-11 PROCEDURE — 97110 THERAPEUTIC EXERCISES: CPT | Mod: GO

## 2024-01-11 PROCEDURE — 2500000001 HC RX 250 WO HCPCS SELF ADMINISTERED DRUGS (ALT 637 FOR MEDICARE OP): Performed by: INTERNAL MEDICINE

## 2024-01-11 PROCEDURE — S4991 NICOTINE PATCH NONLEGEND: HCPCS | Performed by: INTERNAL MEDICINE

## 2024-01-11 PROCEDURE — 84443 ASSAY THYROID STIM HORMONE: CPT | Performed by: NURSE PRACTITIONER

## 2024-01-11 PROCEDURE — 82607 VITAMIN B-12: CPT | Performed by: NURSE PRACTITIONER

## 2024-01-11 PROCEDURE — 82947 ASSAY GLUCOSE BLOOD QUANT: CPT

## 2024-01-11 PROCEDURE — 97530 THERAPEUTIC ACTIVITIES: CPT | Mod: GP

## 2024-01-11 RX ORDER — SUCRALFATE 1 G/10ML
1 SUSPENSION ORAL EVERY 6 HOURS SCHEDULED
Status: DISCONTINUED | OUTPATIENT
Start: 2024-01-11 | End: 2024-01-12 | Stop reason: HOSPADM

## 2024-01-11 RX ORDER — PANTOPRAZOLE SODIUM 40 MG/1
40 TABLET, DELAYED RELEASE ORAL
Status: DISCONTINUED | OUTPATIENT
Start: 2024-01-12 | End: 2024-01-12 | Stop reason: HOSPADM

## 2024-01-11 RX ADMIN — PIPERACILLIN SODIUM AND TAZOBACTAM SODIUM 4.5 G: 4; .5 INJECTION, SOLUTION INTRAVENOUS at 04:33

## 2024-01-11 RX ADMIN — TIOTROPIUM BROMIDE INHALATION SPRAY 2 PUFF: 3.12 SPRAY, METERED RESPIRATORY (INHALATION) at 07:49

## 2024-01-11 RX ADMIN — LACOSAMIDE 200 MG: 100 TABLET, FILM COATED ORAL at 06:40

## 2024-01-11 RX ADMIN — PIPERACILLIN SODIUM AND TAZOBACTAM SODIUM 4.5 G: 4; .5 INJECTION, SOLUTION INTRAVENOUS at 15:35

## 2024-01-11 RX ADMIN — LACOSAMIDE 200 MG: 100 TABLET, FILM COATED ORAL at 18:30

## 2024-01-11 RX ADMIN — Medication 2000 UNITS: at 06:40

## 2024-01-11 RX ADMIN — HEPARIN SODIUM 1300 UNITS/HR: 10000 INJECTION, SOLUTION INTRAVENOUS at 18:52

## 2024-01-11 RX ADMIN — DIVALPROEX SODIUM 750 MG: 250 TABLET, EXTENDED RELEASE ORAL at 06:41

## 2024-01-11 RX ADMIN — PIPERACILLIN SODIUM AND TAZOBACTAM SODIUM 4.5 G: 4; .5 INJECTION, SOLUTION INTRAVENOUS at 21:38

## 2024-01-11 RX ADMIN — PIPERACILLIN SODIUM AND TAZOBACTAM SODIUM 4.5 G: 4; .5 INJECTION, SOLUTION INTRAVENOUS at 10:07

## 2024-01-11 RX ADMIN — VANCOMYCIN 1000 MG: 1 INJECTION, SOLUTION INTRAVENOUS at 01:20

## 2024-01-11 RX ADMIN — NICOTINE 1 PATCH: 21 PATCH, EXTENDED RELEASE TRANSDERMAL at 05:38

## 2024-01-11 RX ADMIN — THIAMINE HCL TAB 100 MG 100 MG: 100 TAB at 06:40

## 2024-01-11 RX ADMIN — ATORVASTATIN CALCIUM 40 MG: 40 TABLET, FILM COATED ORAL at 06:40

## 2024-01-11 RX ADMIN — DIVALPROEX SODIUM 750 MG: 250 TABLET, EXTENDED RELEASE ORAL at 18:31

## 2024-01-11 ASSESSMENT — ENCOUNTER SYMPTOMS
NEUROLOGICAL NEGATIVE: 1
ENDOCRINE NEGATIVE: 1
PSYCHIATRIC NEGATIVE: 1
GASTROINTESTINAL NEGATIVE: 1
ALLERGIC/IMMUNOLOGIC NEGATIVE: 1
COUGH: 1
CONFUSION: 1
CARDIOVASCULAR NEGATIVE: 1
EYES NEGATIVE: 1
MUSCULOSKELETAL NEGATIVE: 1
FEVER: 1
SHORTNESS OF BREATH: 1
HEMATOLOGIC/LYMPHATIC NEGATIVE: 1
FATIGUE: 1

## 2024-01-11 ASSESSMENT — COGNITIVE AND FUNCTIONAL STATUS - GENERAL
DRESSING REGULAR UPPER BODY CLOTHING: A LITTLE
DAILY ACTIVITIY SCORE: 18
MOBILITY SCORE: 22
TOILETING: A LITTLE
EATING MEALS: A LITTLE
WALKING IN HOSPITAL ROOM: A LITTLE
PERSONAL GROOMING: A LITTLE
HELP NEEDED FOR BATHING: A LITTLE
DRESSING REGULAR LOWER BODY CLOTHING: A LITTLE
CLIMB 3 TO 5 STEPS WITH RAILING: A LITTLE

## 2024-01-11 ASSESSMENT — PAIN - FUNCTIONAL ASSESSMENT
PAIN_FUNCTIONAL_ASSESSMENT: 0-10

## 2024-01-11 ASSESSMENT — PAIN SCALES - GENERAL
PAINLEVEL_OUTOF10: 0 - NO PAIN

## 2024-01-11 NOTE — PROCEDURES
Home O2 screen. Pt destated as soon as sitting up to 87, required 2L to go back above 92, when patient walked droppped again to 87 and needed increased to 4L to reach back to 92%

## 2024-01-11 NOTE — PROGRESS NOTES
"Martínez Neri is a 63 y.o. male on day 3 of admission presenting with Neutropenic fever (CMS/HCC).    Subjective   Pt has no new updates at this time.        Objective     Physical Exam    Last Recorded Vitals  Blood pressure 106/52, pulse 68, temperature 36.5 °C (97.7 °F), temperature source Oral, resp. rate 18, height 1.753 m (5' 9.02\"), weight 76.5 kg (168 lb 10.4 oz), SpO2 93 %.  Intake/Output last 3 Shifts:  I/O last 3 completed shifts:  In: 1020 (13.3 mL/kg) [P.O.:520; IV Piggyback:500]  Out: 800 (10.5 mL/kg) [Urine:800 (0.3 mL/kg/hr)]  Weight: 76.5 kg     Relevant Results                             Assessment/Plan   Principal Problem:    Neutropenic fever (CMS/HCC)  Active Problems:    Partial symptomatic epilepsy with complex partial seizures, not intractable, without status epilepticus (CMS/HCC)    Fever in adult    Superior mesenteric artery thrombosis (CMS/HCC)    Hyponatremia    Generalized weakness    Malignant neoplasm of upper lobe of left lung (CMS/HCC)               CHEMO Galindo      "

## 2024-01-11 NOTE — NURSING NOTE
Message left for Dr Banda to call me regarding this patient and aspirin.  Page was being sent by office.

## 2024-01-11 NOTE — PROGRESS NOTES
"Vancomycin Dosing by Pharmacy- FOLLOW UP    Martínez Neri is a 63 y.o. year old male who Pharmacy has been consulted for vancomycin dosing for other medical prophylaxis . Based on the patient's indication and renal status this patient is being dosed based on a goal AUC of 400-600.     Renal function is currently stable.    Current vancomycin dose: 1000 mg given every 12 hours    Estimated vancomycin AUC on current dose: 464 mg/L.hr     Visit Vitals  /53 (BP Location: Left arm, Patient Position: Sitting)   Pulse 68   Temp 36.3 °C (97.3 °F) (Oral)   Resp 18        Lab Results   Component Value Date    CREATININE 0.70 01/11/2024    CREATININE 0.60 01/09/2024    CREATININE 0.70 01/08/2024    CREATININE 0.90 01/07/2024        Patient weight is No results found for: \"PTWEIGHT\"    No results found for: \"CULTURE\"     I/O last 3 completed shifts:  In: 1020 (13.3 mL/kg) [P.O.:520; IV Piggyback:500]  Out: 800 (10.5 mL/kg) [Urine:800 (0.3 mL/kg/hr)]  Weight: 76.5 kg   [unfilled]    No results found for: \"PATIENTTEMP\"     Assessment/Plan    Within goal AUC range. Continue current vancomycin regimen.    This dosing regimen is predicted by [employer] to result in the following pharmacokinetic parameters:  Loading dose: N/A  Regimen: 1000 mg IV every 12 hours.  Start time: 13:20 on 01/11/2024  Exposure target: AUC24 (range)400-600 mg/L.hr   AUC24,ss: 464 mg/L.hr  Probability of AUC24 > 400: 70 %  Ctrough,ss: 13.8 mg/L  Probability of Ctrough,ss > 20: 15 %  Probability of nephrotoxicity (Lodise ANGE 2009): 9 %    The next level will be obtained on 1/13 at 0500. May be obtained sooner if clinically indicated.   Will continue to monitor renal function daily while on vancomycin and order serum creatinine at least every 48 hours if not already ordered.  Follow for continued vancomycin needs, clinical response, and signs/symptoms of toxicity.       WHIT MONTELONGO, PharmD           "

## 2024-01-11 NOTE — NURSING NOTE
Patient's spouse Keri, is requesting a meeting, or Zoom meeting with all physicians -involved her this patient's care.   She can be reached at [phone].

## 2024-01-11 NOTE — PROGRESS NOTES
"Martínez Neri is a 63 y.o. male on day 3 of admission presenting with Neutropenic fever (CMS/HCC).    Subjective   Patient is less agitated than yesterday but still remains intermittently confused.  He is confused regarding his diagnosis and argues about it       Objective     Physical Exam  Vitals reviewed.   Constitutional:       Appearance: Normal appearance.   HENT:      Head: Normocephalic and atraumatic.      Right Ear: Tympanic membrane, ear canal and external ear normal.      Left Ear: Tympanic membrane, ear canal and external ear normal.      Nose: Nose normal.      Mouth/Throat:      Pharynx: Oropharynx is clear.   Eyes:      Extraocular Movements: Extraocular movements intact.      Conjunctiva/sclera: Conjunctivae normal.      Pupils: Pupils are equal, round, and reactive to light.   Cardiovascular:      Rate and Rhythm: Normal rate and regular rhythm.      Pulses: Normal pulses.      Heart sounds: Normal heart sounds.   Pulmonary:      Effort: Pulmonary effort is normal.      Breath sounds: Normal breath sounds.   Abdominal:      General: Abdomen is flat. Bowel sounds are normal.      Palpations: Abdomen is soft.   Musculoskeletal:      Cervical back: Normal range of motion and neck supple.   Skin:     General: Skin is warm and dry.   Neurological:      Mental Status: He is alert. Mental status is at baseline. He is disoriented.   Psychiatric:         Mood and Affect: Mood normal.         Last Recorded Vitals  Blood pressure 108/51, pulse 73, temperature 36.7 °C (98.1 °F), temperature source Oral, resp. rate 20, height 1.753 m (5' 9.02\"), weight 76.5 kg (168 lb 10.4 oz), SpO2 91 %.  Intake/Output last 3 Shifts:  I/O last 3 completed shifts:  In: 1020 (13.3 mL/kg) [P.O.:520; IV Piggyback:500]  Out: 800 (10.5 mL/kg) [Urine:800 (0.3 mL/kg/hr)]  Weight: 76.5 kg     Relevant Results              Scheduled medications  atorvastatin, 40 mg, oral, Daily  cholecalciferol, 2,000 Units, oral, Daily  divalproex, " 750 mg, oral, BID  lacosamide, 200 mg, oral, BID  nicotine, 1 patch, transdermal, q24h  [START ON 1/12/2024] pantoprazole, 40 mg, oral, Daily before breakfast  piperacillin-tazobactam, 4.5 g, intravenous, q6h  potassium chloride CR, 40 mEq, oral, Once  sucralfate, 1 g, oral, q6h CHAPARRITA  thiamine, 100 mg, oral, Daily  tiotropium, 2 Inhalation, inhalation, Daily      Continuous medications  dextrose 5%-0.45 % sodium chloride, 80 mL/hr, Last Rate: 80 mL/hr (01/10/24 1933)  heparin, 0-4,500 Units/hr, Last Rate: 1,100 Units/hr (01/11/24 0400)      PRN medications  PRN medications: acetaminophen **OR** acetaminophen **OR** acetaminophen, benzocaine-menthol, dextromethorphan-guaifenesin, guaiFENesin, heparin (porcine), ipratropium-albuteroL, LORazepam, ondansetron ODT, polyethylene glycol, prochlorperazine  MR angio head wo IV contrast    Result Date: 1/11/2024  Interpreted By:  Tonya Mccartney, STUDY: MR ANGIO HEAD WO IV CONTRAST; 1/11/2024 11:57 am   INDICATION: Signs/Symptoms:coil;   COMPARISON: None   ACCESSION NUMBER(S): [id number]   ORDERING CLINICIAN: LUIS VARGAS   TECHNIQUE: MRA of the head was performed using 3-D time-of-flight technique. 3-D MIP reconstructions were processed, created and reviewed on a separate workstation.   FINDINGS: The anterior and posterior cerebral circulations are patent. No hemodynamically significant stenosis or aneurysmal dilatation is seen. No saccular aneurysm is identified. Susceptibility artifact is seen in the region of the anterior communicating artery which may correlate to clinical history of aneurysm coiling.       Susceptibility artifact is seen in the region of the anterior communicating artery which may correlate to clinical history of aneurysm coiling. Otherwise, unremarkable MRA of the head.   Signed by: Tonya Mccartney 1/11/2024 12:50 PM Dictation workstation:   [id number]    MR brain w and wo IV contrast    Result Date: 1/9/2024  STUDY: MR BRAIN w + wo CONTRAST;  01/09/2024 9:50 PM INDICATION:  Shunt infection.  Additional History:  Lung cancer. COMPARISON: XR skull 01/09/2024. MR brain 10/13/2023. NM PET/CT 10/06/2023.  ACCESSION NUMBER(S): [id number] ORDERING CLINICIAN: Radha Aleman TECHNIQUE:  Multi-planar, multi-sequence MR imaging of the brain was performed without and with intravenous contrast.  Dotarem 0.5 mL was administered intravenously. FINDINGS: Diffusion-weighted imaging demonstrates no area of restricted diffusion to suggest acute infarction.  There is no intracranial hemorrhage, midline shift, mass effect, or extra-axial fluid collection.  Generalized cerebral atrophy noted.  There are no areas of abnormal enhancement.  There are mild scattered patchy areas of T2 prolongation within the subcortical and deep periventricular white matter, suggesting changes of chronic microvascular ischemia  Brain parenchyma otherwise demonstrates normal morphology and signal characteristics.  Midline structures are within normal limits. Major intracranial vessels demonstrate preserved flow voids.  Brain volume and ventricular system are within normal limits for age.  Mild chronic secretions within the ethmoid air cells and maxillary sinuses noted.  Orbits, globes, and mastoid air cells are unremarkable.    Likely sequela of chronic small vessel ischemic disease. No acute intracranial findings. No suspicious region of abnormal enhancement. Signed by David Krause II, MD    CT angio chest for pulmonary embolism    Result Date: 1/9/2024  STUDY: CT Angiogram of the Chest; 01/09/2024 at 8:40 PM INDICATION: Shortness of breath. Evaluate for pulmonary embolism. COMPARISON: CT abdomen and pelvis 01/07/24. XR chest 01/05/24. CT chest, NM PET-CT 10/06/23. ACCESSION NUMBER(S): [id number] ORDERING CLINICIAN: RADHA ALEMAN TECHNIQUE:  CTA of the chest was performed with intravenous contrast. Images are reviewed and processed at a workstation according to the CT angiogram protocol with 3-D  and/or MIP post processing imaging generated.  Omnipaque-350 75 mL was administered intravenously. Automated mA/kV exposure control was utilized and patient examination was performed in strict accordance with principles of ALARA. FINDINGS: Pulmonary arteries are adequately opacified without acute or chronic filling defects.  The thoracic aorta is normal in course and caliber without dissection or aneurysm. The heart is normal in size without pericardial effusion. Atherosclerosis of the thoracic aorta and coronary arteries is present.  There appears to be thickening of the esophagus and/or adjacent adenopathy image 33 series 4.  This is new compared with prior exam. Follow-up endoscopy recommended for further assessment.  Layering debris within the LEFT mainstem bronchus appears to be present, possibly fluid.  Trace amount of fluid in the RIGHT mainstem bronchus also appears to be present.  Clinical correlation for aspiration recommended.  Follow-up endoscopy recommended for further assessment.  Small-to-moderate size right-sided pleural effusion is present.  Small LEFT effusion is present.  Emphysematous changes are present.  RIGHT middle lobe consolidation has developed.  RIGHT upper lobe consolidation also noted.  This appears related to volume loss. This is best appreciated on the coronal series.  Presumed postradiation scarring in the LEFT upper lobe also noted, similar prior study.  Bibasilar consolidation is also present.  Clinical correlation for signs of infection recommended.  Aspiration suspected. Small presumed cyst measuring about the anterior aspect hepatic lobe is present measuring 1.2 cm.  Additional smaller lesion about the anterior aspect of the medial segment LEFT hepatic lobe measures 0.9 cm. Senescent changes of the thoracic spine are present.  There are no acute fractures.  No suspicious bony lesions.    1. There is no evidence of pulmonary embolus. 2. Thickening of the esophagus and/or adjacent  adenopathy. Follow-up endoscopy recommended for further assessment. 3. Debris within the mainstem bronchi, LEFT greater than RIGHT. Bibasilar consolidation noted. Aspiration suspected. Follow-up bronchoscopy recommended. 4. Bilateral pleural effusions, RIGHT greater than LEFT. 5. Emphysematous changes are present. Presumed postradiation changes LEFT upper lobe noted. Signed by David Krause II, MD    XR skull 1-2 views    Result Date: 1/9/2024  Interpreted By:  Lisa Lezama, STUDY: XR SKULL 1-3 VIEWS 1/9/2024 7:35 pm   INDICATION: Signs/Symptoms:To check for shunt versus coil prior to MRI.   COMPARISON: None available.   ACCESSION NUMBER(S): [id number]   ORDERING CLINICIAN: JF ALEMAN   TECHNIQUE: Two views of the calvarium are completed.   FINDINGS: There is a 9 mm device implanted within the midline of the brain just anterior and superior to the sella turcica. The exact etiology of this device is unclear but clinical correlation with surgical history is advised. This does not have the appearance of a shunt device. Of note, the patient did have MRI of the brain on 10/13/2023.       A 9 mm rectangular shaped device is seen in the midline of the brain just anterior and superior to the sella turcica with exact etiology unclear. Clinical correlation is necessary. This does not have the appearance of a shunt device however.   MRI of the brain was performed on 10/13/2023.   Signed by: Lisa Lezama 1/9/2024 8:31 PM Dictation workstation:   [id number]   Results for orders placed or performed during the hospital encounter of 01/05/24 (from the past 24 hour(s))   POCT GLUCOSE   Result Value Ref Range    POCT Glucose 154 (H) 74 - 99 mg/dL   APTT   Result Value Ref Range    aPTT 62.1 (H) 22.0 - 32.5 seconds   CBC   Result Value Ref Range    WBC 3.6 (L) 4.4 - 11.3 x10*3/uL    nRBC 0.0 0.0 - 0.0 /100 WBCs    RBC 2.26 (L) 4.50 - 5.90 x10*6/uL    Hemoglobin 7.4 (L) 13.5 - 17.5 g/dL    Hematocrit 21.9 (L) 41.0 - 52.0 %    MCV 97 80  - 100 fL    MCH 32.7 26.0 - 34.0 pg    MCHC 33.8 32.0 - 36.0 g/dL    RDW 15.9 (H) 11.5 - 14.5 %    Platelets 252 150 - 450 x10*3/uL   Comprehensive Metabolic Panel   Result Value Ref Range    Glucose 114 (H) 65 - 99 mg/dL    Sodium 137 133 - 145 mmol/L    Potassium 3.2 (L) 3.4 - 5.1 mmol/L    Chloride 102 97 - 107 mmol/L    Bicarbonate 27 24 - 31 mmol/L    Urea Nitrogen 3 (L) 8 - 25 mg/dL    Creatinine 0.70 0.40 - 1.60 mg/dL    eGFR >90 >60 mL/min/1.73m*2    Calcium 8.4 (L) 8.5 - 10.4 mg/dL    Albumin 2.3 (L) 3.5 - 5.0 g/dL    Alkaline Phosphatase 43 35 - 125 U/L    Total Protein 5.2 (L) 5.9 - 7.9 g/dL    AST 60 (H) 5 - 40 U/L    Bilirubin, Total <0.2 0.1 - 1.2 mg/dL    ALT 63 (H) 5 - 40 U/L    Anion Gap 8 <=19 mmol/L   APTT   Result Value Ref Range    aPTT 71.7 (H) 22.0 - 32.5 seconds   TSH with reflex to Free T4 if abnormal   Result Value Ref Range    Thyroid Stimulating Hormone 7.63 (H) 0.27 - 4.20 mIU/L   Vitamin B12   Result Value Ref Range    Vitamin B12 1,403 (H) 211 - 946 pg/mL   Thyroxine, Free   Result Value Ref Range    Thyroxine, Free 0.90 0.90 - 1.70 ng/dL   POCT GLUCOSE   Result Value Ref Range    POCT Glucose 108 (H) 74 - 99 mg/dL   POCT GLUCOSE   Result Value Ref Range    POCT Glucose 111 (H) 74 - 99 mg/dL   POCT GLUCOSE   Result Value Ref Range    POCT Glucose 115 (H) 74 - 99 mg/dL   CBC   Result Value Ref Range    WBC 4.3 (L) 4.4 - 11.3 x10*3/uL    nRBC 0.5 (H) 0.0 - 0.0 /100 WBCs    RBC 2.25 (L) 4.50 - 5.90 x10*6/uL    Hemoglobin 7.6 (L) 13.5 - 17.5 g/dL    Hematocrit 21.9 (L) 41.0 - 52.0 %    MCV 97 80 - 100 fL    MCH 33.8 26.0 - 34.0 pg    MCHC 34.7 32.0 - 36.0 g/dL    RDW 15.9 (H) 11.5 - 14.5 %    Platelets 256 150 - 450 x10*3/uL                    Assessment/Plan   Principal Problem:    Neutropenic fever (CMS/HCC)  Active Problems:    Partial symptomatic epilepsy with complex partial seizures, not intractable, without status epilepticus (CMS/HCC)    Fever in adult    Superior mesenteric artery  thrombosis (CMS/HCC)    Hyponatremia    Generalized weakness    Aspiration pneumonia (CMS/HCC)    Emphysema lung (CMS/HCC)    Malignant neoplasm of upper lobe of left lung (CMS/HCC)    Had a long discussion with the daughter  She has a lot of concerns regarding anticoagulation and aspirin  She was told by the neurologist that patient should not be on aspirin  Discussed with Dr. MCFARLAND who wants patient on some kind of anticoagulation because of thrombus seen in the superior mesenteric artery  Daughter started arguing with me that she was told there is no thrombus  Explained and discussed results with the CAT scan  Showed recommendations done by the hematologist  Contacted neurologist and asked the question about aspirin and Plavix.  She says patient is totally safe to take any of the agents.  Message conveyed to the daughter  Patient is still requiring oxygen  Pulmonology saw the patient no plans for bronchoscopy  Speech therapy does not think patient is at high risk for aspiration but he may have aspirated at the admission when he had fever and he was confused  Will arrange oxygen as patient needs 2 L of oxygen at rest and 4 L on exertion  he does have history of emphysema and lung cancer.  He has quit smoking for a month  Home care orders will be placed  Plan discharge tomorrow once oxygen is arranged and home care is arranged     I spent  minutes in the professional and overall care of this patient.      Radha Casey MD

## 2024-01-11 NOTE — CONSULTS
"Consults    Reason For Consult  Abnormal CT Esophagus     History Of Present Illness  Martínez Neri is a 63 y.o. male presenting with fevers. He has known lung cancer on chemo and radiation. CT chest showed esophageal wall thickening. Family at bedside mention patient's oncologist did say esophageal issues/reflux symptoms were likely with area of radiation. Denies abdominal pain, nausea, vomiting     He denies black, tarry, bloody stools. He did have an EGD and bronch prior to starting cancer treatment.      Past Medical History  He has a past medical history of Allergic rhinitis, Asthma, Brain aneurysm, Cancer (CMS/HCC), Carotid artery disease (CMS/HCC), COPD (chronic obstructive pulmonary disease) (CMS/HCC), Epilepsy (CMS/HCC), High cholesterol, History of alcohol use, Hypertension, and Lung cancer (CMS/HCC).    Surgical History  He has a past surgical history that includes Carotid endarterectomy (N/A); Colonoscopy; Hernia repair; and Cerebral aneurysm repair.     Social History  He reports that he has been smoking cigarettes. He has a 50.00 pack-year smoking history. He has been exposed to tobacco smoke. He has never used smokeless tobacco. He reports that he does not currently use alcohol. He reports current drug use. Frequency: 4.00 times per week. Drug: Marijuana.    Family History  Family History   Problem Relation Name Age of Onset    Heart attack Mother      Other (Colorectal cancer) Father      Aneurysm Other      Prostate cancer Other      Alcohol abuse Other          Allergies  Patient has no known allergies.    Review of Systems     Physical Exam     Last Recorded Vitals  Blood pressure 106/52, pulse 68, temperature 36.5 °C (97.7 °F), temperature source Oral, resp. rate 18, height 1.753 m (5' 9.02\"), weight 76.5 kg (168 lb 10.4 oz), SpO2 93 %.    Relevant Results  Results for orders placed or performed during the hospital encounter of 01/05/24 (from the past 24 hour(s))   Ammonia   Result Value Ref " Range    Ammonia 62 (H) 12 - 45 umol/L   APTT   Result Value Ref Range    aPTT 62.2 (H) 22.0 - 32.5 seconds   aPTT   Result Value Ref Range    aPTT 57.8 (H) 22.0 - 32.5 seconds   Protime-INR   Result Value Ref Range    Protime 12.1 9.3 - 12.7 seconds    INR 1.2 0.9 - 1.2   POCT GLUCOSE   Result Value Ref Range    POCT Glucose 154 (H) 74 - 99 mg/dL   APTT   Result Value Ref Range    aPTT 62.1 (H) 22.0 - 32.5 seconds   CBC   Result Value Ref Range    WBC 3.6 (L) 4.4 - 11.3 x10*3/uL    nRBC 0.0 0.0 - 0.0 /100 WBCs    RBC 2.26 (L) 4.50 - 5.90 x10*6/uL    Hemoglobin 7.4 (L) 13.5 - 17.5 g/dL    Hematocrit 21.9 (L) 41.0 - 52.0 %    MCV 97 80 - 100 fL    MCH 32.7 26.0 - 34.0 pg    MCHC 33.8 32.0 - 36.0 g/dL    RDW 15.9 (H) 11.5 - 14.5 %    Platelets 252 150 - 450 x10*3/uL   Comprehensive Metabolic Panel   Result Value Ref Range    Glucose 114 (H) 65 - 99 mg/dL    Sodium 137 133 - 145 mmol/L    Potassium 3.2 (L) 3.4 - 5.1 mmol/L    Chloride 102 97 - 107 mmol/L    Bicarbonate 27 24 - 31 mmol/L    Urea Nitrogen 3 (L) 8 - 25 mg/dL    Creatinine 0.70 0.40 - 1.60 mg/dL    eGFR >90 >60 mL/min/1.73m*2    Calcium 8.4 (L) 8.5 - 10.4 mg/dL    Albumin 2.3 (L) 3.5 - 5.0 g/dL    Alkaline Phosphatase 43 35 - 125 U/L    Total Protein 5.2 (L) 5.9 - 7.9 g/dL    AST 60 (H) 5 - 40 U/L    Bilirubin, Total <0.2 0.1 - 1.2 mg/dL    ALT 63 (H) 5 - 40 U/L    Anion Gap 8 <=19 mmol/L   APTT   Result Value Ref Range    aPTT 71.7 (H) 22.0 - 32.5 seconds   TSH with reflex to Free T4 if abnormal   Result Value Ref Range    Thyroid Stimulating Hormone 7.63 (H) 0.27 - 4.20 mIU/L   Thyroxine, Free   Result Value Ref Range    Thyroxine, Free 0.90 0.90 - 1.70 ng/dL   POCT GLUCOSE   Result Value Ref Range    POCT Glucose 108 (H) 74 - 99 mg/dL   POCT GLUCOSE   Result Value Ref Range    POCT Glucose 111 (H) 74 - 99 mg/dL     MR brain w and wo IV contrast    Result Date: 1/9/2024  STUDY: MR BRAIN w + wo CONTRAST; 01/09/2024 9:50 PM INDICATION:  Shunt infection.   Additional History:  Lung cancer. COMPARISON: XR skull 01/09/2024. MR brain 10/13/2023. NM PET/CT 10/06/2023.  ACCESSION NUMBER(S): [id number] ORDERING CLINICIAN: Radha Aleman TECHNIQUE:  Multi-planar, multi-sequence MR imaging of the brain was performed without and with intravenous contrast.  Dotarem 0.5 mL was administered intravenously. FINDINGS: Diffusion-weighted imaging demonstrates no area of restricted diffusion to suggest acute infarction.  There is no intracranial hemorrhage, midline shift, mass effect, or extra-axial fluid collection.  Generalized cerebral atrophy noted.  There are no areas of abnormal enhancement.  There are mild scattered patchy areas of T2 prolongation within the subcortical and deep periventricular white matter, suggesting changes of chronic microvascular ischemia  Brain parenchyma otherwise demonstrates normal morphology and signal characteristics.  Midline structures are within normal limits. Major intracranial vessels demonstrate preserved flow voids.  Brain volume and ventricular system are within normal limits for age.  Mild chronic secretions within the ethmoid air cells and maxillary sinuses noted.  Orbits, globes, and mastoid air cells are unremarkable.    Likely sequela of chronic small vessel ischemic disease. No acute intracranial findings. No suspicious region of abnormal enhancement. Signed by David Krause II, MD    CT angio chest for pulmonary embolism    Result Date: 1/9/2024  STUDY: CT Angiogram of the Chest; 01/09/2024 at 8:40 PM INDICATION: Shortness of breath. Evaluate for pulmonary embolism. COMPARISON: CT abdomen and pelvis 01/07/24. XR chest 01/05/24. CT chest, NM PET-CT 10/06/23. ACCESSION NUMBER(S): [id number] ORDERING CLINICIAN: RADHA ALEMAN TECHNIQUE:  CTA of the chest was performed with intravenous contrast. Images are reviewed and processed at a workstation according to the CT angiogram protocol with 3-D and/or MIP post processing imaging generated.   Omnipaque-350 75 mL was administered intravenously. Automated mA/kV exposure control was utilized and patient examination was performed in strict accordance with principles of ALARA. FINDINGS: Pulmonary arteries are adequately opacified without acute or chronic filling defects.  The thoracic aorta is normal in course and caliber without dissection or aneurysm. The heart is normal in size without pericardial effusion. Atherosclerosis of the thoracic aorta and coronary arteries is present.  There appears to be thickening of the esophagus and/or adjacent adenopathy image 33 series 4.  This is new compared with prior exam. Follow-up endoscopy recommended for further assessment.  Layering debris within the LEFT mainstem bronchus appears to be present, possibly fluid.  Trace amount of fluid in the RIGHT mainstem bronchus also appears to be present.  Clinical correlation for aspiration recommended.  Follow-up endoscopy recommended for further assessment.  Small-to-moderate size right-sided pleural effusion is present.  Small LEFT effusion is present.  Emphysematous changes are present.  RIGHT middle lobe consolidation has developed.  RIGHT upper lobe consolidation also noted.  This appears related to volume loss. This is best appreciated on the coronal series.  Presumed postradiation scarring in the LEFT upper lobe also noted, similar prior study.  Bibasilar consolidation is also present.  Clinical correlation for signs of infection recommended.  Aspiration suspected. Small presumed cyst measuring about the anterior aspect hepatic lobe is present measuring 1.2 cm.  Additional smaller lesion about the anterior aspect of the medial segment LEFT hepatic lobe measures 0.9 cm. Senescent changes of the thoracic spine are present.  There are no acute fractures.  No suspicious bony lesions.    1. There is no evidence of pulmonary embolus. 2. Thickening of the esophagus and/or adjacent adenopathy. Follow-up endoscopy recommended  for further assessment. 3. Debris within the mainstem bronchi, LEFT greater than RIGHT. Bibasilar consolidation noted. Aspiration suspected. Follow-up bronchoscopy recommended. 4. Bilateral pleural effusions, RIGHT greater than LEFT. 5. Emphysematous changes are present. Presumed postradiation changes LEFT upper lobe noted. Signed by David Krause II, MD    XR skull 1-2 views    Result Date: 1/9/2024  Interpreted By:  Lisa Lezama, STUDY: XR SKULL 1-3 VIEWS 1/9/2024 7:35 pm   INDICATION: Signs/Symptoms:To check for shunt versus coil prior to MRI.   COMPARISON: None available.   ACCESSION NUMBER(S): [id number]   ORDERING CLINICIAN: JF ALEMAN   TECHNIQUE: Two views of the calvarium are completed.   FINDINGS: There is a 9 mm device implanted within the midline of the brain just anterior and superior to the sella turcica. The exact etiology of this device is unclear but clinical correlation with surgical history is advised. This does not have the appearance of a shunt device. Of note, the patient did have MRI of the brain on 10/13/2023.       A 9 mm rectangular shaped device is seen in the midline of the brain just anterior and superior to the sella turcica with exact etiology unclear. Clinical correlation is necessary. This does not have the appearance of a shunt device however.   MRI of the brain was performed on 10/13/2023.   Signed by: Lisa Lezama 1/9/2024 8:31 PM Dictation workstation:   [id number]    CT abdomen pelvis w IV contrast    Result Date: 1/7/2024  Interpreted By:  Lisa Lezama, STUDY: CT ABDOMEN PELVIS W IV CONTRAST;  1/7/2024 12:26 pm   INDICATION: Abdominal pain   COMPARISON: Fused PET/CT study of 10/06/2023   ACCESSION NUMBER(S): [id number]   ORDERING CLINICIAN: JF ALEMAN   TECHNIQUE: CT of the abdomen and pelvis was performed with intravenous contrast administered. 75 mL of Omnipaque 350 was injected intravenously for the study with sagittal and coronal reformations performed by the technologist  at the acquisition scanner.   All CT examinations are performed with 1 or more of the following dose reduction techniques: Automated exposure control, adjustment of mA and/or kv according to patient's size, or use of iterative reconstruction techniques.   FINDINGS: LOWER CHEST: There are severe emphysematous changes seen within the lower lung zones with minimal right pleural effusion and with mild atelectasis at the right lung base.   ABDOMEN:   LIVER: There are several hypodense space-occupying liver lesions measuring up to 2.3 cm in size without interval change compared with prior study. These are most consistent with stable hepatic cysts.   BILE DUCTS: No intrahepatic or extrahepatic biliary ductal dilatation.   GALLBLADDER: Unremarkable   PANCREAS: Unremarkable   SPLEEN: Unremarkable   ADRENAL GLANDS: Unremarkable   KIDNEYS AND URETERS: There is a 1.7 cm right renal cyst seen with left kidney unremarkable. There is mild bilateral perinephric scarring. MRCP   PELVIS:   BLADDER: No abnormality of the urinary bladder is seen.   REPRODUCTIVE ORGANS: Prostate gland is enlarged with a transverse diameter of 4.8 cm.   BOWEL: Colonic diverticulosis is seen without evidence for diverticulitis. The bowel is difficult to accurately evaluate due to patient motion. No pneumatosis intestinalis is seen. No portal venous gas is observed.   VESSELS: There is atherosclerosis of the abdominal aorta without aneurysmal dilatation. Calcified plaque is visible within the superior mesenteric artery. There is a filling defect seen within the proximal superior mesenteric artery identified roughly 7 mm distal to its origin. I believe that this represents partial thrombosis. There is flow seen more distally within the superior mesenteric artery.   PERITONEUM/RETROPERITONEUM/LYMPH NODES: A small amount of free fluid within the pelvic cul-de-sac is identified.   BONES AND ABDOMINAL WALL: Small bilateral fat containing inguinal hernias are  present with left inguinal hernia larger than right. A very tiny fat containing umbilical hernia is observed.       1.  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 However, there is flow noted within the distal SMA. 2. Small amount of free fluid within the pelvic cul-de-sac. 3. Colonic diverticulosis without diverticulitis. 4. Hepatic cysts measuring up to 2.3 cm in size with 1.7 cm right renal cyst. 5. Severe pulmonary emphysema with minimal right pleural effusion and with atelectasis at right lung base. 6. Small bilateral fat containing inguinal hernias with very tiny fat containing umbilical hernia     MACRO: None   Signed by: Lisa Lezama 1/7/2024 12:53 PM Dictation workstation:   [id number]    XR chest 1 view    Result Date: 1/5/2024  STUDY: Chest Radiograph;  01/05/2024 9:39 PM INDICATION: Cough, fever.  Additional History:  Lung cancer. COMPARISON: CT chest 10/06/2023.  NM PET/CT 10/06/2023.  ACCESSION NUMBER(S): [id number] ORDERING CLINICIAN: ARTEM MOSER TECHNIQUE:  Frontal chest was obtained at 2139 hours. FINDINGS: CARDIOMEDIASTINAL SILHOUETTE: Cardiomediastinal silhouette is normal in size and configuration.  LUNGS: Bandlike scarring/atelectasis LEFT midlung noted.  ABDOMEN: No remarkable upper abdominal findings.  BONES: No acute osseous changes.    Bandlike scarring/atelectasis LEFT midlung. Signed by Artem Krause II, MD        Assessment/Plan     Abnormal CT, Anemia (CT showing esophageal wall thickening, he does have reflux symptoms)  -Likely related to lung cancer treatments, radiation induced esophagitis. Recommend PPI and Carafate   -No urgent indication for endoscopy   -Supportive care   -OK for diet as tolerated   -Discussed with Dr Adan    I spent 30 minutes in the professional and overall care of this patient.

## 2024-01-11 NOTE — PROGRESS NOTES
Occupational Therapy    Occupational Therapy Treatment    Name: Martínez Neri  MRN: [id number]  : 1960  Date: 24  Time Calculation  Start Time: 1106  Stop Time: 1121  Time Calculation (min): 15 min    Assessment:  Prognosis: Good  Evaluation/Treatment Tolerance: Patient tolerated treatment well  End of Session Communication: Bedside nurse  End of Session Patient Position:  (w/c with transport staff)  Plan:  Treatment Interventions: ADL retraining, Functional transfer training, UE strengthening/ROM, Endurance training, Patient/family training, Neuromuscular reeducation, Compensatory technique education  OT Frequency: 2 times per week  OT Discharge Recommendations: Low intensity level of continued care, 24 hr supervision due to cognition  OT Recommended Transfer Status: Assist of 1  OT - OK to Discharge: Yes    Subjective   Previous Visit Info:  OT Last Visit  OT Received On: 24  General:  General  Family/Caregiver Present: Yes  Caregiver Feedback: Spouse and daughter  Prior to Session Communication: Bedside nurse  Patient Position Received: Bed, 4 rail up  Preferred Learning Style: verbal  General Comment: Patient in bed upon arrival and agreeable to participate with encouragement from his wife and daughter  Precautions:  Medical Precautions: Fall precautions, Oxygen therapy device and L/min (2L O2 via NC)  Pain Assessment:  Pain Assessment  Pain Assessment: 0-10  Pain Score: 0 - No pain     Objective   Activities of Daily Living: Grooming  Grooming Comments: patient and his family report that he has not completed ADLs, he is educated on the importance of proper hygiene    Functional Standing Tolerance:  Functional Standing Tolerance  Time: ~2 minutes  Activity: static stand and functional mobility  Functional Standing Tolerance Comments: Fair+ balance  Bed Mobility/Transfers: Bed Mobility  Bed Mobility: Yes  Bed Mobility 1  Bed Mobility 1: Supine to sitting  Level of Assistance 1: Independent  Bed  Mobility Comments 1: head of bed elevated    Transfers  Transfer: Yes  Transfer 1  Transfer From 1: Bed to  Transfer to 1: Stand  Technique 1: Sit to stand  Transfer Device 1:  (no AD)  Transfer Level of Assistance 1: Distant supervision  Trials/Comments 1: close supervision to complete functional mobility to the w/c for testing (x3 trials for stand throughout)    Therapy/Activity: Therapeutic Exercise  Therapeutic Exercise Performed: Yes  Therapeutic Exercise Activity 1: sitting EOB, patient completes 1x20 B UE AROM exercises. exercises include bicep curls, chest press. Transport then arrives to patient's room to take him down for testing.  Therapeutic Exercise Activity 2: after patient leaves room with transport, therapist educates patient's daughter and spouse on ther ex. exercises demonstrated and patient's daughter reports that she understands. education on number of repeitions and number of sets    Therapeutic Activity  Therapeutic Activity Performed: Yes  Therapeutic Activity 1: patient sits EOB ~8 minutes with Fair+ balance    Outcome Measures:  [facility] Daily Activity  Putting on and taking off regular lower body clothing: A little  Bathing (including washing, rinsing, drying): A little  Putting on and taking off regular upper body clothing: A little  Toileting, which includes using toilet, bedpan or urinal: A little  Taking care of personal grooming such as brushing teeth: A little  Eating Meals: A little  Daily Activity - Total Score: 18    Education Documentation  Body Mechanics, taught by Cecilia Jasso OT at 1/11/2024 11:39 AM.  Learner: Patient  Readiness: Acceptance  Method: Explanation, Demonstration  Response: Needs Reinforcement    Precautions, taught by Cecilia Jasso OT at 1/11/2024 11:39 AM.  Learner: Patient  Readiness: Acceptance  Method: Explanation, Demonstration  Response: Needs Reinforcement    Home Exercise Program, taught by Cecilia Jasso OT at 1/11/2024 11:39 AM.  Learner:  Patient  Readiness: Acceptance  Method: Explanation, Demonstration  Response: Needs Reinforcement    Education Comments  No comments found.      Goals:  Encounter Problems       Encounter Problems (Active)       OT Goals       ADLs (Progressing)       Start:  01/06/24    Expected End:  01/31/24       Patient will complete ADL tasks with Tyler in order to safely return to PLOF.         Functional Transfers (Progressing)       Start:  01/06/24    Expected End:  01/31/24       Patient will complete functional transfers with Tyler in order to increase patient's safety and independence with daily tasks.         B UE Strength  (Progressing)       Start:  01/06/24    Expected End:  01/31/24       Patient will increase B UE strength to 4+/5 for functional transfers.         Functional Mobility (Progressing)       Start:  01/06/24    Expected End:  01/31/24       Patient will demonstrate the ability to complete item retrieval and functional mobility with Tyler in order to increase patient's safety and independence with daily tasks.

## 2024-01-11 NOTE — PROGRESS NOTES
Physical Therapy    Physical Therapy Treatment    Patient Name: Martínez Neri  MRN: [id number]  Today's Date: 1/11/2024  Time Calculation  Start Time: 1514  Stop Time: 1545  Time Calculation (min): 31 min       Assessment/Plan   PT Assessment  Evaluation/Treatment Tolerance: Patient limited by fatigue, Patient tolerated treatment well  Medical Staff Made Aware: Yes  End of Session Communication: Bedside nurse  End of Session Patient Position:  (Sitting at EOB with RN, spouse, and Nurse manager in room. RN clears Pt to remain sitting at EOB)  PT Plan  Inpatient/Swing Bed or Outpatient: Inpatient  PT Plan  Treatment/Interventions: Transfer training, Gait training, Balance training, Strengthening, Endurance training, Therapeutic exercise, Therapeutic activity, Home exercise program  PT Plan: Skilled PT  PT Frequency: 4 times per week  PT Discharge Recommendations: Low intensity level of continued care  PT Recommended Transfer Status: Contact guard  PT - OK to Discharge: Yes      General Visit Information:   PT  Visit  PT Received On: 01/11/24  Response to Previous Treatment: Patient with no complaints from previous session.  General  Family/Caregiver Present: Yes  Caregiver Feedback: spouse+DTR  Prior to Session Communication: Bedside nurse  Patient Position Received: Bed, 4 rail up, Alarm off, caregiver present, Alarm off, not on at start of session  Preferred Learning Style: verbal  General Comment: Agreeable to PT follow up    Subjective   Precautions:  Precautions  Medical Precautions: Fall precautions  Precautions Comment: Reverse Precautions  Vital Signs:   Pt down to 2L NC today. SPO2 WNLs while on 2L NC but he does briefly take O2 off to blow his nose. SPO2 checked at this time on RA and is 85%. Pt instructed to replace NC back on which does improve O2 back to WNLs ~1min.     Objective   Pain:  Pain Assessment  Pain Assessment: 0-10  Pain Score: 0 - No pain    Activity Tolerance:  Activity Tolerance  Endurance:  Decreased tolerance for upright activites  Activity Tolerance Comments: Fatigues easily with standing activities  Treatments:  Therapeutic Exercise  Therapeutic Exercise Performed: No (Declines exercise today)    Bed Mobility  Bed Mobility: Yes  Bed Mobility 1  Bed Mobility 1: Supine to sitting  Level of Assistance 1: Independent    Ambulation/Gait Training  Ambulation/Gait Training Performed: No (Pt states he ambulated earlier today. Declines ambulation at this time)  Transfers  Transfer: Yes  Transfer 1  Transfer From 1: Bed to  Transfer to 1: Stand  Technique 1: Sit to stand  Transfer Device 1:  (no AD)  Transfer Level of Assistance 1: Close supervision  Trials/Comments 1: ~6-8 intermittent stands from EOB with CS given for safety. Some sway appreciated but no LOB. Line management assist provided. Tolerates standing variable times for each trial but up to several mins at a time. Vitals monitored throughout    Outcome Measures:  [facility] Basic Mobility  Turning from your back to your side while in a flat bed without using bedrails: None  Moving from lying on your back to sitting on the side of a flat bed without using bedrails: None  Moving to and from bed to chair (including a wheelchair): None  Standing up from a chair using your arms (e.g. wheelchair or bedside chair): None  To walk in hospital room: A little  Climbing 3-5 steps with railing: A little  Basic Mobility - Total Score: 22    Education Documentation  Mobility Training, taught by Rosendo Moses, PT at 1/11/2024  4:21 PM.  Learner: Significant Other, Family, Patient  Readiness: Acceptance  Method: Explanation, Demonstration  Response: Needs Reinforcement, Verbalizes Understanding  Comment: safe mobility techniques, pursed lip breathing, SPO2 management and norms, importance of OOB activity    Education Comments  No comments found.        OP EDUCATION:       Encounter Problems       Encounter Problems (Active)       Balance       Standing Balance  (Progressing)       Start:  01/09/24    Expected End:  01/23/24       Pt will demonstrate good static standing balance to promote safe participation with out of bed activity, transfers, and mobility              Mobility       Ambulation (Progressing)       Start:  01/09/24    Expected End:  01/23/24       Pt will ambulate 50' modified independent assist with no device to promote safe home mobility              Safety       Safe Mobility Techniques (Progressing)       Start:  01/09/24    Expected End:  01/23/24       Pt will correctly identify and demonstrate safe mobility techniques to reduce their risks for falls during their acute care stay            Fall Risk Management (Progressing)       Start:  01/09/24    Expected End:  01/23/24       Pt will demonstrate 14 stands from chair in 30s to achieve their CDC Steadi Goal and Reduce Risk for Falls    1.) May NOT use arms for the transfer  2.) Must achieve full standing position    Chair Stand Below Average Scores  Age-------Men----Women  (60-64)----(<14)----(<12)  (65-69)----(<12)----(<11)  (70-74)----(<12)----(<10)  (75-79)----(<11)----(<10)  (80-84)----(<10)----(<9 )  (85-89)----(<8 )----(<8 )  (90-94)----(<7 )----(<4 )    A below average score indicates a risk for falls  www.cdc.gov/steadi              Transfers       Sit to stand (Progressing)       Start:  01/09/24    Expected End:  01/23/24       Pt will transfer sit to standing position with modified independent assist and no device to promote safe out of bed activity

## 2024-01-11 NOTE — CONSULTS
Consults    Reason For Consult  Hypoxia, lung cancer    History Of Present Illness  Martínez Neri is a 63 y.o. male with past medical history includes but is not limited to non-small cell lung cancer diagnosed June '23; currently receiving chemotherapy/radiation, asthma, COPD and tobacco use who presented to [facility] emergency department yesterday for evaluation of fever, dyspnea and mild productive cough for clear sputum.     Past Medical History  Past Medical History:   Diagnosis Date    Allergic rhinitis     Asthma     Brain aneurysm     Cancer (CMS/HCC)     Carotid artery disease (CMS/HCC)     COPD (chronic obstructive pulmonary disease) (CMS/HCC)     Epilepsy (CMS/HCC)     High cholesterol     History of alcohol use     Hypertension     Lung cancer (CMS/HCC)        Surgical History  Past Surgical History:   Procedure Laterality Date    CAROTID ENDARTERECTOMY N/A     CEREBRAL ANEURYSM REPAIR      COLONOSCOPY      HERNIA REPAIR          Social History  Social History     Tobacco Use    Smoking status: Every Day     Packs/day: 1.00     Years: 50.00     Additional pack years: 0.00     Total pack years: 50.00     Types: Cigarettes     Passive exposure: Current    Smokeless tobacco: Never   Vaping Use    Vaping Use: Never used   Substance Use Topics    Alcohol use: Not Currently     Comment: quit 7 years ago    Drug use: Yes     Frequency: 4.0 times per week     Types: Marijuana       Family History  Family History   Problem Relation Name Age of Onset    Heart attack Mother      Other (Colorectal cancer) Father      Aneurysm Other      Prostate cancer Other      Alcohol abuse Other            Allergies  Patient has no known allergies.    Review of Systems   Constitutional:  Positive for fatigue and fever.   HENT: Negative.     Eyes: Negative.    Respiratory:  Positive for cough and shortness of breath.    Cardiovascular: Negative.    Gastrointestinal: Negative.    Endocrine: Negative.   "  Genitourinary: Negative.    Musculoskeletal: Negative.    Allergic/Immunologic: Negative.    Neurological: Negative.    Hematological: Negative.    Psychiatric/Behavioral: Negative.          Physical Exam  Vitals (On 2 L nasal cannula oxygen; O2 sats 93%.) and nursing note reviewed.   Constitutional:       Appearance: Normal appearance.   HENT:      Head: Normocephalic and atraumatic.      Nose: Nose normal.      Mouth/Throat:      Mouth: Mucous membranes are moist.   Eyes:      Extraocular Movements: Extraocular movements intact.      Conjunctiva/sclera: Conjunctivae normal.      Pupils: Pupils are equal, round, and reactive to light.   Cardiovascular:      Rate and Rhythm: Normal rate and regular rhythm.      Pulses: Normal pulses.      Heart sounds: Normal heart sounds.   Pulmonary:      Effort: Pulmonary effort is normal.      Comments: Lungs diminished but clear.  Abdominal:      General: Bowel sounds are normal.      Palpations: Abdomen is soft.   Musculoskeletal:         General: Normal range of motion.   Skin:     General: Skin is warm and dry.      Capillary Refill: Capillary refill takes less than 2 seconds.   Neurological:      General: No focal deficit present.      Mental Status: He is alert and oriented to person, place, and time.   Psychiatric:         Mood and Affect: Mood normal.         Behavior: Behavior normal.          Vital Signs  Blood pressure 127/53, pulse 68, temperature 36.3 °C (97.3 °F), temperature source Oral, resp. rate 18, height 1.753 m (5' 9.02\"), weight 76.5 kg (168 lb 10.4 oz), SpO2 96 %.  Oxygen Therapy  SpO2: 96 %  Medical Gas Therapy: Supplemental oxygen (3.5L)  O2 Delivery Method: Nasal cannula     atorvastatin, 40 mg, oral, Daily  cholecalciferol, 2,000 Units, oral, Daily  divalproex, 750 mg, oral, BID  lacosamide, 200 mg, oral, BID  nicotine, 1 patch, transdermal, q24h  piperacillin-tazobactam, 4.5 g, intravenous, q6h  potassium chloride CR, 40 mEq, oral, Once  thiamine, " 100 mg, oral, Daily  tiotropium, 2 Inhalation, inhalation, Daily     PRN medications: acetaminophen **OR** acetaminophen **OR** acetaminophen, benzocaine-menthol, dextromethorphan-guaifenesin, guaiFENesin, heparin (porcine), ipratropium-albuteroL, LORazepam, ondansetron ODT, polyethylene glycol, prochlorperazine   dextrose 5%-0.45 % sodium chloride, 80 mL/hr, Last Rate: 80 mL/hr (01/10/24 1933)  heparin, 0-4,500 Units/hr, Last Rate: 1,100 Units/hr (01/11/24 0400)       Relevant Results  Results for orders placed or performed during the hospital encounter of 01/05/24 (from the past 24 hour(s))   Ammonia   Result Value Ref Range    Ammonia 62 (H) 12 - 45 umol/L   APTT   Result Value Ref Range    aPTT 62.2 (H) 22.0 - 32.5 seconds   aPTT   Result Value Ref Range    aPTT 57.8 (H) 22.0 - 32.5 seconds   Protime-INR   Result Value Ref Range    Protime 12.1 9.3 - 12.7 seconds    INR 1.2 0.9 - 1.2   POCT GLUCOSE   Result Value Ref Range    POCT Glucose 154 (H) 74 - 99 mg/dL   APTT   Result Value Ref Range    aPTT 62.1 (H) 22.0 - 32.5 seconds   CBC   Result Value Ref Range    WBC 3.6 (L) 4.4 - 11.3 x10*3/uL    nRBC 0.0 0.0 - 0.0 /100 WBCs    RBC 2.26 (L) 4.50 - 5.90 x10*6/uL    Hemoglobin 7.4 (L) 13.5 - 17.5 g/dL    Hematocrit 21.9 (L) 41.0 - 52.0 %    MCV 97 80 - 100 fL    MCH 32.7 26.0 - 34.0 pg    MCHC 33.8 32.0 - 36.0 g/dL    RDW 15.9 (H) 11.5 - 14.5 %    Platelets 252 150 - 450 x10*3/uL   Comprehensive Metabolic Panel   Result Value Ref Range    Glucose 114 (H) 65 - 99 mg/dL    Sodium 137 133 - 145 mmol/L    Potassium 3.2 (L) 3.4 - 5.1 mmol/L    Chloride 102 97 - 107 mmol/L    Bicarbonate 27 24 - 31 mmol/L    Urea Nitrogen 3 (L) 8 - 25 mg/dL    Creatinine 0.70 0.40 - 1.60 mg/dL    eGFR >90 >60 mL/min/1.73m*2    Calcium 8.4 (L) 8.5 - 10.4 mg/dL    Albumin 2.3 (L) 3.5 - 5.0 g/dL    Alkaline Phosphatase 43 35 - 125 U/L    Total Protein 5.2 (L) 5.9 - 7.9 g/dL    AST 60 (H) 5 - 40 U/L    Bilirubin, Total <0.2 0.1 - 1.2 mg/dL     ALT 63 (H) 5 - 40 U/L    Anion Gap 8 <=19 mmol/L   APTT   Result Value Ref Range    aPTT 71.7 (H) 22.0 - 32.5 seconds   TSH with reflex to Free T4 if abnormal   Result Value Ref Range    Thyroid Stimulating Hormone 7.63 (H) 0.27 - 4.20 mIU/L   Thyroxine, Free   Result Value Ref Range    Thyroxine, Free 0.90 0.90 - 1.70 ng/dL   POCT GLUCOSE   Result Value Ref Range    POCT Glucose 108 (H) 74 - 99 mg/dL      MR brain w and wo IV contrast  Result Date: 1/9/2024  Diffusion-weighted imaging demonstrates no area of restricted diffusion to suggest acute infarction.  There is no intracranial hemorrhage, midline shift, mass effect, or extra-axial fluid collection.  Generalized cerebral atrophy noted.  There are no areas of abnormal enhancement.  There are mild scattered patchy areas of T2 prolongation within the subcortical and deep periventricular white matter, suggesting changes of chronic microvascular ischemia  Brain parenchyma otherwise demonstrates normal morphology and signal characteristics.  Midline structures are within normal limits. Major intracranial vessels demonstrate preserved flow voids.  Brain volume and ventricular system are within normal limits for age.  Mild chronic secretions within the ethmoid air cells and maxillary sinuses noted.  Orbits, globes, and mastoid air cells are unremarkable.  Likely sequela of chronic small vessel ischemic disease. No acute intracranial findings. No suspicious region of abnormal enhancement.     CT angio chest for pulmonary embolism  Result Date: 1/9/2024  Pulmonary arteries are adequately opacified without acute or chronic filling defects.  The thoracic aorta is normal in course and caliber without dissection or aneurysm. The heart is normal in size without pericardial effusion. Atherosclerosis of the thoracic aorta and coronary arteries is present.  There appears to be thickening of the esophagus and/or adjacent adenopathy image 33 series 4.  This is new compared  with prior exam. Follow-up endoscopy recommended for further assessment.  Layering debris within the LEFT mainstem bronchus appears to be present, possibly fluid.  Trace amount of fluid in the RIGHT mainstem bronchus also appears to be present.  Clinical correlation for aspiration recommended.  Follow-up endoscopy recommended for further assessment.  Small-to-moderate size right-sided pleural effusion is present.  Small LEFT effusion is present.  Emphysematous changes are present.  RIGHT middle lobe consolidation has developed.  RIGHT upper lobe consolidation also noted.  This appears related to volume loss. This is best appreciated on the coronal series.  Presumed postradiation scarring in the LEFT upper lobe also noted, similar prior study.  Bibasilar consolidation is also present.  Clinical correlation for signs of infection recommended.  Aspiration suspected. Small presumed cyst measuring about the anterior aspect hepatic lobe is present measuring 1.2 cm.  Additional smaller lesion about the anterior aspect of the medial segment LEFT hepatic lobe measures 0.9 cm. Senescent changes of the thoracic spine are present.  There are no acute fractures.  No suspicious bony lesions.1. There is no evidence of pulmonary embolus. 2. Thickening of the esophagus and/or adjacent adenopathy. Follow-up endoscopy recommended for further assessment. 3. Debris within the mainstem bronchi, LEFT greater than RIGHT. Bibasilar consolidation noted. Aspiration suspected. Follow-up bronchoscopy recommended. 4. Bilateral pleural effusions, RIGHT greater than LEFT. 5. Emphysematous changes are present. Presumed postradiation changes LEFT upper lobe noted.     XR skull 1-2 views  Result Date: 1/9/2024  Interpreted By:  Lisa Lezama, STUDY: XR SKULL 1-3 VIEWS There is a 9 mm device implanted within the midline of the brain just anterior and superior to the sella turcica. The exact etiology of this device is unclear but clinical correlation  with surgical history is advised. This does not have the appearance of a shunt device. Of note, the patient did have MRI of the brain on 10/13/2023.     A 9 mm rectangular shaped device is seen in the midline of the brain just anterior and superior to the sella turcica with exact etiology unclear. Clinical correlation is necessary. This does not have the appearance of a shunt device however.   MRI of the brain was performed on 10/13/2023.       CT abdomen pelvis w IV contrast  Result Date: 1/7/2024  LOWER CHEST: There are severe emphysematous changes seen within the lower lung zones with minimal right pleural effusion and with mild atelectasis at the right lung base.   ABDOMEN:   LIVER: There are several hypodense space-occupying liver lesions measuring up to 2.3 cm in size without interval change compared with prior study. These are most consistent with stable hepatic cysts.   BILE DUCTS: No intrahepatic or extrahepatic biliary ductal dilatation.   GALLBLADDER: Unremarkable   PANCREAS: Unremarkable   SPLEEN: Unremarkable   ADRENAL GLANDS: Unremarkable   KIDNEYS AND URETERS: There is a 1.7 cm right renal cyst seen with left kidney unremarkable. There is mild bilateral perinephric scarring. MRCP   PELVIS:   BLADDER: No abnormality of the urinary bladder is seen.   REPRODUCTIVE ORGANS: Prostate gland is enlarged with a transverse diameter of 4.8 cm.   BOWEL: Colonic diverticulosis is seen without evidence for diverticulitis. The bowel is difficult to accurately evaluate due to patient motion. No pneumatosis intestinalis is seen. No portal venous gas is observed.   VESSELS: There is atherosclerosis of the abdominal aorta without aneurysmal dilatation. Calcified plaque is visible within the superior mesenteric artery. There is a filling defect seen within the proximal superior mesenteric artery identified roughly 7 mm distal to its origin. I believe that this represents partial thrombosis. There is flow seen more  distally within the superior mesenteric artery.   PERITONEUM/RETROPERITONEUM/LYMPH NODES: A small amount of free fluid within the pelvic cul-de-sac is identified.   BONES AND ABDOMINAL WALL: Small bilateral fat containing inguinal hernias are present with left inguinal hernia larger than right. A very tiny fat containing umbilical hernia is observed.  1.  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 However, there is flow noted within the distal SMA. 2. Small amount of free fluid within the pelvic cul-de-sac. 3. Colonic diverticulosis without diverticulitis. 4. Hepatic cysts measuring up to 2.3 cm in size with 1.7 cm right renal cyst. 5. Severe pulmonary emphysema with minimal right pleural effusion and with atelectasis at right lung base. 6. Small bilateral fat containing inguinal hernias with very tiny fat containing umbilical hernia    XR chest 1 view  Result Date: 1/5/2024  Cardiomediastinal silhouette is normal in size and configuration.  LUNGS: Bandlike scarring/atelectasis LEFT midlung noted.  ABDOMEN: No remarkable upper abdominal findings.  BONES: No acute osseous changes. Bandlike scarring/atelectasis LEFT midlung.       Impression  Acute hypoxic respiratory failure  Non-small cell lung cancer  Aspiration pneumonia  Esophageal abnormality  COPD  Asthma  Hypokalemia  Tobacco use        Plan  Wean oxygen as sats allow  Continue IBD/ICS  Continuous pulse oximetry  Incentive spirometry/pulmonary hygiene  Zosyn per Infectious Disease  Follow-up cultures  Replace potassium  GI consulted  Heme-Onc following  FEV1 when optimized  PT/OT  Reviewed with collaborating physician Dr. Sahu  Thank you for this consultation      Heather Frey, APRN-CNP  [facility]

## 2024-01-11 NOTE — PROGRESS NOTES
Speech-Language Pathology                 Therapy Communication Note    Patient Name: Martínez Neri  MRN: [id number]  Today's Date: 1/11/2024     Discipline: Speech Language Pathology    Missed Visit Reason:  fluoro room remains down, anticipated to be repaired mid-end of next week per radiology. Family and RN updated and made aware of delays.     MBSS can be completed at outpatient level as needed.     Missed Time: Cancel    Comment:

## 2024-01-11 NOTE — CARE PLAN
The patient's goals for the shift include get stronger to walk    The clinical goals for the shift include promote safety and comfort    Over the shift, the patient did not make progress toward the following goals. Barriers to progression include . Recommendations to address these barriers include .

## 2024-01-11 NOTE — PROGRESS NOTES
Vancomycin Dosing by Pharmacy- Cessation of Therapy    Consult to pharmacy for vancomycin dosing has been discontinued by the prescriber, pharmacy will sign off at this time.    Please call pharmacy if there are further questions or re-enter a consult if vancomycin is resumed.     Norm Singh, GordyD

## 2024-01-11 NOTE — CONSULTS
Inpatient consult to Neurology  Consult performed by: Malena Cosme, ARIC-CNP  Consult ordered by: Radha Casey MD          History Of Present Illness  Martínez Neri is a 63 y.o. male with history of CAD, brain aneurysm with coil thousand 19, recent diagnosis June 2023 of non-small cell carcinoma of the lung presenting with concerns for altered mental status and neutropenia.  Patient is alert and oriented to self and date today however the story he tells me as to why he is here is not consistent with the actual reason for admission  I called his daughter who is point of contact and she discussed with me that he has been confused over the last few years she does not particularly think it is any worse recently and that he does have good days and bad days.  She notes a tremor that he has had since at least 2017 primarily of the face and jaw I did not note this today during conversation.  He also has a history of seizures diagnosed in 2019 initially sounded to be partial seizures however months later he had 1-2 grand mal seizures.  Last seizure was 2 years ago.  He was previously followed by neurology in [place] and has not seen neurology in over a year.  Takes Vimpat and Depakote for seizure management.  Also has concerns as he was getting routine MRA's and has not had 1 in over a year.    Recent MRI of the brain without acute findings.  He is on antibiotics as he was admitted for febrile neutropenia.    Past Medical History  Past Medical History:   Diagnosis Date    Allergic rhinitis     Asthma     Brain aneurysm     Cancer (CMS/HCC)     Carotid artery disease (CMS/HCC)     COPD (chronic obstructive pulmonary disease) (CMS/HCC)     Epilepsy (CMS/HCC)     High cholesterol     History of alcohol use     Hypertension     Lung cancer (CMS/HCC)      Surgical History  Past Surgical History:   Procedure Laterality Date    CAROTID ENDARTERECTOMY N/A     CEREBRAL ANEURYSM REPAIR      COLONOSCOPY      HERNIA REPAIR        Social History  Social History     Tobacco Use    Smoking status: Every Day     Packs/day: 1.00     Years: 50.00     Additional pack years: 0.00     Total pack years: 50.00     Types: Cigarettes     Passive exposure: Current    Smokeless tobacco: Never   Vaping Use    Vaping Use: Never used   Substance Use Topics    Alcohol use: Not Currently     Comment: quit 7 years ago    Drug use: Yes     Frequency: 4.0 times per week     Types: Marijuana     Allergies  Patient has no known allergies.  Medications Prior to Admission   Medication Sig Dispense Refill Last Dose    albuterol (Proventil HFA) 90 mcg/actuation inhaler Inhale 2 puffs every 4 hours if needed for wheezing or shortness of breath. 6.7 g 6     atorvastatin (Lipitor) 40 mg tablet Take 1 tablet (40 mg) by mouth once daily. 90 tablet 1     cholecalciferol (Vitamin D-3) 125 MCG (5000 UT) capsule        divalproex (Depakote ER) 250 mg 24 hr tablet Take 1 tablet (250 mg) by mouth 2 times a day. Do not crush, chew, or split. 90 tablet 1     divalproex (Depakote ER) 500 mg 24 hr tablet Take 1 tablet (500 mg) by mouth 2 times a day. Do not crush, chew, or split. 90 tablet 1     inhalat.spacing dev,med. mask spacer        ipratropium-albuteroL (Duo-Neb) 0.5-2.5 mg/3 mL nebulizer solution Take 3 mL by nebulization 4 times a day as needed for wheezing or shortness of breath. 90 mL 1     lacosamide (Vimpat) 200 mg tablet tablet Take 1 tablet (200 mg) by mouth 2 times a day. 30 tablet 5     LORazepam (Ativan) 0.5 mg tablet Take 1 tablet (0.5 mg) by mouth once daily as needed for anxiety for up to 5 days. 5 tablet 0     nebulizer accessories misc Use twice daily 100 each 0     nebulizer and compressor device Use as directed 1 each 0     nicotine (Nicoderm CQ) 21 mg/24 hr patch Place 1 patch over 24 hours on the skin once every 24 hours. 30 patch 0     ondansetron (Zofran) 8 mg tablet Take 1 tablet (8 mg) by mouth every 8 hours if needed for nausea or vomiting. 30  "tablet 5     prochlorperazine (Compazine) 10 mg tablet Take 1 tablet (10 mg) by mouth every 6 hours if needed for nausea or vomiting. 30 tablet 5     Spiriva Respimat 2.5 mcg/actuation inhaler        thiamine (Vitamin B-1) 100 mg tablet Take 1 tablet (100 mg) by mouth once daily. 90 tablet 1        Review of Systems   Constitutional:  Positive for fever.   Psychiatric/Behavioral:  Positive for behavioral problems and confusion.      Neurological Exam  Neurologic exam:    Awake alert oriented to self and date, no dysarthria, no aphasia  Naming repetition intact, speech is fluent  PERRL, EOMI without ptosis or nystagmus, visual fields intact, face symmetrical, tongue midline  Strength in upper and lower extremities is 5/5  Sensation is intact  FTN intact UMER intact  Heel-to-shin without ataxia  Reflexes  2 throughout,  Toes downgoing bilaterally  Gait not assessed at this time    Physical Exam  Cardiovascular:      Rate and Rhythm: Normal rate.   Pulmonary:      Effort: Pulmonary effort is normal.   Psychiatric:         Cognition and Memory: Memory is impaired.       Last Recorded Vitals  Blood pressure 127/53, pulse 68, temperature 36.3 °C (97.3 °F), temperature source Oral, resp. rate 18, height 1.753 m (5' 9.02\"), weight 76.5 kg (168 lb 10.4 oz), SpO2 96 %.    Relevant Results                  [place] Coma Scale  Best Eye Response: Spontaneous  Best Verbal Response: Oriented  Best Motor Response: Follows commands  [place] Coma Scale Score: 15                 I have personally reviewed the following imaging results MR brain w and wo IV contrast    Result Date: 1/9/2024  STUDY: MR BRAIN w + wo CONTRAST; 01/09/2024 9:50 PM INDICATION:  Shunt infection.  Additional History:  Lung cancer. COMPARISON: XR skull 01/09/2024. MR brain 10/13/2023. NM PET/CT 10/06/2023.  ACCESSION NUMBER(S): [id number] ORDERING CLINICIAN: Radha Casey TECHNIQUE:  Multi-planar, multi-sequence MR imaging of the brain was performed without and " with intravenous contrast.  Dotarem 0.5 mL was administered intravenously. FINDINGS: Diffusion-weighted imaging demonstrates no area of restricted diffusion to suggest acute infarction.  There is no intracranial hemorrhage, midline shift, mass effect, or extra-axial fluid collection.  Generalized cerebral atrophy noted.  There are no areas of abnormal enhancement.  There are mild scattered patchy areas of T2 prolongation within the subcortical and deep periventricular white matter, suggesting changes of chronic microvascular ischemia  Brain parenchyma otherwise demonstrates normal morphology and signal characteristics.  Midline structures are within normal limits. Major intracranial vessels demonstrate preserved flow voids.  Brain volume and ventricular system are within normal limits for age.  Mild chronic secretions within the ethmoid air cells and maxillary sinuses noted.  Orbits, globes, and mastoid air cells are unremarkable.    Likely sequela of chronic small vessel ischemic disease. No acute intracranial findings. No suspicious region of abnormal enhancement. Signed by David Krause II, MD    CT angio chest for pulmonary embolism    Result Date: 1/9/2024  STUDY: CT Angiogram of the Chest; 01/09/2024 at 8:40 PM INDICATION: Shortness of breath. Evaluate for pulmonary embolism. COMPARISON: CT abdomen and pelvis 01/07/24. XR chest 01/05/24. CT chest, NM PET-CT 10/06/23. ACCESSION NUMBER(S): [id number] ORDERING CLINICIAN: JF ALEMAN TECHNIQUE:  CTA of the chest was performed with intravenous contrast. Images are reviewed and processed at a workstation according to the CT angiogram protocol with 3-D and/or MIP post processing imaging generated.  Omnipaque-350 75 mL was administered intravenously. Automated mA/kV exposure control was utilized and patient examination was performed in strict accordance with principles of ALARA. FINDINGS: Pulmonary arteries are adequately opacified without acute or chronic filling  defects.  The thoracic aorta is normal in course and caliber without dissection or aneurysm. The heart is normal in size without pericardial effusion. Atherosclerosis of the thoracic aorta and coronary arteries is present.  There appears to be thickening of the esophagus and/or adjacent adenopathy image 33 series 4.  This is new compared with prior exam. Follow-up endoscopy recommended for further assessment.  Layering debris within the LEFT mainstem bronchus appears to be present, possibly fluid.  Trace amount of fluid in the RIGHT mainstem bronchus also appears to be present.  Clinical correlation for aspiration recommended.  Follow-up endoscopy recommended for further assessment.  Small-to-moderate size right-sided pleural effusion is present.  Small LEFT effusion is present.  Emphysematous changes are present.  RIGHT middle lobe consolidation has developed.  RIGHT upper lobe consolidation also noted.  This appears related to volume loss. This is best appreciated on the coronal series.  Presumed postradiation scarring in the LEFT upper lobe also noted, similar prior study.  Bibasilar consolidation is also present.  Clinical correlation for signs of infection recommended.  Aspiration suspected. Small presumed cyst measuring about the anterior aspect hepatic lobe is present measuring 1.2 cm.  Additional smaller lesion about the anterior aspect of the medial segment LEFT hepatic lobe measures 0.9 cm. Senescent changes of the thoracic spine are present.  There are no acute fractures.  No suspicious bony lesions.    1. There is no evidence of pulmonary embolus. 2. Thickening of the esophagus and/or adjacent adenopathy. Follow-up endoscopy recommended for further assessment. 3. Debris within the mainstem bronchi, LEFT greater than RIGHT. Bibasilar consolidation noted. Aspiration suspected. Follow-up bronchoscopy recommended. 4. Bilateral pleural effusions, RIGHT greater than LEFT. 5. Emphysematous changes are present.  Presumed postradiation changes LEFT upper lobe noted. Signed by David Krause II, MD    XR skull 1-2 views    Result Date: 1/9/2024  Interpreted By:  Lisa Lezama, STUDY: XR SKULL 1-3 VIEWS 1/9/2024 7:35 pm   INDICATION: Signs/Symptoms:To check for shunt versus coil prior to MRI.   COMPARISON: None available.   ACCESSION NUMBER(S): [id number]   ORDERING CLINICIAN: JF ALEMAN   TECHNIQUE: Two views of the calvarium are completed.   FINDINGS: There is a 9 mm device implanted within the midline of the brain just anterior and superior to the sella turcica. The exact etiology of this device is unclear but clinical correlation with surgical history is advised. This does not have the appearance of a shunt device. Of note, the patient did have MRI of the brain on 10/13/2023.       A 9 mm rectangular shaped device is seen in the midline of the brain just anterior and superior to the sella turcica with exact etiology unclear. Clinical correlation is necessary. This does not have the appearance of a shunt device however.   MRI of the brain was performed on 10/13/2023.   Signed by: Lisa Lezama 1/9/2024 8:31 PM Dictation workstation:   [id number]    CT abdomen pelvis w IV contrast    Result Date: 1/7/2024  Interpreted By:  Lisa Lezama, STUDY: CT ABDOMEN PELVIS W IV CONTRAST;  1/7/2024 12:26 pm   INDICATION: Abdominal pain   COMPARISON: Fused PET/CT study of 10/06/2023   ACCESSION NUMBER(S): [id number]   ORDERING CLINICIAN: JF ALEMAN   TECHNIQUE: CT of the abdomen and pelvis was performed with intravenous contrast administered. 75 mL of Omnipaque 350 was injected intravenously for the study with sagittal and coronal reformations performed by the technologist at the acquisition scanner.   All CT examinations are performed with 1 or more of the following dose reduction techniques: Automated exposure control, adjustment of mA and/or kv according to patient's size, or use of iterative reconstruction techniques.   FINDINGS: LOWER  CHEST: There are severe emphysematous changes seen within the lower lung zones with minimal right pleural effusion and with mild atelectasis at the right lung base.   ABDOMEN:   LIVER: There are several hypodense space-occupying liver lesions measuring up to 2.3 cm in size without interval change compared with prior study. These are most consistent with stable hepatic cysts.   BILE DUCTS: No intrahepatic or extrahepatic biliary ductal dilatation.   GALLBLADDER: Unremarkable   PANCREAS: Unremarkable   SPLEEN: Unremarkable   ADRENAL GLANDS: Unremarkable   KIDNEYS AND URETERS: There is a 1.7 cm right renal cyst seen with left kidney unremarkable. There is mild bilateral perinephric scarring. MRCP   PELVIS:   BLADDER: No abnormality of the urinary bladder is seen.   REPRODUCTIVE ORGANS: Prostate gland is enlarged with a transverse diameter of 4.8 cm.   BOWEL: Colonic diverticulosis is seen without evidence for diverticulitis. The bowel is difficult to accurately evaluate due to patient motion. No pneumatosis intestinalis is seen. No portal venous gas is observed.   VESSELS: There is atherosclerosis of the abdominal aorta without aneurysmal dilatation. Calcified plaque is visible within the superior mesenteric artery. There is a filling defect seen within the proximal superior mesenteric artery identified roughly 7 mm distal to its origin. I believe that this represents partial thrombosis. There is flow seen more distally within the superior mesenteric artery.   PERITONEUM/RETROPERITONEUM/LYMPH NODES: A small amount of free fluid within the pelvic cul-de-sac is identified.   BONES AND ABDOMINAL WALL: Small bilateral fat containing inguinal hernias are present with left inguinal hernia larger than right. A very tiny fat containing umbilical hernia is observed.       1.  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 However, there is flow noted within the  distal SMA. 2. Small amount of free fluid within the pelvic cul-de-sac. 3. Colonic diverticulosis without diverticulitis. 4. Hepatic cysts measuring up to 2.3 cm in size with 1.7 cm right renal cyst. 5. Severe pulmonary emphysema with minimal right pleural effusion and with atelectasis at right lung base. 6. Small bilateral fat containing inguinal hernias with very tiny fat containing umbilical hernia     MACRO: None   Signed by: Lisa Lezama 1/7/2024 12:53 PM Dictation workstation:   [id number]    XR chest 1 view    Result Date: 1/5/2024  STUDY: Chest Radiograph;  01/05/2024 9:39 PM INDICATION: Cough, fever.  Additional History:  Lung cancer. COMPARISON: CT chest 10/06/2023.  NM PET/CT 10/06/2023.  ACCESSION NUMBER(S): [id number] ORDERING CLINICIAN: ARTEM MOSER TECHNIQUE:  Frontal chest was obtained at 2139 hours. FINDINGS: CARDIOMEDIASTINAL SILHOUETTE: Cardiomediastinal silhouette is normal in size and configuration.  LUNGS: Bandlike scarring/atelectasis LEFT midlung noted.  ABDOMEN: No remarkable upper abdominal findings.  BONES: No acute osseous changes.    Bandlike scarring/atelectasis LEFT midlung. Signed by Artem Krause II, MD  .      Assessment/Plan   Principal Problem:    Neutropenic fever (CMS/HCC)  Active Problems:    Partial symptomatic epilepsy with complex partial seizures, not intractable, without status epilepticus (CMS/HCC)    Fever in adult    Superior mesenteric artery thrombosis (CMS/HCC)    Hyponatremia    Generalized weakness    Malignant neoplasm of upper lobe of left lung (CMS/HCC)           63-year-old male with history of aneurysm with coil in 2019, epilepsy on Vimpat and Depakote without seizure activity in approximately 2 years, recent diagnosis in June 2023 of non-small cell lung carcinoma presented to the emergency department with concerns for febrile neutropenia and confusion.  Confusion per daughter is unchanged over the past few years.  Patient is confused today on exam as far  as details of his history no apparent focal deficit.  MRI of the brain is without acute findings.  Given that patient is in the hospital we will order MRA of the head as he was routinely having these and had not had one in a while.  Would like to repeat EEG however we do not have the resources at this time.  Will order labs and monitor.      Malena Cosme, ARIC-CNP

## 2024-01-12 ENCOUNTER — HOME HEALTH ADMISSION (OUTPATIENT)
Dept: HOME HEALTH SERVICES | Facility: HOME HEALTH | Age: 64
End: 2024-01-12
Payer: MEDICAID

## 2024-01-12 ENCOUNTER — APPOINTMENT (OUTPATIENT)
Dept: RADIATION ONCOLOGY | Facility: HOSPITAL | Age: 64
End: 2024-01-12
Payer: MEDICAID

## 2024-01-12 ENCOUNTER — DOCUMENTATION (OUTPATIENT)
Dept: HOME HEALTH SERVICES | Facility: HOME HEALTH | Age: 64
End: 2024-01-12
Payer: MEDICAID

## 2024-01-12 VITALS
TEMPERATURE: 97.9 F | OXYGEN SATURATION: 92 % | WEIGHT: 168.65 LBS | RESPIRATION RATE: 19 BRPM | HEART RATE: 78 BPM | BODY MASS INDEX: 24.98 KG/M2 | HEIGHT: 69 IN | SYSTOLIC BLOOD PRESSURE: 111 MMHG | DIASTOLIC BLOOD PRESSURE: 46 MMHG

## 2024-01-12 LAB
APTT PPP: 49.7 SECONDS (ref 22–32.5)
APTT PPP: 53.2 SECONDS (ref 22–32.5)
APTT PPP: 70.3 SECONDS (ref 22–32.5)
BASOPHILS # BLD MANUAL: 0 X10*3/UL (ref 0–0.1)
BASOPHILS NFR BLD MANUAL: 0 %
EOSINOPHIL # BLD MANUAL: 0 X10*3/UL (ref 0–0.7)
EOSINOPHIL NFR BLD MANUAL: 0 %
ERYTHROCYTE [DISTWIDTH] IN BLOOD BY AUTOMATED COUNT: 16.6 % (ref 11.5–14.5)
GLUCOSE BLD MANUAL STRIP-MCNC: 107 MG/DL (ref 74–99)
GLUCOSE BLD MANUAL STRIP-MCNC: 96 MG/DL (ref 74–99)
HCT VFR BLD AUTO: 25.1 % (ref 41–52)
HGB BLD-MCNC: 8.4 G/DL (ref 13.5–17.5)
IMM GRANULOCYTES # BLD AUTO: 0.52 X10*3/UL (ref 0–0.7)
IMM GRANULOCYTES NFR BLD AUTO: 11.7 % (ref 0–0.9)
LYMPHOCYTES # BLD MANUAL: 0.66 X10*3/UL (ref 1.2–4.8)
LYMPHOCYTES NFR BLD MANUAL: 15 %
MCH RBC QN AUTO: 32.9 PG (ref 26–34)
MCHC RBC AUTO-ENTMCNC: 33.5 G/DL (ref 32–36)
MCV RBC AUTO: 98 FL (ref 80–100)
METAMYELOCYTES # BLD MANUAL: 0.09 X10*3/UL
METAMYELOCYTES NFR BLD MANUAL: 2 %
MONOCYTES # BLD MANUAL: 0.84 X10*3/UL (ref 0.1–1)
MONOCYTES NFR BLD MANUAL: 19 %
MYELOCYTES # BLD MANUAL: 0.35 X10*3/UL
MYELOCYTES NFR BLD MANUAL: 8 %
NEUTROPHILS # BLD MANUAL: 2.47 X10*3/UL (ref 1.2–7.7)
NEUTS BAND # BLD MANUAL: 0.53 X10*3/UL (ref 0–0.7)
NEUTS BAND NFR BLD MANUAL: 12 %
NEUTS SEG # BLD MANUAL: 1.94 X10*3/UL (ref 1.2–7)
NEUTS SEG NFR BLD MANUAL: 44 %
NRBC BLD-RTO: 0 /100 WBCS (ref 0–0)
OVALOCYTES BLD QL SMEAR: ABNORMAL
PLATELET # BLD AUTO: 289 X10*3/UL (ref 150–450)
RBC # BLD AUTO: 2.55 X10*6/UL (ref 4.5–5.9)
RBC MORPH BLD: ABNORMAL
STOMATOCYTES BLD QL SMEAR: ABNORMAL
TOTAL CELLS COUNTED BLD: 100
TOXIC GRANULES BLD QL SMEAR: PRESENT
WBC # BLD AUTO: 4.4 X10*3/UL (ref 4.4–11.3)

## 2024-01-12 PROCEDURE — 36415 COLL VENOUS BLD VENIPUNCTURE: CPT | Performed by: INTERNAL MEDICINE

## 2024-01-12 PROCEDURE — 92526 ORAL FUNCTION THERAPY: CPT | Mod: GN | Performed by: SPEECH-LANGUAGE PATHOLOGIST

## 2024-01-12 PROCEDURE — 2500000001 HC RX 250 WO HCPCS SELF ADMINISTERED DRUGS (ALT 637 FOR MEDICARE OP): Performed by: PSYCHIATRY & NEUROLOGY

## 2024-01-12 PROCEDURE — 85027 COMPLETE CBC AUTOMATED: CPT | Performed by: INTERNAL MEDICINE

## 2024-01-12 PROCEDURE — 2500000002 HC RX 250 W HCPCS SELF ADMINISTERED DRUGS (ALT 637 FOR MEDICARE OP, ALT 636 FOR OP/ED): Performed by: INTERNAL MEDICINE

## 2024-01-12 PROCEDURE — 99239 HOSP IP/OBS DSCHRG MGMT >30: CPT | Performed by: INTERNAL MEDICINE

## 2024-01-12 PROCEDURE — 85007 BL SMEAR W/DIFF WBC COUNT: CPT | Performed by: INTERNAL MEDICINE

## 2024-01-12 PROCEDURE — 85730 THROMBOPLASTIN TIME PARTIAL: CPT | Performed by: INTERNAL MEDICINE

## 2024-01-12 PROCEDURE — 2500000001 HC RX 250 WO HCPCS SELF ADMINISTERED DRUGS (ALT 637 FOR MEDICARE OP): Performed by: INTERNAL MEDICINE

## 2024-01-12 PROCEDURE — 2500000004 HC RX 250 GENERAL PHARMACY W/ HCPCS (ALT 636 FOR OP/ED): Performed by: INTERNAL MEDICINE

## 2024-01-12 PROCEDURE — S4991 NICOTINE PATCH NONLEGEND: HCPCS | Performed by: INTERNAL MEDICINE

## 2024-01-12 PROCEDURE — 82947 ASSAY GLUCOSE BLOOD QUANT: CPT

## 2024-01-12 RX ORDER — SUCRALFATE 1 G/10ML
1 SUSPENSION ORAL EVERY 6 HOURS SCHEDULED
Qty: 414 ML | Refills: 0 | Status: SHIPPED | OUTPATIENT
Start: 2024-01-12

## 2024-01-12 RX ORDER — NAPROXEN SODIUM 220 MG/1
81 TABLET, FILM COATED ORAL DAILY
Qty: 30 TABLET | Refills: 2 | Status: SHIPPED | OUTPATIENT
Start: 2024-01-12

## 2024-01-12 RX ORDER — DIVALPROEX SODIUM 500 MG/1
500 TABLET, FILM COATED, EXTENDED RELEASE ORAL 2 TIMES DAILY
Status: DISCONTINUED | OUTPATIENT
Start: 2024-01-12 | End: 2024-01-12 | Stop reason: HOSPADM

## 2024-01-12 RX ORDER — LACTULOSE 10 G/15ML
20 SOLUTION ORAL 3 TIMES DAILY
Status: DISCONTINUED | OUTPATIENT
Start: 2024-01-12 | End: 2024-01-12 | Stop reason: HOSPADM

## 2024-01-12 RX ORDER — DIVALPROEX SODIUM 500 MG/1
500 TABLET, FILM COATED, EXTENDED RELEASE ORAL 2 TIMES DAILY
Qty: 30 TABLET | Refills: 0 | Status: SHIPPED | OUTPATIENT
Start: 2024-01-12

## 2024-01-12 RX ORDER — AMOXICILLIN AND CLAVULANATE POTASSIUM 875; 125 MG/1; MG/1
875 TABLET, FILM COATED ORAL 2 TIMES DAILY
Qty: 28 TABLET | Refills: 0 | Status: SHIPPED | OUTPATIENT
Start: 2024-01-12 | End: 2024-01-26

## 2024-01-12 RX ADMIN — ATORVASTATIN CALCIUM 40 MG: 40 TABLET, FILM COATED ORAL at 08:38

## 2024-01-12 RX ADMIN — Medication 2000 UNITS: at 08:39

## 2024-01-12 RX ADMIN — DIVALPROEX SODIUM 750 MG: 250 TABLET, EXTENDED RELEASE ORAL at 08:42

## 2024-01-12 RX ADMIN — LACTULOSE 20 G: 20 SOLUTION ORAL at 12:18

## 2024-01-12 RX ADMIN — LACOSAMIDE 200 MG: 100 TABLET, FILM COATED ORAL at 08:39

## 2024-01-12 RX ADMIN — THIAMINE HCL TAB 100 MG 100 MG: 100 TAB at 08:39

## 2024-01-12 RX ADMIN — NICOTINE 1 PATCH: 21 PATCH, EXTENDED RELEASE TRANSDERMAL at 10:26

## 2024-01-12 RX ADMIN — PIPERACILLIN SODIUM AND TAZOBACTAM SODIUM 4.5 G: 4; .5 INJECTION, SOLUTION INTRAVENOUS at 11:17

## 2024-01-12 RX ADMIN — PIPERACILLIN SODIUM AND TAZOBACTAM SODIUM 4.5 G: 4; .5 INJECTION, SOLUTION INTRAVENOUS at 04:51

## 2024-01-12 NOTE — DISCHARGE SUMMARY
Discharge Diagnosis  Neutropenic fever (CMS/HCC)    Issues Requiring Follow-Up  Lung cancer  Hypoxia      Test Results Pending At Discharge  Pending Labs       Order Current Status    Lacosamide In process            Hospital Course   Martínez Neri is a 63 y.o. male presenting with fever.  Patient was diagnosed with lung cancer in the end of June 2023.  Patient was in [place] at that time.  Patient came to [place].  Patient was started on treatment for lung cancer in November 2023.  Patient has non-small cell carcinoma of the lung.  Patient is undergoing chemotherapy and radiation therapy.  On the day of admission patient was not feeling well.  He was not eating or drinking and he developed fever.  With this complaint he was brought to the emergency room.  Emergency room initial workup was done and patient was found to have leukopenia with a white cell count of 1.2.  Patient has been admitted to the floor for further management.  Patient denies any nausea or vomiting.  No diarrhea, dysuria, hematuria or frequency.  No hematemesis, hematochezia or melena.   Workup for stroke was negative.  CT abdomen pelvis done which shows SMA partial thrombus.  Started on heparin and switch to aspirin at discharge  CT of the chest showed possibility of aspiration pneumonia.  Treated with antibiotic and plan to do outpatient barium swallow  Seen by the oncologist scheduled for radiation treatment on Monday chemotherapy to follow  Episodes of intermittent agitation  Needed 2 L of home oxygen and 4 L on exertion  Home care arranged  Follow-up with PCP    Pertinent Physical Exam At Time of Discharge  Physical Exam    Home Medications     Medication List      START taking these medications     amoxicillin-pot clavulanate 875-125 mg tablet; Commonly known as:   Augmentin; Take 1 tablet (875 mg) by mouth 2 times a day for 14 days.   aspirin 81 mg chewable tablet; Chew 1 tablet (81 mg) once daily.   sucralfate 100 mg/mL suspension;  Commonly known as: Carafate; Take 10 mL   (1 g) by mouth every 6 hours.     CHANGE how you take these medications     divalproex 500 mg 24 hr tablet; Commonly known as: Depakote ER; Take 1   tablet (500 mg) by mouth 2 times a day. Do not crush, chew, or split.;   What changed: Another medication with the same name was removed. Continue   taking this medication, and follow the directions you see here.     CONTINUE taking these medications     albuterol 90 mcg/actuation inhaler; Commonly known as: Proventil HFA;   Inhale 2 puffs every 4 hours if needed for wheezing or shortness of   breath.   atorvastatin 40 mg tablet; Commonly known as: Lipitor; Take 1 tablet (40   mg) by mouth once daily.   cholecalciferol 125 MCG (5000 UT) capsule; Commonly known as: Vitamin   D-3   inhalat.spacing dev,med. mask spacer   ipratropium-albuteroL 0.5-2.5 mg/3 mL nebulizer solution; Commonly known   as: Duo-Neb; Take 3 mL by nebulization 4 times a day as needed for   wheezing or shortness of breath.   lacosamide 200 mg tablet tablet; Commonly known as: Vimpat; Take 1   tablet (200 mg) by mouth 2 times a day.   LORazepam 0.5 mg tablet; Commonly known as: Ativan; Take 1 tablet (0.5   mg) by mouth once daily as needed for anxiety for up to 5 days.   nebulizer accessories misc; Use twice daily   nebulizer and compressor device; Use as directed   nicotine 21 mg/24 hr patch; Commonly known as: Nicoderm CQ; Place 1   patch over 24 hours on the skin once every 24 hours.   ondansetron 8 mg tablet; Commonly known as: Zofran; Take 1 tablet (8 mg)   by mouth every 8 hours if needed for nausea or vomiting.   prochlorperazine 10 mg tablet; Commonly known as: Compazine; Take 1   tablet (10 mg) by mouth every 6 hours if needed for nausea or vomiting.   Spiriva Respimat 2.5 mcg/actuation inhaler; Generic drug: tiotropium   thiamine 100 mg tablet; Commonly known as: Vitamin B-1; Take 1 tablet   (100 mg) by mouth once daily.       Outpatient  Follow-Up  Future Appointments   Date Time Provider Department Center   1/18/2024 10:00 AM LEW Barger [id number] [facility]   1/25/2024 11:30 AM LEW Barger [id number] [facility]   2/5/2024 11:30 AM ARIC Hoff-CNP [id number] [facility]       Radha Casey MD

## 2024-01-12 NOTE — CARE PLAN
The patient's goals for the shift include get stronger to walk    The clinical goals for the shift include adequate oxygenation,  safety      Problem: Fall/Injury  Goal: Not fall by end of shift  Outcome: Progressing  Goal: Be free from injury by end of the shift  Outcome: Progressing  Goal: Verbalize understanding of personal risk factors for fall in the hospital  Outcome: Progressing  Goal: Verbalize understanding of risk factor reduction measures to prevent injury from fall in the home  Outcome: Progressing  Goal: Use assistive devices by end of the shift  Outcome: Progressing  Goal: Pace activities to prevent fatigue by end of the shift  Outcome: Progressing     Problem: Pain  Goal: Takes deep breaths with improved pain control throughout the shift  Outcome: Progressing  Goal: Turns in bed with improved pain control throughout the shift  Outcome: Progressing  Goal: Walks with improved pain control throughout the shift  Outcome: Progressing  Goal: Performs ADL's with improved pain control throughout shift  Outcome: Progressing  Goal: Participates in PT with improved pain control throughout the shift  Outcome: Progressing  Goal: Free from opioid side effects throughout the shift  Outcome: Progressing  Goal: Free from acute confusion related to pain meds throughout the shift  Outcome: Progressing     Problem: Pain  Goal: My pain/discomfort is manageable  Outcome: Progressing     Problem: Safety  Goal: Patient will be injury free during hospitalization  Outcome: Progressing  Goal: I will remain free of falls  Outcome: Progressing     Problem: Daily Care  Goal: Daily care needs are met  Outcome: Progressing     Problem: Psychosocial Needs  Goal: Demonstrates ability to cope with hospitalization/illness  Outcome: Progressing  Goal: Collaborate with me, my family, and caregiver to identify my specific goals  Outcome: Progressing     Problem: Discharge Barriers  Goal: My discharge needs are met  Outcome: Progressing      Problem: Respiratory  Goal: Clear secretions with interventions this shift  Outcome: Progressing  Goal: Minimize anxiety/maximize coping throughout shift  Outcome: Progressing  Goal: Minimal/no exertional discomfort or dyspnea this shift  Outcome: Progressing  Goal: No signs of respiratory distress (eg. Use of accessory muscles. Peds grunting)  Outcome: Progressing  Goal: Patent airway maintained this shift  Outcome: Progressing  Goal: Tolerate mechanical ventilation evidenced by VS/agitation level this shift  Outcome: Progressing  Goal: Tolerate pulmonary toileting this shift  Outcome: Progressing  Goal: Verbalize decreased shortness of breath this shift  Outcome: Progressing  Goal: Wean oxygen to maintain O2 saturation per order/standard this shift  Outcome: Progressing  Goal: Increase self care and/or family involvement in next 24 hours  Outcome: Progressing

## 2024-01-12 NOTE — PROGRESS NOTES
"Martínez Neri is a 63 y.o. male on day 4 of admission presenting with Neutropenic fever (CMS/HCC).    Subjective   Pt anticipates to discharge home today with HC.   [facility] sent pt's insurance information for verification.  TCSW will continue to follow for updates on insurance verification.      **UPDATE**  [facility] has confirmed pt's insurance and confirmed SOC within 1/13-1/15.     Objective     Physical Exam    Last Recorded Vitals  Blood pressure (!) 111/46, pulse 78, temperature 36.6 °C (97.9 °F), temperature source Oral, resp. rate 19, height 1.753 m (5' 9.02\"), weight 76.5 kg (168 lb 10.4 oz), SpO2 92 %.  Intake/Output last 3 Shifts:  I/O last 3 completed shifts:  In: 400 (5.2 mL/kg) [IV Piggyback:400]  Out: 1600 (20.9 mL/kg) [Urine:1600 (0.6 mL/kg/hr)]  Weight: 76.5 kg     Relevant Results                             Assessment/Plan   Principal Problem:    Neutropenic fever (CMS/HCC)  Active Problems:    Partial symptomatic epilepsy with complex partial seizures, not intractable, without status epilepticus (CMS/HCC)    Fever in adult    Superior mesenteric artery thrombosis (CMS/HCC)    Hyponatremia    Generalized weakness    Aspiration pneumonia (CMS/HCC)    Emphysema lung (CMS/HCC)    Acute respiratory failure (CMS/HCC)    Brain aneurysm    Malignant neoplasm of upper lobe of left lung (CMS/HCC)               Chema Malik, LSW      "

## 2024-01-12 NOTE — PROGRESS NOTES
Speech-Language Pathology    Inpatient Speech Language Pathology Dysphagia Treatment note     Patient Name: Martínez Neri  MRN: [id number]  : 1960  Today's Date: 24  Time Calculation  Start Time: 942  Stop Time:   Time Calculation (min): 20 min       Total Number of Visits: 1/2 (sw, WED )    PLAN:  Skilled speech therapy for dysphagia treatment continues to be warranted to provide training and instruction regarding the use of compensatory swallow strategies, for pt/caregiver education in order to reduce risk of aspiration, dehydration and malnutrition.      SLP Frequency: 2x per week  Discussed POC: Patient, Caregiver/family, Nursing, Physician  Discussed Risks/Benefits: Yes, Patient, Caregiver/Family, Nursing, Physician  Patient/Caregiver Agreeable: Yes       Recommended Diet:    Regular solids  Liquid Diet Recommendations: Thin (IDDSI Level 0)  Compensatory strategies: small bites/sips, add moisture to dry solids, upright 90 degrees for intake   Medication administration: WHOLE WITH LIQUID    Subjective:  Pt. Seen at bedside for skilled dysphagia treatment. Spouse present at bedside.     Pain:  Pain Assessment  Pain Assessment: 0-10  Pain Score: 0 - No pain         Oxygen Status:   nasal cannula          Goals:  1) Pt to tolerate recommended diet without overt s/s of aspiration in order to ensure pt is demonstrating adequate swallow function for meeting nutrition and hydration needs.    Pt and spouse report good diet toleration   2) Pt to participate in oropharyngeal therapeutic exercises to improve oropharyngeal swallow function for return to baseline diet level.    Not targeted  3) Pt to participate in assessment of oropharyngeal swallow function via MBSS for assessment of possible aspiration and to determine least restrictive diet for meeting pt's nutritional and hydration needs.    To be completed as outpatient given down fluoro machine, chronic nature of dysphagia, and pt's strong desire  to go home     SLP Assessment:  Engaged pt and spouse in education RE: esophageal dysphagia, aspiration precautions, the three pillars of aspiration pneumonia, chemo/rad and dysphagia, and compensatory swallowing strategies. Reviewed plan of care with pt to follow up for MBSS as outpatient. Provided handouts and written education to support.       Treatment Outcome:     Pt tolerated treatment well    Education:  Pt. Given skilled instruction on compensatory swallowing strategies, oral care, aspiration precautions, and radiation/chemotherapy as it relates to dysphagia with handouts provided for each topic.   Pt. gave verbal understanding

## 2024-01-12 NOTE — PROGRESS NOTES
"Subjective   Patient and his son and wife at the bedside report he is approximately stable.  He seems somewhat less confused today than yesterday.  No new events overnight.     Objective   Neurological Exam  Physical Exam    Last Recorded Vitals  Blood pressure (!) 111/46, pulse 78, temperature 36.6 °C (97.9 °F), temperature source Oral, resp. rate 19, height 1.753 m (5' 9.02\"), weight 76.5 kg (168 lb 10.4 oz), SpO2 92 %.      Neurologically, pt is awake and oriented x4.  The mild encephalopathy seen yesterday is resolved seemingly today.  No aphasia  Cranial nerves: VFF, EOMI, No nystagmus, Face is symmetric to sensory and motor, no dysarthria, Tongue protrudes midline, Shrug symmetric  Motor: 5/5 strength B throughout, no tremor or asterixis  Sensation is intact bilaterally throughout  Coordination: no ataxia, no dysmetria/dysdiadochokinesia         Scheduled medications  atorvastatin, 40 mg, oral, Daily  cholecalciferol, 2,000 Units, oral, Daily  divalproex, 500 mg, oral, BID  lacosamide, 200 mg, oral, BID  lactulose, 20 g, oral, TID  nicotine, 1 patch, transdermal, q24h  pantoprazole, 40 mg, oral, Daily before breakfast  piperacillin-tazobactam, 4.5 g, intravenous, q6h  potassium chloride CR, 40 mEq, oral, Once  sucralfate, 1 g, oral, q6h CHAPARRITA  thiamine, 100 mg, oral, Daily  tiotropium, 2 Inhalation, inhalation, Daily      Continuous medications  dextrose 5%-0.45 % sodium chloride, 80 mL/hr, Last Rate: 80 mL/hr (01/10/24 1933)  heparin, 0-4,500 Units/hr, Last Rate: 1,100 Units/hr (01/12/24 0151)      PRN medications  PRN medications: acetaminophen **OR** acetaminophen **OR** acetaminophen, benzocaine-menthol, dextromethorphan-guaifenesin, guaiFENesin, heparin (porcine), ipratropium-albuteroL, LORazepam, ondansetron ODT, polyethylene glycol, prochlorperazine     Results for orders placed or performed during the hospital encounter of 01/05/24 (from the past 96 hour(s))   POCT GLUCOSE   Result Value Ref Range    " POCT Glucose 104 (H) 74 - 99 mg/dL   POCT GLUCOSE   Result Value Ref Range    POCT Glucose 160 (H) 74 - 99 mg/dL   Basic Metabolic Panel   Result Value Ref Range    Glucose 113 (H) 65 - 99 mg/dL    Sodium 136 133 - 145 mmol/L    Potassium 3.1 (L) 3.4 - 5.1 mmol/L    Chloride 102 97 - 107 mmol/L    Bicarbonate 25 24 - 31 mmol/L    Urea Nitrogen 3 (L) 8 - 25 mg/dL    Creatinine 0.60 0.40 - 1.60 mg/dL    eGFR >90 >60 mL/min/1.73m*2    Calcium 8.4 (L) 8.5 - 10.4 mg/dL    Anion Gap 9 <=19 mmol/L   aPTT   Result Value Ref Range    aPTT 60.5 (H) 22.0 - 32.5 seconds   POCT GLUCOSE   Result Value Ref Range    POCT Glucose 107 (H) 74 - 99 mg/dL   POCT GLUCOSE   Result Value Ref Range    POCT Glucose 96 74 - 99 mg/dL   POCT GLUCOSE   Result Value Ref Range    POCT Glucose 131 (H) 74 - 99 mg/dL   CBC   Result Value Ref Range    WBC 5.2 4.4 - 11.3 x10*3/uL    nRBC 0.0 0.0 - 0.0 /100 WBCs    RBC 2.28 (L) 4.50 - 5.90 x10*6/uL    Hemoglobin 7.5 (L) 13.5 - 17.5 g/dL    Hematocrit 22.7 (L) 41.0 - 52.0 %     80 - 100 fL    MCH 32.9 26.0 - 34.0 pg    MCHC 33.0 32.0 - 36.0 g/dL    RDW 15.9 (H) 11.5 - 14.5 %    Platelets 220 150 - 450 x10*3/uL   POCT GLUCOSE   Result Value Ref Range    POCT Glucose 111 (H) 74 - 99 mg/dL   aPTT   Result Value Ref Range    aPTT 33.1 (H) 22.0 - 32.5 seconds   aPTT   Result Value Ref Range    aPTT 47.9 (H) 22.0 - 32.5 seconds   Ferritin   Result Value Ref Range    Ferritin 442 (H) 30 - 400 ng/mL   Iron and TIBC   Result Value Ref Range    Iron 51 45 - 160 ug/dL    UIBC 139 110 - 370 ug/dL    TIBC 190 (L) 228 - 428 ug/dL    % Saturation 27 12 - 50 %   POCT GLUCOSE   Result Value Ref Range    POCT Glucose 113 (H) 74 - 99 mg/dL   POCT GLUCOSE   Result Value Ref Range    POCT Glucose 117 (H) 74 - 99 mg/dL   Ammonia   Result Value Ref Range    Ammonia 62 (H) 12 - 45 umol/L   APTT   Result Value Ref Range    aPTT 62.2 (H) 22.0 - 32.5 seconds   aPTT   Result Value Ref Range    aPTT 57.8 (H) 22.0 - 32.5  seconds   Protime-INR   Result Value Ref Range    Protime 12.1 9.3 - 12.7 seconds    INR 1.2 0.9 - 1.2   POCT GLUCOSE   Result Value Ref Range    POCT Glucose 154 (H) 74 - 99 mg/dL   APTT   Result Value Ref Range    aPTT 62.1 (H) 22.0 - 32.5 seconds   CBC   Result Value Ref Range    WBC 3.6 (L) 4.4 - 11.3 x10*3/uL    nRBC 0.0 0.0 - 0.0 /100 WBCs    RBC 2.26 (L) 4.50 - 5.90 x10*6/uL    Hemoglobin 7.4 (L) 13.5 - 17.5 g/dL    Hematocrit 21.9 (L) 41.0 - 52.0 %    MCV 97 80 - 100 fL    MCH 32.7 26.0 - 34.0 pg    MCHC 33.8 32.0 - 36.0 g/dL    RDW 15.9 (H) 11.5 - 14.5 %    Platelets 252 150 - 450 x10*3/uL   Comprehensive Metabolic Panel   Result Value Ref Range    Glucose 114 (H) 65 - 99 mg/dL    Sodium 137 133 - 145 mmol/L    Potassium 3.2 (L) 3.4 - 5.1 mmol/L    Chloride 102 97 - 107 mmol/L    Bicarbonate 27 24 - 31 mmol/L    Urea Nitrogen 3 (L) 8 - 25 mg/dL    Creatinine 0.70 0.40 - 1.60 mg/dL    eGFR >90 >60 mL/min/1.73m*2    Calcium 8.4 (L) 8.5 - 10.4 mg/dL    Albumin 2.3 (L) 3.5 - 5.0 g/dL    Alkaline Phosphatase 43 35 - 125 U/L    Total Protein 5.2 (L) 5.9 - 7.9 g/dL    AST 60 (H) 5 - 40 U/L    Bilirubin, Total <0.2 0.1 - 1.2 mg/dL    ALT 63 (H) 5 - 40 U/L    Anion Gap 8 <=19 mmol/L   APTT   Result Value Ref Range    aPTT 71.7 (H) 22.0 - 32.5 seconds   TSH with reflex to Free T4 if abnormal   Result Value Ref Range    Thyroid Stimulating Hormone 7.63 (H) 0.27 - 4.20 mIU/L   Vitamin B12   Result Value Ref Range    Vitamin B12 1,403 (H) 211 - 946 pg/mL   Thyroxine, Free   Result Value Ref Range    Thyroxine, Free 0.90 0.90 - 1.70 ng/dL   POCT GLUCOSE   Result Value Ref Range    POCT Glucose 108 (H) 74 - 99 mg/dL   POCT GLUCOSE   Result Value Ref Range    POCT Glucose 111 (H) 74 - 99 mg/dL   POCT GLUCOSE   Result Value Ref Range    POCT Glucose 115 (H) 74 - 99 mg/dL   Valproic acid level, total   Result Value Ref Range    Valproic Acid 70 50 - 100 ug/mL   Ammonia   Result Value Ref Range    Ammonia 69 (H) 12 - 45  umol/L   APTT   Result Value Ref Range    aPTT 45.8 (H) 22.0 - 32.5 seconds   CBC   Result Value Ref Range    WBC 4.3 (L) 4.4 - 11.3 x10*3/uL    nRBC 0.5 (H) 0.0 - 0.0 /100 WBCs    RBC 2.25 (L) 4.50 - 5.90 x10*6/uL    Hemoglobin 7.6 (L) 13.5 - 17.5 g/dL    Hematocrit 21.9 (L) 41.0 - 52.0 %    MCV 97 80 - 100 fL    MCH 33.8 26.0 - 34.0 pg    MCHC 34.7 32.0 - 36.0 g/dL    RDW 15.9 (H) 11.5 - 14.5 %    Platelets 256 150 - 450 x10*3/uL   POCT GLUCOSE   Result Value Ref Range    POCT Glucose 108 (H) 74 - 99 mg/dL   APTT   Result Value Ref Range    aPTT 70.3 (H) 22.0 - 32.5 seconds   APTT   Result Value Ref Range    aPTT 53.2 (H) 22.0 - 32.5 seconds   POCT GLUCOSE   Result Value Ref Range    POCT Glucose 96 74 - 99 mg/dL   CBC and Auto Differential   Result Value Ref Range    WBC 4.4 4.4 - 11.3 x10*3/uL    nRBC 0.0 0.0 - 0.0 /100 WBCs    RBC 2.55 (L) 4.50 - 5.90 x10*6/uL    Hemoglobin 8.4 (L) 13.5 - 17.5 g/dL    Hematocrit 25.1 (L) 41.0 - 52.0 %    MCV 98 80 - 100 fL    MCH 32.9 26.0 - 34.0 pg    MCHC 33.5 32.0 - 36.0 g/dL    RDW 16.6 (H) 11.5 - 14.5 %    Platelets 289 150 - 450 x10*3/uL    Immature Granulocytes %, Automated 11.7 (H) 0.0 - 0.9 %    Immature Granulocytes Absolute, Automated 0.52 0.00 - 0.70 x10*3/uL   aPTT   Result Value Ref Range    aPTT 49.7 (H) 22.0 - 32.5 seconds   Manual Differential   Result Value Ref Range    Neutrophils %, Manual 44.0 40.0 - 80.0 %    Bands %, Manual 12.0 0.0 - 5.0 %    Lymphocytes %, Manual 15.0 13.0 - 44.0 %    Monocytes %, Manual 19.0 2.0 - 10.0 %    Eosinophils %, Manual 0.0 0.0 - 6.0 %    Basophils %, Manual 0.0 0.0 - 2.0 %    Metamyelocytes %, Manual 2.0 0.0 - 0.0 %    Myelocytes %, Manual 8.0 0.0 - 0.0 %    Seg Neutrophils Absolute, Manual 1.94 1.20 - 7.00 x10*3/uL    Bands Absolute, Manual 0.53 0.00 - 0.70 x10*3/uL    Lymphocytes Absolute, Manual 0.66 (L) 1.20 - 4.80 x10*3/uL    Monocytes Absolute, Manual 0.84 0.10 - 1.00 x10*3/uL    Eosinophils Absolute, Manual 0.00 0.00 -  0.70 x10*3/uL    Basophils Absolute, Manual 0.00 0.00 - 0.10 x10*3/uL    Metamyelocytes Absolute, Manual 0.09 0.00 - 0.00 x10*3/uL    Myelocytes Absolute, Manual 0.35 0.00 - 0.00 x10*3/uL    Total Cells Counted 100     Neutrophils Absolute, Manual 2.47 1.20 - 7.70 x10*3/uL    RBC Morphology See Below     Ovalocytes Few     Stomatocytes Few     Toxic Granulation Present    POCT GLUCOSE   Result Value Ref Range    POCT Glucose 107 (H) 74 - 99 mg/dL          MR angio head wo IV contrast    Result Date: 1/11/2024  Interpreted By:  Tonya Mccartney, STUDY: MR ANGIO HEAD WO IV CONTRAST; 1/11/2024 11:57 am   INDICATION: Signs/Symptoms:coil;   COMPARISON: None   ACCESSION NUMBER(S): [id number]   ORDERING CLINICIAN: LUIS VARGAS   TECHNIQUE: MRA of the head was performed using 3-D time-of-flight technique. 3-D MIP reconstructions were processed, created and reviewed on a separate workstation.   FINDINGS: The anterior and posterior cerebral circulations are patent. No hemodynamically significant stenosis or aneurysmal dilatation is seen. No saccular aneurysm is identified. Susceptibility artifact is seen in the region of the anterior communicating artery which may correlate to clinical history of aneurysm coiling.       Susceptibility artifact is seen in the region of the anterior communicating artery which may correlate to clinical history of aneurysm coiling. Otherwise, unremarkable MRA of the head.   Signed by: Tonya Mccartney 1/11/2024 12:50 PM Dictation workstation:   [id number]    MR brain w and wo IV contrast    Result Date: 1/9/2024  STUDY: MR BRAIN w + wo CONTRAST; 01/09/2024 9:50 PM INDICATION:  Shunt infection.  Additional History:  Lung cancer. COMPARISON: XR skull 01/09/2024. MR brain 10/13/2023. NM PET/CT 10/06/2023.  ACCESSION NUMBER(S): [id number] ORDERING CLINICIAN: Radha Casey TECHNIQUE:  Multi-planar, multi-sequence MR imaging of the brain was performed without and with intravenous contrast.   Dotarem 0.5 mL was administered intravenously. FINDINGS: Diffusion-weighted imaging demonstrates no area of restricted diffusion to suggest acute infarction.  There is no intracranial hemorrhage, midline shift, mass effect, or extra-axial fluid collection.  Generalized cerebral atrophy noted.  There are no areas of abnormal enhancement.  There are mild scattered patchy areas of T2 prolongation within the subcortical and deep periventricular white matter, suggesting changes of chronic microvascular ischemia  Brain parenchyma otherwise demonstrates normal morphology and signal characteristics.  Midline structures are within normal limits. Major intracranial vessels demonstrate preserved flow voids.  Brain volume and ventricular system are within normal limits for age.  Mild chronic secretions within the ethmoid air cells and maxillary sinuses noted.  Orbits, globes, and mastoid air cells are unremarkable.    Likely sequela of chronic small vessel ischemic disease. No acute intracranial findings. No suspicious region of abnormal enhancement. Signed by David Krause II, MD    CT angio chest for pulmonary embolism    Result Date: 1/9/2024  STUDY: CT Angiogram of the Chest; 01/09/2024 at 8:40 PM INDICATION: Shortness of breath. Evaluate for pulmonary embolism. COMPARISON: CT abdomen and pelvis 01/07/24. XR chest 01/05/24. CT chest, NM PET-CT 10/06/23. ACCESSION NUMBER(S): [id number] ORDERING CLINICIAN: JF ALEMAN TECHNIQUE:  CTA of the chest was performed with intravenous contrast. Images are reviewed and processed at a workstation according to the CT angiogram protocol with 3-D and/or MIP post processing imaging generated.  Omnipaque-350 75 mL was administered intravenously. Automated mA/kV exposure control was utilized and patient examination was performed in strict accordance with principles of ALARA. FINDINGS: Pulmonary arteries are adequately opacified without acute or chronic filling defects.  The thoracic aorta  is normal in course and caliber without dissection or aneurysm. The heart is normal in size without pericardial effusion. Atherosclerosis of the thoracic aorta and coronary arteries is present.  There appears to be thickening of the esophagus and/or adjacent adenopathy image 33 series 4.  This is new compared with prior exam. Follow-up endoscopy recommended for further assessment.  Layering debris within the LEFT mainstem bronchus appears to be present, possibly fluid.  Trace amount of fluid in the RIGHT mainstem bronchus also appears to be present.  Clinical correlation for aspiration recommended.  Follow-up endoscopy recommended for further assessment.  Small-to-moderate size right-sided pleural effusion is present.  Small LEFT effusion is present.  Emphysematous changes are present.  RIGHT middle lobe consolidation has developed.  RIGHT upper lobe consolidation also noted.  This appears related to volume loss. This is best appreciated on the coronal series.  Presumed postradiation scarring in the LEFT upper lobe also noted, similar prior study.  Bibasilar consolidation is also present.  Clinical correlation for signs of infection recommended.  Aspiration suspected. Small presumed cyst measuring about the anterior aspect hepatic lobe is present measuring 1.2 cm.  Additional smaller lesion about the anterior aspect of the medial segment LEFT hepatic lobe measures 0.9 cm. Senescent changes of the thoracic spine are present.  There are no acute fractures.  No suspicious bony lesions.    1. There is no evidence of pulmonary embolus. 2. Thickening of the esophagus and/or adjacent adenopathy. Follow-up endoscopy recommended for further assessment. 3. Debris within the mainstem bronchi, LEFT greater than RIGHT. Bibasilar consolidation noted. Aspiration suspected. Follow-up bronchoscopy recommended. 4. Bilateral pleural effusions, RIGHT greater than LEFT. 5. Emphysematous changes are present. Presumed postradiation  changes LEFT upper lobe noted. Signed by David Krause II, MD    XR skull 1-2 views    Result Date: 1/9/2024  Interpreted By:  Lisa Lezama, STUDY: XR SKULL 1-3 VIEWS 1/9/2024 7:35 pm   INDICATION: Signs/Symptoms:To check for shunt versus coil prior to MRI.   COMPARISON: None available.   ACCESSION NUMBER(S): [id number]   ORDERING CLINICIAN: JF ALEMAN   TECHNIQUE: Two views of the calvarium are completed.   FINDINGS: There is a 9 mm device implanted within the midline of the brain just anterior and superior to the sella turcica. The exact etiology of this device is unclear but clinical correlation with surgical history is advised. This does not have the appearance of a shunt device. Of note, the patient did have MRI of the brain on 10/13/2023.       A 9 mm rectangular shaped device is seen in the midline of the brain just anterior and superior to the sella turcica with exact etiology unclear. Clinical correlation is necessary. This does not have the appearance of a shunt device however.   MRI of the brain was performed on 10/13/2023.   Signed by: Lisa Lezama 1/9/2024 8:31 PM Dictation workstation:   [id number]    CT abdomen pelvis w IV contrast    Result Date: 1/7/2024  Interpreted By:  Lisa Lezama, STUDY: CT ABDOMEN PELVIS W IV CONTRAST;  1/7/2024 12:26 pm   INDICATION: Abdominal pain   COMPARISON: Fused PET/CT study of 10/06/2023   ACCESSION NUMBER(S): [id number]   ORDERING CLINICIAN: JF ALEMAN   TECHNIQUE: CT of the abdomen and pelvis was performed with intravenous contrast administered. 75 mL of Omnipaque 350 was injected intravenously for the study with sagittal and coronal reformations performed by the technologist at the acquisition scanner.   All CT examinations are performed with 1 or more of the following dose reduction techniques: Automated exposure control, adjustment of mA and/or kv according to patient's size, or use of iterative reconstruction techniques.   FINDINGS: LOWER CHEST: There are severe  emphysematous changes seen within the lower lung zones with minimal right pleural effusion and with mild atelectasis at the right lung base.   ABDOMEN:   LIVER: There are several hypodense space-occupying liver lesions measuring up to 2.3 cm in size without interval change compared with prior study. These are most consistent with stable hepatic cysts.   BILE DUCTS: No intrahepatic or extrahepatic biliary ductal dilatation.   GALLBLADDER: Unremarkable   PANCREAS: Unremarkable   SPLEEN: Unremarkable   ADRENAL GLANDS: Unremarkable   KIDNEYS AND URETERS: There is a 1.7 cm right renal cyst seen with left kidney unremarkable. There is mild bilateral perinephric scarring. MRCP   PELVIS:   BLADDER: No abnormality of the urinary bladder is seen.   REPRODUCTIVE ORGANS: Prostate gland is enlarged with a transverse diameter of 4.8 cm.   BOWEL: Colonic diverticulosis is seen without evidence for diverticulitis. The bowel is difficult to accurately evaluate due to patient motion. No pneumatosis intestinalis is seen. No portal venous gas is observed.   VESSELS: There is atherosclerosis of the abdominal aorta without aneurysmal dilatation. Calcified plaque is visible within the superior mesenteric artery. There is a filling defect seen within the proximal superior mesenteric artery identified roughly 7 mm distal to its origin. I believe that this represents partial thrombosis. There is flow seen more distally within the superior mesenteric artery.   PERITONEUM/RETROPERITONEUM/LYMPH NODES: A small amount of free fluid within the pelvic cul-de-sac is identified.   BONES AND ABDOMINAL WALL: Small bilateral fat containing inguinal hernias are present with left inguinal hernia larger than right. A very tiny fat containing umbilical hernia is observed.       1.  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 However, there is flow noted within the distal SMA. 2. Small amount  of free fluid within the pelvic cul-de-sac. 3. Colonic diverticulosis without diverticulitis. 4. Hepatic cysts measuring up to 2.3 cm in size with 1.7 cm right renal cyst. 5. Severe pulmonary emphysema with minimal right pleural effusion and with atelectasis at right lung base. 6. Small bilateral fat containing inguinal hernias with very tiny fat containing umbilical hernia     MACRO: None   Signed by: Lisa Lezama 1/7/2024 12:53 PM Dictation workstation:   [id number]    XR chest 1 view    Result Date: 1/5/2024  STUDY: Chest Radiograph;  01/05/2024 9:39 PM INDICATION: Cough, fever.  Additional History:  Lung cancer. COMPARISON: CT chest 10/06/2023.  NM PET/CT 10/06/2023.  ACCESSION NUMBER(S): [id number] ORDERING CLINICIAN: ARTEM MOSER TECHNIQUE:  Frontal chest was obtained at 2139 hours. FINDINGS: CARDIOMEDIASTINAL SILHOUETTE: Cardiomediastinal silhouette is normal in size and configuration.  LUNGS: Bandlike scarring/atelectasis LEFT midlung noted.  ABDOMEN: No remarkable upper abdominal findings.  BONES: No acute osseous changes.    Bandlike scarring/atelectasis LEFT midlung. Signed by Artem Krause II, MD       Assessment/Plan   Principal Problem:    Neutropenic fever (CMS/HCC)  Active Problems:    Partial symptomatic epilepsy with complex partial seizures, not intractable, without status epilepticus (CMS/HCC)    Fever in adult    Superior mesenteric artery thrombosis (CMS/HCC)    Hyponatremia    Generalized weakness    Aspiration pneumonia (CMS/HCC)    Emphysema lung (CMS/HCC)    Acute respiratory failure (CMS/HCC)    Brain aneurysm    Malignant neoplasm of upper lobe of left lung (CMS/HCC)    63-year-old man with complicated neurologic striae is identified to have an elevated ammonia level.  This hyperammonemia may well explain the patient's peculiar version of encephalopathy in which he is sometimes normal and other times says off-the-wall things.  I have started him on lactulose and decrease the Depakote  500 mg twice a day as this medication may be associated with the elevated ammonia level.  Nonetheless, he is okay for discharge to home as these are outpatient problems and he should follow-up with me in the next 1 to 2 weeks.       I personally spent 40 minutes today, exclusive of procedures, providing care for this patient, including preparation, face to face time, documentation and other services such as review of medical records, diagnostic result, patient education, counseling, coordination of care as specified in the encounter.

## 2024-01-12 NOTE — NURSING NOTE
Attempted to review discharge paperwork with pt's wife she said she would read it  . Discontinued IV 's  pressure held to both sites until bleeding stopped band aide applied to both sites.

## 2024-01-12 NOTE — PROGRESS NOTES
"Martínez Neri is a 63 y.o. male on day 4 of admission presenting with Neutropenic fever (CMS/HCC).    Subjective   On 4 L nasal cannula oxygen; oxygen sats 94%.  No respiratory complaints.  Denies pain.  Afebrile.       Objective     Physical Exam  Vitals and nursing note reviewed.   Constitutional:       Appearance: Normal appearance.   HENT:      Head: Normocephalic and atraumatic.      Nose: Nose normal.      Mouth/Throat:      Mouth: Mucous membranes are moist.   Eyes:      Extraocular Movements: Extraocular movements intact.      Conjunctiva/sclera: Conjunctivae normal.      Pupils: Pupils are equal, round, and reactive to light.   Cardiovascular:      Rate and Rhythm: Normal rate and regular rhythm.      Pulses: Normal pulses.      Heart sounds: Normal heart sounds.   Pulmonary:      Effort: Pulmonary effort is normal.      Breath sounds: Normal breath sounds.   Abdominal:      General: Bowel sounds are normal.      Palpations: Abdomen is soft.   Musculoskeletal:         General: Normal range of motion.   Skin:     General: Skin is warm and dry.      Capillary Refill: Capillary refill takes less than 2 seconds.   Neurological:      General: No focal deficit present.      Mental Status: He is alert and oriented to person, place, and time.   Psychiatric:         Mood and Affect: Mood normal.         Behavior: Behavior normal.         Last Recorded Vitals  Blood pressure 121/54, pulse 75, temperature 36.5 °C (97.7 °F), temperature source Oral, resp. rate 19, height 1.753 m (5' 9.02\"), weight 76.5 kg (168 lb 10.4 oz), SpO2 94 %.  Intake/Output last 3 Shifts:  I/O last 3 completed shifts:  In: 400 (5.2 mL/kg) [IV Piggyback:400]  Out: 1600 (20.9 mL/kg) [Urine:1600 (0.6 mL/kg/hr)]  Weight: 76.5 kg     atorvastatin, 40 mg, oral, Daily  cholecalciferol, 2,000 Units, oral, Daily  divalproex, 750 mg, oral, BID  lacosamide, 200 mg, oral, BID  nicotine, 1 patch, transdermal, q24h  pantoprazole, 40 mg, oral, Daily before " breakfast  piperacillin-tazobactam, 4.5 g, intravenous, q6h  potassium chloride CR, 40 mEq, oral, Once  sucralfate, 1 g, oral, q6h CHAPARRITA  thiamine, 100 mg, oral, Daily  tiotropium, 2 Inhalation, inhalation, Daily       PRN medications: acetaminophen **OR** acetaminophen **OR** acetaminophen, benzocaine-menthol, dextromethorphan-guaifenesin, guaiFENesin, heparin (porcine), ipratropium-albuteroL, LORazepam, ondansetron ODT, polyethylene glycol, prochlorperazine   dextrose 5%-0.45 % sodium chloride, 80 mL/hr, Last Rate: 80 mL/hr (01/10/24 1933)  heparin, 0-4,500 Units/hr, Last Rate: 1,100 Units/hr (01/12/24 0151)       Relevant Results  Results for orders placed or performed during the hospital encounter of 01/05/24 (from the past 24 hour(s))   POCT GLUCOSE   Result Value Ref Range    POCT Glucose 111 (H) 74 - 99 mg/dL   POCT GLUCOSE   Result Value Ref Range    POCT Glucose 115 (H) 74 - 99 mg/dL   Valproic acid level, total   Result Value Ref Range    Valproic Acid 70 50 - 100 ug/mL   Ammonia   Result Value Ref Range    Ammonia 69 (H) 12 - 45 umol/L   APTT   Result Value Ref Range    aPTT 45.8 (H) 22.0 - 32.5 seconds   CBC   Result Value Ref Range    WBC 4.3 (L) 4.4 - 11.3 x10*3/uL    nRBC 0.5 (H) 0.0 - 0.0 /100 WBCs    RBC 2.25 (L) 4.50 - 5.90 x10*6/uL    Hemoglobin 7.6 (L) 13.5 - 17.5 g/dL    Hematocrit 21.9 (L) 41.0 - 52.0 %    MCV 97 80 - 100 fL    MCH 33.8 26.0 - 34.0 pg    MCHC 34.7 32.0 - 36.0 g/dL    RDW 15.9 (H) 11.5 - 14.5 %    Platelets 256 150 - 450 x10*3/uL   POCT GLUCOSE   Result Value Ref Range    POCT Glucose 108 (H) 74 - 99 mg/dL   APTT   Result Value Ref Range    aPTT 70.3 (H) 22.0 - 32.5 seconds   APTT   Result Value Ref Range    aPTT 53.2 (H) 22.0 - 32.5 seconds   POCT GLUCOSE   Result Value Ref Range    POCT Glucose 96 74 - 99 mg/dL   CBC and Auto Differential   Result Value Ref Range    WBC 4.4 4.4 - 11.3 x10*3/uL    nRBC 0.0 0.0 - 0.0 /100 WBCs    RBC 2.55 (L) 4.50 - 5.90 x10*6/uL    Hemoglobin 8.4  (L) 13.5 - 17.5 g/dL    Hematocrit 25.1 (L) 41.0 - 52.0 %    MCV 98 80 - 100 fL    MCH 32.9 26.0 - 34.0 pg    MCHC 33.5 32.0 - 36.0 g/dL    RDW 16.6 (H) 11.5 - 14.5 %    Platelets 289 150 - 450 x10*3/uL    Neutrophils %      Immature Granulocytes %, Automated      Lymphocytes %      Monocytes %      Eosinophils %      Basophils %      Neutrophils Absolute      Lymphocytes Absolute      Monocytes Absolute      Eosinophils Absolute      Basophils Absolute     aPTT   Result Value Ref Range    aPTT 49.7 (H) 22.0 - 32.5 seconds      MR angio head wo IV contrast  Result Date: 1/11/2024  The anterior and posterior cerebral circulations are patent. No hemodynamically significant stenosis or aneurysmal dilatation is seen. No saccular aneurysm is identified. Susceptibility artifact is seen in the region of the anterior communicating artery which may correlate to clinical history of aneurysm coiling.  Susceptibility artifact is seen in the region of the anterior communicating artery which may correlate to clinical history of aneurysm coiling. Otherwise, unremarkable MRA of the head.     MR brain w and wo IV contrast  Result Date: 1/9/2024  Diffusion-weighted imaging demonstrates no area of restricted diffusion to suggest acute infarction.  There is no intracranial hemorrhage, midline shift, mass effect, or extra-axial fluid collection.  Generalized cerebral atrophy noted.  There are no areas of abnormal enhancement.  There are mild scattered patchy areas of T2 prolongation within the subcortical and deep periventricular white matter, suggesting changes of chronic microvascular ischemia  Brain parenchyma otherwise demonstrates normal morphology and signal characteristics.  Midline structures are within normal limits. Major intracranial vessels demonstrate preserved flow voids.  Brain volume and ventricular system are within normal limits for age.  Mild chronic secretions within the ethmoid air cells and maxillary sinuses noted.   Orbits, globes, and mastoid air cells are unremarkable. Likely sequela of chronic small vessel ischemic disease. No acute intracranial findings. No suspicious region of abnormal enhancement. Signed by David Krause II, MD    CT angio chest for pulmonary embolism  Result Date: 1/9/2024  Pulmonary arteries are adequately opacified without acute or chronic filling defects.  The thoracic aorta is normal in course and caliber without dissection or aneurysm. The heart is normal in size without pericardial effusion. Atherosclerosis of the thoracic aorta and coronary arteries is present.  There appears to be thickening of the esophagus and/or adjacent adenopathy image 33 series 4.  This is new compared with prior exam. Follow-up endoscopy recommended for further assessment.  Layering debris within the LEFT mainstem bronchus appears to be present, possibly fluid.  Trace amount of fluid in the RIGHT mainstem bronchus also appears to be present.  Clinical correlation for aspiration recommended.  Follow-up endoscopy recommended for further assessment.  Small-to-moderate size right-sided pleural effusion is present.  Small LEFT effusion is present.  Emphysematous changes are present.  RIGHT middle lobe consolidation has developed.  RIGHT upper lobe consolidation also noted.  This appears related to volume loss. This is best appreciated on the coronal series.  Presumed postradiation scarring in the LEFT upper lobe also noted, similar prior study.  Bibasilar consolidation is also present.  Clinical correlation for signs of infection recommended.  Aspiration suspected. Small presumed cyst measuring about the anterior aspect hepatic lobe is present measuring 1.2 cm.  Additional smaller lesion about the anterior aspect of the medial segment LEFT hepatic lobe measures 0.9 cm. Senescent changes of the thoracic spine are present.  There are no acute fractures.  No suspicious bony lesions. 1. There is no evidence of pulmonary embolus.  2. Thickening of the esophagus and/or adjacent adenopathy. Follow-up endoscopy recommended for further assessment. 3. Debris within the mainstem bronchi, LEFT greater than RIGHT. Bibasilar consolidation noted. Aspiration suspected. Follow-up bronchoscopy recommended. 4. Bilateral pleural effusions, RIGHT greater than LEFT. 5. Emphysematous changes are present. Presumed postradiation changes LEFT upper lobe noted. Signed by David Krause II, MD    XR skull 1-2 views  Result Date: 1/9/2024  There is a 9 mm device implanted within the midline of the brain just anterior and superior to the sella turcica. The exact etiology of this device is unclear but clinical correlation with surgical history is advised. This does not have the appearance of a shunt device. Of note, the patient did have MRI of the brain on 10/13/2023.  A 9 mm rectangular shaped device is seen in the midline of the brain just anterior and superior to the sella turcica with exact etiology unclear. Clinical correlation is necessary. This does not have the appearance of a shunt device however.   MRI of the brain was performed on 10/13/2023.  6    CT abdomen pelvis w IV contrast  Result Date: 1/7/2024  LOWER CHEST: There are severe emphysematous changes seen within the lower lung zones with minimal right pleural effusion and with mild atelectasis at the right lung base.   ABDOMEN:   LIVER: There are several hypodense space-occupying liver lesions measuring up to 2.3 cm in size without interval change compared with prior study. These are most consistent with stable hepatic cysts.   BILE DUCTS: No intrahepatic or extrahepatic biliary ductal dilatation.   GALLBLADDER: Unremarkable   PANCREAS: Unremarkable   SPLEEN: Unremarkable   ADRENAL GLANDS: Unremarkable   KIDNEYS AND URETERS: There is a 1.7 cm right renal cyst seen with left kidney unremarkable. There is mild bilateral perinephric scarring. MRCP   PELVIS:   BLADDER: No abnormality of the urinary bladder  is seen.   REPRODUCTIVE ORGANS: Prostate gland is enlarged with a transverse diameter of 4.8 cm.   BOWEL: Colonic diverticulosis is seen without evidence for diverticulitis. The bowel is difficult to accurately evaluate due to patient motion. No pneumatosis intestinalis is seen. No portal venous gas is observed.   VESSELS: There is atherosclerosis of the abdominal aorta without aneurysmal dilatation. Calcified plaque is visible within the superior mesenteric artery. There is a filling defect seen within the proximal superior mesenteric artery identified roughly 7 mm distal to its origin. I believe that this represents partial thrombosis. There is flow seen more distally within the superior mesenteric artery.   PERITONEUM/RETROPERITONEUM/LYMPH NODES: A small amount of free fluid within the pelvic cul-de-sac is identified.   BONES AND ABDOMINAL WALL: Small bilateral fat containing inguinal hernias are present with left inguinal hernia larger than right. A very tiny fat containing umbilical hernia is observed.  1.  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 However, there is flow noted within the distal SMA. 2. Small amount of free fluid within the pelvic cul-de-sac. 3. Colonic diverticulosis without diverticulitis. 4. Hepatic cysts measuring up to 2.3 cm in size with 1.7 cm right renal cyst. 5. Severe pulmonary emphysema with minimal right pleural effusion and with atelectasis at right lung base. 6. Small bilateral fat containing inguinal hernias with very tiny fat containing umbilical hernia         XR chest 1 view  Result Date: 1/5/2024  CARDIOMEDIASTINAL SILHOUETTE: Cardiomediastinal silhouette is normal in size and configuration.  LUNGS: Bandlike scarring/atelectasis LEFT midlung noted.  ABDOMEN: No remarkable upper abdominal findings.  BONES: No acute osseous changes. Bandlike scarring/atelectasis LEFT midlung. Signed by David Krause II, MD       Impression  Acute hypoxic respiratory failure  Non-small cell lung cancer  Aspiration pneumonia  Esophageal abnormality  COPD  Asthma  Hypokalemia  Tobacco use           Plan  Wean oxygen as sats allow  Home O2 certification prior to discharge  Continue IBD/ICS  Continuous pulse oximetry  Incentive spirometry/pulmonary hygiene  Zosyn per Infectious Disease  Follow-up cultures  Replace potassium  GI consulted  Heme-Onc following  FEV1 as outpatient  PT/OT  Repeat imaging in 4 to 6 weeks and consider repeat bronchoscopy-per patient he is currently receiving chemo and radiation therapy and potentially waiting until after this  Stable for discharge from a pulmonary standpoint after home O2 certification  Follow-up with your Dr. Sahu in 2 weeks       Heather Frey, APRN-CNP  [facility]

## 2024-01-12 NOTE — PROGRESS NOTES
"Martínez Neri is a 63 y.o. male on day 4 of admission presenting with Neutropenic fever (CMS/HCC).    Subjective   Patient is less agitated today       Objective     Physical Exam  Vitals reviewed.   Constitutional:       Appearance: Normal appearance.   HENT:      Head: Normocephalic and atraumatic.      Right Ear: Tympanic membrane, ear canal and external ear normal.      Left Ear: Tympanic membrane, ear canal and external ear normal.      Nose: Nose normal.      Mouth/Throat:      Pharynx: Oropharynx is clear.   Eyes:      Extraocular Movements: Extraocular movements intact.      Conjunctiva/sclera: Conjunctivae normal.      Pupils: Pupils are equal, round, and reactive to light.   Cardiovascular:      Rate and Rhythm: Normal rate and regular rhythm.      Pulses: Normal pulses.      Heart sounds: Normal heart sounds.   Pulmonary:      Effort: Pulmonary effort is normal.      Breath sounds: Rhonchi present.   Abdominal:      General: Abdomen is flat. Bowel sounds are normal.      Palpations: Abdomen is soft.   Musculoskeletal:      Cervical back: Normal range of motion and neck supple.   Skin:     General: Skin is warm and dry.   Neurological:      General: No focal deficit present.      Mental Status: He is alert and oriented to person, place, and time.   Psychiatric:         Mood and Affect: Mood normal.         Last Recorded Vitals  Blood pressure (!) 111/46, pulse 78, temperature 36.6 °C (97.9 °F), temperature source Oral, resp. rate 19, height 1.753 m (5' 9.02\"), weight 76.5 kg (168 lb 10.4 oz), SpO2 92 %.  Intake/Output last 3 Shifts:  I/O last 3 completed shifts:  In: 400 (5.2 mL/kg) [IV Piggyback:400]  Out: 1600 (20.9 mL/kg) [Urine:1600 (0.6 mL/kg/hr)]  Weight: 76.5 kg     Relevant Results              Scheduled medications    Continuous medications    PRN medications  Results for orders placed or performed during the hospital encounter of 01/05/24 (from the past 24 hour(s))   POCT GLUCOSE   Result Value " Ref Range    POCT Glucose 108 (H) 74 - 99 mg/dL   APTT   Result Value Ref Range    aPTT 70.3 (H) 22.0 - 32.5 seconds   APTT   Result Value Ref Range    aPTT 53.2 (H) 22.0 - 32.5 seconds   POCT GLUCOSE   Result Value Ref Range    POCT Glucose 96 74 - 99 mg/dL   CBC and Auto Differential   Result Value Ref Range    WBC 4.4 4.4 - 11.3 x10*3/uL    nRBC 0.0 0.0 - 0.0 /100 WBCs    RBC 2.55 (L) 4.50 - 5.90 x10*6/uL    Hemoglobin 8.4 (L) 13.5 - 17.5 g/dL    Hematocrit 25.1 (L) 41.0 - 52.0 %    MCV 98 80 - 100 fL    MCH 32.9 26.0 - 34.0 pg    MCHC 33.5 32.0 - 36.0 g/dL    RDW 16.6 (H) 11.5 - 14.5 %    Platelets 289 150 - 450 x10*3/uL    Immature Granulocytes %, Automated 11.7 (H) 0.0 - 0.9 %    Immature Granulocytes Absolute, Automated 0.52 0.00 - 0.70 x10*3/uL   aPTT   Result Value Ref Range    aPTT 49.7 (H) 22.0 - 32.5 seconds   Manual Differential   Result Value Ref Range    Neutrophils %, Manual 44.0 40.0 - 80.0 %    Bands %, Manual 12.0 0.0 - 5.0 %    Lymphocytes %, Manual 15.0 13.0 - 44.0 %    Monocytes %, Manual 19.0 2.0 - 10.0 %    Eosinophils %, Manual 0.0 0.0 - 6.0 %    Basophils %, Manual 0.0 0.0 - 2.0 %    Metamyelocytes %, Manual 2.0 0.0 - 0.0 %    Myelocytes %, Manual 8.0 0.0 - 0.0 %    Seg Neutrophils Absolute, Manual 1.94 1.20 - 7.00 x10*3/uL    Bands Absolute, Manual 0.53 0.00 - 0.70 x10*3/uL    Lymphocytes Absolute, Manual 0.66 (L) 1.20 - 4.80 x10*3/uL    Monocytes Absolute, Manual 0.84 0.10 - 1.00 x10*3/uL    Eosinophils Absolute, Manual 0.00 0.00 - 0.70 x10*3/uL    Basophils Absolute, Manual 0.00 0.00 - 0.10 x10*3/uL    Metamyelocytes Absolute, Manual 0.09 0.00 - 0.00 x10*3/uL    Myelocytes Absolute, Manual 0.35 0.00 - 0.00 x10*3/uL    Total Cells Counted 100     Neutrophils Absolute, Manual 2.47 1.20 - 7.70 x10*3/uL    RBC Morphology See Below     Ovalocytes Few     Stomatocytes Few     Toxic Granulation Present    POCT GLUCOSE   Result Value Ref Range    POCT Glucose 107 (H) 74 - 99 mg/dL     MR angio  head wo IV contrast    Result Date: 1/11/2024  Interpreted By:  Tonya Mccartney, STUDY: MR ANGIO HEAD WO IV CONTRAST; 1/11/2024 11:57 am   INDICATION: Signs/Symptoms:coil;   COMPARISON: None   ACCESSION NUMBER(S): [id number]   ORDERING CLINICIAN: LUIS VARGAS   TECHNIQUE: MRA of the head was performed using 3-D time-of-flight technique. 3-D MIP reconstructions were processed, created and reviewed on a separate workstation.   FINDINGS: The anterior and posterior cerebral circulations are patent. No hemodynamically significant stenosis or aneurysmal dilatation is seen. No saccular aneurysm is identified. Susceptibility artifact is seen in the region of the anterior communicating artery which may correlate to clinical history of aneurysm coiling.       Susceptibility artifact is seen in the region of the anterior communicating artery which may correlate to clinical history of aneurysm coiling. Otherwise, unremarkable MRA of the head.   Signed by: Tonya Mccartney 1/11/2024 12:50 PM Dictation workstation:   [id number]                  Assessment/Plan   Principal Problem:    Neutropenic fever (CMS/HCC)  Active Problems:    Partial symptomatic epilepsy with complex partial seizures, not intractable, without status epilepticus (CMS/HCC)    Fever in adult    Superior mesenteric artery thrombosis (CMS/HCC)    Hyponatremia    Generalized weakness    Aspiration pneumonia (CMS/HCC)    Emphysema lung (CMS/HCC)    Acute respiratory failure (CMS/HCC)    Brain aneurysm    Malignant neoplasm of upper lobe of left lung (CMS/HCC)    Discussed with wife was present in the room all the care    He is scheduled for radiation treatment on Monday  CBC with differential ordered for the oncologist they will call her with the plans for chemotherapy  Aspirin started  Home oxygen arranged  Home care arranged  All questions answered         I spent 35 minutes in the professional and overall care of this patient.      Radha Casey MD

## 2024-01-12 NOTE — HH CARE COORDINATION
Home Care received a Referral for Nursing, Physical Therapy, and Occupational Therapy. We have processed the referral for a Start of Care on 1.13 to 1.15.2024.     If you have any questions or concerns, please feel free to contact us at [phone]. Follow the prompts, enter your five digit zip code, and you will be directed to your care team on [facility] 1.

## 2024-01-12 NOTE — CARE PLAN
Problem: Fall/Injury  Goal: Not fall by end of shift  Outcome: Progressing  Goal: Be free from injury by end of the shift  Outcome: Progressing  Goal: Verbalize understanding of personal risk factors for fall in the hospital  Outcome: Progressing  Goal: Verbalize understanding of risk factor reduction measures to prevent injury from fall in the home  Outcome: Progressing  Goal: Use assistive devices by end of the shift  Outcome: Progressing  Goal: Pace activities to prevent fatigue by end of the shift  Outcome: Progressing     Problem: Pain  Goal: Takes deep breaths with improved pain control throughout the shift  Outcome: Progressing  Goal: Turns in bed with improved pain control throughout the shift  Outcome: Progressing  Goal: Walks with improved pain control throughout the shift  Outcome: Progressing  Goal: Performs ADL's with improved pain control throughout shift  Outcome: Progressing  Goal: Participates in PT with improved pain control throughout the shift  Outcome: Progressing  Goal: Free from opioid side effects throughout the shift  Outcome: Progressing  Goal: Free from acute confusion related to pain meds throughout the shift  Outcome: Progressing     Problem: Pain  Goal: My pain/discomfort is manageable  Outcome: Progressing     Problem: Safety  Goal: Patient will be injury free during hospitalization  Outcome: Progressing  Goal: I will remain free of falls  Outcome: Progressing     Problem: Daily Care  Goal: Daily care needs are met  Outcome: Progressing     Problem: Psychosocial Needs  Goal: Demonstrates ability to cope with hospitalization/illness  Outcome: Progressing  Goal: Collaborate with me, my family, and caregiver to identify my specific goals  Outcome: Progressing     Problem: Discharge Barriers  Goal: My discharge needs are met  Outcome: Progressing     Problem: Respiratory  Goal: Clear secretions with interventions this shift  Outcome: Progressing  Goal: Minimize anxiety/maximize coping  throughout shift  Outcome: Progressing  Goal: Minimal/no exertional discomfort or dyspnea this shift  Outcome: Progressing  Goal: No signs of respiratory distress (eg. Use of accessory muscles. Peds grunting)  Outcome: Progressing  Goal: Patent airway maintained this shift  Outcome: Progressing  Goal: Tolerate mechanical ventilation evidenced by VS/agitation level this shift  Outcome: Progressing  Goal: Tolerate pulmonary toileting this shift  Outcome: Progressing  Goal: Verbalize decreased shortness of breath this shift  Outcome: Progressing  Goal: Wean oxygen to maintain O2 saturation per order/standard this shift  Outcome: Progressing  Goal: Increase self care and/or family involvement in next 24 hours  Outcome: Progressing   The patient's goals for the shift include get stronger to walk    The clinical goals for the shift include adequate oxygenation,  safety    Over the shift, the patient did not make progress toward the following goals. Barriers to progression include na. Recommendations to address these barriers include na.

## 2024-01-15 ENCOUNTER — APPOINTMENT (OUTPATIENT)
Dept: RADIATION ONCOLOGY | Facility: HOSPITAL | Age: 64
End: 2024-01-15
Payer: MEDICAID

## 2024-01-15 ENCOUNTER — HOME CARE VISIT (OUTPATIENT)
Dept: HOME HEALTH SERVICES | Facility: HOME HEALTH | Age: 64
End: 2024-01-15
Payer: MEDICAID

## 2024-01-15 VITALS
WEIGHT: 158 LBS | DIASTOLIC BLOOD PRESSURE: 60 MMHG | OXYGEN SATURATION: 92 % | RESPIRATION RATE: 20 BRPM | HEART RATE: 74 BPM | TEMPERATURE: 97.2 F | SYSTOLIC BLOOD PRESSURE: 108 MMHG | BODY MASS INDEX: 23.4 KG/M2 | HEIGHT: 69 IN

## 2024-01-15 DIAGNOSIS — C34.12 MALIGNANT NEOPLASM OF UPPER LOBE, LEFT BRONCHUS OR LUNG (MULTI): ICD-10-CM

## 2024-01-15 LAB — LACOSAMIDE SERPL-MCNC: 12.8 UG/ML (ref 1–10)

## 2024-01-15 PROCEDURE — G0299 HHS/HOSPICE OF RN EA 15 MIN: HCPCS

## 2024-01-15 PROCEDURE — 0023 HH SOC

## 2024-01-15 SDOH — ECONOMIC STABILITY: HOUSING INSECURITY: EVIDENCE OF SMOKING MATERIAL: 1

## 2024-01-15 SDOH — HEALTH STABILITY: MENTAL HEALTH: SMOKING IN HOME: 1

## 2024-01-15 SDOH — ECONOMIC STABILITY: FOOD INSECURITY: MEALS PER DAY: 3

## 2024-01-15 ASSESSMENT — ENCOUNTER SYMPTOMS
PAIN LOCATION - PAIN SEVERITY: 2/10
FATIGUE: 1
PAIN LOCATION: GENERALIZED
PAIN SEVERITY GOAL: 0/10
DEPRESSION: 0
STOOL FREQUENCY: DAILY
BOWEL PATTERN NORMAL: 1
LOWEST PAIN SEVERITY IN PAST 24 HOURS: 0/10
TROUBLE SWALLOWING: 1
PAIN LOCATION - PAIN DURATION: DAILY
HIGHEST PAIN SEVERITY IN PAST 24 HOURS: 2/10
OCCASIONAL FEELINGS OF UNSTEADINESS: 1
APPETITE LEVEL: FAIR
PAIN: 1
PERSON REPORTING PAIN: PATIENT
PAIN LOCATION - PAIN QUALITY: ACHE
LOSS OF SENSATION IN FEET: 0
PAIN LOCATION - PAIN FREQUENCY: INFREQUENT
LOSS OF VISUAL FIELD: 1
SUBJECTIVE PAIN PROGRESSION: WAXING AND WANING
LAST BOWEL MOVEMENT: 66854
PAIN LOCATION - EXACERBATING FACTORS: MOVEMENT

## 2024-01-15 ASSESSMENT — ACTIVITIES OF DAILY LIVING (ADL)
ENTERING_EXITING_HOME: MODERATE ASSIST
AMBULATION ASSISTANCE: STAND BY ASSIST
AMBULATION ASSISTANCE: ONE PERSON
AMBULATION ASSISTANCE: 1

## 2024-01-15 ASSESSMENT — PAIN SCALES - PAIN ASSESSMENT IN ADVANCED DEMENTIA (PAINAD)
FACIALEXPRESSION: 1
NEGVOCALIZATION: 1 - OCCASIONAL MOAN OR GROAN. LOW-LEVEL SPEECH WITH A NEGATIVE OR DISAPPROVING QUALITY.
CONSOLABILITY: 1
NEGVOCALIZATION: 1
CONSOLABILITY: 1 - DISTRACTED OR REASSURED BY VOICE OR TOUCH.
BODYLANGUAGE: 1 - TENSE. DISTRESSED PACING. FIDGETING.
TOTALSCORE: 5
BODYLANGUAGE: 1
BREATHING: 1
FACIALEXPRESSION: 1 - SAD. FRIGHTENED. FROWN.

## 2024-01-15 NOTE — PROGRESS NOTES
Radiation Oncology Treatment Summary    Patient Name:  Martínez Neri  MRN:  [id number]  :  1960    Referring Provider: Elo Peters MD   Primary Care Provider: Jayme Casey MD    Brief History: Martínez Neri is a 63 y.o. male with   Radiation Oncology Problems      Primary cancer of left upper lobe of lung (CMS/HCC)         The patient completed radiotherapy as outlined below.    Radiation Treatment Summary:      THOMAS Hilum               2023 - 2023     1010     2,000 cGy/2,000 cGy     IMRT  Replan THOMAS Hilum    2023 - 2023      5/5       1,000 cGy/1,000 cGy     IMRT  THOMAS Hilum Replan    2023 - 2024     1518     3,000 cGy/3,600 cGy    IMRT  Total   2023 - 2024  30/33 6000 cGy/ 6600 cGy  IMRT  Please see the patient's Mosaiq chart for further details regarding the radiation plan, including beam energy.    Concurrent Chemotherapy:  Treatment Plans       Name Type Plan Dates Plan Provider         Active    PACLitaxel (Weekly) / CARBOplatin (Weekly) with Concurrent Radiation, 49 Day Cycle Oncology Treatment  2023 - Present Elo Peters MD                    CTCAE Toxicity Overview:   Toxicity Assessment          2023    11:14 2023    11:03 1/3/2024    11:41   Toxicity Assessment   Adverse Events Reviewed (WDL) No (Exceptions to WDL) No (Exceptions to WDL) No (Exceptions to WDL)   Treatment Site Thoracic Thoracic Thoracic   Anorexia Grade 0 Grade 0 Grade 0   Dehydration Grade 0 Grade 0    Dermatitis Radiation  Grade 0 Grade 0   Fatigue Grade 1 Grade 1 Grade 1   Nausea Grade 0 Grade 0 Grade 0   Pain Grade 0 Grade 0 Grade 0   Vomiting Grade 0     Esophageal Obstruction   Grade 0   Esophageal Pain Grade 0 Grade 0 Grade 0   Cough Grade 1 Grade 1 Grade 1   Dyspnea Grade 0 Grade 1 Grade 1   Hypoxia Grade 0 Grade 0 Grade 0     Patient Disposition: The patient tolerated radiation with no unexpected acute radiation effects, though had ongoing issues  with neutropenia and thrombocytopenia. He was admitted to the inpatient unit at [facility] due to significant weakness and confusion. During his admission, he was found to be neutropenic with a fever, increasing shortness of breath and new oxygenation requirements, and concerns for aspiration pneumonia. As such decision was taken to stop radiation at the dose of 60 Gy which is an acceptable dose for curative intent treatment.  He is scheduled for a follow up at our clinic after discharge for close interval assessment. The patient is encouraged to contact the radiation department for any questions or concerns.        Jorge Layne MD, MMM  Senior Attending Physician, [facility]  Professor, [facility] [employer]   Our [place]: “To Heal, To Teach, To Discover.”  RN partner: [email or web address]    Phone (scheduling): [phone]/ [email or web address]  Phone (office): [phone]  Phone (after hours): [phone]

## 2024-01-16 ENCOUNTER — TELEPHONE (OUTPATIENT)
Dept: RADIATION ONCOLOGY | Facility: HOSPITAL | Age: 64
End: 2024-01-16
Payer: MEDICAID

## 2024-01-16 ENCOUNTER — APPOINTMENT (OUTPATIENT)
Dept: RADIATION ONCOLOGY | Facility: HOSPITAL | Age: 64
End: 2024-01-16
Payer: MEDICAID

## 2024-01-16 ENCOUNTER — HOME CARE VISIT (OUTPATIENT)
Dept: HOME HEALTH SERVICES | Facility: HOME HEALTH | Age: 64
End: 2024-01-16
Payer: MEDICAID

## 2024-01-16 VITALS
RESPIRATION RATE: 20 BRPM | HEART RATE: 84 BPM | DIASTOLIC BLOOD PRESSURE: 60 MMHG | OXYGEN SATURATION: 95 % | SYSTOLIC BLOOD PRESSURE: 108 MMHG

## 2024-01-16 PROCEDURE — G0151 HHCP-SERV OF PT,EA 15 MIN: HCPCS

## 2024-01-16 SDOH — HEALTH STABILITY: MENTAL HEALTH: SMOKING IN HOME: 1

## 2024-01-16 SDOH — ECONOMIC STABILITY: HOUSING INSECURITY: EVIDENCE OF SMOKING MATERIAL: 1

## 2024-01-16 ASSESSMENT — ENCOUNTER SYMPTOMS
PAIN LOCATION: RIGHT FOOT
PAIN LOCATION - PAIN FREQUENCY: CONSTANT
PAIN LOCATION - PAIN QUALITY: ACHING
PERSON REPORTING PAIN: PATIENT
PAIN LOCATION - PAIN DURATION: ACUTE
PAIN: 1

## 2024-01-16 ASSESSMENT — ACTIVITIES OF DAILY LIVING (ADL)
AMBULATION_DISTANCE/DURATION_TOLERATED: 20 FT
AMBULATION ASSISTANCE ON FLAT SURFACES: 1

## 2024-01-18 ENCOUNTER — TELEPHONE (OUTPATIENT)
Dept: RADIATION ONCOLOGY | Facility: HOSPITAL | Age: 64
End: 2024-01-18
Payer: MEDICAID

## 2024-01-18 ENCOUNTER — TELEPHONE (OUTPATIENT)
Dept: ADMISSION | Facility: HOSPITAL | Age: 64
End: 2024-01-18
Payer: MEDICAID

## 2024-01-18 NOTE — TELEPHONE ENCOUNTER
Called pt to remind of appointment on 01/22/24 at 1:00. Pt's phone went to voicemail left number if needs to reschedule.

## 2024-01-18 NOTE — TELEPHONE ENCOUNTER
Spouse called regarding today's appt which pt will not be coming to because he was just discharged from the hospital.  He has an appt scheduled 1/25 and she inquires if ok to wait until then to be seen?

## 2024-01-19 ENCOUNTER — HOME CARE VISIT (OUTPATIENT)
Dept: HOME HEALTH SERVICES | Facility: HOME HEALTH | Age: 64
End: 2024-01-19
Payer: MEDICAID

## 2024-01-19 ENCOUNTER — APPOINTMENT (OUTPATIENT)
Dept: HOME HEALTH SERVICES | Facility: HOME HEALTH | Age: 64
End: 2024-01-19
Payer: MEDICAID

## 2024-01-19 VITALS
RESPIRATION RATE: 20 BRPM | TEMPERATURE: 97.4 F | DIASTOLIC BLOOD PRESSURE: 80 MMHG | HEART RATE: 79 BPM | SYSTOLIC BLOOD PRESSURE: 110 MMHG

## 2024-01-19 VITALS
TEMPERATURE: 97.8 F | DIASTOLIC BLOOD PRESSURE: 64 MMHG | SYSTOLIC BLOOD PRESSURE: 120 MMHG | HEART RATE: 83 BPM | OXYGEN SATURATION: 95 %

## 2024-01-19 DIAGNOSIS — R13.10 DYSPHAGIA, UNSPECIFIED TYPE: ICD-10-CM

## 2024-01-19 PROCEDURE — G0152 HHCP-SERV OF OT,EA 15 MIN: HCPCS

## 2024-01-19 PROCEDURE — G0153 HHCP-SVS OF S/L PATH,EA 15MN: HCPCS

## 2024-01-19 PROCEDURE — G0300 HHS/HOSPICE OF LPN EA 15 MIN: HCPCS

## 2024-01-19 SDOH — ECONOMIC STABILITY: HOUSING INSECURITY: EVIDENCE OF SMOKING MATERIAL: 1

## 2024-01-19 SDOH — HEALTH STABILITY: MENTAL HEALTH: SMOKING IN HOME: 1

## 2024-01-19 SDOH — HEALTH STABILITY: MENTAL HEALTH: SMOKING IN HOME: 0

## 2024-01-19 SDOH — ECONOMIC STABILITY: HOUSING INSECURITY: EVIDENCE OF SMOKING MATERIAL: 0

## 2024-01-19 ASSESSMENT — ENCOUNTER SYMPTOMS
APPETITE LEVEL: GOOD
DYSPNEA WITH EXERTION: 1
PERSON REPORTING PAIN: PATIENT
DENIES PAIN: 1
DENIES PAIN: 1
HIGHEST PAIN SEVERITY IN PAST 24 HOURS: 0/10
PAIN SEVERITY GOAL: 0/10
SUBJECTIVE PAIN PROGRESSION: UNCHANGED
LOWEST PAIN SEVERITY IN PAST 24 HOURS: 0/10
DENIES PAIN: 1
CHANGE IN APPETITE: UNCHANGED
TROUBLE SWALLOWING: 1

## 2024-01-19 ASSESSMENT — PAIN SCALES - PAIN ASSESSMENT IN ADVANCED DEMENTIA (PAINAD)
TOTALSCORE: 0
FACIALEXPRESSION: 0 - SMILING OR INEXPRESSIVE.
FACIALEXPRESSION: 0
BODYLANGUAGE: 0
NEGVOCALIZATION: 0 - NONE.
BODYLANGUAGE: 0 - RELAXED.
BREATHING: 0
NEGVOCALIZATION: 0
CONSOLABILITY: 0
CONSOLABILITY: 0 - NO NEED TO CONSOLE.

## 2024-01-19 ASSESSMENT — ACTIVITIES OF DAILY LIVING (ADL)
TOILETING: 1
BATHING_CURRENT_FUNCTION: SUPERVISION
BATHING ASSESSED: 1
DRESSING_LB_CURRENT_FUNCTION: INDEPENDENT
TOILETING: INDEPENDENT

## 2024-01-22 ENCOUNTER — HOSPITAL ENCOUNTER (OUTPATIENT)
Dept: RADIATION ONCOLOGY | Facility: HOSPITAL | Age: 64
Setting detail: RADIATION/ONCOLOGY SERIES
Discharge: HOME | End: 2024-01-22
Payer: MEDICAID

## 2024-01-22 ENCOUNTER — HOSPITAL ENCOUNTER (OUTPATIENT)
Dept: RADIOLOGY | Facility: HOSPITAL | Age: 64
Discharge: HOME | End: 2024-01-22
Payer: MEDICAID

## 2024-01-22 VITALS
BODY MASS INDEX: 23.73 KG/M2 | SYSTOLIC BLOOD PRESSURE: 110 MMHG | DIASTOLIC BLOOD PRESSURE: 70 MMHG | TEMPERATURE: 96.6 F | HEIGHT: 69 IN | HEART RATE: 91 BPM | RESPIRATION RATE: 18 BRPM | WEIGHT: 160.2 LBS | OXYGEN SATURATION: 90 %

## 2024-01-22 DIAGNOSIS — C34.12 MALIGNANT NEOPLASM OF UPPER LOBE, LEFT BRONCHUS OR LUNG (MULTI): ICD-10-CM

## 2024-01-22 DIAGNOSIS — C34.12 MALIGNANT NEOPLASM OF UPPER LOBE, LEFT BRONCHUS OR LUNG (MULTI): Primary | ICD-10-CM

## 2024-01-22 PROCEDURE — 71046 X-RAY EXAM CHEST 2 VIEWS: CPT

## 2024-01-22 PROCEDURE — 99214 OFFICE O/P EST MOD 30 MIN: CPT | Performed by: RADIOLOGY

## 2024-01-22 PROCEDURE — 99214 OFFICE O/P EST MOD 30 MIN: CPT | Mod: GC | Performed by: RADIOLOGY

## 2024-01-22 NOTE — PROGRESS NOTES
Staff Physician: Jorge Layne MD, MMM  Resident Physician: Fred Alcantara MD PGY-3  Referring Physician: Jorge Layne MD  Date of Service: 1/22/2024  RADIATION ONCOLOGY FOLLOW UP NOTE  IDENTIFYING DATA:  Problem List Items Addressed This Visit    None  Visit Diagnoses       Malignant neoplasm of upper lobe, left bronchus or lung (CMS/HCC)    -  Primary    Relevant Orders    Clinic Appointment Request Follow Up; JORGE LAYNE; [facility] (Completed)    Referral to Supportive/Palliative Oncology          History of Present Illness:  Martínez Neri is a 63 y.o. male who was referred by Elo Peters MD, for a consultation to the [facility] Department of Radiation Oncology.  He is presenting for evaluation and management of a lung cancer, Stage IIB [id number].     His full oncology history is a follows:     Mr. Neri is a 63 year old male who was found to have a left lung mass during work-up for a syncopal episode in [facility]. At the time her was felt to have an early stage lung cancer. He was then seen by his primary care provider and specialists in [place].     His records from the [facility] were summarized by Dr. Peters from medical oncology, and are repeated below.     On 06/17/2023 he underwent a CT of his chest and was found to have a left suprahilar mass involving the left upper lobe bronchi with postobstructive pneumonia/collapse. CT chest, abdomen, and pelvis on 06/25/2023 demonstrated a persistent mass-like consolidation in the left upper lobe with no signs of extranodal disease. He underwent a bronchoscopy on 06/26/2023 with pathology being consistent with squamous cell carcinoma. PET/CT scan on 07/18/2023 demonstrated a 6.6 cm left upper lobe mass with an SUV of 8.3 and no evidence of distant metastatic disease. A bronchoscopy with EBUS was performed on 07/27/2023. Pathology demonstrated invasive moderately differentiated  squamous cell carcinoma. Left upper lobe hilar fine needle aspiration should atypical cells with squamous differentiation, and subcarinal fine needle aspiration was indeterminate for malignancy with extremely rare atypical cells with squamous differentiation. Molecular testing did not reveal any actionable mutations. PD-L1 TPS was 20%. He was felt not to be a candidate for chemotherapy and radiation due to his performance status at the time. He ultimately relocated to [place] at the request of his daughter and son.     He was seen by Dr. Peters at [facility] on 10/05/2023 with recommendation for repeat CT of his chest with contrast along with PET/CT and brain MRI. A CT of his chest with contrast was performed on 10/06/2023 that demonstrated mass-like consolidation in the region of the posterior upper lobe and lingula, which overall appears slightly smaller when compared to his prior PET/CT scan from 07/18/2023. No other suspicious pulmonary nodules were noted. PET/CT scan on 10/06/2023 demonstrated a slight interval decrease in the size and hypermetabolic activity of a mass-like consolidation within the left upper lobe and lingula, particularly along the more peripheral aspect, with similar appearance of intense FDG uptake along the central component of the mass. No new hypermetabolic sites of disease within the neck, chest, abdominal, or pelvis were noted. An MRI of the brain was performed on 10/13/2023 that did not demonstrate any intracranial metastases.     He was seen by pulmonology and underwent PFTs. He was seen by thoracic surgery on 10/19/2023, and was noted to have PFTs demonstrating an FEV1 of 30% and a DLCO of 30% (final report not available at this time).. He was not felt to be a surgical candidate due to his poor lung function.    He initiated concurrent chemoradiation with Dr. Peters and IMRT/VMAT with planned 66Gy in 33fx with concurrent carbo/taxol. The patient completed a total of 60Gy in  30fx, completed on 1/5/24.     INTERVAL HISTORY  Since we last saw Martínez Neri in clinic on 1/3/24, the patient was admitted to the inpatient unit at [facility] due to significant weakness and confusion. During his admission, he was found to be neutropenic with a fever, increasing shortness of breath and new oxygenation requirements, and concerns for aspiration pneumonia. During his time he was treated with ongoing supplemental oxygen, IV antibiotics, and discharged with 2-4L of home O2 on 1/12/24. Since discharge, he has been at home with his family, having home PT at this time. His energy has been significantly improving since discharged, but he is still deconditioned. He notes that he does need some support to complete his own ADLs, but is out of bed >50% of the time. His weight is stable from last visit and his appetite is unchanged. He denies any significant symptoms at this time. Specifically, he denies chest wall tenderness, chest pain, hemoptysis, fevers. He still actively uses about 2L at rest and 4L with activity of supplemental O2.     PAST MEDICAL, SURGICAL, FAMILY, AND SOCIAL HISTORY: Reviewed in EMR    ALLERGIES:  No Known Allergies  MEDICATIONS:    Current Outpatient Medications:     albuterol (Proventil HFA) 90 mcg/actuation inhaler, Inhale 2 puffs every 4 hours if needed for wheezing or shortness of breath., Disp: 6.7 g, Rfl: 6    amoxicillin-pot clavulanate (Augmentin) 875-125 mg tablet, Take 1 tablet (875 mg) by mouth 2 times a day for 14 days., Disp: 28 tablet, Rfl: 0    aspirin 81 mg chewable tablet, Chew 1 tablet (81 mg) once daily., Disp: 30 tablet, Rfl: 2    atorvastatin (Lipitor) 40 mg tablet, Take 1 tablet (40 mg) by mouth once daily., Disp: 90 tablet, Rfl: 1    cholecalciferol (Vitamin D-3) 125 MCG (5000 UT) capsule, , Disp: , Rfl:     divalproex (Depakote ER) 500 mg 24 hr tablet, Take 1 tablet (500 mg) by mouth 2 times a day. Do not crush, chew, or split., Disp: 30 tablet, Rfl:  0    inhalat.spacing dev,med. mask spacer, , Disp: , Rfl:     ipratropium-albuteroL (Duo-Neb) 0.5-2.5 mg/3 mL nebulizer solution, Take 3 mL by nebulization 4 times a day as needed for wheezing or shortness of breath., Disp: 90 mL, Rfl: 1    lacosamide (Vimpat) 200 mg tablet tablet, Take 1 tablet (200 mg) by mouth 2 times a day., Disp: 30 tablet, Rfl: 5    nebulizer accessories misc, Use twice daily, Disp: 100 each, Rfl: 0    nebulizer and compressor device, Use as directed, Disp: 1 each, Rfl: 0    nicotine (Nicoderm CQ) 21 mg/24 hr patch, Place 1 patch over 24 hours on the skin once every 24 hours., Disp: 30 patch, Rfl: 0    ondansetron (Zofran) 8 mg tablet, Take 1 tablet (8 mg) by mouth every 8 hours if needed for nausea or vomiting., Disp: 30 tablet, Rfl: 5    oxygen (O2) gas therapy, Inhale 2 each continuously. Patient to wear O2 2L/NC continuously at rest and increase O2 to 4l/NC with any activity., Disp: , Rfl:     prochlorperazine (Compazine) 10 mg tablet, Take 1 tablet (10 mg) by mouth every 6 hours if needed for nausea or vomiting., Disp: 30 tablet, Rfl: 5    Spiriva Respimat 2.5 mcg/actuation inhaler, , Disp: , Rfl:     sucralfate (Carafate) 100 mg/mL suspension, Take 10 mL (1 g) by mouth every 6 hours., Disp: 414 mL, Rfl: 0    thiamine (Vitamin B-1) 100 mg tablet, Take 1 tablet (100 mg) by mouth once daily., Disp: 90 tablet, Rfl: 1    LORazepam (Ativan) 0.5 mg tablet, Take 1 tablet (0.5 mg) by mouth once daily as needed for anxiety for up to 5 days., Disp: 5 tablet, Rfl: 0     REVIEW OF SYSTEMS:  Except for the symptoms described in the interval history, the review of systems is negative. Specifically, except as noted, when asked the patient expressed no complaints relative to constitutional (fever, weight loss), eyes, ears, nose, mouth, throat, neurologic, cardiovascular, pulmonary, breast, GI, , skin, musculoskeletal, endocrine, hematologic/lymphatic, or immunologic systems.    PERFORMANCE  "STATUS:  Karnofsky Performance Score/ECO, Requires occasional assistance, but is able to care for most of his personal needs (ECOG equivalent 2)    PHYSICAL EXAMINATION:  /70   Pulse 91   Temp 35.9 °C (96.6 °F) (Temporal)   Resp 18   Ht 1.753 m (5' 9\")   Wt 72.7 kg (160 lb 3.2 oz)   SpO2 90%   BMI 23.66 kg/m²   Pain score: 0/10  General: no acute distress, calm, cooperative, engaged in conversation.   HEENT: Normocephalic, atraumatic. Extraocular movements are intact.   Neck: supple with trachea at midline, no palpable adenopathy.   Pulmonary: Breathing comfortably on 2L of O2, no respiratory distress. Mild wheezing at the lung bases.   Cardiovascular: Regular rate, no cyanosis, well-perfused  Neurologic: Alert and oriented x3. Cranial nerves intact to examination. Motor strength intact bilaterally. S    DIAGNOSTIC REPORTS REVIEWED:  Imaging: All imaging was personally reviewed and interpreted in clinic. Findings as per interval history and EMR.  Laboratory/Pathology:  All pertinent labs and pathology were personally reviewed and interpreted in clinic. Findings as per interval history and EMR.     IMPRESSION:  63 year old male with a history of Stage IIB NSCLC squamous cell carcinoma who completed concurrent chemoradiation with 60Gy in 30fx out of total planned 66Gy/33fx due to inpatient admission 2/2 to neutropenic fever, aspiration pneumonia, and acute hypoxic respiratory failure. He is here for a short-interval routine follow-up visit.    PLAN:  We discussed in detail with the patient and his wife regarding his recent inpatient admission, the decision to complete his radiation treatment early at 30fx instead of 33fx due to his inpatient admission and deconditioning which put him at higher risk of toxicity. Since discharge, he has been doing better, but still has ongoing deconditioning and supplemental oxygenation requirements.     Given his new acute O2 requirements, we would recommend an " interval scan with a 2 view CXR to continue to monitor his lung status, and discussed with the patient return precautions in case he is feeling worse.     We also emphasized the role of supportive oncology referral, which has proven to be beneficial in lung cancer patients, especially given the patient has ongoing fatigue, shortness of breath, and psychosocial support needs with himself and his family due to his cancer diagnosis. The patient and his wife are in agreement.     -CXR to be completed today  -FUV scheduled with Lauren CORBETT on 2/5/24  -Supportive Oncology Referral  - He has barium swallow study scheduled for 2/7/24 to rule out concern for aspiration.   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    Above plan discussed with Dr. Peters and team.    PAIN PLAN: The patient reports their pain is well-controlled on their current regimen.  Their pain regimen is currently managed by Primary Care.      DISEASE STATUS: Controlled  NEW METACHRONOUS CANCER: No    Thank you for the opportunity to participate in the ongoing care of this pleasant man.    Fred Alcantara MD  PGY-3 Radiation Oncology  For  Jorge Layne MD, MMM  Senior Attending Physician, [facility]  Professor, [employer]   Our [place]: “To Heal, To Teach, To Discover.”  RN partner: [email or web address]    Phone (scheduling): [phone]/ [email or web address]  Phone (office): [phone]  Phone (after hours): [phone]    ATTENDING ADDENDUM:  I saw and evaluated the patient with the resident. I personally obtained the key and critical portions of the history and physical exam and directly counseled the patient of the treatment plan. I reviewed the resident documentation and discussed the patient with the resident. I agree with the resident's medical decision making as documented in the  note.      ADDENDUM:  CXR: Interval development of small right pleural effusion with associated adjacent atelectasis/consolidation superimposed on chronic lung changes.    Will need continued follow up.

## 2024-01-23 ENCOUNTER — HOME CARE VISIT (OUTPATIENT)
Dept: HOME HEALTH SERVICES | Facility: HOME HEALTH | Age: 64
End: 2024-01-23
Payer: MEDICAID

## 2024-01-23 ENCOUNTER — TELEPHONE (OUTPATIENT)
Dept: PALLIATIVE MEDICINE | Facility: CLINIC | Age: 64
End: 2024-01-23
Payer: MEDICAID

## 2024-01-23 VITALS — HEART RATE: 80 BPM | DIASTOLIC BLOOD PRESSURE: 60 MMHG | SYSTOLIC BLOOD PRESSURE: 102 MMHG | RESPIRATION RATE: 20 BRPM

## 2024-01-23 VITALS
TEMPERATURE: 97.2 F | SYSTOLIC BLOOD PRESSURE: 90 MMHG | DIASTOLIC BLOOD PRESSURE: 62 MMHG | RESPIRATION RATE: 18 BRPM | HEART RATE: 76 BPM | OXYGEN SATURATION: 92 %

## 2024-01-23 PROCEDURE — G0300 HHS/HOSPICE OF LPN EA 15 MIN: HCPCS

## 2024-01-23 PROCEDURE — G0151 HHCP-SERV OF PT,EA 15 MIN: HCPCS

## 2024-01-23 SDOH — HEALTH STABILITY: MENTAL HEALTH: SMOKING IN HOME: 1

## 2024-01-23 SDOH — ECONOMIC STABILITY: HOUSING INSECURITY: EVIDENCE OF SMOKING MATERIAL: 0

## 2024-01-23 ASSESSMENT — ENCOUNTER SYMPTOMS
LOWER EXTREMITY EDEMA: 1
TROUBLE SWALLOWING: 1
SPUTUM AMOUNT: MODERATE
DENIES PAIN: 1
STOOL FREQUENCY: DAILY
DENIES PAIN: 1
SPUTUM PRODUCTION: 1
CHANGE IN APPETITE: UNCHANGED
LAST BOWEL MOVEMENT: 66862
SPUTUM COLOR: CLEAR
PERSON REPORTING PAIN: PATIENT
APPETITE LEVEL: GOOD
SPUTUM CONSISTENCY: THICK

## 2024-01-23 NOTE — TELEPHONE ENCOUNTER
Called patient to schedule appt with supportive oncology.  Patient referred by Dr Alcantara.  No answer.  Voicemail message left with patient's daughter to call back and schedule appt.

## 2024-01-24 ENCOUNTER — HOME CARE VISIT (OUTPATIENT)
Dept: HOME HEALTH SERVICES | Facility: HOME HEALTH | Age: 64
End: 2024-01-24
Payer: MEDICAID

## 2024-01-24 VITALS
DIASTOLIC BLOOD PRESSURE: 62 MMHG | OXYGEN SATURATION: 92 % | RESPIRATION RATE: 18 BRPM | SYSTOLIC BLOOD PRESSURE: 124 MMHG | HEART RATE: 72 BPM | TEMPERATURE: 97.6 F

## 2024-01-24 PROCEDURE — G0152 HHCP-SERV OF OT,EA 15 MIN: HCPCS

## 2024-01-24 SDOH — HEALTH STABILITY: MENTAL HEALTH: SMOKING IN HOME: 0

## 2024-01-24 SDOH — ECONOMIC STABILITY: HOUSING INSECURITY: EVIDENCE OF SMOKING MATERIAL: 0

## 2024-01-24 ASSESSMENT — PAIN SCALES - PAIN ASSESSMENT IN ADVANCED DEMENTIA (PAINAD)
FACIALEXPRESSION: 0
BREATHING: 0
BODYLANGUAGE: 0
CONSOLABILITY: 0 - NO NEED TO CONSOLE.
BODYLANGUAGE: 0 - RELAXED.
NEGVOCALIZATION: 0 - NONE.
CONSOLABILITY: 0
TOTALSCORE: 0
NEGVOCALIZATION: 0
FACIALEXPRESSION: 0 - SMILING OR INEXPRESSIVE.

## 2024-01-24 ASSESSMENT — ACTIVITIES OF DAILY LIVING (ADL)
BATHING_CURRENT_FUNCTION: MINIMUM ASSIST
GROOMING_WITHIN_DEFINED_LIMITS: 1
FEEDING_WITHIN_DEFINED_LIMITS: 1
DRESSING_LB_CURRENT_FUNCTION: MINIMUM ASSIST
BATHING ASSESSED: 1
WASHING_LB_CURRENT_FUNCTION: MINIMUM ASSIST

## 2024-01-24 ASSESSMENT — ENCOUNTER SYMPTOMS
LOWEST PAIN SEVERITY IN PAST 24 HOURS: 0/10
OCCASIONAL FEELINGS OF UNSTEADINESS: 1
ANGER WITHIN DEFINED LIMITS: 1
PAIN SEVERITY GOAL: 0/10
AGGRESSION WITHIN DEFINED LIMITS: 1
HIGHEST PAIN SEVERITY IN PAST 24 HOURS: 0/10
DEPRESSION: 0
SUBJECTIVE PAIN PROGRESSION: UNCHANGED
DENIES PAIN: 1
LOSS OF SENSATION IN FEET: 0

## 2024-01-25 ENCOUNTER — HOME CARE VISIT (OUTPATIENT)
Dept: HOME HEALTH SERVICES | Facility: HOME HEALTH | Age: 64
End: 2024-01-25
Payer: MEDICAID

## 2024-01-25 ENCOUNTER — APPOINTMENT (OUTPATIENT)
Dept: PALLIATIVE MEDICINE | Facility: HOSPITAL | Age: 64
End: 2024-01-25
Payer: MEDICAID

## 2024-01-25 ENCOUNTER — OFFICE VISIT (OUTPATIENT)
Dept: HEMATOLOGY/ONCOLOGY | Facility: HOSPITAL | Age: 64
End: 2024-01-25
Payer: MEDICAID

## 2024-01-25 VITALS — HEART RATE: 76 BPM | SYSTOLIC BLOOD PRESSURE: 95 MMHG | RESPIRATION RATE: 18 BRPM | DIASTOLIC BLOOD PRESSURE: 58 MMHG

## 2024-01-25 VITALS
RESPIRATION RATE: 20 BRPM | HEART RATE: 79 BPM | TEMPERATURE: 97.7 F | SYSTOLIC BLOOD PRESSURE: 99 MMHG | BODY MASS INDEX: 22.82 KG/M2 | DIASTOLIC BLOOD PRESSURE: 53 MMHG | WEIGHT: 154.54 LBS | OXYGEN SATURATION: 98 %

## 2024-01-25 DIAGNOSIS — C34.12 PRIMARY CANCER OF LEFT UPPER LOBE OF LUNG (MULTI): ICD-10-CM

## 2024-01-25 DIAGNOSIS — Z51.11 ENCOUNTER FOR CHEMOTHERAPY MANAGEMENT: ICD-10-CM

## 2024-01-25 PROCEDURE — 99214 OFFICE O/P EST MOD 30 MIN: CPT | Performed by: NURSE PRACTITIONER

## 2024-01-25 PROCEDURE — G0153 HHCP-SVS OF S/L PATH,EA 15MN: HCPCS

## 2024-01-25 ASSESSMENT — ENCOUNTER SYMPTOMS
DENIES PAIN: 1
OCCASIONAL FEELINGS OF UNSTEADINESS: 0
LOSS OF SENSATION IN FEET: 0
PERSON REPORTING PAIN: PATIENT
DEPRESSION: 0

## 2024-01-25 ASSESSMENT — COLUMBIA-SUICIDE SEVERITY RATING SCALE - C-SSRS
6. HAVE YOU EVER DONE ANYTHING, STARTED TO DO ANYTHING, OR PREPARED TO DO ANYTHING TO END YOUR LIFE?: NO
1. IN THE PAST MONTH, HAVE YOU WISHED YOU WERE DEAD OR WISHED YOU COULD GO TO SLEEP AND NOT WAKE UP?: NO
2. HAVE YOU ACTUALLY HAD ANY THOUGHTS OF KILLING YOURSELF?: NO

## 2024-01-25 ASSESSMENT — PATIENT HEALTH QUESTIONNAIRE - PHQ9
1. LITTLE INTEREST OR PLEASURE IN DOING THINGS: NOT AT ALL
SUM OF ALL RESPONSES TO PHQ9 QUESTIONS 1 AND 2: 0
2. FEELING DOWN, DEPRESSED OR HOPELESS: NOT AT ALL

## 2024-01-25 ASSESSMENT — ACTIVITIES OF DAILY LIVING (ADL): OASIS_M1830: 05

## 2024-01-25 ASSESSMENT — PAIN SCALES - GENERAL: PAINLEVEL: 0-NO PAIN

## 2024-01-25 NOTE — PROGRESS NOTES
[facility] - Medical Oncology Follow-Up Visit    Patient ID: Martínez Neri is a 63 y.o. male      Current therapy: CARBOplatin (Paraplatin) 240 mg in sodium chloride 0.9% 124 mL IV, 240 mg (87.3 % of original dose 271.4 mg), intravenous, Once, 1 of 1 cycle    Dose modification: 237 mg (original dose 271.4 mg, Cycle 1, Reason: Protocol Driven), 271 mg (original dose 271.4 mg, Cycle 1, Reason: Protocol Driven)    Administration: 240 mg (11/20/2023), 271.4 mg (12/4/2023), 271.4 mg (12/12/2023), 271.4 mg (12/18/2023), 271 mg (11/27/2023)        PACLitaxeL (Taxol) 84 mg in dextrose 5 % in water (D5W) 114 mL IV, 45 mg/m2 = 84 mg, intravenous, Once, 1 of 1 cycle    Administration: 84 mg (11/20/2023), 84 mg (11/27/2023), 84 mg (12/4/2023), 84 mg (12/12/2023), 84 mg (12/18/2023)        palonosetron (Aloxi) injection 250 mcg, 250 mcg, intravenous, Once, 1 of 1 cycle    Administration: 250 mcg (11/20/2023), 250 mcg (11/27/2023), 250 mcg (12/4/2023), 250 mcg (12/12/2023), 250 mcg (12/18/2023)      Oncologic History:     DIAGNOSIS AND STAGING  Tentative stage IIB (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    PD-L1 TPS 20%      PRIOR THERAPIES  None         CURRENT THERAPY  concurrent chemo-RT using weekly carbo/taxol followed by consolidative durvalumab         CURRENT ONCOLOGICAL PROBLEMS  Cough         HISTORY OF PRESENT ILLNESS:  Current smoker (50-pack-year history), with history of COPD and seizures, who was diagnosed with a squamous cell carcinoma of the left upper lobe in [place], never treated.  Outpatient notes from [place] cancer [facility] and hematology clinic were reviewed.     On 06/17/2023 the patient had a CT chest for ongoing respiratory symptoms and  was found to have a left suprahilar mass involving the left upper lobe bronchi with postobstructive pneumonia/collapse.  On 06/25/2023 CT chest/abdomen/pelvis demonstrated persistent masslike consolidation in the left upper lobe with no signs of extrathoracic metastatic disease.     On 06/26/2023 the patient underwent a bronchoscopy, and pathology was compatible with a squamous cell carcinoma at least in situ, with a foci highly suspicious for invasive squamous cell carcinoma.     On 07/18/2023 a PET/CT obtained, demonstrating a 6.6 cm left upper lobe mass with an SUV of 8.3 and no evidence of distant metastatic disease.     On 07/18/2023 to brain MRI was negative for intracranial metastasis.     On 07/27/2023 bronchoscopy/EBUS was performed.  Endobronchial biopsies of left upper lobe demonstrated an invasive moderately differentiated squamous cell carcinoma.  Left upper lobe hilar FNA showed atypical cells with squamous differentiation, suspicious for malignancy.      Subcarinal FNA was indeterminate for malignancy with extremely rare atypical cells with squamous differentiation.  Molecular profile: Lack of BRAF, EGFR, HER2, KRAS, MET, ALK, NTRK 1/2/3, RET or ROS1 alterations.  PD-L1 TPS 20%.  The patient was seen by medical oncology and did not too frail for combined chemo RT (there is a performance status ECOG 3) documented on 08/17/2023.  At that time, he lived with some friends, while his family was primarily located in [place].     His daughter traveled and was able to relocate him to [place], and he presents today to clinic accompanied by his daughter, son, and daughter-in-law.     The patient states he is felt much better since his relocation, has been gaining weight, approximately 10 pounds since he moved here.  He is more active, going up and down the steps at home, but still tired and taking some breaks throughout the day.     He denies any headaches or new neurological symptoms.  His respiratory symptoms are  stable. There is no new localized pain.     Reported molecular and ancillary testing performed upon diagnosis in [place]:   BRAF negative  [id number] negative  EGFR classic mutations negative  HER2 negative  MET negative  ALK negative  In tract 1/2/3 negative  RET negative  ROS1 negative  PD-L1 20%     Referred to surgery - PFTs were considered prohibitive for surgical resection-      10/31/23: Repeat EBUS for systematic staging:  Final Cytological Interpretation      A. LYMPH NODE 7 PULMONARY FINE NEEDLE ASPIRATION, CYTOLOGY AND CELL BLOCK:  --  No malignant cells identified.  --  Lymphoid sample.     B. LYMPH NODE 4 L PULMONARY FINE NEEDLE ASPIRATION, CYTOLOGY AND CELL BLOCK:  --  No malignant cells identified.  --  Lymphoid sample.           C. LYMPH NODE 11 L PULMONARY FINE NEEDLE ASPIRATION, CYTOLOGY AND CELL BLOCK:  --  A rare cluster of malignant cells derived from squamous cell carcinoma, see note  -- Also, please refer to concurrent biopsy surgical pathology report ([id number]).     Note:  The malignant squamous cell carcinoma cells are represented in the cell block only.  Immunostain for p40 is positive. The cytomorphology and immunoprofile support the above diagnosis.  The material is not sufficient for molecular testing.      Component     FINAL DIAGNOSIS   Left lung, upper lobe, biopsy:  -- Superficial fragment of squamous cell carcinoma, keratinizing; see note.     Note: According to clinician's note, PD-L1 and NGS have been performed at an outside institution. These studies can be repeated, if clinically indicated.      NGS and PD-L1 not repeated in this specimen as it was done in outside institution already and the purpose of this procedure was not for diagnosis but systematic mediastinal re-staging.      PAST MEDICAL HISTORY  Brain aneurysm  COPD (chronic obstructive pulmonary disease) (CMS/HCC)  Epilepsy (CMS/HCC)  High cholesterol  History of alcohol use  Hypertension     PAST SURGICAL  HISTORY:  CAROTID ENDARTERECTOMY  COLONOSCOPY  HERNIA REPAIR      SOCIAL HISTORY  Tobacco (50 pack-year history) use - quit after cancer dx      CURRENT MEDS REVIEWED:  Scheduled medications  Continuous medications  PRN medications     ALLERGIES  NKDA     FAMILY HISTORY  Father had prostate CA        Chief Concern: Here in clinic for follow-up s/p concurrent chemo/RT with weekly Carbo/Taxol; Recent hospitalization 1/5/24 - 1/12/24 for generalized weakness    HPI Patient is here today in clinic with his daughter for follow-up s/p concurrent chemo/RT with weekly Carbo/Taxol. 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 He had recent follow-up with radiation oncology, and has follow-up with neurology tomorrow. He also has a barium swallow eval 1/29/24. He is feeling improved overall. Breathing stable, on 4L/NC today. Appetite is slowly improving, he did not care for the hospital food while inpatient. No GI symptoms. No seizure activity. No new aches/pains. Feels like he is getting stronger with PT/OT, discussed using his walker for stability. No rash or skin issues. No fevers, chills, or cold symptoms.         Meds (Current):    Current Outpatient Medications:     albuterol (Proventil HFA) 90 mcg/actuation inhaler, Inhale 2 puffs every 4 hours if needed for wheezing or shortness of breath., Disp: 6.7 g, Rfl: 6    amoxicillin-pot clavulanate (Augmentin) 875-125 mg tablet, Take 1 tablet (875 mg) by mouth 2 times a day for 14 days., Disp: 28 tablet, Rfl: 0    aspirin 81 mg chewable tablet, Chew 1 tablet (81 mg) once daily., Disp: 30 tablet, Rfl: 2    atorvastatin (Lipitor) 40 mg tablet, Take 1 tablet (40 mg) by mouth once daily., Disp: 90 tablet, Rfl: 1    cholecalciferol (Vitamin D-3) 125 MCG (5000 UT) capsule, , Disp: , Rfl:     divalproex  (Depakote ER) 500 mg 24 hr tablet, Take 1 tablet (500 mg) by mouth 2 times a day. Do not crush, chew, or split., Disp: 30 tablet, Rfl: 0    inhalat.spacing dev,med. mask spacer, , Disp: , Rfl:     ipratropium-albuteroL (Duo-Neb) 0.5-2.5 mg/3 mL nebulizer solution, Take 3 mL by nebulization 4 times a day as needed for wheezing or shortness of breath., Disp: 90 mL, Rfl: 1    lacosamide (Vimpat) 200 mg tablet tablet, Take 1 tablet (200 mg) by mouth 2 times a day., Disp: 30 tablet, Rfl: 5    nebulizer accessories misc, Use twice daily, Disp: 100 each, Rfl: 0    nebulizer and compressor device, Use as directed, Disp: 1 each, Rfl: 0    nicotine (Nicoderm CQ) 21 mg/24 hr patch, Place 1 patch over 24 hours on the skin once every 24 hours., Disp: 30 patch, Rfl: 0    ondansetron (Zofran) 8 mg tablet, Take 1 tablet (8 mg) by mouth every 8 hours if needed for nausea or vomiting., Disp: 30 tablet, Rfl: 5    oxygen (O2) gas therapy, Inhale 2 each continuously. Patient to wear O2 2L/NC continuously at rest and increase O2 to 4l/NC with any activity., Disp: , Rfl:     prochlorperazine (Compazine) 10 mg tablet, Take 1 tablet (10 mg) by mouth every 6 hours if needed for nausea or vomiting., Disp: 30 tablet, Rfl: 5    Spiriva Respimat 2.5 mcg/actuation inhaler, , Disp: , Rfl:     thiamine (Vitamin B-1) 100 mg tablet, Take 1 tablet (100 mg) by mouth once daily., Disp: 90 tablet, Rfl: 1    LORazepam (Ativan) 0.5 mg tablet, Take 1 tablet (0.5 mg) by mouth once daily as needed for anxiety for up to 5 days., Disp: 5 tablet, Rfl: 0    sucralfate (Carafate) 100 mg/mL suspension, Take 10 mL (1 g) by mouth every 6 hours. (Patient not taking: Reported on 1/25/2024), Disp: 414 mL, Rfl: 0    Review of Systems - Oncology 12 point ROS was obtained and negative unless otherwise mentioned in the above HPI.      Objective   BSA: 1.85 meters squared  Wt Readings from Last 5 Encounters:   01/25/24 70.1 kg (154 lb 8.7 oz)   01/22/24 72.7 kg (160 lb 3.2  oz)   01/15/24 71.7 kg (158 lb)   01/06/24 76.5 kg (168 lb 10.4 oz)   01/05/24 73 kg (161 lb)     BP 99/53   Pulse 79   Temp 36.5 °C (97.7 °F) (Core)   Resp 20   Wt 70.1 kg (154 lb 8.7 oz)   SpO2 98% Comment: 2 liters  BMI 22.82 kg/m²          Physical Exam  Constitutional:       Appearance: Normal appearance.   Eyes:      Conjunctiva/sclera: Conjunctivae normal.      Pupils: Pupils are equal, round, and reactive to light.   Cardiovascular:      Rate and Rhythm: Normal rate and regular rhythm.   Pulmonary:      Effort: Pulmonary effort is normal.      Breath sounds: Normal breath sounds.   Abdominal:      General: Bowel sounds are normal.      Palpations: Abdomen is soft.   Musculoskeletal:         General: No swelling.      Right lower leg: No edema.      Left lower leg: No edema.   Skin:     General: Skin is warm and dry.   Neurological:      General: No focal deficit present.      Mental Status: He is alert and oriented to person, place, and time.   Psychiatric:         Mood and Affect: Mood normal.         Behavior: Behavior normal.        Results:  Labs:  Lab Results   Component Value Date    WBC 4.4 01/12/2024    HGB 8.4 (L) 01/12/2024    HCT 25.1 (L) 01/12/2024    MCV 98 01/12/2024     01/12/2024      Lab Results   Component Value Date    NEUTROABS 0.90 (L) 01/04/2024      Lab Results   Component Value Date    GLUCOSE 114 (H) 01/11/2024    CALCIUM 8.4 (L) 01/11/2024     01/11/2024    K 3.2 (L) 01/11/2024    CO2 27 01/11/2024     01/11/2024    BUN 3 (L) 01/11/2024    CREATININE 0.70 01/11/2024     Lab Results   Component Value Date    ALT 63 (H) 01/11/2024    AST 60 (H) 01/11/2024    ALKPHOS 43 01/11/2024    BILITOT <0.2 01/11/2024      Lab Results   Component Value Date    TSH 7.63 (H) 01/11/2024    FREET4 0.90 01/11/2024       Imaging:           === Results for orders placed during the hospital encounter of 01/05/24 ===    MR angio head wo IV contrast [[id number]] 01/11/2024    Status:  Normal  Susceptibility artifact is seen in the region of the anterior  communicating artery which may correlate to clinical history of  aneurysm coiling. Otherwise, unremarkable MRA of the head.    Signed by: Tonya Mccartney 1/11/2024 12:50 PM  Dictation workstation:   [id number]    Assessment/Plan      Martínez Neri is a 63 y.o. male here for follow up     Reviewed with the patient the treatment plan consisting of concurrent chemo-RT.  This will be followed by consolidative durvalumab.  We discussed potential side effects related to carboplatin and paclitaxel weekly and he was able to sign a consent.  He has been seen by radiation oncology (Dr. Layne) and underwent CT simulation.  RT start date 11/20/23 and C1 Carbo/Taxol   -RTC weekly for follow-up  -[facility] paperwork filled out for spouse, signed, faxed and original copy given to patient/daughter  -12/26/23 Treatment held today for neutropenia (ANC 1.36) and thrombocytopenia (platelets 80)  -Anticipated completion of radiation was 1/10/24, Last RT was 1/5/24 and the remaining treatments were dropped.  -Repeat CT chest ordered and subsequent follow-up with Dr. Peters to discuss and consent for durvalumab (C1 planned 2/8/24)  - vascular referral placed regarding SMA partial thrombus (on ASA)  - Barium swallow eval 1/29/25

## 2024-01-26 ENCOUNTER — HOME CARE VISIT (OUTPATIENT)
Dept: HOME HEALTH SERVICES | Facility: HOME HEALTH | Age: 64
End: 2024-01-26
Payer: MEDICAID

## 2024-01-26 ENCOUNTER — TELEPHONE (OUTPATIENT)
Dept: PALLIATIVE MEDICINE | Facility: CLINIC | Age: 64
End: 2024-01-26

## 2024-01-26 VITALS
TEMPERATURE: 97.2 F | DIASTOLIC BLOOD PRESSURE: 70 MMHG | OXYGEN SATURATION: 100 % | RESPIRATION RATE: 20 BRPM | HEART RATE: 84 BPM | SYSTOLIC BLOOD PRESSURE: 112 MMHG

## 2024-01-26 PROCEDURE — G0151 HHCP-SERV OF PT,EA 15 MIN: HCPCS

## 2024-01-26 PROCEDURE — G0180 MD CERTIFICATION HHA PATIENT: HCPCS | Performed by: INTERNAL MEDICINE

## 2024-01-26 PROCEDURE — G0300 HHS/HOSPICE OF LPN EA 15 MIN: HCPCS

## 2024-01-26 SDOH — ECONOMIC STABILITY: HOUSING INSECURITY: EVIDENCE OF SMOKING MATERIAL: 0

## 2024-01-26 SDOH — HEALTH STABILITY: MENTAL HEALTH: SMOKING IN HOME: 1

## 2024-01-26 ASSESSMENT — ENCOUNTER SYMPTOMS
CHANGE IN APPETITE: UNCHANGED
APPETITE LEVEL: GOOD
DENIES PAIN: 1
PERSON REPORTING PAIN: PATIENT
SPUTUM PRODUCTION: 1
SPUTUM CONSISTENCY: THICK
TROUBLE SWALLOWING: 1
SPUTUM COLOR: CLEAR
DENIES PAIN: 1
SPUTUM AMOUNT: MODERATE
DESCRIPTION OF MEMORY LOSS: LONG TERM

## 2024-01-26 NOTE — TELEPHONE ENCOUNTER
2nd phone call attempt made to schedule patient with supportive oncology.  Voicemail left with patient's daughter.

## 2024-01-28 DIAGNOSIS — R56.9 SEIZURES (MULTI): ICD-10-CM

## 2024-01-29 ENCOUNTER — HOME CARE VISIT (OUTPATIENT)
Dept: HOME HEALTH SERVICES | Facility: HOME HEALTH | Age: 64
End: 2024-01-29
Payer: MEDICAID

## 2024-01-29 ENCOUNTER — HOSPITAL ENCOUNTER (OUTPATIENT)
Dept: RADIOLOGY | Facility: HOSPITAL | Age: 64
Discharge: HOME | End: 2024-01-29
Payer: MEDICAID

## 2024-01-29 DIAGNOSIS — Z51.11 ENCOUNTER FOR CHEMOTHERAPY MANAGEMENT: ICD-10-CM

## 2024-01-29 DIAGNOSIS — C34.12 PRIMARY CANCER OF LEFT UPPER LOBE OF LUNG (MULTI): ICD-10-CM

## 2024-01-29 PROCEDURE — G0151 HHCP-SERV OF PT,EA 15 MIN: HCPCS

## 2024-01-29 PROCEDURE — 2550000001 HC RX 255 CONTRASTS: Performed by: NURSE PRACTITIONER

## 2024-01-29 PROCEDURE — 71260 CT THORAX DX C+: CPT

## 2024-01-29 RX ORDER — LACOSAMIDE 200 MG/1
200 TABLET ORAL 2 TIMES DAILY
Qty: 30 TABLET | Refills: 2 | Status: SHIPPED | OUTPATIENT
Start: 2024-01-29

## 2024-01-29 RX ADMIN — IOHEXOL 75 ML: 350 INJECTION, SOLUTION INTRAVENOUS at 10:54

## 2024-01-29 ASSESSMENT — ENCOUNTER SYMPTOMS
PERSON REPORTING PAIN: PATIENT
DENIES PAIN: 1

## 2024-01-31 ENCOUNTER — HOME CARE VISIT (OUTPATIENT)
Dept: HOME HEALTH SERVICES | Facility: HOME HEALTH | Age: 64
End: 2024-01-31
Payer: MEDICAID

## 2024-01-31 VITALS
RESPIRATION RATE: 18 BRPM | HEART RATE: 65 BPM | TEMPERATURE: 97.3 F | DIASTOLIC BLOOD PRESSURE: 68 MMHG | SYSTOLIC BLOOD PRESSURE: 122 MMHG | OXYGEN SATURATION: 96 %

## 2024-01-31 VITALS
HEART RATE: 64 BPM | SYSTOLIC BLOOD PRESSURE: 129 MMHG | RESPIRATION RATE: 18 BRPM | OXYGEN SATURATION: 99 % | DIASTOLIC BLOOD PRESSURE: 59 MMHG | TEMPERATURE: 96.8 F

## 2024-01-31 PROCEDURE — G0300 HHS/HOSPICE OF LPN EA 15 MIN: HCPCS

## 2024-01-31 PROCEDURE — G0152 HHCP-SERV OF OT,EA 15 MIN: HCPCS

## 2024-01-31 SDOH — ECONOMIC STABILITY: HOUSING INSECURITY: EVIDENCE OF SMOKING MATERIAL: 0

## 2024-01-31 SDOH — HEALTH STABILITY: MENTAL HEALTH: SMOKING IN HOME: 1

## 2024-01-31 ASSESSMENT — ENCOUNTER SYMPTOMS
DEPRESSION: 0
SPUTUM CONSISTENCY: THICK
LOWEST PAIN SEVERITY IN PAST 24 HOURS: 0/10
PAIN SEVERITY GOAL: 0/10
LOSS OF SENSATION IN FEET: 0
AGGRESSION WITHIN DEFINED LIMITS: 1
SPUTUM COLOR: CLEAR
ANGER WITHIN DEFINED LIMITS: 1
DENIES PAIN: 1
SUBJECTIVE PAIN PROGRESSION: UNCHANGED
CHANGE IN APPETITE: UNCHANGED
APPETITE LEVEL: GOOD
HIGHEST PAIN SEVERITY IN PAST 24 HOURS: 0/10
SPUTUM AMOUNT: SCANT
DENIES PAIN: 1
OCCASIONAL FEELINGS OF UNSTEADINESS: 1
TROUBLE SWALLOWING: 1
SPUTUM PRODUCTION: 1

## 2024-01-31 ASSESSMENT — PAIN SCALES - PAIN ASSESSMENT IN ADVANCED DEMENTIA (PAINAD)
BODYLANGUAGE: 0
TOTALSCORE: 0
BREATHING: 0
CONSOLABILITY: 0
FACIALEXPRESSION: 0
NEGVOCALIZATION: 0
CONSOLABILITY: 0 - NO NEED TO CONSOLE.
FACIALEXPRESSION: 0 - SMILING OR INEXPRESSIVE.
BODYLANGUAGE: 0 - RELAXED.
NEGVOCALIZATION: 0 - NONE.

## 2024-01-31 ASSESSMENT — ACTIVITIES OF DAILY LIVING (ADL)
BATHING ASSESSED: 1
BATHING_CURRENT_FUNCTION: CONTACT GUARD ASSIST
FEEDING_WITHIN_DEFINED_LIMITS: 1
DRESSING_LB_CURRENT_FUNCTION: CONTACT GUARD ASSIST
WASHING_LB_CURRENT_FUNCTION: CONTACT GUARD ASSIST
GROOMING_WITHIN_DEFINED_LIMITS: 1

## 2024-02-01 ENCOUNTER — LAB (OUTPATIENT)
Dept: LAB | Facility: HOSPITAL | Age: 64
End: 2024-02-01
Payer: MEDICAID

## 2024-02-01 ENCOUNTER — OFFICE VISIT (OUTPATIENT)
Dept: HEMATOLOGY/ONCOLOGY | Facility: HOSPITAL | Age: 64
End: 2024-02-01
Payer: MEDICAID

## 2024-02-01 ENCOUNTER — APPOINTMENT (OUTPATIENT)
Dept: HEMATOLOGY/ONCOLOGY | Facility: HOSPITAL | Age: 64
End: 2024-02-01
Payer: MEDICAID

## 2024-02-01 VITALS
OXYGEN SATURATION: 96 % | HEART RATE: 67 BPM | WEIGHT: 158.07 LBS | SYSTOLIC BLOOD PRESSURE: 91 MMHG | BODY MASS INDEX: 23.34 KG/M2 | TEMPERATURE: 97.3 F | DIASTOLIC BLOOD PRESSURE: 50 MMHG | RESPIRATION RATE: 20 BRPM

## 2024-02-01 DIAGNOSIS — Z51.11 ENCOUNTER FOR CHEMOTHERAPY MANAGEMENT: ICD-10-CM

## 2024-02-01 DIAGNOSIS — C34.12 MALIGNANT NEOPLASM OF UPPER LOBE OF LEFT LUNG (MULTI): ICD-10-CM

## 2024-02-01 DIAGNOSIS — C34.12 PRIMARY CANCER OF LEFT UPPER LOBE OF LUNG (MULTI): ICD-10-CM

## 2024-02-01 LAB
ALBUMIN SERPL BCP-MCNC: 3.8 G/DL (ref 3.4–5)
ALP SERPL-CCNC: 69 U/L (ref 33–136)
ALT SERPL W P-5'-P-CCNC: 9 U/L (ref 10–52)
ANION GAP SERPL CALC-SCNC: 12 MMOL/L (ref 10–20)
AST SERPL W P-5'-P-CCNC: 16 U/L (ref 9–39)
BASOPHILS # BLD AUTO: 0.1 X10*3/UL (ref 0–0.1)
BASOPHILS NFR BLD AUTO: 1.3 %
BILIRUB SERPL-MCNC: 0.3 MG/DL (ref 0–1.2)
BUN SERPL-MCNC: 16 MG/DL (ref 6–23)
CALCIUM SERPL-MCNC: 9.4 MG/DL (ref 8.6–10.3)
CHLORIDE SERPL-SCNC: 101 MMOL/L (ref 98–107)
CO2 SERPL-SCNC: 31 MMOL/L (ref 21–32)
CORTIS AM PEAK SERPL-MSCNC: 11.6 UG/DL (ref 5–20)
CREAT SERPL-MCNC: 0.73 MG/DL (ref 0.5–1.3)
EGFRCR SERPLBLD CKD-EPI 2021: >90 ML/MIN/1.73M*2
EOSINOPHIL # BLD AUTO: 0.07 X10*3/UL (ref 0–0.7)
EOSINOPHIL NFR BLD AUTO: 0.9 %
ERYTHROCYTE [DISTWIDTH] IN BLOOD BY AUTOMATED COUNT: 16.2 % (ref 11.5–14.5)
GLUCOSE SERPL-MCNC: 73 MG/DL (ref 74–99)
HCT VFR BLD AUTO: 31 % (ref 41–52)
HGB BLD-MCNC: 9.9 G/DL (ref 13.5–17.5)
IMM GRANULOCYTES # BLD AUTO: 0.03 X10*3/UL (ref 0–0.7)
IMM GRANULOCYTES NFR BLD AUTO: 0.4 % (ref 0–0.9)
LYMPHOCYTES # BLD AUTO: 0.96 X10*3/UL (ref 1.2–4.8)
LYMPHOCYTES NFR BLD AUTO: 12.2 %
MCH RBC QN AUTO: 32.2 PG (ref 26–34)
MCHC RBC AUTO-ENTMCNC: 31.9 G/DL (ref 32–36)
MCV RBC AUTO: 101 FL (ref 80–100)
MONOCYTES # BLD AUTO: 0.74 X10*3/UL (ref 0.1–1)
MONOCYTES NFR BLD AUTO: 9.4 %
NEUTROPHILS # BLD AUTO: 5.99 X10*3/UL (ref 1.2–7.7)
NEUTROPHILS NFR BLD AUTO: 75.8 %
NRBC BLD-RTO: 0 /100 WBCS (ref 0–0)
PLATELET # BLD AUTO: 339 X10*3/UL (ref 150–450)
POTASSIUM SERPL-SCNC: 4.7 MMOL/L (ref 3.5–5.3)
PROT SERPL-MCNC: 6.9 G/DL (ref 6.4–8.2)
RBC # BLD AUTO: 3.07 X10*6/UL (ref 4.5–5.9)
SODIUM SERPL-SCNC: 139 MMOL/L (ref 136–145)
TSH SERPL-ACNC: 3.75 MIU/L (ref 0.44–3.98)
WBC # BLD AUTO: 7.9 X10*3/UL (ref 4.4–11.3)

## 2024-02-01 PROCEDURE — 36415 COLL VENOUS BLD VENIPUNCTURE: CPT

## 2024-02-01 PROCEDURE — 82024 ASSAY OF ACTH: CPT

## 2024-02-01 PROCEDURE — 80053 COMPREHEN METABOLIC PANEL: CPT

## 2024-02-01 PROCEDURE — 85025 COMPLETE CBC W/AUTO DIFF WBC: CPT

## 2024-02-01 PROCEDURE — 99215 OFFICE O/P EST HI 40 MIN: CPT | Performed by: INTERNAL MEDICINE

## 2024-02-01 PROCEDURE — 84443 ASSAY THYROID STIM HORMONE: CPT

## 2024-02-01 PROCEDURE — 82533 TOTAL CORTISOL: CPT

## 2024-02-01 ASSESSMENT — PATIENT HEALTH QUESTIONNAIRE - PHQ9
SUM OF ALL RESPONSES TO PHQ9 QUESTIONS 1 AND 2: 0
2. FEELING DOWN, DEPRESSED OR HOPELESS: NOT AT ALL
1. LITTLE INTEREST OR PLEASURE IN DOING THINGS: NOT AT ALL

## 2024-02-01 ASSESSMENT — ENCOUNTER SYMPTOMS
OCCASIONAL FEELINGS OF UNSTEADINESS: 0
LOSS OF SENSATION IN FEET: 0
DEPRESSION: 0

## 2024-02-01 ASSESSMENT — PAIN SCALES - GENERAL: PAINLEVEL: 0-NO PAIN

## 2024-02-01 NOTE — PROGRESS NOTES
Patient ID: Martínez Neri is a 63 y.o. male    Primary Care Provider: Jayme Casey MD       DIAGNOSIS AND STAGING  Stage IIB (cT2 pN1 cM0) 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    PD-L1 TPS 20%      PRIOR THERAPIES  Concurrent chemo RT using weekly CarboTaxol, completed 60 Gy in 30 fractions on 01/05/2024     CURRENT THERAPY  To be determined        CURRENT ONCOLOGICAL PROBLEMS  Cough, productive  Oxygen dependence  Radiation-induced esophagitis        HISTORY OF PRESENT ILLNESS:  Current smoker (50-pack-year history), with history of COPD and seizures, who was diagnosed with a squamous cell carcinoma of the left upper lobe in [place], never treated.  Outpatient notes from [facility] and hematology clinic were reviewed.     On 06/17/2023 the patient had a CT chest for ongoing respiratory symptoms and was found to have a left suprahilar mass involving the left upper lobe bronchi with postobstructive pneumonia/collapse.  On 06/25/2023 CT chest/abdomen/pelvis demonstrated persistent masslike consolidation in the left upper lobe with no signs of extrathoracic metastatic disease.     On 06/26/2023 the patient underwent a bronchoscopy, and pathology was compatible with a squamous cell carcinoma at least in situ, with a foci highly suspicious for invasive squamous cell carcinoma.     On 07/18/2023 a PET/CT obtained, demonstrating a 6.6 cm left upper lobe mass with an SUV of 8.3 and no evidence of distant metastatic disease.     On 07/18/2023 to brain MRI was negative for intracranial metastasis.     On 07/27/2023 bronchoscopy/EBUS was performed.  Endobronchial biopsies of left upper lobe demonstrated an invasive moderately differentiated squamous cell carcinoma.  Left upper lobe hilar  FNA showed atypical cells with squamous differentiation, suspicious for malignancy.      Subcarinal FNA was indeterminate for malignancy with extremely rare atypical cells with squamous differentiation.  Molecular profile: Lack of BRAF, EGFR, HER2, KRAS, MET, ALK, NTRK 1/2/3, RET or ROS1 alterations.  PD-L1 TPS 20%.  The patient was seen by medical oncology and did not too frail for combined chemo RT (there is a performance status ECOG 3) documented on 08/17/2023.  At that time, he lived with some friends, while his family was primarily located in [place].     His daughter traveled and was able to relocate him to [place], and he presents today to clinic accompanied by his daughter, son, and daughter-in-law.     The patient states he is felt much better since his relocation, has been gaining weight, approximately 10 pounds since he moved here.  He is more active, going up and down the steps at home, but still tired and taking some breaks throughout the day.     He denies any headaches or new neurological symptoms.  His respiratory symptoms are stable. There is no new localized pain.     Reported molecular and ancillary testing performed upon diagnosis in [place]:   BRAF negative  WICAM17L negative  EGFR classic mutations negative  HER2 negative  MET negative  ALK negative  In tract 1/2/3 negative  RET negative  ROS1 negative  PD-L1 20%     Referred to surgery - PFTs were considered prohibitive for surgical resection-FEV1 30%, DLCO 30%.     10/31/23: Repeat EBUS for systematic staging:  Final Cytological Interpretation      A. LYMPH NODE 7 PULMONARY FINE NEEDLE ASPIRATION, CYTOLOGY AND CELL BLOCK:  --  No malignant cells identified.  --  Lymphoid sample.     B. LYMPH NODE 4 L PULMONARY FINE NEEDLE ASPIRATION, CYTOLOGY AND CELL BLOCK:  --  No malignant cells identified.  --  Lymphoid sample.           C. LYMPH NODE 11 L PULMONARY FINE NEEDLE ASPIRATION, CYTOLOGY AND CELL BLOCK:  --  A rare cluster of malignant cells derived  from squamous cell carcinoma, see note  -- Also, please refer to concurrent biopsy surgical pathology report ([id number]).     Note:  The malignant squamous cell carcinoma cells are represented in the cell block only.  Immunostain for p40 is positive. The cytomorphology and immunoprofile support the above diagnosis.  The material is not sufficient for molecular testing.      Component     FINAL DIAGNOSIS   Left lung, upper lobe, biopsy:  -- Superficial fragment of squamous cell carcinoma, keratinizing; see note.     Note: According to clinician's note, PD-L1 and NGS have been performed at an outside institution. These studies can be repeated, if clinically indicated.      NGS and PD-L1 not repeated in this specimen as it was done in outside institution already and the purpose of this procedure was not for diagnosis but systematic mediastinal re-staging.     01/05/2024: Completes definitive chemo RT using 60 Gray in 30 fractions with concurrent weekly CarboTaxol    01/08/2024: Admitted with neutropenic fevers, dehydration.  CT abdomen/pelvis demonstrated a partial thrombus of superior mesenteric artery.  He was a started on heparin and switched to aspirin at discharge.  CT chest was concerning for aspiration pneumonia, he was treated with antibiotics.  He was discharged on oxygen, 2 L at rest, 4 L on exertion.  He was also discharged on Augmentin 875 mg twice daily for 14 days.  Also sucralfate, 10 mL every 6 hours.  While hospitalized, he had episodes of intermittent agitation and was found to have elevated ammonia levels.  On 01/11/2024, ALT was 63, AST 60.    01/29/2024: CT chest with IV contrast demonstrates the development of multiple pulmonary nodules, including a 3 cm spiculated left lower lobe nodule, concerning for progression of his disease.  A PET scan was ordered STAT for subsequent evaluation, and a referral was placed to IR for CT-guided biopsy of most assessable lung nodule (right lower lobe,  posterior, image #[id number]).       PAST MEDICAL HISTORY  Brain aneurysm  COPD (chronic obstructive pulmonary disease) (CMS/HCC)  Epilepsy (CMS/HCC)  High cholesterol  History of alcohol use  Hypertension     PAST SURGICAL HISTORY:  CAROTID ENDARTERECTOMY  COLONOSCOPY  HERNIA REPAIR      SOCIAL HISTORY  Tobacco (50 pack-year history) use - quit after cancer dx      CURRENT MEDS REVIEWED:  Scheduled medications  Continuous medications  PRN medications     ALLERGIES  NKDA     FAMILY HISTORY  Father had prostate CA       SUBJECTIVE:  Here to discuss results of most recently obtained scans   He was discharged from the recent hospitalization as above, and completed a course of antibiotics  States his cough is a still very pronounced and he coughs many times throughout this visit, clearly productive cough, but unable to bring up sputum.  Denies any fevers  Endorses improvement in appetite and stamina over the last couple of weeks  He is scheduled for modified barium swallow next week.    A 13 point review of systems was performed, with significant findings documented above in subjective history.    OBJECTIVE:  Vitals:    02/01/24 1132   BP: 91/50   Pulse: 67   Resp: 20   Temp: 36.3 °C (97.3 °F)   SpO2: 96%      Body surface area is 1.87 meters squared.     Wt Readings from Last 5 Encounters:   02/01/24 71.7 kg (158 lb 1.1 oz)   01/25/24 70.1 kg (154 lb 8.7 oz)   01/22/24 72.7 kg (160 lb 3.2 oz)   01/15/24 71.7 kg (158 lb)   01/06/24 76.5 kg (168 lb 10.4 oz)       ECOGSCORE: 1- Restricted in physically strenuous activity.  Carries out light duty.    Physical Exam  Constitutional:       Appearance: Normal appearance.   HENT:      Head: Normocephalic and atraumatic.   Eyes:      General: No scleral icterus.  Cardiovascular:      Rate and Rhythm: Normal rate and regular rhythm.      Heart sounds: Normal heart sounds.   Pulmonary:      Effort: Pulmonary effort is normal.      Breath sounds: Normal breath sounds.   Abdominal:       General: There is no distension.      Palpations: Abdomen is soft.      Tenderness: There is no abdominal tenderness. There is no guarding.   Musculoskeletal:      Right lower leg: No edema.      Left lower leg: No edema.   Skin:     General: Skin is warm.      Coloration: Skin is not pale.      Findings: No erythema or rash.   Neurological:      General: No focal deficit present.      Mental Status: He is alert and oriented to person, place, and time. Mental status is at baseline.      Motor: No weakness.      Gait: Gait normal.   Psychiatric:         Mood and Affect: Mood normal.         Behavior: Behavior normal.         Thought Content: Thought content normal.         Judgment: Judgment normal.          Diagnostic Results   Results:  Labs:  Lab Results   Component Value Date    WBC 4.4 01/12/2024    HGB 8.4 (L) 01/12/2024    HCT 25.1 (L) 01/12/2024    MCV 98 01/12/2024     01/12/2024      Lab Results   Component Value Date    NEUTROABS 0.90 (L) 01/04/2024        Lab Results   Component Value Date    GLUCOSE 114 (H) 01/11/2024    CALCIUM 8.4 (L) 01/11/2024     01/11/2024    K 3.2 (L) 01/11/2024    CO2 27 01/11/2024     01/11/2024    BUN 3 (L) 01/11/2024    CREATININE 0.70 01/11/2024     Lab Results   Component Value Date    ALT 63 (H) 01/11/2024    AST 60 (H) 01/11/2024    ALKPHOS 43 01/11/2024    BILITOT <0.2 01/11/2024      Lab Results   Component Value Date    TSH 7.63 (H) 01/11/2024    FREET4 0.90 01/11/2024       STUDY:  CT CHEST W IV CONTRAST;  1/29/2024 11:00 am      INDICATION:  Signs/Symptoms:NSCLC; Restaging.      COMPARISON:  01/09/2024, 10/06/2023      ACCESSION NUMBER(S):  [id number]      ORDERING CLINICIAN:  FRANCINE BALL      TECHNIQUE:  Helical data acquisition of the chest was obtained  after intravenous  administration of 75 ml of Optiray 350.  Images were reformatted in  axial, coronal, and sagittal planes.      FINDINGS:  LUNGS AND AIRWAYS:  The trachea and central  airways are patent. No endobronchial lesion.      Innumerable pulmonary nodules are noted in both lungs in a random  distribution. The largest is a spiculated mass in the left lower  which measures 3 cm.      Unchanged appearance of masslike consolidation left upper lobe.      Moderate centrilobular emphysema.      Interval resolution of bibasilar pleural effusions.      MEDIASTINUM AND WILLIAM, LOWER NECK AND AXILLA:  The visualized thyroid gland is within normal limits.      No evidence of thoracic lymphadenopathy by CT criteria.      Esophagus appears within normal limits as seen.      HEART AND VESSELS:  The thoracic aorta is stable in course and caliber.      Main pulmonary artery and its branches are normal in caliber.      Moderate coronary artery calcifications are seen. The study is not  optimized for evaluation of coronary arteries.      The cardiac chambers are not enlarged.      No evidence of pericardial effusion.      UPPER ABDOMEN:  The visualized subdiaphragmatic structures demonstrate no remarkable  findings.      CHEST WALL AND OSSEOUS STRUCTURES:  There are no suspicious osseous lesions. Multilevel degenerative  changes are present      IMPRESSION:  1. Interval development of innumerable pulmonary nodules and masses  seen in both lungs and a random distribution. The largest is a 3 cm  spiculated mass in the left lower lobe. Differential diagnosis  includes infectious/inflammatory versus neoplastic etiologies.  Recommend clinical correlation and follow-up with PET-CT versus  short-term interval CT scan of the chest in 3-6 months.  2. Interval resolution bibasilar pleural effusions.  3. Moderate centrilobular emphysema.    Assessment/Plan     Primary cancer of left upper lobe of lung (CMS/HCC), Clinical: Stage IIB (cT2, cN1, cM0)    Stage IIB squamous cell carcinoma of the left upper lobe, treated with concurrent chemo RT using weekly CarboTaxol, completed 60 Gray in 30 fractions on  01/05/2024.  Subsequently hospitalized with neutropenic fevers on 01/08/2024.  Suspicion for aspiration during the workup performed at outside hospital as well as SMA partial thrombus.  I discussed with the patient and the family today the most recently obtained CT scan findings and my concerns regarding the possibility of progression of his disease.  We are obtaining a stat PET scan and ordering a CT-guided biopsy of one of his peripheral lung nodules, for further evaluation.  Will schedule a phone appointment to go over PET scan results next week.  I explained that this certainly can be an infectious/inflammatory complication, albeit the appearance of the lesions on the CT scan are certainly very concerning.  Hence, we cannot proceed with consolidative durvalumab without establishing the etiology of the changes demonstrated and most recent scan.        This note was created with the assistance of a speech recognition program.  Although the intention is to generate a document that actually reflects the content of the visit, it is possible that some mistakes occur and may not be corrected by the time of completion of this note.        Elo Peters MD, MS  Thoracic Medical Oncology   [address] [place], [place] [id number]  Phone: [phone]

## 2024-02-02 ENCOUNTER — APPOINTMENT (OUTPATIENT)
Dept: RADIOLOGY | Facility: HOSPITAL | Age: 64
End: 2024-02-02
Payer: MEDICAID

## 2024-02-02 ENCOUNTER — HOSPITAL ENCOUNTER (OUTPATIENT)
Dept: RADIOLOGY | Facility: HOSPITAL | Age: 64
Discharge: HOME | End: 2024-02-02
Payer: MEDICAID

## 2024-02-02 ENCOUNTER — HOME CARE VISIT (OUTPATIENT)
Dept: HOME HEALTH SERVICES | Facility: HOME HEALTH | Age: 64
End: 2024-02-02
Payer: MEDICAID

## 2024-02-02 ENCOUNTER — TELEPHONE (OUTPATIENT)
Dept: RADIATION ONCOLOGY | Facility: HOSPITAL | Age: 64
End: 2024-02-02
Payer: MEDICAID

## 2024-02-02 DIAGNOSIS — C34.12 PRIMARY CANCER OF LEFT UPPER LOBE OF LUNG (MULTI): ICD-10-CM

## 2024-02-02 LAB — GLUCOSE BLD MANUAL STRIP-MCNC: 80 MG/DL (ref 74–99)

## 2024-02-02 PROCEDURE — 82947 ASSAY GLUCOSE BLOOD QUANT: CPT

## 2024-02-02 PROCEDURE — A9552 F18 FDG: HCPCS | Mod: SE | Performed by: INTERNAL MEDICINE

## 2024-02-02 PROCEDURE — 78815 PET IMAGE W/CT SKULL-THIGH: CPT | Mod: PET TUMOR SUBSQ TX STRATEGY | Performed by: RADIOLOGY

## 2024-02-02 PROCEDURE — 78815 PET IMAGE W/CT SKULL-THIGH: CPT | Mod: PS

## 2024-02-02 PROCEDURE — 3430000001 HC RX 343 DIAGNOSTIC RADIOPHARMACEUTICALS: Mod: SE | Performed by: INTERNAL MEDICINE

## 2024-02-02 RX ORDER — FLUDEOXYGLUCOSE F 18 200 MCI/ML
9.59 INJECTION, SOLUTION INTRAVENOUS
Status: COMPLETED | OUTPATIENT
Start: 2024-02-02 | End: 2024-02-02

## 2024-02-02 RX ADMIN — FLUDEOXYGLUCOSE F 18 9.59 MILLICURIE: 200 INJECTION, SOLUTION INTRAVENOUS at 12:00

## 2024-02-02 NOTE — TELEPHONE ENCOUNTER
Called pt to remind of appointment on 2/5/2024 at 11:30. Pt's phone went to voicemail left number if needs to reschedule.

## 2024-02-03 LAB — ACTH PLAS-MCNC: 26.6 PG/ML (ref 7.2–63.3)

## 2024-02-05 ENCOUNTER — HOSPITAL ENCOUNTER (OUTPATIENT)
Dept: RADIATION ONCOLOGY | Facility: HOSPITAL | Age: 64
Setting detail: RADIATION/ONCOLOGY SERIES
Discharge: HOME | End: 2024-02-05
Payer: MEDICAID

## 2024-02-05 ENCOUNTER — HOME CARE VISIT (OUTPATIENT)
Dept: HOME HEALTH SERVICES | Facility: HOME HEALTH | Age: 64
End: 2024-02-05
Payer: MEDICAID

## 2024-02-05 VITALS
HEIGHT: 69 IN | TEMPERATURE: 97.9 F | HEART RATE: 82 BPM | DIASTOLIC BLOOD PRESSURE: 61 MMHG | BODY MASS INDEX: 23.25 KG/M2 | WEIGHT: 157 LBS | RESPIRATION RATE: 18 BRPM | OXYGEN SATURATION: 95 % | SYSTOLIC BLOOD PRESSURE: 95 MMHG

## 2024-02-05 DIAGNOSIS — C34.12 MALIGNANT NEOPLASM OF UPPER LOBE, LEFT BRONCHUS OR LUNG (MULTI): ICD-10-CM

## 2024-02-05 DIAGNOSIS — C34.12 MALIGNANT NEOPLASM OF UPPER LOBE OF LEFT LUNG (MULTI): Primary | ICD-10-CM

## 2024-02-05 DIAGNOSIS — T78.40XA ALLERGY, INITIAL ENCOUNTER: ICD-10-CM

## 2024-02-05 DIAGNOSIS — J43.9 PULMONARY EMPHYSEMA, UNSPECIFIED EMPHYSEMA TYPE (MULTI): ICD-10-CM

## 2024-02-05 PROCEDURE — 99214 OFFICE O/P EST MOD 30 MIN: CPT | Performed by: NURSE PRACTITIONER

## 2024-02-05 RX ORDER — ALBUTEROL SULFATE 90 UG/1
2 AEROSOL, METERED RESPIRATORY (INHALATION) EVERY 4 HOURS PRN
Qty: 6.7 G | Refills: 6 | Status: SHIPPED | OUTPATIENT
Start: 2024-02-05

## 2024-02-05 ASSESSMENT — ENCOUNTER SYMPTOMS
ACTIVITY CHANGE: 0
CHEST TIGHTNESS: 0
APNEA: 0
GASTROINTESTINAL NEGATIVE: 1
PSYCHIATRIC NEGATIVE: 1
APPETITE CHANGE: 0
NEUROLOGICAL NEGATIVE: 1
STRIDOR: 0
CARDIOVASCULAR NEGATIVE: 1
UNEXPECTED WEIGHT CHANGE: 0
SHORTNESS OF BREATH: 1
CHOKING: 0
MUSCULOSKELETAL NEGATIVE: 1
FEVER: 0
DEPRESSION: 0
FATIGUE: 0
COUGH: 1
WHEEZING: 1
HEMATOLOGIC/LYMPHATIC NEGATIVE: 1
CHILLS: 0
DIAPHORESIS: 0
OCCASIONAL FEELINGS OF UNSTEADINESS: 0
LOSS OF SENSATION IN FEET: 0

## 2024-02-05 ASSESSMENT — PAIN SCALES - GENERAL: PAINLEVEL: 0-NO PAIN

## 2024-02-05 NOTE — PROGRESS NOTES
"  Patient ID: [id number]     DIAGNOSIS AND STAGING  Stage IIB (cT2 pN1 cM0) 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    PD-L1 TPS 20%      PRIOR THERAPIES  Concurrent chemo RT using weekly CarboTaxol, completed 60 Gy in 30 fractions on 01/05/2024    History of presenting illness    Martínez Neri is a 63 y.o. male who presents today for follow up one month s/p chemoRT for SCC of the left upper lobe. Patient completed 30/33 fractions due to hospitalization for deconditioning. He was treated for for possible aspiration pneumonia. 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 He was treated with Heparin and switched to aspirin at discharge. Energy is fair. Appetite good. Weight stable. Patient on 2-4L of oxygen since hospitalization. Using inhalers and nebulizer as prescribed. He states he is almost out of rescue inhaler and does not see pulmonologist til 2/14/24. Endorses a productive cough. Some days are better than others. Phlegm is clear/white. Denies fevers or back pain. Endorse the occasional chest pain with cough. Per patient he quit smoking a month ago \"until today\". Patient was planned to start consolidative immunotherapy. He saw Dr. Peters on 2/1/24 and there was concern for disease progression on CT. Stat PET CT was ordered. He has barium swallow study scheduled for 2/7/24 to rule out concern for aspiration. Patient states he has difficulty swallowing when he gets worked up. Denies cough or choking with PO intake.     Review of systems:  Review of Systems   Constitutional:  Negative for activity change, appetite change, chills, diaphoresis, fatigue, fever and unexpected weight change.   HENT: Negative.     Respiratory:  Positive for cough, shortness of breath and wheezing. Negative for apnea, " "choking, chest tightness and stridor.    Cardiovascular: Negative.    Gastrointestinal: Negative.    Musculoskeletal: Negative.    Skin: Negative.    Neurological: Negative.    Hematological: Negative.    Psychiatric/Behavioral: Negative.         Past Medical history  He  has a past surgical history that includes Carotid endarterectomy (N/A); Colonoscopy; Hernia repair; and Cerebral aneurysm repair.    Last recorded vital:  BP 95/61   Pulse 82   Temp 36.6 °C (97.9 °F) (Skin)   Resp 18   Ht 1.753 m (5' 9\")   Wt 71.2 kg (157 lb)   SpO2 95% Comment: 2 liters  BMI 23.18 kg/m²     Physical exam  Physical Exam  Constitutional:       General: He is not in acute distress.     Appearance: Normal appearance. He is not ill-appearing, toxic-appearing or diaphoretic.   HENT:      Head: Normocephalic.      Mouth/Throat:      Mouth: Mucous membranes are moist.      Pharynx: Oropharynx is clear.   Eyes:      Conjunctiva/sclera: Conjunctivae normal.      Pupils: Pupils are equal, round, and reactive to light.   Cardiovascular:      Rate and Rhythm: Normal rate and regular rhythm.      Pulses: Normal pulses.      Heart sounds: Normal heart sounds.   Pulmonary:      Effort: No respiratory distress.      Breath sounds: No stridor. Wheezing present. No rhonchi or rales.   Chest:      Chest wall: No tenderness.   Abdominal:      General: Bowel sounds are normal. There is no distension.      Palpations: Abdomen is soft. There is no mass.      Tenderness: There is no abdominal tenderness. There is no guarding or rebound.      Hernia: No hernia is present.   Musculoskeletal:         General: Normal range of motion.      Cervical back: Normal range of motion and neck supple.   Skin:     General: Skin is warm and dry.   Neurological:      General: No focal deficit present.      Mental Status: He is alert and oriented to person, place, and time.   Psychiatric:         Mood and Affect: Mood normal.         Behavior: Behavior normal.    "      Thought Content: Thought content normal.         Judgment: Judgment normal.         Radiology results:  CT chest w IV contrast 01/29/2024    Narrative  Interpreted By:  Tonya Mccartney,  STUDY:  CT CHEST W IV CONTRAST;  1/29/2024 11:00 am    INDICATION:  Signs/Symptoms:NSCLC; Restaging.    COMPARISON:  01/09/2024, 10/06/2023    ACCESSION NUMBER(S):  [id number]    ORDERING CLINICIAN:  FRANCINE BALL    TECHNIQUE:  Helical data acquisition of the chest was obtained  after intravenous  administration of 75 ml of Optiray 350.  Images were reformatted in  axial, coronal, and sagittal planes.    FINDINGS:  LUNGS AND AIRWAYS:  The trachea and central airways are patent. No endobronchial lesion.    Innumerable pulmonary nodules are noted in both lungs in a random  distribution. The largest is a spiculated mass in the left lower  which measures 3 cm.    Unchanged appearance of masslike consolidation left upper lobe.    Moderate centrilobular emphysema.    Interval resolution of bibasilar pleural effusions.    MEDIASTINUM AND WILLIAM, LOWER NECK AND AXILLA:  The visualized thyroid gland is within normal limits.    No evidence of thoracic lymphadenopathy by CT criteria.    Esophagus appears within normal limits as seen.    HEART AND VESSELS:  The thoracic aorta is stable in course and caliber.    Main pulmonary artery and its branches are normal in caliber.    Moderate coronary artery calcifications are seen. The study is not  optimized for evaluation of coronary arteries.    The cardiac chambers are not enlarged.    No evidence of pericardial effusion.    UPPER ABDOMEN:  The visualized subdiaphragmatic structures demonstrate no remarkable  findings.    CHEST WALL AND OSSEOUS STRUCTURES:  There are no suspicious osseous lesions. Multilevel degenerative  changes are present    Impression  1. Interval development of innumerable pulmonary nodules and masses  seen in both lungs and a random distribution. The largest is a 3  cm  spiculated mass in the left lower lobe. Differential diagnosis  includes infectious/inflammatory versus neoplastic etiologies.  Recommend clinical correlation and follow-up with PET-CT versus  short-term interval CT scan of the chest in 3-6 months.  2. Interval resolution bibasilar pleural effusions.  3. Moderate centrilobular emphysema.    MACRO:  None    Signed by: Tonya Mccartney 1/29/2024 12:35 PM  Dictation workstation:   [id number]     NM PET CT lung CA staging 02/02/2024    Narrative  Interpreted By:  Flavio Lawler and Meyers Emily  STUDY:  NM PET CT LUNG CA STAGING;  2/2/2024 1:14 pm    INDICATION:  Signs/Symptoms:Stage IIB NSCLC, now with multiple new lung nodules,  concern for progression; restage.    Per medical record: 63-year-old male with history of stage IIIB  non-small-cell lung carcinoma with new pulmonary lung nodules on  recent CT chest 01/29/2024, concerning for disease progression.    COMPARISON:  CT chest with contrast 01/29/2024, PET-CT 10/06/2023    ACCESSION NUMBER(S):  [id number]    ORDERING CLINICIAN:  SENIA FUNES    TECHNIQUE:  DIVISION OF NUCLEAR MEDICINE  POSITRON EMISSION TOMOGRAPHY (PET-CT)    The patient received an intravenous dose of 9.59 mCi of Fluorine-18  fluorodeoxyglucose (FDG). Positron emission tomographic (PET) images  from mid-thigh to skull base were then acquired after a one hour  delay. Also acquired was a contemporaneous low dose non-contrast CT  scan performed for attenuation correction of PET images and anatomic  localization.  The PET and CT images were digitally fused for  display.  All images were acquired on a combined PET-CT scanner unit.  Some areas of FDG accumulation may be described in standardized  uptake value (SUV) units.    CODING:  Subsequent Treatment Strategy (PS)    CALIBRATION:  Dose Injection-to-Scan Interval (mins): 62 min  Mediastinal bloodpool SUV (normal 1.5-2.5): 1.49  Blood glucose: 80 mg/dL    FINDINGS:  NECK:  No focal  hypermetabolic soft tissue lesion is seen in the neck.  No hypermetabolic cervical lymphadenopathy is present.    CHEST:  Interval decrease in size of a masslike consolidation within the left  upper lobe and lingula with decreased hypermetabolic activity when  compared to prior PET-CT with a SUV max of 2.2, previously up to 9.2  and associated postradiation change. There are, however, numerous new  solid pulmonary nodules scattered throughout the lungs bilaterally  with moderate to intense hypermetabolic activity. For example, there  is a 1.9 cm solid pulmonary nodule within the left lower lobe with a  SUV max of 10.1. In addition, there is a new 2.8 cm pleural-based  nodule within the anterior aspect of the right middle lobe with a SUV  max of 7.2. There are numerous additional smaller bilateral solid  pulmonary nodules with moderate to intensely increased hypermetabolic  activity. No evidence of hypermetabolic mediastinal, hilar or  axillary lymphadenopathy.    ABDOMEN AND PELVIS:  No hypermetabolic soft tissue lesion is present in the abdomen and  pelvis. No evidence of hypermetabolic lymphadenopathy.  Physiologic radiotracer uptake is present in the liver and spleen  with excretion into the bowel loops and the genitourinary tract.    MUSCULOSKELETAL:  There is no focal hypermetabolic lesion to suggest osseous metastasis.    Impression  1. Numerous new solid pulmonary nodules scattered throughout the  lungs bilaterally with moderate to intense FDG avidity, including a  1.9 cm solid pulmonary nodule within the left lower lobe in addition  to numerous others. Findings are concerning for new metastatic  disease.  2. Interval decrease in size of a masslike consolidation within the  left upper lobe and lingula with decreased FDG avidity centrally with  associated postradiation change, consistent with patient's known  primary site of non-small-cell lung carcinoma.  3. No evidence of abnormal FDG uptake within the  abdomen or pelvis.    I personally reviewed the images/study, and I agree with the findings  as stated above. This study was interpreted at [facility], [place], [place].    Signed by: Flavio Lawler 2/2/2024 2:39 PM  Dictation workstation:   [id number]     Plan:  Assessment/Plan     63 year old male one month s/p chemo RT to the [facility]. Patient found to have progression on recent CT and PET CT. Biopsy scheduled for Wednesday per patient's wife. Patient upset, he feels as though he has been lied to regarding his prognosis. Explained the nature of lung cancer and that he did have a good response to the area that was previously treated. Patient to have follow up with Dr. Peters after biopsy to discuss treatment plan. Refill for albuterol inhaler sent to pharmacy. Continue oxygen, inhalers and nebulizer as prescribed. Continue follow up with pulmonologist as scheduled. Patient to return to clinic in 3 months for virtual visit per request. Patient instructed to call with any questions or concerns.       Problem List Items Addressed This Visit       Emphysema lung (CMS/HCC) - Primary    Relevant Medications    albuterol (Proventil HFA) 90 mcg/actuation inhaler     Other Visit Diagnoses       Malignant neoplasm of upper lobe, left bronchus or lung (CMS/HCC)        Relevant Medications    albuterol (Proventil HFA) 90 mcg/actuation inhaler    Other Relevant Orders    Clinic Appointment Request Follow Up; ANGIE SHELDON; [facility] [address] [facility] (Completed)    Allergy, initial encounter

## 2024-02-06 ENCOUNTER — HOME CARE VISIT (OUTPATIENT)
Dept: HOME HEALTH SERVICES | Facility: HOME HEALTH | Age: 64
End: 2024-02-06
Payer: MEDICAID

## 2024-02-06 VITALS
TEMPERATURE: 96.9 F | SYSTOLIC BLOOD PRESSURE: 102 MMHG | BODY MASS INDEX: 23.18 KG/M2 | OXYGEN SATURATION: 94 % | HEART RATE: 66 BPM | WEIGHT: 157 LBS | DIASTOLIC BLOOD PRESSURE: 58 MMHG | RESPIRATION RATE: 18 BRPM

## 2024-02-06 VITALS — OXYGEN SATURATION: 92 %

## 2024-02-06 PROCEDURE — G0151 HHCP-SERV OF PT,EA 15 MIN: HCPCS

## 2024-02-06 PROCEDURE — G0299 HHS/HOSPICE OF RN EA 15 MIN: HCPCS

## 2024-02-06 SDOH — HEALTH STABILITY: MENTAL HEALTH: SMOKING IN HOME: 1

## 2024-02-06 SDOH — ECONOMIC STABILITY: HOUSING INSECURITY: EVIDENCE OF SMOKING MATERIAL: 1

## 2024-02-06 SDOH — ECONOMIC STABILITY: FOOD INSECURITY: MEALS PER DAY: 3

## 2024-02-06 ASSESSMENT — PAIN SCALES - PAIN ASSESSMENT IN ADVANCED DEMENTIA (PAINAD)
FACIALEXPRESSION: 1 - SAD. FRIGHTENED. FROWN.
FACIALEXPRESSION: 1
CONSOLABILITY: 0
BODYLANGUAGE: 0
BODYLANGUAGE: 0 - RELAXED.
BREATHING: 0
TOTALSCORE: 1
CONSOLABILITY: 0 - NO NEED TO CONSOLE.
NEGVOCALIZATION: 0 - NONE.
NEGVOCALIZATION: 0

## 2024-02-06 ASSESSMENT — ENCOUNTER SYMPTOMS
DEPRESSED MOOD: 1
BOWEL PATTERN NORMAL: 1
STOOL FREQUENCY: DAILY
PAIN LOCATION - PAIN QUALITY: ACHES
PAIN LOCATION - PAIN DURATION: DAILY
DENIES PAIN: 1
PAIN SEVERITY GOAL: 0/10
HIGHEST PAIN SEVERITY IN PAST 24 HOURS: 6/10
SUBJECTIVE PAIN PROGRESSION: WAXING AND WANING
PERSON REPORTING PAIN: PATIENT
CHANGE IN APPETITE: INCREASED
PAIN LOCATION - PAIN FREQUENCY: INTERMITTENT
PAIN LOCATION: CHEST
APPETITE LEVEL: GOOD
PAIN: 1
PERSON REPORTING PAIN: PATIENT
PAIN LOCATION - PAIN SEVERITY: 6/10
LOWEST PAIN SEVERITY IN PAST 24 HOURS: 3/10
PAIN LOCATION - EXACERBATING FACTORS: NOTHING
HEADACHES: 1

## 2024-02-06 ASSESSMENT — ACTIVITIES OF DAILY LIVING (ADL)
AMBULATION ASSISTANCE: ONE PERSON
CURRENT_FUNCTION: STAND BY ASSIST
CURRENT_FUNCTION: ONE PERSON
AMBULATION ASSISTANCE: STAND BY ASSIST

## 2024-02-07 ENCOUNTER — PREP FOR PROCEDURE (OUTPATIENT)
Dept: RADIOLOGY | Facility: HOSPITAL | Age: 64
End: 2024-02-07
Payer: MEDICAID

## 2024-02-07 ENCOUNTER — APPOINTMENT (OUTPATIENT)
Dept: RADIOLOGY | Facility: HOSPITAL | Age: 64
End: 2024-02-07
Payer: MEDICAID

## 2024-02-07 ENCOUNTER — HOSPITAL ENCOUNTER (OUTPATIENT)
Dept: RADIOLOGY | Facility: HOSPITAL | Age: 64
Discharge: HOME | End: 2024-02-07
Payer: MEDICAID

## 2024-02-07 VITALS
TEMPERATURE: 97.3 F | SYSTOLIC BLOOD PRESSURE: 99 MMHG | HEART RATE: 70 BPM | RESPIRATION RATE: 18 BRPM | DIASTOLIC BLOOD PRESSURE: 55 MMHG

## 2024-02-07 DIAGNOSIS — C34.12 PRIMARY CANCER OF LEFT UPPER LOBE OF LUNG (MULTI): ICD-10-CM

## 2024-02-07 DIAGNOSIS — C34.12 PRIMARY CANCER OF LEFT UPPER LOBE OF LUNG (MULTI): Primary | ICD-10-CM

## 2024-02-07 PROCEDURE — 76604 US EXAM CHEST: CPT | Performed by: RADIOLOGY

## 2024-02-07 PROCEDURE — 76604 US EXAM CHEST: CPT

## 2024-02-07 ASSESSMENT — PAIN - FUNCTIONAL ASSESSMENT: PAIN_FUNCTIONAL_ASSESSMENT: 0-10

## 2024-02-07 NOTE — Clinical Note
D/T  Pt taking ASA and Dr. Dewey viewing areas of Bx and difficulty accessing them, Dr. Dewey doesn't feel it would be safe to proceed with procedure.  Plan is to reschedule and hold ASA next time for 3-5 days

## 2024-02-07 NOTE — POST-PROCEDURE NOTE
Interventional Radiology Note    Indication for procedure: The encounter diagnosis was Primary cancer of left upper lobe of lung (CMS/HCC).    Pre-Procedure Verification and Time Out:  · Procedure Location procedure area   · HUDDLE - Pre-procedure Verification completed   · TIME OUT - Final Verification completed immediately prior to procedure start   · DEBRIEF completed     General Information:  Date/Time of Procedure: 02/07/24 at 9:23 AM  Indication(s)/Pre - Procedure Diagnoses: NSCLC   Post-Procedure Diagnosis: NSCLC  Procedure Name: Lung Mass Ultrasound (planned biopsy)  Findings: See PACS  Procedure performed by: Dr. Carmelo Dewey MD  Assistant(s):   Estimated Blood Loss (mL): none  Specimen: No  Informed Consent: written consent obtained    Procedure Details:  Limited ultrasonographic imaging of the right lung was obtained which demonstrated poor visualization of the pleural based lesion. Due to lack of accessible window, the procedure was not completed.    Prep:  N/A    Anesthesia/Medications:  Procedural Sedation:  Medications (Filter: Administrations occurring from 0901 to 0919 on 02/07/24)      None          None    Attending Attestation:  I was present for the entire procedure    Giovanna Burns MD  PGY-4, Diagnostic Radiology

## 2024-02-07 NOTE — PRE-PROCEDURE NOTE
INTERVENTIONAL RADIOLOGY PRE-PROCEDURE NOTE    Martínez Neri is a 63 y.o. male with PMHx of NSCLC who presents to the interventional radiology department for lung nodule biopsy.    Procedure: Lung Mass Biopsy    Interval History:  Patient reports taking ASA 81 mg. Risks and benefits of proceeding with the lung biopsy were discussed, including potential bleeding risk. After discussion, patient elected to proceed with the procedure.    Indication for procedure: The encounter diagnosis was Primary cancer of left upper lobe of lung (CMS/HCC).    Past Medical History:   Diagnosis Date    Allergic rhinitis     Asthma     Brain aneurysm     Cancer (CMS/HCC)     Carotid artery disease (CMS/HCC)     COPD (chronic obstructive pulmonary disease) (CMS/HCC)     Epilepsy (CMS/HCC)     High cholesterol     History of alcohol use     Hypertension     Lung cancer (CMS/HCC)       Past Surgical History:   Procedure Laterality Date    CAROTID ENDARTERECTOMY N/A     CEREBRAL ANEURYSM REPAIR      COLONOSCOPY      HERNIA REPAIR         Relevant Labs:   Lab Results   Component Value Date    CREATININE 0.73 02/01/2024    EGFR >90 02/01/2024    INR 1.2 01/10/2024    PROTIME 12.1 01/10/2024       Planned Sedation/Anesthesia: Moderate    Directed physical examination:    Normal appearance, behavior, cognition and NAD  Lungs: No acute distress, on nasal cannula      Current Outpatient Medications:     albuterol (Proventil HFA) 90 mcg/actuation inhaler, Inhale 2 puffs every 4 hours if needed for wheezing or shortness of breath., Disp: 6.7 g, Rfl: 6    aspirin 81 mg chewable tablet, Chew 1 tablet (81 mg) once daily., Disp: 30 tablet, Rfl: 2    atorvastatin (Lipitor) 40 mg tablet, Take 1 tablet (40 mg) by mouth once daily., Disp: 90 tablet, Rfl: 1    cholecalciferol (Vitamin D-3) 125 MCG (5000 UT) capsule, , Disp: , Rfl:     divalproex (Depakote ER) 500 mg 24 hr tablet, Take 1 tablet (500 mg) by mouth 2 times a day. Do not crush, chew, or split.,  Disp: 30 tablet, Rfl: 0    inhalat.spacing dev,med. mask spacer, , Disp: , Rfl:     ipratropium-albuteroL (Duo-Neb) 0.5-2.5 mg/3 mL nebulizer solution, Take 3 mL by nebulization 4 times a day as needed for wheezing or shortness of breath., Disp: 90 mL, Rfl: 1    lacosamide (Vimpat) 200 mg tablet tablet, TAKE ONE TABLET BY MOUTH TWO TIMES A DAY, Disp: 30 tablet, Rfl: 2    LORazepam (Ativan) 0.5 mg tablet, Take 1 tablet (0.5 mg) by mouth once daily as needed for anxiety for up to 5 days., Disp: 5 tablet, Rfl: 0    nebulizer accessories misc, Use twice daily, Disp: 100 each, Rfl: 0    nebulizer and compressor device, Use as directed, Disp: 1 each, Rfl: 0    nicotine (Nicoderm CQ) 21 mg/24 hr patch, Place 1 patch over 24 hours on the skin once every 24 hours., Disp: 30 patch, Rfl: 0    ondansetron (Zofran) 8 mg tablet, Take 1 tablet (8 mg) by mouth every 8 hours if needed for nausea or vomiting., Disp: 30 tablet, Rfl: 5    oxygen (O2) gas therapy, Inhale 2 each continuously. Patient to wear O2 2L/NC continuously at rest and increase O2 to 4l/NC with any activity., Disp: , Rfl:     prochlorperazine (Compazine) 10 mg tablet, Take 1 tablet (10 mg) by mouth every 6 hours if needed for nausea or vomiting., Disp: 30 tablet, Rfl: 5    Spiriva Respimat 2.5 mcg/actuation inhaler, , Disp: , Rfl:     sucralfate (Carafate) 100 mg/mL suspension, Take 10 mL (1 g) by mouth every 6 hours. (Patient taking differently: Take 10 mL (1 g) by mouth every 6 hours. As needed), Disp: 414 mL, Rfl: 0    thiamine (Vitamin B-1) 100 mg tablet, Take 1 tablet (100 mg) by mouth once daily., Disp: 90 tablet, Rfl: 1     Mallampati: III (soft and hard palate and base of uvula visible)    ASA Score: ASA 2 - Patient with mild systemic disease with no functional limitations    Benefits, risks and alternatives of procedure and planned sedation have been discussed with the patient and/or their representative. All questions answered and they agree to proceed.      Patient was discussed with attending physician Dr. Jaxon Burns MD  PGY-4, Diagnostic Radiology   IR pager: [phone]    NON-Urgent on call weekends and after hours weekdays (5pm - 5am) IR pager: [phone]  Urgent & emergent on call weekends and after hours weekdays (5pm-7am) IR pager: [phone]

## 2024-02-08 ENCOUNTER — HOME CARE VISIT (OUTPATIENT)
Dept: HOME HEALTH SERVICES | Facility: HOME HEALTH | Age: 64
End: 2024-02-08
Payer: MEDICAID

## 2024-02-08 VITALS
DIASTOLIC BLOOD PRESSURE: 62 MMHG | HEART RATE: 82 BPM | OXYGEN SATURATION: 96 % | SYSTOLIC BLOOD PRESSURE: 118 MMHG | RESPIRATION RATE: 18 BRPM | TEMPERATURE: 98 F

## 2024-02-08 PROCEDURE — G0152 HHCP-SERV OF OT,EA 15 MIN: HCPCS

## 2024-02-08 ASSESSMENT — ENCOUNTER SYMPTOMS: DENIES PAIN: 1

## 2024-02-08 ASSESSMENT — PAIN SCALES - PAIN ASSESSMENT IN ADVANCED DEMENTIA (PAINAD)
CONSOLABILITY: 0 - NO NEED TO CONSOLE.
BODYLANGUAGE: 0
NEGVOCALIZATION: 0
TOTALSCORE: 0
NEGVOCALIZATION: 0 - NONE.
BODYLANGUAGE: 0 - RELAXED.
CONSOLABILITY: 0
FACIALEXPRESSION: 0
FACIALEXPRESSION: 0 - SMILING OR INEXPRESSIVE.
BREATHING: 0

## 2024-02-08 ASSESSMENT — ACTIVITIES OF DAILY LIVING (ADL)
DRESSING_LB_CURRENT_FUNCTION: INDEPENDENT
BATHING ASSESSED: 1
BATHING_CURRENT_FUNCTION: INDEPENDENT

## 2024-02-09 ENCOUNTER — APPOINTMENT (OUTPATIENT)
Dept: HOME HEALTH SERVICES | Facility: HOME HEALTH | Age: 64
End: 2024-02-09
Payer: MEDICAID

## 2024-02-12 ENCOUNTER — HOSPITAL ENCOUNTER (OUTPATIENT)
Dept: RADIOLOGY | Facility: HOSPITAL | Age: 64
Discharge: HOME | End: 2024-02-12
Payer: MEDICAID

## 2024-02-12 VITALS
DIASTOLIC BLOOD PRESSURE: 58 MMHG | HEIGHT: 69 IN | OXYGEN SATURATION: 98 % | WEIGHT: 157 LBS | RESPIRATION RATE: 17 BRPM | TEMPERATURE: 97.7 F | SYSTOLIC BLOOD PRESSURE: 104 MMHG | BODY MASS INDEX: 23.25 KG/M2 | HEART RATE: 67 BPM

## 2024-02-12 DIAGNOSIS — C34.12 PRIMARY CANCER OF LEFT UPPER LOBE OF LUNG (MULTI): ICD-10-CM

## 2024-02-12 PROCEDURE — 88305 TISSUE EXAM BY PATHOLOGIST: CPT | Mod: TC,SUR | Performed by: STUDENT IN AN ORGANIZED HEALTH CARE EDUCATION/TRAINING PROGRAM

## 2024-02-12 PROCEDURE — 2500000004 HC RX 250 GENERAL PHARMACY W/ HCPCS (ALT 636 FOR OP/ED): Mod: SE | Performed by: RADIOLOGY

## 2024-02-12 PROCEDURE — 71046 X-RAY EXAM CHEST 2 VIEWS: CPT | Performed by: RADIOLOGY

## 2024-02-12 PROCEDURE — 2720000007 HC OR 272 NO HCPCS

## 2024-02-12 PROCEDURE — 71046 X-RAY EXAM CHEST 2 VIEWS: CPT

## 2024-02-12 PROCEDURE — 88305 TISSUE EXAM BY PATHOLOGIST: CPT | Performed by: STUDENT IN AN ORGANIZED HEALTH CARE EDUCATION/TRAINING PROGRAM

## 2024-02-12 PROCEDURE — 99152 MOD SED SAME PHYS/QHP 5/>YRS: CPT | Performed by: RADIOLOGY

## 2024-02-12 PROCEDURE — 32408 CORE NDL BX LNG/MED PERQ: CPT | Performed by: RADIOLOGY

## 2024-02-12 PROCEDURE — 99152 MOD SED SAME PHYS/QHP 5/>YRS: CPT

## 2024-02-12 PROCEDURE — 32408 CORE NDL BX LNG/MED PERQ: CPT

## 2024-02-12 RX ORDER — FENTANYL CITRATE 50 UG/ML
INJECTION, SOLUTION INTRAMUSCULAR; INTRAVENOUS
Status: COMPLETED | OUTPATIENT
Start: 2024-02-12 | End: 2024-02-12

## 2024-02-12 RX ORDER — MIDAZOLAM HYDROCHLORIDE 1 MG/ML
INJECTION INTRAMUSCULAR; INTRAVENOUS
Status: COMPLETED | OUTPATIENT
Start: 2024-02-12 | End: 2024-02-12

## 2024-02-12 RX ADMIN — FENTANYL CITRATE 50 MCG: 50 INJECTION, SOLUTION INTRAMUSCULAR; INTRAVENOUS at 11:00

## 2024-02-12 RX ADMIN — MIDAZOLAM HYDROCHLORIDE 1 MG: 1 INJECTION, SOLUTION INTRAMUSCULAR; INTRAVENOUS at 11:00

## 2024-02-12 ASSESSMENT — PAIN - FUNCTIONAL ASSESSMENT
PAIN_FUNCTIONAL_ASSESSMENT: 0-10

## 2024-02-12 ASSESSMENT — PAIN SCALES - GENERAL
PAINLEVEL_OUTOF10: 0 - NO PAIN

## 2024-02-12 NOTE — PRE-PROCEDURE NOTE
INTERVENTIONAL RADIOLOGY PRE-PROCEDURE NOTE    Martínez Neri is a 63 y.o. male with PMHx of NSCLC who presents to the interventional radiology department for lung nodule biopsy.    Procedure: Lung nodule biopsy    Indication for procedure: The encounter diagnosis was Primary cancer of left upper lobe of lung (CMS/HCC).    Past Medical History:   Diagnosis Date    Allergic rhinitis     Asthma     Brain aneurysm     Cancer (CMS/HCC)     Carotid artery disease (CMS/HCC)     COPD (chronic obstructive pulmonary disease) (CMS/HCC)     Epilepsy (CMS/HCC)     High cholesterol     History of alcohol use     Hypertension     Lung cancer (CMS/HCC)       Past Surgical History:   Procedure Laterality Date    CAROTID ENDARTERECTOMY N/A     CEREBRAL ANEURYSM REPAIR      COLONOSCOPY      HERNIA REPAIR         Relevant Labs:   Lab Results   Component Value Date    CREATININE 0.73 02/01/2024    EGFR >90 02/01/2024    INR 1.2 01/10/2024    PROTIME 12.1 01/10/2024       Planned Sedation/Anesthesia: Moderate    Directed physical examination:    Normal appearance, behavior, cognition and NAD  Lungs: No increased work of breathing. On nasal cannula.      Current Outpatient Medications:     albuterol (Proventil HFA) 90 mcg/actuation inhaler, Inhale 2 puffs every 4 hours if needed for wheezing or shortness of breath., Disp: 6.7 g, Rfl: 6    aspirin 81 mg chewable tablet, Chew 1 tablet (81 mg) once daily., Disp: 30 tablet, Rfl: 2    atorvastatin (Lipitor) 40 mg tablet, Take 1 tablet (40 mg) by mouth once daily., Disp: 90 tablet, Rfl: 1    cholecalciferol (Vitamin D-3) 125 MCG (5000 UT) capsule, , Disp: , Rfl:     divalproex (Depakote ER) 500 mg 24 hr tablet, Take 1 tablet (500 mg) by mouth 2 times a day. Do not crush, chew, or split., Disp: 30 tablet, Rfl: 0    inhalat.spacing dev,med. mask spacer, , Disp: , Rfl:     ipratropium-albuteroL (Duo-Neb) 0.5-2.5 mg/3 mL nebulizer solution, Take 3 mL by nebulization 4 times a day as needed for  wheezing or shortness of breath., Disp: 90 mL, Rfl: 1    lacosamide (Vimpat) 200 mg tablet tablet, TAKE ONE TABLET BY MOUTH TWO TIMES A DAY, Disp: 30 tablet, Rfl: 2    LORazepam (Ativan) 0.5 mg tablet, Take 1 tablet (0.5 mg) by mouth once daily as needed for anxiety for up to 5 days., Disp: 5 tablet, Rfl: 0    nebulizer accessories misc, Use twice daily, Disp: 100 each, Rfl: 0    nebulizer and compressor device, Use as directed, Disp: 1 each, Rfl: 0    nicotine (Nicoderm CQ) 21 mg/24 hr patch, Place 1 patch over 24 hours on the skin once every 24 hours., Disp: 30 patch, Rfl: 0    ondansetron (Zofran) 8 mg tablet, Take 1 tablet (8 mg) by mouth every 8 hours if needed for nausea or vomiting., Disp: 30 tablet, Rfl: 5    oxygen (O2) gas therapy, Inhale 2 each continuously. Patient to wear O2 2L/NC continuously at rest and increase O2 to 4l/NC with any activity., Disp: , Rfl:     prochlorperazine (Compazine) 10 mg tablet, Take 1 tablet (10 mg) by mouth every 6 hours if needed for nausea or vomiting., Disp: 30 tablet, Rfl: 5    Spiriva Respimat 2.5 mcg/actuation inhaler, , Disp: , Rfl:     sucralfate (Carafate) 100 mg/mL suspension, Take 10 mL (1 g) by mouth every 6 hours. (Patient taking differently: Take 10 mL (1 g) by mouth every 6 hours. As needed), Disp: 414 mL, Rfl: 0    thiamine (Vitamin B-1) 100 mg tablet, Take 1 tablet (100 mg) by mouth once daily., Disp: 90 tablet, Rfl: 1     Mallampati: III (soft and hard palate and base of uvula visible)    ASA Score: ASA 2 - Patient with mild systemic disease with no functional limitations    Benefits, risks and alternatives of procedure and planned sedation have been discussed with the patient and/or their representative. All questions answered and they agree to proceed.     Patient was discussed with attending physician Dr. Veronique Knapp.    Joseph Smith MD  Diagnostic Radiology, PGY-4, R3      NON-Urgent on call weekends and after hours weekdays (5pm - 5am) IR pager:  [phone]  Urgent & emergent on call weekends and after hours weekdays (5pm-7am) IR pager: [phone]

## 2024-02-12 NOTE — POST-PROCEDURE NOTE
Interventional Radiology Brief Postprocedure Note    Attending: Veronique Knapp MD     Assistant: Joseph Smith MD    Diagnosis: right lower lobe pulmonary nodule    Description of procedure: Using CT guidance, a single pass was made into a right lower lobe nodule using a 18 Gauge BARD needle passed through a 17 gauge coaxial system. Scanning after each pass demonstrated no bleeding or pneumothorax. See radiology report for full details.       Anesthesia:  Local, moderate sedation    Complications: None    Estimated Blood Loss: none    Medications (Filter: Administrations occurring from 1047 to 1111 on 02/12/24) As of 02/12/24 1111      fentaNYL PF (Sublimaze) injection (mcg) Total dose:  50 mcg      Date/Time Rate/Dose/Volume Action       02/12/24  1100 50 mcg Given               midazolam (Versed) injection (mg) Total dose:  1 mg      Date/Time Rate/Dose/Volume Action       02/12/24  1100 1 mg Given                   No specimens collected      See detailed result report with images in PACS.    The patient tolerated the procedure well without incident or complication and is in stable condition.     Joseph Smith MD  Diagnostic Radiology, PGY-4, R3

## 2024-02-13 ENCOUNTER — HOME CARE VISIT (OUTPATIENT)
Dept: HOME HEALTH SERVICES | Facility: HOME HEALTH | Age: 64
End: 2024-02-13
Payer: MEDICAID

## 2024-02-14 LAB
LABORATORY COMMENT REPORT: NORMAL
PATH REPORT.COMMENTS IMP SPEC: NORMAL
PATH REPORT.FINAL DX SPEC: NORMAL
PATH REPORT.GROSS SPEC: NORMAL
PATH REPORT.RELEVANT HX SPEC: NORMAL
PATH REPORT.TOTAL CANCER: NORMAL

## 2024-02-15 ENCOUNTER — APPOINTMENT (OUTPATIENT)
Dept: HEMATOLOGY/ONCOLOGY | Facility: HOSPITAL | Age: 64
End: 2024-02-15
Payer: MEDICAID

## 2024-02-16 ENCOUNTER — TELEPHONE (OUTPATIENT)
Dept: HEMATOLOGY/ONCOLOGY | Facility: CLINIC | Age: 64
End: 2024-02-16
Payer: MEDICAID

## 2024-02-16 DIAGNOSIS — C34.12 MALIGNANT NEOPLASM OF UPPER LOBE OF LEFT LUNG (MULTI): Primary | ICD-10-CM

## 2024-02-16 NOTE — TELEPHONE ENCOUNTER
Patient's spouse states that she called scheduling with instructions that Dr. Peters wanted to see Martínez on 2/22 with infusion after. She was told by scheduling that Infusion is full that day and Dr. Peters's next availability is on 3/7.   Message sent to team.

## 2024-02-17 NOTE — ADDENDUM NOTE
Encounter addended by: Jorge Layne MD on: 2/17/2024 4:05 AM   Actions taken: Level of Service modified, Clinical Note Signed

## 2024-02-18 NOTE — RADIATION COMPLETION NOTES
Radiation Oncology Treatment Summary    Patient Name:  Martínez Neri  MRN:  [id number]  :  1960    Referring Provider: Elo Peters MD   Primary Care Provider: Jayme Casey MD    Brief History: Martínez Neri is a 63 y.o. male with   Radiation Oncology Problems      Primary cancer of left upper lobe of lung (CMS/HCC)         The patient completed radiotherapy as outlined below.    Radiation Treatment Summary:      THOMAS Hilum               2023 - 2023     1010     2,000 cGy/2,000 cGy     IMRT  Replan THOMAS Hilum    2023 - 2023      5/5       1,000 cGy/1,000 cGy     IMRT  THOMAS Hilum Replan    2023 - 2024     1518     3,000 cGy/3,600 cGy    IMRT  Total   2023 - 2024  30/33 6000 cGy/ 6600 cGy  IMRT  Please see the patient's Mosaiq chart for further details regarding the radiation plan, including beam energy.    Concurrent Chemotherapy:  Treatment Plans       Name Type Plan Dates Plan Provider         Active    PACLitaxel (Weekly) / CARBOplatin (Weekly) with Concurrent Radiation, 49 Day Cycle Oncology Treatment  2023 - Present Elo Peters MD                    CTCAE Toxicity Overview:   Toxicity Assessment          2023    11:14 2023    11:03 1/3/2024    11:41   Toxicity Assessment   Adverse Events Reviewed (WDL) No (Exceptions to WDL) No (Exceptions to WDL) No (Exceptions to WDL)   Treatment Site Thoracic Thoracic Thoracic   Anorexia Grade 0 Grade 0 Grade 0   Dehydration Grade 0 Grade 0    Dermatitis Radiation  Grade 0 Grade 0   Fatigue Grade 1 Grade 1 Grade 1   Nausea Grade 0 Grade 0 Grade 0   Pain Grade 0 Grade 0 Grade 0   Vomiting Grade 0     Esophageal Obstruction   Grade 0   Esophageal Pain Grade 0 Grade 0 Grade 0   Cough Grade 1 Grade 1 Grade 1   Dyspnea Grade 0 Grade 1 Grade 1   Hypoxia Grade 0 Grade 0 Grade 0     Patient Disposition: The patient tolerated radiation with no unexpected acute radiation effects, though had ongoing issues  with neutropenia and thrombocytopenia. He was admitted to the inpatient unit at [facility] due to significant weakness and confusion. During his admission, he was found to be neutropenic with a fever, increasing shortness of breath and new oxygenation requirements, and concerns for aspiration pneumonia. As such decision was taken to stop radiation at the dose of 60 Gy which is an acceptable dose for curative intent treatment.  He is scheduled for a follow up at our clinic after discharge for close interval assessment. The patient is encouraged to contact the radiation department for any questions or concerns.        Jorge Layne MD, MMM  Senior Attending Physician, [facility]  Professor, [employer]   Our [place]: “To Heal, To Teach, To Discover.”  RN partner: [email or web address]    Phone (scheduling): [phone]/ [email or web address]  Phone (office): [phone]  Phone (after hours): [phone]

## 2024-02-19 LAB
ATRIAL RATE: 115 BPM
P AXIS: 68 DEGREES
P OFFSET: 183 MS
P ONSET: 139 MS
PR INTERVAL: 138 MS
Q ONSET: 208 MS
QRS COUNT: 19 BEATS
QRS DURATION: 92 MS
QT INTERVAL: 318 MS
QTC CALCULATION(BAZETT): 439 MS
QTC FREDERICIA: 394 MS
R AXIS: -67 DEGREES
T AXIS: 70 DEGREES
T OFFSET: 367 MS
VENTRICULAR RATE: 115 BPM

## 2024-02-19 NOTE — TELEPHONE ENCOUNTER
Spoke with wife. CT reasoning as well as scheduling discussed in detail. No additional needs at this time.

## 2024-02-21 ENCOUNTER — TELEPHONE (OUTPATIENT)
Dept: PALLIATIVE MEDICINE | Facility: HOSPITAL | Age: 64
End: 2024-02-21
Payer: MEDICAID

## 2024-02-21 ENCOUNTER — OFFICE VISIT (OUTPATIENT)
Dept: PALLIATIVE MEDICINE | Facility: HOSPITAL | Age: 64
End: 2024-02-21
Payer: MEDICAID

## 2024-02-21 ENCOUNTER — HOSPITAL ENCOUNTER (OUTPATIENT)
Dept: RADIOLOGY | Facility: HOSPITAL | Age: 64
Discharge: HOME | End: 2024-02-21
Payer: MEDICAID

## 2024-02-21 VITALS
WEIGHT: 159.17 LBS | RESPIRATION RATE: 20 BRPM | TEMPERATURE: 97.3 F | BODY MASS INDEX: 23.51 KG/M2 | HEART RATE: 73 BPM | SYSTOLIC BLOOD PRESSURE: 97 MMHG | DIASTOLIC BLOOD PRESSURE: 57 MMHG | OXYGEN SATURATION: 100 %

## 2024-02-21 DIAGNOSIS — G89.3 CANCER RELATED PAIN: Primary | ICD-10-CM

## 2024-02-21 DIAGNOSIS — F11.90 CHRONIC, CONTINUOUS USE OF OPIOIDS: ICD-10-CM

## 2024-02-21 DIAGNOSIS — K59.03 CONSTIPATION DUE TO OPIOID THERAPY: ICD-10-CM

## 2024-02-21 DIAGNOSIS — Z51.5 PALLIATIVE CARE ENCOUNTER: ICD-10-CM

## 2024-02-21 DIAGNOSIS — C34.12 MALIGNANT NEOPLASM OF UPPER LOBE OF LEFT LUNG (MULTI): ICD-10-CM

## 2024-02-21 DIAGNOSIS — C34.12 MALIGNANT NEOPLASM OF UPPER LOBE, LEFT BRONCHUS OR LUNG (MULTI): ICD-10-CM

## 2024-02-21 DIAGNOSIS — T40.2X5A CONSTIPATION DUE TO OPIOID THERAPY: ICD-10-CM

## 2024-02-21 PROCEDURE — 99497 ADVNCD CARE PLAN 30 MIN: CPT | Performed by: INTERNAL MEDICINE

## 2024-02-21 PROCEDURE — 71260 CT THORAX DX C+: CPT

## 2024-02-21 PROCEDURE — 71260 CT THORAX DX C+: CPT | Mod: FOREIGN READ | Performed by: RADIOLOGY

## 2024-02-21 PROCEDURE — 2550000001 HC RX 255 CONTRASTS: Mod: SE | Performed by: INTERNAL MEDICINE

## 2024-02-21 PROCEDURE — 99215 OFFICE O/P EST HI 40 MIN: CPT | Performed by: INTERNAL MEDICINE

## 2024-02-21 PROCEDURE — 99205 OFFICE O/P NEW HI 60 MIN: CPT | Performed by: INTERNAL MEDICINE

## 2024-02-21 RX ORDER — OXYCODONE HYDROCHLORIDE 5 MG/1
5 TABLET ORAL EVERY 6 HOURS PRN
Qty: 15 TABLET | Refills: 0 | Status: SHIPPED | OUTPATIENT
Start: 2024-02-21 | End: 2024-02-21 | Stop reason: SDUPTHER

## 2024-02-21 RX ORDER — POLYETHYLENE GLYCOL 3350 17 G/17G
17 POWDER, FOR SOLUTION ORAL DAILY
Qty: 30 PACKET | Refills: 2 | Status: SHIPPED | OUTPATIENT
Start: 2024-02-21 | End: 2024-05-21

## 2024-02-21 RX ORDER — OXYCODONE HYDROCHLORIDE 5 MG/1
5 TABLET ORAL EVERY 6 HOURS PRN
Qty: 120 TABLET | Refills: 0 | Status: SHIPPED | OUTPATIENT
Start: 2024-02-21 | End: 2024-03-22

## 2024-02-21 RX ORDER — NALOXONE HYDROCHLORIDE 4 MG/.1ML
4 SPRAY NASAL AS NEEDED
Qty: 2 EACH | Refills: 0 | Status: SHIPPED | OUTPATIENT
Start: 2024-02-21

## 2024-02-21 RX ADMIN — IOHEXOL 75 ML: 350 INJECTION, SOLUTION INTRAVENOUS at 14:30

## 2024-02-21 ASSESSMENT — PAIN SCALES - GENERAL: PAINLEVEL: 6

## 2024-02-21 NOTE — TELEPHONE ENCOUNTER
Pharmacy electronic system is down and didn't receive scripts sent today. Patient and spouse updated and aware I will call in the morning to check if they received Oxy script yet and if PA is needed. I will update family then.

## 2024-02-21 NOTE — PROGRESS NOTES
Controlled Substance Agreement for Palliative Care reviewed and completed with patient. Education provided to outline both the expectations of the provider and the patient about the proper and safe use of controlled substances. Take Charge [place] and Know the signs of a drug overdose handouts reviewed and provided to patient.

## 2024-02-21 NOTE — PROGRESS NOTES
"SUPPORTIVE AND PALLIATIVE ONCOLOGY CONSULT - OUTPATIENT      SERVICE DATE: 2/21/2024    Medical Oncologist: MD Parish Jerez MD   Radiation Oncologist: No care team member to display    REASON FOR CONSULT/CHIEF CONSULT COMPLAINT: Symptom management and goals.     Subjective   HISTORY OF PRESENT ILLNESS:   Mr. Martínez Neri is a 64 yo  male with hx of epilepsy (on keppra and vimpat), COPD (currently on home oxygen 2- 4L), essential tremors, who has Stage IIB squamous cell lung cancer (THOMAS; treated with concurrent chemo- CarboTaxol; and XRT 30 fractions of 60 Gray) with recent SMA partial thrombus and neutropenic fevers duringa hospitalization in 1/2024.   Palliative care was consulted after Rad Onc stated he is no longer a candidate for further XRT to assist with symptom management and goals conversation.      Symptoms  Pain- diffused, ant chest 4-6/10, continuous deep aching pressure; started 4 months ago; worse with coughing and stressed. No taking any meds for pain.   SOB- chronic, worse in the past month; CHAPIN. Able to walk 2 blocks. Requires O2 to sleep at night.   No nausea, vomiting, diarrhea  Moderate amount of anxiety and \"depression\". He's had recent changes in his life- moved from [place] to [place] and currently lives with wife at son's house with son's family (daughter in law and 3 grandkids), and daughter.   Fatigue- moderate.   Sleep - difficult staying asleep.   Good appetite.   Cough.      Information obtained from: chart review, interview of patient, and interview of family  ______________________________________________________________________     Oncology History   Primary cancer of left upper lobe of lung (CMS/HCC)   10/9/2023 Initial Diagnosis    Primary cancer of left upper lobe of lung (CMS/HCC)     10/31/2023 Cancer Staged    Staging form: Lung, AJCC 8th Edition, Clinical stage from 10/31/2023: Stage IIB (cT2, cN1, cM0) - Signed by Elo Peters MD on 2/1/2024   "   Malignant neoplasm of upper lobe of left lung (CMS/HCC)   10/26/2023 Initial Diagnosis    Malignant neoplasm of upper lobe of left lung (CMS/HCC)     11/20/2023 - 12/18/2023 Chemotherapy    PACLitaxel (Weekly) / CARBOplatin (Weekly) with Concurrent Radiation, 49 Day Cycle     2/8/2024 -  Chemotherapy    Durvalumab (Biweekly), 28 Day Cycles         Past Medical History:   Diagnosis Date    Allergic rhinitis     Asthma     Brain aneurysm     Cancer (CMS/HCC)     Carotid artery disease (CMS/HCC)     COPD (chronic obstructive pulmonary disease) (CMS/HCC)     Epilepsy (CMS/HCC)     High cholesterol     History of alcohol use     Hypertension     Lung cancer (CMS/HCC)      Past Surgical History:   Procedure Laterality Date    CAROTID ENDARTERECTOMY N/A     CEREBRAL ANEURYSM REPAIR      COLONOSCOPY      HERNIA REPAIR       Family History   Problem Relation Name Age of Onset    Heart attack Mother      Other (Colorectal cancer) Father      Aneurysm Other      Prostate cancer Other      Alcohol abuse Other          SOCIAL HISTORY  Social History:  reports that he has been smoking cigarettes. He has a 50.00 pack-year smoking history. He has been exposed to tobacco smoke. He has never used smokeless tobacco. He reports that he does not currently use alcohol. He reports current drug use. Frequency: 4.00 times per week. Drug: Marijuana.   to Keri for 36 years.   Moved from [place] to son's home late 2023.   Worked as a private  and  in the past. Has not driven since developed epilepsy.   Was very involved in his AA group in [place] for 7 years- found it helpful and engaging. Feels like he doesn't yet have community here.     Worship and Importance of Worship:  Spiritual but not Orthodoxy. Believes in a higher being.     REVIEW OF SYSTEMS  Review of systems negative unless noted in HPI.       Objective     Palliative Performance Scale % (PPS)  50     Current Outpatient Medications   Medication  Instructions    albuterol (Proventil HFA) 90 mcg/actuation inhaler 2 puffs, inhalation, Every 4 hours PRN    aspirin 81 mg, oral, Daily    atorvastatin (LIPITOR) 40 mg, oral, Daily    cholecalciferol (Vitamin D-3) 125 MCG (5000 UT) capsule     divalproex (DEPAKOTE ER) 500 mg, oral, 2 times daily, Do not crush, chew, or split.    inhalat.spacing dev,med. mask spacer No dose, route, or frequency recorded.    ipratropium-albuteroL (Duo-Neb) 0.5-2.5 mg/3 mL nebulizer solution 3 mL, nebulization, 4 times daily PRN    lacosamide (VIMPAT) 200 mg, oral, 2 times daily    LORazepam (ATIVAN) 0.5 mg, oral, Daily PRN    naloxone (NARCAN) 4 mg, nasal, As needed, May repeat every 2-3 minutes if needed, alternating nostrils, until medical assistance becomes available.    nebulizer accessories misc Use twice daily    nebulizer and compressor device Use as directed    nicotine (Nicoderm CQ) 21 mg/24 hr patch 1 patch, transdermal, Every 24 hours    ondansetron (ZOFRAN) 8 mg, oral, Every 8 hours PRN    oxyCODONE (ROXICODONE) 5 mg, oral, Every 6 hours PRN    oxygen (O2) gas therapy 2 each, inhalation, Continuous, Patient to wear O2 2L/NC continuously at rest and increase O2 to 4l/NC with any activity.     polyethylene glycol (GLYCOLAX, MIRALAX) 17 g, oral, Daily, Mix 1 cap (17g) into 8 ounces of fluid.    prochlorperazine (COMPAZINE) 10 mg, oral, Every 6 hours PRN    Spiriva Respimat 2.5 mcg/actuation inhaler     sucralfate (CARAFATE) 1 g, oral, Every 6 hours scheduled    thiamine (VITAMIN B-1) 100 mg, oral, Daily       Allergies: No Known Allergies             PHYSICAL EXAMINATION  Vital Signs:   Vital signs reviewed  Vitals:    02/21/24 1345   BP:    Pulse:    Resp:    Temp:    SpO2: 100%        Physical Exam  Constitutional:       General: Patient is not in acute distress.  HENT:      Head: Normocephalic. Some temporal wasting.      Mouth: Mucous membranes are moist. No oral thrush.   Eyes:      Conjunctiva/sclera: Conjunctivae  clear, sclerae white. No discharge.     Pupils: Pupils are equal, round, and reactive to light.   Neck:      Vascular: No carotid bruit.   Cardiovascular:      Rate and Rhythm: Normal rate and regular rhythm.      Heart sounds: No murmur heard.  Pulmonary:      Effort: No respiratory distress.      Breath sounds: Coarse breath sounds with min wheezing.   Abdominal:      General: There is no distension.      Tenderness: There is no abdominal tenderness. There is no guarding.   Musculoskeletal:         General: No deformity.   Skin:     Coloration: Skin is not jaundiced.   Neurological:      General: No focal deficit present.      Mental Status: He is oriented to person, place, and time.   Psychiatric:         Behavior: Behavior normal. Behavior is cooperative.    ASSESSMENT/PLAN  Mr. Martínez Neri is a 64 yo  male with hx of epilepsy (on keppra and vimpat), COPD (currently on home oxygen 2- 4L), essential tremors, who has Stage IIB squamous cell lung cancer (THOMAS; treated with concurrent chemo- CarboTaxol; and XRT 30 fractions of 60 Gray) with recent SMA partial thrombus and neutropenic fevers duringa hospitalization in 1/2024.   Palliative care was consulted after Rad Onc stated he is no longer a candidate for further XRT to assist with symptom management and goals conversation.      Problem List:  Stage IIB THOMAS squamous cell cancer, now concerning for progression of disease  Chronic resp failure with COPD/emphysema  Cancer related pain  Cancer cough  Pleuritic chest pain  Cancer related fatigue  Dyspnea  Weight loss  Frailty  Anxiety  Nausea       Cancer-related pain  - Pleuritic in nature. Sporadic and associated with coughing.   - Takes oxycodone infrequently. May be beneficial for dyspnea in future with disease progression  -Continue with Oxycodone 5mg q4h PRN.       #Chronic respiratory failure  #COPD GOLD 3, emphysema  -Continue 2-4L oxygen   -Hand fan  -mindfulness breathing techniques which we will  teach at next visit.     Opioid Use  Medication Management:   - OARRS report reviewed with no aberrant behavior; consistent with  prescriptions/records and patient history  - Controlled Substance Agreement to be completed     Nausea  If gets nauseous, can use ondansetron prn.     Constipation  Start miralax 17 gm daily prn    Anxiety and Adjustment Disorder  Taught mindfulness exercise  Talked about community building and joining AA locally.     Fatigue  Can further address after pain is better managed.     Supportive Interventions: Interventions: Music Therapy: offered, Art Therapy: offered, Social work referral for: Advance Directives   support- will reach out to [facility]- Supportive Onc     Introduction to Supportive and Palliative Oncology:  Spoke with patient and his wife, Keri  Introduced the role and philosophy of Supportive and Palliative oncology in the evaluation and management of symptoms during cancer treatment  Palliative care was introduced as a service for patients with serious illness to help with symptoms, assist with goals of care conversations, navigate complex decision making, improve quality of life for patients, and provide support both patients and families.  Patient seemed to appreciate the extra layer of support.    Medical Decision Making/Goals of Care/Advance Care Planning:  Patient's current clinical condition, including diagnosis, prognosis, and management plan, and goals of care were discussed.   Life limiting disease: Stage IIB lung cancer  Getting staging done today and following up with onc on Monday.   Family: Supportive   Performance status: Major limitations due to fatigue and dyspnea  Joys/meaning/strength: Music- rock and roll. Found meaning in helping others get sober via AA. (Prior to AA, enjoyed smoking and ETOH).   Understanding of health: Demonstrates good  understanding of disease process, understands plan for further conversation with onc about prognosis.    Information:Wants full disclosure  Prognosis:Wants full disclosure  Goals: symptom control and cancer directed therapy; wants to try cancer directed therapy with hope for good outcome; but if the efforts doing give him a lot of time, then he is thinking about not moving forward with cancer directed therapy.   Worries and fears now and future: not being independent.   Minimum acceptable outcome/QOL:  Preserve independence.   Code status discussion:  Did not address today.     Advance Directives  Existence of Advance Directives:No - has interest  Decision maker: Surrogate decision maker is Keri Neri (wife)  vickie Abreu  3rd- [place]  Code Status: Did not address. But currently full code.     Next Follow-Up Visit:  Return to clinic in 4 weeks.     Signature and billing  Thank you for allowing us to participate in the care of this patient. Spent 20 mins with ACP today.   Plan of Care discussed with: patient and his wife      SIGNATURE: Ned Rock MD

## 2024-02-22 ENCOUNTER — APPOINTMENT (OUTPATIENT)
Dept: RADIOLOGY | Facility: HOSPITAL | Age: 64
End: 2024-02-22
Payer: MEDICAID

## 2024-02-22 ENCOUNTER — TELEPHONE (OUTPATIENT)
Dept: PALLIATIVE MEDICINE | Facility: HOSPITAL | Age: 64
End: 2024-02-22
Payer: MEDICAID

## 2024-02-22 NOTE — TELEPHONE ENCOUNTER
Urgent PA submitted to [facility] for Oxycodone as patient does not have pain medications at home. Cost with discount card is $70.82, patient would like to wait for PA approval. If PA does not come back quickly, aware we can send short supply to fill OOP in the interim but at this time he will wait for full QTY.

## 2024-02-23 NOTE — TELEPHONE ENCOUNTER
Per [facility] approved from 2/22/24-5/21/24. I confirmed with pharmacy and updated family.

## 2024-02-26 ENCOUNTER — OFFICE VISIT (OUTPATIENT)
Dept: HEMATOLOGY/ONCOLOGY | Facility: CLINIC | Age: 64
End: 2024-02-26
Payer: MEDICAID

## 2024-02-26 ENCOUNTER — CONSULT (OUTPATIENT)
Dept: CARDIOLOGY | Facility: CLINIC | Age: 64
End: 2024-02-26
Payer: MEDICAID

## 2024-02-26 VITALS
DIASTOLIC BLOOD PRESSURE: 69 MMHG | HEIGHT: 70 IN | OXYGEN SATURATION: 90 % | SYSTOLIC BLOOD PRESSURE: 113 MMHG | BODY MASS INDEX: 23.91 KG/M2 | WEIGHT: 167 LBS | HEART RATE: 86 BPM

## 2024-02-26 VITALS
RESPIRATION RATE: 20 BRPM | OXYGEN SATURATION: 100 % | HEART RATE: 73 BPM | SYSTOLIC BLOOD PRESSURE: 107 MMHG | DIASTOLIC BLOOD PRESSURE: 68 MMHG | TEMPERATURE: 98.2 F | BODY MASS INDEX: 23.77 KG/M2 | WEIGHT: 160.94 LBS

## 2024-02-26 DIAGNOSIS — C34.12 PRIMARY CANCER OF LEFT UPPER LOBE OF LUNG (MULTI): ICD-10-CM

## 2024-02-26 DIAGNOSIS — R00.2 PALPITATIONS: ICD-10-CM

## 2024-02-26 DIAGNOSIS — Z01.818 PRE-OP TESTING: ICD-10-CM

## 2024-02-26 DIAGNOSIS — R91.1 LUNG NODULE: ICD-10-CM

## 2024-02-26 DIAGNOSIS — K55.069 SUPERIOR MESENTERIC ARTERY THROMBOSIS (MULTI): Primary | ICD-10-CM

## 2024-02-26 DIAGNOSIS — C34.12 PRIMARY CANCER OF LEFT UPPER LOBE OF LUNG (MULTI): Primary | ICD-10-CM

## 2024-02-26 DIAGNOSIS — Z86.79 PERSONAL HISTORY OF OTHER DISEASES OF THE CIRCULATORY SYSTEM: Primary | ICD-10-CM

## 2024-02-26 PROCEDURE — 99215 OFFICE O/P EST HI 40 MIN: CPT | Mod: GC | Performed by: INTERNAL MEDICINE

## 2024-02-26 PROCEDURE — 99204 OFFICE O/P NEW MOD 45 MIN: CPT | Performed by: INTERNAL MEDICINE

## 2024-02-26 PROCEDURE — 99215 OFFICE O/P EST HI 40 MIN: CPT | Performed by: INTERNAL MEDICINE

## 2024-02-26 PROCEDURE — 99214 OFFICE O/P EST MOD 30 MIN: CPT | Mod: GC | Performed by: INTERNAL MEDICINE

## 2024-02-26 ASSESSMENT — ENCOUNTER SYMPTOMS
EYE DISCHARGE: 0
SUSPICIOUS LESIONS: 0
WHEEZING: 0
LOSS OF SENSATION IN FEET: 0
SHORTNESS OF BREATH: 0
NIGHT SWEATS: 0
CONSTIPATION: 0
ABDOMINAL PAIN: 0
CHILLS: 0
BLURRED VISION: 0
MYALGIAS: 0
DYSURIA: 0
CLAUDICATION: 0
FEVER: 0
DIARRHEA: 0
BACK PAIN: 0
COLOR CHANGE: 0
COUGH: 1
OCCASIONAL FEELINGS OF UNSTEADINESS: 0
ORTHOPNEA: 0
DEPRESSION: 0
FLANK PAIN: 0
DYSPNEA ON EXERTION: 0

## 2024-02-26 ASSESSMENT — PAIN SCALES - GENERAL: PAINLEVEL: 6

## 2024-02-26 NOTE — PROGRESS NOTES
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 The patient was admitted from 1/5-1/12/24. CT abdomen pelvis done showed SMA partial thrombus.  Started on heparin and switch to aspirin at discharge. The patient did not have bleeding events in the hospital. CT chest showed possibility of aspiration pneumonia. Treated with antibiotic and discharged with home care and oxygen at home. During the hospitalization, the patient's wife noted that the patient had an elevated heart rate, however he wasn't diagnosed with atrial fibrillation. The patient followed up with his Oncologist, Gemma Peters and Radiation Oncologist, Dr. Padilla. CT Chest showed findings concerning for multiple bilateral pulmonary nodules and masses concerning for infection vs neoplastic etiology. Pending Bronchoscopy with biopsy to determine etiology.    Patient was diagnosed with lung cancer in the end of June 2023.  Patient was in [place] at that time.  Patient came to [place].  Patient was started on treatment for lung cancer in November 2023.  Patient has non-small cell carcinoma of the lung.  Completed 30 rounds of radiation therapy with 4 cycles of chemotherapy.     At this visit, the patient does have complaints of cough, and he is still using oxygen. The patient denies any abdominal pain, blood in his urine/stool, nausea, or vomiting.    Review of Systems  Review of Systems   Constitutional: Negative for chills, fever and night sweats.   HENT:  Negative for congestion, ear discharge and ear pain.    Eyes:  Negative for blurred vision and discharge.   Cardiovascular:  Negative for claudication, dyspnea on exertion, leg swelling and orthopnea.   Respiratory:  Positive for cough. Negative for shortness of breath and  wheezing.    Skin:  Negative for color change, itching and suspicious lesions.   Musculoskeletal:  Negative for back pain, joint pain and myalgias.   Gastrointestinal:  Negative for abdominal pain, constipation and diarrhea.   Genitourinary:  Negative for dysuria and flank pain.        Past Medical History:   Diagnosis Date    Allergic rhinitis     Asthma     Brain aneurysm     Cancer (CMS/HCC)     Carotid artery disease (CMS/HCC)     COPD (chronic obstructive pulmonary disease) (CMS/HCC)     Epilepsy (CMS/HCC)     High cholesterol     History of alcohol use     Hypertension     Lung cancer (CMS/HCC)      Past Surgical History:   Procedure Laterality Date    CAROTID ENDARTERECTOMY N/A     CEREBRAL ANEURYSM REPAIR      COLONOSCOPY      HERNIA REPAIR       Social History     Socioeconomic History    Marital status: Legally      Spouse name: Not on file    Number of children: 2    Years of education: Not on file    Highest education level: Not on file   Occupational History    Not on file   Tobacco Use    Smoking status: Every Day     Packs/day: 1.00     Years: 50.00     Additional pack years: 0.00     Total pack years: 50.00     Types: Cigarettes     Passive exposure: Current    Smokeless tobacco: Never   Vaping Use    Vaping Use: Never used   Substance and Sexual Activity    Alcohol use: Not Currently     Comment: quit 7 years ago    Drug use: Yes     Frequency: 4.0 times per week     Types: Marijuana    Sexual activity: Defer   Other Topics Concern    Not on file   Social History Narrative    Not on file     Social Determinants of Health     Financial Resource Strain: Low Risk  (1/6/2024)    Overall Financial Resource Strain (CARDIA)     Difficulty of Paying Living Expenses: Not very hard   Food Insecurity: No Food Insecurity (1/8/2024)    Hunger Vital Sign     Worried About Running Out of Food in the Last Year: Never true     Ran Out of Food in the Last Year: Never true   Transportation Needs: No  "Transportation Needs (1/15/2024)    OASIS : Transportation     Lack of Transportation (Medical): No     Lack of Transportation (Non-Medical): No     Patient Unable or Declines to Respond: No   Physical Activity: Inactive (1/8/2024)    Exercise Vital Sign     Days of Exercise per Week: 0 days     Minutes of Exercise per Session: 0 min   Stress: Stress Concern Present (1/8/2024)    [employer] - Occupational Stress Questionnaire     Feeling of Stress : To some extent   Social Connections: Feeling Socially Integrated (1/15/2024)    OASIS : Social Isolation     Frequency of experiencing loneliness or isolation: Never   Intimate Partner Violence: Not At Risk (1/8/2024)    Humiliation, Afraid, Rape, and Kick questionnaire     Fear of Current or Ex-Partner: No     Emotionally Abused: No     Physically Abused: No     Sexually Abused: No   Housing Stability: Low Risk  (1/6/2024)    Housing Stability Vital Sign     Unable to Pay for Housing in the Last Year: No     Number of Places Lived in the Last Year: 1     Unstable Housing in the Last Year: No      Family History   Problem Relation Name Age of Onset    Heart attack Mother      Other (Colorectal cancer) Father      Aneurysm Other      Prostate cancer Other      Alcohol abuse Other        No Known Allergies    Objective   Physical Exam  /69 (BP Location: Right arm, Patient Position: Sitting, BP Cuff Size: Adult long)   Pulse 86   Ht 1.765 m (5' 9.5\")   Wt 75.8 kg (167 lb)   BMI 24.31 kg/m²      General:  In no acute distress  Neuro: alert and oriented x3  CV:  RRR  Lungs: scattered crackles, using Oxygen   Abd:  Soft, non-tender   Psych:  Appropriate affect  Upper extremities: No swelling, +2 radial   Lower extremities: No edema  Skin:  No open ulcers.     Medications     Current Outpatient Medications:     albuterol (Proventil HFA) 90 mcg/actuation inhaler, Inhale 2 puffs every 4 hours if needed for wheezing or shortness of " breath., Disp: 6.7 g, Rfl: 6    apixaban (Eliquis) 5 mg tablet, Take 1 tablet (5 mg) by mouth 2 times a day., Disp: 180 tablet, Rfl: 3    aspirin 81 mg chewable tablet, Chew 1 tablet (81 mg) once daily., Disp: 30 tablet, Rfl: 2    atorvastatin (Lipitor) 40 mg tablet, Take 1 tablet (40 mg) by mouth once daily., Disp: 90 tablet, Rfl: 1    cholecalciferol (Vitamin D-3) 125 MCG (5000 UT) capsule, , Disp: , Rfl:     divalproex (Depakote ER) 500 mg 24 hr tablet, Take 1 tablet (500 mg) by mouth 2 times a day. Do not crush, chew, or split., Disp: 30 tablet, Rfl: 0    inhalat.spacing dev,med. mask spacer, , Disp: , Rfl:     ipratropium-albuteroL (Duo-Neb) 0.5-2.5 mg/3 mL nebulizer solution, Take 3 mL by nebulization 4 times a day as needed for wheezing or shortness of breath., Disp: 90 mL, Rfl: 1    lacosamide (Vimpat) 200 mg tablet tablet, TAKE ONE TABLET BY MOUTH TWO TIMES A DAY, Disp: 30 tablet, Rfl: 2    LORazepam (Ativan) 0.5 mg tablet, Take 1 tablet (0.5 mg) by mouth once daily as needed for anxiety for up to 5 days., Disp: 5 tablet, Rfl: 0    naloxone (Narcan) 4 mg/0.1 mL nasal spray, Administer 1 spray (4 mg) into affected nostril(s) if needed for opioid reversal. May repeat every 2-3 minutes if needed, alternating nostrils, until medical assistance becomes available., Disp: 2 each, Rfl: 0    nebulizer accessories misc, Use twice daily, Disp: 100 each, Rfl: 0    nebulizer and compressor device, Use as directed, Disp: 1 each, Rfl: 0    nicotine (Nicoderm CQ) 21 mg/24 hr patch, Place 1 patch over 24 hours on the skin once every 24 hours., Disp: 30 patch, Rfl: 0    ondansetron (Zofran) 8 mg tablet, Take 1 tablet (8 mg) by mouth every 8 hours if needed for nausea or vomiting., Disp: 30 tablet, Rfl: 5    oxyCODONE (Roxicodone) 5 mg immediate release tablet, Take 1 tablet (5 mg) by mouth every 6 hours if needed for severe pain (7 - 10)., Disp: 120 tablet, Rfl: 0    oxygen (O2) gas therapy, Inhale 2 each continuously.  Patient to wear O2 2L/NC continuously at rest and increase O2 to 4l/NC with any activity., Disp: , Rfl:     polyethylene glycol (Glycolax, Miralax) 17 gram packet, Take 17 g by mouth once daily. Mix 1 cap (17g) into 8 ounces of fluid., Disp: 30 packet, Rfl: 2    prochlorperazine (Compazine) 10 mg tablet, Take 1 tablet (10 mg) by mouth every 6 hours if needed for nausea or vomiting., Disp: 30 tablet, Rfl: 5    Spiriva Respimat 2.5 mcg/actuation inhaler, , Disp: , Rfl:     sucralfate (Carafate) 100 mg/mL suspension, Take 10 mL (1 g) by mouth every 6 hours. (Patient taking differently: Take 10 mL (1 g) by mouth every 6 hours. As needed), Disp: 414 mL, Rfl: 0    thiamine (Vitamin B-1) 100 mg tablet, Take 1 tablet (100 mg) by mouth once daily., Disp: 90 tablet, Rfl: 1    Lab Review   Recent Labs     02/01/24  1035 01/11/24  0433 01/09/24  0431 01/08/24  0428 01/07/24 1724 01/06/24 0443 01/05/24 2050 01/04/24  0846    137 136 133 132* 133 129* 134*   K 4.7 3.2* 3.1* 3.3* 3.4 4.0 4.3 4.4    102 102 101 100 100 96* 99   CO2 31 27 25 24 24 21* 22* 27   ANIONGAP 12 8 9 8 8 12 11 12   BUN 16 3* 3* 5* 8 12 15 16   CREATININE 0.73 0.70 0.60 0.70 0.90 0.70 0.70 0.75   EGFR >90 >90 >90 >90 >90 >90 >90 >90     Recent Labs     02/01/24  1035 01/11/24  0433 01/07/24  1724 01/06/24 0443 01/05/24 2050   ALBUMIN 3.8 2.3* 2.8* 3.2* 3.9   ALKPHOS 69 43 42 49 62   ALT 9* 63* 40 8 9   AST 16 60* 178* 24 19   BILITOT 0.3 <0.2 0.3 0.3 0.3     Recent Labs     02/01/24  1035 01/12/24  0847 01/11/24  1802 01/11/24  0433 01/09/24  1749 01/08/24  0428 01/08/24  0249 01/07/24  1724   WBC 7.9 4.4 4.3* 3.6* 5.2 4.9 5.9 3.6*   HGB 9.9* 8.4* 7.6* 7.4* 7.5* 7.4* 8.2* 7.7*   HCT 31.0* 25.1* 21.9* 21.9* 22.7* 21.3* 23.3* 22.3*    289 256 252 220 192 217 195   * 98 97 97 100 97 98 98     Recent Labs     01/10/24  1635 01/08/24  1535   INR 1.2 1.1     PTT - 1/12/2024:  8:47 AM  1.2   12.1 49.7     Recent Labs      "10/03/23  0828   CHOL 162   HDL 67.1   TRIG 87     No results found for: \"HGBA1C\"  Lab Results   Component Value Date    TSH 3.75 02/01/2024       Imaging  CT A/P 1/7/2024   1.  There is partial thrombosis of the proximal superior mesenteric  artery identified 7 mm distal to the origin. However, there is flow  noted within the distal SMA.  2. Small amount of free fluid within the pelvic cul-de-sac.  3. Colonic diverticulosis without diverticulitis.  4. Hepatic cysts measuring up to 2.3 cm in size with 1.7 cm right  renal cyst.  5. Severe pulmonary emphysema with minimal right pleural effusion and  with atelectasis at right lung base.  6. Small bilateral fat containing inguinal hernias with very tiny fat  containing umbilical hernia      Assessment/Plan   Martínez Neri is a 63 y.o. male with history of non small cell lung cancer, COPD, and seizures (treated with Depakote and Vimpat) came to the Vascular medicine clinic for SMA thrombosis. The patient has been taking aspirin 81 mg once daily for treatment. CT Abd/Pelvis completed during hospitalization is the only abdominal scan available which showed partial thrombosis of the SMA. No other scans available for comparison. His CT Chest w/ contrast showed no evidence of thrombosis. Will need imaging to evaluate for the source of thrombotic event. He did note having an elevated heart rate during hospitalization. Will benefit from a Holter monitor to rule out underlying atrial fibrillation.    Plan:  - Complete CTA Abd/pelvis w/ contrast  - Complete transthoracic echocardiogram to rule out cardiac source  - Continue taking aspirin 81 mg once daily  - Start taking eliquis 5 mg BID  - Complete bronchoscopy with biopsy to rule out neoplastic causes of multiple pulmonary nodules.    Camryn Alcantara  Vascular Medicine Fellow      I saw and evaluated the patient. I personally obtained the key and critical portions of the history and physical exam or was physically present for key " and critical portions performed by the resident/fellow. I reviewed the resident/fellow's documentation and discussed the patient with the resident/fellow. I agree with the resident/fellow's medical decision making as documented in the note.    A 63 M with history of COPD, seizures, non small cell carcinoma of the lung (dx June 2023), IC aneurysm s/p repair and active smoker. Currently on oxygen for presumed infection vs cancer progression. Following with VM for SMA thrombosis      _ 1/2024 SMA thrombosis   Was on heparin gtt, seen by Oncology, limited hypercoagulable work up done, pt dc on ASA  There is evidence of Athero but also with a filling defect noted a bland thrombus can not be excluded    Active malignancy   APLA Ng   Known ASO   No known aortic thrombus/dissection, confirmed with radiology   No ECHO done after, IC thrombus/shunt?   No known arrhythmia, but reports multiple episodes of tachycardia     Plan   --- Given evidence of SMA thrombus, would favor starting AC especially with underlying malignancy and concern about hypercoagulability   --- We discussed DOAC vs Lovenox especially with arterial thrombosis and Anti seizures medications with potential lowering of DOAC AC effect, he wants to avoid injections, will start Eliquis for now with close monitoring as long as it is compatible with his cancer treatment, will reassess after the CT results    --- They will let me know when the bronchoscopy is scheduled to adjust the AC around the procedure   --- CT A/P to reassess the SMA thrombosis   --- Holter   --- ECHO with bubble study   --- Continue ASA  --- We discussed the importance of smoking cessation, continue following with the PCP for ASO risk factors modifications   --- Rest as detailed in the patient's instructions     Samantha Abreu MD

## 2024-02-26 NOTE — PATIENT INSTRUCTIONS
--- Close monitoring to bleeding and fall precautions while on anticoagulation   --- Avoid first and second hand smoking   --- Follow up in 4 weeks, earlier if needed

## 2024-02-26 NOTE — PROGRESS NOTES
Bronchoscopy Scheduling Request    Pre-bronchoscopy visit: New patient visit with Bronchoscopy group provider  Please schedule procedure: Next available    Cytology on-site:  Yes  Location:  [facility]  Performing physician:  Advanced diagnostic bronchoscopist  Referring physician:  Elo Peters MD, Jayme Casey MD  Indication:  LLL nodule/s (request maribeth of LLL nodule and BAL for infection)  Sedation / Anesthesia:  GA  Procedure:  BAL, Navigational bronchoscopy  Time:  Tier 3  Fluorscopy:   Yes  Imaging needed:  CT Jeffrey - same day as procedure  Labs:  None  Meds:  None  Special Considerations:  None  Reviewed by:  Sal Wallace MD

## 2024-02-26 NOTE — PROGRESS NOTES
Patient ID: Martínez Neri is a 63 y.o. 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    PD-L1 TPS 20%      PRIOR THERAPIES  Concurrent chemo RT using weekly CarboTaxol, completed 60 Gy in 30 fractions on 01/05/2024     CURRENT THERAPY  To be determined        CURRENT ONCOLOGICAL PROBLEMS  Cough, productive  Oxygen dependence  Radiation-induced esophagitis        HISTORY OF PRESENT ILLNESS:  Current smoker (50-pack-year history), with history of COPD and seizures, who was diagnosed with a squamous cell carcinoma of the left upper lobe in [place], never treated.  Outpatient notes from [facility] and hematology clinic were reviewed.     On 06/17/2023 the patient had a CT chest for ongoing respiratory symptoms and was found to have a left suprahilar mass involving the left upper lobe bronchi with postobstructive pneumonia/collapse.  On 06/25/2023 CT chest/abdomen/pelvis demonstrated persistent masslike consolidation in the left upper lobe with no signs of extrathoracic metastatic disease.     On 06/26/2023 the patient underwent a bronchoscopy, and pathology was compatible with a squamous cell carcinoma at least in situ, with a foci highly suspicious for invasive squamous cell carcinoma.     On 07/18/2023 a PET/CT obtained, demonstrating a 6.6 cm left upper lobe mass with an SUV of 8.3 and no evidence of distant metastatic disease.     On 07/18/2023 to brain MRI was negative for intracranial metastasis.     On 07/27/2023 bronchoscopy/EBUS was performed.  Endobronchial biopsies of left upper lobe demonstrated an invasive moderately differentiated squamous cell carcinoma.  Left upper lobe hilar FNA  showed atypical cells with squamous differentiation, suspicious for malignancy.      Subcarinal FNA was indeterminate for malignancy with extremely rare atypical cells with squamous differentiation.  Molecular profile: Lack of BRAF, EGFR, HER2, KRAS, MET, ALK, NTRK 1/2/3, RET or ROS1 alterations.  PD-L1 TPS 20%.  The patient was seen by medical oncology and did not too frail for combined chemo RT (there is a performance status ECOG 3) documented on 08/17/2023.  At that time, he lived with some friends, while his family was primarily located in [place].     His daughter traveled and was able to relocate him to [place], and he presents today to clinic accompanied by his daughter, son, and daughter-in-law.     The patient states he is felt much better since his relocation, has been gaining weight, approximately 10 pounds since he moved here.  He is more active, going up and down the steps at home, but still tired and taking some breaks throughout the day.     He denies any headaches or new neurological symptoms.  His respiratory symptoms are stable. There is no new localized pain.     Reported molecular and ancillary testing performed upon diagnosis in [place]:   BRAF negative  [id number] negative  EGFR classic mutations negative  HER2 negative  MET negative  ALK negative  In tract 1/2/3 negative  RET negative  ROS1 negative  PD-L1 20%     Referred to surgery - PFTs were considered prohibitive for surgical resection-FEV1 30%, DLCO 30%.     10/31/23: Repeat EBUS for systematic staging:  Final Cytological Interpretation      A. LYMPH NODE 7 PULMONARY FINE NEEDLE ASPIRATION, CYTOLOGY AND CELL BLOCK:  --  No malignant cells identified.  --  Lymphoid sample.     B. LYMPH NODE 4 L PULMONARY FINE NEEDLE ASPIRATION, CYTOLOGY AND CELL BLOCK:  --  No malignant cells identified.  --  Lymphoid sample.           C. LYMPH NODE 11 L PULMONARY FINE NEEDLE ASPIRATION, CYTOLOGY AND CELL BLOCK:  --  A rare cluster of malignant cells derived  from squamous cell carcinoma, see note  -- Also, please refer to concurrent biopsy surgical pathology report ([id number]).     Note:  The malignant squamous cell carcinoma cells are represented in the cell block only.  Immunostain for p40 is positive. The cytomorphology and immunoprofile support the above diagnosis.  The material is not sufficient for molecular testing.      Component     FINAL DIAGNOSIS   Left lung, upper lobe, biopsy:  -- Superficial fragment of squamous cell carcinoma, keratinizing; see note.     Note: According to clinician's note, PD-L1 and NGS have been performed at an outside institution. These studies can be repeated, if clinically indicated.      NGS and PD-L1 not repeated in this specimen as it was done in outside institution already and the purpose of this procedure was not for diagnosis but systematic mediastinal re-staging.     01/05/2024: Completes definitive chemo RT using 60 Gray in 30 fractions with concurrent weekly CarboTaxol    01/08/2024: Admitted with neutropenic fevers, dehydration.  CT abdomen/pelvis demonstrated a partial thrombus of superior mesenteric artery.  He was a started on heparin and switched to aspirin at discharge.  CT chest was concerning for aspiration pneumonia, he was treated with antibiotics.  He was discharged on oxygen, 2 L at rest, 4 L on exertion.  He was also discharged on Augmentin 875 mg twice daily for 14 days.  Also sucralfate, 10 mL every 6 hours.  While hospitalized, he had episodes of intermittent agitation and was found to have elevated ammonia levels.  On 01/11/2024, ALT was 63, AST 60.    01/29/2024: CT chest with IV contrast demonstrates the development of multiple pulmonary nodules, including a 3 cm spiculated left lower lobe nodule, concerning for progression of his disease.  A PET scan was ordered STAT for subsequent evaluation, and a referral was placed to IR for CT-guided biopsy of most assessable lung nodule (right lower lobe,  posterior, image #[id number]).    02/12/2024: Pathology from RLL biopsy not diagnostic    PAST MEDICAL HISTORY  Brain aneurysm  COPD (chronic obstructive pulmonary disease) (CMS/HCC)  Epilepsy (CMS/HCC)  High cholesterol  History of alcohol use  Hypertension     PAST SURGICAL HISTORY:  CAROTID ENDARTERECTOMY  COLONOSCOPY  HERNIA REPAIR      SOCIAL HISTORY  Tobacco (50 pack-year history) use - quit after cancer dx      CURRENT MEDS REVIEWED:  Scheduled medications  Continuous medications  PRN medications     ALLERGIES  NKDA     FAMILY HISTORY  Father had prostate CA     SUBJECTIVE:  Patient here for biopsy review. He has pain 6/10 on R side at biopsy site. Also BL pleuritic pain, worse with cough.  He saw a pulmonologist in [facility] in [place]. He refilled his Spiriva and wants to do PFTs which is planned for ~1 month.  He is on 2L O2 at rest and 4L on exertion. His cough is productive of clear sputum. Weight is improved, appetite is good, no dysgeusia.   A 13 point review of systems was performed, with significant findings documented above in subjective history.    OBJECTIVE:  Vitals:    02/26/24 1055   BP: 107/68   Pulse: 73   Resp: 20   Temp: 36.8 °C (98.2 °F)   SpO2: 100%      Body surface area is 1.89 meters squared.     Wt Readings from Last 5 Encounters:   02/26/24 73 kg (160 lb 15 oz)   02/21/24 72.2 kg (159 lb 2.8 oz)   02/12/24 71.2 kg (157 lb)   02/06/24 71.2 kg (157 lb)   02/05/24 71.2 kg (157 lb)     ECOG: 3    Physical Exam  Constitutional:       Appearance: Normal appearance.   HENT:      Head: Normocephalic and atraumatic.   Eyes:      General: No scleral icterus.  Cardiovascular:      Rate and Rhythm: Normal rate and regular rhythm.      Heart sounds: Normal heart sounds.   Pulmonary:      Effort: Pulmonary effort is normal.      Breath sounds: Normal breath sounds.   Abdominal:      General: There is no distension.      Palpations: Abdomen is soft.      Tenderness: There is no  abdominal tenderness. There is no guarding.   Musculoskeletal:      Right lower leg: No edema.      Left lower leg: No edema.   Skin:     General: Skin is warm.      Coloration: Skin is not pale.      Findings: No erythema or rash.   Neurological:      General: No focal deficit present.      Mental Status: He is alert and oriented to person, place, and time. Mental status is at baseline.      Motor: No weakness.      Gait: Gait normal.   Psychiatric:         Mood and Affect: Mood normal.         Behavior: Behavior normal.         Thought Content: Thought content normal.         Judgment: Judgment normal.        Diagnostic Results   Results:  Labs:  Lab Results   Component Value Date    WBC 7.9 02/01/2024    HGB 9.9 (L) 02/01/2024    HCT 31.0 (L) 02/01/2024     (H) 02/01/2024     02/01/2024      Lab Results   Component Value Date    NEUTROABS 5.99 02/01/2024        Lab Results   Component Value Date    GLUCOSE 73 (L) 02/01/2024    CALCIUM 9.4 02/01/2024     02/01/2024    K 4.7 02/01/2024    CO2 31 02/01/2024     02/01/2024    BUN 16 02/01/2024    CREATININE 0.73 02/01/2024     Lab Results   Component Value Date    ALT 9 (L) 02/01/2024    AST 16 02/01/2024    ALKPHOS 69 02/01/2024    BILITOT 0.3 02/01/2024      Lab Results   Component Value Date    ACTH 26.6 02/01/2024    CORTISOL 11.6 02/01/2024    TSH 3.75 02/01/2024    FREET4 0.90 01/11/2024       STUDY:  CT CHEST W IV CONTRAST;  1/29/2024 11:00 am      INDICATION:  Signs/Symptoms:NSCLC; Restaging.      COMPARISON:  01/09/2024, 10/06/2023      ACCESSION NUMBER(S):  [id number]      ORDERING CLINICIAN:  FRANCINE BALL      TECHNIQUE:  Helical data acquisition of the chest was obtained  after intravenous  administration of 75 ml of Optiray 350.  Images were reformatted in  axial, coronal, and sagittal planes.      FINDINGS:  LUNGS AND AIRWAYS:  The trachea and central airways are patent. No endobronchial lesion.      Innumerable pulmonary  nodules are noted in both lungs in a random  distribution. The largest is a spiculated mass in the left lower  which measures 3 cm.      Unchanged appearance of masslike consolidation left upper lobe.      Moderate centrilobular emphysema.      Interval resolution of bibasilar pleural effusions.      MEDIASTINUM AND WILLIAM, LOWER NECK AND AXILLA:  The visualized thyroid gland is within normal limits.      No evidence of thoracic lymphadenopathy by CT criteria.      Esophagus appears within normal limits as seen.      HEART AND VESSELS:  The thoracic aorta is stable in course and caliber.      Main pulmonary artery and its branches are normal in caliber.      Moderate coronary artery calcifications are seen. The study is not  optimized for evaluation of coronary arteries.      The cardiac chambers are not enlarged.      No evidence of pericardial effusion.      UPPER ABDOMEN:  The visualized subdiaphragmatic structures demonstrate no remarkable  findings.      CHEST WALL AND OSSEOUS STRUCTURES:  There are no suspicious osseous lesions. Multilevel degenerative  changes are present      IMPRESSION:  1. Interval development of innumerable pulmonary nodules and masses  seen in both lungs and a random distribution. The largest is a 3 cm  spiculated mass in the left lower lobe. Differential diagnosis  includes infectious/inflammatory versus neoplastic etiologies.  Recommend clinical correlation and follow-up with PET-CT versus  short-term interval CT scan of the chest in 3-6 months.  2. Interval resolution bibasilar pleural effusions.  3. Moderate centrilobular emphysema.    Assessment/Plan     Primary cancer of left upper lobe of lung (CMS/HCC), Clinical: Stage IIB (cT2, cN1, cM0)    Stage IIB squamous cell carcinoma of the left upper lobe, treated with concurrent chemo RT using weekly CarboTaxol, completed 60 Gray in 30 fractions on 01/05/2024.  Subsequently hospitalized with neutropenic fevers on 01/08/2024.  Suspicion  for aspiration during the workup performed at outside hospital as well as SMA partial thrombus.  He has seen pulmonology in [facility] Dr. Sahu and underwent RLL biopsy by IR which was not diagnostic.  Clinically he is marginally better - there is suspicion that this is not progression of disease but rather an infectious process.  We will refer for bronchoscopy of LLL and further evalation.   Hence, we cannot proceed with consolidative durvalumab without establishing the etiology of the changes demonstrated and most recent scan.    RTC 2-3 weeks after bronchoscopy    Eliseo Khanna MD PGY-6  Hematology/Oncology Fellow    I saw and evaluated the patient. I personally obtained the key and critical portions of the history and physical exam or was physically present for key and critical portions performed by the resident/fellow. I reviewed the resident/fellow's documentation and discussed the patient with the resident/fellow. I agree with the resident/fellow's medical decision making as documented in the note.     CT scans personally reviewed - many of the changes have improved and/or evolved - the LLL spiculated nodule is smaller than previously - the biopsy was not successful and I would like for him to be bronchoscoped not only for biopsy of the LLL but also BAL and infectious work-up. He is almost 2 months from completion of chemo-RT and his PS is not fully recovered - we are holding consolidation durvalumab as with his current frailty and ongoing unsolved radiographic abnormalities may not be helpful and lead to complications such as pneumonitis.     Elo Peters MD, MS  Thoracic Medical Oncology   [id number] [id number]  Phone: [phone]

## 2024-02-27 ENCOUNTER — TELEPHONE (OUTPATIENT)
Dept: PALLIATIVE MEDICINE | Facility: CLINIC | Age: 64
End: 2024-02-27
Payer: MEDICAID

## 2024-02-27 ENCOUNTER — LAB (OUTPATIENT)
Dept: LAB | Facility: HOSPITAL | Age: 64
End: 2024-02-27
Payer: MEDICAID

## 2024-02-27 ENCOUNTER — HOSPITAL ENCOUNTER (OUTPATIENT)
Dept: RADIOLOGY | Facility: HOSPITAL | Age: 64
Discharge: HOME | End: 2024-02-27
Payer: MEDICAID

## 2024-02-27 DIAGNOSIS — Z01.818 PRE-OP TESTING: ICD-10-CM

## 2024-02-27 DIAGNOSIS — R91.1 LUNG NODULE: ICD-10-CM

## 2024-02-27 DIAGNOSIS — R00.2 PALPITATIONS: ICD-10-CM

## 2024-02-27 DIAGNOSIS — C34.12 PRIMARY CANCER OF LEFT UPPER LOBE OF LUNG (MULTI): ICD-10-CM

## 2024-02-27 DIAGNOSIS — Z86.79 PERSONAL HISTORY OF OTHER DISEASES OF THE CIRCULATORY SYSTEM: ICD-10-CM

## 2024-02-27 DIAGNOSIS — K55.069 SUPERIOR MESENTERIC ARTERY THROMBOSIS (MULTI): ICD-10-CM

## 2024-02-27 LAB
ANION GAP SERPL CALC-SCNC: 14 MMOL/L (ref 10–20)
BUN SERPL-MCNC: 11 MG/DL (ref 6–23)
CALCIUM SERPL-MCNC: 9.1 MG/DL (ref 8.6–10.3)
CHLORIDE SERPL-SCNC: 103 MMOL/L (ref 98–107)
CO2 SERPL-SCNC: 26 MMOL/L (ref 21–32)
CREAT SERPL-MCNC: 0.75 MG/DL (ref 0.5–1.3)
EGFRCR SERPLBLD CKD-EPI 2021: >90 ML/MIN/1.73M*2
ERYTHROCYTE [DISTWIDTH] IN BLOOD BY AUTOMATED COUNT: 15.2 % (ref 11.5–14.5)
GLUCOSE SERPL-MCNC: 89 MG/DL (ref 74–99)
HCT VFR BLD AUTO: 29.6 % (ref 41–52)
HGB BLD-MCNC: 9.7 G/DL (ref 13.5–17.5)
MCH RBC QN AUTO: 32.1 PG (ref 26–34)
MCHC RBC AUTO-ENTMCNC: 32.8 G/DL (ref 32–36)
MCV RBC AUTO: 98 FL (ref 80–100)
NRBC BLD-RTO: 0 /100 WBCS (ref 0–0)
PLATELET # BLD AUTO: 284 X10*3/UL (ref 150–450)
POTASSIUM SERPL-SCNC: 3.9 MMOL/L (ref 3.5–5.3)
RBC # BLD AUTO: 3.02 X10*6/UL (ref 4.5–5.9)
SODIUM SERPL-SCNC: 139 MMOL/L (ref 136–145)
WBC # BLD AUTO: 7.3 X10*3/UL (ref 4.4–11.3)

## 2024-02-27 PROCEDURE — 70496 CT ANGIOGRAPHY HEAD: CPT

## 2024-02-27 PROCEDURE — 36415 COLL VENOUS BLD VENIPUNCTURE: CPT

## 2024-02-27 PROCEDURE — 80048 BASIC METABOLIC PNL TOTAL CA: CPT

## 2024-02-27 PROCEDURE — 2550000001 HC RX 255 CONTRASTS: Mod: SE | Performed by: PSYCHIATRY & NEUROLOGY

## 2024-02-27 PROCEDURE — 70496 CT ANGIOGRAPHY HEAD: CPT | Performed by: RADIOLOGY

## 2024-02-27 PROCEDURE — 85027 COMPLETE CBC AUTOMATED: CPT

## 2024-02-27 RX ADMIN — IOHEXOL 75 ML: 350 INJECTION, SOLUTION INTRAVENOUS at 10:43

## 2024-02-27 NOTE — TELEPHONE ENCOUNTER
Phone call to patient to reschedule appointment with Dr. Rock on 3/20/24 due to clinic closure. I spoke to patient's wife, Keri, who accepted FUV at [facility] on 3/27/24 at 10am. Keri aware to call if patient needs refills prior to this appointment.

## 2024-02-28 DIAGNOSIS — R91.1 LUNG NODULE: ICD-10-CM

## 2024-02-28 DIAGNOSIS — Z01.818 PRE-OP TESTING: ICD-10-CM

## 2024-02-28 NOTE — TELEPHONE ENCOUNTER
Phone call and  left for Keri. Dr. Rock has to close his clinic on 3/27/24, as well, so appointment will need rescheduled. VM left providing call back number. I will try calling tomorrow if no return call.

## 2024-02-29 NOTE — TELEPHONE ENCOUNTER
I spoke to Keri. She accepted 10:30am FUV on 4/3/23 with Dr. Rock at [facility].

## 2024-03-13 ENCOUNTER — HOSPITAL ENCOUNTER (OUTPATIENT)
Dept: RADIOLOGY | Facility: CLINIC | Age: 64
Discharge: HOME | End: 2024-03-13
Payer: MEDICAID

## 2024-03-13 ENCOUNTER — HOSPITAL ENCOUNTER (OUTPATIENT)
Dept: CARDIOLOGY | Facility: CLINIC | Age: 64
Discharge: HOME | End: 2024-03-13
Payer: MEDICAID

## 2024-03-13 DIAGNOSIS — R00.2 PALPITATIONS: ICD-10-CM

## 2024-03-13 DIAGNOSIS — K55.069 SUPERIOR MESENTERIC ARTERY THROMBOSIS (MULTI): ICD-10-CM

## 2024-03-13 DIAGNOSIS — C34.12 PRIMARY CANCER OF LEFT UPPER LOBE OF LUNG (MULTI): ICD-10-CM

## 2024-03-13 PROCEDURE — 74174 CTA ABD&PLVS W/CONTRAST: CPT | Performed by: RADIOLOGY

## 2024-03-13 PROCEDURE — 2550000001 HC RX 255 CONTRASTS: Performed by: INTERNAL MEDICINE

## 2024-03-13 PROCEDURE — 93246 EXT ECG>7D<15D RECORDING: CPT

## 2024-03-13 PROCEDURE — 93248 EXT ECG>7D<15D REV&INTERPJ: CPT | Performed by: INTERNAL MEDICINE

## 2024-03-13 PROCEDURE — 74174 CTA ABD&PLVS W/CONTRAST: CPT

## 2024-03-13 RX ADMIN — IOHEXOL 90 ML: 350 INJECTION, SOLUTION INTRAVENOUS at 14:57

## 2024-03-18 ASSESSMENT — ACTIVITIES OF DAILY LIVING (ADL)
HOME_HEALTH_OASIS: 01
OASIS_M1830: 02

## 2024-03-20 ENCOUNTER — APPOINTMENT (OUTPATIENT)
Dept: PALLIATIVE MEDICINE | Facility: HOSPITAL | Age: 64
End: 2024-03-20
Payer: MEDICAID

## 2024-03-21 NOTE — PROGRESS NOTES
Patient: Martínez Neri    [id number]  : 1960 -- AGE 63 y.o.    Provider: Promise Stover     Location [facility]   Service Date: 3/25/2024            [facility]  New Visit Note    Virtual or Telephone Consent  A telephone visit (audio only) between the patient (at the originating site) and the provider (at the distant site) was utilized to provide this telehealth service.   Verbal consent was requested and obtained from Martínez Neri on this date, 24 for a telehealth visit.       HISTORY OF PRESENT ILLNESS     The patient's referring provider is: No ref. provider found    HISTORY OF PRESENT ILLNESS   Martínez Neri is a 63 y.o. male who presents to a [facility] for an evaluation with concerns of  progression of lung cancer, known diagnosis NSCLC. I have independently interviewed and examined the patient in the office and reviewed available records.    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 Subsequently hospitalized with neutropenic fevers on 2024.  Suspicion for aspiration during the workup performed at outside hospital as well as SMA partial thrombus.  He has seen pulmonology in [facility] Dr. Sahu and underwent RLL biopsy by IR which was not diagnostic.  Clinically he is marginally better - there is suspicion that this is not progression of disease but rather an infectious process.  Patient was referred for a bronchoscopy of LLL and further evalation.     He wears continuous oxygen - he is on 2L at rest and 4 Liters with activity. At baseline,  has dyspnea on exertion, but none at rest. Currently sits for most of the day, works inside the house, but does not carry loads and  do strenuous exercise.   Has to stop for breath after walking about 100 meters or a few minutes (mMRC 3).  Denies orthopnea and pnd, but has occasional estelle.  Has gained X 20 pounds in the last X 12 months.  Relates chronic cough and occ productive clear to yellow phlegm, C/o wheezing, and denies green/blood streaks. C/o night cough. No hemoptysis. No fever or shivering chills. Has a runny nose, and a tingling sensation in the back of his throat.  Also complains of occasional heartburn and chest pain.      Previous pulmonary history:     previously was told they have COPD .  currently is on supplemental oxygen.      Inhalers/nebulized medications: spiriva 2 puffs daily, albuterol - 2-3 times a day    Hospitalization History: Not been hospitalized over the last year for breathing related problem.    Sleep history:  Denies snoring, apnea, feeling tired during the day or taking naps during the day.       ALLERGIES AND MEDICATIONS     ALLERGIES  No Known Allergies    MEDICATIONS  Current Outpatient Medications   Medication Sig Dispense Refill    albuterol (Proventil HFA) 90 mcg/actuation inhaler Inhale 2 puffs every 4 hours if needed for wheezing or shortness of breath. 6.7 g 6    apixaban (Eliquis) 5 mg tablet Take 1 tablet (5 mg) by mouth 2 times a day. 180 tablet 3    aspirin 81 mg chewable tablet Chew 1 tablet (81 mg) once daily. 30 tablet 2    atorvastatin (Lipitor) 40 mg tablet Take 1 tablet (40 mg) by mouth once daily. 90 tablet 1    cholecalciferol (Vitamin D-3) 125 MCG (5000 UT) capsule       divalproex (Depakote ER) 500 mg 24 hr tablet Take 1 tablet (500 mg) by mouth 2 times a day. Do not crush, chew, or split. 30 tablet 0    inhalat.spacing dev,med. mask spacer       ipratropium-albuteroL (Duo-Neb) 0.5-2.5 mg/3 mL nebulizer solution Take 3 mL by nebulization 4 times a day as needed for wheezing or shortness of breath. 90 mL 1    lacosamide (Vimpat) 200 mg tablet tablet TAKE ONE TABLET BY MOUTH TWO TIMES A DAY  30 tablet 2    LORazepam (Ativan) 0.5 mg tablet Take 1 tablet (0.5 mg) by mouth once daily as needed for anxiety for up to 5 days. 5 tablet 0    naloxone (Narcan) 4 mg/0.1 mL nasal spray Administer 1 spray (4 mg) into affected nostril(s) if needed for opioid reversal. May repeat every 2-3 minutes if needed, alternating nostrils, until medical assistance becomes available. 2 each 0    nebulizer accessories misc Use twice daily 100 each 0    nebulizer and compressor device Use as directed 1 each 0    nicotine (Nicoderm CQ) 21 mg/24 hr patch Place 1 patch over 24 hours on the skin once every 24 hours. 30 patch 0    ondansetron (Zofran) 8 mg tablet Take 1 tablet (8 mg) by mouth every 8 hours if needed for nausea or vomiting. 30 tablet 5    oxyCODONE (Roxicodone) 5 mg immediate release tablet Take 1 tablet (5 mg) by mouth every 6 hours if needed for severe pain (7 - 10). 120 tablet 0    oxygen (O2) gas therapy Inhale 2 each continuously. Patient to wear O2 2L/NC continuously at rest and increase O2 to 4l/NC with any activity.      polyethylene glycol (Glycolax, Miralax) 17 gram packet Take 17 g by mouth once daily. Mix 1 cap (17g) into 8 ounces of fluid. 30 packet 2    prochlorperazine (Compazine) 10 mg tablet Take 1 tablet (10 mg) by mouth every 6 hours if needed for nausea or vomiting. 30 tablet 5    Spiriva Respimat 2.5 mcg/actuation inhaler       sucralfate (Carafate) 100 mg/mL suspension Take 10 mL (1 g) by mouth every 6 hours. (Patient taking differently: Take 10 mL (1 g) by mouth every 6 hours. As needed) 414 mL 0    thiamine (Vitamin B-1) 100 mg tablet Take 1 tablet (100 mg) by mouth once daily. 90 tablet 1     No current facility-administered medications for this visit.         PAST HISTORY     PAST MEDICAL HISTORY  He  has a past medical history of Allergic rhinitis, Asthma, Brain aneurysm, Cancer (CMS/HCC), Carotid artery disease (CMS/HCC), COPD (chronic obstructive pulmonary disease) (CMS/HCC), Epilepsy  (CMS/HCC), High cholesterol, History of alcohol use, Hypertension, and Lung cancer (CMS/HCC).    PAST SURGICAL HISTORY  Past Surgical History:   Procedure Laterality Date    CAROTID ENDARTERECTOMY N/A     CEREBRAL ANEURYSM REPAIR      COLONOSCOPY      HERNIA REPAIR         IMMUNIZATION HISTORY    There is no immunization history on file for this patient.    SOCIAL HISTORY  He  reports that he has been smoking cigarettes. He has a 50.00 pack-year smoking history. He has been exposed to tobacco smoke. He has never used smokeless tobacco. He reports that he does not currently use alcohol. He reports current drug use. Frequency: 4.00 times per week. Drug: Marijuana. He Patient Tobacco (50 pack-year history) use - quit after cancer dx  1-1.5ppd, started smoking when he was 12, approx 50 pack year history    OCCUPATIONAL/ENVIRONMENTAL HISTORY  Previously worked as: drove,    DOES/DOES NOT EC: does not have known exposure to asbestos, silica, beryllium or inhaled metals.  DOES/DOES NOT EC: does not have exposure to birds or exotic animals.    FAMILY HISTORY  Family History   Problem Relation Name Age of Onset    Heart attack Mother      Other (Colorectal cancer) Father      Aneurysm Other      Prostate cancer Other      Alcohol abuse Other       DOES/DOES NOT EC: does have a family history of pulmonary disease.both parents smoked  DOES/DOES NOT EC: does have a family history of cancer.  DOES/DOES NOT EC: does not have a family history of autoimmune disorders.    RESULTS/DATA     Pulmonary Function Test Results     Done 10/17/23 - PFT lab to Fax to office    Chest Radiograph     XR chest 2 views 02/12/2024    INDICATION:  Signs/Symptoms:post lung biopsy.    COMPARISON:  02/12/2024    CARDIOMEDIASTINAL SILHOUETTE:  Cardiomediastinal silhouette is normal in size and configuration.  Bilateral coronary artery calcifications.    LUNGS:  There is coarse bilateral airspace opacities with midlung  atelectasis. There  are several nodular opacities overlying the lower  lobes. There is pulmonary hyperinflation. There is no pneumothorax.    ABDOMEN:  No remarkable upper abdominal findings.    BONES:  No acute osseous changes.    Impression  1.  Coarse bilateral nodular opacities. Correlate with concern for  metastatic disease or atypical infection/mycobacterial disease. No  pneumothorax.    Chest CT Scan     CT angio chest for pulmonary embolism 01/09/2024    Narrative  STUDY:  CT Angiogram of the Chest; 01/09/2024 at 8:40 PM  INDICATION:  Shortness of breath. Evaluate for pulmonary embolism.  COMPARISON:  CT abdomen and pelvis 01/07/24. XR chest 01/05/24. CT chest, NM PET-CT  10/06/23.    FINDINGS:  Pulmonary arteries are adequately opacified without acute or chronic  filling defects.  The thoracic aorta is normal in course and caliber  without dissection or aneurysm.  The heart is normal in size without pericardial effusion.  Atherosclerosis of the thoracic aorta and coronary arteries is  present.  There appears to be thickening of the esophagus and/or adjacent  adenopathy image 33 series 4.  This is new compared with prior exam.  Follow-up endoscopy recommended for further assessment.  Layering debris within the LEFT mainstem bronchus appears to be  present, possibly fluid.  Trace amount of fluid in the RIGHT mainstem  bronchus also appears to be present.  Clinical correlation for  aspiration recommended.  Follow-up endoscopy recommended for further  assessment.  Small-to-moderate size right-sided pleural effusion is  present.  Small LEFT effusion is present.  Emphysematous changes are  present.  RIGHT middle lobe consolidation has developed.  RIGHT upper  lobe consolidation also noted.  This appears related to volume loss.  This is best appreciated on the coronal series.  Presumed  postradiation scarring in the LEFT upper lobe also noted, similar  prior study.  Bibasilar consolidation is also present.  Clinical  correlation for  signs of infection recommended.  Aspiration suspected.  Small presumed cyst measuring about the anterior aspect hepatic lobe  is present measuring 1.2 cm.  Additional smaller lesion about the  anterior aspect of the medial segment LEFT hepatic lobe measures 0.9  cm. Senescent changes of the thoracic spine are present.  There are no  acute fractures.  No suspicious bony lesions.    Impression  1. There is no evidence of pulmonary embolus.  2. Thickening of the esophagus and/or adjacent adenopathy. Follow-up  endoscopy recommended for further assessment.  3. Debris within the mainstem bronchi, LEFT greater than RIGHT.  Bibasilar consolidation noted. Aspiration suspected. Follow-up  bronchoscopy recommended.  4. Bilateral pleural effusions, RIGHT greater than LEFT.  5. Emphysematous changes are present. Presumed postradiation changes  LEFT upper lobe noted.      Echocardiogram     No testing done         REVIEW OF SYSTEMS     REVIEW OF SYSTEMS  Review of Systems   Cardiovascular:  Positive for leg swelling.   Neurological:         Last seizure 2 years ago   All other systems reviewed and are negative.        PHYSICAL EXAM     VITAL SIGNS: There were no vitals taken for this visit.     CURRENT WEIGHT: [unfilled]  BMI: [unfilled]  PREVIOUS WEIGHTS:  Wt Readings from Last 3 Encounters:   02/26/24 75.8 kg (167 lb)   02/26/24 73 kg (160 lb 15 oz)   02/21/24 72.2 kg (159 lb 2.8 oz)       Physical Exam  Constitutional:       Appearance: Normal appearance.   Pulmonary:      Effort: Pulmonary effort is normal.   Neurological:      General: No focal deficit present.      Mental Status: He is alert and oriented to person, place, and time.   Psychiatric:         Mood and Affect: Mood normal.         Behavior: Behavior normal.         Thought Content: Thought content normal.         Judgment: Judgment normal.         ASSESSMENT/PLAN     Mr. Neri is a 63 y.o. male and  has a past medical history of Allergic rhinitis, Asthma, Brain  aneurysm, Cancer (CMS/HCC), Carotid artery disease (CMS/HCC), COPD (chronic obstructive pulmonary disease) (CMS/HCC), Epilepsy (CMS/HCC), High cholesterol, History of alcohol use, Hypertension, and Lung cancer (CMS/HCC). He was referred to the [facility] for pre-bronchoscopy evaluation of Left upper lobe NSCLC now with concerning area of LLL.      Problem List and Orders      Assessment and Plan / Recommendations:  Problem List Items Addressed This Visit    None        Patient's visit was converted to a virtual visit.    1. Left upper lobe NSCLC now with concerning area of LLL.    - Plan for bronchoscopy with biopsy   - Lab work prior to procedure   - anticoagulation - hold aspirin the night before, eliquis hold 5 days prior     I explained the procedure to the patient. We discussed that the bronchoscopy will be performed by a member of the Interventional Pulmonary Team; depending on scheduling and provider availability. We also discussed that the IP providers function as a team and not infrequently may have to fill in for one another if there are emergent issues that need attention at the same time. The patient / family expressed understanding and agreed to proceed. All questions were answered.     Patient's visit was converted to a virtual visit. Spoke with the patient via phone for 14 minutes.    Prep: 10 minutes  Phone 14 minutes  Total: 24 minutes     Thank you for visiting the Pulmonary clinic today!     Promise Stover CNP  My office number is [phone] -     Best way to get a hold of me is to call my office --> Please do not send me follow my health messages  Any test results will be discussed at next visit -- please make sure to make a follow up appt after testing.

## 2024-03-22 ENCOUNTER — HOSPITAL ENCOUNTER (OUTPATIENT)
Dept: CARDIOLOGY | Facility: CLINIC | Age: 64
Discharge: HOME | End: 2024-03-22
Payer: MEDICAID

## 2024-03-22 DIAGNOSIS — K55.069 SUPERIOR MESENTERIC ARTERY THROMBOSIS (MULTI): ICD-10-CM

## 2024-03-22 DIAGNOSIS — Z51.81 ENCOUNTER FOR THERAPEUTIC DRUG LEVEL MONITORING: ICD-10-CM

## 2024-03-22 DIAGNOSIS — R00.2 PALPITATIONS: ICD-10-CM

## 2024-03-22 DIAGNOSIS — C34.12 PRIMARY CANCER OF LEFT UPPER LOBE OF LUNG (MULTI): ICD-10-CM

## 2024-03-22 LAB
AORTIC VALVE MEAN GRADIENT: 4.8 MMHG
AORTIC VALVE PEAK VELOCITY: 1.59 M/S
AV PEAK GRADIENT: 10.1 MMHG
AVA (PEAK VEL): 2.3 CM2
AVA (VTI): 2.38 CM2
EJECTION FRACTION APICAL 4 CHAMBER: 61.9
EJECTION FRACTION: 66 %
LEFT ATRIUM VOLUME AREA LENGTH INDEX BSA: 26.5 ML/M2
LEFT VENTRICULAR OUTFLOW TRACT DIAMETER: 2.08 CM
MITRAL VALVE E/A RATIO: 0.87
MITRAL VALVE E/E' RATIO: 9.12
RIGHT VENTRICLE FREE WALL PEAK S': 0.13 CM/S
TRICUSPID ANNULAR PLANE SYSTOLIC EXCURSION: 2.5 CM

## 2024-03-22 PROCEDURE — 93306 TTE W/DOPPLER COMPLETE: CPT

## 2024-03-22 PROCEDURE — 93306 TTE W/DOPPLER COMPLETE: CPT | Performed by: INTERNAL MEDICINE

## 2024-03-25 ENCOUNTER — TELEMEDICINE (OUTPATIENT)
Dept: PULMONOLOGY | Facility: CLINIC | Age: 64
End: 2024-03-25
Payer: MEDICAID

## 2024-03-25 ENCOUNTER — OFFICE VISIT (OUTPATIENT)
Dept: CARDIOLOGY | Facility: CLINIC | Age: 64
End: 2024-03-25
Payer: MEDICAID

## 2024-03-25 VITALS
DIASTOLIC BLOOD PRESSURE: 72 MMHG | WEIGHT: 168 LBS | HEART RATE: 68 BPM | OXYGEN SATURATION: 97 % | BODY MASS INDEX: 24.45 KG/M2 | SYSTOLIC BLOOD PRESSURE: 112 MMHG

## 2024-03-25 DIAGNOSIS — J44.9 CHRONIC OBSTRUCTIVE PULMONARY DISEASE, UNSPECIFIED COPD TYPE (MULTI): Primary | ICD-10-CM

## 2024-03-25 DIAGNOSIS — C34.12 PRIMARY CANCER OF LEFT UPPER LOBE OF LUNG (MULTI): ICD-10-CM

## 2024-03-25 DIAGNOSIS — Z79.01 ANTICOAGULATION MANAGEMENT ENCOUNTER: ICD-10-CM

## 2024-03-25 DIAGNOSIS — Z51.81 ANTICOAGULATION MANAGEMENT ENCOUNTER: ICD-10-CM

## 2024-03-25 DIAGNOSIS — K55.069 SUPERIOR MESENTERIC ARTERY THROMBOSIS (MULTI): Primary | ICD-10-CM

## 2024-03-25 DIAGNOSIS — K40.30 UNILATERAL INGUINAL HERNIA WITH OBSTRUCTION AND WITHOUT GANGRENE, RECURRENCE NOT SPECIFIED: ICD-10-CM

## 2024-03-25 PROCEDURE — 99212 OFFICE O/P EST SF 10 MIN: CPT | Performed by: NURSE PRACTITIONER

## 2024-03-25 PROCEDURE — 99214 OFFICE O/P EST MOD 30 MIN: CPT | Performed by: INTERNAL MEDICINE

## 2024-03-25 ASSESSMENT — PAIN SCALES - GENERAL: PAINLEVEL: 0-NO PAIN

## 2024-03-25 ASSESSMENT — ENCOUNTER SYMPTOMS
OCCASIONAL FEELINGS OF UNSTEADINESS: 1
DEPRESSION: 0
LOSS OF SENSATION IN FEET: 0

## 2024-03-25 NOTE — PROGRESS NOTES
Chief complaint: SMA thrombosis     Subjective   Pt is accompanied by his wife   Last seen in Feb 2024  Started on eliquis for ?SMA filling defect, tolerating well   ECHO 3/22/24, no vegetation noted. Aortic valve appears abnormal  CTA A/P 3/13/24, with widespread plaque, no thrombosis noted     incidental findings communicated with the pt, wife and his PCP, ?cystitis, ?incarceration or strangulation  Continues to have the holter   Scheduled for the bronch on 4/1    Review of Systems  As noted above   Cough   SOB/CHAPIN, on Oxygen   Bilateral intermittent LE swelling, worse at the end of the day associated with heaviness  No abdominal pain, N/V, constipation or other GI complaints   No urinary symptoms   No bruises, melena or bleeding   No other complaints today    Past Medical History:   Diagnosis Date    Allergic rhinitis     Asthma     Brain aneurysm     Cancer (CMS/HCC)     Carotid artery disease (CMS/HCC)     COPD (chronic obstructive pulmonary disease) (CMS/HCC)     Epilepsy (CMS/HCC)     High cholesterol     History of alcohol use     Hypertension     Lung cancer (CMS/HCC)      Past Surgical History:   Procedure Laterality Date    CAROTID ENDARTERECTOMY N/A     CEREBRAL ANEURYSM REPAIR      COLONOSCOPY      HERNIA REPAIR       Social History     Socioeconomic History    Marital status: Legally      Spouse name: None    Number of children: 2    Years of education: None    Highest education level: None   Occupational History    None   Tobacco Use    Smoking status: Every Day     Packs/day: 1.00     Years: 50.00     Additional pack years: 0.00     Total pack years: 50.00     Types: Cigarettes     Passive exposure: Current    Smokeless tobacco: Never   Vaping Use    Vaping Use: Never used   Substance and Sexual Activity    Alcohol use: Not Currently     Comment: quit 7 years ago    Drug use: Yes     Frequency: 4.0 times per week     Types: Marijuana    Sexual activity: Defer   Other Topics Concern    None    Social History Narrative    None     Social Determinants of Health     Financial Resource Strain: Low Risk  (1/6/2024)    Overall Financial Resource Strain (CARDIA)     Difficulty of Paying Living Expenses: Not very hard   Food Insecurity: No Food Insecurity (1/8/2024)    Hunger Vital Sign     Worried About Running Out of Food in the Last Year: Never true     Ran Out of Food in the Last Year: Never true   Transportation Needs: No Transportation Needs (2/5/2024)    OASIS : Transportation     Lack of Transportation (Medical): No     Lack of Transportation (Non-Medical): No     Patient Unable or Declines to Respond: No   Physical Activity: Inactive (1/8/2024)    Exercise Vital Sign     Days of Exercise per Week: 0 days     Minutes of Exercise per Session: 0 min   Stress: Stress Concern Present (1/8/2024)    [employer] - Occupational Stress Questionnaire     Feeling of Stress : To some extent   Social Connections: Feeling Socially Integrated (2/5/2024)    OASIS : Social Isolation     Frequency of experiencing loneliness or isolation: Never   Intimate Partner Violence: Not At Risk (1/8/2024)    Humiliation, Afraid, Rape, and Kick questionnaire     Fear of Current or Ex-Partner: No     Emotionally Abused: No     Physically Abused: No     Sexually Abused: No   Housing Stability: Low Risk  (1/6/2024)    Housing Stability Vital Sign     Unable to Pay for Housing in the Last Year: No     Number of Places Lived in the Last Year: 1     Unstable Housing in the Last Year: No      Family History   Problem Relation Name Age of Onset    Heart attack Mother      Other (Colorectal cancer) Father      Aneurysm Other      Prostate cancer Other      Alcohol abuse Other        No Known Allergies    Objective   Physical Exam  /72   Pulse 68   Wt 76.2 kg (168 lb)   BMI 24.45 kg/m²      General:  In no acute distress  Neuro: alert and oriented x3  CV:  RRR  Lungs: scattered crackles/coarse  breath sounds, using Oxygen   Abd:  Soft, non-tender   Psych:  Appropriate affect  Upper extremities: No swelling  Lower extremities: david LE edema   Skin:  No open ulcers    Medications   Current Outpatient Medications   Medication Instructions    albuterol (Proventil HFA) 90 mcg/actuation inhaler 2 puffs, inhalation, Every 4 hours PRN    apixaban (ELIQUIS) 5 mg, oral, 2 times daily    aspirin 81 mg, oral, Daily    atorvastatin (LIPITOR) 40 mg, oral, Daily    cholecalciferol (Vitamin D-3) 125 MCG (5000 UT) capsule     divalproex (DEPAKOTE ER) 500 mg, oral, 2 times daily, Do not crush, chew, or split.    inhalat.spacing dev,med. mask spacer No dose, route, or frequency recorded.    ipratropium-albuteroL (Duo-Neb) 0.5-2.5 mg/3 mL nebulizer solution 3 mL, nebulization, 4 times daily PRN    lacosamide (VIMPAT) 200 mg, oral, 2 times daily    LORazepam (ATIVAN) 0.5 mg, oral, Daily PRN    naloxone (NARCAN) 4 mg, nasal, As needed, May repeat every 2-3 minutes if needed, alternating nostrils, until medical assistance becomes available.    nebulizer accessories misc Use twice daily    nebulizer and compressor device Use as directed    nicotine (Nicoderm CQ) 21 mg/24 hr patch 1 patch, transdermal, Every 24 hours    ondansetron (ZOFRAN) 8 mg, oral, Every 8 hours PRN    oxygen (O2) gas therapy 2 each, inhalation, Continuous, Patient to wear O2 2L/NC continuously at rest and increase O2 to 4l/NC with any activity.     polyethylene glycol (GLYCOLAX, MIRALAX) 17 g, oral, Daily, Mix 1 cap (17g) into 8 ounces of fluid.    prochlorperazine (COMPAZINE) 10 mg, oral, Every 6 hours PRN    Spiriva Respimat 2.5 mcg/actuation inhaler     sucralfate (CARAFATE) 1 g, oral, Every 6 hours scheduled    thiamine (VITAMIN B-1) 100 mg, oral, Daily        Lab Review   Recent Labs     02/27/24  0939 02/01/24  1035 01/11/24  0433 01/09/24  0431 01/08/24  0428 01/07/24  1724 01/06/24  0443 01/05/24 2050    139 137 136 133 132* 133 129*   K 3.9 4.7  "3.2* 3.1* 3.3* 3.4 4.0 4.3    101 102 102 101 100 100 96*   CO2 26 31 27 25 24 24 21* 22*   ANIONGAP 14 12 8 9 8 8 12 11   BUN 11 16 3* 3* 5* 8 12 15   CREATININE 0.75 0.73 0.70 0.60 0.70 0.90 0.70 0.70   EGFR >90 >90 >90 >90 >90 >90 >90 >90     Recent Labs     02/01/24  1035 01/11/24  0433 01/07/24  1724 01/06/24  0443 01/05/24  2050   ALBUMIN 3.8 2.3* 2.8* 3.2* 3.9   ALKPHOS 69 43 42 49 62   ALT 9* 63* 40 8 9   AST 16 60* 178* 24 19   BILITOT 0.3 <0.2 0.3 0.3 0.3     Recent Labs     02/27/24  0939 02/01/24  1035 01/12/24  0847 01/11/24  1802 01/11/24  0433 01/09/24  1749 01/08/24  0428 01/08/24  0249   WBC 7.3 7.9 4.4 4.3* 3.6* 5.2 4.9 5.9   HGB 9.7* 9.9* 8.4* 7.6* 7.4* 7.5* 7.4* 8.2*   HCT 29.6* 31.0* 25.1* 21.9* 21.9* 22.7* 21.3* 23.3*    339 289 256 252 220 192 217   MCV 98 101* 98 97 97 100 97 98     Recent Labs     01/10/24  1635 01/08/24  1535   INR 1.2 1.1     PTT - 1/12/2024:  8:47 AM  1.2   12.1 49.7     Recent Labs     10/03/23  0828   CHOL 162   HDL 67.1   TRIG 87     No results found for: \"HGBA1C\"  Lab Results   Component Value Date    TSH 3.75 02/01/2024     Component      Latest Ref Rng 1/8/2024   Beta-2 Glyco 1 IgG      <20.0 U/mL <1.4    Beta-2 Glyco 1 IgA      <20.0 U/mL 1.3    Beta 2 Glyco 1 IgM      <20.0 U/mL 4.7    Anticardiolipin IgA      <20.0 APL U/mL 1.5    Anticardiolipin IgG      <20.0 GPL U/mL <1.6    Anticardiolipin IgM      <20.0 MPL U/mL 3.0      Imaging  ECHO 3/22/24    1. Left ventricular systolic function is normal with a 60-65% estimated ejection fraction.   2. Aortic valve appears abnormal.   3. Aortic valve stenosis is not present.    CTA A/P 3/13/24   Vascular:  1. Calcified and noncalcified atheromatous plaques of the proximal to  distal superior mesenteric artery which produce mild multilevel  luminal stenosis without clear evidence of hemodynamically  significant stenosis or occlusion. The degree of luminal stenosis is  difficult to accurately compare with " prior exam secondary to  differences in technique and motion artifact on current exam.  2. Diffuse calcified and noncalcified atheromatous plaques of the  bilateral common iliac, external iliac and internal iliac artery  which produce at least mild to moderate multifocal luminal stenosis  of the bilateral external and internal iliac arteries without  evidence of complete occlusion.  3. No abdominal aortic aneurysm or dissection.  Nonvascular:  1. Redemonstration of a small left inguinal hernia which now contains  a small amount of fluid and fat stranding. Correlate with concern for  incarceration or strangulation.  2. Circumferential urinary bladder wall thickening. Correlate with  urinalysis and concern for acute cystitis.  3. Similar appearance of numerous spiculated nodules within the  visualized bilateral lower lungs compatible with patient's known  history of cancer.  4. Severe emphysema    CT A/P 1/7/2024   1.  There is partial thrombosis of the proximal superior mesenteric  artery identified 7 mm distal to the origin. However, there is flow  noted within the distal SMA.  2. Small amount of free fluid within the pelvic cul-de-sac.  3. Colonic diverticulosis without diverticulitis.  4. Hepatic cysts measuring up to 2.3 cm in size with 1.7 cm right  renal cyst.  5. Severe pulmonary emphysema with minimal right pleural effusion and  with atelectasis at right lung base.  6. Small bilateral fat containing inguinal hernias with very tiny fat  containing umbilical hernia      Assessment/Plan   Martínez Neri is a 63 y.o. male with history of COPD, seizures, non small cell carcinoma of the lung (dx June 2023. S/p RTX and CTX), IC aneurysm s/p repair and active smoker. Currently on oxygen for presumed infection vs cancer progression. 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 Aortic valve appears abnormal  CTA A/P 3/13/24, with widespread plaque, no thrombosis noted    No known arrhythmia, but reports multiple episodes of tachycardia     Plan   --- Continue Eliquis as long as it is compatible with his cancer treatment  --- They follow closely with neurology for the history of brain aneurysm, they will be reaching out to his neurologist at [facility] today and will let me know if there is any concerns about AC moving forward  --- CBC, CMP   --- Follow up Holter results  --- Continue ASA  --- Smoking cessation encouraged  --- Continue follow up with the PCP for ASO risk factors modifications and the incidental findings on the ECHO/CTA, referral to  was placed  --- Rest as detailed in the patient's instructions     Samantha Abreu MD

## 2024-03-25 NOTE — PATIENT INSTRUCTIONS
--- Close monitoring to bleeding and fall precautions while on anticoagulation   --- Avoid first and second hand smoking   --- Use compression stockings during the day   --- Follow up in 3 months, earlier if needed

## 2024-03-27 ENCOUNTER — APPOINTMENT (OUTPATIENT)
Dept: PALLIATIVE MEDICINE | Facility: HOSPITAL | Age: 64
End: 2024-03-27
Payer: MEDICAID

## 2024-03-27 PROBLEM — Z51.81 ENCOUNTER FOR THERAPEUTIC DRUG LEVEL MONITORING: Status: ACTIVE | Noted: 2024-03-22

## 2024-04-01 ENCOUNTER — HOSPITAL ENCOUNTER (OUTPATIENT)
Dept: RADIOLOGY | Facility: HOSPITAL | Age: 64
Discharge: HOME | End: 2024-04-01
Payer: MEDICAID

## 2024-04-01 DIAGNOSIS — R91.1 LUNG NODULE: ICD-10-CM

## 2024-04-01 PROCEDURE — 71250 CT THORAX DX C-: CPT | Performed by: RADIOLOGY

## 2024-04-01 PROCEDURE — 71250 CT THORAX DX C-: CPT

## 2024-04-03 ENCOUNTER — OFFICE VISIT (OUTPATIENT)
Dept: PALLIATIVE MEDICINE | Facility: HOSPITAL | Age: 64
End: 2024-04-03
Payer: MEDICAID

## 2024-04-03 ENCOUNTER — PATIENT MESSAGE (OUTPATIENT)
Dept: CARDIOLOGY | Facility: CLINIC | Age: 64
End: 2024-04-03
Payer: MEDICAID

## 2024-04-03 VITALS
HEART RATE: 74 BPM | TEMPERATURE: 97.3 F | SYSTOLIC BLOOD PRESSURE: 125 MMHG | DIASTOLIC BLOOD PRESSURE: 63 MMHG | OXYGEN SATURATION: 99 % | WEIGHT: 155.2 LBS | RESPIRATION RATE: 17 BRPM | BODY MASS INDEX: 22.92 KG/M2

## 2024-04-03 DIAGNOSIS — Z51.5 PALLIATIVE CARE ENCOUNTER: Primary | ICD-10-CM

## 2024-04-03 DIAGNOSIS — J43.9 PULMONARY EMPHYSEMA, UNSPECIFIED EMPHYSEMA TYPE (MULTI): ICD-10-CM

## 2024-04-03 DIAGNOSIS — C34.12 PRIMARY CANCER OF LEFT UPPER LOBE OF LUNG (MULTI): ICD-10-CM

## 2024-04-03 DIAGNOSIS — F41.9 ANXIETY: ICD-10-CM

## 2024-04-03 DIAGNOSIS — R39.9 LOWER URINARY TRACT SYMPTOMS (LUTS): ICD-10-CM

## 2024-04-03 PROCEDURE — 99215 OFFICE O/P EST HI 40 MIN: CPT | Performed by: INTERNAL MEDICINE

## 2024-04-03 PROCEDURE — 99215 OFFICE O/P EST HI 40 MIN: CPT | Mod: GC | Performed by: INTERNAL MEDICINE

## 2024-04-03 RX ORDER — TAMSULOSIN HYDROCHLORIDE 0.4 MG/1
0.4 CAPSULE ORAL DAILY
Qty: 30 CAPSULE | Refills: 1 | Status: SHIPPED | OUTPATIENT
Start: 2024-04-03 | End: 2024-05-08 | Stop reason: SDUPTHER

## 2024-04-03 ASSESSMENT — PAIN SCALES - GENERAL
PAINLEVEL: 0 - NO PAIN
PAINLEVEL: 5

## 2024-04-03 NOTE — PROGRESS NOTES
Supportive Oncology Established Patient Note    Patient ID: Martínez Neri is a 63 y.o. male who presents for Follow-up.    HPI  Martínez is here with his wife Keri. HE reports that he has minimal chest pain when coughing and uses his oxycodone 3-4 times per week. It is somewhat helpful for his dyspnea.   The major issue currently is anxiety and depression, He believes that the last few years events have unmasked both of these things in him. He reports low patience levels, depression that makes him not want to do anything, and anxiety that keeps him from interacting with his family. He is living with his sons family and his grandchildren irritate him.   He has not used counseling services or [facility]      He is confused about the cancelled bronchoscopy and his CT findings, which are somewhat reassuring but the change in treatment course and time they waited for his bronchoscopy are causing concern.         Symptoms  [place] Symptom Assessment Scores  Pain Score: 0 - No pain (Chest, epigastrum sporadic. Uses oxycodone 5mg 3-4 times per week,)       Appetite Score: 4 (Good in the morning and lunch. Anxiety is an issue)           Dyspnea Score: 5 (Can go up to 7-8. Other factors are at play. Was able to walk from garage to appt without issue prior to chemo-radiation.)   Anxiety Score: 7           Allergies  Patient has no known allergies.     Last Recorded Vitals  Blood pressure 125/63, pulse 74, temperature 36.3 °C (97.3 °F), temperature source Temporal, resp. rate 17, weight 70.4 kg (155 lb 3.3 oz), SpO2 99 %.     Daily Weight  04/03/24 : 70.4 kg (155 lb 3.3 oz)  04/01/24 : 73.5 kg (162 lb)  03/25/24 : 76.2 kg (168 lb)  02/26/24 : 75.8 kg (167 lb)  02/26/24 : 73 kg (160 lb 15 oz)  02/21/24 : 72.2 kg (159 lb 2.8 oz)         Physical Exam:  Constitutional:       General: Patient is not in acute distress.  HENT:      Head: Normocephalic.      Mouth: Mucous membranes are moist.   Eyes:       Conjunctiva/sclera: Conjunctivae clear, sclerae white. No discharge.     Pupils: Pupils are equal, round, and reactive to light.   Neck:      Vascular: No carotid bruit.   Cardiovascular:      Rate and Rhythm: Normal rate and regular rhythm.      Heart sounds: No murmur heard.  Pulmonary:      Effort: No respiratory distress.      Breath sounds: Clear to auscultation  Abdominal:      General: There is no distension.      Tenderness: There is no abdominal tenderness. There is no guarding.   Musculoskeletal:         General: No deformity.   Skin:     Coloration: Skin is not jaundiced.   Neurological:      General: No focal deficit present.      Mental Status: He is oriented to person, place, and time.   Psychiatric:         Behavior: Behavior normal. Behavior is cooperative.          Relevant Results  Lab Results   Component Value Date    WBC 7.3 02/27/2024    HGB 9.7 (L) 02/27/2024    HCT 29.6 (L) 02/27/2024    MCV 98 02/27/2024     02/27/2024      Lab Results   Component Value Date    GLUCOSE 89 02/27/2024    CALCIUM 9.1 02/27/2024     02/27/2024    K 3.9 02/27/2024    CO2 26 02/27/2024     02/27/2024    BUN 11 02/27/2024    CREATININE 0.75 02/27/2024      Lab Results   Component Value Date    ALT 9 (L) 02/01/2024    AST 16 02/01/2024    ALKPHOS 69 02/01/2024    BILITOT 0.3 02/01/2024        Relevant Imaging   No results found for this or any previous visit from the past 1000 days.     No image results found.       Medications:   Current Outpatient Medications   Medication Instructions    albuterol (Proventil HFA) 90 mcg/actuation inhaler 2 puffs, inhalation, Every 4 hours PRN    apixaban (ELIQUIS) 5 mg, oral, 2 times daily    aspirin 81 mg, oral, Daily    atorvastatin (LIPITOR) 40 mg, oral, Daily    cholecalciferol (Vitamin D-3) 125 MCG (5000 UT) capsule     divalproex (DEPAKOTE ER) 500 mg, oral, 2 times daily, Do not crush, chew, or split.    inhalat.spacing dev,med. mask spacer No dose, route,  or frequency recorded.    ipratropium-albuteroL (Duo-Neb) 0.5-2.5 mg/3 mL nebulizer solution 3 mL, nebulization, 4 times daily PRN    lacosamide (VIMPAT) 200 mg, oral, 2 times daily    LORazepam (ATIVAN) 0.5 mg, oral, Daily PRN    naloxone (NARCAN) 4 mg, nasal, As needed, May repeat every 2-3 minutes if needed, alternating nostrils, until medical assistance becomes available.    nebulizer accessories misc Use twice daily    nebulizer and compressor device Use as directed    nicotine (Nicoderm CQ) 21 mg/24 hr patch 1 patch, transdermal, Every 24 hours    ondansetron (ZOFRAN) 8 mg, oral, Every 8 hours PRN    oxygen (O2) gas therapy 2 each, inhalation, Continuous, Patient to wear O2 2L/NC continuously at rest and increase O2 to 4l/NC with any activity.     polyethylene glycol (GLYCOLAX, MIRALAX) 17 g, oral, Daily, Mix 1 cap (17g) into 8 ounces of fluid.    prochlorperazine (COMPAZINE) 10 mg, oral, Every 6 hours PRN    Spiriva Respimat 2.5 mcg/actuation inhaler     sucralfate (CARAFATE) 1 g, oral, Every 6 hours scheduled    thiamine (VITAMIN B-1) 100 mg, oral, Daily        Assessment and Plan  Impression:  Martínez Neri is a 62 yo male 50 pack year smoking hx with COPD, seizures disorder, who has SCC of THOMAS (dx in [place]; never treated) who presents for eval.     Problems:  NSLC, Left upper lobe  Concern for dz progression per 1/2024 CT with sides of LLL  CRF d/t emphysema on 2-4L oxygen  Anxiety and Depression, illness related  Cancer related pain      Cancer-related pain  - Pleuritic in nature. Sporadic and associated with coughing.   - Takes oxycodone infrequently. May be beneficial for dyspnea in future with disease progression  -Continue with Oxycodone 5mg q4h PRN.     #Anxiety  #Depression  -Likely related to stress of cancer dx and being in close contact with healthcare system since he had his cerebral aneurysm fixed several years ago.   -Referred to psychology and music therapy  -Discussed transcendental  meditation   -Referred to [facility]     #Chronic respiratory failure  #COPD GOLD 3, emphysema  -Can walk to appointments without stopping if he pushes him self but generally, sounds like becomes breathless after 50-100m. He is on 2-4L oxygen at baseline.   -Denies dyspnea at rest and speaks in full sentences during our visits.   -Discussed mindful breathing as reason for why people smoke to reduce stress. He will try cutting a straw into thirds and using it to facilitate smoke-free mindful breathing.     #LUTS, concern for BPH  -Has significant LUTS including frequency, dribbling, incomplete voiding, nocturia  -Starting tamsulosin 0.4mg daily. Discussed side effects in detial including orthostatic hypotension          Problem List Items Addressed This Visit             ICD-10-CM    Primary cancer of left upper lobe of lung (CMS/[facility]) C34.12    Relevant Orders    Referral to Psychology    Music Therapy eval and treat    Emphysema lung (CMS/[facility]) J43.9    Relevant Orders    Referral to Psychology    Music Therapy eval and treat     Other Visit Diagnoses         Codes    Lower urinary tract symptoms (LUTS)    -  Primary R39.9    Relevant Medications    tamsulosin (Flomax) 0.4 mg 24 hr capsule    Anxiety     F41.9    Relevant Orders    Referral to Psychology    Music Therapy eval and treat            Social Considerations  [facility] referral      Code Status: Full Code  Confirmed 4/3/24    Changes this visit and follow-up for next visit:   Tamsulosin,  Psych referral  Music Therapy  Follow-up to last CT result w/ oncology and pulmonology discussion.         Abhijit Mendez MD

## 2024-04-03 NOTE — LETTER
April 3, 2024       Dear [facility] Liaison::    Martínez Neri is being followed at [facility]    Martínez Neri has been referred to the [facility] and has verbally consented to being contacted.  Patient MRN: [id number]  Patient's Preferred Phone Contact:  [phone]    Sincerely,    Ned Rock MD

## 2024-04-03 NOTE — PATIENT INSTRUCTIONS
"Please check out [facility]  We sent out referrals Music Therapy and Psychology (Therapist)  Try using the cut straw for \"smoking\" so that you get the benefits from deep breathing without the smoke.  We have started TAMSULOSIN for your increased urinary frequency. This medication usually takes a few days to start working. One major side effect is lightheadedness/dizziness when standing. Please TAKE YOUR TIME WITH STANDING while you get used to how this medication effects you.   "

## 2024-04-04 NOTE — PATIENT COMMUNICATION
Called and spoke to patient's wife. Gave results per Dr. Abreu. Pt's wife verbalized understanding. No other questions at this time.

## 2024-04-05 PROBLEM — D64.9 ANEMIA: Status: ACTIVE | Noted: 2023-12-26

## 2024-04-05 PROBLEM — Z79.01 LONG TERM (CURRENT) USE OF ANTICOAGULANTS: Status: ACTIVE | Noted: 2024-03-25

## 2024-04-05 PROBLEM — K40.30 INGUINAL HERNIA WITH OBSTRUCTION: Status: ACTIVE | Noted: 2024-03-25

## 2024-04-08 ENCOUNTER — OFFICE VISIT (OUTPATIENT)
Dept: SURGERY | Facility: CLINIC | Age: 64
End: 2024-04-08
Payer: MEDICAID

## 2024-04-08 VITALS
SYSTOLIC BLOOD PRESSURE: 110 MMHG | HEART RATE: 82 BPM | OXYGEN SATURATION: 94 % | WEIGHT: 155 LBS | TEMPERATURE: 98.2 F | HEIGHT: 70 IN | BODY MASS INDEX: 22.19 KG/M2 | DIASTOLIC BLOOD PRESSURE: 60 MMHG

## 2024-04-08 DIAGNOSIS — K40.30 UNILATERAL INGUINAL HERNIA WITH OBSTRUCTION AND WITHOUT GANGRENE, RECURRENCE NOT SPECIFIED: ICD-10-CM

## 2024-04-08 DIAGNOSIS — K40.91 UNILATERAL RECURRENT INGUINAL HERNIA WITHOUT OBSTRUCTION OR GANGRENE: Primary | ICD-10-CM

## 2024-04-08 PROCEDURE — 99215 OFFICE O/P EST HI 40 MIN: CPT | Performed by: STUDENT IN AN ORGANIZED HEALTH CARE EDUCATION/TRAINING PROGRAM

## 2024-04-08 PROCEDURE — 99205 OFFICE O/P NEW HI 60 MIN: CPT | Performed by: STUDENT IN AN ORGANIZED HEALTH CARE EDUCATION/TRAINING PROGRAM

## 2024-04-08 ASSESSMENT — ENCOUNTER SYMPTOMS
VOMITING: 0
ABDOMINAL PAIN: 0
UNEXPECTED WEIGHT CHANGE: 0
WOUND: 0
HEMATURIA: 0
LOSS OF SENSATION IN FEET: 0
VOICE CHANGE: 0
SPEECH DIFFICULTY: 0
SORE THROAT: 0
PALPITATIONS: 0
CHILLS: 0
DEPRESSION: 0
BLOOD IN STOOL: 0
TROUBLE SWALLOWING: 0
HEADACHES: 0
FACIAL ASYMMETRY: 0
DIARRHEA: 0
ADENOPATHY: 0
OCCASIONAL FEELINGS OF UNSTEADINESS: 0
ARTHRALGIAS: 0
COUGH: 1
DYSURIA: 0
BRUISES/BLEEDS EASILY: 0
NAUSEA: 0
SHORTNESS OF BREATH: 1
FEVER: 0
CHEST TIGHTNESS: 0

## 2024-04-08 ASSESSMENT — PATIENT HEALTH QUESTIONNAIRE - PHQ9
1. LITTLE INTEREST OR PLEASURE IN DOING THINGS: SEVERAL DAYS
SUM OF ALL RESPONSES TO PHQ9 QUESTIONS 1 AND 2: 2
10. IF YOU CHECKED OFF ANY PROBLEMS, HOW DIFFICULT HAVE THESE PROBLEMS MADE IT FOR YOU TO DO YOUR WORK, TAKE CARE OF THINGS AT HOME, OR GET ALONG WITH OTHER PEOPLE: SOMEWHAT DIFFICULT
2. FEELING DOWN, DEPRESSED OR HOPELESS: SEVERAL DAYS

## 2024-04-08 ASSESSMENT — PAIN SCALES - GENERAL: PAINLEVEL: 0-NO PAIN

## 2024-04-08 NOTE — PROGRESS NOTES
History Of Present Illness  Martínez Neri is a 63 y.o. male presenting for evaluation of a left inguinal hernia.  He reports he had the hernia repaired in [place] in 2017, and it quickly came back after a year or 2.  It has always been reducible.  When it bulges out it does cause some pressure, and when he pushes too hard on it, there is also some discomfort.  He denies any nausea or vomiting surrounding the hernia.  Never had any color or skin changes.       He has a history of lung cancer, and underwent chemo and radiation last year.  He was admitted for neutropenic fever in January.  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  There is workup undergoing with his pulmonologist and oncologist to figure out if he needs additional treatment based on CT scan findings of lung nodules.  He is also on oxygen, between 2 and 4 L baseline activity.     Past Medical History  Past Medical History:   Diagnosis Date    Allergic rhinitis     Asthma     Brain aneurysm     Cancer (CMS/HCC)     Carotid artery disease (CMS/HCC)     COPD (chronic obstructive pulmonary disease) (CMS/HCC)     Epilepsy (CMS/HCC)     High cholesterol     History of alcohol use     Hypertension     Lung cancer (CMS/HCC)        Surgical History  Past Surgical History:   Procedure Laterality Date    CAROTID ENDARTERECTOMY N/A     CEREBRAL ANEURYSM REPAIR      COLONOSCOPY      HERNIA REPAIR          Social History  He reports that he has been smoking cigarettes. He has a 50.00 pack-year smoking history. He has been exposed to tobacco smoke. He has never used smokeless tobacco. He reports that he does not currently use alcohol. He reports current drug use. Frequency: 4.00 times per week. Drug: Marijuana.    Family History  Family History   Problem Relation Name Age of Onset    Heart attack Mother      Other (Colorectal cancer) Father      Aneurysm Other      Prostate cancer Other      Alcohol abuse Other           Allergies  Patient has no known allergies.    Review of Systems   Constitutional:  Negative for chills, fever and unexpected weight change.   HENT:  Negative for sneezing, sore throat, trouble swallowing and voice change.    Respiratory:  Positive for cough and shortness of breath. Negative for chest tightness.    Cardiovascular:  Negative for chest pain and palpitations.   Gastrointestinal:  Negative for abdominal pain, blood in stool, diarrhea, nausea and vomiting.   Endocrine: Negative for cold intolerance and heat intolerance.   Genitourinary:  Negative for decreased urine volume, dysuria and hematuria.   Musculoskeletal:  Negative for arthralgias and gait problem.   Skin:  Negative for rash and wound.   Neurological:  Negative for facial asymmetry, speech difficulty and headaches.   Hematological:  Negative for adenopathy. Does not bruise/bleed easily.   Psychiatric/Behavioral:  Negative for self-injury and suicidal ideas.         Physical Exam  Vitals and nursing note reviewed.   Constitutional:       Appearance: He is ill-appearing.   HENT:      Head: Normocephalic and atraumatic.      Mouth/Throat:      Mouth: Mucous membranes are dry.      Pharynx: Oropharynx is clear.   Eyes:      Extraocular Movements: Extraocular movements intact.      Pupils: Pupils are equal, round, and reactive to light.   Cardiovascular:      Rate and Rhythm: Normal rate and regular rhythm.      Pulses: Normal pulses.   Pulmonary:      Effort: Pulmonary effort is normal.      Comments:   On 2 L nasal cannula  Abdominal:      General: There is no distension.      Palpations: Abdomen is soft.      Tenderness: There is no abdominal tenderness.      Hernia: A hernia (reducible left inguinal) is present.   Musculoskeletal:      Cervical back: Normal range of motion and neck supple.   Skin:     General: Skin is warm and dry.   Neurological:      General: No focal deficit present.      Mental Status: He is alert and oriented to person,  "place, and time.   Psychiatric:         Mood and Affect: Mood normal.         Behavior: Behavior normal.          Last Recorded Vitals  Blood pressure 110/60, pulse 82, temperature 36.8 °C (98.2 °F), height 1.765 m (5' 9.5\"), weight 70.3 kg (155 lb), SpO2 94 %.    Relevant Results   reviewed CT scan from 3/13/2024, small left inguinal hernia with fluid and stranding     Assessment/Plan   Problem List Items Addressed This Visit    None  Visit Diagnoses         Codes    Unilateral recurrent inguinal hernia without obstruction or gangrene    -  Primary K40.91    Unilateral inguinal hernia with obstruction and without gangrene, recurrence not specified     K40.30           63-year-old male with a left inguinal hernia.  On imaging there was concern for obstruction or strangulation, however on exam it is easily reducible and he has no history of incarceration or GI symptoms to suggest obstruction.  We discussed repair which would require anesthesia and for him to hold his anticoagulation for several days before and after surgery.  Overall given his chronic illness and current respiratory and cardiac status, he is not a great candidate for surgery and has high risk for perioperative morbidity and mortality.  Discussed waiting to see if he is can to have any improvement in his lungs since this is all still being worked up from his pulmonologist.  Also if he can come off of the anticoagulation, that would be best.  He does not have a current oncology plan for his lung cancer given CT scan and liver biopsy findings.  I think his oncologic outcome is more important than fixing the hernia at this point, and I anticipate the surgery will cause a decline in his physiologic reserve.  So if the plan is to restart chemo or radiation or even surgery, that should be done before hernia repair.  I encouraged the patient and his wife to make an appointment with oncology.  we discussed the warning signs of incarceration and strangulation " including pain, inability to reduce the hernia, skin changes, nausea or vomiting and inability to have a bowel movement which should warrant more urgent evaluation.      Darcy Corbin MD

## 2024-04-11 ENCOUNTER — OFFICE VISIT (OUTPATIENT)
Dept: HEMATOLOGY/ONCOLOGY | Facility: HOSPITAL | Age: 64
End: 2024-04-11
Payer: MEDICAID

## 2024-04-11 VITALS
BODY MASS INDEX: 22.78 KG/M2 | HEART RATE: 73 BPM | DIASTOLIC BLOOD PRESSURE: 61 MMHG | WEIGHT: 156.53 LBS | TEMPERATURE: 96.8 F | SYSTOLIC BLOOD PRESSURE: 125 MMHG | OXYGEN SATURATION: 96 % | RESPIRATION RATE: 20 BRPM

## 2024-04-11 DIAGNOSIS — C34.12 MALIGNANT NEOPLASM OF UPPER LOBE OF LEFT LUNG (MULTI): Primary | ICD-10-CM

## 2024-04-11 PROCEDURE — 99215 OFFICE O/P EST HI 40 MIN: CPT | Performed by: INTERNAL MEDICINE

## 2024-04-11 ASSESSMENT — PAIN SCALES - GENERAL: PAINLEVEL: 0-NO PAIN

## 2024-04-11 NOTE — PROGRESS NOTES
Patient ID: Martínez Neri is a 63 y.o. 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    PD-L1 TPS 20%      PRIOR THERAPIES  Concurrent chemo RT using weekly CarboTaxol, completed 60 Gy in 30 fractions on 01/05/2024     CURRENT THERAPY  To be determined        CURRENT ONCOLOGICAL PROBLEMS  Cough, productive  Oxygen dependence  Radiation-induced esophagitis        HISTORY OF PRESENT ILLNESS:  Current smoker (50-pack-year history), with history of COPD and seizures, who was diagnosed with a squamous cell carcinoma of the left upper lobe in [place], never treated.  Outpatient notes from [facility] and hematology clinic were reviewed.     On 06/17/2023 the patient had a CT chest for ongoing respiratory symptoms and was found to have a left suprahilar mass involving the left upper lobe bronchi with postobstructive pneumonia/collapse.  On 06/25/2023 CT chest/abdomen/pelvis demonstrated persistent masslike consolidation in the left upper lobe with no signs of extrathoracic metastatic disease.     On 06/26/2023 the patient underwent a bronchoscopy, and pathology was compatible with a squamous cell carcinoma at least in situ, with a foci highly suspicious for invasive squamous cell carcinoma.     On 07/18/2023 a PET/CT obtained, demonstrating a 6.6 cm left upper lobe mass with an SUV of 8.3 and no evidence of distant metastatic disease.     On 07/18/2023 to brain MRI was negative for intracranial metastasis.     On 07/27/2023 bronchoscopy/EBUS was performed.  Endobronchial biopsies of left upper lobe demonstrated an invasive moderately differentiated squamous cell carcinoma.  Left upper lobe hilar FNA  showed atypical cells with squamous differentiation, suspicious for malignancy.      Subcarinal FNA was indeterminate for malignancy with extremely rare atypical cells with squamous differentiation.  Molecular profile: Lack of BRAF, EGFR, HER2, KRAS, MET, ALK, NTRK 1/2/3, RET or ROS1 alterations.  PD-L1 TPS 20%.  The patient was seen by medical oncology and did not too frail for combined chemo RT (there is a performance status ECOG 3) documented on 08/17/2023.  At that time, he lived with some friends, while his family was primarily located in [place].     His daughter traveled and was able to relocate him to [place], and he presents today to clinic accompanied by his daughter, son, and daughter-in-law.     The patient states he is felt much better since his relocation, has been gaining weight, approximately 10 pounds since he moved here.  He is more active, going up and down the steps at home, but still tired and taking some breaks throughout the day.     He denies any headaches or new neurological symptoms.  His respiratory symptoms are stable. There is no new localized pain.     Reported molecular and ancillary testing performed upon diagnosis in [place]:   BRAF negative  [id number] negative  EGFR classic mutations negative  HER2 negative  MET negative  ALK negative  In tract 1/2/3 negative  RET negative  ROS1 negative  PD-L1 20%     Referred to surgery - PFTs were considered prohibitive for surgical resection-FEV1 30%, DLCO 30%.     10/31/23: Repeat EBUS for systematic staging:  Final Cytological Interpretation      A. LYMPH NODE 7 PULMONARY FINE NEEDLE ASPIRATION, CYTOLOGY AND CELL BLOCK:  --  No malignant cells identified.  --  Lymphoid sample.     B. LYMPH NODE 4 L PULMONARY FINE NEEDLE ASPIRATION, CYTOLOGY AND CELL BLOCK:  --  No malignant cells identified.  --  Lymphoid sample.           C. LYMPH NODE 11 L PULMONARY FINE NEEDLE ASPIRATION, CYTOLOGY AND CELL BLOCK:  --  A rare cluster of malignant cells derived  from squamous cell carcinoma, see note  -- Also, please refer to concurrent biopsy surgical pathology report ([id number]).     Note:  The malignant squamous cell carcinoma cells are represented in the cell block only.  Immunostain for p40 is positive. The cytomorphology and immunoprofile support the above diagnosis.  The material is not sufficient for molecular testing.      Component     FINAL DIAGNOSIS   Left lung, upper lobe, biopsy:  -- Superficial fragment of squamous cell carcinoma, keratinizing; see note.     Note: According to clinician's note, PD-L1 and NGS have been performed at an outside institution. These studies can be repeated, if clinically indicated.      NGS and PD-L1 not repeated in this specimen as it was done in outside institution already and the purpose of this procedure was not for diagnosis but systematic mediastinal re-staging.     01/05/2024: Completes definitive chemo RT using 60 Gray in 30 fractions with concurrent weekly CarboTaxol    01/08/2024: Admitted with neutropenic fevers, dehydration.  CT abdomen/pelvis demonstrated a partial thrombus of superior mesenteric artery.  He was a started on heparin and switched to aspirin at discharge.  CT chest was concerning for aspiration pneumonia, he was treated with antibiotics.  He was discharged on oxygen, 2 L at rest, 4 L on exertion.  He was also discharged on Augmentin 875 mg twice daily for 14 days.  Also sucralfate, 10 mL every 6 hours.  While hospitalized, he had episodes of intermittent agitation and was found to have elevated ammonia levels.  On 01/11/2024, ALT was 63, AST 60.    01/29/2024: CT chest with IV contrast demonstrates the development of multiple pulmonary nodules, including a 3 cm spiculated left lower lobe nodule, concerning for progression of his disease.  A PET scan was ordered STAT for subsequent evaluation, and a referral was placed to IR for CT-guided biopsy of most assessable lung nodule (right lower lobe,  posterior, image #[id number]).    02/12/2024: Pathology from RLL biopsy not diagnostic    04/01/24: The contrast obtained for navigational bronchoscopy demonstrated significant progression of the lung parenchyma abnormalities, and procedure was canceled.  Patient will remain on surveillance.    PAST MEDICAL HISTORY  Brain aneurysm  COPD (chronic obstructive pulmonary disease) (CMS/HCC)  Epilepsy (CMS/HCC)  High cholesterol  History of alcohol use  Hypertension     PAST SURGICAL HISTORY:  CAROTID ENDARTERECTOMY  COLONOSCOPY  HERNIA REPAIR      SOCIAL HISTORY  Tobacco (50 pack-year history) use - quit after cancer dx      CURRENT MEDS REVIEWED:  Scheduled medications  Continuous medications  PRN medications     ALLERGIES  NKDA     FAMILY HISTORY  Father had prostate CA     SUBJECTIVE:  He was taken for a bronchoscopy but the planning CT showed almost complete resolution of lung lesions that were to be sampled. The test was then cancelled.  The pt will be followed -   He has been doing well overall, with stability of his significant respiratory symptoms  Is oxygen dependent  Appetite has been preserved   Stamina is fair  Denies new localized worrisome pains  No new headaches or neurological symptoms    OBJECTIVE:  Vitals:    04/11/24 1131   BP: 125/61   Pulse: 73   Resp: 20   Temp: 36 °C (96.8 °F)   SpO2: 96%        Body surface area is 1.87 meters squared.     Wt Readings from Last 5 Encounters:   04/24/24 70.3 kg (155 lb)   04/11/24 71 kg (156 lb 8.4 oz)   04/08/24 70.3 kg (155 lb)   04/03/24 70.4 kg (155 lb 3.3 oz)   04/01/24 73.5 kg (162 lb)     ECOG: 3    Physical Exam  Constitutional:       Appearance: Normal appearance.   HENT:      Head: Normocephalic and atraumatic.   Eyes:      General: No scleral icterus.  Cardiovascular:      Rate and Rhythm: Normal rate and regular rhythm.      Heart sounds: Normal heart sounds.   Pulmonary:      Effort: Pulmonary effort is normal.      Breath sounds: Normal breath sounds.    Abdominal:      General: There is no distension.      Palpations: Abdomen is soft.      Tenderness: There is no abdominal tenderness. There is no guarding.   Musculoskeletal:      Right lower leg: No edema.      Left lower leg: No edema.   Skin:     General: Skin is warm.      Coloration: Skin is not pale.      Findings: No erythema or rash.   Neurological:      General: No focal deficit present.      Mental Status: He is alert and oriented to person, place, and time. Mental status is at baseline.      Motor: No weakness.      Gait: Gait normal.   Psychiatric:         Mood and Affect: Mood normal.         Behavior: Behavior normal.         Thought Content: Thought content normal.         Judgment: Judgment normal.        Diagnostic Results   Results:  Labs:  Lab Results   Component Value Date    WBC 7.3 02/27/2024    HGB 9.7 (L) 02/27/2024    HCT 29.6 (L) 02/27/2024    MCV 98 02/27/2024     02/27/2024      Lab Results   Component Value Date    NEUTROABS 5.99 02/01/2024        Lab Results   Component Value Date    GLUCOSE 89 02/27/2024    CALCIUM 9.1 02/27/2024     02/27/2024    K 3.9 02/27/2024    CO2 26 02/27/2024     02/27/2024    BUN 11 02/27/2024    CREATININE 0.75 02/27/2024     Lab Results   Component Value Date    ALT 9 (L) 02/01/2024    AST 16 02/01/2024    ALKPHOS 69 02/01/2024    BILITOT 0.3 02/01/2024      Lab Results   Component Value Date    ACTH 26.6 02/01/2024    CORTISOL 11.6 02/01/2024    TSH 3.75 02/01/2024    FREET4 0.90 01/11/2024     STUDY:  CT CHEST WITHOUT FOR [facility] PLANNING;  4/1/2024 7:32 am      INDICATION:  Signs/Symptoms:Navigational Bronchoscopy.      COMPARISON:  CT dated 02/21/2024 and 01/29/2024.  PET-CT dated 02/02/2024      ACCESSION NUMBER(S):  [id number]      ORDERING CLINICIAN:  WHIT COLEMAN      TECHNIQUE:  Helical data acquisition of the chest was obtained  without IV  contrast material.  Images were reformatted in axial, coronal, and  sagittal  planes.      FINDINGS:  LUNGS AND AIRWAYS:  The trachea and central airways are patent. No endobronchial lesion.  There is mucus and secretion in the trachea.      There is moderate upper lung predominant emphysema.      Extensive bilateral nodules and nodular densities predominantly in  the lower lobes, overall improved compared to prior study.      For example 1.4 x 0.8 cm left lower lobe nodular density, image  228/350, measuring 2.0 x 1.5 cm on 02/21/2024 study and measuring 3.0  x 1.8 cm on study from 01/29/2024  0.8 cm right lower lobe nodular density, image 232/350, previously  0.9 cm.  1.0 cm right lower lobe nodular density, image 260/350, previously  1.4 cm. Multiple additional scattered nodules and nodular densities,  some of them annotated on PACs and appear smaller compared to prior  study.      More focal predominantly bandlike opacity in the left upper lobe  causing component of volume loss also slightly improved compared to  prior study      MEDIASTINUM AND WILLIAM, LOWER NECK AND AXILLA:  The visualized thyroid gland is within normal limits.      No evidence of thoracic lymphadenopathy by CT criteria.      Esophagus appears within normal limits as seen.      HEART AND VESSELS:  The thoracic aorta is of normal course and caliber with moderate  vascular calcifications.      Main pulmonary artery and its branches are normal in caliber.      Severe coronary artery calcification. The study is not optimized for  evaluation of coronary arteries.      The cardiac chambers are not enlarged. Calcification of the aortic  valve.      No evidence of pericardial effusion.      UPPER ABDOMEN:  Moderate to severe atherosclerotic calcification of the visualized  portion of the abdominal aorta. Unchanged mild bilateral perinephric  stranding. Couple of small hypodense lesions in the liver,  incompletely characterized but likely representing cysts or  hemangiomas and unchanged compared to prior study.      CHEST WALL AND  OSSEOUS STRUCTURES:  There are no suspicious osseous lesions. Multilevel degenerative  changes are present      IMPRESSION:  1. Continued improvement in the size, number and conspicuity of  multiple nodules and nodular densities in bilateral lungs more in the  lower lobes. Given continued improvement, ongoing and improving  inflammatory/infectious process appears to be more likely. This  includes dominant left lower lobe 1.4 x 0.8 cm nodular density which  appears smaller compared to priors. Recommend continued attention on  short-term follow-up CT.  2. Confluent and predominantly bandlike opacity in the left upper  lobe causing component of volume loss also slightly improved compared  to prior study. Recommend continued attention on follow-up.  3. Background moderate emphysema.  4. Severe coronary artery calcification. Correlation with coronary  artery disease factors.  5. Calcification of the aortic valve and correlate with aortic valve  stenosis.          Assessment/Plan     Primary cancer of left upper lobe of lung (CMS/HCC), Clinical: Stage IIB (cT2, cN1, cM0)    Stage IIB squamous cell carcinoma of the left upper lobe, treated with concurrent chemo RT using weekly CarboTaxol, completed 60 Gray in 30 fractions on 01/05/2024.  Subsequently hospitalized with neutropenic fevers on 01/08/2024.  Suspicion for aspiration during the workup performed at outside hospital as well as SMA partial thrombus.  He has seen pulmonology in [facility] Dr. Sahu and underwent RLL biopsy by IR which was not diagnostic.  He was then scheduled for a navigational bronchoscopy but the lung lesions that were supposed to be biopsied pretty much resolved in the planning scans -   This is c/w with inflammatory changes   Due to multiple complications as described above, he missed the window for consolidative durvalumab.  There is also concern in regards to high risk of toxicities due to his underlying pulmonary challenges.  We are  specifically concerned about increased risk for treatment related pneumonitis.  Pt will be followed closely, with repeat scan in 3 months (July 2024) and a follow-up with me then.  He should, and this was stressed with the patient, continue to follow closely with Dr. Sahu in [facility].        Elo Peters MD, MS  Thoracic Medical Oncology   [address] [place] [address]  Phone: [phone]

## 2024-04-19 DIAGNOSIS — J44.9 CHRONIC OBSTRUCTIVE PULMONARY DISEASE, UNSPECIFIED COPD TYPE (MULTI): Primary | ICD-10-CM

## 2024-04-24 ENCOUNTER — CLINICAL SUPPORT (OUTPATIENT)
Dept: CARDIAC REHAB | Facility: CLINIC | Age: 64
End: 2024-04-24
Payer: MEDICAID

## 2024-04-24 ENCOUNTER — TELEPHONE (OUTPATIENT)
Dept: PALLIATIVE MEDICINE | Facility: HOSPITAL | Age: 64
End: 2024-04-24
Payer: MEDICAID

## 2024-04-24 VITALS
SYSTOLIC BLOOD PRESSURE: 92 MMHG | DIASTOLIC BLOOD PRESSURE: 50 MMHG | BODY MASS INDEX: 22.96 KG/M2 | WEIGHT: 155 LBS | HEIGHT: 69 IN

## 2024-04-24 DIAGNOSIS — J44.9 CHRONIC OBSTRUCTIVE PULMONARY DISEASE, UNSPECIFIED COPD TYPE (MULTI): ICD-10-CM

## 2024-04-24 ASSESSMENT — PATIENT HEALTH QUESTIONNAIRE - PHQ9
8. MOVING OR SPEAKING SO SLOWLY THAT OTHER PEOPLE COULD HAVE NOTICED. OR THE OPPOSITE, BEING SO FIGETY OR RESTLESS THAT YOU HAVE BEEN MOVING AROUND A LOT MORE THAN USUAL: MORE THAN HALF THE DAYS
5. POOR APPETITE OR OVEREATING: MORE THAN HALF THE DAYS
3. TROUBLE FALLING OR STAYING ASLEEP OR SLEEPING TOO MUCH: MORE THAN HALF THE DAYS
1. LITTLE INTEREST OR PLEASURE IN DOING THINGS: SEVERAL DAYS
9. THOUGHTS THAT YOU WOULD BE BETTER OFF DEAD, OR OF HURTING YOURSELF: MORE THAN HALF THE DAYS
4. FEELING TIRED OR HAVING LITTLE ENERGY: MORE THAN HALF THE DAYS
SUM OF ALL RESPONSES TO PHQ QUESTIONS 1-9: 16
SUM OF ALL RESPONSES TO PHQ9 QUESTIONS 1 & 2: 2
6. FEELING BAD ABOUT YOURSELF - OR THAT YOU ARE A FAILURE OR HAVE LET YOURSELF OR YOUR FAMILY DOWN: MORE THAN HALF THE DAYS
SUM OF ALL RESPONSES TO PHQ QUESTIONS 1-9: 16
2. FEELING DOWN, DEPRESSED OR HOPELESS: SEVERAL DAYS
7. TROUBLE CONCENTRATING ON THINGS, SUCH AS READING THE NEWSPAPER OR WATCHING TELEVISION: MORE THAN HALF THE DAYS

## 2024-04-24 ASSESSMENT — DUKE ACTIVITY SCORE INDEX (DASI)
CAN YOU DO MODERATE WORK AROUND THE HOUSE LIKE VACUUMING, SWEEPING FLOORS OR CARRYING GROCERIES: YES
CAN YOU DO HEAVY WORK AROUND THE HOUSE LIKE SCRUBBING FLOORS OR LIFTING AND MOVING HEAVY FURNITURE: NO
CAN YOU DO YARD WORK LIKE RAKING LEAVES, WEEDING OR PUSHING A MOWER: YES
DASI METS SCORE: 6.3
CAN YOU PARTICIPATE IN STRENOUS SPORTS LIKE SWIMMING, SINGLES TENNIS, FOOTBALL, BASKETBALL, OR SKIING: NO
CAN YOU TAKE CARE OF YOURSELF (EAT, DRESS, BATHE, OR USE TOILET): YES
CAN YOU PARTICIPATE IN MODERATE RECREATIONAL ACTIVITIES LIKE GOLF, BOWLING, DANCING, DOUBLES TENNIS OR THROWING A BASEBALL OR FOOTBALL: NO
CAN YOU HAVE SEXUAL RELATIONS: YES
TOTAL_SCORE: 28.7
CAN YOU RUN A SHORT DISTANCE: NO
CAN YOU WALK A BLOCK OR TWO ON LEVEL GROUND: YES
CAN YOU WALK INDOORS, SUCH AS AROUND YOUR HOUSE: YES
CAN YOU CLIMB A FLIGHT OF STAIRS OR WALK UP A HILL: YES
CAN YOU DO LIGHT WORK AROUND THE HOUSE LIKE DUSTING OR WASHING DISHES: YES

## 2024-04-24 NOTE — PROGRESS NOTES
Pulmonary Rehabilitation Initial Treatment Plan    Name: Martínez Neri  Medical Record Number: [id number]  YOB: 1960  Age: 63 y.o.    Today’s Date: 4/24/2024  Primary Care Physician: Jayme Casey MD  Referring Physician: Jose Sahu MD  Program Location: [address]  Primary Diagnosis:   1. Chronic obstructive pulmonary disease, unspecified COPD type (Multi)  Referral to Pulmonary Rehabilitation         Onset/Date of Diagnosis: 12/2022    Initial Assessment, not yet started program.    AACVPR Risk Stratification: moderate    Falls Risk: Medium  Psychosocial Assessment    Pre PHQ-9: 16      Sent PH-Q 9 to MD if score > 20: No; score < 20    Stress Management    Pt reported/currently experiencing stress: Yes; Stress; Severity: moderate, Anxiety; Severity: moderate, and Depression; Severity: moderate  Patient uses stress management skills: No   History of:  since CA dx  Currently seeing a mental health provider: No  Social Support: Yes, Whom:Spouse  and family  Pre CAT score: 17   Post CAT score: Survey to be given at discharge.   Learning Assessment:  Learning assessment/barriers: None  Preferred learning method: Auditory  Barriers: None  Comments:    Stages of Change:Preparation    Psychosocial Plan    Goal Status: Initial Assessment; goals not yet started    Psychosocial Goals: Demonstrating proper techniques for stress management, Maintain or lower PH-Q 9 score by discharge, and Identify strategies for managing depression    Psychosocial Interventions/Education:  TBD     Initial Assessment:    Oxygen Assessment  SpO2 at rest: 96 %  SpO2 with exertion: 92 %    Oxygen Use    Supplemental O2 prescribed: Yes,   Liters at rest: 2 L  Liters with exertion: 4 L    SpO2 at rest: 96 %  SpO2 with exertion: 92 %    Using O2 as prescribed: Yes  DME: [facility]      Demonstrates appropriate knowledge of O2 use: Yes   Demonstrates appropriate knowledge of O2 safety: Yes     Home O2  "System: Concentrator  Portable O2 System: Portable Tank    Hypoxia managed: Yes   Home Pulse Oximeter: Yes     Pre MMRC: 3  Post MMRC:     Oxygen Plan  Goal Status: Initial Assessment; goals not yet started  Oxygen Goals: Decrease MMRC score, Learn and use pursed lip breathing as needed, and Titrate supplemental O2 as needed to keep SpO2 at 88% or greater  Oxygen Education/Interventions:   To be done in Pulmonary Rehab.    Nutrition Assessment:    Hyperlipidemia: No     Lipids:   Lab Results   Component Value Date    CHOL 162 10/03/2023    HDL 67.1 10/03/2023    TRIG 87 10/03/2023       Current Dietary Guidelines:  no special  Barriers to dietary change: no    Diet Habit Survey: Picture Your Plate  Pre: Initial survey given. Pending completion and return from patient.  Post: To be done at discharge.    Diabetes Assessment    No results found for: \"HGBA1C\"    History of Diabetes: No    Weight Management    Height: 175.3 cm (5' 9\")  Weight: 70.3 kg (155 lb)  BMI (Calculated): 22.88  No data recorded    Nutrition Plan    Goal Status: Initial Assessment; goals not yet started    Nutrition Goals: Improve Diet Habit Survey score by 5-10 points by discharge  Nutrition Interventions/Education:   TBD    Initial Assessment:      Exercise Assessment    No  Mode: NA  Frequency: NA  Duration: NA    6 Minute Walk Assessment     6 MWT distance: 583 ft  FiO2 used during test:      Exercise Prescription      Exercise Prescription based on: 6 Minute Walk Test          Frequency:  3 days/week   Mode: NuStep, Recumbent Cycle, and Airdyne   Duration: 24 total aerobic minutes   Intensity: RPE 12-14  Target HR:      MET Level: 1.8  Patient wears supplemental O2: Yes;   O2 Flow Rate: 2 to 4 L/min  SpO2 Range: 88 to 100 %     Modality Workload METs Duration (minutes)   1 Pre-Exercise   2:00   2 NuStep Load 2 @30 gutiérrez  2 8 :00   3 Recumbent Bike Load 1 @ 50 rpm  1.5 8 :00   4 Schwinn Airdyne Load 0.5  2   8 :00   5 Post-Exercise   2:00 "     Resistance Training: No   Home Exercise Prescription given: To be given prior to discharge from program.    Exercise Plan  Goal Status: Initial Assessment; goals not yet started  Exercise Goals: Increase exercise MET level by 5-10% each week, Increase total exercise duration to 30-45 minutes, Initiate strength training 2-3 days a week, and Establish a home exercise program before discharge    Exercise Interventions/Education:   TBD    Initial Assessment:      Other Core Components/Risk Factor Assessment:    Medication adherence  Current Medications:   Medication Documentation Review Audit       Reviewed by Dixie Rodriguez RN (Registered Nurse) on 04/24/24 at 1032      Medication Order Taking? Sig Documenting Provider Last Dose Status   albuterol (Proventil HFA) 90 mcg/actuation inhaler [id number] Yes Inhale 2 puffs every 4 hours if needed for wheezing or shortness of breath. Bhavna Reed APRN-CNP Taking Active   apixaban (Eliquis) 5 mg tablet [id number] Yes Take 1 tablet (5 mg) by mouth 2 times a day. Samantha Abreu MD Taking Active   aspirin 81 mg chewable tablet [id number] Yes Chew 1 tablet (81 mg) once daily. Radha Casey MD Taking Active   atorvastatin (Lipitor) 40 mg tablet [id number] Yes Take 1 tablet (40 mg) by mouth once daily. Jayme Casey MD Taking Active   cholecalciferol (Vitamin D-3) 125 MCG (5000 UT) capsule [id number] Yes  Historical Provider, MD Taking Active   divalproex (Depakote ER) 500 mg 24 hr tablet [id number] Yes Take 1 tablet (500 mg) by mouth 2 times a day. Do not crush, chew, or split. Radha Casey MD Taking Active   inhalat.spacing dev,med. mask spacer [id number] Yes  Jayme Casey MD Taking Active   ipratropium-albuteroL (Duo-Neb) 0.5-2.5 mg/3 mL nebulizer solution [id number] Yes Take 3 mL by nebulization 4 times a day as needed for wheezing or shortness of breath. Jayme Casey MD Taking Active   lacosamide (Vimpat) 200 mg tablet tablet [id number] Yes TAKE ONE TABLET BY  MOUTH TWO TIMES A DAY Brianne Harrell, APRN-CNP Taking Active   LORazepam (Ativan) 0.5 mg tablet [id number]  Take 1 tablet (0.5 mg) by mouth once daily as needed for anxiety for up to 5 days. Felisha Knox MD   [id number]   naloxone (Narcan) 4 mg/0.1 mL nasal spray [id number] Yes Administer 1 spray (4 mg) into affected nostril(s) if needed for opioid reversal. May repeat every 2-3 minutes if needed, alternating nostrils, until medical assistance becomes available. Ned Rock MD Taking Active   nebulizer accessories misc [id number] Yes Use twice daily Jayme Casey MD Taking Active   nebulizer and compressor device [id number]  Use as directed Jayme Casey MD  Active   nicotine (Nicoderm CQ) 21 mg/24 hr patch [id number] Yes Place 1 patch over 24 hours on the skin once every 24 hours. Jayme Casey MD Taking Active   ondansetron (Zofran) 8 mg tablet [id number] Yes Take 1 tablet (8 mg) by mouth every 8 hours if needed for nausea or vomiting. Elo Peters MD Taking Active   oxygen (O2) gas therapy [id number]  Inhale 2 each continuously. Patient to wear O2 2L/NC continuously at rest and increase O2 to 4l/NC with any activity. Jayme Casey MD  Active   polyethylene glycol (Glycolax, Miralax) 17 gram packet [id number] Yes Take 17 g by mouth once daily. Mix 1 cap (17g) into 8 ounces of fluid. Ned Rock MD Taking Active   prochlorperazine (Compazine) 10 mg tablet [id number] Yes Take 1 tablet (10 mg) by mouth every 6 hours if needed for nausea or vomiting. Elo Peters MD Taking Active   Spiriva Respimat 2.5 mcg/actuation inhaler [id number] Yes  Historical Provider, MD Taking Active   sucralfate (Carafate) 100 mg/mL suspension [id number] Yes Take 10 mL (1 g) by mouth every 6 hours. Radha Casey MD Taking Active   tamsulosin (Flomax) 0.4 mg 24 hr capsule [id number] Yes Take 1 capsule (0.4 mg) by mouth once daily for 60 doses. Abhijit Mendez MD Taking Active   thiamine (Vitamin B-1) 100 mg tablet  [id number] Yes Take 1 tablet (100 mg) by mouth once daily. Jamye Casey MD Taking Active                                 Medication compliance: Yes   Uses pill box/organizer:  Has a system in place   Carries medication list: No    Uses Inhalers appropriately:  Pt states that he is taking approp    Blood Pressure Management  History of Hypertension: No   Medication Changes: No   Resting BP:  Visit Vitals  BP 92/50        Heart Failure Management  Hx of Heart Failure: No    Smoking/Tobacco Assessment  Social History     Tobacco Use   Smoking Status Every Day    Current packs/day: 1.00    Average packs/day: 1 pack/day for 50.0 years (50.0 ttl pk-yrs)    Types: Cigarettes    Passive exposure: Current   Smokeless Tobacco Never       Other Core Component Plan    Goal Status: Initial Assessment; goals not yet started  Other Core Component Goals: Verbalize medication usage and drug actions by discharge, Begin tobacco cessation program, and Set tobacco cessation quit date while enrolled  Other Core Component Interventions/Education: Bronchial hygiene, proper use of inhalers and spacer       Initial Assessment:      Individual Patient Goals:    Get off oxygen by discharge    Goal Status: Initial Assessment; goals not yet started    Staff Comments:  Pt scored high on PHQ9, discussed counseling, declined at this time.     Rehab Staff Signature: Dixie Rodriguez RN

## 2024-04-24 NOTE — TELEPHONE ENCOUNTER
Phone call to patient's spouse, Keri, to reschedule FUV with Dr. Rock on 5/1 due to clinic closure. Keri accepted FUV on 5/8 at 9am. No further needs.

## 2024-04-30 ENCOUNTER — CLINICAL SUPPORT (OUTPATIENT)
Dept: CARDIAC REHAB | Facility: CLINIC | Age: 64
End: 2024-04-30
Payer: MEDICAID

## 2024-04-30 DIAGNOSIS — J44.9 CHRONIC OBSTRUCTIVE PULMONARY DISEASE, UNSPECIFIED COPD TYPE (MULTI): ICD-10-CM

## 2024-04-30 PROCEDURE — 94626 PHY/QHP OP PULM RHB W/MNTR: CPT | Performed by: INTERNAL MEDICINE

## 2024-05-01 ENCOUNTER — APPOINTMENT (OUTPATIENT)
Dept: PALLIATIVE MEDICINE | Facility: HOSPITAL | Age: 64
End: 2024-05-01
Payer: MEDICAID

## 2024-05-03 ENCOUNTER — CLINICAL SUPPORT (OUTPATIENT)
Dept: CARDIAC REHAB | Facility: CLINIC | Age: 64
End: 2024-05-03
Payer: MEDICAID

## 2024-05-03 DIAGNOSIS — J44.9 CHRONIC OBSTRUCTIVE PULMONARY DISEASE, UNSPECIFIED COPD TYPE (MULTI): ICD-10-CM

## 2024-05-03 PROCEDURE — 94626 PHY/QHP OP PULM RHB W/MNTR: CPT | Performed by: INTERNAL MEDICINE

## 2024-05-06 ENCOUNTER — TELEPHONE (OUTPATIENT)
Dept: RADIATION ONCOLOGY | Facility: HOSPITAL | Age: 64
End: 2024-05-06
Payer: MEDICAID

## 2024-05-06 ENCOUNTER — APPOINTMENT (OUTPATIENT)
Dept: CARDIAC REHAB | Facility: CLINIC | Age: 64
End: 2024-05-06
Payer: MEDICARE

## 2024-05-06 NOTE — TELEPHONE ENCOUNTER
Called pt to remind of appointment on 5/7/2024 at 3:00 Pt answered and will be present.    Pt is aware that the appointment is a phone/virtual visit, pt. understands that he/she doesn't have to show up in office.

## 2024-05-07 ENCOUNTER — HOSPITAL ENCOUNTER (OUTPATIENT)
Dept: RADIATION ONCOLOGY | Facility: HOSPITAL | Age: 64
Setting detail: RADIATION/ONCOLOGY SERIES
Discharge: HOME | End: 2024-05-07
Payer: MEDICAID

## 2024-05-07 ENCOUNTER — CLINICAL SUPPORT (OUTPATIENT)
Dept: CARDIAC REHAB | Facility: CLINIC | Age: 64
End: 2024-05-07
Payer: MEDICAID

## 2024-05-07 DIAGNOSIS — J44.9 CHRONIC OBSTRUCTIVE PULMONARY DISEASE, UNSPECIFIED COPD TYPE (MULTI): ICD-10-CM

## 2024-05-07 DIAGNOSIS — C34.12 MALIGNANT NEOPLASM OF UPPER LOBE OF LEFT LUNG (MULTI): Primary | ICD-10-CM

## 2024-05-07 PROCEDURE — 94626 PHY/QHP OP PULM RHB W/MNTR: CPT | Performed by: INTERNAL MEDICINE

## 2024-05-07 PROCEDURE — 99213 OFFICE O/P EST LOW 20 MIN: CPT | Performed by: NURSE PRACTITIONER

## 2024-05-07 ASSESSMENT — ENCOUNTER SYMPTOMS
NEUROLOGICAL NEGATIVE: 1
CHEST TIGHTNESS: 0
DIAPHORESIS: 0
SHORTNESS OF BREATH: 1
MUSCULOSKELETAL NEGATIVE: 1
GASTROINTESTINAL NEGATIVE: 1
HEMATOLOGIC/LYMPHATIC NEGATIVE: 1
COUGH: 1
APNEA: 0
STRIDOR: 0
FATIGUE: 0
CHOKING: 0
ACTIVITY CHANGE: 0
PSYCHIATRIC NEGATIVE: 1
FEVER: 0
CHILLS: 0
UNEXPECTED WEIGHT CHANGE: 0
APPETITE CHANGE: 0
WHEEZING: 0
CARDIOVASCULAR NEGATIVE: 1

## 2024-05-07 NOTE — PROGRESS NOTES
Patient ID: [id number]     DIAGNOSIS AND STAGING  Stage IIB (cT2 pN1 cM0) 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    PD-L1 TPS 20%      PRIOR THERAPIES  Concurrent chemo RT using weekly CarboTaxol, completed 60 Gy in 30 fractions on 01/05/2024    History of presenting illness    Telehealth visit today with Martínez Neri and his wife. Patient is a 63 y.o. male who presents today for follow up 4 months s/p chemoRT for SCC of the left upper lobe. Patient completed 30/33 fractions due to hospitalization for deconditioning. He was treated for for possible aspiration pneumonia. 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 He was treated with Heparin and switched to aspirin at discharge. Energy is fair. Appetite good. Weight stable. Patient on 2-4L of oxygen since hospitalization.  He recently started pulmonary rehab and feels like he is getting stronger. Definitely has improved from 2 months ago. Using inhalers and nebulizer as prescribed. Notices a cough when active for periods of times. Rests briefly and it resolves.  Denies fevers, chest pain or back pain.  Patient was planned to start consolidative immunotherapy. Due to delay related to complications/hospitalizations he missed the window and there is concerned for increased toxicities. He is currently being followed with 3 month CT scans. Next one is scheduled for July with follow up after.   Review of systems:  Review of Systems   Constitutional:  Negative for activity change, appetite change, chills, diaphoresis, fatigue, fever and unexpected weight change.   HENT: Negative.     Respiratory:  Positive for cough and shortness of breath. Negative for apnea, choking, chest tightness, wheezing and stridor.    Cardiovascular: Negative.     Gastrointestinal: Negative.    Musculoskeletal: Negative.    Skin: Negative.    Neurological: Negative.    Hematological: Negative.    Psychiatric/Behavioral: Negative.         Past Medical history  He  has a past surgical history that includes Carotid endarterectomy (N/A); Colonoscopy; Hernia repair; and Cerebral aneurysm repair.        Radiology results:    CT chest w IV contrast 2024    Narrative  STUDY:  CT Chest with IV Contrast; 24, 2:31 PM.  INDICATION:  Follow new lung nodules and masses.  COMPARISON:  CT lun24; CT chest: 24, 10/06/23; CTA chest: 24;  CT AP: 24; PET lun23.  ACCESSION NUMBER(S):  [id number]  ORDERING CLINICIAN:  SENIA FUNES  TECHNIQUE:  CT of the chest was performed with intravenous contrast.  Omnipaque 350 75 mL was administered intravenously.  Automated mA/kV exposure control was utilized and patient examination  was performed in strict accordance with principles of ALARA.  FINDINGS:  MEDIASTINUM:  The heart is normal in size without pericardial effusion.  Moderate  coronary artery calcifications.  Central vascular structures opacify  normally.  LUNGS/PLEURA:  There is no pleural effusion, pleural thickening, or pneumothorax.  The airways are patent.  Again noted are multiple bilateral pulmonary nodules and masses with  some nodules appearing to be slightly smaller compared to prior exam.  For instance a spiculated mass in the left lower lobe on image 45  series 5 measures 1.9 cm x 1.9 cm previously measuring 2.5 cm x 2.4 cm  a subpleural nodule posteriorly in the right lower lobe on image 49  measures 1.3 cm previously measuring 1.6 cm.  Additional ill-defined  areas of consolidation in the right middle lobe and right lower lobe  slightly improved.  Worsening atelectasis or consolidation in the left  upper lobe compared to prior study with some associated mucous  plugging or possible endobronchial lesion.  Additional scattered  areas  of suspected bronchiolitis laterally involving all lobes.  LYMPH NODES:  Thoracic lymph nodes are not enlarged.  UPPER ABDOMEN:  There is fatty infiltration of the liver.  Small hepatic cysts  measuring up to 1.6 cm.  BONES:  There are no acute fractures.  No suspicious bony lesions.    Impression  1. No pulmonary embolism.  2. Again noted are multiple bilateral pulmonary nodules and masses  with some nodules appearing to be slightly smaller compared to prior  exam. Additional ill-defined areas of consolidation in the right  middle lobe and right lower lobe slightly improved. Additional  scattered areas of suspected bronchiolitis laterally involving all  lobes.  Differential diagnosis includes an inflammatory/infectious  process versus possible plastic etiology.  Consider short-term  follow-up PET/CT after appropriate medical therapy for an infectious  etiology.  3. Worsening atelectasis or consolidation in the left upper lobe  compared to prior study with some associated mucous plugging or  possible endobronchial lesion.  Attention to this area on follow-up is  recommended.  4. Moderate coronary artery calcifications.  5. Hepatic steatosis.  Signed by Mirza Wallace MD   Plan:  Assessment/Plan     63 year old male 4 months s/p chemo RT to the THOMAS.  Patient is in usual state of health. No acute complaints related to treatment. CT scan from 4/1/24 is stable. Currently under observation with Dr. Peters with 3 months CT scans. Next scan is in July.  Continue oxygen, inhalers and nebulizer as prescribed. Continue follow up with pulmonologist as scheduled.  Continue pulmonary rehab. Patient to return to clinic in 6 months for virtual visit per request. Patient instructed to call with any questions or concerns.     I performed this visit using real- time telehealth tools , including an audio/video or telephone connection between Mr and  Mg , who is in their home and Bhavna Reed CNP at [facility]  [facility].

## 2024-05-08 ENCOUNTER — OFFICE VISIT (OUTPATIENT)
Dept: PALLIATIVE MEDICINE | Facility: HOSPITAL | Age: 64
End: 2024-05-08
Payer: MEDICAID

## 2024-05-08 VITALS
DIASTOLIC BLOOD PRESSURE: 70 MMHG | OXYGEN SATURATION: 96 % | RESPIRATION RATE: 20 BRPM | SYSTOLIC BLOOD PRESSURE: 107 MMHG | HEART RATE: 77 BPM | TEMPERATURE: 97.7 F | BODY MASS INDEX: 22.11 KG/M2 | WEIGHT: 149.69 LBS

## 2024-05-08 DIAGNOSIS — G89.3 CANCER RELATED PAIN: ICD-10-CM

## 2024-05-08 DIAGNOSIS — T40.2X5A CONSTIPATION DUE TO OPIOID THERAPY: ICD-10-CM

## 2024-05-08 DIAGNOSIS — C34.12 MALIGNANT NEOPLASM OF UPPER LOBE, LEFT BRONCHUS OR LUNG (MULTI): ICD-10-CM

## 2024-05-08 DIAGNOSIS — K59.03 CONSTIPATION DUE TO OPIOID THERAPY: ICD-10-CM

## 2024-05-08 DIAGNOSIS — F41.9 ANXIETY: ICD-10-CM

## 2024-05-08 DIAGNOSIS — Z51.5 PALLIATIVE CARE ENCOUNTER: ICD-10-CM

## 2024-05-08 DIAGNOSIS — R39.9 LOWER URINARY TRACT SYMPTOMS (LUTS): Primary | ICD-10-CM

## 2024-05-08 PROCEDURE — 99214 OFFICE O/P EST MOD 30 MIN: CPT | Performed by: INTERNAL MEDICINE

## 2024-05-08 PROCEDURE — 99214 OFFICE O/P EST MOD 30 MIN: CPT | Mod: GC | Performed by: INTERNAL MEDICINE

## 2024-05-08 RX ORDER — TAMSULOSIN HYDROCHLORIDE 0.4 MG/1
0.4 CAPSULE ORAL DAILY
Qty: 90 CAPSULE | Refills: 3 | Status: SHIPPED | OUTPATIENT
Start: 2024-05-08 | End: 2025-05-03

## 2024-05-08 ASSESSMENT — PAIN SCALES - GENERAL: PAINLEVEL: 0-NO PAIN

## 2024-05-08 NOTE — PROGRESS NOTES
"Supportive Oncology Established Patient Note    Patient ID: Martínez Neri is a 63 y.o. male who presents for Follow-up.    HPI  Martínez is here with wife Keri. They report him making good progress with his cardiopulmonary rehab. We discussed his CT findings and previous PET scan. His Ct has some liver findings but PET from 2/2024 was did not suggestive abdominal disease.  He reports decreased appetite and that \"his stomach has not stretched out enough\" since he was ill. His early satiety has been in tandem with his hernia which now comes out with minimal exertion or abdominal pressure. A hernia belt form [employer] has been helpful for when he exercises at cardiopulmoanry rehab. We discussed how he can continue his rehab at home with his sons weight training equipment and that he will need to ask for instructions on how to do so safely from his current PT providers.   He does not have much support at home from family but is grateful that they allowed him to come live with him. His anxiety and depression are somewhat improved as he has learned to cope better with those stresses at home. Psych referral and music therapy never panned out as they did not receive a call.   LUTS have improve since starting tamsulosin last visit. No orthostatic or symptoms of hypotension. Occ constipation but is only taking oxycodone every few days.   He has worsening tremor in RT hand after decreased in AED. We reviewed his lacosamide and valproic acid levels. The former was high and the latter was normal range yet the lacosamide dose was maintained and the valproic acid was reduced. He thinks this may be causing his worsened RT hand tremor.     Still smoking from time to time; he is past contemplative phase but just slips up. Wife and family is trying to help. [place] oxygen supplier is not sending oxygen regularly.         Symptoms  [place] Symptom Assessment Scores                                      Allergies  Patient has no known " allergies.     Last Recorded Vitals  Blood pressure 107/70, pulse 77, temperature 36.5 °C (97.7 °F), temperature source Core, resp. rate 20, weight 67.9 kg (149 lb 11.1 oz), SpO2 96%.     Daily Weight  05/08/24 : 67.9 kg (149 lb 11.1 oz)  04/24/24 : 70.3 kg (155 lb)  04/11/24 : 71 kg (156 lb 8.4 oz)  04/08/24 : 70.3 kg (155 lb)  04/03/24 : 70.4 kg (155 lb 3.3 oz)  04/01/24 : 73.5 kg (162 lb)         Physical Exam:  Constitutional:       General: Patient is not in acute distress.  HENT:      Head: Normocephalic.      Mouth: Mucous membranes are moist.   Eyes:      Conjunctiva/sclera: Conjunctivae clear, sclerae white. No discharge.     Pupils: Pupils are equal, round, and reactive to light.   Neck:      Vascular: No carotid bruit.   Cardiovascular:      Rate and Rhythm: Normal rate and regular rhythm.      Heart sounds: No murmur heard.  Pulmonary:      Effort: No respiratory distress.      Breath sounds: Scattered rhonchi that clear with cough.   Abdominal:      General: There is no distension.      Tenderness: There is no abdominal tenderness. There is no guarding.   Musculoskeletal:         General: No deformity.   Skin:     Coloration: Skin is not jaundiced.   Neurological:      General: No focal deficit present.      Mental Status: He is oriented to person, place, and time.   Psychiatric:         Behavior: Behavior normal. Behavior is cooperative.          Relevant Results  Lab Results   Component Value Date    WBC 7.3 02/27/2024    HGB 9.7 (L) 02/27/2024    HCT 29.6 (L) 02/27/2024    MCV 98 02/27/2024     02/27/2024      Lab Results   Component Value Date    GLUCOSE 89 02/27/2024    CALCIUM 9.1 02/27/2024     02/27/2024    K 3.9 02/27/2024    CO2 26 02/27/2024     02/27/2024    BUN 11 02/27/2024    CREATININE 0.75 02/27/2024      Lab Results   Component Value Date    ALT 9 (L) 02/01/2024    AST 16 02/01/2024    ALKPHOS 69 02/01/2024    BILITOT 0.3 02/01/2024        Relevant Imaging   No  results found for this or any previous visit from the past 1000 days.     No image results found.       Medications:   Current Outpatient Medications   Medication Instructions   • albuterol (Proventil HFA) 90 mcg/actuation inhaler 2 puffs, inhalation, Every 4 hours PRN   • apixaban (ELIQUIS) 5 mg, oral, 2 times daily   • aspirin 81 mg, oral, Daily   • atorvastatin (LIPITOR) 40 mg, oral, Daily   • cholecalciferol (Vitamin D-3) 125 MCG (5000 UT) capsule    • divalproex (DEPAKOTE ER) 500 mg, oral, 2 times daily, Do not crush, chew, or split.   • inhalat.spacing dev,med. mask spacer No dose, route, or frequency recorded.   • ipratropium-albuteroL (Duo-Neb) 0.5-2.5 mg/3 mL nebulizer solution 3 mL, nebulization, 4 times daily PRN   • lacosamide (VIMPAT) 200 mg, oral, 2 times daily   • LORazepam (ATIVAN) 0.5 mg, oral, Daily PRN   • naloxone (NARCAN) 4 mg, nasal, As needed, May repeat every 2-3 minutes if needed, alternating nostrils, until medical assistance becomes available.   • nebulizer accessories misc Use twice daily   • nebulizer and compressor device Use as directed   • nicotine (Nicoderm CQ) 21 mg/24 hr patch 1 patch, transdermal, Every 24 hours   • ondansetron (ZOFRAN) 8 mg, oral, Every 8 hours PRN   • oxygen (O2) gas therapy 2 each, inhalation, Continuous, Patient to wear O2 2L/NC continuously at rest and increase O2 to 4l/NC with any activity.    • polyethylene glycol (GLYCOLAX, MIRALAX) 17 g, oral, Daily, Mix 1 cap (17g) into 8 ounces of fluid.   • prochlorperazine (COMPAZINE) 10 mg, oral, Every 6 hours PRN   • Spiriva Respimat 2.5 mcg/actuation inhaler    • sucralfate (CARAFATE) 1 g, oral, Every 6 hours scheduled   • tamsulosin (FLOMAX) 0.4 mg, oral, Daily   • thiamine (VITAMIN B-1) 100 mg, oral, Daily        Assessment and Plan  Impression:  Martínez Neri is a 62 yo male 50 pack year smoking hx with COPD, seizures disorder, who has SCC of THOMAS (dx in [place]; never treated) who is here for follow-up  viist    Problems:  NSLC, Left upper lobe  Concern for dz progression per 1/2024 CT with sides of LLL  CRF d/t emphysema on 2-4L oxygen  Anxiety and depression, illness-related  Cancer-related pain      Cancer-related pain, improved.   - Pleuritic in nature. Sporadic and associated with coughing. Pain is now mostly aching type in muscle after working out.   - Takes oxycodone infrequently. May be beneficial for dyspnea in future with disease progression  - Continue with Oxycodone 5mg q4h PRN.   - PDMP: No red flags   - UDS: consider next visit. Very infrequent use, may be negative.   - CSA: Completed    #Inguinal Hernia:  -may be causing increased transit time through bowels and causing early satiety and decreased appetite.  - Seen by general surgery, they want confirmation of oncology plan and time off anticoagulation.  - 7/8/CT w/ contrast of chest will hopefully show resolution of pulmonary lesions and no need for treatment. Will need clarification of whether eliquis can be held perioperatively. He had an incidental finding of partial SMA thrombus during hospitalization for febrile neutropenia.     #Anxiety  #Depression  -Likely related to stress of cancer dx and being in close contact with healthcare system since he had his cerebral aneurysm fixed several years ago.   -Referred to psychology and music therapy  -Discussed transcendental meditation   -Referred to [facility]     #Chronic respiratory failure  #COPD GOLD 3, emphysema  - Can walk to appointments without stopping if he pushes him self but generally, sounds like becomes breathless after 50-100m. He is on 2-4L oxygen at baseline.   - Denies dyspnea at rest and speaks in full sentences during our visits.   - Discussed mindful breathing as reason for why people smoke to reduce stress. He will try cutting a straw into thirds and using it to facilitate smoke-free mindful breathing.   -Will have SW reach out to [facility] because they are not sending supplies  on monthly basis wihtout multiple phone calls and reminders from patient's family.     #LUTS, concern for BPH, resolved  -Has=d significant LUTS including frequency, dribbling, incomplete voiding, nocturia  -C/w  tamsulosin 0.4mg daily. Discussed side effects in detail including orthostatic hypotension      #Tremor of hands  - Worse in dominant right hand. He and his wife agree this started after his AED levels were changed during admission earlier this year.   - Previously, Lacosamide was 12.8 (NR: 1-10) and valproic acid level was WNL. However, his lacosamide dose was kept as is and his valproic acid level was reduced.   - Told him to follow-up with neuro. They also have questions about medical marijuana in that context.         Problem List Items Addressed This Visit    None        Social Considerations  [facility] referral      Code Status: Full Code  Confirmed 4/3/24    Changes this visit and follow-up for next visit:   Tamsulosin refilled for 90 days   Follow-up to last CT result w/ oncology and pulmonology discussion.   Oxygen supplier issues, may get SW on board. [facility]  Cardio equipment           Abhijit Mendez MD      I saw and evaluated the patient. I personally obtained the key and critical portions of the history and physical exam or was physically present for key and critical portions performed by the resident/fellow. I reviewed the resident/fellow's documentation and discussed the patient with the resident/fellow. I agree with the resident/fellow's medical decision making as documented in the note.    Ned Rock MD

## 2024-05-08 NOTE — PATIENT INSTRUCTIONS
[employer] for cardio equipment, also try [employer], [employer].  Talk to the Respiratory therapists about training you how to use weights.  July CT scan for confirmation of nodule resolution. We need to have answers on anticoagulation (Eliquis) and oncology plan for any potential treatments  INCREMENTALLY increase portion sizes  We will touch base with oncology about anticogulation recommendations in the event that surgery is indicated.   Please follow-up with neurologist and discuss your results and symptoms. From what I see Valproic acid levels were normal and lacosamide levels were high.  Also discuss marijuana with them when you're there.   Always enjoy seeing you both.

## 2024-05-09 ENCOUNTER — SOCIAL WORK (OUTPATIENT)
Dept: CASE MANAGEMENT | Facility: HOSPITAL | Age: 64
End: 2024-05-09
Payer: MEDICAID

## 2024-05-09 NOTE — PROGRESS NOTES
"This SW was asked to contact pt's family/oxygen supply company due to pt running out before delivery, reportedly several times. SW called pt's wife this date and learned that wife had \"panicked\" and made an error in understanding the timing and the supply pt has. She appreciated the call and to know team can help support/advocate if needed.  SW to follow as needed.  "

## 2024-05-10 ENCOUNTER — APPOINTMENT (OUTPATIENT)
Dept: CARDIAC REHAB | Facility: CLINIC | Age: 64
End: 2024-05-10
Payer: MEDICARE

## 2024-05-13 ENCOUNTER — CLINICAL SUPPORT (OUTPATIENT)
Dept: CARDIAC REHAB | Facility: CLINIC | Age: 64
End: 2024-05-13
Payer: MEDICAID

## 2024-05-13 DIAGNOSIS — J44.9 CHRONIC OBSTRUCTIVE PULMONARY DISEASE, UNSPECIFIED COPD TYPE (MULTI): ICD-10-CM

## 2024-05-13 PROCEDURE — 94626 PHY/QHP OP PULM RHB W/MNTR: CPT | Performed by: INTERNAL MEDICINE

## 2024-05-14 ENCOUNTER — CLINICAL SUPPORT (OUTPATIENT)
Dept: CARDIAC REHAB | Facility: CLINIC | Age: 64
End: 2024-05-14
Payer: MEDICAID

## 2024-05-14 DIAGNOSIS — J44.9 CHRONIC OBSTRUCTIVE PULMONARY DISEASE, UNSPECIFIED COPD TYPE (MULTI): ICD-10-CM

## 2024-05-14 PROCEDURE — 94626 PHY/QHP OP PULM RHB W/MNTR: CPT | Performed by: INTERNAL MEDICINE

## 2024-05-17 ENCOUNTER — CLINICAL SUPPORT (OUTPATIENT)
Dept: CARDIAC REHAB | Facility: CLINIC | Age: 64
End: 2024-05-17
Payer: MEDICAID

## 2024-05-17 DIAGNOSIS — J44.9 CHRONIC OBSTRUCTIVE PULMONARY DISEASE, UNSPECIFIED COPD TYPE (MULTI): ICD-10-CM

## 2024-05-17 PROCEDURE — 94626 PHY/QHP OP PULM RHB W/MNTR: CPT | Performed by: INTERNAL MEDICINE

## 2024-05-20 ENCOUNTER — DOCUMENTATION (OUTPATIENT)
Dept: CARDIAC REHAB | Facility: CLINIC | Age: 64
End: 2024-05-20

## 2024-05-20 ENCOUNTER — CLINICAL SUPPORT (OUTPATIENT)
Dept: CARDIAC REHAB | Facility: CLINIC | Age: 64
End: 2024-05-20
Payer: MEDICAID

## 2024-05-20 DIAGNOSIS — J44.9 CHRONIC OBSTRUCTIVE PULMONARY DISEASE, UNSPECIFIED COPD TYPE (MULTI): ICD-10-CM

## 2024-05-20 PROCEDURE — 93798 PHYS/QHP OP CAR RHAB W/ECG: CPT | Performed by: INTERNAL MEDICINE

## 2024-05-20 NOTE — PROGRESS NOTES
Pulmonary Rehabilitation Initial Treatment Plan    Name: Martínez Neri  Medical Record Number: [id number]  YOB: 1960  Age: 63 y.o.    Today’s Date: 5/20/2024  Primary Care Physician: Jayme Casey MD  Referring Physician: Jose Sahu MD  Program Location: [address]  Primary Diagnosis: COPD     Onset/Date of Diagnosis: 12/2022    Session #:  7    AACVPR Risk Stratification: moderate    Falls Risk: Medium  Psychosocial Assessment  Initial PHQ-9:  16  Sent PH-Q 9 to MD if score > 20  Post PHQ-9:    Stress Management    Pt reported/currently experiencing stress: Yes; Stress; Severity: moderate, Anxiety; Severity: moderate, and Depression; Severity: moderate  Patient uses stress management skills: No   History of:  since CA dx  Currently seeing a mental health provider: No  Social Support: Yes, Whom:Spouse  and family  Pre CAT score: 17  Post CAT score: Survey to be given at discharge.   Learning Assessment:  Learning assessment/barriers: None  Preferred learning method: Auditory  Barriers: None  Comments:    Stages of Change:Action    Psychosocial Plan    Goal Status:In Progress    Psychosocial Goals: Demonstrating proper techniques for stress management, Maintain or lower PH-Q 9 score by discharge, and Identify strategies for managing depression    Psychosocial Interventions/Education:   4/30/24 reviewed initial PHQ-9 score reviewed w/ pt, will reassess at mid-pt, Pt scored high on PHQ9, discussed counseling, declined at this time. MK       Oxygen Assessment  SpO2 at rest: 96 %  SpO2 with exertion: 92 %    Oxygen Use    Supplemental O2 prescribed: Yes,   Liters at rest: 2 L  Liters with exertion: 4 L    SpO2 at rest: 96 %  SpO2 with exertion: 92 %    Using O2 as prescribed: Yes  DME: [facility]      Demonstrates appropriate knowledge of O2 use: Yes   Demonstrates appropriate knowledge of O2 safety: Yes     Home O2 System: Concentrator  Portable O2 System: Portable  "Tank    Hypoxia managed: Yes   Home Pulse Oximeter: Yes     Pre MMRC: 3  Post MMRC:     Oxygen Plan  Goal Status:In Progress  Oxygen Goals: Decrease MMRC score, Learn and use pursed lip breathing as needed, and Titrate supplemental O2 as needed to keep SpO2 at 88% or greater  Oxygen Education/Interventions:   5/20/24 Sp02's > 88% on 2-3 LNC/MS    Nutrition Assessment:    Hyperlipidemia: No     Lipids:   Lab Results   Component Value Date    CHOL 162 10/03/2023    HDL 67.1 10/03/2023    TRIG 87 10/03/2023       Current Dietary Guidelines:  no special  Barriers to dietary change: no    Diet Habit Survey: Picture Your Plate  Pre: Initial survey given. Pending completion and return from patient.  Post: To be done at discharge.    Diabetes Assessment    No results found for: \"HGBA1C\"    History of Diabetes: No    Weight Management  Height: 175.3 cm (5' 9\")  Weight: 70.3 kg (155 lb)  BMI (Calculated): 22.88    Nutrition Plan    Goal Status: In Progress    Nutrition Goals: Improve Diet Habit Survey score by 5-10 points by discharge  Nutrition Interventions/Education:   5/3/24 gave and reviewed pulmonary nutrution h/o w/ pt's wife/DEVAN      Exercise Assessment    Home Exercise:  No  Mode: NA  Frequency: NA  Duration: NA    6 Minute Walk Assessment     6 MWT distance: 583 ft  FiO2 used during test:      Exercise Prescription      Exercise Prescription based on: 6 Minute Walk Test         Frequency:  3 days/week   Mode: NuStep, Recumbent Cycle, and Airdyne   Duration: 24 total aerobic minutes   Intensity: RPE 12-14  Target HR:  RPD  MET Level: 1.8  Patient wears supplemental O2: Yes   O2 Flow Rate: 2 to 4 L/min  SpO2 Range: 88 to 100 %     Modality Workload METs Duration (minutes)   1 Pre-Exercise   2:00   2 NuStep Load 2 @30 gutiérrez  2 27:00   3 Recumbent Bike Load 1 @ 50 rpm  1.5 27:00   4 Post-Exercise   2:00     Resistance Training: No   Home Exercise Prescription given: To be given prior to discharge from " program.    Exercise Plan  Goal Status:In Progress  Exercise Goals: Increase exercise MET level by 5-10% each week, Increase total exercise duration to 30-45 minutes, Initiate strength training 2-3 days a week, and Establish a home exercise program before discharge    Exercise Interventions/Education:       Other Core Components/Risk Factor Assessment:    Medication adherence  Current Medications:   Medication Documentation Review Audit       Reviewed by Dixie Rodriguez RN (Registered Nurse) on 04/24/24 at 1032      Medication Order Taking? Sig Documenting Provider Last Dose Status   albuterol (Proventil HFA) 90 mcg/actuation inhaler [id number] Yes Inhale 2 puffs every 4 hours if needed for wheezing or shortness of breath. Bhavna Reed, APRN-CNP Taking Active   apixaban (Eliquis) 5 mg tablet [id number] Yes Take 1 tablet (5 mg) by mouth 2 times a day. Samantha Abreu MD Taking Active   aspirin 81 mg chewable tablet [id number] Yes Chew 1 tablet (81 mg) once daily. Radha Casey MD Taking Active   atorvastatin (Lipitor) 40 mg tablet [id number] Yes Take 1 tablet (40 mg) by mouth once daily. Jayme Casey MD Taking Active   cholecalciferol (Vitamin D-3) 125 MCG (5000 UT) capsule [id number] Yes  Historical Provider, MD Taking Active   divalproex (Depakote ER) 500 mg 24 hr tablet [id number] Yes Take 1 tablet (500 mg) by mouth 2 times a day. Do not crush, chew, or split. Radha Casey MD Taking Active   inhalat.spacing dev,med. mask spacer [id number] Yes  Jayme Casey MD Taking Active   ipratropium-albuteroL (Duo-Neb) 0.5-2.5 mg/3 mL nebulizer solution [id number] Yes Take 3 mL by nebulization 4 times a day as needed for wheezing or shortness of breath. Jayme Casey MD Taking Active   lacosamide (Vimpat) 200 mg tablet tablet [id number] Yes TAKE ONE TABLET BY MOUTH TWO TIMES A DAY Brianne Harrell, APRN-CNP Taking Active   LORazepam (Ativan) 0.5 mg tablet [id number]  Take 1 tablet (0.5 mg) by mouth once daily as  needed for anxiety for up to 5 days. Felisha Knox MD   23 [id number]   naloxone (Narcan) 4 mg/0.1 mL nasal spray [id number] Yes Administer 1 spray (4 mg) into affected nostril(s) if needed for opioid reversal. May repeat every 2-3 minutes if needed, alternating nostrils, until medical assistance becomes available. Ned Rock MD Taking Active   nebulizer accessories misc [id number] Yes Use twice daily Jayme Casey MD Taking Active   nebulizer and compressor device [id number]  Use as directed Jayme Casey MD  Active   nicotine (Nicoderm CQ) 21 mg/24 hr patch [id number] Yes Place 1 patch over 24 hours on the skin once every 24 hours. Jayme Casey MD Taking Active   ondansetron (Zofran) 8 mg tablet [id number] Yes Take 1 tablet (8 mg) by mouth every 8 hours if needed for nausea or vomiting. Elo Peters MD Taking Active   oxygen (O2) gas therapy [id number]  Inhale 2 each continuously. Patient to wear O2 2L/NC continuously at rest and increase O2 to 4l/NC with any activity. Jayme Casey MD  Active   polyethylene glycol (Glycolax, Miralax) 17 gram packet [id number] Yes Take 17 g by mouth once daily. Mix 1 cap (17g) into 8 ounces of fluid. Ned Rock MD Taking Active   prochlorperazine (Compazine) 10 mg tablet [id number] Yes Take 1 tablet (10 mg) by mouth every 6 hours if needed for nausea or vomiting. Elo Peters MD Taking Active   Spiriva Respimat 2.5 mcg/actuation inhaler [id number] Yes  Historical Provider, MD Taking Active   sucralfate (Carafate) 100 mg/mL suspension [id number] Yes Take 10 mL (1 g) by mouth every 6 hours. Radha Casey MD Taking Active   tamsulosin (Flomax) 0.4 mg 24 hr capsule [id number] Yes Take 1 capsule (0.4 mg) by mouth once daily for 60 doses. Abhijit Mendez MD Taking Active   thiamine (Vitamin B-1) 100 mg tablet [id number] Yes Take 1 tablet (100 mg) by mouth once daily. Jayme Casey MD Taking Active                                 Medication compliance: Yes   Uses  pill box/organizer:  Has a system in place   Carries medication list: No    Uses Inhalers appropriately:  Pt states that he is taking approp    Blood Pressure Management  History of Hypertension: No   Medication Changes: No   Resting BP:  92/50       Heart Failure Management  Hx of Heart Failure: No    Smoking/Tobacco Assessment  Social History     Tobacco Use   Smoking Status Every Day    Current packs/day: 1.00    Average packs/day: 1 pack/day for 50.0 years (50.0 ttl pk-yrs)    Types: Cigarettes    Passive exposure: Current   Smokeless Tobacco Never       Other Core Component Plan    Goal Status:  In Progress  Other Core Component Goals: Verbalize medication usage and drug actions by discharge, Begin tobacco cessation program, and Set tobacco cessation quit date while enrolled  Other Core Component Interventions/Education: Bronchial hygiene, proper use of inhalers and spacer        Individual Patient Goals:    Get off oxygen by discharge    Goal Status: In Progress    Staff Comments:  Mr. Neri has attended 7 sessions since beginning the program.  He is very deconditioned so his progress on his outcomes and goals has been slow.  No respiratory complaints voiced during his exercise sessions.    Rehab Staff Signature: Rosie Elizabeth

## 2024-05-21 ENCOUNTER — CLINICAL SUPPORT (OUTPATIENT)
Dept: CARDIAC REHAB | Facility: CLINIC | Age: 64
End: 2024-05-21
Payer: MEDICAID

## 2024-05-21 DIAGNOSIS — J44.9 CHRONIC OBSTRUCTIVE PULMONARY DISEASE, UNSPECIFIED COPD TYPE (MULTI): ICD-10-CM

## 2024-05-21 PROCEDURE — 94626 PHY/QHP OP PULM RHB W/MNTR: CPT | Performed by: INTERNAL MEDICINE

## 2024-05-24 ENCOUNTER — SOCIAL WORK (OUTPATIENT)
Dept: CASE MANAGEMENT | Facility: HOSPITAL | Age: 64
End: 2024-05-24
Payer: MEDICAID

## 2024-05-24 ENCOUNTER — APPOINTMENT (OUTPATIENT)
Dept: CARDIAC REHAB | Facility: CLINIC | Age: 64
End: 2024-05-24
Payer: MEDICARE

## 2024-05-24 NOTE — PROGRESS NOTES
This SW was contacted by caregiver study staff re: concerns. SW spoke with pt's alberto this date and she is being proactive with her own care.

## 2024-05-28 ENCOUNTER — CLINICAL SUPPORT (OUTPATIENT)
Dept: CARDIAC REHAB | Facility: CLINIC | Age: 64
End: 2024-05-28
Payer: MEDICAID

## 2024-05-28 DIAGNOSIS — J44.9 CHRONIC OBSTRUCTIVE PULMONARY DISEASE, UNSPECIFIED COPD TYPE (MULTI): ICD-10-CM

## 2024-05-28 PROCEDURE — 94626 PHY/QHP OP PULM RHB W/MNTR: CPT | Performed by: INTERNAL MEDICINE

## 2024-05-31 ENCOUNTER — APPOINTMENT (OUTPATIENT)
Dept: CARDIAC REHAB | Facility: CLINIC | Age: 64
End: 2024-05-31
Payer: MEDICARE

## 2024-06-03 ENCOUNTER — CLINICAL SUPPORT (OUTPATIENT)
Dept: CARDIAC REHAB | Facility: CLINIC | Age: 64
End: 2024-06-03
Payer: MEDICAID

## 2024-06-03 DIAGNOSIS — J44.9 CHRONIC OBSTRUCTIVE PULMONARY DISEASE, UNSPECIFIED COPD TYPE (MULTI): ICD-10-CM

## 2024-06-03 PROCEDURE — 94626 PHY/QHP OP PULM RHB W/MNTR: CPT

## 2024-06-04 ENCOUNTER — CLINICAL SUPPORT (OUTPATIENT)
Dept: CARDIAC REHAB | Facility: CLINIC | Age: 64
End: 2024-06-04
Payer: MEDICAID

## 2024-06-04 DIAGNOSIS — J44.9 CHRONIC OBSTRUCTIVE PULMONARY DISEASE, UNSPECIFIED COPD TYPE (MULTI): ICD-10-CM

## 2024-06-04 PROCEDURE — 94626 PHY/QHP OP PULM RHB W/MNTR: CPT

## 2024-06-07 ENCOUNTER — APPOINTMENT (OUTPATIENT)
Dept: CARDIAC REHAB | Facility: CLINIC | Age: 64
End: 2024-06-07
Payer: MEDICARE

## 2024-06-10 ENCOUNTER — CLINICAL SUPPORT (OUTPATIENT)
Dept: CARDIAC REHAB | Facility: CLINIC | Age: 64
End: 2024-06-10
Payer: MEDICAID

## 2024-06-10 DIAGNOSIS — J44.9 CHRONIC OBSTRUCTIVE PULMONARY DISEASE, UNSPECIFIED COPD TYPE (MULTI): ICD-10-CM

## 2024-06-10 PROCEDURE — 94626 PHY/QHP OP PULM RHB W/MNTR: CPT | Performed by: INTERNAL MEDICINE

## 2024-06-11 ENCOUNTER — DOCUMENTATION (OUTPATIENT)
Dept: CARDIAC REHAB | Facility: CLINIC | Age: 64
End: 2024-06-11

## 2024-06-11 ENCOUNTER — CLINICAL SUPPORT (OUTPATIENT)
Dept: CARDIAC REHAB | Facility: CLINIC | Age: 64
End: 2024-06-11
Payer: MEDICAID

## 2024-06-11 DIAGNOSIS — J44.9 CHRONIC OBSTRUCTIVE PULMONARY DISEASE, UNSPECIFIED COPD TYPE (MULTI): ICD-10-CM

## 2024-06-11 PROCEDURE — 94626 PHY/QHP OP PULM RHB W/MNTR: CPT | Performed by: INTERNAL MEDICINE

## 2024-06-11 NOTE — PROGRESS NOTES
"  Pulmonary Rehabilitation 30 Day Reassessment    Name: Martínez Neri  Medical Record Number: [id number]  YOB: 1960  Age: 63 y.o.    Today’s Date: 6/11/2024  Primary Care Physician: Jayme Casey MD  Referring Physician: Jose Sahu MD  Program Location: [address]  Primary Diagnosis: COPD   Onset/Date of Diagnosis: 12/2022  Session #:  12    AACVPR Risk Stratification: moderate    Falls Risk: Medium  Psychosocial Assessment  Initial PHQ-9:  16  Sent PH-Q 9 to MD if score > 20  Post PHQ-9:    Stress Management  Pt reported/currently experiencing stress: Yes; Stress; Severity: moderate, Anxiety; Severity: moderate, and Depression; Severity: moderate  Patient uses stress management skills: No   History of:  since CA dx  Currently seeing a mental health provider: No  Social Support: Yes, Whom:Spouse  and family  Pre CAT score: 17  Post CAT score: Survey to be given at discharge.   Learning Assessment:  Learning assessment/barriers: None  Preferred learning method: Auditory  Barriers: None  Comments:    Stages of Change:Action    Psychosocial Plan  Goal Status:In Progress  Psychosocial Goals: Demonstrating proper techniques for stress management, Maintain or lower PH-Q 9 score by discharge, and Identify strategies for managing depression  Psychosocial Interventions/Education:   4/30/24 reviewed initial PHQ-9 score reviewed w/ pt, will reassess at mid-pt, Pt scored high on PHQ9, discussed counseling, declined at this time.   6/3/24 Attended stress lecture. Handout given for home review/  6/11/24 Pt states that he has \"good days and bad days\". Has been encouraged for many yrs to seek counseling but does not want to./       Oxygen Assessment  SpO2 at rest: 96 %  SpO2 with exertion: 92 %    Oxygen Use    Supplemental O2 prescribed: Yes,   Liters at rest: 2 L  Liters with exertion: 4 L  SpO2 at rest: 96 %  SpO2 with exertion: 92 %  Using O2 as prescribed: Yes  DME: Jonn[facility]    " "Demonstrates appropriate knowledge of O2 use: Yes   Demonstrates appropriate knowledge of O2 safety: Yes   Home O2 System: Concentrator  Portable O2 System: Portable Tank    Hypoxia managed: Yes   Home Pulse Oximeter: Yes     Pre MMRC: 3  Post MMRC:     Oxygen Plan  Goal Status:In Progress  Oxygen Goals: Decrease MMRC score, Learn and use pursed lip breathing as needed, and Titrate supplemental O2 as needed to keep SpO2 at 88% or greater  Oxygen Education/Interventions:   5/20/24 Sp02's > 88% on 2-3 LNC/MS  6/11/24 Pt stated that he is titrating his own oxygen and is currently 2L N/C. Discussed that pulse ox should remain greater than 88%/mk    Nutrition Assessment:    Hyperlipidemia: No     Lipids:   Lab Results   Component Value Date    CHOL 162 10/03/2023    HDL 67.1 10/03/2023    TRIG 87 10/03/2023       Current Dietary Guidelines:  no special  Barriers to dietary change: no  Diet Habit Survey: Picture Your Plate  Pre: Initial survey given. Pending completion and return from patient.  Post: To be done at discharge.    Diabetes Assessment    No results found for: \"HGBA1C\"    History of Diabetes: No    Weight Management  Height: 175.3 cm (5' 9\")  Weight: 70.3 kg (155 lb)  BMI (Calculated): 22.88    Nutrition Plan    Goal Status: In Progress    Nutrition Goals: Improve Diet Habit Survey score by 5-10 points by discharge  Nutrition Interventions/Education:   5/3/24 gave and reviewed pulmonary nutrution h/o w/ pt's wife/DEVAN  6/11/24 Attended sodium lecture. Handout given for home review/mk      Exercise Assessment  Home Exercise: Yes  Mode: weights  Frequency: 3 days a week  Duration: 10    6 Minute Walk Assessment   6 MWT distance: 583 ft  FiO2 used during test:      Exercise Prescription      Exercise Prescription based on: current ex rx     Frequency:  3 days/week   Mode: Nustep, Recumbent bike   Duration: 33 total aerobic minutes   Intensity: RPE 12-14  Target HR:  RPD  MET Level: 2.3  Patient wears supplemental " O2: Yes   O2 Flow Rate: 2 to 4 L/min  SpO2 Range: 88 to 100 %     Modality Workload METs Duration (minutes)   1 Pre-Exercise   2:00   2 NuStep Load 4 @40 gutiérrez  2.3 22:00   3 Recumbent Bike Load 2 @ 50 rpm  2 11:00   4 Post-Exercise   2:00     Resistance Training: No   Home Exercise Prescription given: To be given prior to discharge from program.    Exercise Plan  Goal Status:In Progress  Exercise Goals: Increase exercise MET level by 5-10% each week, Increase total exercise duration to 30-45 minutes, Initiate strength training 2-3 days a week, and Establish a home exercise program before discharge  Exercise Interventions/Education:   6/4/24 Instructed on upper body weights/mk  6/11/24 No regular home exercise, able to do activities like shopping and gardening/MS    Other Core Components/Risk Factor Assessment:    Medication adherence  Current Medications:   Medication Documentation Review Audit       Reviewed by Dixie Rodriguez RN (Registered Nurse) on 04/24/24 at 1032      Medication Order Taking? Sig Documenting Provider Last Dose Status   albuterol (Proventil HFA) 90 mcg/actuation inhaler [id number] Yes Inhale 2 puffs every 4 hours if needed for wheezing or shortness of breath. Bhavna Reed, APRN-CNP Taking Active   apixaban (Eliquis) 5 mg tablet [id number] Yes Take 1 tablet (5 mg) by mouth 2 times a day. Samantha Abreu MD Taking Active   aspirin 81 mg chewable tablet [id number] Yes Chew 1 tablet (81 mg) once daily. Radha Casey MD Taking Active   atorvastatin (Lipitor) 40 mg tablet [id number] Yes Take 1 tablet (40 mg) by mouth once daily. Jayme Casey MD Taking Active   cholecalciferol (Vitamin D-3) 125 MCG (5000 UT) capsule [id number] Yes  Historical Provider, MD Taking Active   divalproex (Depakote ER) 500 mg 24 hr tablet [id number] Yes Take 1 tablet (500 mg) by mouth 2 times a day. Do not crush, chew, or split. Radha Casey MD Taking Active   inhalat.spacing dev,med. mask spacer [id number] Yes   Jayme Casey MD Taking Active   ipratropium-albuteroL (Duo-Neb) 0.5-2.5 mg/3 mL nebulizer solution [id number] Yes Take 3 mL by nebulization 4 times a day as needed for wheezing or shortness of breath. Jayme Casey MD Taking Active   lacosamide (Vimpat) 200 mg tablet tablet [id number] Yes TAKE ONE TABLET BY MOUTH TWO TIMES A DAY ARIC Brower-CNP Taking Active   LORazepam (Ativan) 0.5 mg tablet [id number]  Take 1 tablet (0.5 mg) by mouth once daily as needed for anxiety for up to 5 days. Felisha Knox MD   23 [id number]   naloxone (Narcan) 4 mg/0.1 mL nasal spray [id number] Yes Administer 1 spray (4 mg) into affected nostril(s) if needed for opioid reversal. May repeat every 2-3 minutes if needed, alternating nostrils, until medical assistance becomes available. Ned Rock MD Taking Active   nebulizer accessories misc [id number] Yes Use twice daily Jayme Casey MD Taking Active   nebulizer and compressor device [id number]  Use as directed Jayme Casey MD  Active   nicotine (Nicoderm CQ) 21 mg/24 hr patch [id number] Yes Place 1 patch over 24 hours on the skin once every 24 hours. Jayme Casey MD Taking Active   ondansetron (Zofran) 8 mg tablet [id number] Yes Take 1 tablet (8 mg) by mouth every 8 hours if needed for nausea or vomiting. Elo Peters MD Taking Active   oxygen (O2) gas therapy [id number]  Inhale 2 each continuously. Patient to wear O2 2L/NC continuously at rest and increase O2 to 4l/NC with any activity. Jayme Casey MD  Active   polyethylene glycol (Glycolax, Miralax) 17 gram packet [id number] Yes Take 17 g by mouth once daily. Mix 1 cap (17g) into 8 ounces of fluid. Ned Rock MD Taking Active   prochlorperazine (Compazine) 10 mg tablet [id number] Yes Take 1 tablet (10 mg) by mouth every 6 hours if needed for nausea or vomiting. Elo Peters MD Taking Active   Spiriva Respimat 2.5 mcg/actuation inhaler [id number] Yes  Historical Provider, MD Taking Active    sucralfate (Carafate) 100 mg/mL suspension [id number] Yes Take 10 mL (1 g) by mouth every 6 hours. Radha Casey MD Taking Active   tamsulosin (Flomax) 0.4 mg 24 hr capsule [id number] Yes Take 1 capsule (0.4 mg) by mouth once daily for 60 doses. Abhijit Mendez MD Taking Active   thiamine (Vitamin B-1) 100 mg tablet [id number] Yes Take 1 tablet (100 mg) by mouth once daily. Jayme Casey MD Taking Active                                 Medication compliance: Yes   Uses pill box/organizer:  Has a system in place   Carries medication list: No    Uses Inhalers appropriately:  Pt states that he is taking approp    Blood Pressure Management  History of Hypertension: No   Medication Changes: No   Resting BP:  92/50   Heart Failure Management  Hx of Heart Failure: No    Smoking/Tobacco Assessment  Social History     Tobacco Use   Smoking Status Every Day    Current packs/day: 1.00    Average packs/day: 1 pack/day for 50.0 years (50.0 ttl pk-yrs)    Types: Cigarettes    Passive exposure: Current   Smokeless Tobacco Never       Other Core Component Plan    Goal Status:  In Progress  Other Core Component Goals: Verbalize medication usage and drug actions by discharge, Begin tobacco cessation program, and Set tobacco cessation quit date while enrolled  Other Core Component Interventions/Education: Bronchial hygiene, proper use of inhalers and spacer  5/28/24 Attend bronchial hygiene lecture which included acapella and henson cough/  6/11/24 Verbalizes daily mucous production. Does not have a flutter device and has been encouraged to discuss with physician/mk        Individual Patient Goals:    Get off oxygen by discharge    Goal Status: In Progress    Staff Comments:  Mr Neri has been attended rehab 2-3 days a week and progressing as planned. Oxygen saturations remain greater than 88% on 2L N/C continuous.     Rehab Staff Signature: Dixie Rodriguez RN

## 2024-06-14 ENCOUNTER — CLINICAL SUPPORT (OUTPATIENT)
Dept: CARDIAC REHAB | Facility: CLINIC | Age: 64
End: 2024-06-14
Payer: MEDICAID

## 2024-06-14 DIAGNOSIS — J44.9 CHRONIC OBSTRUCTIVE PULMONARY DISEASE, UNSPECIFIED COPD TYPE (MULTI): ICD-10-CM

## 2024-06-14 PROCEDURE — 94626 PHY/QHP OP PULM RHB W/MNTR: CPT | Performed by: INTERNAL MEDICINE

## 2024-06-17 ENCOUNTER — OFFICE VISIT (OUTPATIENT)
Dept: CARDIOLOGY | Facility: CLINIC | Age: 64
End: 2024-06-17
Payer: MEDICAID

## 2024-06-17 ENCOUNTER — CLINICAL SUPPORT (OUTPATIENT)
Dept: CARDIAC REHAB | Facility: CLINIC | Age: 64
End: 2024-06-17
Payer: MEDICAID

## 2024-06-17 VITALS
WEIGHT: 157 LBS | SYSTOLIC BLOOD PRESSURE: 99 MMHG | HEIGHT: 69 IN | DIASTOLIC BLOOD PRESSURE: 61 MMHG | RESPIRATION RATE: 16 BRPM | OXYGEN SATURATION: 96 % | HEART RATE: 65 BPM | BODY MASS INDEX: 23.25 KG/M2

## 2024-06-17 DIAGNOSIS — K55.069 SUPERIOR MESENTERIC ARTERY THROMBOSIS (MULTI): Primary | ICD-10-CM

## 2024-06-17 DIAGNOSIS — Z79.01 ANTICOAGULATION MANAGEMENT ENCOUNTER: ICD-10-CM

## 2024-06-17 DIAGNOSIS — M79.89 LEG SWELLING: ICD-10-CM

## 2024-06-17 DIAGNOSIS — J44.9 CHRONIC OBSTRUCTIVE PULMONARY DISEASE, UNSPECIFIED COPD TYPE (MULTI): ICD-10-CM

## 2024-06-17 DIAGNOSIS — Z51.81 ANTICOAGULATION MANAGEMENT ENCOUNTER: ICD-10-CM

## 2024-06-17 PROCEDURE — 94626 PHY/QHP OP PULM RHB W/MNTR: CPT | Performed by: INTERNAL MEDICINE

## 2024-06-17 PROCEDURE — 99214 OFFICE O/P EST MOD 30 MIN: CPT | Performed by: INTERNAL MEDICINE

## 2024-06-17 NOTE — PATIENT INSTRUCTIONS
--- Close monitoring to bleeding and fall precautions while on anticoagulation   --- Smoking cessation highly encouraged  --- Use compression stockings during the day   --- Leg elevation whenever sitting/sleeping  --- Follow up in 6 months, earlier if needed

## 2024-06-17 NOTE — PROGRESS NOTES
Chief complaint: SMA thrombosis     Subjective   Pt is accompanied by his wife   Last seen in March 2024  He continues to be on Eliquis, 5 mg twice daily, tolerated well  He is also taking aspirin 81 mg daily and high intensity statins  He is undergoing now pulmonary rehab 3 times a week, and overall feels his symptoms are improving  Also his leg swelling has significantly improved since last visit  Bronchoscopy and biopsy canceled given improvement on imaging, currently off treatment  Seen by GS with plans for hernia conservative management     Review of Systems  As noted above   Cough   SOB/CHAPIN, on Oxygen   Intermittent CP   Bilateral intermittent LE swelling, worse at the end of the day associated with heaviness  Anxiety/depression   No bruises, melena or bleeding   No other complaints today    Past Medical History:   Diagnosis Date    Allergic rhinitis     Asthma ([facility])     Brain aneurysm ([facility]-H[facility])     Cancer (Multi)     Carotid artery disease ([facility])     COPD (chronic obstructive pulmonary disease) (Multi)     Epilepsy (Multi)     High cholesterol     History of alcohol use     Hypertension     Lung cancer (Multi)      Past Surgical History:   Procedure Laterality Date    CAROTID ENDARTERECTOMY N/A     CEREBRAL ANEURYSM REPAIR      COLONOSCOPY      HERNIA REPAIR       Social History     Socioeconomic History    Marital status: Legally      Spouse name: Not on file    Number of children: 2    Years of education: Not on file    Highest education level: Not on file   Occupational History    Not on file   Tobacco Use    Smoking status: Every Day     Current packs/day: 1.00     Average packs/day: 1 pack/day for 50.0 years (50.0 ttl pk-yrs)     Types: Cigarettes     Passive exposure: Current    Smokeless tobacco: Never   Vaping Use    Vaping status: Never Used   Substance and Sexual Activity    Alcohol use: Not Currently     Comment: quit 7 years ago    Drug use: Yes     Frequency: 4.0 times per week      Types: Marijuana    Sexual activity: Defer   Other Topics Concern    Not on file   Social History Narrative    Not on file     Social Determinants of Health     Financial Resource Strain: Low Risk  (1/6/2024)    Overall Financial Resource Strain (CARDIA)     Difficulty of Paying Living Expenses: Not very hard   Food Insecurity: No Food Insecurity (1/8/2024)    Hunger Vital Sign     Worried About Running Out of Food in the Last Year: Never true     Ran Out of Food in the Last Year: Never true   Transportation Needs: No Transportation Needs (2/5/2024)    OASIS : Transportation     Lack of Transportation (Medical): No     Lack of Transportation (Non-Medical): No     Patient Unable or Declines to Respond: No   Physical Activity: Inactive (1/8/2024)    Exercise Vital Sign     Days of Exercise per Week: 0 days     Minutes of Exercise per Session: 0 min   Stress: Stress Concern Present (1/8/2024)    [employer] - Occupational Stress Questionnaire     Feeling of Stress : To some extent   Social Connections: Feeling Socially Integrated (2/5/2024)    OASIS : Social Isolation     Frequency of experiencing loneliness or isolation: Never   Intimate Partner Violence: Not At Risk (1/8/2024)    Humiliation, Afraid, Rape, and Kick questionnaire     Fear of Current or Ex-Partner: No     Emotionally Abused: No     Physically Abused: No     Sexually Abused: No   Housing Stability: Low Risk  (1/6/2024)    Housing Stability Vital Sign     Unable to Pay for Housing in the Last Year: No     Number of Places Lived in the Last Year: 1     Unstable Housing in the Last Year: No      Family History   Problem Relation Name Age of Onset    Heart attack Mother      Other (Colorectal cancer) Father      Aneurysm Other      Prostate cancer Other      Alcohol abuse Other        No Known Allergies    Objective   Physical Exam  BP 99/61 (BP Location: Left arm, Patient Position: Sitting, BP Cuff Size: Adult)    "Pulse 65   Resp 16   Ht 1.753 m (5' 9\")   Wt 71.2 kg (157 lb)   BMI 23.18 kg/m²      General:  In no acute distress  Neuro: alert and oriented x3  CV:  RRR  Lungs: scattered crackles/coarse breath sounds, using Oxygen   Abd:  Soft, non-tender   Psych:  Appropriate affect  Upper extremities: No swelling swelling  Lower extremities: david LE edema, +2 DP  Skin:  No open ulcers, bilateral varicosities    Medications   Current Outpatient Medications   Medication Instructions    albuterol (Proventil HFA) 90 mcg/actuation inhaler 2 puffs, inhalation, Every 4 hours PRN    apixaban (ELIQUIS) 5 mg, oral, 2 times daily    aspirin 81 mg, oral, Daily    atorvastatin (LIPITOR) 40 mg, oral, Daily    cholecalciferol (Vitamin D-3) 125 MCG (5000 UT) capsule     divalproex (DEPAKOTE ER) 500 mg, oral, 2 times daily, Do not crush, chew, or split.    inhalat.spacing dev,med. mask spacer No dose, route, or frequency recorded.    ipratropium-albuteroL (Duo-Neb) 0.5-2.5 mg/3 mL nebulizer solution 3 mL, nebulization, 4 times daily PRN    lacosamide (VIMPAT) 200 mg, oral, 2 times daily    LORazepam (ATIVAN) 0.5 mg, oral, Daily PRN    naloxone (NARCAN) 4 mg, nasal, As needed, May repeat every 2-3 minutes if needed, alternating nostrils, until medical assistance becomes available.    nebulizer accessories misc Use twice daily    nebulizer and compressor device Use as directed    nicotine (Nicoderm CQ) 21 mg/24 hr patch 1 patch, transdermal, Every 24 hours    ondansetron (ZOFRAN) 8 mg, oral, Every 8 hours PRN    oxygen (O2) gas therapy 2 each, inhalation, Continuous, Patient to wear O2 2L/NC continuously at rest and increase O2 to 4l/NC with any activity.     prochlorperazine (COMPAZINE) 10 mg, oral, Every 6 hours PRN    Spiriva Respimat 2.5 mcg/actuation inhaler     sucralfate (CARAFATE) 1 g, oral, Every 6 hours scheduled    tamsulosin (FLOMAX) 0.4 mg, oral, Daily    thiamine (VITAMIN B-1) 100 mg, oral, Daily        Lab Review   Recent Labs " "    02/27/24  0939 02/01/24  1035 01/11/24  0433 01/09/24  0431 01/08/24  0428 01/07/24  1724 01/06/24  0443 01/05/24 2050    139 137 136 133 132* 133 129*   K 3.9 4.7 3.2* 3.1* 3.3* 3.4 4.0 4.3    101 102 102 101 100 100 96*   CO2 26 31 27 25 24 24 21* 22*   ANIONGAP 14 12 8 9 8 8 12 11   BUN 11 16 3* 3* 5* 8 12 15   CREATININE 0.75 0.73 0.70 0.60 0.70 0.90 0.70 0.70   EGFR >90 >90 >90 >90 >90 >90 >90 >90     Recent Labs     02/01/24  1035 01/11/24  0433 01/07/24  1724 01/06/24 0443 01/05/24 2050   ALBUMIN 3.8 2.3* 2.8* 3.2* 3.9   ALKPHOS 69 43 42 49 62   ALT 9* 63* 40 8 9   AST 16 60* 178* 24 19   BILITOT 0.3 <0.2 0.3 0.3 0.3     Recent Labs     02/27/24  0939 02/01/24  1035 01/12/24  0847 01/11/24  1802 01/11/24  0433 01/09/24  1749 01/08/24  0428 01/08/24  0249   WBC 7.3 7.9 4.4 4.3* 3.6* 5.2 4.9 5.9   HGB 9.7* 9.9* 8.4* 7.6* 7.4* 7.5* 7.4* 8.2*   HCT 29.6* 31.0* 25.1* 21.9* 21.9* 22.7* 21.3* 23.3*    339 289 256 252 220 192 217   MCV 98 101* 98 97 97 100 97 98     Recent Labs     01/10/24  1635 01/08/24  1535   INR 1.2 1.1     PTT - 1/12/2024:  8:47 AM  1.2   12.1 49.7     Recent Labs     10/03/23  0828   CHOL 162   HDL 67.1   TRIG 87     No results found for: \"HGBA1C\"  Lab Results   Component Value Date    TSH 3.75 02/01/2024     Component 1/8/2024   Beta-2 Glyco 1 IgG <1.4    Beta-2 Glyco 1 IgA 1.3    Beta 2 Glyco 1 IgM 4.7    Anticardiolipin IgA 1.5    Anticardiolipin IgG <1.6    Anticardiolipin IgM 3.0      Imaging  ECHO 3/22/24    1. Left ventricular systolic function is normal with a 60-65% estimated ejection fraction.   2. Aortic valve appears abnormal.   3. Aortic valve stenosis is not present.    CTA A/P 3/13/24   Vascular:  1. Calcified and noncalcified atheromatous plaques of the proximal to  distal superior mesenteric artery which produce mild multilevel  luminal stenosis without clear evidence of hemodynamically  significant stenosis or occlusion. The degree of luminal " stenosis is  difficult to accurately compare with prior exam secondary to  differences in technique and motion artifact on current exam.  2. Diffuse calcified and noncalcified atheromatous plaques of the  bilateral common iliac, external iliac and internal iliac artery  which produce at least mild to moderate multifocal luminal stenosis  of the bilateral external and internal iliac arteries without  evidence of complete occlusion.  3. No abdominal aortic aneurysm or dissection.  Nonvascular:  1. Redemonstration of a small left inguinal hernia which now contains  a small amount of fluid and fat stranding. Correlate with concern for  incarceration or strangulation.  2. Circumferential urinary bladder wall thickening. Correlate with  urinalysis and concern for acute cystitis.  3. Similar appearance of numerous spiculated nodules within the  visualized bilateral lower lungs compatible with patient's known  history of cancer.  4. Severe emphysema    CT A/P 1/7/2024   1.  There is partial thrombosis of the proximal superior mesenteric  artery identified 7 mm distal to the origin. However, there is flow  noted within the distal SMA.  2. Small amount of free fluid within the pelvic cul-de-sac.  3. Colonic diverticulosis without diverticulitis.  4. Hepatic cysts measuring up to 2.3 cm in size with 1.7 cm right  renal cyst.  5. Severe pulmonary emphysema with minimal right pleural effusion and  with atelectasis at right lung base.  6. Small bilateral fat containing inguinal hernias with very tiny fat  containing umbilical hernia      Assessment/Plan   Martínez Neri is a 63 y.o. male with history of COPD, seizures, non small cell carcinoma of the lung (dx June 2023. S/p RTX and CTX), chronic respiratory failure, IC aneurysm s/p repair and active smoker. 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 Aortic valve appears abnormal  CTA A/P 3/13/24, with widespread plaque, no thrombosis noted    No known arrhythmia, but reports multiple episodes of tachycardia, Holter with no significant abnormality     Plan   --- Continue Eliquis 5 mg twice daily as long as it is compatible with the cancer treatment, monitor for bleeding  --- They follow closely with neurology for the history of brain aneurysm, they will be reaching out to his neurologist at [facility] and will let me know if there is any concerns about AC moving forward  --- CBC, BMP   --- Continue ASA 81 mg every day   --- Continue follow up with the PCP for ASO risk factors modification  Target BP <130/80   Continue HS statins, 40 every day, target LDL <70  Smoking cessation discussed and highly encouraged  --- Rest as detailed in the patient's instructions     Samantha Abreu MD

## 2024-06-18 ENCOUNTER — CLINICAL SUPPORT (OUTPATIENT)
Dept: CARDIAC REHAB | Facility: CLINIC | Age: 64
End: 2024-06-18
Payer: MEDICAID

## 2024-06-18 DIAGNOSIS — J44.9 CHRONIC OBSTRUCTIVE PULMONARY DISEASE, UNSPECIFIED COPD TYPE (MULTI): ICD-10-CM

## 2024-06-18 PROCEDURE — 94626 PHY/QHP OP PULM RHB W/MNTR: CPT | Performed by: INTERNAL MEDICINE

## 2024-06-21 ENCOUNTER — APPOINTMENT (OUTPATIENT)
Dept: CARDIAC REHAB | Facility: CLINIC | Age: 64
End: 2024-06-21
Payer: MEDICARE

## 2024-06-24 ENCOUNTER — CLINICAL SUPPORT (OUTPATIENT)
Dept: CARDIAC REHAB | Facility: CLINIC | Age: 64
End: 2024-06-24
Payer: MEDICAID

## 2024-06-24 DIAGNOSIS — J44.9 CHRONIC OBSTRUCTIVE PULMONARY DISEASE, UNSPECIFIED COPD TYPE (MULTI): ICD-10-CM

## 2024-06-24 PROCEDURE — 94626 PHY/QHP OP PULM RHB W/MNTR: CPT | Performed by: INTERNAL MEDICINE

## 2024-06-25 ENCOUNTER — APPOINTMENT (OUTPATIENT)
Dept: CARDIAC REHAB | Facility: CLINIC | Age: 64
End: 2024-06-25
Payer: MEDICAID

## 2024-06-28 ENCOUNTER — CLINICAL SUPPORT (OUTPATIENT)
Dept: CARDIAC REHAB | Facility: CLINIC | Age: 64
End: 2024-06-28
Payer: MEDICAID

## 2024-06-28 DIAGNOSIS — J44.9 CHRONIC OBSTRUCTIVE PULMONARY DISEASE, UNSPECIFIED COPD TYPE (MULTI): ICD-10-CM

## 2024-06-28 PROCEDURE — 94626 PHY/QHP OP PULM RHB W/MNTR: CPT | Performed by: INTERNAL MEDICINE

## 2024-07-01 ENCOUNTER — CLINICAL SUPPORT (OUTPATIENT)
Dept: CARDIAC REHAB | Facility: CLINIC | Age: 64
End: 2024-07-01
Payer: MEDICARE

## 2024-07-01 ENCOUNTER — APPOINTMENT (OUTPATIENT)
Dept: CARDIOLOGY | Facility: CLINIC | Age: 64
End: 2024-07-01
Payer: MEDICAID

## 2024-07-01 DIAGNOSIS — J44.9 CHRONIC OBSTRUCTIVE PULMONARY DISEASE, UNSPECIFIED COPD TYPE (MULTI): ICD-10-CM

## 2024-07-01 PROCEDURE — 94626 PHY/QHP OP PULM RHB W/MNTR: CPT | Performed by: INTERNAL MEDICINE

## 2024-07-02 ENCOUNTER — CLINICAL SUPPORT (OUTPATIENT)
Dept: CARDIAC REHAB | Facility: CLINIC | Age: 64
End: 2024-07-02
Payer: MEDICARE

## 2024-07-02 DIAGNOSIS — J44.9 CHRONIC OBSTRUCTIVE PULMONARY DISEASE, UNSPECIFIED COPD TYPE (MULTI): ICD-10-CM

## 2024-07-02 PROCEDURE — 94626 PHY/QHP OP PULM RHB W/MNTR: CPT | Performed by: INTERNAL MEDICINE

## 2024-07-05 ENCOUNTER — CLINICAL SUPPORT (OUTPATIENT)
Dept: CARDIAC REHAB | Facility: CLINIC | Age: 64
End: 2024-07-05
Payer: MEDICARE

## 2024-07-05 DIAGNOSIS — J44.9 CHRONIC OBSTRUCTIVE PULMONARY DISEASE, UNSPECIFIED COPD TYPE (MULTI): ICD-10-CM

## 2024-07-05 DIAGNOSIS — C34.12 MALIGNANT NEOPLASM OF UPPER LOBE, LEFT BRONCHUS OR LUNG (MULTI): Primary | ICD-10-CM

## 2024-07-05 PROCEDURE — 94626 PHY/QHP OP PULM RHB W/MNTR: CPT | Performed by: INTERNAL MEDICINE

## 2024-07-05 NOTE — PROGRESS NOTES
Martínez Neri  1960  [id number]  63 y.o.    [id number]      Cardiac/Pulmonary Rehab Nutrition Education    7/5/2024   Gave and reviewed Heart Healthy Tips handout with patient and spouse. Encouraged to further read handouts at home and follow up with questions as needed.     Signature Calli Minor RDN, LD

## 2024-07-08 ENCOUNTER — HOSPITAL ENCOUNTER (OUTPATIENT)
Dept: RADIOLOGY | Facility: HOSPITAL | Age: 64
Discharge: HOME | End: 2024-07-08
Payer: MEDICARE

## 2024-07-08 ENCOUNTER — APPOINTMENT (OUTPATIENT)
Dept: CARDIAC REHAB | Facility: CLINIC | Age: 64
End: 2024-07-08
Payer: MEDICARE

## 2024-07-08 ENCOUNTER — LAB (OUTPATIENT)
Dept: LAB | Facility: HOSPITAL | Age: 64
End: 2024-07-08
Payer: MEDICARE

## 2024-07-08 DIAGNOSIS — K55.069 SUPERIOR MESENTERIC ARTERY THROMBOSIS (MULTI): ICD-10-CM

## 2024-07-08 DIAGNOSIS — C34.12 MALIGNANT NEOPLASM OF UPPER LOBE, LEFT BRONCHUS OR LUNG (MULTI): ICD-10-CM

## 2024-07-08 DIAGNOSIS — Z51.81 ANTICOAGULATION MANAGEMENT ENCOUNTER: ICD-10-CM

## 2024-07-08 DIAGNOSIS — Z79.01 ANTICOAGULATION MANAGEMENT ENCOUNTER: ICD-10-CM

## 2024-07-08 DIAGNOSIS — C34.12 MALIGNANT NEOPLASM OF UPPER LOBE OF LEFT LUNG (MULTI): ICD-10-CM

## 2024-07-08 LAB
ANION GAP SERPL CALC-SCNC: 13 MMOL/L (ref 10–20)
BUN SERPL-MCNC: 10 MG/DL (ref 6–23)
CALCIUM SERPL-MCNC: 9.3 MG/DL (ref 8.6–10.3)
CHLORIDE SERPL-SCNC: 103 MMOL/L (ref 98–107)
CO2 SERPL-SCNC: 28 MMOL/L (ref 21–32)
CREAT SERPL-MCNC: 0.71 MG/DL (ref 0.5–1.3)
EGFRCR SERPLBLD CKD-EPI 2021: >90 ML/MIN/1.73M*2
ERYTHROCYTE [DISTWIDTH] IN BLOOD BY AUTOMATED COUNT: 14.8 % (ref 11.5–14.5)
GLUCOSE SERPL-MCNC: 86 MG/DL (ref 74–99)
HCT VFR BLD AUTO: 30.8 % (ref 41–52)
HGB BLD-MCNC: 9.6 G/DL (ref 13.5–17.5)
MCH RBC QN AUTO: 27.6 PG (ref 26–34)
MCHC RBC AUTO-ENTMCNC: 31.2 G/DL (ref 32–36)
MCV RBC AUTO: 89 FL (ref 80–100)
NRBC BLD-RTO: 0 /100 WBCS (ref 0–0)
PLATELET # BLD AUTO: 353 X10*3/UL (ref 150–450)
POTASSIUM SERPL-SCNC: 4.4 MMOL/L (ref 3.5–5.3)
RBC # BLD AUTO: 3.48 X10*6/UL (ref 4.5–5.9)
SODIUM SERPL-SCNC: 140 MMOL/L (ref 136–145)
WBC # BLD AUTO: 5.6 X10*3/UL (ref 4.4–11.3)

## 2024-07-08 PROCEDURE — 85027 COMPLETE CBC AUTOMATED: CPT

## 2024-07-08 PROCEDURE — 80048 BASIC METABOLIC PNL TOTAL CA: CPT

## 2024-07-08 PROCEDURE — 2550000001 HC RX 255 CONTRASTS: Mod: SE | Performed by: INTERNAL MEDICINE

## 2024-07-08 PROCEDURE — 71260 CT THORAX DX C+: CPT

## 2024-07-08 PROCEDURE — 71260 CT THORAX DX C+: CPT | Performed by: RADIOLOGY

## 2024-07-08 PROCEDURE — 36415 COLL VENOUS BLD VENIPUNCTURE: CPT

## 2024-07-09 ENCOUNTER — CLINICAL SUPPORT (OUTPATIENT)
Dept: CARDIAC REHAB | Facility: CLINIC | Age: 64
End: 2024-07-09
Payer: MEDICARE

## 2024-07-09 DIAGNOSIS — J44.9 CHRONIC OBSTRUCTIVE PULMONARY DISEASE, UNSPECIFIED COPD TYPE (MULTI): ICD-10-CM

## 2024-07-09 PROCEDURE — 94626 PHY/QHP OP PULM RHB W/MNTR: CPT | Performed by: INTERNAL MEDICINE

## 2024-07-10 ENCOUNTER — TELEPHONE (OUTPATIENT)
Dept: CARDIOLOGY | Facility: CLINIC | Age: 64
End: 2024-07-10
Payer: MEDICARE

## 2024-07-10 ENCOUNTER — TELEPHONE (OUTPATIENT)
Dept: PALLIATIVE MEDICINE | Facility: HOSPITAL | Age: 64
End: 2024-07-10
Payer: MEDICARE

## 2024-07-10 NOTE — TELEPHONE ENCOUNTER
----- Message from Samantha Abreu sent at 7/9/2024  4:30 PM EDT -----  Labs are stable    Thanks,   Samantha Abreu MD

## 2024-07-10 NOTE — TELEPHONE ENCOUNTER
Result Communication    Resulted Orders   CBC   Result Value Ref Range    WBC 5.6 4.4 - 11.3 x10*3/uL    nRBC 0.0 0.0 - 0.0 /100 WBCs    RBC 3.48 (L) 4.50 - 5.90 x10*6/uL    Hemoglobin 9.6 (L) 13.5 - 17.5 g/dL    Hematocrit 30.8 (L) 41.0 - 52.0 %    MCV 89 80 - 100 fL    MCH 27.6 26.0 - 34.0 pg    MCHC 31.2 (L) 32.0 - 36.0 g/dL    RDW 14.8 (H) 11.5 - 14.5 %    Platelets 353 150 - 450 x10*3/uL   Basic Metabolic Panel   Result Value Ref Range    Glucose 86 74 - 99 mg/dL    Sodium 140 136 - 145 mmol/L    Potassium 4.4 3.5 - 5.3 mmol/L    Chloride 103 98 - 107 mmol/L    Bicarbonate 28 21 - 32 mmol/L    Anion Gap 13 10 - 20 mmol/L    Urea Nitrogen 10 6 - 23 mg/dL    Creatinine 0.71 0.50 - 1.30 mg/dL    eGFR >90 >60 mL/min/1.73m*2      Comment:      Calculations of estimated GFR are performed using the 2021 CKD-EPI Study Refit equation without the race variable for the IDMS-Traceable creatinine methods.  https://jasn.asnjournals.org/content/early/2021/09/22/ASN.0549741769    Calcium 9.3 8.6 - 10.3 mg/dL       9:51 AM      Results were successfully communicated with the  patient's wife  and they acknowledged their understanding.

## 2024-07-10 NOTE — TELEPHONE ENCOUNTER
Patient was scheduled to be seen in Supportive Oncology/Palliative Care clinic today by Dr. Rock. Patient did not show up for appointment. Phone call to patient to reschedule missed appointment. I spoke to Keri who accepted FUV on 7/17/24 at 9am. No current needs.

## 2024-07-12 ENCOUNTER — DOCUMENTATION (OUTPATIENT)
Dept: CARDIAC REHAB | Facility: CLINIC | Age: 64
End: 2024-07-12
Payer: MEDICARE

## 2024-07-12 ENCOUNTER — APPOINTMENT (OUTPATIENT)
Dept: CARDIAC REHAB | Facility: CLINIC | Age: 64
End: 2024-07-12
Payer: MEDICARE

## 2024-07-12 NOTE — PROGRESS NOTES
"  Pulmonary Rehabilitation 60 Day Reassessment    Name: Martínez Neri  Medical Record Number: [id number]  YOB: 1960  Age: 63 y.o.    Today’s Date: 7/12/2024  Primary Care Physician: Jayme Casey MD  Referring Physician: Jose Sahu MD  Program Location: [address]  Primary Diagnosis: COPD   Onset/Date of Diagnosis: 12/2022  Session #:  22    AACVPR Risk Stratification: moderate    Falls Risk: Medium  Psychosocial Assessment  Initial PHQ-9:  16  Sent PH-Q 9 to MD if score > 20  Post PHQ-9:    Stress Management  Pt reported/currently experiencing stress: Yes; Stress; Severity: moderate, Anxiety; Severity: moderate, and Depression; Severity: moderate  Patient uses stress management skills: No   History of:  since CA dx  Currently seeing a mental health provider: No  Social Support: Yes, Whom:Spouse  and family  Pre CAT score: 17  Post CAT score: Survey to be given at discharge.   Learning Assessment:  Learning assessment/barriers: None  Preferred learning method: Auditory  Barriers: None  Comments:    Stages of Change:Action    Psychosocial Plan  Goal Status:In Progress  Psychosocial Goals: Demonstrating proper techniques for stress management, Maintain or lower PH-Q 9 score by discharge, and Identify strategies for managing depression  Psychosocial Interventions/Education:   4/30/24 reviewed initial PHQ-9 score reviewed w/ pt, will reassess at mid-pt, Pt scored high on PHQ9, discussed counseling, declined at this time.   6/3/24 Attended stress lecture. Handout given for home review/  6/11/24 Pt states that he has \"good days and bad days\". Has been encouraged for many yrs to seek counseling but does not want to./  7/9/24 No psychosocial issues voiced to staff/       Oxygen Assessment  SpO2 at rest: 96 %  SpO2 with exertion: 92 %    Oxygen Use    Supplemental O2 prescribed: Yes,   Liters at rest: 2 L  Liters with exertion: 4 L  SpO2 at rest: 96 %  SpO2 with exertion: " "92 %  Using O2 as prescribed: Yes  DME: Jonnathan    Demonstrates appropriate knowledge of O2 use: Yes   Demonstrates appropriate knowledge of O2 safety: Yes   Home O2 System: Concentrator  Portable O2 System: Portable Tank    Hypoxia managed: Yes   Home Pulse Oximeter: Yes     Pre MMRC: 3  Post MMRC:     Oxygen Plan  Goal Status:In Progress  Oxygen Goals: Decrease MMRC score, Learn and use pursed lip breathing as needed, and Titrate supplemental O2 as needed to keep SpO2 at 88% or greater  Oxygen Education/Interventions:   5/20/24 Sp02's > 88% on 2-3 LNC/MS  6/11/24 Pt stated that he is titrating his own oxygen and is currently 2L N/C. Discussed that pulse ox should remain greater than 88%/mk  7/9/24 Pt has started exercising on room air with saturation range 95-97%. Pt has own pulse ox and was instructed that if his saturations go below 89% that he will need to wear his oxygen. Discussed on humidity may affect his breathing/mk    Nutrition Assessment:    Hyperlipidemia: No     Lipids:   Lab Results   Component Value Date    CHOL 162 10/03/2023    HDL 67.1 10/03/2023    TRIG 87 10/03/2023       Current Dietary Guidelines:  no special  Barriers to dietary change: no  Diet Habit Survey: Picture Your Plate  Pre: Initial survey given. Pending completion and return from patient.  Post: To be done at discharge.    Diabetes Assessment      History of Diabetes: No    Weight Management  Height: 175.3 cm (5' 9\")  Weight: 70.3 kg (155 lb)  BMI (Calculated): 22.88    Nutrition Plan    Goal Status: In Progress  Nutrition Goals: Improve Diet Habit Survey score by 5-10 points by discharge  Nutrition Interventions/Education:   5/3/24 gave and reviewed pulmonary nutrution h/o w/ pt's wife/DEVAN  6/11/24 Attended sodium lecture. Handout given for home review/mk  7/5/2024   Gave and reviewed Heart Healthy Tips handout with patient and spouse. Encouraged to further read handouts at home and follow up with questions as needed. Melchor Mccurdy " REYNALDO Minor RDN, LD    Exercise Assessment  Home Exercise: Yes  Mode: weights  Frequency: 3 days a week  Duration: 10    6 Minute Walk Assessment   6 MWT distance: 583 ft  FiO2 used during test:      Exercise Prescription      Exercise Prescription based on: current ex rx     Frequency:  3 days/week   Mode: Nustep, Recumbent bike   Duration: 33 total aerobic minutes   Intensity: RPE 12-14  Target HR:  RPD  MET Level: 2.3  Patient wears supplemental O2: Yes   O2 Flow Rate: 2 to 4 L/min  SpO2 Range: 88 to 100 %     Modality Workload METs Duration (minutes)   1 Pre-Exercise   2:00   2 NuStep Load 6 @50 gutiérrez  2.6 26:00   3 Recumbent Bike Load 3 @ 50 rpm  2.3 10:00   4 Post-Exercise   2:00     Resistance Training: No   Home Exercise Prescription given: To be given prior to discharge from program.    Exercise Plan  Goal Status:In Progress  Exercise Goals: Increase exercise MET level by 5-10% each week, Increase total exercise duration to 30-45 minutes, Initiate strength training 2-3 days a week, and Establish a home exercise program before discharge  Exercise Interventions/Education:   6/4/24 Instructed on upper body weights/mk  6/11/24 No regular home exercise, able to do activities like shopping and gardening/MS    Other Core Components/Risk Factor Assessment:    Medication adherence  Current Medications:   Medication Documentation Review Audit       Reviewed by Chana Perkins RN (Registered Nurse) on 06/17/24 at 1310      Medication Order Taking? Sig Documenting Provider Last Dose Status   albuterol (Proventil HFA) 90 mcg/actuation inhaler [id number] Yes Inhale 2 puffs every 4 hours if needed for wheezing or shortness of breath. Bhavna Reed, APRN-CNP Taking Active   apixaban (Eliquis) 5 mg tablet [id number] Yes Take 1 tablet (5 mg) by mouth 2 times a day. Samantha Abreu MD Taking Active   aspirin 81 mg chewable tablet [id number] Yes Chew 1 tablet (81 mg) once daily. Radha Casey MD Taking Active   atorvastatin  (Lipitor) 40 mg tablet [id number] Yes Take 1 tablet (40 mg) by mouth once daily. Jayme Casey MD Taking Active   cholecalciferol (Vitamin D-3) 125 MCG (5000 UT) capsule [id number] Yes  Historical Provider, MD Taking Active   divalproex (Depakote ER) 500 mg 24 hr tablet [id number] Yes Take 1 tablet (500 mg) by mouth 2 times a day. Do not crush, chew, or split. Radha Casey MD Taking Active   inhalat.spacing dev,med. mask spacer [id number] Yes  Jayme Casey MD Taking Active   ipratropium-albuteroL (Duo-Neb) 0.5-2.5 mg/3 mL nebulizer solution [id number] Yes Take 3 mL by nebulization 4 times a day as needed for wheezing or shortness of breath. Jayme Casey MD Taking Active   lacosamide (Vimpat) 200 mg tablet tablet [id number] Yes TAKE ONE TABLET BY MOUTH TWO TIMES A DAY ARIC Brower-CNP Taking Active   LORazepam (Ativan) 0.5 mg tablet [id number]  Take 1 tablet (0.5 mg) by mouth once daily as needed for anxiety for up to 5 days. Felisha Knox MD   [id number]   naloxone (Narcan) 4 mg/0.1 mL nasal spray [id number] Yes Administer 1 spray (4 mg) into affected nostril(s) if needed for opioid reversal. May repeat every 2-3 minutes if needed, alternating nostrils, until medical assistance becomes available. Ned Rock MD Taking Active   nebulizer accessories misc [id number] Yes Use twice daily Jayme Casey MD Taking Active   nebulizer and compressor device [id number] Yes Use as directed Jayme Casey MD Taking Active   nicotine (Nicoderm CQ) 21 mg/24 hr patch [id number] No Place 1 patch over 24 hours on the skin once every 24 hours.   Patient not taking: Reported on 2024    Jayme Casey MD Not Taking Flag for Review   ondansetron (Zofran) 8 mg tablet [id number] Yes Take 1 tablet (8 mg) by mouth every 8 hours if needed for nausea or vomiting. Elo Peters MD Taking Active   oxygen (O2) gas therapy [id number] Yes Inhale 2 each continuously. Patient to wear O2 2L/NC continuously at rest and  increase O2 to 4l/NC with any activity. Jayme Casey MD Taking Active   prochlorperazine (Compazine) 10 mg tablet [id number] No Take 1 tablet (10 mg) by mouth every 6 hours if needed for nausea or vomiting.   Patient not taking: Reported on 6/17/2024    Elo Peters MD Not Taking Flag for Review   Spiriva Respimat 2.5 mcg/actuation inhaler [id number] Yes  Historical Provider, MD Taking Active   sucralfate (Carafate) 100 mg/mL suspension [id number] No Take 10 mL (1 g) by mouth every 6 hours.   Patient not taking: Reported on 6/17/2024    Radha Casey MD Not Taking Flag for Review   tamsulosin (Flomax) 0.4 mg 24 hr capsule [id number] Yes Take 1 capsule (0.4 mg) by mouth once daily. Abhijit Mendez MD Taking Active   thiamine 100 mg tablet [id number] Yes TAKE 1 TABLET BY MOUTH EVERY DAY Jayme Casey MD Taking Active                                 Medication compliance: Yes   Uses pill box/organizer:  Has a system in place   Carries medication list: No    Uses Inhalers appropriately:  Pt states that he is taking approp    Blood Pressure Management  History of Hypertension: No   Medication Changes: No    Heart Failure Management  Hx of Heart Failure: No    Smoking/Tobacco Assessment  Social History     Tobacco Use   Smoking Status Every Day    Current packs/day: 1.00    Average packs/day: 1 pack/day for 50.0 years (50.0 ttl pk-yrs)    Types: Cigarettes    Passive exposure: Current   Smokeless Tobacco Never       Other Core Component Plan    Goal Status:  In Progress  Other Core Component Goals: Verbalize medication usage and drug actions by discharge, Begin tobacco cessation program, and Set tobacco cessation quit date while enrolled  Other Core Component Interventions/Education: Bronchial hygiene, proper use of inhalers and spacer  5/28/24 Attend bronchial hygiene lecture which included acapella and natividad cough/mk  6/11/24 Verbalizes daily mucous production. Does not have a flutter device and has been encouraged to  "discuss with physician/sim  7/9/24 Has been trying to quit smoking. Aware smoking cessation counselor is available/sim        Individual Patient Goals:    Get off oxygen by discharge    Goal Status: In Progress    Staff Comments:  Mr Neri has been consistent with his pulmonary rehab. He has been walking laps in the hallway on room air, saturations remain greater than 90% with his exercise.  No resp complaints voiced. Chronic moist cough noted. Verbalizes able to \"spit up mucous\".     Rehab Staff Signature: Dixie Rodriguez RN        "

## 2024-07-15 ENCOUNTER — CLINICAL SUPPORT (OUTPATIENT)
Dept: CARDIAC REHAB | Facility: CLINIC | Age: 64
End: 2024-07-15
Payer: MEDICARE

## 2024-07-15 DIAGNOSIS — J44.9 CHRONIC OBSTRUCTIVE PULMONARY DISEASE, UNSPECIFIED COPD TYPE (MULTI): ICD-10-CM

## 2024-07-15 PROCEDURE — 93798 PHYS/QHP OP CAR RHAB W/ECG: CPT | Performed by: INTERNAL MEDICINE

## 2024-07-15 NOTE — PROGRESS NOTES
Education on cholesterol was done during today's session. Discussed with patient the different types of cholesterol, risk factors and how to make lifestyle modifications to improve levels. Handout and patient's recent lab results were given for home review.     Lab Results   Component Value Date    CHOL 162 10/03/2023    HDL 67.1 10/03/2023    TRIG 87 10/03/2023    LDLCALC 78 (L) 10/03/2023

## 2024-07-16 ENCOUNTER — CLINICAL SUPPORT (OUTPATIENT)
Dept: CARDIAC REHAB | Facility: CLINIC | Age: 64
End: 2024-07-16
Payer: MEDICARE

## 2024-07-16 DIAGNOSIS — J44.9 CHRONIC OBSTRUCTIVE PULMONARY DISEASE, UNSPECIFIED COPD TYPE (MULTI): ICD-10-CM

## 2024-07-16 PROCEDURE — 94626 PHY/QHP OP PULM RHB W/MNTR: CPT | Performed by: INTERNAL MEDICINE

## 2024-07-17 ENCOUNTER — OFFICE VISIT (OUTPATIENT)
Dept: PALLIATIVE MEDICINE | Facility: HOSPITAL | Age: 64
End: 2024-07-17
Payer: MEDICARE

## 2024-07-17 VITALS
WEIGHT: 151.6 LBS | TEMPERATURE: 97.5 F | OXYGEN SATURATION: 95 % | HEART RATE: 82 BPM | DIASTOLIC BLOOD PRESSURE: 59 MMHG | BODY MASS INDEX: 22.39 KG/M2 | SYSTOLIC BLOOD PRESSURE: 122 MMHG | RESPIRATION RATE: 16 BRPM

## 2024-07-17 DIAGNOSIS — Z72.0 TOBACCO USE: ICD-10-CM

## 2024-07-17 DIAGNOSIS — Z51.5 PALLIATIVE CARE ENCOUNTER: ICD-10-CM

## 2024-07-17 DIAGNOSIS — R06.02 SHORTNESS OF BREATH: ICD-10-CM

## 2024-07-17 DIAGNOSIS — G89.3 CANCER RELATED PAIN: Primary | ICD-10-CM

## 2024-07-17 DIAGNOSIS — R09.81 NASAL CONGESTION: ICD-10-CM

## 2024-07-17 PROCEDURE — 99214 OFFICE O/P EST MOD 30 MIN: CPT | Mod: GC | Performed by: INTERNAL MEDICINE

## 2024-07-17 PROCEDURE — 99214 OFFICE O/P EST MOD 30 MIN: CPT | Performed by: INTERNAL MEDICINE

## 2024-07-17 RX ORDER — OXYCODONE HYDROCHLORIDE 5 MG/1
5 TABLET ORAL EVERY 6 HOURS PRN
Qty: 120 TABLET | Refills: 0 | Status: SHIPPED | OUTPATIENT
Start: 2024-07-17 | End: 2024-08-16

## 2024-07-17 RX ORDER — FLUTICASONE PROPIONATE 50 MCG
2 SPRAY, SUSPENSION (ML) NASAL DAILY
Qty: 11.1 ML | Refills: 3 | Status: SHIPPED | OUTPATIENT
Start: 2024-07-17 | End: 2025-07-17

## 2024-07-17 RX ORDER — NICOTINE 7MG/24HR
1 PATCH, TRANSDERMAL 24 HOURS TRANSDERMAL EVERY 24 HOURS
Qty: 30 PATCH | Refills: 0 | Status: SHIPPED | OUTPATIENT
Start: 2024-07-17 | End: 2024-08-16

## 2024-07-17 ASSESSMENT — PAIN SCALES - GENERAL
PAINLEVEL_OUTOF10: 3
PAINLEVEL: 0-NO PAIN

## 2024-07-17 ASSESSMENT — PAIN SCALES - PAIN ASSESSMENT IN ADVANCED DEMENTIA (PAINAD): BREATHING: OCCASIONAL LABORED BREATHING, SHORT PERIOD OF HYPERVENTILATION

## 2024-07-17 NOTE — PROGRESS NOTES
.Supportive Oncology Established Patient Note    Patient ID: Martínez Neri is a 63 y.o. male who presents for Follow-up.    HPI  Patient states that for the past 4 days he has been feeling congested. He feel he has more mucous production which seems to be clear/yellow colored.  He also noticed some cough and insomnia related to the cough. He states that he has not been using oxygen for the past 10 days.     Allergies  Patient has no known allergies.     Objective:  [place] Symptom Assessment Scores  0-10 (Numeric) Pain Score: 3     Tiredness Score: 5 Appetite Score: Best appetite   Nausea Score: Not nauseated Wellbeing Score: 4   Depression Score: 4 Dyspnea Score: 5   Anxiety Score: 4 Other Problem Score:  (Congestion)       Last Recorded Vitals  Blood pressure 122/59, pulse 82, temperature 36.4 °C (97.5 °F), temperature source Temporal, resp. rate 16, weight 68.8 kg (151 lb 9.6 oz), SpO2 95%.     Physical Exam:  Constitutional:       General: Patient is not in acute distress.  HENT:      Head: Normocephalic.      Nose: erythematous turbinates bilaterally     Ears: bulging tympanic membrane     Mouth: Redness in oropharynx, Mucous membranes are moist.   Eyes:      Conjunctiva/sclera: Conjunctivae clear, sclerae white. No discharge.     Pupils: Pupils are equal, round, and reactive to light.   Neck:      Vascular: No carotid bruit.   Cardiovascular:      Rate and Rhythm: Normal rate and regular rhythm.      Heart sounds: No murmur heard.  Pulmonary:      Effort: No respiratory distress.      Breath sounds: Clear to auscultation  Abdominal:      General: There is no distension.      Tenderness: There is no abdominal tenderness. There is no guarding.   Musculoskeletal:         General: No deformity.   Skin:     Coloration: Skin is not jaundiced.   Neurological:      General: No focal deficit present.      Mental Status: He is oriented to person, place, and time.   Psychiatric:         Behavior: Behavior normal.  Behavior is cooperative.          Relevant Results  Lab Results   Component Value Date    WBC 5.6 07/08/2024    HGB 9.6 (L) 07/08/2024    HCT 30.8 (L) 07/08/2024    MCV 89 07/08/2024     07/08/2024      Lab Results   Component Value Date    GLUCOSE 86 07/08/2024    CALCIUM 9.3 07/08/2024     07/08/2024    K 4.4 07/08/2024    CO2 28 07/08/2024     07/08/2024    BUN 10 07/08/2024    CREATININE 0.71 07/08/2024      Lab Results   Component Value Date    ALT 9 (L) 02/01/2024    AST 16 02/01/2024    ALKPHOS 69 02/01/2024    BILITOT 0.3 02/01/2024        Relevant Imaging   No results found for this or any previous visit from the past 1000 days.     No image results found.       Medications:   Current Outpatient Medications   Medication Instructions    albuterol (Proventil HFA) 90 mcg/actuation inhaler 2 puffs, inhalation, Every 4 hours PRN    apixaban (ELIQUIS) 5 mg, oral, 2 times daily    aspirin 81 mg, oral, Daily    atorvastatin (LIPITOR) 40 mg, oral, Daily    cholecalciferol (Vitamin D-3) 125 MCG (5000 UT) capsule     divalproex (DEPAKOTE ER) 500 mg, oral, 2 times daily, Do not crush, chew, or split.    inhalat.spacing dev,med. mask spacer No dose, route, or frequency recorded.    ipratropium-albuteroL (Duo-Neb) 0.5-2.5 mg/3 mL nebulizer solution 3 mL, nebulization, 4 times daily PRN    lacosamide (VIMPAT) 200 mg, oral, 2 times daily    LORazepam (ATIVAN) 0.5 mg, oral, Daily PRN    naloxone (NARCAN) 4 mg, nasal, As needed, May repeat every 2-3 minutes if needed, alternating nostrils, until medical assistance becomes available.    nebulizer accessories misc Use twice daily    nebulizer and compressor device Use as directed    nicotine (Nicoderm CQ) 21 mg/24 hr patch 1 patch, transdermal, Every 24 hours    ondansetron (ZOFRAN) 8 mg, oral, Every 8 hours PRN    oxygen (O2) gas therapy 2 each, inhalation, Continuous, Patient to wear O2 2L/NC continuously at rest and increase O2 to 4l/NC with any activity.      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 Patient with sinusitis symptoms for the past 4 days. Clear secretions. Normal oxygen saturation on pulse ox.     Problems:  NSLC, Left upper lobe  Concern for dz progression per 1/2024 CT with sides of LLL  CRF d/t emphysema on 2-4L oxygen  Anxiety and Depression, illness related  Cancer related pain  Rhinitis    Rhinitis  - Flonase 1 puff per nostril daily    Cancer-related pain  - Pleuritic in nature. Sporadic and associated with coughing.   - Takes oxycodone infrequently.   -Continue with Oxycodone 5mg q4h PRN.      #Anxiety  #Depression  -Likely related to stress of cancer dx and being in close contact with healthcare system since he had his cerebral aneurysm fixed several years ago.   - Patient using breathing/relaxation techniques. Fan therapy being used.      #Chronic tobacco smoker  - currently smokes about 3 cigs/day  - was taking nicotine 21 mg but didn't tolerate it.   - start nicotine patch 7 mg patch.     #Chronic respiratory failure  #COPD GOLD 3, emphysema  -Can walk to appointments without stopping , its been 10 days since he used oxygen  -Denies dyspnea at rest and speaks in full sentences during our visits.   -Patient continues to smoke 2-3 cigarettes per day. We recommended to decrease nicotine patches to 7 mg per day.      Code status: DNR/DNI    Changes this visit and follow-up for next visit:   Flonase  New code status DNR/DNI    Next visit: October      Eze Duarte MD  Anesthesiology critical care PGY-5

## 2024-07-18 ENCOUNTER — OFFICE VISIT (OUTPATIENT)
Dept: HEMATOLOGY/ONCOLOGY | Facility: HOSPITAL | Age: 64
End: 2024-07-18
Payer: MEDICARE

## 2024-07-18 VITALS
RESPIRATION RATE: 18 BRPM | TEMPERATURE: 97.3 F | OXYGEN SATURATION: 96 % | BODY MASS INDEX: 22.33 KG/M2 | WEIGHT: 151.24 LBS | SYSTOLIC BLOOD PRESSURE: 110 MMHG | DIASTOLIC BLOOD PRESSURE: 56 MMHG | HEART RATE: 91 BPM

## 2024-07-18 DIAGNOSIS — C34.12 MALIGNANT NEOPLASM OF UPPER LOBE OF LEFT LUNG (MULTI): Primary | ICD-10-CM

## 2024-07-18 PROCEDURE — 99215 OFFICE O/P EST HI 40 MIN: CPT | Mod: GC | Performed by: INTERNAL MEDICINE

## 2024-07-18 PROCEDURE — 99215 OFFICE O/P EST HI 40 MIN: CPT | Performed by: INTERNAL MEDICINE

## 2024-07-18 ASSESSMENT — PAIN SCALES - GENERAL: PAINLEVEL: 0-NO PAIN

## 2024-07-18 NOTE — PROGRESS NOTES
Patient ID: Martínez Neri is a 63 y.o. 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    PD-L1 TPS 20%      PRIOR THERAPIES  Concurrent chemo RT using weekly CarboTaxol, completed 60 Gy in 30 fractions on 01/05/2024     CURRENT THERAPY  Surveillance        CURRENT ONCOLOGICAL PROBLEMS  Cough, productive  Oxygen dependence  Radiation-induced esophagitis        HISTORY OF PRESENT ILLNESS:  Current smoker (50-pack-year history), with history of COPD and seizures, who was diagnosed with a squamous cell carcinoma of the left upper lobe in [place], never treated.  Outpatient notes from [facility] and hematology clinic were reviewed.     On 06/17/2023 the patient had a CT chest for ongoing respiratory symptoms and was found to have a left suprahilar mass involving the left upper lobe bronchi with postobstructive pneumonia/collapse.  On 06/25/2023 CT chest/abdomen/pelvis demonstrated persistent masslike consolidation in the left upper lobe with no signs of extrathoracic metastatic disease.     On 06/26/2023 the patient underwent a bronchoscopy, and pathology was compatible with a squamous cell carcinoma at least in situ, with a foci highly suspicious for invasive squamous cell carcinoma.     On 07/18/2023 a PET/CT obtained, demonstrating a 6.6 cm left upper lobe mass with an SUV of 8.3 and no evidence of distant metastatic disease.     On 07/18/2023 to brain MRI was negative for intracranial metastasis.     On 07/27/2023 bronchoscopy/EBUS was performed.  Endobronchial biopsies of left upper lobe demonstrated an invasive moderately differentiated squamous cell carcinoma.  Left upper lobe hilar FNA  showed atypical cells with squamous differentiation, suspicious for malignancy.      Subcarinal FNA was indeterminate for malignancy with extremely rare atypical cells with squamous differentiation.  Molecular profile: Lack of BRAF, EGFR, HER2, KRAS, MET, ALK, NTRK 1/2/3, RET or ROS1 alterations.  PD-L1 TPS 20%.  The patient was seen by medical oncology and did not too frail for combined chemo RT (there is a performance status ECOG 3) documented on 08/17/2023.  At that time, he lived with some friends, while his family was primarily located in [place].     His daughter traveled and was able to relocate him to [place], and he presents today to clinic accompanied by his daughter, son, and daughter-in-law.     The patient states he is felt much better since his relocation, has been gaining weight, approximately 10 pounds since he moved here.  He is more active, going up and down the steps at home, but still tired and taking some breaks throughout the day.     He denies any headaches or new neurological symptoms.  His respiratory symptoms are stable. There is no new localized pain.     Reported molecular and ancillary testing performed upon diagnosis in [place]:   BRAF negative  [id number] negative  EGFR classic mutations negative  HER2 negative  MET negative  ALK negative  In tract 1/2/3 negative  RET negative  ROS1 negative  PD-L1 20%     Referred to surgery - PFTs were considered prohibitive for surgical resection-FEV1 30%, DLCO 30%.     10/31/23: Repeat EBUS for systematic staging:  Final Cytological Interpretation      A. LYMPH NODE 7 PULMONARY FINE NEEDLE ASPIRATION, CYTOLOGY AND CELL BLOCK:  --  No malignant cells identified.  --  Lymphoid sample.     B. LYMPH NODE 4 L PULMONARY FINE NEEDLE ASPIRATION, CYTOLOGY AND CELL BLOCK:  --  No malignant cells identified.  --  Lymphoid sample.           C. LYMPH NODE 11 L PULMONARY FINE NEEDLE ASPIRATION, CYTOLOGY AND CELL BLOCK:  --  A rare cluster of malignant cells derived  from squamous cell carcinoma, see note  -- Also, please refer to concurrent biopsy surgical pathology report ([id number]).     Note:  The malignant squamous cell carcinoma cells are represented in the cell block only.  Immunostain for p40 is positive. The cytomorphology and immunoprofile support the above diagnosis.  The material is not sufficient for molecular testing.      Component     FINAL DIAGNOSIS   Left lung, upper lobe, biopsy:  -- Superficial fragment of squamous cell carcinoma, keratinizing; see note.     Note: According to clinician's note, PD-L1 and NGS have been performed at an outside institution. These studies can be repeated, if clinically indicated.      NGS and PD-L1 not repeated in this specimen as it was done in outside institution already and the purpose of this procedure was not for diagnosis but systematic mediastinal re-staging.     01/05/2024: Completes definitive chemo RT using 60 Gray in 30 fractions with concurrent weekly CarboTaxol    01/08/2024: Admitted with neutropenic fevers, dehydration.  CT abdomen/pelvis demonstrated a partial thrombus of superior mesenteric artery.  He was a started on heparin and switched to aspirin at discharge.  CT chest was concerning for aspiration pneumonia, he was treated with antibiotics.  He was discharged on oxygen, 2 L at rest, 4 L on exertion.  He was also discharged on Augmentin 875 mg twice daily for 14 days.  Also sucralfate, 10 mL every 6 hours.  While hospitalized, he had episodes of intermittent agitation and was found to have elevated ammonia levels.  On 01/11/2024, ALT was 63, AST 60.    01/29/2024: CT chest with IV contrast demonstrates the development of multiple pulmonary nodules, including a 3 cm spiculated left lower lobe nodule, concerning for progression of his disease.  A PET scan was ordered STAT for subsequent evaluation, and a referral was placed to IR for CT-guided biopsy of most assessable lung nodule (right lower lobe,  posterior, image #[id number]).    2024: Pathology from RLL biopsy not diagnostic    24: The contrast obtained for navigational bronchoscopy demonstrated significant progression of the lung parenchyma abnormalities, and procedure was canceled.  Patient will remain on surveillance.    24: CT chest showing likely post radiation changes in THOMAS but with slight increase few pulmonary nodules    24: Overall patient is clinically doing well so will hold off on a bronch at this time and plan to repeat scans in 2 months    PAST MEDICAL HISTORY  Brain aneurysm  COPD (chronic obstructive pulmonary disease) (CMS/HCC)  Epilepsy (CMS/HCC)  High cholesterol  History of alcohol use  Hypertension     PAST SURGICAL HISTORY:  CAROTID ENDARTERECTOMY  COLONOSCOPY  HERNIA REPAIR      SOCIAL HISTORY  Tobacco (50 pack-year history) use - quit after cancer dx      CURRENT MEDS REVIEWED:  Scheduled medications  Continuous medications  PRN medications     ALLERGIES  NKDA     FAMILY HISTORY  Father had prostate CA     SUBJECTIVE:  Patient presents today with wife to go over scan results  He is doing quite well - functional status and breathing improving. He is engaged in pulmonary rehab and is now off oxygen. Able to engage in mild to moderate exercise for extended periods of time. Is more active at home. Appetite is good and weight is stable.   He continues to the have a cough intermittently productive of clear sputum.   No new GI or neuro issues. No rashes    OBJECTIVE:  Vitals:    24 1129   BP: 110/56   Pulse: 91   Resp: 18   Temp: 36.3 °C (97.3 °F)   SpO2: 96%        Body surface area is 1.83 meters squared.     Wt Readings from Last 5 Encounters:   24 68.6 kg (151 lb 3.8 oz)   24 68.8 kg (151 lb 9.6 oz)   24 71.2 kg (157 lb)   24 67.9 kg (149 lb 11.1 oz)   24 70.3 kg (155 lb)     ECO    Physical Exam  Constitutional:       Appearance: Normal appearance.   HENT:      Head:  Normocephalic and atraumatic.   Eyes:      General: No scleral icterus.  Cardiovascular:      Rate and Rhythm: Normal rate and regular rhythm.      Heart sounds: Normal heart sounds.   Pulmonary:      Effort: Pulmonary effort is normal.      Breath sounds: Normal breath sounds.   Abdominal:      General: There is no distension.      Palpations: Abdomen is soft.      Tenderness: There is no abdominal tenderness. There is no guarding.   Musculoskeletal:      Right lower leg: No edema.      Left lower leg: No edema.   Lymphadenopathy:      Cervical: No cervical adenopathy.   Skin:     General: Skin is warm.      Coloration: Skin is not pale.      Findings: No erythema or rash.   Neurological:      General: No focal deficit present.      Mental Status: He is alert and oriented to person, place, and time. Mental status is at baseline.      Motor: No weakness.      Gait: Gait normal.   Psychiatric:         Mood and Affect: Mood normal.         Behavior: Behavior normal.         Thought Content: Thought content normal.         Judgment: Judgment normal.          Diagnostic Results   Results:  Labs:  Lab Results   Component Value Date    WBC 5.6 07/08/2024    HGB 9.6 (L) 07/08/2024    HCT 30.8 (L) 07/08/2024    MCV 89 07/08/2024     07/08/2024      Lab Results   Component Value Date    NEUTROABS 5.99 02/01/2024        Lab Results   Component Value Date    GLUCOSE 86 07/08/2024    CALCIUM 9.3 07/08/2024     07/08/2024    K 4.4 07/08/2024    CO2 28 07/08/2024     07/08/2024    BUN 10 07/08/2024    CREATININE 0.71 07/08/2024     Lab Results   Component Value Date    ALT 9 (L) 02/01/2024    AST 16 02/01/2024    ALKPHOS 69 02/01/2024    BILITOT 0.3 02/01/2024      Lab Results   Component Value Date    ACTH 26.6 02/01/2024    CORTISOL 11.6 02/01/2024    TSH 3.75 02/01/2024    FREET4 0.90 01/11/2024     STUDY:  CT CHEST W IV CONTRAST;  7/8/2024 9:26 am      INDICATION:  Signs/Symptoms:stage IIB NSCLC of the THOMAS  - treated with concurrent  chemo-RT.      COMPARISON:  CT dated 04/01/2024      ACCESSION NUMBER(S):  [id number]      ORDERING CLINICIAN:  SENIA FUNES      TECHNIQUE:  Helical data acquisition of the chest was obtained  with 66 cc of  Omnipaque 350. Images were reformatted in axial, coronal, and  sagittal planes.      FINDINGS:  LUNGS AND AIRWAYS:  The trachea and central airways are patent. No endobronchial lesion.  Small amount of mucus and secretion in the trachea.      There is moderate upper lung predominant emphysema.      Interval worsening of the aeration of the left upper lobe with  increased focal left upper lobe opacity and volume loss with  associated occlusion of one of the left upper lobe bronchi.      There is a 6 mm left upper lobe nodule, image 91/349, increased in  size compared to prior study.  4 mm left upper lobe nodule, image 104/349, also increased in size  compared to prior study.  4 mm right lower lobe centrally necrotic nodule seen in image  168/349, new compared to prior study.      Interval reduction in the size of a left lower lobe spiculated nodule  measuring 6 mm, image 228/349. Interval reduction in the size of a  right lower lobe now 7 mm nodule seen in image 262/349, previously 10  mm. There is also interval improvement in the previously seen  multifocal airspace opacity in bilateral lower lobes and areas of  tree-in-bud nodularity.      MEDIASTINUM AND WILLIAM, LOWER NECK AND AXILLA:  The visualized thyroid gland is within normal limits.      No evidence of thoracic lymphadenopathy by CT criteria.      Esophagus appears within normal limits as seen.      HEART AND VESSELS:  The thoracic aorta is of normal course and caliber with moderate  vascular calcifications.      Main pulmonary artery and its branches are normal in caliber.      Severe coronary artery calcification. The study is not optimized for  evaluation of coronary arteries.      The cardiac chambers are not enlarged.  Calcification of the aortic  valve. Correlate with aortic valve stenosis.      No evidence of pericardial effusion.      UPPER ABDOMEN:  Small hypodense lesions are identified in the liver, incompletely  characterized but stable compared to prior study.      CHEST WALL AND OSSEOUS STRUCTURES:  There are no suspicious osseous lesions. Multilevel degenerative  changes are present      IMPRESSION:  1.  Interval worsening of the aeration of the left upper lobe with  increase in volume loss, occlusion of one of the left upper bronchi  and increase in masslike opacity. Findings might be due to evolving  postradiation changes. Recommend close attention on follow-up study.  2. Interval increase in size of two nodules in the left upper lobe  measuring up to 6 mm. Recommend close attention on short-term  follow-up chest CT  3. There has been interval improvement in the previously seen  tree-in-bud nodular densities in bilateral lower lobes in keeping  with improving inflammatory/infectious process.  4. Severe coronary artery calcification. Calcification of the aortic  valve and correlate with aortic stenosis.          Assessment/Plan     Primary cancer of left upper lobe of lung (Multi), Clinical: Stage IIB (cT2, cN1, cM0)    Stage IIB squamous cell carcinoma of the left upper lobe, treated with concurrent chemo RT using weekly CarboTaxol, completed 60 Gray in 30 fractions on 01/05/2024.  Subsequently hospitalized with neutropenic fevers on 01/08/2024.  Suspicion for aspiration during the workup performed at outside hospital as well as SMA partial thrombus.  He has seen pulmonology in [facility] Dr. Sahu and underwent RLL biopsy by IR which was not diagnostic.  He was then scheduled for a navigational bronchoscopy but the lung lesions that were supposed to be biopsied pretty much resolved in the planning scans -   This is c/w with inflammatory changes   Due to multiple complications as described above, he missed the window  for consolidative durvalumab.  There was also concern in regards to high risk of toxicities due to his underlying pulmonary challenges.  We are specifically concerned about increased risk for treatment related pneumonitis. Being followed closely with scans for now    Most recent scans 07/08 showing what appears to be radiation changes in THOMAS but with increasing size of multiple pulmonary nodules. Patient is clinically doing much better than he has in a long time. We discussed the option of going for bronchoscopy now vs holding off and following with repeat scans in 2 months as has had waxing and waning lung nodules in the past. Via shared decision making collectively decided to follow up in clinic with CT chest in 2 months. If clinical status worsens patient will call and we can re-evaluate sooner.       Patient seen and discussed with Dr. Fran Fontanez MD  Hematology-Oncology Fellow - PGY5    I saw and evaluated the patient. I personally obtained the key and critical portions of the history and physical exam or was physically present for key and critical portions performed by the resident/fellow. I reviewed the resident/fellow's documentation and discussed the patient with the resident/fellow. I agree with the resident/fellow's medical decision making as documented in the note.     Elo Peters MD, MS

## 2024-07-19 ENCOUNTER — APPOINTMENT (OUTPATIENT)
Dept: CARDIAC REHAB | Facility: CLINIC | Age: 64
End: 2024-07-19
Payer: MEDICARE

## 2024-07-22 ENCOUNTER — APPOINTMENT (OUTPATIENT)
Dept: CARDIAC REHAB | Facility: CLINIC | Age: 64
End: 2024-07-22
Payer: MEDICARE

## 2024-07-22 ENCOUNTER — HOSPITAL ENCOUNTER (OUTPATIENT)
Dept: RADIOLOGY | Facility: HOSPITAL | Age: 64
Discharge: HOME | End: 2024-07-22
Payer: MEDICARE

## 2024-07-22 DIAGNOSIS — J18.9 PNEUMONIA, UNSPECIFIED ORGANISM: ICD-10-CM

## 2024-07-22 PROCEDURE — 71046 X-RAY EXAM CHEST 2 VIEWS: CPT | Performed by: RADIOLOGY

## 2024-07-22 PROCEDURE — 71046 X-RAY EXAM CHEST 2 VIEWS: CPT

## 2024-07-29 ENCOUNTER — CLINICAL SUPPORT (OUTPATIENT)
Dept: CARDIAC REHAB | Facility: CLINIC | Age: 64
End: 2024-07-29
Payer: MEDICARE

## 2024-07-29 DIAGNOSIS — J44.9 CHRONIC OBSTRUCTIVE PULMONARY DISEASE, UNSPECIFIED COPD TYPE (MULTI): ICD-10-CM

## 2024-07-29 PROCEDURE — 94626 PHY/QHP OP PULM RHB W/MNTR: CPT | Performed by: INTERNAL MEDICINE

## 2024-07-29 NOTE — PROGRESS NOTES
Pulmonary Rehab Education    7/29/2024  Discussed infection prevention and how to clean resp equipment. Handout given for home review.    Signature Dixie Rodriguez RN

## 2024-07-30 ENCOUNTER — CLINICAL SUPPORT (OUTPATIENT)
Dept: CARDIAC REHAB | Facility: CLINIC | Age: 64
End: 2024-07-30
Payer: MEDICARE

## 2024-07-30 DIAGNOSIS — J44.9 CHRONIC OBSTRUCTIVE PULMONARY DISEASE, UNSPECIFIED COPD TYPE (MULTI): ICD-10-CM

## 2024-07-30 PROCEDURE — 94626 PHY/QHP OP PULM RHB W/MNTR: CPT | Performed by: INTERNAL MEDICINE

## 2024-08-02 ENCOUNTER — CLINICAL SUPPORT (OUTPATIENT)
Dept: CARDIAC REHAB | Facility: CLINIC | Age: 64
End: 2024-08-02
Payer: MEDICARE

## 2024-08-02 DIAGNOSIS — J44.9 CHRONIC OBSTRUCTIVE PULMONARY DISEASE, UNSPECIFIED COPD TYPE (MULTI): ICD-10-CM

## 2024-08-02 PROCEDURE — 94626 PHY/QHP OP PULM RHB W/MNTR: CPT | Performed by: INTERNAL MEDICINE

## 2024-08-02 NOTE — PROGRESS NOTES
Martínez Neri  1960  [id number]  64 y.o.    [facility] [id number]      Cardiac/Pulmonary Rehab Nutrition Education    8/2/2024  Gave and reviewed label reading tips handout with patient's wife. Reviewed sodium guidelines and added sugar guidelines. Pt's wife is already reading labels and limiting processed foods. Encouraged to have pt review handouts and follow up with questions as needed.    Signature Calli Minor, KARISHMA, LD

## 2024-08-05 ENCOUNTER — CLINICAL SUPPORT (OUTPATIENT)
Dept: CARDIAC REHAB | Facility: CLINIC | Age: 64
End: 2024-08-05
Payer: MEDICARE

## 2024-08-05 ENCOUNTER — DOCUMENTATION (OUTPATIENT)
Dept: CARDIAC REHAB | Facility: CLINIC | Age: 64
End: 2024-08-05

## 2024-08-05 DIAGNOSIS — J44.9 CHRONIC OBSTRUCTIVE PULMONARY DISEASE, UNSPECIFIED COPD TYPE (MULTI): ICD-10-CM

## 2024-08-05 PROCEDURE — 94626 PHY/QHP OP PULM RHB W/MNTR: CPT | Performed by: INTERNAL MEDICINE

## 2024-08-05 NOTE — PROGRESS NOTES
"  Pulmonary Rehabilitation 90 Day Reassessment    Name: Martínez Neri  Medical Record Number: [id number]  YOB: 1960  Age: 64 y.o.    Today’s Date: 8/5/2024  Primary Care Physician: Jayme Casey MD  Referring Physician: Jose Sahu MD  Program Location: [address]  Primary Diagnosis: COPD   Onset/Date of Diagnosis: 12/2022  Session #:  28    AACVPR Risk Stratification: moderate    Falls Risk: Medium  Psychosocial Assessment  Initial PHQ-9:  16  Sent PH-Q 9 to MD if score > 20  Post PHQ-9:    Stress Management  Pt reported/currently experiencing stress: Yes; Stress; Severity: moderate, Anxiety; Severity: moderate, and Depression; Severity: moderate  Patient uses stress management skills: No   History of:  since CA dx  Currently seeing a mental health provider: No  Social Support: Yes, Whom:Spouse  and family  Pre CAT score: 17  Post CAT score: Survey to be given at discharge.   Learning Assessment:  Learning assessment/barriers: None  Preferred learning method: Auditory  Barriers: None  Comments:    Stages of Change:Action    Psychosocial Plan  Goal Status:In Progress  Psychosocial Goals: Demonstrating proper techniques for stress management, Maintain or lower PH-Q 9 score by discharge, and Identify strategies for managing depression  Psychosocial Interventions/Education:   4/30/24 reviewed initial PHQ-9 score reviewed w/ pt, will reassess at mid-pt, Pt scored high on PHQ9, discussed counseling, declined at this time.   6/3/24 Attended stress lecture. Handout given for home review/  6/11/24 Pt states that he has \"good days and bad days\". Has been encouraged for many yrs to seek counseling but does not want to./  7/9/24 No psychosocial issues voiced to staff/       Oxygen Assessment  SpO2 at rest: 96 %  SpO2 with exertion: 92 %    Oxygen Use    Supplemental O2 prescribed: Yes,   Liters at rest: 0 L  Liters with exertion: 0 L  SpO2 at rest: 96 %  SpO2 with exertion: 92 " "%  Using O2 as prescribed: Yes  DME: [facility]    Demonstrates appropriate knowledge of O2 use: Yes   Demonstrates appropriate knowledge of O2 safety: Yes   Home O2 System: Concentrator  Portable O2 System: Portable Tank    Hypoxia managed: Yes   Home Pulse Oximeter: Yes     Pre MMRC: 3  Post MMRC:     Oxygen Plan  Goal Status:In Progress  Oxygen Goals: Decrease MMRC score, Learn and use pursed lip breathing as needed, and Titrate supplemental O2 as needed to keep SpO2 at 88% or greater  Oxygen Education/Interventions:   5/20/24 Sp02's > 88% on 2-3 LNC/MS  6/11/24 Pt stated that he is titrating his own oxygen and is currently 2L N/C. Discussed that pulse ox should remain greater than 88%/mk  7/9/24 Pt has started exercising on room air with saturation range 95-97%. Pt has own pulse ox and was instructed that if his saturations go below 89% that he will need to wear his oxygen. Discussed on humidity may affect his breathing/mk  7/30/2024  Discussed infection prevention and when to contact physician. Handout given for home review and proper cleaning of respiratory equipment/MK    Nutrition Assessment:    Hyperlipidemia: No     Lipids:   Lab Results   Component Value Date    CHOL 162 10/03/2023    HDL 67.1 10/03/2023    TRIG 87 10/03/2023       Current Dietary Guidelines:  no special  Barriers to dietary change: no  Diet Habit Survey: Picture Your Plate  Pre: Initial survey given. Pending completion and return from patient.  Post: To be done at discharge.    Diabetes Assessment      History of Diabetes: No    Weight Management  Height: 175.3 cm (5' 9\")  Weight: 70.3 kg (155 lb)  BMI (Calculated): 22.88    Nutrition Plan    Goal Status: In Progress  Nutrition Goals: Improve Diet Habit Survey score by 5-10 points by discharge  Nutrition Interventions/Education:   5/3/24 gave and reviewed pulmonary nutrution h/o w/ pt's wife/DEVAN  6/11/24 Attended sodium lecture. Handout given for home review/mk  7/5/2024   Gave and reviewed " Heart Healthy Tips handout with patient and spouse. Encouraged to further read handouts at home and follow up with questions as needed. Signature Calli Minor RDN, LD  8/2/2024  Gave and reviewed label reading tips handout with patient's wife. Reviewed sodium guidelines and added sugar guidelines. Pt's wife is already reading labels and limiting processed foods. Encouraged to have pt review handouts and follow up with questions as needed./Signature Calli Minor RDN, [facility]  Exercise Assessment  Home Exercise: Yes  Mode: weights  Frequency: 3 days a week  Duration: 10    6 Minute Walk Assessment   6 MWT distance: 583 ft  FiO2 used during test:      Exercise Prescription      Exercise Prescription based on: current ex rx     Frequency:  3 days/week   Mode: Nustep, Recumbent bike   Duration: 40 total aerobic minutes   Intensity: RPE 12-14  Target HR:  RPD  MET Level: 2.6  Patient wears supplemental O2: No  O2 Flow Rate: 0 L/min  SpO2 Range: 88 to 100 %     Modality Workload METs Duration (minutes)   1 Pre-Exercise   2:00   2 NuStep Load 6 @50 gutiérrez  2.6 20:00   3 Recumbent Bike Load 3 @ 50 rpm  2.3 20:00   4 Post-Exercise   2:00     Resistance Training: No   Home Exercise Prescription given:Yes    Exercise Plan  Goal Status:In Progress  Exercise Goals: Increase exercise MET level by 5-10% each week, Increase total exercise duration to 30-45 minutes, Initiate strength training 2-3 days a week, and Establish a home exercise program before discharge  Exercise Interventions/Education:   6/4/24 Instructed on upper body weights/mk  6/11/24 No regular home exercise, able to do activities like shopping and gardening/MS    Other Core Components/Risk Factor Assessment:    Medication adherence  Current Medications:   Medication Documentation Review Audit       Reviewed by Lola Momin MA (Medical Assistant) on 07/18/24 at 1131      Medication Order Taking? Sig Documenting Provider Last Dose Status   albuterol  (Proventil HFA) 90 mcg/actuation inhaler [id number] Yes Inhale 2 puffs every 4 hours if needed for wheezing or shortness of breath. ARIC Hoff-CNP Taking Active   apixaban (Eliquis) 5 mg tablet [id number] Yes Take 1 tablet (5 mg) by mouth 2 times a day. Samantha Abreu MD Taking Active   aspirin 81 mg chewable tablet [id number] Yes Chew 1 tablet (81 mg) once daily. Radha Casey MD Taking Active   atorvastatin (Lipitor) 40 mg tablet [id number] Yes Take 1 tablet (40 mg) by mouth once daily. Jayme Casey MD Taking Active   cholecalciferol (Vitamin D-3) 125 MCG (5000 UT) capsule [id number] Yes  Historical Provider, MD Taking Active   divalproex (Depakote ER) 500 mg 24 hr tablet [id number] Yes Take 1 tablet (500 mg) by mouth 2 times a day. Do not crush, chew, or split. Radha Casey MD Taking Active   fluticasone (Flonase) 50 mcg/actuation nasal spray [id number] Yes Administer 2 sprays into each nostril once daily. Shake gently. Before first use, prime pump. After use, clean tip and replace cap. Ned Rock MD Taking Active   inhalat.spacing dev,med. mask spacer [id number] Yes  Jayme Casey MD Taking Active   ipratropium-albuteroL (Duo-Neb) 0.5-2.5 mg/3 mL nebulizer solution [id number] Yes Take 3 mL by nebulization 4 times a day as needed for wheezing or shortness of breath. Jayme Casey MD Taking Active   lacosamide (Vimpat) 200 mg tablet tablet [id number] Yes TAKE ONE TABLET BY MOUTH TWO TIMES A DAY LEW Brower Taking Active   LORazepam (Ativan) 0.5 mg tablet [id number]  Take 1 tablet (0.5 mg) by mouth once daily as needed for anxiety for up to 5 days. Felisha Knox MD   23 [id number]   naloxone (Narcan) 4 mg/0.1 mL nasal spray [id number] Yes Administer 1 spray (4 mg) into affected nostril(s) if needed for opioid reversal. May repeat every 2-3 minutes if needed, alternating nostrils, until medical assistance becomes available. Ned Rock MD Taking Active   nebulizer  accessories misc [id number] Yes Use twice daily Jayme Casey MD Taking Active   nebulizer and compressor device [id number] Yes Use as directed Jayme Casey MD Taking Active    Patient not taking:   Discontinued 07/17/24 1616   nicotine (Nicoderm CQ) 7 mg/24 hr patch [id number] Yes Place 1 patch over 24 hours on the skin once every 24 hours. Ned Rock MD Taking Active   ondansetron (Zofran) 8 mg tablet [id number] Yes Take 1 tablet (8 mg) by mouth every 8 hours if needed for nausea or vomiting. Elo Peters MD Taking Active   oxyCODONE (Roxicodone) 5 mg immediate release tablet [id number] Yes Take 1 tablet (5 mg) by mouth every 6 hours if needed for severe pain (7 - 10). Ned Rock MD Taking Active   oxygen (O2) gas therapy [id number] Yes Inhale 2 each continuously. Patient to wear O2 2L/NC continuously at rest and increase O2 to 4l/NC with any activity. Jayme Casey MD Taking Active   prochlorperazine (Compazine) 10 mg tablet [id number] No Take 1 tablet (10 mg) by mouth every 6 hours if needed for nausea or vomiting.   Patient not taking: Reported on 6/17/2024    Elo Peters MD Not Taking Active   Spiriva Respimat 2.5 mcg/actuation inhaler [id number] Yes  Historical Provider, MD Taking Active   sucralfate (Carafate) 100 mg/mL suspension [id number] No Take 10 mL (1 g) by mouth every 6 hours.   Patient not taking: Reported on 6/17/2024    Radha Casey MD Not Taking Active   tamsulosin (Flomax) 0.4 mg 24 hr capsule [id number] Yes Take 1 capsule (0.4 mg) by mouth once daily. Abhijit Mendez MD Taking Active   thiamine 100 mg tablet [id number] Yes TAKE 1 TABLET BY MOUTH EVERY DAY Jayme Casey MD Taking Active                                 Medication compliance: Yes   Uses pill box/organizer:  Has a system in place   Carries medication list: No    Uses Inhalers appropriately:  Pt states that he is taking approp    Blood Pressure Management  History of Hypertension: No   Medication Changes: No     Heart Failure Management  Hx of Heart Failure: No    Smoking/Tobacco Assessment  Social History     Tobacco Use   Smoking Status Every Day    Current packs/day: 1.00    Average packs/day: 1 pack/day for 50.0 years (50.0 ttl pk-yrs)    Types: Cigarettes    Passive exposure: Current   Smokeless Tobacco Never       Other Core Component Plan    Goal Status:  In Progress  Other Core Component Goals: Verbalize medication usage and drug actions by discharge, Begin tobacco cessation program, and Set tobacco cessation quit date while enrolled  Other Core Component Interventions/Education: Bronchial hygiene, proper use of inhalers and spacer  5/28/24 Attend bronchial hygiene lecture which included acapella and henson cough/mk  6/11/24 Verbalizes daily mucous production. Does not have a flutter device and has been encouraged to discuss with physician/sim  7/9/24 Has been trying to quit smoking. Aware smoking cessation counselor is available/sim        Individual Patient Goals:    Get off oxygen by discharge    Goal Status: In Progress    Staff Comments:  Mr. Neri remains adherent to his pulmonary rehab exercise sessions. He continues walking laps in the hallway on room air, saturations remain greater than 90% with his exercise.  No respiratory complaints voiced.     Rehab Staff Signature: Rosie Elizabeth RN

## 2024-08-06 ENCOUNTER — CLINICAL SUPPORT (OUTPATIENT)
Dept: CARDIAC REHAB | Facility: CLINIC | Age: 64
End: 2024-08-06
Payer: MEDICARE

## 2024-08-06 DIAGNOSIS — J44.9 CHRONIC OBSTRUCTIVE PULMONARY DISEASE, UNSPECIFIED COPD TYPE (MULTI): ICD-10-CM

## 2024-08-06 PROCEDURE — 94626 PHY/QHP OP PULM RHB W/MNTR: CPT | Performed by: INTERNAL MEDICINE

## 2024-08-12 ENCOUNTER — CLINICAL SUPPORT (OUTPATIENT)
Dept: CARDIAC REHAB | Facility: CLINIC | Age: 64
End: 2024-08-12
Payer: MEDICARE

## 2024-08-12 DIAGNOSIS — J44.9 CHRONIC OBSTRUCTIVE PULMONARY DISEASE, UNSPECIFIED COPD TYPE (MULTI): ICD-10-CM

## 2024-08-12 PROCEDURE — 94626 PHY/QHP OP PULM RHB W/MNTR: CPT | Performed by: INTERNAL MEDICINE

## 2024-08-12 NOTE — PROGRESS NOTES
Pulmonary Rehab Education    8/12/2024  Mini lecture on Cardiovascular Changes with Exercise. Handout given for home review.    Signature Dixie Rodriguez RN

## 2024-08-13 ENCOUNTER — CLINICAL SUPPORT (OUTPATIENT)
Dept: CARDIAC REHAB | Facility: CLINIC | Age: 64
End: 2024-08-13
Payer: MEDICARE

## 2024-08-13 DIAGNOSIS — E78.00 HYPERCHOLESTEROLEMIA: ICD-10-CM

## 2024-08-13 DIAGNOSIS — J44.9 CHRONIC OBSTRUCTIVE PULMONARY DISEASE, UNSPECIFIED COPD TYPE (MULTI): ICD-10-CM

## 2024-08-13 PROCEDURE — 94626 PHY/QHP OP PULM RHB W/MNTR: CPT | Performed by: INTERNAL MEDICINE

## 2024-08-13 RX ORDER — ATORVASTATIN CALCIUM 40 MG/1
40 TABLET, FILM COATED ORAL DAILY
Qty: 90 TABLET | Refills: 0 | OUTPATIENT
Start: 2024-08-13

## 2024-08-19 ENCOUNTER — CLINICAL SUPPORT (OUTPATIENT)
Dept: CARDIAC REHAB | Facility: CLINIC | Age: 64
End: 2024-08-19
Payer: MEDICARE

## 2024-08-19 DIAGNOSIS — J44.9 CHRONIC OBSTRUCTIVE PULMONARY DISEASE, UNSPECIFIED COPD TYPE (MULTI): ICD-10-CM

## 2024-08-19 DIAGNOSIS — E78.00 HYPERCHOLESTEROLEMIA: ICD-10-CM

## 2024-08-19 PROCEDURE — 94626 PHY/QHP OP PULM RHB W/MNTR: CPT | Performed by: INTERNAL MEDICINE

## 2024-08-19 RX ORDER — ATORVASTATIN CALCIUM 40 MG/1
40 TABLET, FILM COATED ORAL DAILY
Qty: 30 TABLET | Refills: 0 | Status: SHIPPED | OUTPATIENT
Start: 2024-08-19

## 2024-08-23 ENCOUNTER — CLINICAL SUPPORT (OUTPATIENT)
Dept: CARDIAC REHAB | Facility: CLINIC | Age: 64
End: 2024-08-23
Payer: MEDICARE

## 2024-08-23 DIAGNOSIS — J44.9 CHRONIC OBSTRUCTIVE PULMONARY DISEASE, UNSPECIFIED COPD TYPE (MULTI): ICD-10-CM

## 2024-08-23 PROCEDURE — 94626 PHY/QHP OP PULM RHB W/MNTR: CPT | Performed by: INTERNAL MEDICINE

## 2024-08-23 NOTE — PROGRESS NOTES
Pulmonary Rehab Education    8/23/2024 Discussion of prescribed drug names, doses, side effects, and medication uses was done with patient. Medication education page was completed and given to patient. Handouts were given for home review and patient was instructed to come back with any questions.      Signature Dixie Rodriguez RN

## 2024-08-26 ENCOUNTER — APPOINTMENT (OUTPATIENT)
Dept: PRIMARY CARE | Facility: CLINIC | Age: 64
End: 2024-08-26
Payer: MEDICARE

## 2024-08-27 ENCOUNTER — CLINICAL SUPPORT (OUTPATIENT)
Dept: CARDIAC REHAB | Facility: CLINIC | Age: 64
End: 2024-08-27
Payer: MEDICARE

## 2024-08-27 DIAGNOSIS — J44.9 CHRONIC OBSTRUCTIVE PULMONARY DISEASE, UNSPECIFIED COPD TYPE (MULTI): ICD-10-CM

## 2024-08-27 PROCEDURE — 93798 PHYS/QHP OP CAR RHAB W/ECG: CPT | Performed by: INTERNAL MEDICINE

## 2024-08-30 ENCOUNTER — CLINICAL SUPPORT (OUTPATIENT)
Dept: CARDIAC REHAB | Facility: CLINIC | Age: 64
End: 2024-08-30
Payer: MEDICARE

## 2024-08-30 DIAGNOSIS — J44.9 CHRONIC OBSTRUCTIVE PULMONARY DISEASE, UNSPECIFIED COPD TYPE (MULTI): ICD-10-CM

## 2024-08-30 PROCEDURE — 94626 PHY/QHP OP PULM RHB W/MNTR: CPT | Performed by: INTERNAL MEDICINE

## 2024-09-03 ENCOUNTER — TELEPHONE (OUTPATIENT)
Dept: ADMISSION | Facility: HOSPITAL | Age: 64
End: 2024-09-03

## 2024-09-03 ENCOUNTER — TELEPHONE (OUTPATIENT)
Dept: HEMATOLOGY/ONCOLOGY | Facility: HOSPITAL | Age: 64
End: 2024-09-03

## 2024-09-03 ENCOUNTER — DOCUMENTATION (OUTPATIENT)
Dept: CARDIAC REHAB | Facility: CLINIC | Age: 64
End: 2024-09-03

## 2024-09-03 ENCOUNTER — CLINICAL SUPPORT (OUTPATIENT)
Dept: CARDIAC REHAB | Facility: CLINIC | Age: 64
End: 2024-09-03
Payer: MEDICARE

## 2024-09-03 DIAGNOSIS — J44.9 CHRONIC OBSTRUCTIVE PULMONARY DISEASE, UNSPECIFIED COPD TYPE (MULTI): ICD-10-CM

## 2024-09-03 PROCEDURE — 94626 PHY/QHP OP PULM RHB W/MNTR: CPT | Performed by: INTERNAL MEDICINE

## 2024-09-03 NOTE — TELEPHONE ENCOUNTER
Patients wife called and states she has intermittent FMLA and needs paperwork updated. Fax number given to wife to send to office.  Denies additional concerns at this time.

## 2024-09-03 NOTE — PROGRESS NOTES
"  Pulmonary Rehabilitation Discharge Note    Name: Martínez Neri  Medical Record Number: [id number]  YOB: 1960  Age: 64 y.o.    Today’s Date: 9/3/2024  Primary Care Physician: Jayme Casey MD  Referring Physician: Jose Sahu MD  Program Location: [address]  Primary Diagnosis: COPD   Onset/Date of Diagnosis: 12/2022  Session #:  36  AACVPR Risk Stratification: moderate  Falls Risk: Medium  Psychosocial Assessment  Initial PHQ-9:  16  Sent PH-Q 9 to MD if score > 20  Post PHQ-9:  7  Stress Management  Pt reported/currently experiencing stress: Yes; Stress; Severity: moderate, Anxiety; Severity: moderate, and Depression; Severity: moderate  Patient uses stress management skills: No   History of:  since CA dx  Currently seeing a mental health provider: No  Social Support: Yes, Whom:Spouse  and family  Pre CAT score: 17  Post CAT score:  19  Learning Assessment:  Learning assessment/barriers: None  Preferred learning method: Auditory  Barriers: None  Comments:  Stages of Change:Action  Psychosocial Plan  Goal Status:Met  Psychosocial Goals: Demonstrating proper techniques for stress management, Maintain or lower PH-Q 9 score by discharge, and Identify strategies for managing depression  Psychosocial Interventions/Education:   4/30/24 reviewed initial PHQ-9 score reviewed w/ pt, will reassess at mid-pt, Pt scored high on PHQ9, discussed counseling, declined at this time.   6/3/24 Attended stress lecture. Handout given for home review/  6/11/24 Pt states that he has \"good days and bad days\". Has been encouraged for many yrs to seek counseling but does not want to./  7/9/24 No psychosocial issues voiced to staff/  9/3/24 Post PHQ9 score shows significant improvement since admission. Has strong social support from spouse/       Oxygen Assessment  SpO2 at rest: 96 %  SpO2 with exertion: 92 %  Oxygen Use  Supplemental O2 prescribed: Yes,   Liters at rest: 0 L  Liters with " "exertion: 0 L  SpO2 at rest: 96 %  SpO2 with exertion: 92 %  Using O2 as prescribed: Yes  DME: [facility]    Demonstrates appropriate knowledge of O2 use: Yes   Demonstrates appropriate knowledge of O2 safety: Yes   Home O2 System: Concentrator  Portable O2 System: Portable Tank  Hypoxia managed: Yes   Home Pulse Oximeter: Yes     Pre MMRC: 3  Post MMRC: 0    Oxygen Plan  Goal Status:Met  Oxygen Goals: Decrease MMRC score, Learn and use pursed lip breathing as needed, and Titrate supplemental O2 as needed to keep SpO2 at 88% or greater  Oxygen Education/Interventions:   5/20/24 Sp02's > 88% on 2-3 LNC/MS  6/11/24 Pt stated that he is titrating his own oxygen and is currently 2L N/C. Discussed that pulse ox should remain greater than 88%/mk  7/9/24 Pt has started exercising on room air with saturation range 95-97%. Pt has own pulse ox and was instructed that if his saturations go below 89% that he will need to wear his oxygen. Discussed on humidity may affect his breathing/mk  7/30/2024  Discussed infection prevention and when to contact physician. Handout given for home review and proper cleaning of respiratory equipment/MK  9/3/24 Post MMRC scored decreased from admission. Pt no longer wearing oxygen. Saturations remain greater than 88% with rest and exertion./mk    Nutrition Assessment:    Hyperlipidemia: No     Lipids:   Lab Results   Component Value Date    CHOL 162 10/03/2023    HDL 67.1 10/03/2023    TRIG 87 10/03/2023       Current Dietary Guidelines:  no special  Barriers to dietary change: no  Diet Habit Survey: Picture Your Plate  Pre: Surveys not returned  Post: To be done at discharge.    Diabetes Assessment      History of Diabetes: No    Weight Management  Height: 175.3 cm (5' 9\")  Weight: 70.3 kg (155 lb)  BMI (Calculated): 22.88    Nutrition Plan    Goal Status: In Progress  Nutrition Goals: Improve Diet Habit Survey score by 5-10 points by discharge  Nutrition Interventions/Education:   5/3/24 gave and " reviewed pulmonary nutrution h/o w/ pt's wife/DEVAN  6/11/24 Attended sodium lecture. Handout given for home review/mk  7/5/2024   Gave and reviewed Heart Healthy Tips handout with patient and spouse. Encouraged to further read handouts at home and follow up with questions as needed. Melchor Minor RDN, [facility]  8/2/2024  Gave and reviewed label reading tips handout with patient's wife. Reviewed sodium guidelines and added sugar guidelines. Pt's wife is already reading labels and limiting processed foods. Encouraged to have pt review handouts and follow up with questions as needed./Melchor Minor RDN, [facility]  Exercise Assessment  Home Exercise: Yes  Mode: weights  Frequency: 3 days a week  Duration: 10    6 Minute Walk Assessment   6 MWT distance: 583 ft  FiO2 used during test:      Exercise Prescription      Exercise Prescription based on: current ex rx     Frequency:  3 days/week   Mode: Nustep, Recumbent bike   Duration: 40 total aerobic minutes   Intensity: RPE 12-14  Target HR:  RPD  MET Level: 2.6  Patient wears supplemental O2: No  O2 Flow Rate: 0 L/min  SpO2 Range: 88 to 100 %     Modality Workload METs Duration (minutes)   1 Pre-Exercise   2:00   2 NuStep Load 6 @50 gutiérrez  2.6 20:00   3 Recumbent Bike Load 3 @ 50 rpm  2.3 20:00   4 Post-Exercise   2:00     Resistance Training: No   Home Exercise Prescription given:Yes    Exercise Plan  Goal Status: Met  Exercise Goals: Increase exercise MET level by 5-10% each week, Increase total exercise duration to 30-45 minutes, Initiate strength training 2-3 days a week, and Establish a home exercise program before discharge  Exercise Interventions/Education:   6/4/24 Instructed on upper body weights/mk  6/11/24 No regular home exercise, able to do activities like shopping and gardening/MS  9/3/24 Discussed home exercise. Pt plans to continue walking and possibly buying a stationary bike. Encouraged to check out the local fitness centers including the  [facility]./mk    Other Core Components/Risk Factor Assessment:    Medication adherence  Current Medications:   Medication Documentation Review Audit       Reviewed by Lola Momin MA (Medical Assistant) on 24 at 1131      Medication Order Taking? Sig Documenting Provider Last Dose Status   albuterol (Proventil HFA) 90 mcg/actuation inhaler [id number] Yes Inhale 2 puffs every 4 hours if needed for wheezing or shortness of breath. Bhavna Reed, APRN-CNP Taking Active   apixaban (Eliquis) 5 mg tablet [id number] Yes Take 1 tablet (5 mg) by mouth 2 times a day. Samantha Abreu MD Taking Active   aspirin 81 mg chewable tablet [id number] Yes Chew 1 tablet (81 mg) once daily. Radha Casey MD Taking Active   atorvastatin (Lipitor) 40 mg tablet [id number] Yes Take 1 tablet (40 mg) by mouth once daily. Jayme Casey MD Taking Active   cholecalciferol (Vitamin D-3) 125 MCG (5000 UT) capsule [id number] Yes  Historical Provider, MD Taking Active   divalproex (Depakote ER) 500 mg 24 hr tablet [id number] Yes Take 1 tablet (500 mg) by mouth 2 times a day. Do not crush, chew, or split. Radha Casey MD Taking Active   fluticasone (Flonase) 50 mcg/actuation nasal spray [id number] Yes Administer 2 sprays into each nostril once daily. Shake gently. Before first use, prime pump. After use, clean tip and replace cap. Ned Rock MD Taking Active   inhalat.spacing dev,med. mask spacer [id number] Yes  Jayme Casey MD Taking Active   ipratropium-albuteroL (Duo-Neb) 0.5-2.5 mg/3 mL nebulizer solution [id number] Yes Take 3 mL by nebulization 4 times a day as needed for wheezing or shortness of breath. Jayme Casey MD Taking Active   lacosamide (Vimpat) 200 mg tablet tablet [id number] Yes TAKE ONE TABLET BY MOUTH TWO TIMES A DAY Brianne Harrell APRN-CNP Taking Active   LORazepam (Ativan) 0.5 mg tablet [id number]  Take 1 tablet (0.5 mg) by mouth once daily as needed for anxiety for up to 5 days. Felisha Knox MD    12/05/23 2359   naloxone (Narcan) 4 mg/0.1 mL nasal spray [id number] Yes Administer 1 spray (4 mg) into affected nostril(s) if needed for opioid reversal. May repeat every 2-3 minutes if needed, alternating nostrils, until medical assistance becomes available. Ned Rock MD Taking Active   nebulizer accessories misc [id number] Yes Use twice daily Jayme Casey MD Taking Active   nebulizer and compressor device [id number] Yes Use as directed Jayme Casey MD Taking Active    Patient not taking:   Discontinued 07/17/24 1616   nicotine (Nicoderm CQ) 7 mg/24 hr patch [id number] Yes Place 1 patch over 24 hours on the skin once every 24 hours. Ned Rock MD Taking Active   ondansetron (Zofran) 8 mg tablet [id number] Yes Take 1 tablet (8 mg) by mouth every 8 hours if needed for nausea or vomiting. Elo Peters MD Taking Active   oxyCODONE (Roxicodone) 5 mg immediate release tablet [id number] Yes Take 1 tablet (5 mg) by mouth every 6 hours if needed for severe pain (7 - 10). Ned Rock MD Taking Active   oxygen (O2) gas therapy [id number] Yes Inhale 2 each continuously. Patient to wear O2 2L/NC continuously at rest and increase O2 to 4l/NC with any activity. Jayme Casey MD Taking Active   prochlorperazine (Compazine) 10 mg tablet [id number] No Take 1 tablet (10 mg) by mouth every 6 hours if needed for nausea or vomiting.   Patient not taking: Reported on 6/17/2024    Elo Peters MD Not Taking Active   Spiriva Respimat 2.5 mcg/actuation inhaler [id number] Yes  Historical Provider, MD Taking Active   sucralfate (Carafate) 100 mg/mL suspension [id number] No Take 10 mL (1 g) by mouth every 6 hours.   Patient not taking: Reported on 6/17/2024    Radha Casey MD Not Taking Active   tamsulosin (Flomax) 0.4 mg 24 hr capsule [id number] Yes Take 1 capsule (0.4 mg) by mouth once daily. Abhijit Mendez MD Taking Active   thiamine 100 mg tablet [id number] Yes TAKE 1 TABLET BY MOUTH EVERY DAY Jayme Casey MD  Taking Active                                 Medication compliance: Yes   Uses pill box/organizer:  Has a system in place   Carries medication list: No    Uses Inhalers appropriately:  Pt states that he is taking approp    Blood Pressure Management  History of Hypertension: No   Medication Changes: No    Heart Failure Management  Hx of Heart Failure: No    Smoking/Tobacco Assessment  Social History     Tobacco Use   Smoking Status Every Day    Current packs/day: 1.00    Average packs/day: 1 pack/day for 50.0 years (50.0 ttl pk-yrs)    Types: Cigarettes    Passive exposure: Current   Smokeless Tobacco Never       Other Core Component Plan    Goal Status:  Met  Other Core Component Goals: Verbalize medication usage and drug actions by discharge, Begin tobacco cessation program, and Set tobacco cessation quit date while enrolled  Other Core Component Interventions/Education: Bronchial hygiene, proper use of inhalers and spacer  5/28/24 Attend bronchial hygiene lecture which included acapella and henson cough/  6/11/24 Verbalizes daily mucous production. Does not have a flutter device and has been encouraged to discuss with physician/  7/9/24 Has been trying to quit smoking. Aware smoking cessation counselor is available/  9/3/24 Pt stated that he quit smoking on his own. Declined smoking cessation counselor at this time./mk        Individual Patient Goals:    Get off oxygen by discharge    Goal Status: Met    Staff Comments:  Mr Neri has been discharged from pulmonary rehab. No respiratory complaints voiced. Continues with chronic cough, encourage bronchial hygiene and henson cough.     Rehab Staff Signature: Dixie Rodriguez RN

## 2024-09-03 NOTE — TELEPHONE ENCOUNTER
Patient's wife Keri calls to state that she tried to fax [facility] recertification papers to [phone] and it did not go through.    She needs these to be completed as soon as possible and sent back.    She would like a call back at [phone].    Message sent to team.

## 2024-09-03 NOTE — TELEPHONE ENCOUNTER
RN called Keri to notify her the paperwork was submitted - voicemail left and to call back if she has any additional questions/concerns.

## 2024-09-03 NOTE — TELEPHONE ENCOUNTER
Per team, she can try re-faxing her [facility] papers or drop them off at [facility] .    Called Keri back to let her know. She states that she will try re-faxing.    Team updated.

## 2024-09-06 ENCOUNTER — SOCIAL WORK (OUTPATIENT)
Dept: CASE MANAGEMENT | Facility: HOSPITAL | Age: 64
End: 2024-09-06
Payer: COMMERCIAL

## 2024-09-06 ENCOUNTER — TELEPHONE (OUTPATIENT)
Dept: SURGERY | Facility: CLINIC | Age: 64
End: 2024-09-06
Payer: COMMERCIAL

## 2024-09-06 NOTE — TELEPHONE ENCOUNTER
Reached out to patient who was sleeping at time of call. Spoke with his wife, Keri, and informed her Dr. Sahu will not give patient medical clearance for surgery unless he comes into office for an appointment. Denisse stated she will tell patient and contact Dr. Sahu's office. Information has been scanned into chart.

## 2024-09-06 NOTE — PROGRESS NOTES
Pt's wife requested assistance with FMLA/intermittent leave. This was faxed successfully to [facility] on 09/03/24. Triage nurse left message for pt's wife letting her know, per Epic entry.

## 2024-09-13 DIAGNOSIS — C34.12 MALIGNANT NEOPLASM OF UPPER LOBE, LEFT BRONCHUS OR LUNG (MULTI): Primary | ICD-10-CM

## 2024-09-14 ENCOUNTER — APPOINTMENT (OUTPATIENT)
Dept: PRIMARY CARE | Facility: CLINIC | Age: 64
End: 2024-09-14
Payer: COMMERCIAL

## 2024-09-16 ENCOUNTER — LAB (OUTPATIENT)
Dept: LAB | Facility: HOSPITAL | Age: 64
End: 2024-09-16
Payer: COMMERCIAL

## 2024-09-16 ENCOUNTER — HOSPITAL ENCOUNTER (OUTPATIENT)
Dept: RADIOLOGY | Facility: HOSPITAL | Age: 64
Discharge: HOME | End: 2024-09-16
Payer: COMMERCIAL

## 2024-09-16 ENCOUNTER — APPOINTMENT (OUTPATIENT)
Dept: PRIMARY CARE | Facility: CLINIC | Age: 64
End: 2024-09-16
Payer: COMMERCIAL

## 2024-09-16 VITALS
SYSTOLIC BLOOD PRESSURE: 118 MMHG | HEIGHT: 69 IN | DIASTOLIC BLOOD PRESSURE: 68 MMHG | WEIGHT: 149 LBS | BODY MASS INDEX: 22.07 KG/M2

## 2024-09-16 DIAGNOSIS — C34.12 MALIGNANT NEOPLASM OF UPPER LOBE OF LEFT LUNG (MULTI): ICD-10-CM

## 2024-09-16 DIAGNOSIS — Z12.5 ENCOUNTER FOR PROSTATE CANCER SCREENING: ICD-10-CM

## 2024-09-16 DIAGNOSIS — I10 BENIGN HYPERTENSION: ICD-10-CM

## 2024-09-16 DIAGNOSIS — R56.9 SEIZURES (MULTI): Primary | ICD-10-CM

## 2024-09-16 DIAGNOSIS — E78.00 HYPERCHOLESTEROLEMIA: ICD-10-CM

## 2024-09-16 DIAGNOSIS — K55.069 MESENTERIC ARTERY THROMBOSIS (MULTI): ICD-10-CM

## 2024-09-16 DIAGNOSIS — C34.12 MALIGNANT NEOPLASM OF UPPER LOBE, LEFT BRONCHUS OR LUNG (MULTI): ICD-10-CM

## 2024-09-16 LAB
CREAT SERPL-MCNC: 0.69 MG/DL (ref 0.5–1.3)
EGFRCR SERPLBLD CKD-EPI 2021: >90 ML/MIN/1.73M*2
HOLD SPECIMEN: NORMAL

## 2024-09-16 PROCEDURE — 99213 OFFICE O/P EST LOW 20 MIN: CPT | Performed by: INTERNAL MEDICINE

## 2024-09-16 PROCEDURE — 1159F MED LIST DOCD IN RCRD: CPT | Performed by: INTERNAL MEDICINE

## 2024-09-16 PROCEDURE — 3078F DIAST BP <80 MM HG: CPT | Performed by: INTERNAL MEDICINE

## 2024-09-16 PROCEDURE — 2550000001 HC RX 255 CONTRASTS

## 2024-09-16 PROCEDURE — 3008F BODY MASS INDEX DOCD: CPT | Performed by: INTERNAL MEDICINE

## 2024-09-16 PROCEDURE — 71260 CT THORAX DX C+: CPT | Performed by: RADIOLOGY

## 2024-09-16 PROCEDURE — 82565 ASSAY OF CREATININE: CPT

## 2024-09-16 PROCEDURE — 1157F ADVNC CARE PLAN IN RCRD: CPT | Performed by: INTERNAL MEDICINE

## 2024-09-16 PROCEDURE — 71260 CT THORAX DX C+: CPT

## 2024-09-16 PROCEDURE — 3074F SYST BP LT 130 MM HG: CPT | Performed by: INTERNAL MEDICINE

## 2024-09-16 PROCEDURE — 36415 COLL VENOUS BLD VENIPUNCTURE: CPT

## 2024-09-16 RX ORDER — ATORVASTATIN CALCIUM 40 MG/1
40 TABLET, FILM COATED ORAL DAILY
Qty: 90 TABLET | Refills: 1 | Status: SHIPPED | OUTPATIENT
Start: 2024-09-16 | End: 2024-09-16 | Stop reason: SDUPTHER

## 2024-09-16 RX ORDER — ATORVASTATIN CALCIUM 40 MG/1
40 TABLET, FILM COATED ORAL DAILY
Qty: 30 TABLET | Refills: 0 | Status: SHIPPED | OUTPATIENT
Start: 2024-09-16 | End: 2024-10-16

## 2024-09-16 ASSESSMENT — ENCOUNTER SYMPTOMS
OCCASIONAL FEELINGS OF UNSTEADINESS: 0
DEPRESSION: 0
LOSS OF SENSATION IN FEET: 0

## 2024-09-17 NOTE — PROGRESS NOTES
"Subjective   Patient ID: Martínez Neri is a 64 y.o. male who presents for Follow-up.    This gentleman today came here for follow-up appointment.  He told me he is seeing many specialists and he really does not want too many blood tests.  He was in hospital. He had a complex course.  He came with his wife.    I have personally reviewed the patient's Past Medical History, Medications, Allergies, Social History, and Family History in the EMR.    Review of Systems   All other systems reviewed and are negative.    Objective   /68   Ht 1.753 m (5' 9\")   Wt 67.6 kg (149 lb)   BMI 22.00 kg/m²     Physical Exam  Vitals reviewed.   Cardiovascular:      Heart sounds: Normal heart sounds, S1 normal and S2 normal. No murmur heard.     No friction rub.   Pulmonary:      Effort: Pulmonary effort is normal.      Breath sounds: Normal breath sounds and air entry.   Abdominal:      Palpations: There is no hepatomegaly, splenomegaly or mass.   Musculoskeletal:      Right lower leg: No edema.      Left lower leg: No edema.   Lymphadenopathy:      Lower Body: No right inguinal adenopathy. No left inguinal adenopathy.   Neurological:      Cranial Nerves: Cranial nerves 2-12 are intact.      Sensory: No sensory deficit.      Motor: Motor function is intact.      Deep Tendon Reflexes: Reflexes are normal and symmetric.     LAB WORK: Laboratory testing discussed.    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 Hypercholesterolemia as well as peripheral vascular disease, statin.  I told he needs liver check.  2. Seizure.  He is on Depakote and Vimpat, two drugs.  He needs Depakote level.  I told him with the liver, as a second drug instead of Lipitor, he needs regular monitoring.  3. Seizure, under control.  Monitor.  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  We will check his platelet count.  5. Prostate health.  No PSA taken.  I ordered PSA.  6. Refill given.  7. I urged him to do the blood work in 30 days and come and see me for checkup.  The patient and wife are agreeable.    Scribe Attestation  By signing my name below, I, David Chapman attest that this documentation has been prepared under the direction and in the presence of Jayme Casey MD.

## 2024-09-24 ENCOUNTER — OFFICE VISIT (OUTPATIENT)
Dept: HEMATOLOGY/ONCOLOGY | Facility: HOSPITAL | Age: 64
End: 2024-09-24
Payer: MEDICARE

## 2024-09-24 VITALS
BODY MASS INDEX: 22.21 KG/M2 | HEART RATE: 78 BPM | WEIGHT: 150.4 LBS | TEMPERATURE: 98.1 F | RESPIRATION RATE: 18 BRPM | SYSTOLIC BLOOD PRESSURE: 130 MMHG | OXYGEN SATURATION: 98 % | DIASTOLIC BLOOD PRESSURE: 61 MMHG

## 2024-09-24 DIAGNOSIS — C34.12 PRIMARY CANCER OF LEFT UPPER LOBE OF LUNG (MULTI): Primary | ICD-10-CM

## 2024-09-24 PROCEDURE — 99215 OFFICE O/P EST HI 40 MIN: CPT | Performed by: STUDENT IN AN ORGANIZED HEALTH CARE EDUCATION/TRAINING PROGRAM

## 2024-09-24 PROCEDURE — 1125F AMNT PAIN NOTED PAIN PRSNT: CPT | Performed by: STUDENT IN AN ORGANIZED HEALTH CARE EDUCATION/TRAINING PROGRAM

## 2024-09-24 PROCEDURE — 1157F ADVNC CARE PLAN IN RCRD: CPT | Performed by: STUDENT IN AN ORGANIZED HEALTH CARE EDUCATION/TRAINING PROGRAM

## 2024-09-24 PROCEDURE — 99215 OFFICE O/P EST HI 40 MIN: CPT | Mod: GC | Performed by: STUDENT IN AN ORGANIZED HEALTH CARE EDUCATION/TRAINING PROGRAM

## 2024-09-24 PROCEDURE — 1159F MED LIST DOCD IN RCRD: CPT | Performed by: STUDENT IN AN ORGANIZED HEALTH CARE EDUCATION/TRAINING PROGRAM

## 2024-09-24 ASSESSMENT — PAIN SCALES - GENERAL: PAINLEVEL: 1

## 2024-09-24 NOTE — PROGRESS NOTES
Patient ID: Martínez Neri is a 64 y.o. 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    PD-L1 TPS 20%      PRIOR THERAPIES  Concurrent chemo RT using weekly CarboTaxol, completed 60 Gy in 30 fractions on 01/05/2024     CURRENT THERAPY  Surveillance        CURRENT ONCOLOGICAL PROBLEMS  Cough, productive  Oxygen dependence  Radiation-induced esophagitis        HISTORY OF PRESENT ILLNESS:  Current smoker (50-pack-year history), with history of COPD and seizures, who was diagnosed with a squamous cell carcinoma of the left upper lobe in [place], never treated.  Outpatient notes from [facility] and hematology clinic were reviewed.     On 06/17/2023 the patient had a CT chest for ongoing respiratory symptoms and was found to have a left suprahilar mass involving the left upper lobe bronchi with postobstructive pneumonia/collapse.  On 06/25/2023 CT chest/abdomen/pelvis demonstrated persistent masslike consolidation in the left upper lobe with no signs of extrathoracic metastatic disease.     On 06/26/2023 the patient underwent a bronchoscopy, and pathology was compatible with a squamous cell carcinoma at least in situ, with a foci highly suspicious for invasive squamous cell carcinoma.     On 07/18/2023 a PET/CT obtained, demonstrating a 6.6 cm left upper lobe mass with an SUV of 8.3 and no evidence of distant metastatic disease.     On 07/18/2023 to brain MRI was negative for intracranial metastasis.     On 07/27/2023 bronchoscopy/EBUS was performed.  Endobronchial biopsies of left upper lobe demonstrated an invasive moderately differentiated squamous cell carcinoma.  Left upper lobe hilar FNA  showed atypical cells with squamous differentiation, suspicious for malignancy.      Subcarinal FNA was indeterminate for malignancy with extremely rare atypical cells with squamous differentiation.  Molecular profile: Lack of BRAF, EGFR, HER2, KRAS, MET, ALK, NTRK 1/2/3, RET or ROS1 alterations.  PD-L1 TPS 20%.  The patient was seen by medical oncology and did not too frail for combined chemo RT (there is a performance status ECOG 3) documented on 08/17/2023.  At that time, he lived with some friends, while his family was primarily located in [place].     His daughter traveled and was able to relocate him to [place], and he presents today to clinic accompanied by his daughter, son, and daughter-in-law.     The patient states he is felt much better since his relocation, has been gaining weight, approximately 10 pounds since he moved here.  He is more active, going up and down the steps at home, but still tired and taking some breaks throughout the day.     He denies any headaches or new neurological symptoms.  His respiratory symptoms are stable. There is no new localized pain.     Reported molecular and ancillary testing performed upon diagnosis in [place]:   BRAF negative  NDQPR95V negative  EGFR classic mutations negative  HER2 negative  MET negative  ALK negative  In tract 1/2/3 negative  RET negative  ROS1 negative  PD-L1 20%     Referred to surgery - PFTs were considered prohibitive for surgical resection-FEV1 30%, DLCO 30%.     10/31/23: Repeat EBUS for systematic staging:  Final Cytological Interpretation      A. LYMPH NODE 7 PULMONARY FINE NEEDLE ASPIRATION, CYTOLOGY AND CELL BLOCK:  --  No malignant cells identified.  --  Lymphoid sample.     B. LYMPH NODE 4 L PULMONARY FINE NEEDLE ASPIRATION, CYTOLOGY AND CELL BLOCK:  --  No malignant cells identified.  --  Lymphoid sample.           C. LYMPH NODE 11 L PULMONARY FINE NEEDLE ASPIRATION, CYTOLOGY AND CELL BLOCK:  --  A rare cluster of malignant cells derived  from squamous cell carcinoma, see note  -- Also, please refer to concurrent biopsy surgical pathology report ([id number]).     Note:  The malignant squamous cell carcinoma cells are represented in the cell block only.  Immunostain for p40 is positive. The cytomorphology and immunoprofile support the above diagnosis.  The material is not sufficient for molecular testing.      Component     FINAL DIAGNOSIS   Left lung, upper lobe, biopsy:  -- Superficial fragment of squamous cell carcinoma, keratinizing; see note.     Note: According to clinician's note, PD-L1 and NGS have been performed at an outside institution. These studies can be repeated, if clinically indicated.      NGS and PD-L1 not repeated in this specimen as it was done in outside institution already and the purpose of this procedure was not for diagnosis but systematic mediastinal re-staging.     01/05/2024: Completes definitive chemo RT using 60 Gray in 30 fractions with concurrent weekly CarboTaxol    01/08/2024: Admitted with neutropenic fevers, dehydration.  CT abdomen/pelvis demonstrated a partial thrombus of superior mesenteric artery.  He was a started on heparin and switched to aspirin at discharge.  CT chest was concerning for aspiration pneumonia, he was treated with antibiotics.  He was discharged on oxygen, 2 L at rest, 4 L on exertion.  He was also discharged on Augmentin 875 mg twice daily for 14 days.  Also sucralfate, 10 mL every 6 hours.  While hospitalized, he had episodes of intermittent agitation and was found to have elevated ammonia levels.  On 01/11/2024, ALT was 63, AST 60.    01/29/2024: CT chest with IV contrast demonstrates the development of multiple pulmonary nodules, including a 3 cm spiculated left lower lobe nodule, concerning for progression of his disease.  A PET scan was ordered STAT for subsequent evaluation, and a referral was placed to IR for CT-guided biopsy of most assessable lung nodule (right lower lobe,  posterior, image #[id number]).    02/12/2024: Pathology from RLL biopsy not diagnostic    04/01/24: The contrast obtained for navigational bronchoscopy demonstrated significant progression of the lung parenchyma abnormalities, and procedure was canceled.  Patient will remain on surveillance.    07/08/24: CT chest showing likely post radiation changes in THOMAS but with slight increase few pulmonary nodules    07/18/24: Overall patient is clinically doing well so will hold off on a bronch at this time and plan to repeat scans in 2 months    PAST MEDICAL HISTORY  Brain aneurysm  COPD (chronic obstructive pulmonary disease) (CMS/HCC)  Epilepsy (CMS/HCC)  High cholesterol  History of alcohol use  Hypertension     PAST SURGICAL HISTORY:  CAROTID ENDARTERECTOMY  COLONOSCOPY  HERNIA REPAIR      SOCIAL HISTORY  Tobacco (50 pack-year history) use - quit after cancer dx      CURRENT MEDS REVIEWED:  Scheduled medications  Continuous medications  PRN medications     ALLERGIES  NKDA     FAMILY HISTORY  Father had prostate CA     SUBJECTIVE:  Patient previous a Dr. Peters patient. Patient presents to clinic today with wife to go over scan results. He is doing quite well. He has finished pulmonary rehab and has been off oxygen for about a month. Patient states he has been active and feeling good. Mentions sometimes he has occasional pain over his upper left chest wall when he coughs. His appetite is good and his weight remains stable. He is eager to discuss his scan results today.    Went over CT scan and discussed that things look stable. Will plan to repeat CT chest w/ IV contrast in 3 months.      OBJECTIVE:  Vitals:    09/24/24 1425   BP: 130/61   Pulse: 78   Resp: 18   Temp: 36.7 °C (98.1 °F)   SpO2: 98%        Body surface area is 1.82 meters squared.     Wt Readings from Last 5 Encounters:   09/24/24 68.2 kg (150 lb 6.4 oz)   09/16/24 67.6 kg (149 lb)   07/18/24 68.6 kg (151 lb 3.8 oz)   07/17/24 68.8 kg (151 lb 9.6 oz)   06/17/24  71.2 kg (157 lb)     ECO    Physical Exam  Constitutional:       Appearance: Normal appearance.   HENT:      Head: Normocephalic and atraumatic.   Eyes:      General: No scleral icterus.  Cardiovascular:      Rate and Rhythm: Normal rate and regular rhythm.      Heart sounds: Normal heart sounds.   Pulmonary:      Effort: Pulmonary effort is normal.      Breath sounds: Wheezing present.      Comments: Minor expiratory wheezing over THOMAS  Abdominal:      General: There is no distension.      Palpations: Abdomen is soft.      Tenderness: There is no abdominal tenderness. There is no guarding.   Musculoskeletal:      Right lower leg: No edema.      Left lower leg: No edema.   Lymphadenopathy:      Cervical: No cervical adenopathy.   Skin:     General: Skin is warm.      Coloration: Skin is not pale.      Findings: No erythema or rash.   Neurological:      General: No focal deficit present.      Mental Status: He is alert and oriented to person, place, and time. Mental status is at baseline.      Motor: No weakness.      Gait: Gait normal.   Psychiatric:         Mood and Affect: Mood normal.         Behavior: Behavior normal.         Thought Content: Thought content normal.         Judgment: Judgment normal.          Diagnostic Results   Results:  Labs:  Lab Results   Component Value Date    WBC 5.6 2024    HGB 9.6 (L) 2024    HCT 30.8 (L) 2024    MCV 89 2024     2024      Lab Results   Component Value Date    NEUTROABS 5.99 2024        Lab Results   Component Value Date    GLUCOSE 86 2024    CALCIUM 9.3 2024     2024    K 4.4 2024    CO2 28 2024     2024    BUN 10 2024    CREATININE 0.69 2024     Lab Results   Component Value Date    ALT 9 (L) 2024    AST 16 2024    ALKPHOS 69 2024    BILITOT 0.3 2024      Lab Results   Component Value Date    ACTH 26.6 2024    CORTISOL 11.6 2024     TSH 3.75 02/01/2024    FREET4 0.90 01/11/2024     STUDY:  CT CHEST W IV CONTRAST;  9/16/2024 10:49 am      INDICATION:  Signs/Symptoms:NSCLC treated with concurrent chemo-RT.      COMPARISON:  CT dated 07/08/2024 and 04/01/2024          ACCESSION NUMBER(S):  [id number]      ORDERING CLINICIAN:  SENIA FUNES      TECHNIQUE:  Helical data acquisition of the chest was obtained  with 75 cc of  Omnipaque 350. Images were reformatted in axial, coronal, and  sagittal planes.      FINDINGS:  LUNGS AND AIRWAYS:  The trachea and central airways are patent. No endobronchial lesion.  There is bronchial wall thickening.      There is moderate to severe diffuse emphysema with slight upper lung  predominance.      Stable appearance left upper lobe volume loss, with dense masslike  calcification and bronchiectasis      3 mm left upper lobe nodule, image 73/361, stable.  5 mm left upper lobe nodule, image 91/361, stable.  4 mm left upper lobe nodule, image 102/361.  An area of curvilinear density in the left lower lobe, image 235/361,  unchanged compared to prior study.      6 mm left lower lobe nodule, image 267/361 new compared to prior  study.  4 mm left lower lobe nodular density, image 271/361, not definitely  seen on the prior study. Interval increase in size of a 7 mm right  lower lobe nodule with central cavitation seen in image 178/361.      MEDIASTINUM AND WILLIAM, LOWER NECK AND AXILLA:  The visualized thyroid gland is within normal limits.      No evidence of thoracic lymphadenopathy by CT criteria.      Esophagus appears within normal limits as seen.      HEART AND VESSELS:  The thoracic aorta is of normal course and caliber with severe  vascular calcifications.      Main pulmonary artery and its branches are normal in caliber.      Severe coronary artery calcification. The study is not optimized for  evaluation of coronary arteries.      The cardiac chambers are not enlarged. Calcification of the aortic  valve.  Correlate with aortic valve stenosis.      No evidence of pericardial effusion.      UPPER ABDOMEN:  A few small hypodense lesions are identified in the visualized  portion of the liver, not significantly different from prior study.  These are incompletely characterized but likely cysts or hemangiomas.  The visualized portion of the upper abdomen appear otherwise  unremarkable.      CHEST WALL AND OSSEOUS STRUCTURES:  There are no suspicious osseous lesions. Multilevel degenerative  changes are present       Impression   1.  Overall stable appearance of left upper lobe masslike  consolidation, volume loss , architectural distortion and  bronchiectasis, likely due to evolving postradiation changes.  2. 7 mm right lower lobe nodule with central lucency/cavitation,  increased in size compared to prior study which measured 4 mm. Cannot  exclude metastatic disease and recommend attention on short-term  follow-up chest C T.  3. A few new left lung base nodular densities which are likely areas  of mucous plugging. Additional nodules and nodular densities in the  left upper lobe which are stable. Recommend attention on follow-up.  4. Additional stable findings as described above.         Assessment/Plan     Primary cancer of left upper lobe of lung (Multi), Clinical: Stage IIB (cT2, cN1, cM0)    #Stage IIB squamous cell carcinoma of the left upper lobe  - treated with concurrent chemo RT using weekly CarboTaxol, completed 60 Gray in 30 fractions on 01/05/2024.  -Subsequently hospitalized with neutropenic fevers on 01/08/2024.  -Suspicion for aspiration during the workup performed at outside hospital as well as SMA partial thrombus.  -He has seen pulmonology in [facility] Dr. Sahu and underwent RLL biopsy by IR which was not diagnostic.  -He was then scheduled for a navigational bronchoscopy but the lung lesions that were supposed to be biopsied pretty much resolved in the planning scans - This is c/w with inflammatory  changes   -Due to multiple complications as described above, he missed the window for consolidative durvalumab.    -There was also concern in regards to high risk of toxicities due to his underlying pulmonary challenges.    -We are specifically concerned about increased risk for treatment related pneumonitis.   -Being followed closely with scans for now  -Scans 07/08 showing what appears to be radiation changes in THOMAS but with increasing size of multiple pulmonary nodules. Patient is clinically doing much better than he has in a long time.   -We discussed the option of going for bronchoscopy now vs holding off and following with repeat scans in 2 months as has had waxing and waning lung nodules in the past.   -Via shared decision making collectively decided to follow up in clinic with CT chest in 2 months. If clinical status worsens patient will call and we can re-evaluate sooner.  -Patient seen in clinic 9/24 to go over recent CT scan  - CT scan complete 9/16/24 showed overall stable appearance of mass like consolidation in THOMAS. Likely due to evolving postradiation changes. There is a 7 mm right lower lobe nodule with central lucency/cavitation, increased in size compared to prior study which measured 4 mm.  -Stability of CT scan was discussed with patient with plans to repeat CT scan w/ IV contrast early December.      Patient seen and discussed with Dr. Berenice Cordova MD  IM resident, PGY-2

## 2024-10-09 ENCOUNTER — LAB (OUTPATIENT)
Dept: LAB | Facility: LAB | Age: 64
End: 2024-10-09
Payer: COMMERCIAL

## 2024-10-09 ENCOUNTER — OFFICE VISIT (OUTPATIENT)
Dept: PALLIATIVE MEDICINE | Facility: HOSPITAL | Age: 64
End: 2024-10-09
Payer: COMMERCIAL

## 2024-10-09 VITALS
TEMPERATURE: 97 F | WEIGHT: 147.6 LBS | OXYGEN SATURATION: 92 % | HEART RATE: 72 BPM | BODY MASS INDEX: 21.8 KG/M2 | DIASTOLIC BLOOD PRESSURE: 57 MMHG | SYSTOLIC BLOOD PRESSURE: 126 MMHG

## 2024-10-09 DIAGNOSIS — R09.81 NASAL CONGESTION: ICD-10-CM

## 2024-10-09 DIAGNOSIS — F41.9 ANXIETY: ICD-10-CM

## 2024-10-09 DIAGNOSIS — I10 BENIGN HYPERTENSION: ICD-10-CM

## 2024-10-09 DIAGNOSIS — Z51.5 PALLIATIVE CARE ENCOUNTER: ICD-10-CM

## 2024-10-09 DIAGNOSIS — Z12.5 ENCOUNTER FOR PROSTATE CANCER SCREENING: ICD-10-CM

## 2024-10-09 DIAGNOSIS — E78.00 HYPERCHOLESTEROLEMIA: ICD-10-CM

## 2024-10-09 DIAGNOSIS — G89.3 CANCER RELATED PAIN: Primary | ICD-10-CM

## 2024-10-09 LAB
ALBUMIN SERPL BCP-MCNC: 4.1 G/DL (ref 3.4–5)
ALP SERPL-CCNC: 70 U/L (ref 33–136)
ALT SERPL W P-5'-P-CCNC: 14 U/L (ref 10–52)
ANION GAP SERPL CALC-SCNC: 17 MMOL/L (ref 10–20)
AST SERPL W P-5'-P-CCNC: 19 U/L (ref 9–39)
BASOPHILS # BLD AUTO: 0.05 X10*3/UL (ref 0–0.1)
BASOPHILS NFR BLD AUTO: 0.8 %
BILIRUB SERPL-MCNC: 0.3 MG/DL (ref 0–1.2)
BUN SERPL-MCNC: 14 MG/DL (ref 6–23)
CALCIUM SERPL-MCNC: 9.5 MG/DL (ref 8.6–10.6)
CHLORIDE SERPL-SCNC: 100 MMOL/L (ref 98–107)
CO2 SERPL-SCNC: 25 MMOL/L (ref 21–32)
CREAT SERPL-MCNC: 0.77 MG/DL (ref 0.5–1.3)
EGFRCR SERPLBLD CKD-EPI 2021: >90 ML/MIN/1.73M*2
EOSINOPHIL # BLD AUTO: 0.08 X10*3/UL (ref 0–0.7)
EOSINOPHIL NFR BLD AUTO: 1.2 %
ERYTHROCYTE [DISTWIDTH] IN BLOOD BY AUTOMATED COUNT: 17.2 % (ref 11.5–14.5)
GLUCOSE SERPL-MCNC: 129 MG/DL (ref 74–99)
HCT VFR BLD AUTO: 25.4 % (ref 41–52)
HGB BLD-MCNC: 7.4 G/DL (ref 13.5–17.5)
IMM GRANULOCYTES # BLD AUTO: 0.01 X10*3/UL (ref 0–0.7)
IMM GRANULOCYTES NFR BLD AUTO: 0.2 % (ref 0–0.9)
LYMPHOCYTES # BLD AUTO: 1.28 X10*3/UL (ref 1.2–4.8)
LYMPHOCYTES NFR BLD AUTO: 19.6 %
MCH RBC QN AUTO: 22.7 PG (ref 26–34)
MCHC RBC AUTO-ENTMCNC: 29.1 G/DL (ref 32–36)
MCV RBC AUTO: 78 FL (ref 80–100)
MONOCYTES # BLD AUTO: 0.43 X10*3/UL (ref 0.1–1)
MONOCYTES NFR BLD AUTO: 6.6 %
NEUTROPHILS # BLD AUTO: 4.67 X10*3/UL (ref 1.2–7.7)
NEUTROPHILS NFR BLD AUTO: 71.6 %
NRBC BLD-RTO: 0 /100 WBCS (ref 0–0)
PLATELET # BLD AUTO: 470 X10*3/UL (ref 150–450)
POTASSIUM SERPL-SCNC: 4.6 MMOL/L (ref 3.5–5.3)
PROT SERPL-MCNC: 7.4 G/DL (ref 6.4–8.2)
PSA SERPL-MCNC: 0.94 NG/ML
RBC # BLD AUTO: 3.26 X10*6/UL (ref 4.5–5.9)
SODIUM SERPL-SCNC: 137 MMOL/L (ref 136–145)
TSH SERPL-ACNC: 2.5 MIU/L (ref 0.44–3.98)
WBC # BLD AUTO: 6.5 X10*3/UL (ref 4.4–11.3)

## 2024-10-09 PROCEDURE — 1125F AMNT PAIN NOTED PAIN PRSNT: CPT | Performed by: INTERNAL MEDICINE

## 2024-10-09 PROCEDURE — 99214 OFFICE O/P EST MOD 30 MIN: CPT | Performed by: INTERNAL MEDICINE

## 2024-10-09 PROCEDURE — 1157F ADVNC CARE PLAN IN RCRD: CPT | Performed by: INTERNAL MEDICINE

## 2024-10-09 RX ORDER — OXYCODONE HYDROCHLORIDE 5 MG/1
5 TABLET ORAL 2 TIMES DAILY PRN
Qty: 60 TABLET | Refills: 0 | Status: SHIPPED | OUTPATIENT
Start: 2024-10-09 | End: 2024-11-08

## 2024-10-09 RX ORDER — FLUTICASONE PROPIONATE 50 MCG
2 SPRAY, SUSPENSION (ML) NASAL DAILY
Qty: 11.1 ML | Refills: 3 | Status: SHIPPED | OUTPATIENT
Start: 2024-10-09 | End: 2024-10-12 | Stop reason: SDUPTHER

## 2024-10-09 ASSESSMENT — PAIN SCALES - GENERAL: PAINLEVEL: 5

## 2024-10-09 NOTE — PROGRESS NOTES
Supportive Oncology Established Patient Note    Patient ID: Martínez Neri is a 64 y.o. male who presents for Follow-up.    HPI  Mr. Neri is here with his wife Keri.   He continues to have a runny nose and congestion. He does think the flonase medication helps, but has only been using it when he's really congested.  Still has pleuritic chest pain when he coughs and states this can limit his sleep at times. The  oxycodone helps. Doesn't use this every day, maybe 2-3 times a week. Needs a new prescription. Denies constipation.   Endorses some anxiety. 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 Lots of racing thoughts which even limit his sleep and make it hard to fall asleep. Hasn't been doing all the breathing exercises all the time, though he thinks it helps.   Good appetite. No nausea/vomiting.   Continues to smoke tobacco - 0.5 ppd, which is down from the 1 ppd he usually smoked in the past. Uses nicotine patches to help with his cravings.     Allergies  Patient has no known allergies.     Objective:    Last Recorded Vitals  Blood pressure 126/57, pulse 72, temperature 36.1 °C (97 °F), temperature source Temporal, weight 67 kg (147 lb 9.6 oz), SpO2 92%.      Physical Exam:  Constitutional:       General: Patient is not in acute distress.  HENT:      Head: Normocephalic.      Mouth: Mucous membranes are moist.   Eyes:      Conjunctiva/sclera: Conjunctivae clear, sclerae white. No discharge.  Cardiovascular:      Rate and Rhythm: Normal rate and regular rhythm.      Heart sounds: No murmur heard.  Pulmonary:      Effort: No respiratory distress.      Breath sounds: Decreased breath sounds throughout.   Abdominal:      General: There is no distension.      Tenderness: There is no abdominal tenderness. There is no guarding.   Musculoskeletal:         General: No deformity.   Skin:     Coloration: Skin is not jaundiced.   Neurological:       General: No focal deficit present.      Mental Status: He is oriented to person, place, and time.   Psychiatric:         Behavior: Behavior normal.    Relevant Results  Lab Results   Component Value Date    WBC 5.6 07/08/2024    HGB 9.6 (L) 07/08/2024    HCT 30.8 (L) 07/08/2024    MCV 89 07/08/2024     07/08/2024      Lab Results   Component Value Date    GLUCOSE 86 07/08/2024    CALCIUM 9.3 07/08/2024     07/08/2024    K 4.4 07/08/2024    CO2 28 07/08/2024     07/08/2024    BUN 10 07/08/2024    CREATININE 0.69 09/16/2024      Lab Results   Component Value Date    ALT 9 (L) 02/01/2024    AST 16 02/01/2024    ALKPHOS 69 02/01/2024    BILITOT 0.3 02/01/2024        Relevant Imaging   No results found for this or any previous visit from the past 1000 days.     No image results found.       Medications:   Current Outpatient Medications   Medication Instructions    albuterol (Proventil HFA) 90 mcg/actuation inhaler 2 puffs, inhalation, Every 4 hours PRN    apixaban (ELIQUIS) 5 mg, oral, 2 times daily    aspirin 81 mg, oral, Daily    atorvastatin (LIPITOR) 40 mg, oral, Daily    cholecalciferol (Vitamin D-3) 125 MCG (5000 UT) capsule     divalproex (DEPAKOTE ER) 500 mg, oral, 2 times daily, Do not crush, chew, or split.    fluticasone (Flonase) 50 mcg/actuation nasal spray 2 sprays, Each Nostril, Daily, Shake gently. Before first use, prime pump. After use, clean tip and replace cap.    inhalat.spacing dev,med. mask spacer No dose, route, or frequency recorded.    ipratropium-albuteroL (Duo-Neb) 0.5-2.5 mg/3 mL nebulizer solution 3 mL, nebulization, 4 times daily PRN    lacosamide (VIMPAT) 200 mg, oral, 2 times daily    LORazepam (ATIVAN) 0.5 mg, oral, Daily PRN    naloxone (NARCAN) 4 mg, nasal, As needed, May repeat every 2-3 minutes if needed, alternating nostrils, until medical assistance becomes available.    nebulizer accessories misc Use twice daily    nebulizer and compressor device Use as directed     nicotine (Nicoderm CQ) 7 mg/24 hr patch 1 patch, transdermal, Every 24 hours    ondansetron (ZOFRAN) 8 mg, oral, Every 8 hours PRN    oxygen (O2) gas therapy 2 each, inhalation, Continuous, Patient to wear O2 2L/NC continuously at rest and increase O2 to 4l/NC with any activity.     Spiriva Respimat 2.5 mcg/actuation inhaler     tamsulosin (FLOMAX) 0.4 mg, oral, Daily    thiamine (VITAMIN B-1) 100 mg, oral, Daily        Assessment and Plan  Impression:  Martínez Neri is a 63 yo male 50 pack year smoking hx with COPD, seizures disorder, who has SCC of THOMAS (dx in [place]; never treated) who presents for follow up.      Problems:  NSLC, Left upper lobe  Concern for dz progression per 1/2024 CT with sides of LLL  CRF d/t emphysema on 2-4L oxygen  Anxiety and Depression, illness related  Cancer related pain  Rhinitis     Rhinitis  - Reports some improvement with the Flonase, but has not been taking this consistently. Instructed to take Flonase 1 puff per nostril daily.     Cancer-related pain  - Pleuritic in nature. Sporadic and associated with coughing.   - Takes oxycodone infrequently (2-3 times a week).   -Change Oxycodone to 5mg BID PRN. Refill sent today at patient's request.      #Anxiety  #Depression  -Likely related to stress of cancer dx and other medical conditions.   - Patient using breathing/relaxation techniques with some improvement. Reiterated continuing this more frequently.    - may benefit from buproprion but I think he's tried that before and didn't like it. Next provider can reconsider.      #Chronic tobacco smoker  - currently smokes about 0.5 ppd.   - continue nicotine patch to help wean.   - smoking cessation counseling provided.      #Chronic respiratory failure  #COPD GOLD 3, emphysema  -Been 2 months since he used oxygen. Denies dyspnea at rest and speaks in full sentences during our visits. Continues to check pulse ox at home.         Code Status: DNR and No Intubation     Changes this  visit:  Refill oxycodone for cancer related pain.     Follow up in 3 months.     Chana Aguilar DO    Plan discussed with attending, Dr. Rock.

## 2024-10-12 ENCOUNTER — LAB (OUTPATIENT)
Dept: LAB | Facility: LAB | Age: 64
End: 2024-10-12
Payer: COMMERCIAL

## 2024-10-12 ENCOUNTER — OFFICE VISIT (OUTPATIENT)
Dept: PRIMARY CARE | Facility: CLINIC | Age: 64
End: 2024-10-12
Payer: COMMERCIAL

## 2024-10-12 VITALS
WEIGHT: 147 LBS | DIASTOLIC BLOOD PRESSURE: 64 MMHG | HEIGHT: 69 IN | BODY MASS INDEX: 21.77 KG/M2 | SYSTOLIC BLOOD PRESSURE: 132 MMHG

## 2024-10-12 DIAGNOSIS — I10 ESSENTIAL (PRIMARY) HYPERTENSION: Primary | ICD-10-CM

## 2024-10-12 DIAGNOSIS — E78.00 HYPERCHOLESTEROLEMIA: ICD-10-CM

## 2024-10-12 DIAGNOSIS — E53.9 VITAMIN B DEFICIENCY: ICD-10-CM

## 2024-10-12 DIAGNOSIS — R73.9 HYPERGLYCEMIA: ICD-10-CM

## 2024-10-12 DIAGNOSIS — D64.9 ANEMIA, UNSPECIFIED TYPE: Primary | ICD-10-CM

## 2024-10-12 DIAGNOSIS — R53.1 GENERALIZED WEAKNESS: ICD-10-CM

## 2024-10-12 DIAGNOSIS — R09.81 NASAL CONGESTION: ICD-10-CM

## 2024-10-12 DIAGNOSIS — I10 HYPERTENSION, UNSPECIFIED TYPE: ICD-10-CM

## 2024-10-12 LAB
APPEARANCE UR: CLEAR
BILIRUB UR STRIP.AUTO-MCNC: NEGATIVE MG/DL
COLOR UR: YELLOW
GLUCOSE UR STRIP.AUTO-MCNC: NORMAL MG/DL
HYALINE CASTS #/AREA URNS AUTO: ABNORMAL /LPF
KETONES UR STRIP.AUTO-MCNC: NEGATIVE MG/DL
LEUKOCYTE ESTERASE UR QL STRIP.AUTO: NEGATIVE
MUCOUS THREADS #/AREA URNS AUTO: ABNORMAL /LPF
NITRITE UR QL STRIP.AUTO: NEGATIVE
PH UR STRIP.AUTO: 6 [PH]
PROT UR STRIP.AUTO-MCNC: NORMAL MG/DL
RBC # UR STRIP.AUTO: NEGATIVE /UL
RBC #/AREA URNS AUTO: ABNORMAL /HPF
SP GR UR STRIP.AUTO: 1.03
UROBILINOGEN UR STRIP.AUTO-MCNC: NORMAL MG/DL
WBC #/AREA URNS AUTO: ABNORMAL /HPF

## 2024-10-12 PROCEDURE — 3008F BODY MASS INDEX DOCD: CPT | Performed by: INTERNAL MEDICINE

## 2024-10-12 PROCEDURE — 81001 URINALYSIS AUTO W/SCOPE: CPT

## 2024-10-12 PROCEDURE — 1159F MED LIST DOCD IN RCRD: CPT | Performed by: INTERNAL MEDICINE

## 2024-10-12 PROCEDURE — 1157F ADVNC CARE PLAN IN RCRD: CPT | Performed by: INTERNAL MEDICINE

## 2024-10-12 PROCEDURE — 3075F SYST BP GE 130 - 139MM HG: CPT | Performed by: INTERNAL MEDICINE

## 2024-10-12 PROCEDURE — 99214 OFFICE O/P EST MOD 30 MIN: CPT | Performed by: INTERNAL MEDICINE

## 2024-10-12 PROCEDURE — 3078F DIAST BP <80 MM HG: CPT | Performed by: INTERNAL MEDICINE

## 2024-10-12 RX ORDER — ATORVASTATIN CALCIUM 40 MG/1
40 TABLET, FILM COATED ORAL DAILY
Qty: 30 TABLET | Refills: 0 | Status: SHIPPED | OUTPATIENT
Start: 2024-10-12 | End: 2024-11-11

## 2024-10-12 RX ORDER — NAPROXEN SODIUM 220 MG/1
81 TABLET, FILM COATED ORAL DAILY
Qty: 30 TABLET | Refills: 2 | Status: SHIPPED | OUTPATIENT
Start: 2024-10-12

## 2024-10-12 RX ORDER — LANOLIN ALCOHOL/MO/W.PET/CERES
100 CREAM (GRAM) TOPICAL DAILY
Qty: 90 TABLET | Refills: 0 | Status: SHIPPED | OUTPATIENT
Start: 2024-10-12

## 2024-10-12 RX ORDER — FLUTICASONE PROPIONATE 50 MCG
2 SPRAY, SUSPENSION (ML) NASAL DAILY
Qty: 11.1 ML | Refills: 3 | Status: SHIPPED | OUTPATIENT
Start: 2024-10-12 | End: 2025-10-12

## 2024-10-12 NOTE — PROGRESS NOTES
"Subjective   Patient ID: Martínez Neri is a 64 y.o. male who presents for Follow-up and Med Refill.    This gentleman today came here for follow-up on various conditions.  He is accompanied with his wife.  Overall, he is okay.  He is taking medications regularly.  He wants a refill on medication.  He is a shared patient with the cancer team.    I have personally reviewed the patient's Past Medical History, Medications, Allergies, Social History, and Family History in the EMR.    Med Refill    Review of Systems   All other systems reviewed and are negative.    Objective   /64   Ht 1.753 m (5' 9\")   Wt 66.7 kg (147 lb)   BMI 21.71 kg/m²     Physical Exam  Vitals reviewed.   Cardiovascular:      Heart sounds: Normal heart sounds, S1 normal and S2 normal. No murmur heard.     No friction rub.   Pulmonary:      Effort: Pulmonary effort is normal.      Breath sounds: Normal breath sounds and air entry.   Abdominal:      Palpations: There is no hepatomegaly, splenomegaly or mass.   Musculoskeletal:      Right lower leg: No edema.      Left lower leg: No edema.   Lymphadenopathy:      Lower Body: No right inguinal adenopathy. No left inguinal adenopathy.   Neurological:      Cranial Nerves: Cranial nerves 2-12 are intact.      Sensory: No sensory deficit.      Motor: Motor function is intact.      Deep Tendon Reflexes: Reflexes are normal and symmetric.     LAB WORK:  Laboratory testing discussed.    Assessment/Plan   Problem List Items Addressed This Visit             ICD-10-CM       Hematology and Neoplasia    Anemia - Primary D64.9       Symptoms and Signs    Generalized weakness R53.1    Relevant Medications    aspirin 81 mg chewable tablet     Other Visit Diagnoses         Codes    Hypercholesterolemia     E78.00    Relevant Medications    atorvastatin (Lipitor) 40 mg tablet    Nasal congestion     R09.81    Relevant Medications    fluticasone (Flonase) 50 mcg/actuation nasal spray    Vitamin B deficiency     " E53.9    Relevant Medications    thiamine 100 mg tablet    Hyperglycemia     R73.9    Hypertension, unspecified type     I10        1. Anemia.  Newer finding in the background of cancer.  Advanced work-up ordered.  His colonoscopy was only one year ago.  There is no weight loss history.  Monitor.  2. Hyperglycemia.  Wife confirmed, he just had, but it is worth doing hemoglobin A1c.  3. Hypertension, okay.  4. High cholesterol.  Monitor.  5. Follow-up appointment with me in a week if necessary.  Otherwise routine check in six to eight weeks’ time.  Wife promised he will contact cancer doctor with new blood work to check on anemia and take further action.  I will leave it to his wife to coordinate all these care.    Scribe Attestation  By signing my name below, I, David Chapman attest that this documentation has been prepared under the direction and in the presence of Jayme Casey MD.

## 2024-10-14 ENCOUNTER — TELEPHONE (OUTPATIENT)
Dept: ADMISSION | Facility: HOSPITAL | Age: 64
End: 2024-10-14
Payer: COMMERCIAL

## 2024-10-14 NOTE — TELEPHONE ENCOUNTER
Per Dr Ng- pt should continue to follow with PCP as this is not related to previous treatments or oncology issues at this time.   Wife verbalized understanding and will reach out to PCP

## 2024-10-14 NOTE — TELEPHONE ENCOUNTER
Pt had labs completed based on FUV with PCP Dr. Casey.   Dr Casey is concerned with dropping hgb and hematocrit  Last FUV 9/24 with next FUV 12/10  Pt's wife denies dyspnea, fatigue.

## 2024-11-04 ENCOUNTER — TELEPHONE (OUTPATIENT)
Dept: RADIATION ONCOLOGY | Facility: HOSPITAL | Age: 64
End: 2024-11-04
Payer: COMMERCIAL

## 2024-11-04 NOTE — TELEPHONE ENCOUNTER
Called pt to remind of appointment on 11/5/2024 at 1:30 Pt answered and will be present.    Pt is aware that the appointment is a phone/virtual visit, pt. understands that he/she doesn't have to show up in office.

## 2024-11-05 ENCOUNTER — HOSPITAL ENCOUNTER (OUTPATIENT)
Dept: RADIATION ONCOLOGY | Facility: HOSPITAL | Age: 64
Setting detail: RADIATION/ONCOLOGY SERIES
Discharge: HOME | End: 2024-11-05
Payer: COMMERCIAL

## 2024-11-05 DIAGNOSIS — C34.12 MALIGNANT NEOPLASM OF UPPER LOBE OF LEFT LUNG (MULTI): Primary | ICD-10-CM

## 2024-11-05 PROCEDURE — 99213 OFFICE O/P EST LOW 20 MIN: CPT | Performed by: NURSE PRACTITIONER

## 2024-11-05 PROCEDURE — G2211 COMPLEX E/M VISIT ADD ON: HCPCS | Performed by: NURSE PRACTITIONER

## 2024-11-05 PROCEDURE — 99213 OFFICE O/P EST LOW 20 MIN: CPT | Mod: 95 | Performed by: NURSE PRACTITIONER

## 2024-11-05 ASSESSMENT — ENCOUNTER SYMPTOMS
UNEXPECTED WEIGHT CHANGE: 0
GASTROINTESTINAL NEGATIVE: 1
STRIDOR: 0
WHEEZING: 0
NEUROLOGICAL NEGATIVE: 1
FEVER: 0
CHEST TIGHTNESS: 0
APPETITE CHANGE: 0
FATIGUE: 0
CHILLS: 0
DIAPHORESIS: 0
COUGH: 1
MUSCULOSKELETAL NEGATIVE: 1
CHOKING: 0
PSYCHIATRIC NEGATIVE: 1
CARDIOVASCULAR NEGATIVE: 1
SHORTNESS OF BREATH: 1
HEMATOLOGIC/LYMPHATIC NEGATIVE: 1
ACTIVITY CHANGE: 0
APNEA: 0

## 2024-11-05 NOTE — PROGRESS NOTES
Patient ID: [id number]     DIAGNOSIS AND STAGING  Stage IIB (cT2 pN1 cM0) 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    PD-L1 TPS 20%      PRIOR THERAPIES  Concurrent chemo RT using weekly CarboTaxol, completed 60 Gy in 30 fractions on 01/05/2024    History of presenting illness    Telehealth visit today with Martínez Neri and his wife. Patient is a 64 y.o. male who presents today for follow up 9 months s/p chemoRT for SCC of the left upper lobe. Patient completed 30/33 fractions due to hospitalization for deconditioning. He was treated for for possible aspiration pneumonia. 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 He was treated with Heparin and switched to aspirin at discharge. Patient is doing well. Energy is fair. Appetite good. Weight stable. Patient completed pulmonary rehab and no longer requires oxygen.  Using inhalers and nebulizer as prescribed. Continues to have a productive cough. If he has a coughing fit may have some chest tenderness.  Denies fevers, chest pain or back pain.  Patient was planned to start consolidative immunotherapy. Due to delay related to complications/hospitalizations he missed the window and there is concerned for increased toxicities. He is currently being followed with 3 month CT scans. Scan in September was stable. Next one is scheduled for December with follow up after.   Review of systems:  Review of Systems   Constitutional:  Negative for activity change, appetite change, chills, diaphoresis, fatigue, fever and unexpected weight change.   HENT: Negative.     Respiratory:  Positive for cough and shortness of breath. Negative for apnea, choking, chest tightness, wheezing and stridor.    Cardiovascular: Negative.    Gastrointestinal: Negative.     Musculoskeletal: Negative.    Skin: Negative.    Neurological: Negative.    Hematological: Negative.    Psychiatric/Behavioral: Negative.         Past Medical history  He  has a past surgical history that includes Carotid endarterectomy (N/A); Colonoscopy; Hernia repair; and Cerebral aneurysm repair.        Radiology results:  CT chest w IV contrast 09/16/2024    Narrative  Interpreted By:  Trixie Govea,  STUDY:  CT CHEST W IV CONTRAST;  9/16/2024 10:49 am    INDICATION:  Signs/Symptoms:NSCLC treated with concurrent chemo-RT.    COMPARISON:  CT dated 07/08/2024 and 04/01/2024      ACCESSION NUMBER(S):  [id number]    ORDERING CLINICIAN:  SENIA FUNES    TECHNIQUE:  Helical data acquisition of the chest was obtained  with 75 cc of  Omnipaque 350. Images were reformatted in axial, coronal, and  sagittal planes.    FINDINGS:  LUNGS AND AIRWAYS:  The trachea and central airways are patent. No endobronchial lesion.  There is bronchial wall thickening.    There is moderate to severe diffuse emphysema with slight upper lung  predominance.    Stable appearance left upper lobe volume loss, with dense masslike  calcification and bronchiectasis    3 mm left upper lobe nodule, image 73/361, stable.  5 mm left upper lobe nodule, image 91/361, stable.  4 mm left upper lobe nodule, image 102/361.  An area of curvilinear density in the left lower lobe, image 235/361,  unchanged compared to prior study.    6 mm left lower lobe nodule, image 267/361 new compared to prior  study.  4 mm left lower lobe nodular density, image 271/361, not definitely  seen on the prior study. Interval increase in size of a 7 mm right  lower lobe nodule with central cavitation seen in image 178/361.    MEDIASTINUM AND WILLIAM, LOWER NECK AND AXILLA:  The visualized thyroid gland is within normal limits.    No evidence of thoracic lymphadenopathy by CT criteria.    Esophagus appears within normal limits as seen.    HEART AND VESSELS:  The  thoracic aorta is of normal course and caliber with severe  vascular calcifications.    Main pulmonary artery and its branches are normal in caliber.    Severe coronary artery calcification. The study is not optimized for  evaluation of coronary arteries.    The cardiac chambers are not enlarged. Calcification of the aortic  valve. Correlate with aortic valve stenosis.    No evidence of pericardial effusion.    UPPER ABDOMEN:  A few small hypodense lesions are identified in the visualized  portion of the liver, not significantly different from prior study.  These are incompletely characterized but likely cysts or hemangiomas.  The visualized portion of the upper abdomen appear otherwise  unremarkable.    CHEST WALL AND OSSEOUS STRUCTURES:  There are no suspicious osseous lesions. Multilevel degenerative  changes are present    Impression  1.  Overall stable appearance of left upper lobe masslike  consolidation, volume loss , architectural distortion and  bronchiectasis, likely due to evolving postradiation changes.  2. 7 mm right lower lobe nodule with central lucency/cavitation,  increased in size compared to prior study which measured 4 mm. Cannot  exclude metastatic disease and recommend attention on short-term  follow-up chest C T.  3. A few new left lung base nodular densities which are likely areas  of mucous plugging. Additional nodules and nodular densities in the  left upper lobe which are stable. Recommend attention on follow-up.  4. Additional stable findings as described above.    MACRO:  None    Signed by: Trixie Duffy 9/16/2024 11:17 AM  Dictation workstation:   [id number]   Plan:  Assessment/Plan     63 year old male 9 months s/p chemo RT to the THOMAS.  Patient is doing well and in usual state of health. No acute complaints related to treatment. No longer on oxygen. CT scan from 9/16/24 is stable. Currently under observation with Dr. Ng with 3 months CT scans. Next scan is in December.   Continue  inhalers and nebulizer as prescribed. Continue follow up with pulmonologist as scheduled.  Patient to return to clinic in 6 months for virtual visit per request. Patient instructed to call with any questions or concerns.     I performed this visit using real- time telehealth tools , including an audio/video or telephone connection between Mr and Mrs. Holliday , who is in their home and Bhavna Reed CNP at [facility].

## 2024-12-04 DIAGNOSIS — C34.12 MALIGNANT NEOPLASM OF UPPER LOBE, LEFT BRONCHUS OR LUNG (MULTI): Primary | ICD-10-CM

## 2024-12-05 ENCOUNTER — APPOINTMENT (OUTPATIENT)
Dept: RADIOLOGY | Facility: HOSPITAL | Age: 64
End: 2024-12-05
Payer: COMMERCIAL

## 2024-12-06 DIAGNOSIS — G89.3 CANCER RELATED PAIN: ICD-10-CM

## 2024-12-06 RX ORDER — OXYCODONE HYDROCHLORIDE 5 MG/1
5 TABLET ORAL 2 TIMES DAILY PRN
Qty: 60 TABLET | Refills: 0 | Status: SHIPPED | OUTPATIENT
Start: 2024-12-06 | End: 2025-01-05

## 2024-12-09 ENCOUNTER — APPOINTMENT (OUTPATIENT)
Dept: CARDIOLOGY | Facility: CLINIC | Age: 64
End: 2024-12-09
Payer: MEDICAID

## 2024-12-10 ENCOUNTER — APPOINTMENT (OUTPATIENT)
Dept: HEMATOLOGY/ONCOLOGY | Facility: HOSPITAL | Age: 64
End: 2024-12-10
Payer: COMMERCIAL

## 2024-12-18 ENCOUNTER — LAB (OUTPATIENT)
Dept: LAB | Facility: HOSPITAL | Age: 64
End: 2024-12-18
Payer: COMMERCIAL

## 2024-12-18 ENCOUNTER — HOSPITAL ENCOUNTER (OUTPATIENT)
Dept: RADIOLOGY | Facility: HOSPITAL | Age: 64
Discharge: HOME | End: 2024-12-18
Payer: COMMERCIAL

## 2024-12-18 DIAGNOSIS — C34.12 PRIMARY CANCER OF LEFT UPPER LOBE OF LUNG (MULTI): ICD-10-CM

## 2024-12-18 LAB
ALBUMIN SERPL BCP-MCNC: 4.4 G/DL (ref 3.4–5)
ALP SERPL-CCNC: 66 U/L (ref 33–136)
ALT SERPL W P-5'-P-CCNC: 19 U/L (ref 10–52)
ANION GAP SERPL CALC-SCNC: 12 MMOL/L (ref 10–20)
AST SERPL W P-5'-P-CCNC: 21 U/L (ref 9–39)
BASOPHILS # BLD AUTO: 0.09 X10*3/UL (ref 0–0.1)
BASOPHILS NFR BLD AUTO: 1.4 %
BILIRUB SERPL-MCNC: 0.3 MG/DL (ref 0–1.2)
BUN SERPL-MCNC: 14 MG/DL (ref 6–23)
CALCIUM SERPL-MCNC: 9.8 MG/DL (ref 8.6–10.3)
CHLORIDE SERPL-SCNC: 103 MMOL/L (ref 98–107)
CO2 SERPL-SCNC: 30 MMOL/L (ref 21–32)
CREAT SERPL-MCNC: 0.78 MG/DL (ref 0.5–1.3)
DACRYOCYTES BLD QL SMEAR: NORMAL
EGFRCR SERPLBLD CKD-EPI 2021: >90 ML/MIN/1.73M*2
EOSINOPHIL # BLD AUTO: 0.2 X10*3/UL (ref 0–0.7)
EOSINOPHIL NFR BLD AUTO: 3.1 %
ERYTHROCYTE [DISTWIDTH] IN BLOOD BY AUTOMATED COUNT: 22.9 % (ref 11.5–14.5)
GLUCOSE SERPL-MCNC: 89 MG/DL (ref 74–99)
HCT VFR BLD AUTO: 34 % (ref 41–52)
HGB BLD-MCNC: 10.7 G/DL (ref 13.5–17.5)
IMM GRANULOCYTES # BLD AUTO: 0.01 X10*3/UL (ref 0–0.7)
IMM GRANULOCYTES NFR BLD AUTO: 0.2 % (ref 0–0.9)
LYMPHOCYTES # BLD AUTO: 1.66 X10*3/UL (ref 1.2–4.8)
LYMPHOCYTES NFR BLD AUTO: 25.4 %
MCH RBC QN AUTO: 26.8 PG (ref 26–34)
MCHC RBC AUTO-ENTMCNC: 31.5 G/DL (ref 32–36)
MCV RBC AUTO: 85 FL (ref 80–100)
MONOCYTES # BLD AUTO: 0.56 X10*3/UL (ref 0.1–1)
MONOCYTES NFR BLD AUTO: 8.6 %
NEUTROPHILS # BLD AUTO: 4.02 X10*3/UL (ref 1.2–7.7)
NEUTROPHILS NFR BLD AUTO: 61.3 %
NRBC BLD-RTO: 0 /100 WBCS (ref 0–0)
OVALOCYTES BLD QL SMEAR: NORMAL
PLATELET # BLD AUTO: 364 X10*3/UL (ref 150–450)
POLYCHROMASIA BLD QL SMEAR: NORMAL
POTASSIUM SERPL-SCNC: 4.9 MMOL/L (ref 3.5–5.3)
PROT SERPL-MCNC: 7.5 G/DL (ref 6.4–8.2)
RBC # BLD AUTO: 3.99 X10*6/UL (ref 4.5–5.9)
RBC MORPH BLD: NORMAL
SCHISTOCYTES BLD QL SMEAR: NORMAL
SODIUM SERPL-SCNC: 140 MMOL/L (ref 136–145)
TARGETS BLD QL SMEAR: NORMAL
WBC # BLD AUTO: 6.5 X10*3/UL (ref 4.4–11.3)

## 2024-12-18 PROCEDURE — 71260 CT THORAX DX C+: CPT | Performed by: STUDENT IN AN ORGANIZED HEALTH CARE EDUCATION/TRAINING PROGRAM

## 2024-12-18 PROCEDURE — 71260 CT THORAX DX C+: CPT

## 2024-12-18 PROCEDURE — 85025 COMPLETE CBC W/AUTO DIFF WBC: CPT

## 2024-12-18 PROCEDURE — 36415 COLL VENOUS BLD VENIPUNCTURE: CPT

## 2024-12-18 PROCEDURE — 82565 ASSAY OF CREATININE: CPT

## 2024-12-18 PROCEDURE — 2550000001 HC RX 255 CONTRASTS: Performed by: STUDENT IN AN ORGANIZED HEALTH CARE EDUCATION/TRAINING PROGRAM

## 2024-12-31 ENCOUNTER — OFFICE VISIT (OUTPATIENT)
Dept: PALLIATIVE MEDICINE | Facility: CLINIC | Age: 64
End: 2024-12-31
Payer: COMMERCIAL

## 2024-12-31 ENCOUNTER — TELEPHONE (OUTPATIENT)
Dept: PALLIATIVE MEDICINE | Facility: CLINIC | Age: 64
End: 2024-12-31
Payer: COMMERCIAL

## 2024-12-31 ENCOUNTER — OFFICE VISIT (OUTPATIENT)
Dept: HEMATOLOGY/ONCOLOGY | Facility: HOSPITAL | Age: 64
End: 2024-12-31
Payer: COMMERCIAL

## 2024-12-31 VITALS
WEIGHT: 148.15 LBS | OXYGEN SATURATION: 97 % | SYSTOLIC BLOOD PRESSURE: 130 MMHG | DIASTOLIC BLOOD PRESSURE: 70 MMHG | BODY MASS INDEX: 21.94 KG/M2 | HEIGHT: 69 IN | TEMPERATURE: 96.8 F | HEART RATE: 75 BPM | RESPIRATION RATE: 18 BRPM

## 2024-12-31 DIAGNOSIS — Z51.5 PALLIATIVE CARE ENCOUNTER: Primary | ICD-10-CM

## 2024-12-31 DIAGNOSIS — Z79.891 ENCOUNTER FOR MONITORING OPIOID MAINTENANCE THERAPY: ICD-10-CM

## 2024-12-31 DIAGNOSIS — F32.A ANXIETY AND DEPRESSION: ICD-10-CM

## 2024-12-31 DIAGNOSIS — C34.12 PRIMARY CANCER OF LEFT UPPER LOBE OF LUNG (MULTI): ICD-10-CM

## 2024-12-31 DIAGNOSIS — Z51.81 ENCOUNTER FOR MONITORING OPIOID MAINTENANCE THERAPY: ICD-10-CM

## 2024-12-31 DIAGNOSIS — G89.3 CANCER RELATED PAIN: ICD-10-CM

## 2024-12-31 DIAGNOSIS — F41.9 ANXIETY AND DEPRESSION: ICD-10-CM

## 2024-12-31 DIAGNOSIS — G47.00 INSOMNIA, UNSPECIFIED TYPE: ICD-10-CM

## 2024-12-31 DIAGNOSIS — G62.9 NEUROPATHY: ICD-10-CM

## 2024-12-31 PROCEDURE — 1159F MED LIST DOCD IN RCRD: CPT | Performed by: STUDENT IN AN ORGANIZED HEALTH CARE EDUCATION/TRAINING PROGRAM

## 2024-12-31 PROCEDURE — 1157F ADVNC CARE PLAN IN RCRD: CPT | Performed by: STUDENT IN AN ORGANIZED HEALTH CARE EDUCATION/TRAINING PROGRAM

## 2024-12-31 PROCEDURE — 99215 OFFICE O/P EST HI 40 MIN: CPT | Performed by: NURSE PRACTITIONER

## 2024-12-31 PROCEDURE — 99215 OFFICE O/P EST HI 40 MIN: CPT | Performed by: STUDENT IN AN ORGANIZED HEALTH CARE EDUCATION/TRAINING PROGRAM

## 2024-12-31 PROCEDURE — 3008F BODY MASS INDEX DOCD: CPT | Performed by: NURSE PRACTITIONER

## 2024-12-31 PROCEDURE — 1125F AMNT PAIN NOTED PAIN PRSNT: CPT | Performed by: NURSE PRACTITIONER

## 2024-12-31 PROCEDURE — 1157F ADVNC CARE PLAN IN RCRD: CPT | Performed by: NURSE PRACTITIONER

## 2024-12-31 RX ORDER — BISMUTH SUBSALICYLATE 262 MG
1 TABLET,CHEWABLE ORAL DAILY
COMMUNITY

## 2024-12-31 RX ORDER — MIRTAZAPINE 7.5 MG/1
7.5 TABLET, FILM COATED ORAL NIGHTLY
Qty: 30 TABLET | Refills: 2 | Status: SHIPPED | OUTPATIENT
Start: 2024-12-31 | End: 2025-01-30

## 2024-12-31 RX ORDER — OXYCODONE HYDROCHLORIDE 5 MG/1
5 TABLET ORAL EVERY 6 HOURS PRN
Qty: 120 TABLET | Refills: 0 | Status: SHIPPED | OUTPATIENT
Start: 2024-12-31 | End: 2025-01-30

## 2024-12-31 ASSESSMENT — PAIN SCALES - GENERAL: PAINLEVEL_OUTOF10: 4

## 2024-12-31 NOTE — PROGRESS NOTES
Patient ID: Martínez Neri is a 64 y.o. 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    PD-L1 TPS 20%      PRIOR THERAPIES  Concurrent chemo RT using weekly CarboTaxol, completed 60 Gy in 30 fractions on 01/05/2024     CURRENT THERAPY  Surveillance        CURRENT ONCOLOGICAL PROBLEMS  Cough, productive  Oxygen dependence  Radiation-induced esophagitis        HISTORY OF PRESENT ILLNESS:  Current smoker (50-pack-year history), with history of COPD and seizures, who was diagnosed with a squamous cell carcinoma of the left upper lobe in [place], never treated.  Outpatient notes from [facility] and hematology clinic were reviewed.     On 06/17/2023 the patient had a CT chest for ongoing respiratory symptoms and was found to have a left suprahilar mass involving the left upper lobe bronchi with postobstructive pneumonia/collapse.  On 06/25/2023 CT chest/abdomen/pelvis demonstrated persistent masslike consolidation in the left upper lobe with no signs of extrathoracic metastatic disease.     On 06/26/2023 the patient underwent a bronchoscopy, and pathology was compatible with a squamous cell carcinoma at least in situ, with a foci highly suspicious for invasive squamous cell carcinoma.     On 07/18/2023 a PET/CT obtained, demonstrating a 6.6 cm left upper lobe mass with an SUV of 8.3 and no evidence of distant metastatic disease.     On 07/18/2023 to brain MRI was negative for intracranial metastasis.     On 07/27/2023 bronchoscopy/EBUS was performed.  Endobronchial biopsies of left upper lobe demonstrated an invasive moderately differentiated squamous cell carcinoma.  Left upper lobe hilar FNA  showed atypical cells with squamous differentiation, suspicious for malignancy.      Subcarinal FNA was indeterminate for malignancy with extremely rare atypical cells with squamous differentiation.  Molecular profile: Lack of BRAF, EGFR, HER2, KRAS, MET, ALK, NTRK 1/2/3, RET or ROS1 alterations.  PD-L1 TPS 20%.  The patient was seen by medical oncology and did not too frail for combined chemo RT (there is a performance status ECOG 3) documented on 08/17/2023.  At that time, he lived with some friends, while his family was primarily located in [place].     His daughter traveled and was able to relocate him to [place], and he presents today to clinic accompanied by his daughter, son, and daughter-in-law.     The patient states he is felt much better since his relocation, has been gaining weight, approximately 10 pounds since he moved here.  He is more active, going up and down the steps at home, but still tired and taking some breaks throughout the day.     He denies any headaches or new neurological symptoms.  His respiratory symptoms are stable. There is no new localized pain.     Reported molecular and ancillary testing performed upon diagnosis in [place]:   BRAF negative  [id number] negative  EGFR classic mutations negative  HER2 negative  MET negative  ALK negative  In tract 1/2/3 negative  RET negative  ROS1 negative  PD-L1 20%     Referred to surgery - PFTs were considered prohibitive for surgical resection-FEV1 30%, DLCO 30%.     10/31/23: Repeat EBUS for systematic staging:  Final Cytological Interpretation      A. LYMPH NODE 7 PULMONARY FINE NEEDLE ASPIRATION, CYTOLOGY AND CELL BLOCK:  --  No malignant cells identified.  --  Lymphoid sample.     B. LYMPH NODE 4 L PULMONARY FINE NEEDLE ASPIRATION, CYTOLOGY AND CELL BLOCK:  --  No malignant cells identified.  --  Lymphoid sample.           C. LYMPH NODE 11 L PULMONARY FINE NEEDLE ASPIRATION, CYTOLOGY AND CELL BLOCK:  --  A rare cluster of malignant cells derived  from squamous cell carcinoma, see note  -- Also, please refer to concurrent biopsy surgical pathology report ([id number]).     Note:  The malignant squamous cell carcinoma cells are represented in the cell block only.  Immunostain for p40 is positive. The cytomorphology and immunoprofile support the above diagnosis.  The material is not sufficient for molecular testing.      Component     FINAL DIAGNOSIS   Left lung, upper lobe, biopsy:  -- Superficial fragment of squamous cell carcinoma, keratinizing; see note.     Note: According to clinician's note, PD-L1 and NGS have been performed at an outside institution. These studies can be repeated, if clinically indicated.      NGS and PD-L1 not repeated in this specimen as it was done in outside institution already and the purpose of this procedure was not for diagnosis but systematic mediastinal re-staging.     01/05/2024: Completes definitive chemo RT using 60 Gray in 30 fractions with concurrent weekly CarboTaxol    01/08/2024: Admitted with neutropenic fevers, dehydration.  CT abdomen/pelvis demonstrated a partial thrombus of superior mesenteric artery.  He was a started on heparin and switched to aspirin at discharge.  CT chest was concerning for aspiration pneumonia, he was treated with antibiotics.  He was discharged on oxygen, 2 L at rest, 4 L on exertion.  He was also discharged on Augmentin 875 mg twice daily for 14 days.  Also sucralfate, 10 mL every 6 hours.  While hospitalized, he had episodes of intermittent agitation and was found to have elevated ammonia levels.  On 01/11/2024, ALT was 63, AST 60.    01/29/2024: CT chest with IV contrast demonstrates the development of multiple pulmonary nodules, including a 3 cm spiculated left lower lobe nodule, concerning for progression of his disease.  A PET scan was ordered STAT for subsequent evaluation, and a referral was placed to IR for CT-guided biopsy of most assessable lung nodule (right lower lobe,  posterior, image #[id number]).    2024: Pathology from RLL biopsy not diagnostic    24: The contrast obtained for navigational bronchoscopy demonstrated significant progression of the lung parenchyma abnormalities, and procedure was canceled.  Patient will remain on surveillance.    24: CT chest showing likely post radiation changes in THOMAS but with slight increase few pulmonary nodules    24: Overall patient is clinically doing well so will hold off on a bronch at this time and plan to repeat scans in 2 months    PAST MEDICAL HISTORY  Brain aneurysm  COPD (chronic obstructive pulmonary disease) (CMS/HCC)  Epilepsy (CMS/HCC)  High cholesterol  History of alcohol use  Hypertension     PAST SURGICAL HISTORY:  CAROTID ENDARTERECTOMY  COLONOSCOPY  HERNIA REPAIR      SOCIAL HISTORY  Tobacco (50 pack-year history) use - quit after cancer dx      CURRENT MEDS REVIEWED:  Scheduled medications  Continuous medications  PRN medications     ALLERGIES  NKDA     FAMILY HISTORY  Father had prostate CA     SUBJECTIVE:  He is here today and feels well overall. No new complaints or concerns.       OBJECTIVE:  There were no vitals filed for this visit.       There is no height or weight on file to calculate BSA.     Wt Readings from Last 5 Encounters:   24 67.2 kg (148 lb 2.4 oz)   10/12/24 66.7 kg (147 lb)   10/09/24 67 kg (147 lb 9.6 oz)   24 68.2 kg (150 lb 6.4 oz)   24 67.6 kg (149 lb)     ECO    Physical Exam  Constitutional:       Appearance: Normal appearance.   HENT:      Head: Normocephalic and atraumatic.   Eyes:      General: No scleral icterus.  Cardiovascular:      Rate and Rhythm: Normal rate and regular rhythm.      Heart sounds: Normal heart sounds.   Pulmonary:      Effort: Pulmonary effort is normal. No respiratory distress.      Breath sounds: Rhonchi present. No wheezing.   Abdominal:      General: There is no distension.      Palpations: Abdomen is soft.      Tenderness:  There is no abdominal tenderness. There is no guarding.   Musculoskeletal:      Right lower leg: No edema.      Left lower leg: No edema.   Lymphadenopathy:      Cervical: No cervical adenopathy.   Skin:     General: Skin is warm.      Coloration: Skin is not pale.      Findings: No erythema or rash.   Neurological:      General: No focal deficit present.      Mental Status: He is alert and oriented to person, place, and time. Mental status is at baseline.      Motor: No weakness.      Gait: Gait normal.   Psychiatric:         Mood and Affect: Mood normal.         Behavior: Behavior normal.         Thought Content: Thought content normal.         Judgment: Judgment normal.          Diagnostic Results   Results:  Labs:  Lab Results   Component Value Date    WBC 6.5 12/18/2024    HGB 10.7 (L) 12/18/2024    HCT 34.0 (L) 12/18/2024    MCV 85 12/18/2024     12/18/2024      Lab Results   Component Value Date    NEUTROABS 4.02 12/18/2024        Lab Results   Component Value Date    GLUCOSE 89 12/18/2024    CALCIUM 9.8 12/18/2024     12/18/2024    K 4.9 12/18/2024    CO2 30 12/18/2024     12/18/2024    BUN 14 12/18/2024    CREATININE 0.78 12/18/2024     Lab Results   Component Value Date    ALT 19 12/18/2024    AST 21 12/18/2024    ALKPHOS 66 12/18/2024    BILITOT 0.3 12/18/2024      Lab Results   Component Value Date    ACTH 26.6 02/01/2024    CORTISOL 11.6 02/01/2024    TSH 2.50 10/09/2024    FREET4 0.90 01/11/2024     Imaging:  I have personally reviewed the below imaging and concur with the reported findings unless otherwise stated:    CT chest w IV contrast    Result Date: 12/18/2024  Impression: 1. Interval enlargement of few scattered bilateral pulmonary nodules, including an enlarging dominant 9 mm left upper lobar nodule along the major fissure as well as additional enlarging left upper and right lower lobe nodules described in detail above, raising concern for progression of metastatic pulmonary  disease. 2. Similar collapse of the medial left upper lobe with similar surrounding fibrotic change and reticulation likely related to posttreatment change and atelectasis. Few additional scattered areas of ground-glass opacity and architectural distortion also likely representing regions of prior treated nodules. 3. Similar moderate upper lobe predominant centrilobular emphysema. 4. No thoracic lymphadenopathy.   I personally reviewed the image(s)/study and resident interpretation as stated by Dr. Khushboo Burnette MD. I agree with the findings as stated. This study was interpreted at [facility], [place], [place].   MACRO: Critical Finding:  See findings. Notification was initiated on 12/18/2024 at 3:22 pm by  Mariusz Terrell.  (**-YCF-**) Instructions:   Signed by: Mariusz Terrell 12/18/2024 3:22 PM Dictation workstation:   [id number]       Assessment/Plan     Primary cancer of left upper lobe of lung (Multi), Clinical: Stage IIB (cT2, cN1, cM0)    #Stage IIB squamous cell carcinoma of the left upper lobe  - treated with concurrent chemo RT using weekly CarboTaxol, completed 60 Gray in 30 fractions on 01/05/2024.  -Subsequently hospitalized with neutropenic fevers on 01/08/2024.  -Suspicion for aspiration during the workup performed at outside hospital as well as SMA partial thrombus.  -He has seen pulmonology in [facility] Dr. Sahu and underwent RLL biopsy by IR which was not diagnostic.  -He was then scheduled for a navigational bronchoscopy but the lung lesions that were supposed to be biopsied pretty much resolved in the planning scans - This is c/w with inflammatory changes   -Due to multiple complications as described above, he missed the window for consolidative durvalumab.    -There was also concern in regards to high risk of toxicities due to his underlying pulmonary challenges.    -We are specifically concerned about increased risk for treatment related pneumonitis.   -Being  followed closely with scans for now  -Scans 07/08 showing what appears to be radiation changes in THOMAS but with increasing size of multiple pulmonary nodules. Patient is clinically doing much better than he has in a long time.   -We discussed the option of going for bronchoscopy now vs holding off and following with repeat scans in 2 months as has had waxing and waning lung nodules in the past.   -Via shared decision making collectively decided to follow up in clinic with CT chest in 2 months. If clinical status worsens patient will call and we can re-evaluate sooner.  -Patient seen in clinic 9/24 to go over recent CT scan  - CT scan complete 9/16/24 showed overall stable appearance of mass like consolidation in THOMAS. Likely due to evolving postradiation changes. There is a 7 mm right lower lobe nodule with central lucency/cavitation, increased in size compared to prior study which measured 4 mm.  -personally reviewed CT scan from 12/18/24 with enlarging subcentimeter nodules. Asymptomatic. Discussed continued close monitoring, will repeat CT scan in 3 months.

## 2024-12-31 NOTE — TELEPHONE ENCOUNTER
Phone call to pharmacy to check if PA needed for Oxycodone frequency increase and Mirtazapine new medication. No PA needed on either medication, pharmacy will fill both today.

## 2024-12-31 NOTE — PROGRESS NOTES
SUPPORTIVE AND PALLIATIVE ONCOLOGY OUTPATIENT FOLLOW-UP      SERVICE DATE: 12/31/2024    Cancer History  NSCL CA THOMAS dx June 2023  -7/27/23: bronch/ EBUS performed, biopsies of THOMAS demonstrated invasive moderately differentiated SCC.   -was living in [place] at the time, relocated to [place]  -1/5/24: completed definitive chemoRT 60 Gy in 30 fxs with concurrent weekly carbotaxol  -currently on surveillance     Onc: Dr. Tasha Zuluaga Onc: NICOLETTE Reed, CNP      Subjective   HISTORY OF PRESENT ILLNESS: Martínez Neri is a 64 y.o. male with Pmhx of emphysema/ COPD, ETOH misuse, seizure disorder, and SCC of THOMAS.     He presents to supportive oncology for follow up for pain and symptom management.     Presents for FUV alone today, previously followed with Dr. Rock, this is our first visit today. States pain is about the same, has pain in L chest, worse with coughing. Takes oxycodone 1-2x/day, though does not always require daily, and also takes vasu ASA back and body. Has numbness/tingling in fingers and toes, sometimes effects balance. Moving bowels daily. No N/V/reflux. Appetite is steady, has gained a few lbs recently, THC helps appetite. Has had depression for past few years, taking depakote and vimpat, which also helps manage his seizures. Some anxiety regarding results of most recent scans. Not sleeping well, few hrs at a time, trouble falling and staying asleep. Activity levels are good, trying to exercise more lately.     Pain Assessment:  Pain Score:   4  Location: Generalized (throat, chest, abdomen- from coughing)  Education:  Discussed that oxycodone is short acting pain medication, typically gives relief for 3-4 hrs     Symptom Assessment:  Pain:a little  Headache: none  Dizziness:none  Lack of energy: a little  Difficulty sleeping: very much  Worrying: a little  Anxiety: a little  Depression: a little  Pain in mouth/swallowing: none  Dry mouth: none  Taste changes: none  Shortness of breath: none  Lack of  appetite: a little   Nausea: none  Vomiting: none  Constipation: none  Diarrhea: none  Sore muscles: a little  Numbness or tingling in hands/feet/other: somewhat  Weight loss: a little      Information obtained from: chart review and interview of patient  ______________________________________________________________________        Objective                PHYSICAL EXAMINATION   Vital Signs:   Vital signs reviewed  Vitals:    12/31/24 0827   BP: 130/70   Pulse: 75   Resp: 18   Temp: 36 °C (96.8 °F)   SpO2: 97%     Pain Score:  4     Physical Exam  Vitals reviewed.   Constitutional:       Appearance: Normal appearance.   HENT:      Head: Normocephalic.   Cardiovascular:      Rate and Rhythm: Normal rate.   Pulmonary:      Effort: Pulmonary effort is normal.      Comments: Faint wheezing  Abdominal:      Palpations: Abdomen is soft.   Musculoskeletal:         General: Normal range of motion.   Skin:     General: Skin is warm and dry.      Coloration: Skin is ashen.   Neurological:      General: No focal deficit present.      Mental Status: He is alert and oriented to person, place, and time.   Psychiatric:         Mood and Affect: Mood normal.         Judgment: Judgment normal.         ASSESSMENT/PLAN    Pain  Pain is: cancer related pain  Type: visceral and neuropathic  Pain control: well-controlled  Home regimen:   -increase oxycodone 5mg to q6hrs PRN. Rx sent today.   -continue Sanket ASA back and body PRN, not taking often (ok per vascular provider)  -avoid NSAIDs d/t concurrent use of Eliquis   Intolerances/previously tried:     Opioid Use  Medication Management:   - OARRS report reviewed with no aberrant behavior; consistent with  prescriptions/records and patient history  - MED 15.  Overdose Risk Score 290.   This has been discussed with patient.   - We will continue to closely monitor the patient for signs of prescription misuse including UDS, OARRS review and subjective reports at each visit.  - no  concurrent benzodiazepine use   - I am a provider who either is or has consulted and collaborated with a provider certified in Hospice and Palliative Medicine and have conducted a face-face visit and examination for this patient.  - Routine Urine Drug Screen completed deferred (MED <90) appropriately positive for opioids and negative for illicit substances  - Controlled Substance Agreement completed 2/21/24  - Specifically discussed that controlled substance prescriptions will only be provided by our group as outlined in the completed agreement  - Prescribed naloxone 2/21/24  - Red Flags: history of ETOH misuse, in recovery since 2016    Constipation  At risk for constipation related to opioids,  currently not constipated  Usual bowel pattern: daily  Home regimen:   -educated pt on importance of maintaining regular bowel schedule  -start colace 100mg bid PRN for hard stools  -start senna 8.6mg, 1-2tabs, 1-2x/day PRN    Altered Mood  Chronic depression related to health concerns   controlled with home regimen  Home regimen:   -continue depakote ER 500mg bid. (Managed by neurologist- also treatment for seizure disorder)    Sleeping Difficulty:  Impaired sleep related to insomnia  Home regimen:    -start mirtazapine 7.5mg at bedtime. Rx sent today.     Decreased appetite  Related to malignancy and disease process  Home regimen:    -encouraged smaller, more frequent meals through out the day  -encouraged use of supplements such as Boost, Ensure, etc.    -start mirtazapine 7.5mg at bedtime. Rx sent today.     Supportive Interventions:    Supportive and Palliative Oncology encounter:  Emotional support provided  Coordination of care  We will continue to follow and address symptoms as needed    Advance Directives  Existence of Advance Directives:Unknown  Decision maker: not formally discussed this visit   Code Status: DNR comfort care arrest          Next Follow-Up Visit:  Return to clinic in 6 weeks     Signature and  billing  Medical complexity was high level due to due to complexity of problems, extensive data review, and high risk of management/treatment.  Time was spent on the following: Prep Time, Time Directly with Patient/Family/Caregiver, Documentation Time. Total time spent: 45 mins      Data  Diagnostic tests and information reviewed for today's visit:  Most recent labs         Some elements copied from oncology note on 9/24/24, the elements have been updated and all reflect current decision making from today, 12/31/2024.      Plan of Care discussed with: Patient    SIGNATURE: ARIC Lim-CNP    Contact information:  Supportive and Palliative Oncology  Monday-Friday 8 AM-5 PM  Phone:  [phone], press option #5, then option #1.   Or Epic Secure Chat

## 2025-01-22 ENCOUNTER — OFFICE VISIT (OUTPATIENT)
Dept: URGENT CARE | Age: 65
End: 2025-01-22
Payer: COMMERCIAL

## 2025-01-22 VITALS
HEART RATE: 69 BPM | SYSTOLIC BLOOD PRESSURE: 109 MMHG | OXYGEN SATURATION: 95 % | BODY MASS INDEX: 21.87 KG/M2 | WEIGHT: 148.81 LBS | DIASTOLIC BLOOD PRESSURE: 61 MMHG | RESPIRATION RATE: 18 BRPM

## 2025-01-22 DIAGNOSIS — S00.412A ABRASION OF LEFT EAR CANAL, INITIAL ENCOUNTER: Primary | ICD-10-CM

## 2025-01-22 RX ORDER — CIPROFLOXACIN AND DEXAMETHASONE 3; 1 MG/ML; MG/ML
4 SUSPENSION/ DROPS AURICULAR (OTIC) 2 TIMES DAILY
Qty: 7.5 ML | Refills: 0 | Status: SHIPPED | OUTPATIENT
Start: 2025-01-22 | End: 2025-01-29

## 2025-01-22 NOTE — PROGRESS NOTES
Subjective   Patient ID: Martínez Neri is a 64 y.o. male. They present today with a chief complaint of Earache (Left ear draining blood the last 2 days/Pt states that he felt like water was in there the last 2 days, used a q-tip last night and that's when he noticed blood.).    History of Present Illness  See [facility]       History provided by:  Patient   used: No        Past Medical History  Allergies as of 01/22/2025    (No Known Allergies)       (Not in a hospital admission)       Past Medical History:   Diagnosis Date    Allergic rhinitis     Asthma     Brain aneurysm ([facility]-HCC)     Cancer (Multi)     Carotid artery disease (CMS-HCC)     COPD (chronic obstructive pulmonary disease) (Multi)     Epilepsy     High cholesterol     History of alcohol use     Hypertension     Lung cancer (Multi)     Neutropenic fever (CMS-HCC)     Seizure (Multi)        Past Surgical History:   Procedure Laterality Date    CAROTID ENDARTERECTOMY N/A     CEREBRAL ANEURYSM REPAIR      COLONOSCOPY      HERNIA REPAIR          reports that he has quit smoking. His smoking use included cigarettes. He has a 50 pack-year smoking history. He has been exposed to tobacco smoke. He has never used smokeless tobacco. He reports that he does not currently use alcohol. He reports current drug use. Frequency: 4.00 times per week. Drug: Marijuana.    Review of Systems  Review of Systems   All other systems reviewed and are negative.                                 Objective    Vitals:    01/22/25 1016   BP: 109/61   BP Location: Left arm   Patient Position: Lying   BP Cuff Size: Adult   Pulse: 69   Resp: 18   TempSrc: Oral   SpO2: 95%   Weight: 67.5 kg (148 lb 13 oz)     No LMP for male patient.    Physical Exam  Vitals and nursing note reviewed.   Constitutional:       General: He is not in acute distress.     Appearance: He is normal weight. He is not ill-appearing, toxic-appearing or diaphoretic.   HENT:      Head: Normocephalic and  atraumatic.      Right Ear: Tympanic membrane, ear canal and external ear normal.      Left Ear: Tympanic membrane, ear canal and external ear normal.      Ears:      Comments: Left ear canal w/blood occluding canal.  Blood clot removed easily.  Inferior aspect of canal with abrasion.  TM intact and normal.  No canal erythema or edema.      Nose: Nose normal.      Mouth/Throat:      Mouth: Mucous membranes are moist.      Pharynx: No oropharyngeal exudate or posterior oropharyngeal erythema.   Eyes:      General: No scleral icterus.        Right eye: No discharge.         Left eye: No discharge.      Extraocular Movements: Extraocular movements intact.      Conjunctiva/sclera: Conjunctivae normal.      Pupils: Pupils are equal, round, and reactive to light.   Cardiovascular:      Rate and Rhythm: Normal rate and regular rhythm.      Pulses: Normal pulses.      Heart sounds: No murmur heard.     No friction rub. No gallop.   Pulmonary:      Effort: Pulmonary effort is normal. No respiratory distress.   Abdominal:      General: Abdomen is flat.   Musculoskeletal:      Cervical back: Normal range of motion and neck supple. No rigidity or tenderness.   Skin:     General: Skin is warm and dry.      Capillary Refill: Capillary refill takes less than 2 seconds.   Neurological:      General: No focal deficit present.      Mental Status: He is alert.   Psychiatric:         Mood and Affect: Mood normal.         Behavior: Behavior normal.         Procedures    Point of Care Test & Imaging Results from this visit  No results found for this visit on 01/22/25.   No results found.    Diagnostic study results (if any) were reviewed by Charisma Antonio PA-C.    Assessment/Plan   Allergies, medications, history, and pertinent labs/EKGs/Imaging reviewed by Charisma Antonio PA-C.     Medical Decision Making  64 year old M presents with complaint of left ear canal bleeding after using cotton swab yesterday evening.  No otalgia.  No  hearing change.  Exam with left EAC blood clot, which is removed.  Small ear canal abrasion.  Likely bleeding due to AC.  No other easy bleeding or bruising.   TM is normal.  Will provide topical antibiotics for prevention of secondary infection.  Encouraged follow-up with primary care provider.  Discussed indications for presentation to emergency department.  Discharged good condition agreeable to plan as discussed.     Orders and Diagnoses  Diagnoses and all orders for this visit:  Abrasion of left ear canal, initial encounter  -     ciprofloxacin-dexamethasone (Ciprodex) otic suspension; Administer 4 drops into the left ear 2 times a day for 7 days.      Medical Admin Record      Patient disposition: Home    Electronically signed by Charisma Antonio PA-C  11:37 AM

## 2025-02-11 ENCOUNTER — OFFICE VISIT (OUTPATIENT)
Dept: PALLIATIVE MEDICINE | Facility: CLINIC | Age: 65
End: 2025-02-11
Payer: COMMERCIAL

## 2025-02-11 VITALS
TEMPERATURE: 97.2 F | SYSTOLIC BLOOD PRESSURE: 142 MMHG | OXYGEN SATURATION: 98 % | RESPIRATION RATE: 18 BRPM | DIASTOLIC BLOOD PRESSURE: 82 MMHG | BODY MASS INDEX: 21.02 KG/M2 | WEIGHT: 143.08 LBS | HEART RATE: 81 BPM

## 2025-02-11 DIAGNOSIS — R63.4 WEIGHT LOSS, UNINTENTIONAL: ICD-10-CM

## 2025-02-11 DIAGNOSIS — Z51.81 ENCOUNTER FOR MONITORING OPIOID MAINTENANCE THERAPY: ICD-10-CM

## 2025-02-11 DIAGNOSIS — G89.3 CANCER RELATED PAIN: ICD-10-CM

## 2025-02-11 DIAGNOSIS — Z79.891 ENCOUNTER FOR MONITORING OPIOID MAINTENANCE THERAPY: ICD-10-CM

## 2025-02-11 DIAGNOSIS — Z51.5 PALLIATIVE CARE ENCOUNTER: Primary | ICD-10-CM

## 2025-02-11 DIAGNOSIS — G47.00 INSOMNIA, UNSPECIFIED TYPE: ICD-10-CM

## 2025-02-11 PROCEDURE — 99214 OFFICE O/P EST MOD 30 MIN: CPT | Performed by: NURSE PRACTITIONER

## 2025-02-11 PROCEDURE — 1157F ADVNC CARE PLAN IN RCRD: CPT | Performed by: NURSE PRACTITIONER

## 2025-02-11 PROCEDURE — 1125F AMNT PAIN NOTED PAIN PRSNT: CPT | Performed by: NURSE PRACTITIONER

## 2025-02-11 RX ORDER — MIRTAZAPINE 7.5 MG/1
7.5 TABLET, FILM COATED ORAL NIGHTLY
Qty: 30 TABLET | Refills: 3 | Status: SHIPPED | OUTPATIENT
Start: 2025-02-11 | End: 2025-03-13

## 2025-02-11 ASSESSMENT — PAIN SCALES - GENERAL: PAINLEVEL_OUTOF10: 5

## 2025-02-11 NOTE — PROGRESS NOTES
SUPPORTIVE AND PALLIATIVE ONCOLOGY OUTPATIENT FOLLOW-UP      SERVICE DATE: 2/11/2025    Cancer History  NSCL CA THOMAS dx June 2023  -7/27/23: bronch/ EBUS performed, biopsies of THOMAS demonstrated invasive moderately differentiated SCC.   -was living in [place] at the time, relocated to [place]  -1/5/24: completed definitive chemoRT 60 Gy in 30 fxs with concurrent weekly carbotaxol  -currently on surveillance     Onc: Dr. Tasha Zuluaga Onc: NICOLETTE Reed CNP      Subjective   HISTORY OF PRESENT ILLNESS: Martínez Neri is a 64 y.o. male with Pmhx of emphysema/ COPD, ETOH misuse, seizure disorder, and SCC of THOMAS.     He presents to supportive oncology for follow up for pain and symptom management.     Presents for FUV accompanied by wife, Keri. Continues to have pain in neck, abdomen, increased with coughing. Taking oxycodone a few times/day, some days he does not required any pain medication. Moving bowels regularly. No N/V, occasional reflux. States he's hungry all the time, has lost 5 lbs in past month, eating 3 meals/day. Today we discussed increasing protein/calories, and adding snacks through out the day. Sleeping well normally, last night was not a good night.     Pain Assessment:  Pain Score:   5  Location: Abdomen (upper abdomen/lower chest, and neck)  Education:      Symptom Assessment:  Pain:a little  Headache: none  Dizziness:none  Lack of energy: a little  Difficulty sleeping: a little  Worrying: a little  Anxiety: a little  Depression: a little  Pain in mouth/swallowing: none  Dry mouth: none  Taste changes: none  Shortness of breath: none  Lack of appetite: a little   Nausea: none  Vomiting: none  Constipation: none  Diarrhea: none  Sore muscles: a little  Numbness or tingling in hands/feet/other: somewhat  Weight loss: somewhat      Information obtained from: chart review, interview of patient, and interview of family  ______________________________________________________________________        Objective                 PHYSICAL EXAMINATION   Vital Signs:   Vital signs reviewed  Visit Vitals  /82 (BP Location: Left arm, Patient Position: Sitting, BP Cuff Size: Adult)   Pulse 81   Temp 36.2 °C (97.2 °F) (Temporal)   Resp 18        Pain Score:  5     Physical Exam  Vitals reviewed.   Constitutional:       Appearance: Normal appearance.   HENT:      Head: Normocephalic.   Cardiovascular:      Rate and Rhythm: Normal rate.   Pulmonary:      Effort: Pulmonary effort is normal.      Comments: Faint wheezing  Abdominal:      Palpations: Abdomen is soft.   Musculoskeletal:         General: Normal range of motion.   Skin:     General: Skin is warm and dry.      Coloration: Skin is ashen.   Neurological:      General: No focal deficit present.      Mental Status: He is alert and oriented to person, place, and time.   Psychiatric:         Mood and Affect: Mood normal.         Judgment: Judgment normal.       ASSESSMENT/PLAN    Pain  Pain is: cancer related pain  Type: visceral and neuropathic  Pain control: well-controlled  Home regimen:   -continue oxycodone 5mg q6hrs PRN. No Rx needed today.   -continue daily ASA (ok per vascular provider)  -avoid NSAIDs d/t concurrent use of Eliquis   Intolerances/previously tried:     Opioid Use  Medication Management:   - OARRS report reviewed with no aberrant behavior; consistent with  prescriptions/records and patient history  - MED 15.  Overdose Risk Score 290.   This has been discussed with patient.   - We will continue to closely monitor the patient for signs of prescription misuse including UDS, OARRS review and subjective reports at each visit.  - no concurrent benzodiazepine use   - I am a provider who either is or has consulted and collaborated with a provider certified in Hospice and Palliative Medicine and have conducted a face-face visit and examination for this patient.  - Routine Urine Drug Screen completed deferred (MED <90) appropriately positive for opioids and  negative for illicit substances  - Controlled Substance Agreement completed 2/21/24  - Specifically discussed that controlled substance prescriptions will only be provided by our group as outlined in the completed agreement  - Prescribed naloxone 2/21/24  - Red Flags: history of ETOH misuse, in recovery since 2016    Constipation  At risk for constipation related to opioids,  currently not constipated  Usual bowel pattern: daily  Home regimen:   -educated pt on importance of maintaining regular bowel schedule  -continue colace 100mg bid PRN for hard stools  -continue senna 8.6mg, 1-2tabs, 1-2x/day PRN    Altered Mood  Chronic depression related to health concerns   controlled with home regimen  Home regimen:   -continue depakote ER 500mg bid. (Managed by neurologist- also treatment for seizure disorder)    Sleeping Difficulty:  Impaired sleep related to insomnia  Home regimen:    -continue mirtazapine 7.5mg at bedtime. Rx sent today.     Decreased appetite  Related to malignancy and disease process  Home regimen:    -encouraged smaller, more frequent meals through out the day  -encouraged use of supplements such as Boost, Ensure, etc.    -discussed goal for diet high in protein/calories  -continue mirtazapine 7.5mg at bedtime. Rx sent today.     Supportive Interventions:    Supportive and Palliative Oncology encounter:  Emotional support provided  Coordination of care  We will continue to follow and address symptoms as needed    Advance Directives  Existence of Advance Directives:Unknown  Decision maker: not formally discussed this visit   Code Status: DNR comfort care arrest          Next Follow-Up Visit:  Return to clinic in 6 weeks     Signature and billing  Medical complexity was moderate level due to due to complexity of problems, extensive data review, and high risk of management/treatment.  Time was spent on the following: Prep Time, Time Directly with Patient/Family/Caregiver, Documentation Time. Total time  spent: 45 mins      Data  Diagnostic tests and information reviewed for today's visit:  Most recent labs         Some elements copied from my note on 12/31/24, the elements have been updated and all reflect current decision making from today, 2/11/2025.      Plan of Care discussed with: Patient, wife    SIGNATURE: ARIC Lim-CNP    Contact information:  Supportive and Palliative Oncology  Monday-Friday 8 AM-5 PM  Phone:  [phone], press option #5, then option #1.   Or Epic Secure Chat

## 2025-02-26 ENCOUNTER — PATIENT MESSAGE (OUTPATIENT)
Dept: PALLIATIVE MEDICINE | Facility: CLINIC | Age: 65
End: 2025-02-26
Payer: COMMERCIAL

## 2025-02-26 DIAGNOSIS — G89.3 CANCER RELATED PAIN: ICD-10-CM

## 2025-02-26 RX ORDER — OXYCODONE HYDROCHLORIDE 5 MG/1
5 TABLET ORAL EVERY 6 HOURS PRN
Qty: 120 TABLET | Refills: 0 | Status: SHIPPED | OUTPATIENT
Start: 2025-02-26 | End: 2025-03-28

## 2025-02-26 NOTE — PATIENT COMMUNICATION
Patient last seen by ARIC Medley on 2/11 with plan to continue oxycodone 5mg q6h PRN. Follow up visit is scheduled for 3/31. OARRS reviewed and no aberrancy noted. Patient last filled oxycodone 5mg #120/30 days on 12/31. Prescription pended to provider to approve.

## 2025-03-11 ENCOUNTER — HOSPITAL ENCOUNTER (OUTPATIENT)
Dept: RADIOLOGY | Facility: HOSPITAL | Age: 65
Discharge: HOME | End: 2025-03-11
Payer: COMMERCIAL

## 2025-03-11 ENCOUNTER — LAB (OUTPATIENT)
Dept: LAB | Facility: HOSPITAL | Age: 65
End: 2025-03-11
Payer: COMMERCIAL

## 2025-03-11 DIAGNOSIS — C34.12 PRIMARY CANCER OF LEFT UPPER LOBE OF LUNG (MULTI): ICD-10-CM

## 2025-03-11 LAB
ALBUMIN SERPL BCP-MCNC: 4.2 G/DL (ref 3.4–5)
ALP SERPL-CCNC: 65 U/L (ref 33–136)
ALT SERPL W P-5'-P-CCNC: 13 U/L (ref 10–52)
ANION GAP SERPL CALC-SCNC: 12 MMOL/L (ref 10–20)
AST SERPL W P-5'-P-CCNC: 17 U/L (ref 9–39)
BASOPHILS # BLD AUTO: 0.06 X10*3/UL (ref 0–0.1)
BASOPHILS NFR BLD AUTO: 0.7 %
BILIRUB SERPL-MCNC: 0.3 MG/DL (ref 0–1.2)
BUN SERPL-MCNC: 14 MG/DL (ref 6–23)
CALCIUM SERPL-MCNC: 9.4 MG/DL (ref 8.6–10.3)
CHLORIDE SERPL-SCNC: 101 MMOL/L (ref 98–107)
CO2 SERPL-SCNC: 28 MMOL/L (ref 21–32)
CREAT SERPL-MCNC: 0.72 MG/DL (ref 0.5–1.3)
EGFRCR SERPLBLD CKD-EPI 2021: >90 ML/MIN/1.73M*2
EOSINOPHIL # BLD AUTO: 0.07 X10*3/UL (ref 0–0.7)
EOSINOPHIL NFR BLD AUTO: 0.9 %
ERYTHROCYTE [DISTWIDTH] IN BLOOD BY AUTOMATED COUNT: 17.4 % (ref 11.5–14.5)
GLUCOSE SERPL-MCNC: 81 MG/DL (ref 74–99)
HCT VFR BLD AUTO: 33.3 % (ref 41–52)
HGB BLD-MCNC: 10.6 G/DL (ref 13.5–17.5)
IMM GRANULOCYTES # BLD AUTO: 0.02 X10*3/UL (ref 0–0.7)
IMM GRANULOCYTES NFR BLD AUTO: 0.2 % (ref 0–0.9)
LYMPHOCYTES # BLD AUTO: 2 X10*3/UL (ref 1.2–4.8)
LYMPHOCYTES NFR BLD AUTO: 24.9 %
MCH RBC QN AUTO: 27.2 PG (ref 26–34)
MCHC RBC AUTO-ENTMCNC: 31.8 G/DL (ref 32–36)
MCV RBC AUTO: 86 FL (ref 80–100)
MONOCYTES # BLD AUTO: 0.82 X10*3/UL (ref 0.1–1)
MONOCYTES NFR BLD AUTO: 10.2 %
NEUTROPHILS # BLD AUTO: 5.06 X10*3/UL (ref 1.2–7.7)
NEUTROPHILS NFR BLD AUTO: 63.1 %
NRBC BLD-RTO: 0 /100 WBCS (ref 0–0)
PLATELET # BLD AUTO: 378 X10*3/UL (ref 150–450)
POTASSIUM SERPL-SCNC: 4.4 MMOL/L (ref 3.5–5.3)
PROT SERPL-MCNC: 7.3 G/DL (ref 6.4–8.2)
RBC # BLD AUTO: 3.89 X10*6/UL (ref 4.5–5.9)
SODIUM SERPL-SCNC: 137 MMOL/L (ref 136–145)
WBC # BLD AUTO: 8 X10*3/UL (ref 4.4–11.3)

## 2025-03-11 PROCEDURE — 2550000001 HC RX 255 CONTRASTS: Performed by: STUDENT IN AN ORGANIZED HEALTH CARE EDUCATION/TRAINING PROGRAM

## 2025-03-11 PROCEDURE — 71260 CT THORAX DX C+: CPT

## 2025-03-11 PROCEDURE — 85025 COMPLETE CBC W/AUTO DIFF WBC: CPT

## 2025-03-11 PROCEDURE — 36415 COLL VENOUS BLD VENIPUNCTURE: CPT

## 2025-03-11 PROCEDURE — 80053 COMPREHEN METABOLIC PANEL: CPT

## 2025-03-11 RX ADMIN — IOHEXOL 75 ML: 350 INJECTION, SOLUTION INTRAVENOUS at 09:28

## 2025-03-13 ENCOUNTER — APPOINTMENT (OUTPATIENT)
Dept: RADIOLOGY | Facility: HOSPITAL | Age: 65
End: 2025-03-13
Payer: COMMERCIAL

## 2025-03-18 ENCOUNTER — OFFICE VISIT (OUTPATIENT)
Dept: HEMATOLOGY/ONCOLOGY | Facility: HOSPITAL | Age: 65
End: 2025-03-18
Payer: COMMERCIAL

## 2025-03-18 VITALS
DIASTOLIC BLOOD PRESSURE: 68 MMHG | SYSTOLIC BLOOD PRESSURE: 134 MMHG | WEIGHT: 141.8 LBS | BODY MASS INDEX: 20.84 KG/M2 | OXYGEN SATURATION: 100 % | RESPIRATION RATE: 17 BRPM | TEMPERATURE: 95.9 F | HEART RATE: 88 BPM

## 2025-03-18 DIAGNOSIS — R00.2 PALPITATIONS: ICD-10-CM

## 2025-03-18 DIAGNOSIS — C34.12 PRIMARY CANCER OF LEFT UPPER LOBE OF LUNG (MULTI): ICD-10-CM

## 2025-03-18 DIAGNOSIS — K55.069 SUPERIOR MESENTERIC ARTERY THROMBOSIS (MULTI): ICD-10-CM

## 2025-03-18 PROCEDURE — 99215 OFFICE O/P EST HI 40 MIN: CPT | Performed by: STUDENT IN AN ORGANIZED HEALTH CARE EDUCATION/TRAINING PROGRAM

## 2025-03-18 PROCEDURE — 1157F ADVNC CARE PLAN IN RCRD: CPT | Performed by: STUDENT IN AN ORGANIZED HEALTH CARE EDUCATION/TRAINING PROGRAM

## 2025-03-18 PROCEDURE — G2211 COMPLEX E/M VISIT ADD ON: HCPCS | Performed by: STUDENT IN AN ORGANIZED HEALTH CARE EDUCATION/TRAINING PROGRAM

## 2025-03-18 PROCEDURE — 1159F MED LIST DOCD IN RCRD: CPT | Performed by: STUDENT IN AN ORGANIZED HEALTH CARE EDUCATION/TRAINING PROGRAM

## 2025-03-18 PROCEDURE — 1126F AMNT PAIN NOTED NONE PRSNT: CPT | Performed by: STUDENT IN AN ORGANIZED HEALTH CARE EDUCATION/TRAINING PROGRAM

## 2025-03-18 RX ORDER — APIXABAN 5 MG/1
5 TABLET, FILM COATED ORAL 2 TIMES DAILY
Qty: 180 TABLET | Refills: 0 | Status: SHIPPED | OUTPATIENT
Start: 2025-03-18

## 2025-03-18 ASSESSMENT — PAIN SCALES - GENERAL: PAINLEVEL_OUTOF10: 0-NO PAIN

## 2025-03-18 NOTE — PROGRESS NOTES
Patient ID: Martínez Neri is a 64 y.o. 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    PD-L1 TPS 20%      PRIOR THERAPIES  Concurrent chemo RT using weekly CarboTaxol, completed 60 Gy in 30 fractions on 01/05/2024     CURRENT THERAPY  Surveillance        CURRENT ONCOLOGICAL PROBLEMS  Cough, productive  Oxygen dependence  Radiation-induced esophagitis        HISTORY OF PRESENT ILLNESS:  Current smoker (50-pack-year history), with history of COPD and seizures, who was diagnosed with a squamous cell carcinoma of the left upper lobe in [place], never treated. On 06/17/2023 the patient had a CT chest for ongoing respiratory symptoms and was found to have a left suprahilar mass involving the left upper lobe bronchi with postobstructive pneumonia/collapse.  On 06/25/2023 CT chest/abdomen/pelvis demonstrated persistent masslike consolidation in the left upper lobe with no signs of extrathoracic metastatic disease. On 06/26/2023 the patient underwent a bronchoscopy, and pathology was compatible with a squamous cell carcinoma at least in situ, with a foci highly suspicious for invasive squamous cell carcinoma. On 07/18/2023 a PET/CT obtained, demonstrating a 6.6 cm left upper lobe mass with an SUV of 8.3 and no evidence of distant metastatic disease. On 07/18/2023 to brain MRI was negative for intracranial metastasis.     On 07/27/2023 bronchoscopy/EBUS was performed.  Endobronchial biopsies of left upper lobe demonstrated an invasive moderately differentiated squamous cell carcinoma.  Left upper lobe hilar FNA showed atypical cells with squamous differentiation, suspicious for malignancy. Subcarinal FNA was  indeterminate for malignancy with extremely rare atypical cells with squamous differentiation.  Molecular profile: Lack of BRAF, EGFR, HER2, KRAS, MET, ALK, NTRK 1/2/3, RET or ROS1 alterations.  PD-L1 TPS 20%.  The patient was seen by medical oncology and did not too frail for combined chemo RT (there is a performance status ECOG 3) documented on 08/17/2023.  At that time, he lived with some friends, while his family was primarily located in [place]. His daughter traveled and was able to relocate him to [place].     Reported molecular and ancillary testing performed upon diagnosis in [place]:   BRAF negative  [id number] negative  EGFR classic mutations negative  HER2 negative  MET negative  ALK negative  In tract 1/2/3 negative  RET negative  ROS1 negative  PD-L1 20%     Referred to surgery - PFTs were considered prohibitive for surgical resection-FEV1 30%, DLCO 30%.     10/31/23: Repeat EBUS for systematic staging:  Final Cytological Interpretation      A. LYMPH NODE 7 PULMONARY FINE NEEDLE ASPIRATION, CYTOLOGY AND CELL BLOCK:  --  No malignant cells identified.  --  Lymphoid sample.     B. LYMPH NODE 4 L PULMONARY FINE NEEDLE ASPIRATION, CYTOLOGY AND CELL BLOCK:  --  No malignant cells identified.  --  Lymphoid sample.           C. LYMPH NODE 11 L PULMONARY FINE NEEDLE ASPIRATION, CYTOLOGY AND CELL BLOCK:  --  A rare cluster of malignant cells derived from squamous cell carcinoma, see note  -- Also, please refer to concurrent biopsy surgical pathology report ([id number]).     Note:  The malignant squamous cell carcinoma cells are represented in the cell block only.  Immunostain for p40 is positive. The cytomorphology and immunoprofile support the above diagnosis.  The material is not sufficient for molecular testing.      Component     FINAL DIAGNOSIS   Left lung, upper lobe, biopsy:  -- Superficial fragment of squamous cell carcinoma, keratinizing; see note.     Note: According to clinician's note, PD-L1 and NGS have  been performed at an outside institution. These studies can be repeated, if clinically indicated.       01/05/2024: Completes definitive chemo RT using 60 Gray in 30 fractions with concurrent weekly CarboTaxol    01/08/2024: Admitted with neutropenic fevers, dehydration.  CT abdomen/pelvis demonstrated a partial thrombus of superior mesenteric artery.  He was a started on heparin and switched to aspirin at discharge.  CT chest was concerning for aspiration pneumonia, he was treated with antibiotics.  He was discharged on oxygen, 2 L at rest, 4 L on exertion.  He was also discharged on Augmentin 875 mg twice daily for 14 days.  Also sucralfate, 10 mL every 6 hours.  While hospitalized, he had episodes of intermittent agitation and was found to have elevated ammonia levels.  On 01/11/2024, ALT was 63, AST 60.    01/29/2024: CT chest with IV contrast demonstrates the development of multiple pulmonary nodules, including a 3 cm spiculated left lower lobe nodule, concerning for progression of his disease.  A PET scan was ordered STAT for subsequent evaluation, and a referral was placed to IR for CT-guided biopsy of most assessable lung nodule (right lower lobe, posterior, image #[id number]).    02/12/2024: Pathology from RLL biopsy not diagnostic    04/01/24: The contrast obtained for navigational bronchoscopy demonstrated significant progression of the lung parenchyma abnormalities, and procedure was canceled.  Patient will remain on surveillance.    07/08/24: CT chest showing likely post radiation changes in THOMAS but with slight increase few pulmonary nodules    07/18/24: Overall patient is clinically doing well so will hold off on a bronch at this time    PAST MEDICAL HISTORY  Brain aneurysm  COPD (chronic obstructive pulmonary disease) (CMS/HCC)  Epilepsy (CMS/HCC)  High cholesterol  History of alcohol use  Hypertension     PAST SURGICAL HISTORY:  CAROTID ENDARTERECTOMY  COLONOSCOPY  HERNIA REPAIR      SOCIAL  HISTORY  Tobacco (50 pack-year history) use - quit after cancer dx      CURRENT MEDS REVIEWED:  Scheduled medications  Continuous medications  PRN medications     ALLERGIES  NKDA     FAMILY HISTORY  Father had prostate CA     SUBJECTIVE:  He is here today with his wife. Had a cold and is recovered. No fevers. Had some constipation while he had a cold and those are normalizing.       OBJECTIVE:  Vitals:    25 1040   BP: 134/68   Pulse: 88   Resp: 17   Temp: 35.5 °C (95.9 °F)   SpO2: 100%          Body surface area is 1.77 meters squared.     Wt Readings from Last 5 Encounters:   25 64.3 kg (141 lb 12.8 oz)   25 64.9 kg (143 lb 1.3 oz)   25 67.5 kg (148 lb 13 oz)   24 67.2 kg (148 lb 2.4 oz)   10/12/24 66.7 kg (147 lb)     ECO    Physical Exam  Constitutional:       Appearance: Normal appearance.   HENT:      Head: Normocephalic and atraumatic.   Eyes:      General: No scleral icterus.  Cardiovascular:      Rate and Rhythm: Normal rate and regular rhythm.      Heart sounds: Normal heart sounds.   Pulmonary:      Effort: Pulmonary effort is normal. No respiratory distress.      Breath sounds: Rhonchi present. No wheezing.   Abdominal:      General: There is no distension.      Palpations: Abdomen is soft.      Tenderness: There is no abdominal tenderness. There is no guarding.   Musculoskeletal:      Right lower leg: No edema.      Left lower leg: No edema.   Lymphadenopathy:      Cervical: No cervical adenopathy.   Skin:     General: Skin is warm.      Coloration: Skin is not pale.      Findings: No erythema or rash.   Neurological:      General: No focal deficit present.      Mental Status: He is alert and oriented to person, place, and time. Mental status is at baseline.      Motor: No weakness.      Gait: Gait normal.   Psychiatric:         Mood and Affect: Mood normal.         Behavior: Behavior normal.         Thought Content: Thought content normal.         Judgment: Judgment  normal.        Diagnostic Results   Results:  Labs:  Lab Results   Component Value Date    WBC 8.0 03/11/2025    HGB 10.6 (L) 03/11/2025    HCT 33.3 (L) 03/11/2025    MCV 86 03/11/2025     03/11/2025      Lab Results   Component Value Date    NEUTROABS 5.06 03/11/2025        Lab Results   Component Value Date    GLUCOSE 81 03/11/2025    CALCIUM 9.4 03/11/2025     03/11/2025    K 4.4 03/11/2025    CO2 28 03/11/2025     03/11/2025    BUN 14 03/11/2025    CREATININE 0.72 03/11/2025     Lab Results   Component Value Date    ALT 13 03/11/2025    AST 17 03/11/2025    ALKPHOS 65 03/11/2025    BILITOT 0.3 03/11/2025      Lab Results   Component Value Date    ACTH 26.6 02/01/2024    CORTISOL 11.6 02/01/2024    TSH 2.50 10/09/2024    FREET4 0.90 01/11/2024     Imaging:  I have personally reviewed the below imaging and concur with the reported findings unless otherwise stated:    CT chest w IV contrast    Result Date: 3/11/2025  Impression: 1. Cavitary nodules within the lungs have slightly increased in size when compared to prior CTs dating back to 09/16/2024. Left upper lobe solid nodule has minimally increased when compared to CT 12/18/2024. Remaining nodules are stable and no new pulmonary nodules are evident. No thoracic lymphadenopathy is seen. 2. Similar appearance of consolidation within the medial left upper lobe with surrounding fibrotic changes and reticulations likely secondary to prior treatment and atelectasis. 3. Similar moderate-to-severe diffuse centrilobular emphysematous changes. 4. No acute pathology.   I personally reviewed the images/study and I agree with the findings as stated by Rafita Begum MD (Radiology Resident). This study was interpreted at [facility], [place], [place].   Signed by: Flavio Wilson 3/11/2025 4:05 PM Dictation workstation:   [id number]       Assessment/Plan     Primary cancer of left upper lobe of lung  ([facility]), Clinical: Stage IIB (cT2, cN1, cM0)    #Stage IIB squamous cell carcinoma of the left upper lobe  - treated with concurrent chemo RT using weekly CarboTaxol, completed 60 Gray in 30 fractions on 01/05/2024.  -Subsequently hospitalized with neutropenic fevers on 01/08/2024.  -Suspicion for aspiration during the workup performed at outside hospital as well as SMA partial thrombus.  -He has seen pulmonology in [facility] Dr. Sahu and underwent RLL biopsy by IR which was not diagnostic.  -He was then scheduled for a navigational bronchoscopy but the lung lesions that were supposed to be biopsied pretty much resolved in the planning scans - This is c/w with inflammatory changes   -Due to multiple complications as described above, he missed the window for consolidative durvalumab.    -There was also concern in regards to high risk of toxicities due to his underlying pulmonary challenges.    -We are specifically concerned about increased risk for treatment related pneumonitis.   -Being followed closely with scans for now  - personally reviewed CT scan with continued minimal growth of subcentimeter nodules, but no new nodules. Will continue to monitor closely, and will repeat CT scan in 4 months per patient preference.

## 2025-03-21 DIAGNOSIS — E78.00 HYPERCHOLESTEROLEMIA: ICD-10-CM

## 2025-03-21 RX ORDER — ATORVASTATIN CALCIUM 40 MG/1
40 TABLET, FILM COATED ORAL DAILY
Qty: 30 TABLET | Refills: 0 | Status: SHIPPED | OUTPATIENT
Start: 2025-03-21 | End: 2025-04-20

## 2025-03-31 ENCOUNTER — OFFICE VISIT (OUTPATIENT)
Dept: PALLIATIVE MEDICINE | Facility: CLINIC | Age: 65
End: 2025-03-31
Payer: COMMERCIAL

## 2025-03-31 VITALS
HEART RATE: 66 BPM | SYSTOLIC BLOOD PRESSURE: 120 MMHG | RESPIRATION RATE: 18 BRPM | WEIGHT: 143.19 LBS | BODY MASS INDEX: 21.04 KG/M2 | DIASTOLIC BLOOD PRESSURE: 58 MMHG | OXYGEN SATURATION: 99 % | TEMPERATURE: 97.3 F

## 2025-03-31 DIAGNOSIS — Z51.81 ENCOUNTER FOR MONITORING OPIOID MAINTENANCE THERAPY: ICD-10-CM

## 2025-03-31 DIAGNOSIS — Z79.891 ENCOUNTER FOR MONITORING OPIOID MAINTENANCE THERAPY: ICD-10-CM

## 2025-03-31 DIAGNOSIS — Z51.5 PALLIATIVE CARE ENCOUNTER: Primary | ICD-10-CM

## 2025-03-31 DIAGNOSIS — G89.3 CANCER RELATED PAIN: ICD-10-CM

## 2025-03-31 DIAGNOSIS — M79.2 NEUROPATHIC PAIN: ICD-10-CM

## 2025-03-31 PROCEDURE — 99213 OFFICE O/P EST LOW 20 MIN: CPT | Performed by: NURSE PRACTITIONER

## 2025-03-31 PROCEDURE — 1157F ADVNC CARE PLAN IN RCRD: CPT | Performed by: NURSE PRACTITIONER

## 2025-03-31 PROCEDURE — 1125F AMNT PAIN NOTED PAIN PRSNT: CPT | Performed by: NURSE PRACTITIONER

## 2025-03-31 RX ORDER — OXYCODONE HYDROCHLORIDE 5 MG/1
5 TABLET ORAL 2 TIMES DAILY PRN
Qty: 60 TABLET | Refills: 0 | Status: SHIPPED | OUTPATIENT
Start: 2025-03-31 | End: 2025-04-30

## 2025-03-31 ASSESSMENT — PAIN SCALES - GENERAL: PAINLEVEL_OUTOF10: 2

## 2025-03-31 NOTE — PROGRESS NOTES
"  SUPPORTIVE AND PALLIATIVE ONCOLOGY OUTPATIENT FOLLOW-UP      SERVICE DATE: 3/31/2025    Cancer History  NSCL CA THOMAS dx June 2023  -7/27/23: bronch/ EBUS performed, biopsies of THOMAS demonstrated invasive moderately differentiated SCC.   -was living in [place] at the time, relocated to [place]  -1/5/24: completed definitive chemoRT 60 Gy in 30 fxs with concurrent weekly carbotaxol  -currently on surveillance     Onc: Dr. Tasha Zuluaga Onc: NICOLETTE Reed, CNP      Subjective   HISTORY OF PRESENT ILLNESS: Martínez Neri is a 64 y.o. male with Pmhx of emphysema/ COPD, ETOH misuse, seizure disorder, and SCC of THOMAS.     He presents to supportive oncology for follow up for pain and symptom management.     Presents for FUV accompanied by wife, Keri. Has been feeling generally well, taking oxycodone 3-4x/day, but would like to start trying to wean off the medication. We discussed that this should be a slow process, and that he should try decreasing daily dose by 1 pill/day to start. Moving bowels regularly. No N/V, random bout of nausea a few days ago. Appetite is good, eating 3 meals/day, occasional Boost, which I encouraged he try to increase to one/day. Mood is \"up and down,\" feels he's been more depressed lately, attributes some of this to living situation-- 8 people at home, including 3 grandchildren: 12, 7, and 5. Discussed ways to cope/ manage stress at home. Sleep is also \"up and down,\" has been taking mirtazapine PRN. Discussed taking this medication consistently, every night, to help with sleep, and hopefully increase appetite.     Pain Assessment:  Pain Score:   2  Location:  (Stomach  lower chest)  Education:  Pt interested in weaning off of oxycodone, discussed medication safety in starting this process.     Symptom Assessment:  Pain:a little  Headache: none  Dizziness:none  Lack of energy: a little  Difficulty sleeping: a little  Worrying: a little  Anxiety: a little  Depression: a little  Pain in mouth/swallowing: " none  Dry mouth: none  Taste changes: none  Shortness of breath: none  Lack of appetite: a little   Nausea: none  Vomiting: none  Constipation: none  Diarrhea: none  Sore muscles: a little  Numbness or tingling in hands/feet/other: somewhat  Weight loss: a little      Information obtained from: chart review, interview of patient, and interview of family  ______________________________________________________________________        Objective                PHYSICAL EXAMINATION   Vital Signs:   Vital signs reviewed  Visit Vitals  /58 (BP Location: Right arm, Patient Position: Sitting, BP Cuff Size: Adult long)   Pulse 66   Temp 36.3 °C (97.3 °F) (Temporal)   Resp 18        Pain Score:  2     Physical Exam  Vitals reviewed.   Constitutional:       Appearance: Normal appearance.   HENT:      Head: Normocephalic.   Cardiovascular:      Rate and Rhythm: Normal rate.   Pulmonary:      Effort: Pulmonary effort is normal.   Abdominal:      Palpations: Abdomen is soft.   Musculoskeletal:         General: Normal range of motion.   Skin:     General: Skin is warm and dry.   Neurological:      General: No focal deficit present.      Mental Status: He is alert and oriented to person, place, and time.   Psychiatric:         Mood and Affect: Mood normal.         Judgment: Judgment normal.         ASSESSMENT/PLAN    Pain  Pain is: cancer related pain  Type: visceral and neuropathic  Pain control: well-controlled  Home regimen:   -continue oxycodone 5mg q6hrs PRN. Rx sent today.    -continue daily ASA (ok per vascular provider)  -avoid NSAIDs d/t concurrent use of Eliquis   Intolerances/previously tried:     Opioid Use  Medication Management:   - OARRS report reviewed with no aberrant behavior; consistent with  prescriptions/records and patient history  - MED 15.  Overdose Risk Score 280.   This has been discussed with patient.   - We will continue to closely monitor the patient for signs of prescription misuse including UDS,  OARRS review and subjective reports at each visit.  - no concurrent benzodiazepine use   - I am a provider who either is or has consulted and collaborated with a provider certified in Hospice and Palliative Medicine and have conducted a face-face visit and examination for this patient.  - Routine Urine Drug Screen completed deferred (MED <90) appropriately positive for opioids and negative for illicit substances  - Controlled Substance Agreement completed 2/21/24  - Specifically discussed that controlled substance prescriptions will only be provided by our group as outlined in the completed agreement  - Prescribed naloxone 2/21/24  - Red Flags: history of ETOH misuse, in recovery since 2016    Constipation  At risk for constipation related to opioids,  currently not constipated  Usual bowel pattern: daily  Home regimen:   -educated pt on importance of maintaining regular bowel schedule  -continue colace 100mg bid PRN for hard stools  -continue senna 8.6mg, 1-2tabs, 1-2x/day PRN    Altered Mood  Chronic depression related to health concerns   controlled with home regimen  Home regimen:   -continue depakote ER 500mg bid. (Managed by neurologist- also treatment for seizure disorder)    Sleeping Difficulty:  Impaired sleep related to insomnia  Home regimen:    -continue mirtazapine 7.5mg at bedtime. Encouraged pt to take this medication consistently for best efficacy.     Decreased appetite  Related to malignancy and disease process  Home regimen:    -encouraged smaller, more frequent meals through out the day  -encouraged use of supplements such as Boost, Ensure, etc.    -discussed goal for diet high in protein/calories  -continue mirtazapine 7.5mg at bedtime.     Supportive Interventions:    Supportive and Palliative Oncology encounter:  Emotional support provided  Coordination of care  We will continue to follow and address symptoms as needed    Advance Directives  Existence of Advance Directives:Unknown  Decision  maker: not formally discussed this visit   Code Status: DNR comfort care arrest          Next Follow-Up Visit:  Return to clinic in 8 weeks     Signature and billing  Medical complexity was low level due to due to complexity of problems, extensive data review, and high risk of management/treatment.  Time was spent on the following: Prep Time, Time Directly with Patient/Family/Caregiver, Documentation Time. Total time spent: 25 mins      Data  Diagnostic tests and information reviewed for today's visit:  Most recent labs         Some elements copied from my note on 2/11/25, the elements have been updated and all reflect current decision making from today, 3/31/2025.      Plan of Care discussed with: Patient, wife    SIGNATURE: Tisha Medley, ARIC-CNP    Contact information:  Supportive and Palliative Oncology  Monday-Friday 8 AM-5 PM  Phone:  [phone], press option #5, then option #1.   Or Epic Secure Chat

## 2025-04-18 DIAGNOSIS — E78.00 HYPERCHOLESTEROLEMIA: ICD-10-CM

## 2025-04-18 RX ORDER — ATORVASTATIN CALCIUM 40 MG/1
40 TABLET, FILM COATED ORAL DAILY
Qty: 30 TABLET | Refills: 0 | Status: SHIPPED | OUTPATIENT
Start: 2025-04-18

## 2025-05-01 ENCOUNTER — TELEPHONE (OUTPATIENT)
Dept: HEMATOLOGY/ONCOLOGY | Facility: CLINIC | Age: 65
End: 2025-05-01
Payer: COMMERCIAL

## 2025-05-01 DIAGNOSIS — G89.3 CANCER RELATED PAIN: ICD-10-CM

## 2025-05-01 RX ORDER — OXYCODONE HYDROCHLORIDE 5 MG/1
5 TABLET ORAL 2 TIMES DAILY PRN
Qty: 60 TABLET | Refills: 0 | Status: SHIPPED | OUTPATIENT
Start: 2025-05-01 | End: 2025-05-31

## 2025-05-01 NOTE — TELEPHONE ENCOUNTER
Reason for Conversation  Med Refill    Background   Refill Oxycodone 5 mg [facility]  Disposition   No disposition on file.

## 2025-05-01 NOTE — TELEPHONE ENCOUNTER
Reason for Conversation  Med Refill    Background       Disposition   OARRS reviewed and no concerns noted. Per last visit with Renee Medley NP on 3/3/1/25 patient to continue Oxycodone 5mg . F/U visit scheduled for 6/20/25. Refills sent to provider for review/approval .

## 2025-05-05 ENCOUNTER — TELEPHONE (OUTPATIENT)
Dept: RADIATION ONCOLOGY | Facility: HOSPITAL | Age: 65
End: 2025-05-05
Payer: COMMERCIAL

## 2025-05-05 NOTE — TELEPHONE ENCOUNTER
Called pt to remind of appointment on 5/6/2025 at 4:30 Pt answered and will be present.    Pt is aware that the appointment is a phone/virtual visit, pt. understands that he/she doesn't have to show up in office.

## 2025-05-06 ENCOUNTER — HOSPITAL ENCOUNTER (OUTPATIENT)
Dept: RADIATION ONCOLOGY | Facility: HOSPITAL | Age: 65
Setting detail: RADIATION/ONCOLOGY SERIES
Discharge: HOME | End: 2025-05-06
Payer: COMMERCIAL

## 2025-05-06 DIAGNOSIS — C34.12 MALIGNANT NEOPLASM OF UPPER LOBE OF LEFT LUNG (MULTI): Primary | ICD-10-CM

## 2025-05-06 PROCEDURE — 99213 OFFICE O/P EST LOW 20 MIN: CPT | Mod: 95 | Performed by: NURSE PRACTITIONER

## 2025-05-06 PROCEDURE — G2211 COMPLEX E/M VISIT ADD ON: HCPCS | Performed by: NURSE PRACTITIONER

## 2025-05-06 PROCEDURE — 99213 OFFICE O/P EST LOW 20 MIN: CPT | Performed by: NURSE PRACTITIONER

## 2025-05-06 ASSESSMENT — ENCOUNTER SYMPTOMS
APNEA: 0
CHILLS: 0
CARDIOVASCULAR NEGATIVE: 1
HEMATOLOGIC/LYMPHATIC NEGATIVE: 1
FEVER: 0
CHEST TIGHTNESS: 0
PSYCHIATRIC NEGATIVE: 1
GASTROINTESTINAL NEGATIVE: 1
DIAPHORESIS: 0
NEUROLOGICAL NEGATIVE: 1
FATIGUE: 0
UNEXPECTED WEIGHT CHANGE: 0
SHORTNESS OF BREATH: 1
ACTIVITY CHANGE: 0
CHOKING: 0
COUGH: 1
APPETITE CHANGE: 0
STRIDOR: 0
MUSCULOSKELETAL NEGATIVE: 1
WHEEZING: 0

## 2025-05-06 NOTE — PROGRESS NOTES
Patient ID: [id number]     DIAGNOSIS AND STAGING  Stage IIB (cT2 pN1 cM0) 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    PD-L1 TPS 20%      PRIOR THERAPIES  Concurrent chemo RT using weekly CarboTaxol, completed 60 Gy in 30 fractions on 01/05/2024    History of presenting illness    Telehealth visit today with Martínez Neri and his wife. Patient is a 64 y.o. male who presents today for follow up 1 year and 4 months s/p chemoRT for SCC of the left upper lobe. Patient completed 30/33 fractions due to hospitalization for deconditioning. He was treated for for possible aspiration pneumonia. 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 He was treated with Heparin and switched to aspirin at discharge. Patient is doing well. Feels like it is a huge difference from this time last year.  Energy is baseline. Appetite good. Weight stable. Patient completed pulmonary rehab and no longer requires oxygen.  Using inhalers and nebulizer as prescribed. Continues to have a productive cough. Tends to be worse when it is humid out.  Denies fevers, chest pain or back pain.  Patient was planned to start consolidative immunotherapy. Due to delay related to complications/hospitalizations he missed the window and there is concerned for increased toxicities. He is currently being followed with 3 month CT scans. Scan in March was stable. Next one is scheduled for July with follow up after.   Review of systems:  Review of Systems   Constitutional:  Negative for activity change, appetite change, chills, diaphoresis, fatigue, fever and unexpected weight change.   HENT: Negative.     Respiratory:  Positive for cough and shortness of breath. Negative for apnea, choking, chest tightness, wheezing and stridor.    Cardiovascular:  Negative.    Gastrointestinal: Negative.    Musculoskeletal: Negative.    Skin: Negative.    Neurological: Negative.    Hematological: Negative.    Psychiatric/Behavioral: Negative.         Past Medical history  He  has a past surgical history that includes Carotid endarterectomy (N/A); Colonoscopy; Hernia repair; and Cerebral aneurysm repair.        Radiology results:  CT chest w IV contrast 03/11/2025    Narrative  Interpreted By:  Flavio Wilson,  and Clarita Eller  STUDY:  CT CHEST W IV CONTRAST;  3/11/2025 9:32 am    INDICATION:  Signs/Symptoms:63 yo with NSCLC, concern for progression.    ,C34.12 Malignant neoplasm of upper lobe, left bronchus or lung  (Multi)    Per EMR: History of stage IIB non-small-cell lung cancer of the left  upper lobe (squamous cell carcinoma), diagnosed on 06/26/2023.  Currently on surveillance. Previously treated with concurrent  chemotherapy and radiation therapy using weekly carboTaxol, completed  60 Gy in 30 fractions on 01/05/2024. Previous scan demonstrated  concern for progression of metastatic pulmonary disease.    COMPARISON:  CT chest 12/18/2024, 09/16/2024 and 07/08/2024.    ACCESSION NUMBER(S):  [id number]    ORDERING CLINICIAN:  FITO SCHOFIELD    TECHNIQUE:  Helical data acquisition of the chest was obtained following  intravenous administration of 75 ML Omnipaque 350.  Images were  reformatted in axial, coronal, and sagittal planes.    FINDINGS:  LUNGS AND AIRWAYS:  The trachea and central airways are patent. No endobronchial lesion  is seen.Similar diffuse bronchial wall thickening.    Similar consolidation within the medial left upper lobe with  homogeneous attenuation, which is unchanged in degree and  distribution when compared to most recent prior examination. Again  there is surrounding interlobular septal thickening and fibrotic  changes likely secondary to prior treatments and resultant  atelectasis.    Scattered pulmonary nodules have again  increased in size when  compared to prior. For example: Cavitary nodule along the left major  fissure measures 1.3 cm in diameter (series 3, image 82), previously  0.9 cm on 12/18/2024 and 0.5 cm on 09/16/2024. Cavitary nodule within  the right lower lobe measures 1.2 cm in diameter (series 3, image  158), previously 0.9 cm on 12/18/2024 and 0.7 cm on 09/16/2024. Solid  nodule within the periphery of the left upper lobe measures 0.8 cm in  diameter (series 3, image 93), previously 0.7 cm on 12/18/2024 and  0.4 cm on 09/16/2024.    Solid nodule within the apex of the left upper lobe measures 0.3 cm  in diameter (series 3, image 63), stable from prior CT 12/18/2024.    No new pulmonary nodules are evident.    Again there is moderate-to-severe diffuse centrilobular emphysematous  changes with upper lung predominance.      MEDIASTINUM AND WILLIAM, LOWER NECK AND AXILLA:  The visualized thyroid gland is within normal limits.  No evidence of thoracic lymphadenopathy by CT criteria.  Esophagus appears within normal limits as seen.    HEART AND VESSELS:  The thoracic aorta normal in course and caliber.There is moderate  atherosclerosis present, including calcified and noncalcified  plaques. Main pulmonary artery and its branches are normal in  caliber. Severe coronary artery calcifications are seen. Please  note,the study is not optimized for evaluation of coronary arteries.  The cardiac chambers are not enlarged.There is mild aortic valvular  calcification. There is no pericardial effusion seen.    UPPER ABDOMEN:  Scattered hypodense lesions throughout the visualized hepatic  parenchyma appears stable from prior. Mild perinephric fat stranding  bilaterally, similar to prior. Otherwise there is no acute  subdiaphragmatic abnormality within the field of view.        CHEST WALL AND OSSEOUS STRUCTURES:  Chest wall is within normal limits.  No acute osseous pathology.There are no suspicious osseous  lesions.Mild multilevel  degenerative changes within visualized spine.    Impression  1. Cavitary nodules within the lungs have slightly increased in size  when compared to prior CTs dating back to 09/16/2024. Left upper lobe  solid nodule has minimally increased when compared to CT 12/18/2024.  Remaining nodules are stable and no new pulmonary nodules are  evident. No thoracic lymphadenopathy is seen.  2. Similar appearance of consolidation within the medial left upper  lobe with surrounding fibrotic changes and reticulations likely  secondary to prior treatment and atelectasis.  3. Similar moderate-to-severe diffuse centrilobular emphysematous  changes.  4. No acute pathology.    I personally reviewed the images/study and I agree with the findings  as stated by Rafita Begum MD (Radiology Resident).  This study was interpreted at [place], [place].    Signed by: Flavio Wilson 3/11/2025 4:05 PM  Dictation workstation:   [id number]   Plan:  Assessment/Plan     63 year old male 1 year and 4 months s/p chemo RT to the THOMAS.  Patient is doing well and in usual state of health. No acute complaints related to treatment. No longer on oxygen. CT scan from 3/11/25 is overall stable. Currently under observation with Dr. Ng with 3 months CT scans. Next scan is in July.  Continue  inhalers and nebulizer as prescribed. Continue follow up with pulmonologist as scheduled.  Patient to return to clinic in 6 months for virtual visit per request. Patient instructed to call with any questions or concerns.     Virtual or Telephone Consent    While technically available, the patient was unable or unwilling to consent to connect via audio/video telehealth technology; therefore, I performed this visit using a real-time audio only connection between Martínez Neri & Bhavna GARCÍA [facility], APRN-CNP.  Verbal consent was requested and obtained from Martínez Neri on this date, 05/06/25 for a telehealth  visit and the patient's location was confirmed at the time of the visit.

## 2025-05-15 DIAGNOSIS — R56.9 SEIZURES (MULTI): ICD-10-CM

## 2025-05-15 DIAGNOSIS — E78.00 HYPERCHOLESTEROLEMIA: ICD-10-CM

## 2025-05-15 RX ORDER — ATORVASTATIN CALCIUM 40 MG/1
40 TABLET, FILM COATED ORAL DAILY
Qty: 30 TABLET | Refills: 0 | Status: SHIPPED | OUTPATIENT
Start: 2025-05-15

## 2025-06-02 ENCOUNTER — OFFICE VISIT (OUTPATIENT)
Dept: PALLIATIVE MEDICINE | Facility: CLINIC | Age: 65
End: 2025-06-02
Payer: COMMERCIAL

## 2025-06-02 VITALS
DIASTOLIC BLOOD PRESSURE: 67 MMHG | BODY MASS INDEX: 20.55 KG/M2 | HEART RATE: 89 BPM | SYSTOLIC BLOOD PRESSURE: 133 MMHG | WEIGHT: 139.88 LBS | OXYGEN SATURATION: 98 % | RESPIRATION RATE: 18 BRPM | TEMPERATURE: 97.3 F

## 2025-06-02 DIAGNOSIS — M79.2 NEUROPATHIC PAIN: ICD-10-CM

## 2025-06-02 DIAGNOSIS — Z51.81 ENCOUNTER FOR MONITORING OPIOID MAINTENANCE THERAPY: ICD-10-CM

## 2025-06-02 DIAGNOSIS — G47.00 INSOMNIA, UNSPECIFIED TYPE: ICD-10-CM

## 2025-06-02 DIAGNOSIS — Z51.5 PALLIATIVE CARE ENCOUNTER: Primary | ICD-10-CM

## 2025-06-02 DIAGNOSIS — R63.0 POOR APPETITE: ICD-10-CM

## 2025-06-02 DIAGNOSIS — Z79.891 ENCOUNTER FOR MONITORING OPIOID MAINTENANCE THERAPY: ICD-10-CM

## 2025-06-02 DIAGNOSIS — G89.3 CANCER RELATED PAIN: ICD-10-CM

## 2025-06-02 PROCEDURE — 1126F AMNT PAIN NOTED NONE PRSNT: CPT | Performed by: NURSE PRACTITIONER

## 2025-06-02 PROCEDURE — 99214 OFFICE O/P EST MOD 30 MIN: CPT | Performed by: NURSE PRACTITIONER

## 2025-06-02 RX ORDER — MIRTAZAPINE 7.5 MG/1
7.5 TABLET, FILM COATED ORAL NIGHTLY
Qty: 30 TABLET | Refills: 3 | Status: ON HOLD | OUTPATIENT
Start: 2025-06-02 | End: 2025-07-02

## 2025-06-02 RX ORDER — OXYCODONE HYDROCHLORIDE 5 MG/1
5 TABLET ORAL EVERY 6 HOURS PRN
Qty: 120 TABLET | Refills: 0 | Status: ON HOLD | OUTPATIENT
Start: 2025-06-02 | End: 2025-07-02

## 2025-06-02 ASSESSMENT — PAIN SCALES - GENERAL: PAINLEVEL_OUTOF10: 0-NO PAIN

## 2025-06-02 NOTE — PROGRESS NOTES
SUPPORTIVE AND PALLIATIVE ONCOLOGY OUTPATIENT FOLLOW-UP      SERVICE DATE: 6/2/2025    Cancer History  NSCL CA THOMAS dx June 2023  -7/27/23: bronch/ EBUS performed, biopsies of THOMAS demonstrated invasive moderately differentiated SCC.   -was living in [place] at the time, relocated to [place]  -1/5/24: completed definitive chemoRT 60 Gy in 30 fxs with concurrent weekly carbotaxol  -currently on surveillance     Onc: Dr. Tasha Zuluaga Onc: NICOLETTE Reed CNP      Subjective   HISTORY OF PRESENT ILLNESS: Martínez Neri is a 64 y.o. male with Pmhx of emphysema/ COPD, ETOH misuse, seizure disorder, and SCC of THOMAS.     He presents to supportive oncology for follow up for pain and symptom management.     Presents for FUV accompanied by wife, Keri. Currently denies pain, but pain levels depend on how much he's coughing, which causes chest pain. Moving bowels daily. No N/V/reflux. Appetite is low, discussed increasing supplements. Mood is stable, easily agitated based on current living situation. Sleep is up and down.     Pain Assessment:  Pain Score: 0-No pain  Location:    Education:  Pt interested in weaning off of oxycodone, discussed medication safety in starting this process.     Symptom Assessment:  Pain:a little  Headache: none  Dizziness:none  Lack of energy: a little  Difficulty sleeping: a little  Worrying: a little  Anxiety: a little  Depression: a little  Pain in mouth/swallowing: none  Dry mouth: none  Taste changes: none  Shortness of breath: none  Lack of appetite: somewhat   Nausea: none  Vomiting: none  Constipation: none  Diarrhea: none  Sore muscles: a little  Numbness or tingling in hands/feet/other: somewhat  Weight loss: a little      Information obtained from: chart review, interview of patient, and interview of family  ______________________________________________________________________        Objective                PHYSICAL EXAMINATION   Vital Signs:   Vital signs reviewed  Visit Vitals  /67 (BP  Location: Right arm, Patient Position: Sitting, BP Cuff Size: Adult long)   Pulse 89   Temp 36.3 °C (97.3 °F) (Temporal)   Resp 18        Pain Score:  2     Physical Exam  Vitals reviewed.   Constitutional:       Appearance: Normal appearance.   HENT:      Head: Normocephalic.   Cardiovascular:      Rate and Rhythm: Normal rate.   Pulmonary:      Effort: Pulmonary effort is normal.   Abdominal:      Palpations: Abdomen is soft.   Musculoskeletal:         General: Normal range of motion.   Skin:     General: Skin is warm and dry.   Neurological:      General: No focal deficit present.      Mental Status: He is alert and oriented to person, place, and time.   Psychiatric:         Mood and Affect: Mood normal.         Judgment: Judgment normal.       ASSESSMENT/PLAN    Pain  Pain is: cancer related pain  Type: visceral and neuropathic  Pain control: well-controlled  Home regimen:   -continue oxycodone 5mg q6hrs PRN. Rx sent today.    -continue daily ASA (ok per vascular provider)  -avoid NSAIDs d/t concurrent use of Eliquis   Intolerances/previously tried:     Opioid Use  Medication Management:   - OARRS report reviewed with no aberrant behavior; consistent with  prescriptions/records and patient history  - MED 15.  Overdose Risk Score 290.   This has been discussed with patient.   - We will continue to closely monitor the patient for signs of prescription misuse including UDS, OARRS review and subjective reports at each visit.  - no concurrent benzodiazepine use   - I am a provider who either is or has consulted and collaborated with a provider certified in Hospice and Palliative Medicine and have conducted a face-face visit and examination for this patient.  - Routine Urine Drug Screen completed deferred (MED <90) appropriately positive for opioids and negative for illicit substances  - Controlled Substance Agreement completed 2/21/24  - Specifically discussed that controlled substance prescriptions will only be  provided by our group as outlined in the completed agreement  - Prescribed naloxone 2/21/24  - Red Flags: history of ETOH misuse, in recovery since 2016    Constipation  At risk for constipation related to opioids,  currently not constipated  Usual bowel pattern: daily  Home regimen:   -educated pt on importance of maintaining regular bowel schedule  -continue colace 100mg bid PRN for hard stools  -continue senna 8.6mg, 1-2tabs, 1-2x/day PRN    Altered Mood  Chronic depression related to health concerns   controlled with home regimen  Home regimen:   -continue depakote ER 500mg bid. (Managed by neurologist- also treatment for seizure disorder)    Sleeping Difficulty:  Impaired sleep related to insomnia  Home regimen:    -continue mirtazapine 7.5mg at bedtime. Encouraged pt to take this medication consistently for best efficacy.     Decreased appetite  Related to malignancy and disease process  Home regimen:    -encouraged smaller, more frequent meals through out the day  -encouraged use of supplements such as Boost, Ensure, etc.    -discussed goal for diet high in protein/calories  -continue mirtazapine 7.5mg at bedtime. Rx sent today.     Supportive Interventions:    Supportive and Palliative Oncology encounter:  Emotional support provided  Coordination of care  We will continue to follow and address symptoms as needed    Advance Directives  Existence of Advance Directives:Unknown  Decision maker: not formally discussed this visit   Code Status: DNR comfort care arrest          Next Follow-Up Visit:  Return to clinic in 6 weeks     Signature and billing  Medical complexity was moderate level due to due to complexity of problems, extensive data review, and high risk of management/treatment.  Time was spent on the following: Prep Time, Time Directly with Patient/Family/Caregiver, Documentation Time. Total time spent: 30 mins      Data  Diagnostic tests and information reviewed for today's visit:  Most recent labs          Some elements copied from my note on 3/31/25, the elements have been updated and all reflect current decision making from today, 6/2/2025.      Plan of Care discussed with: Patient, wife    SIGNATURE: LEW Lim    Contact information:  Supportive and Palliative Oncology  Monday-Friday 8 AM-5 PM  Phone:  [phone], press option #5, then option #1.   Or Epic Secure Chat

## 2025-06-05 ENCOUNTER — APPOINTMENT (OUTPATIENT)
Dept: RADIOLOGY | Facility: HOSPITAL | Age: 65
End: 2025-06-05
Payer: COMMERCIAL

## 2025-06-05 ENCOUNTER — APPOINTMENT (OUTPATIENT)
Dept: CARDIOLOGY | Facility: HOSPITAL | Age: 65
End: 2025-06-05
Payer: COMMERCIAL

## 2025-06-05 ENCOUNTER — HOSPITAL ENCOUNTER (INPATIENT)
Facility: HOSPITAL | Age: 65
LOS: 2 days | Discharge: ANOTHER HOSPITAL | End: 2025-06-07
Attending: STUDENT IN AN ORGANIZED HEALTH CARE EDUCATION/TRAINING PROGRAM | Admitting: INTERNAL MEDICINE
Payer: COMMERCIAL

## 2025-06-05 DIAGNOSIS — J18.9 PNEUMONIA OF BOTH LOWER LOBES DUE TO INFECTIOUS ORGANISM: Primary | ICD-10-CM

## 2025-06-05 DIAGNOSIS — R06.02 SHORTNESS OF BREATH: ICD-10-CM

## 2025-06-05 DIAGNOSIS — R79.89 ELEVATED TROPONIN: ICD-10-CM

## 2025-06-05 DIAGNOSIS — C34.90 MALIGNANT NEOPLASM OF LUNG, UNSPECIFIED LATERALITY, UNSPECIFIED PART OF LUNG (MULTI): ICD-10-CM

## 2025-06-05 PROBLEM — N17.9 AKI (ACUTE KIDNEY INJURY): Status: ACTIVE | Noted: 2025-06-05

## 2025-06-05 LAB
ALBUMIN SERPL BCP-MCNC: 3.6 G/DL (ref 3.4–5)
ALP SERPL-CCNC: 46 U/L (ref 33–136)
ALT SERPL W P-5'-P-CCNC: 9 U/L (ref 10–52)
ANION GAP SERPL CALCULATED.3IONS-SCNC: 17 MMOL/L (ref 10–20)
AORTIC VALVE MEAN GRADIENT: 5 MMHG
AORTIC VALVE PEAK VELOCITY: 1.47 M/S
AST SERPL W P-5'-P-CCNC: 15 U/L (ref 9–39)
ATRIAL RATE: 132 BPM
AV PEAK GRADIENT: 9 MMHG
BASOPHILS # BLD MANUAL: 0 X10*3/UL (ref 0–0.1)
BASOPHILS NFR BLD MANUAL: 0 %
BILIRUB SERPL-MCNC: 0.4 MG/DL (ref 0–1.2)
BUN SERPL-MCNC: 22 MG/DL (ref 6–23)
CALCIUM SERPL-MCNC: 8.6 MG/DL (ref 8.6–10.3)
CARDIAC TROPONIN I PNL SERPL HS: 46 NG/L (ref 0–20)
CARDIAC TROPONIN I PNL SERPL HS: 67 NG/L (ref 0–20)
CHLORIDE SERPL-SCNC: 98 MMOL/L (ref 98–107)
CO2 SERPL-SCNC: 20 MMOL/L (ref 21–32)
CREAT SERPL-MCNC: 1.37 MG/DL (ref 0.5–1.3)
EGFRCR SERPLBLD CKD-EPI 2021: 57 ML/MIN/1.73M*2
EJECTION FRACTION APICAL 4 CHAMBER: 57.7
EJECTION FRACTION: 58 %
EOSINOPHIL # BLD MANUAL: 0 X10*3/UL (ref 0–0.7)
EOSINOPHIL NFR BLD MANUAL: 0 %
ERYTHROCYTE [DISTWIDTH] IN BLOOD BY AUTOMATED COUNT: 16.7 % (ref 11.5–14.5)
FLUAV RNA RESP QL NAA+PROBE: NOT DETECTED
FLUBV RNA RESP QL NAA+PROBE: NOT DETECTED
GLUCOSE SERPL-MCNC: 124 MG/DL (ref 74–99)
HCT VFR BLD AUTO: 24.5 % (ref 41–52)
HGB BLD-MCNC: 7.7 G/DL (ref 13.5–17.5)
HYPOCHROMIA BLD QL SMEAR: ABNORMAL
IMM GRANULOCYTES # BLD AUTO: 0 X10*3/UL (ref 0–0.7)
IMM GRANULOCYTES NFR BLD AUTO: 0 % (ref 0–0.9)
LACTATE SERPL-SCNC: 2.1 MMOL/L (ref 0.4–2)
LACTATE SERPL-SCNC: 3.8 MMOL/L (ref 0.4–2)
LEFT ATRIUM VOLUME AREA LENGTH INDEX BSA: 27.6 ML/M2
LEFT VENTRICLE INTERNAL DIMENSION DIASTOLE: 4.59 CM (ref 3.5–6)
LIPASE SERPL-CCNC: 11 U/L (ref 9–82)
LV EJECTION FRACTION BIPLANE: 56 %
LYMPHOCYTES # BLD MANUAL: 0.82 X10*3/UL (ref 1.2–4.8)
LYMPHOCYTES NFR BLD MANUAL: 17 %
MCH RBC QN AUTO: 26.4 PG (ref 26–34)
MCHC RBC AUTO-ENTMCNC: 31.4 G/DL (ref 32–36)
MCV RBC AUTO: 84 FL (ref 80–100)
MITRAL VALVE E/A RATIO: 0.93
MONOCYTES # BLD MANUAL: 0 X10*3/UL (ref 0.1–1)
MONOCYTES NFR BLD MANUAL: 0 %
MRSA DNA SPEC QL NAA+PROBE: NOT DETECTED
MYELOCYTES # BLD MANUAL: 0.19 X10*3/UL
MYELOCYTES NFR BLD MANUAL: 4 %
NEUTROPHILS # BLD MANUAL: 3.79 X10*3/UL (ref 1.2–7.7)
NEUTS BAND # BLD MANUAL: 1.2 X10*3/UL (ref 0–0.7)
NEUTS BAND NFR BLD MANUAL: 25 %
NEUTS SEG # BLD MANUAL: 2.59 X10*3/UL (ref 1.2–7)
NEUTS SEG NFR BLD MANUAL: 54 %
NRBC BLD-RTO: 0 /100 WBCS (ref 0–0)
P AXIS: 78 DEGREES
P OFFSET: 184 MS
P ONSET: 135 MS
PLATELET # BLD AUTO: 304 X10*3/UL (ref 150–450)
POTASSIUM SERPL-SCNC: 3.7 MMOL/L (ref 3.5–5.3)
PR INTERVAL: 138 MS
PROT SERPL-MCNC: 6.3 G/DL (ref 6.4–8.2)
Q ONSET: 204 MS
QRS COUNT: 22 BEATS
QRS DURATION: 126 MS
QT INTERVAL: 334 MS
QTC CALCULATION(BAZETT): 494 MS
QTC FREDERICIA: 434 MS
R AXIS: -84 DEGREES
RBC # BLD AUTO: 2.92 X10*6/UL (ref 4.5–5.9)
RBC MORPH BLD: ABNORMAL
RIGHT VENTRICLE FREE WALL PEAK S': 10 CM/S
RSV RNA RESP QL NAA+PROBE: NOT DETECTED
SARS-COV-2 RNA RESP QL NAA+PROBE: NOT DETECTED
SODIUM SERPL-SCNC: 131 MMOL/L (ref 136–145)
T AXIS: 67 DEGREES
T OFFSET: 371 MS
TOTAL CELLS COUNTED BLD: 100
TRICUSPID ANNULAR PLANE SYSTOLIC EXCURSION: 1.9 CM
VENTRICULAR RATE: 132 BPM
WBC # BLD AUTO: 4.8 X10*3/UL (ref 4.4–11.3)

## 2025-06-05 PROCEDURE — 36415 COLL VENOUS BLD VENIPUNCTURE: CPT | Performed by: STUDENT IN AN ORGANIZED HEALTH CARE EDUCATION/TRAINING PROGRAM

## 2025-06-05 PROCEDURE — 2500000002 HC RX 250 W HCPCS SELF ADMINISTERED DRUGS (ALT 637 FOR MEDICARE OP, ALT 636 FOR OP/ED): Performed by: INTERNAL MEDICINE

## 2025-06-05 PROCEDURE — 71275 CT ANGIOGRAPHY CHEST: CPT

## 2025-06-05 PROCEDURE — 76770 US EXAM ABDO BACK WALL COMP: CPT

## 2025-06-05 PROCEDURE — 83690 ASSAY OF LIPASE: CPT | Performed by: STUDENT IN AN ORGANIZED HEALTH CARE EDUCATION/TRAINING PROGRAM

## 2025-06-05 PROCEDURE — 2500000001 HC RX 250 WO HCPCS SELF ADMINISTERED DRUGS (ALT 637 FOR MEDICARE OP): Performed by: INTERNAL MEDICINE

## 2025-06-05 PROCEDURE — 85027 COMPLETE CBC AUTOMATED: CPT | Performed by: STUDENT IN AN ORGANIZED HEALTH CARE EDUCATION/TRAINING PROGRAM

## 2025-06-05 PROCEDURE — 87641 MR-STAPH DNA AMP PROBE: CPT | Performed by: INTERNAL MEDICINE

## 2025-06-05 PROCEDURE — 96361 HYDRATE IV INFUSION ADD-ON: CPT

## 2025-06-05 PROCEDURE — 87205 SMEAR GRAM STAIN: CPT | Mod: TRILAB | Performed by: REGISTERED NURSE

## 2025-06-05 PROCEDURE — 2500000005 HC RX 250 GENERAL PHARMACY W/O HCPCS: Performed by: STUDENT IN AN ORGANIZED HEALTH CARE EDUCATION/TRAINING PROGRAM

## 2025-06-05 PROCEDURE — 99222 1ST HOSP IP/OBS MODERATE 55: CPT

## 2025-06-05 PROCEDURE — 2060000001 HC INTERMEDIATE ICU ROOM DAILY

## 2025-06-05 PROCEDURE — 2500000002 HC RX 250 W HCPCS SELF ADMINISTERED DRUGS (ALT 637 FOR MEDICARE OP, ALT 636 FOR OP/ED): Performed by: STUDENT IN AN ORGANIZED HEALTH CARE EDUCATION/TRAINING PROGRAM

## 2025-06-05 PROCEDURE — 93005 ELECTROCARDIOGRAM TRACING: CPT

## 2025-06-05 PROCEDURE — 2550000001 HC RX 255 CONTRASTS: Performed by: STUDENT IN AN ORGANIZED HEALTH CARE EDUCATION/TRAINING PROGRAM

## 2025-06-05 PROCEDURE — 99285 EMERGENCY DEPT VISIT HI MDM: CPT | Mod: 25 | Performed by: STUDENT IN AN ORGANIZED HEALTH CARE EDUCATION/TRAINING PROGRAM

## 2025-06-05 PROCEDURE — 2500000005 HC RX 250 GENERAL PHARMACY W/O HCPCS: Performed by: INTERNAL MEDICINE

## 2025-06-05 PROCEDURE — 85007 BL SMEAR W/DIFF WBC COUNT: CPT | Performed by: STUDENT IN AN ORGANIZED HEALTH CARE EDUCATION/TRAINING PROGRAM

## 2025-06-05 PROCEDURE — 97165 OT EVAL LOW COMPLEX 30 MIN: CPT | Mod: GO

## 2025-06-05 PROCEDURE — 93306 TTE W/DOPPLER COMPLETE: CPT

## 2025-06-05 PROCEDURE — 84484 ASSAY OF TROPONIN QUANT: CPT | Performed by: STUDENT IN AN ORGANIZED HEALTH CARE EDUCATION/TRAINING PROGRAM

## 2025-06-05 PROCEDURE — 87449 NOS EACH ORGANISM AG IA: CPT | Mod: TRILAB | Performed by: NURSE PRACTITIONER

## 2025-06-05 PROCEDURE — 9420000001 HC RT PATIENT EDUCATION 5 MIN

## 2025-06-05 PROCEDURE — 93306 TTE W/DOPPLER COMPLETE: CPT | Performed by: INTERNAL MEDICINE

## 2025-06-05 PROCEDURE — 99222 1ST HOSP IP/OBS MODERATE 55: CPT | Performed by: INTERNAL MEDICINE

## 2025-06-05 PROCEDURE — 87899 AGENT NOS ASSAY W/OPTIC: CPT | Mod: TRILAB | Performed by: NURSE PRACTITIONER

## 2025-06-05 PROCEDURE — 80053 COMPREHEN METABOLIC PANEL: CPT | Performed by: STUDENT IN AN ORGANIZED HEALTH CARE EDUCATION/TRAINING PROGRAM

## 2025-06-05 PROCEDURE — 97162 PT EVAL MOD COMPLEX 30 MIN: CPT | Mod: GP

## 2025-06-05 PROCEDURE — 96365 THER/PROPH/DIAG IV INF INIT: CPT

## 2025-06-05 PROCEDURE — 96375 TX/PRO/DX INJ NEW DRUG ADDON: CPT

## 2025-06-05 PROCEDURE — 71275 CT ANGIOGRAPHY CHEST: CPT | Performed by: STUDENT IN AN ORGANIZED HEALTH CARE EDUCATION/TRAINING PROGRAM

## 2025-06-05 PROCEDURE — 87081 CULTURE SCREEN ONLY: CPT | Mod: TRILAB | Performed by: NURSE PRACTITIONER

## 2025-06-05 PROCEDURE — 99222 1ST HOSP IP/OBS MODERATE 55: CPT | Performed by: NURSE PRACTITIONER

## 2025-06-05 PROCEDURE — 96367 TX/PROPH/DG ADDL SEQ IV INF: CPT

## 2025-06-05 PROCEDURE — 2500000004 HC RX 250 GENERAL PHARMACY W/ HCPCS (ALT 636 FOR OP/ED): Performed by: STUDENT IN AN ORGANIZED HEALTH CARE EDUCATION/TRAINING PROGRAM

## 2025-06-05 PROCEDURE — 87637 SARSCOV2&INF A&B&RSV AMP PRB: CPT | Performed by: NURSE PRACTITIONER

## 2025-06-05 PROCEDURE — 83605 ASSAY OF LACTIC ACID: CPT | Performed by: STUDENT IN AN ORGANIZED HEALTH CARE EDUCATION/TRAINING PROGRAM

## 2025-06-05 PROCEDURE — 94640 AIRWAY INHALATION TREATMENT: CPT | Mod: 59

## 2025-06-05 PROCEDURE — 94664 DEMO&/EVAL PT USE INHALER: CPT

## 2025-06-05 PROCEDURE — 2500000004 HC RX 250 GENERAL PHARMACY W/ HCPCS (ALT 636 FOR OP/ED): Performed by: INTERNAL MEDICINE

## 2025-06-05 PROCEDURE — 94640 AIRWAY INHALATION TREATMENT: CPT

## 2025-06-05 PROCEDURE — 76770 US EXAM ABDO BACK WALL COMP: CPT | Performed by: RADIOLOGY

## 2025-06-05 PROCEDURE — 2500000004 HC RX 250 GENERAL PHARMACY W/ HCPCS (ALT 636 FOR OP/ED)

## 2025-06-05 RX ORDER — PANTOPRAZOLE SODIUM 40 MG/10ML
40 INJECTION, POWDER, LYOPHILIZED, FOR SOLUTION INTRAVENOUS
Status: DISCONTINUED | OUTPATIENT
Start: 2025-06-05 | End: 2025-06-07 | Stop reason: HOSPADM

## 2025-06-05 RX ORDER — OXYCODONE HYDROCHLORIDE 5 MG/1
5 TABLET ORAL EVERY 6 HOURS PRN
Status: DISCONTINUED | OUTPATIENT
Start: 2025-06-05 | End: 2025-06-06

## 2025-06-05 RX ORDER — VANCOMYCIN HYDROCHLORIDE 1 G/20ML
INJECTION, POWDER, LYOPHILIZED, FOR SOLUTION INTRAVENOUS DAILY PRN
Status: DISCONTINUED | OUTPATIENT
Start: 2025-06-05 | End: 2025-06-07 | Stop reason: HOSPADM

## 2025-06-05 RX ORDER — VANCOMYCIN 1.75 G/350ML
1250 INJECTION, SOLUTION INTRAVENOUS ONCE
Status: COMPLETED | OUTPATIENT
Start: 2025-06-05 | End: 2025-06-05

## 2025-06-05 RX ORDER — ACETAMINOPHEN 325 MG/1
650 TABLET ORAL EVERY 4 HOURS PRN
Status: DISCONTINUED | OUTPATIENT
Start: 2025-06-05 | End: 2025-06-07 | Stop reason: HOSPADM

## 2025-06-05 RX ORDER — GUAIFENESIN 600 MG/1
600 TABLET, EXTENDED RELEASE ORAL EVERY 12 HOURS PRN
Status: DISCONTINUED | OUTPATIENT
Start: 2025-06-05 | End: 2025-06-07 | Stop reason: HOSPADM

## 2025-06-05 RX ORDER — IPRATROPIUM BROMIDE AND ALBUTEROL SULFATE 2.5; .5 MG/3ML; MG/3ML
3 SOLUTION RESPIRATORY (INHALATION) ONCE
Status: COMPLETED | OUTPATIENT
Start: 2025-06-05 | End: 2025-06-05

## 2025-06-05 RX ORDER — TALC
3 POWDER (GRAM) TOPICAL NIGHTLY
Status: DISCONTINUED | OUTPATIENT
Start: 2025-06-05 | End: 2025-06-07 | Stop reason: HOSPADM

## 2025-06-05 RX ORDER — ACETAMINOPHEN 650 MG/1
650 SUPPOSITORY RECTAL EVERY 4 HOURS PRN
Status: DISCONTINUED | OUTPATIENT
Start: 2025-06-05 | End: 2025-06-07 | Stop reason: HOSPADM

## 2025-06-05 RX ORDER — ALBUTEROL SULFATE 90 UG/1
2 INHALANT RESPIRATORY (INHALATION) EVERY 4 HOURS PRN
Status: DISCONTINUED | OUTPATIENT
Start: 2025-06-05 | End: 2025-06-05

## 2025-06-05 RX ORDER — NALOXONE HYDROCHLORIDE 0.4 MG/ML
0.4 INJECTION, SOLUTION INTRAMUSCULAR; INTRAVENOUS; SUBCUTANEOUS AS NEEDED
Status: DISCONTINUED | OUTPATIENT
Start: 2025-06-05 | End: 2025-06-07 | Stop reason: HOSPADM

## 2025-06-05 RX ORDER — ONDANSETRON 4 MG/1
8 TABLET, ORALLY DISINTEGRATING ORAL EVERY 8 HOURS PRN
Status: DISCONTINUED | OUTPATIENT
Start: 2025-06-05 | End: 2025-06-06

## 2025-06-05 RX ORDER — POLYETHYLENE GLYCOL 3350 17 G/17G
17 POWDER, FOR SOLUTION ORAL DAILY
Status: DISCONTINUED | OUTPATIENT
Start: 2025-06-05 | End: 2025-06-06

## 2025-06-05 RX ORDER — ONDANSETRON HYDROCHLORIDE 2 MG/ML
4 INJECTION, SOLUTION INTRAVENOUS EVERY 8 HOURS PRN
Status: DISCONTINUED | OUTPATIENT
Start: 2025-06-05 | End: 2025-06-07 | Stop reason: HOSPADM

## 2025-06-05 RX ORDER — ONDANSETRON 4 MG/1
4 TABLET, ORALLY DISINTEGRATING ORAL EVERY 8 HOURS PRN
Status: DISCONTINUED | OUTPATIENT
Start: 2025-06-05 | End: 2025-06-07 | Stop reason: HOSPADM

## 2025-06-05 RX ORDER — CEFTRIAXONE 1 G/50ML
1 INJECTION, SOLUTION INTRAVENOUS ONCE
Status: COMPLETED | OUTPATIENT
Start: 2025-06-05 | End: 2025-06-05

## 2025-06-05 RX ORDER — IPRATROPIUM BROMIDE AND ALBUTEROL SULFATE 2.5; .5 MG/3ML; MG/3ML
3 SOLUTION RESPIRATORY (INHALATION) 4 TIMES DAILY PRN
Status: DISCONTINUED | OUTPATIENT
Start: 2025-06-05 | End: 2025-06-07 | Stop reason: HOSPADM

## 2025-06-05 RX ORDER — DOCUSATE SODIUM 100 MG/1
100 CAPSULE, LIQUID FILLED ORAL 2 TIMES DAILY
Status: DISCONTINUED | OUTPATIENT
Start: 2025-06-05 | End: 2025-06-06

## 2025-06-05 RX ORDER — GUAIFENESIN/DEXTROMETHORPHAN 100-10MG/5
5 SYRUP ORAL EVERY 4 HOURS PRN
Status: DISCONTINUED | OUTPATIENT
Start: 2025-06-05 | End: 2025-06-07 | Stop reason: HOSPADM

## 2025-06-05 RX ORDER — IPRATROPIUM BROMIDE AND ALBUTEROL SULFATE 2.5; .5 MG/3ML; MG/3ML
3 SOLUTION RESPIRATORY (INHALATION) EVERY 6 HOURS
Status: DISCONTINUED | OUTPATIENT
Start: 2025-06-05 | End: 2025-06-05

## 2025-06-05 RX ORDER — ATORVASTATIN CALCIUM 40 MG/1
40 TABLET, FILM COATED ORAL DAILY
Status: DISCONTINUED | OUTPATIENT
Start: 2025-06-05 | End: 2025-06-07 | Stop reason: HOSPADM

## 2025-06-05 RX ORDER — IPRATROPIUM BROMIDE AND ALBUTEROL SULFATE 2.5; .5 MG/3ML; MG/3ML
3 SOLUTION RESPIRATORY (INHALATION)
Status: DISCONTINUED | OUTPATIENT
Start: 2025-06-05 | End: 2025-06-07 | Stop reason: HOSPADM

## 2025-06-05 RX ORDER — NAPROXEN SODIUM 220 MG/1
81 TABLET, FILM COATED ORAL DAILY
Status: DISCONTINUED | OUTPATIENT
Start: 2025-06-05 | End: 2025-06-07 | Stop reason: HOSPADM

## 2025-06-05 RX ORDER — ACETAMINOPHEN 160 MG/5ML
650 SOLUTION ORAL EVERY 4 HOURS PRN
Status: DISCONTINUED | OUTPATIENT
Start: 2025-06-05 | End: 2025-06-07 | Stop reason: HOSPADM

## 2025-06-05 RX ORDER — LACOSAMIDE 100 MG/1
200 TABLET ORAL 2 TIMES DAILY
Status: DISCONTINUED | OUTPATIENT
Start: 2025-06-05 | End: 2025-06-07 | Stop reason: HOSPADM

## 2025-06-05 RX ORDER — SODIUM CHLORIDE 9 MG/ML
100 INJECTION, SOLUTION INTRAVENOUS CONTINUOUS
Status: DISCONTINUED | OUTPATIENT
Start: 2025-06-05 | End: 2025-06-07

## 2025-06-05 RX ORDER — MIRTAZAPINE 7.5 MG/1
7.5 TABLET, FILM COATED ORAL NIGHTLY
Status: DISCONTINUED | OUTPATIENT
Start: 2025-06-05 | End: 2025-06-07 | Stop reason: HOSPADM

## 2025-06-05 RX ORDER — NICOTINE 7MG/24HR
1 PATCH, TRANSDERMAL 24 HOURS TRANSDERMAL EVERY 24 HOURS
Status: DISCONTINUED | OUTPATIENT
Start: 2025-06-05 | End: 2025-06-06

## 2025-06-05 RX ORDER — DIVALPROEX SODIUM 500 MG/1
500 TABLET, FILM COATED, EXTENDED RELEASE ORAL 2 TIMES DAILY
Status: DISCONTINUED | OUTPATIENT
Start: 2025-06-05 | End: 2025-06-07 | Stop reason: HOSPADM

## 2025-06-05 RX ORDER — DEXTROSE MONOHYDRATE AND SODIUM CHLORIDE 5; .45 G/100ML; G/100ML
75 INJECTION, SOLUTION INTRAVENOUS CONTINUOUS
Status: DISCONTINUED | OUTPATIENT
Start: 2025-06-05 | End: 2025-06-05

## 2025-06-05 RX ORDER — PANTOPRAZOLE SODIUM 40 MG/1
40 TABLET, DELAYED RELEASE ORAL
Status: DISCONTINUED | OUTPATIENT
Start: 2025-06-05 | End: 2025-06-07 | Stop reason: HOSPADM

## 2025-06-05 RX ORDER — FLUTICASONE PROPIONATE 50 MCG
2 SPRAY, SUSPENSION (ML) NASAL DAILY
Status: DISCONTINUED | OUTPATIENT
Start: 2025-06-05 | End: 2025-06-07 | Stop reason: HOSPADM

## 2025-06-05 RX ORDER — CEFTRIAXONE 1 G/50ML
1 INJECTION, SOLUTION INTRAVENOUS ONCE
Status: COMPLETED | OUTPATIENT
Start: 2025-06-06 | End: 2025-06-06

## 2025-06-05 RX ADMIN — DIVALPROEX SODIUM 500 MG: 500 TABLET, FILM COATED, EXTENDED RELEASE ORAL at 08:46

## 2025-06-05 RX ADMIN — DEXTROSE MONOHYDRATE 500 MG: 50 INJECTION, SOLUTION INTRAVENOUS at 05:37

## 2025-06-05 RX ADMIN — ASPIRIN 81 MG: 81 TABLET, CHEWABLE ORAL at 20:00

## 2025-06-05 RX ADMIN — VANCOMYCIN 1250 MG: 1.75 INJECTION, SOLUTION INTRAVENOUS at 17:21

## 2025-06-05 RX ADMIN — IPRATROPIUM BROMIDE AND ALBUTEROL SULFATE 3 ML: 2.5; .5 SOLUTION RESPIRATORY (INHALATION) at 03:53

## 2025-06-05 RX ADMIN — IPRATROPIUM BROMIDE AND ALBUTEROL SULFATE 3 ML: 2.5; .5 SOLUTION RESPIRATORY (INHALATION) at 19:26

## 2025-06-05 RX ADMIN — PIPERACILLIN SODIUM AND TAZOBACTAM SODIUM 3.38 G: 3; .375 INJECTION, SOLUTION INTRAVENOUS at 16:51

## 2025-06-05 RX ADMIN — LACOSAMIDE 200 MG: 100 TABLET, FILM COATED ORAL at 20:00

## 2025-06-05 RX ADMIN — LACOSAMIDE 200 MG: 100 TABLET, FILM COATED ORAL at 08:46

## 2025-06-05 RX ADMIN — PIPERACILLIN SODIUM AND TAZOBACTAM SODIUM 3.38 G: 3; .375 INJECTION, SOLUTION INTRAVENOUS at 21:04

## 2025-06-05 RX ADMIN — DIVALPROEX SODIUM 500 MG: 500 TABLET, FILM COATED, EXTENDED RELEASE ORAL at 20:00

## 2025-06-05 RX ADMIN — APIXABAN 5 MG: 5 TABLET, FILM COATED ORAL at 08:46

## 2025-06-05 RX ADMIN — APIXABAN 5 MG: 5 TABLET, FILM COATED ORAL at 20:00

## 2025-06-05 RX ADMIN — OXYCODONE 5 MG: 5 TABLET ORAL at 19:44

## 2025-06-05 RX ADMIN — Medication 60 PERCENT: at 08:36

## 2025-06-05 RX ADMIN — Medication 8 L/MIN: at 19:54

## 2025-06-05 RX ADMIN — Medication 9 L/MIN: at 04:59

## 2025-06-05 RX ADMIN — SODIUM CHLORIDE 100 ML/HR: 900 INJECTION, SOLUTION INTRAVENOUS at 17:22

## 2025-06-05 RX ADMIN — IOHEXOL 75 ML: 350 INJECTION, SOLUTION INTRAVENOUS at 03:46

## 2025-06-05 RX ADMIN — IPRATROPIUM BROMIDE AND ALBUTEROL SULFATE 3 ML: 2.5; .5 SOLUTION RESPIRATORY (INHALATION) at 13:38

## 2025-06-05 RX ADMIN — CEFTRIAXONE 1 G: 1 INJECTION, SOLUTION INTRAVENOUS at 05:06

## 2025-06-05 RX ADMIN — METHYLPREDNISOLONE SODIUM SUCCINATE 125 MG: 125 INJECTION, POWDER, FOR SOLUTION INTRAMUSCULAR; INTRAVENOUS at 03:52

## 2025-06-05 RX ADMIN — SODIUM CHLORIDE 1000 ML: 900 INJECTION, SOLUTION INTRAVENOUS at 03:52

## 2025-06-05 RX ADMIN — ATORVASTATIN CALCIUM 40 MG: 40 TABLET, FILM COATED ORAL at 20:00

## 2025-06-05 SDOH — SOCIAL STABILITY: SOCIAL INSECURITY: HAVE YOU HAD ANY THOUGHTS OF HARMING ANYONE ELSE?: NO

## 2025-06-05 SDOH — ECONOMIC STABILITY: HOUSING INSECURITY: IN THE LAST 12 MONTHS, WAS THERE A TIME WHEN YOU WERE NOT ABLE TO PAY THE MORTGAGE OR RENT ON TIME?: NO

## 2025-06-05 SDOH — SOCIAL STABILITY: SOCIAL INSECURITY: ABUSE: ADULT

## 2025-06-05 SDOH — SOCIAL STABILITY: SOCIAL INSECURITY: WITHIN THE LAST YEAR, HAVE YOU BEEN AFRAID OF YOUR PARTNER OR EX-PARTNER?: NO

## 2025-06-05 SDOH — SOCIAL STABILITY: SOCIAL INSECURITY: WITHIN THE LAST YEAR, HAVE YOU BEEN HUMILIATED OR EMOTIONALLY ABUSED IN OTHER WAYS BY YOUR PARTNER OR EX-PARTNER?: NO

## 2025-06-05 SDOH — SOCIAL STABILITY: SOCIAL INSECURITY: DO YOU FEEL ANYONE HAS EXPLOITED OR TAKEN ADVANTAGE OF YOU FINANCIALLY OR OF YOUR PERSONAL PROPERTY?: NO

## 2025-06-05 SDOH — ECONOMIC STABILITY: FOOD INSECURITY: WITHIN THE PAST 12 MONTHS, YOU WORRIED THAT YOUR FOOD WOULD RUN OUT BEFORE YOU GOT THE MONEY TO BUY MORE.: NEVER TRUE

## 2025-06-05 SDOH — SOCIAL STABILITY: SOCIAL INSECURITY
WITHIN THE LAST YEAR, HAVE YOU BEEN RAPED OR FORCED TO HAVE ANY KIND OF SEXUAL ACTIVITY BY YOUR PARTNER OR EX-PARTNER?: NO

## 2025-06-05 SDOH — ECONOMIC STABILITY: HOUSING INSECURITY: AT ANY TIME IN THE PAST 12 MONTHS, WERE YOU HOMELESS OR LIVING IN A SHELTER (INCLUDING NOW)?: NO

## 2025-06-05 SDOH — SOCIAL STABILITY: SOCIAL INSECURITY: ARE THERE ANY APPARENT SIGNS OF INJURIES/BEHAVIORS THAT COULD BE RELATED TO ABUSE/NEGLECT?: NO

## 2025-06-05 SDOH — ECONOMIC STABILITY: FOOD INSECURITY: HOW HARD IS IT FOR YOU TO PAY FOR THE VERY BASICS LIKE FOOD, HOUSING, MEDICAL CARE, AND HEATING?: NOT VERY HARD

## 2025-06-05 SDOH — ECONOMIC STABILITY: FOOD INSECURITY: WITHIN THE PAST 12 MONTHS, THE FOOD YOU BOUGHT JUST DIDN'T LAST AND YOU DIDN'T HAVE MONEY TO GET MORE.: NEVER TRUE

## 2025-06-05 SDOH — ECONOMIC STABILITY: INCOME INSECURITY: IN THE PAST 12 MONTHS HAS THE ELECTRIC, GAS, OIL, OR WATER COMPANY THREATENED TO SHUT OFF SERVICES IN YOUR HOME?: NO

## 2025-06-05 SDOH — SOCIAL STABILITY: SOCIAL INSECURITY: DO YOU FEEL UNSAFE GOING BACK TO THE PLACE WHERE YOU ARE LIVING?: NO

## 2025-06-05 SDOH — SOCIAL STABILITY: SOCIAL INSECURITY: ARE YOU OR HAVE YOU BEEN THREATENED OR ABUSED PHYSICALLY, EMOTIONALLY, OR SEXUALLY BY ANYONE?: NO

## 2025-06-05 SDOH — SOCIAL STABILITY: SOCIAL INSECURITY: DOES ANYONE TRY TO KEEP YOU FROM HAVING/CONTACTING OTHER FRIENDS OR DOING THINGS OUTSIDE YOUR HOME?: NO

## 2025-06-05 SDOH — ECONOMIC STABILITY: HOUSING INSECURITY: IN THE PAST 12 MONTHS, HOW MANY TIMES HAVE YOU MOVED WHERE YOU WERE LIVING?: 0

## 2025-06-05 SDOH — ECONOMIC STABILITY: TRANSPORTATION INSECURITY: IN THE PAST 12 MONTHS, HAS LACK OF TRANSPORTATION KEPT YOU FROM MEDICAL APPOINTMENTS OR FROM GETTING MEDICATIONS?: NO

## 2025-06-05 SDOH — SOCIAL STABILITY: SOCIAL INSECURITY: WERE YOU ABLE TO COMPLETE ALL THE BEHAVIORAL HEALTH SCREENINGS?: YES

## 2025-06-05 SDOH — SOCIAL STABILITY: SOCIAL INSECURITY: HAVE YOU HAD THOUGHTS OF HARMING ANYONE ELSE?: NO

## 2025-06-05 SDOH — SOCIAL STABILITY: SOCIAL INSECURITY: HAS ANYONE EVER THREATENED TO HURT YOUR FAMILY OR YOUR PETS?: NO

## 2025-06-05 ASSESSMENT — ENCOUNTER SYMPTOMS
SEIZURES: 0
DIFFICULTY URINATING: 0
HYPERACTIVE: 0
CHILLS: 0
APPETITE CHANGE: 0
ABDOMINAL PAIN: 0
EYE REDNESS: 0
HALLUCINATIONS: 0
LIGHT-HEADEDNESS: 0
ARTHRALGIAS: 0
HEADACHES: 0
CHOKING: 0
ABDOMINAL DISTENTION: 0
ADENOPATHY: 0
WOUND: 0
DIARRHEA: 0
TREMORS: 0
HEMATURIA: 0
PALPITATIONS: 0
MUSCULOSKELETAL NEGATIVE: 1
MYALGIAS: 0
TROUBLE SWALLOWING: 0
COUGH: 0
FACIAL SWELLING: 0
CONSTIPATION: 0
WHEEZING: 0
GASTROINTESTINAL NEGATIVE: 1
POLYDIPSIA: 0
VOICE CHANGE: 0
SHORTNESS OF BREATH: 0
POLYPHAGIA: 0
CHEST TIGHTNESS: 0
DIZZINESS: 0
DYSPHORIC MOOD: 0
COLOR CHANGE: 0
EYE DISCHARGE: 0
VOMITING: 0
NECK PAIN: 0
BACK PAIN: 0
NUMBNESS: 0
SLEEP DISTURBANCE: 0
NAUSEA: 0
FREQUENCY: 0
PSYCHIATRIC NEGATIVE: 1
DECREASED CONCENTRATION: 0
FLANK PAIN: 0
RHINORRHEA: 0
BLOOD IN STOOL: 0
DIAPHORESIS: 0
EYE PAIN: 0
SINUS PRESSURE: 0
HEMATOLOGIC/LYMPHATIC NEGATIVE: 1
WEAKNESS: 1
ACTIVITY CHANGE: 0
NERVOUS/ANXIOUS: 0
FEVER: 0
ANAL BLEEDING: 0
RECTAL PAIN: 0
SORE THROAT: 0
COUGH: 1
JOINT SWELLING: 0
ENDOCRINE NEGATIVE: 1
BRUISES/BLEEDS EASILY: 0
NECK STIFFNESS: 0
SHORTNESS OF BREATH: 1
DYSURIA: 0
APNEA: 0
EYES NEGATIVE: 1
FATIGUE: 0
ALLERGIC/IMMUNOLOGIC NEGATIVE: 1
PHOTOPHOBIA: 0
AGITATION: 0
FATIGUE: 1
CARDIOVASCULAR NEGATIVE: 1
EYE ITCHING: 0
STRIDOR: 0
UNEXPECTED WEIGHT CHANGE: 0
SPEECH DIFFICULTY: 0
FACIAL ASYMMETRY: 0
CONFUSION: 0
SINUS PAIN: 0
WEAKNESS: 0

## 2025-06-05 ASSESSMENT — PAIN - FUNCTIONAL ASSESSMENT
PAIN_FUNCTIONAL_ASSESSMENT: 0-10
PAIN_FUNCTIONAL_ASSESSMENT: FLACC (FACE, LEGS, ACTIVITY, CRY, CONSOLABILITY)
PAIN_FUNCTIONAL_ASSESSMENT: 0-10

## 2025-06-05 ASSESSMENT — COGNITIVE AND FUNCTIONAL STATUS - GENERAL
DRESSING REGULAR LOWER BODY CLOTHING: A LITTLE
PATIENT BASELINE BEDBOUND: NO
TOILETING: A LITTLE
DAILY ACTIVITIY SCORE: 19
STANDING UP FROM CHAIR USING ARMS: A LITTLE
DRESSING REGULAR UPPER BODY CLOTHING: A LITTLE
WALKING IN HOSPITAL ROOM: A LITTLE
DAILY ACTIVITIY SCORE: 24
CLIMB 3 TO 5 STEPS WITH RAILING: A LITTLE
PERSONAL GROOMING: A LITTLE
MOBILITY SCORE: 20
HELP NEEDED FOR BATHING: A LITTLE
MOBILITY SCORE: 24
MOVING TO AND FROM BED TO CHAIR: A LITTLE

## 2025-06-05 ASSESSMENT — PAIN SCALES - GENERAL
PAINLEVEL_OUTOF10: 0 - NO PAIN
PAINLEVEL_OUTOF10: 8
PAINLEVEL_OUTOF10: 5 - MODERATE PAIN
PAINLEVEL_OUTOF10: 0 - NO PAIN

## 2025-06-05 ASSESSMENT — PATIENT HEALTH QUESTIONNAIRE - PHQ9
1. LITTLE INTEREST OR PLEASURE IN DOING THINGS: NOT AT ALL
SUM OF ALL RESPONSES TO PHQ9 QUESTIONS 1 & 2: 0
2. FEELING DOWN, DEPRESSED OR HOPELESS: NOT AT ALL

## 2025-06-05 ASSESSMENT — ACTIVITIES OF DAILY LIVING (ADL)
LACK_OF_TRANSPORTATION: NO
BATHING: INDEPENDENT
DRESSING YOURSELF: INDEPENDENT
FEEDING YOURSELF: INDEPENDENT
ADEQUATE_TO_COMPLETE_ADL: YES
PATIENT'S MEMORY ADEQUATE TO SAFELY COMPLETE DAILY ACTIVITIES?: YES
WALKS IN HOME: INDEPENDENT
ADL_ASSISTANCE: INDEPENDENT
TOILETING: INDEPENDENT
ADL_ASSISTANCE: INDEPENDENT
LACK_OF_TRANSPORTATION: NO
BATHING_ASSISTANCE: STAND BY
JUDGMENT_ADEQUATE_SAFELY_COMPLETE_DAILY_ACTIVITIES: YES
GROOMING: INDEPENDENT
HEARING - RIGHT EAR: FUNCTIONAL
HEARING - LEFT EAR: FUNCTIONAL

## 2025-06-05 ASSESSMENT — LIFESTYLE VARIABLES
HOW OFTEN DO YOU HAVE A DRINK CONTAINING ALCOHOL: NEVER
AUDIT-C TOTAL SCORE: 0
HOW OFTEN DO YOU HAVE 6 OR MORE DRINKS ON ONE OCCASION: NEVER
HOW MANY STANDARD DRINKS CONTAINING ALCOHOL DO YOU HAVE ON A TYPICAL DAY: PATIENT DOES NOT DRINK
AUDIT-C TOTAL SCORE: 0
SKIP TO QUESTIONS 9-10: 1

## 2025-06-05 ASSESSMENT — PAIN DESCRIPTION - LOCATION
LOCATION: CHEST
LOCATION: GENERALIZED

## 2025-06-05 NOTE — CONSULTS
Inpatient consult to Pulmonology  Consult performed by: Heather Frey, ARIC-CNP  Consult ordered by: Radha Casey MD  Reason for consult: Pneumonia          Reason For Consult  Pneumonia    History Of Present Illness  Martínez Neri is a 64 y.o. male with a past medical history that includes but is not limited to COPD/emphysema, lung cancer; status post chemo and radiation, superior mesenteric artery thrombosis; on Eliquis,  who presented to [facility] Emergency Department today via squad for evaluation of sudden onset shortness of breath, nonproductive cough, weakness and fatigue since last night. He was hypoxic; 87% on room air and tachypneic upon arrival and placed on 9 L mini high flow nasal cannula maintain O2 sats greater than or equal to 92%.  Currently he is on 6 L mini high flow nasal cannula oxygen; O2 sats 98%.  A CT angio was obtained and was without evidence of pulmonary embolism but did show new consolidative airspace opacity present in the L5 lung lobes, small bilateral pleural effusions, some increase in size of right hilar lymph nodes is favored to be reactive, redemonstration of the area of volume loss/atelectasis in the left upper lobe, similar in appearance to prior exam and increase in size of some cavitary nodules in the lungs bilaterally.  He was given a breathing treatment, IV azithromycin x 1, IV ceftriaxone x 1 and 125 mg IV Solu-Medrol x 1 in the ED. He denies fevers, chills, nausea, vomiting, diarrhea, chest or abdominal pain.  He is a patient of Dr. Sahu.     Past Medical History  Diagnosis Date    Allergic rhinitis     Asthma     Brain aneurysm (HHS-HCC)     Cancer (Multi)     Carotid artery disease     COPD (chronic obstructive pulmonary disease) (Multi)     Epilepsy     High cholesterol     History of alcohol use     Hypertension     Lung cancer (Multi)     Neutropenic fever     Seizure (Multi)        Surgical History  [Surgical History]    [Surgical History]  Past  Surgical History  Procedure Laterality Date    CAROTID ENDARTERECTOMY N/A     CEREBRAL ANEURYSM REPAIR      COLONOSCOPY      HERNIA REPAIR          Social History  Tobacco Use    Smoking status: Former     Current packs/day: 1.00     Average packs/day: 1 pack/day for 50.0 years (50.0 ttl pk-yrs)     Types: Cigarettes     Passive exposure: Current    Smokeless tobacco: Never   Vaping Use    Vaping status: Never Used   Substance Use Topics    Alcohol use: Not Currently     Comment: quit 7 years ago    Drug use: Yes     Frequency: 4.0 times per week     Types: Marijuana       Family History  Problem Relation Name Age of Onset    Heart attack Mother      Other (Colorectal cancer) Father      Aneurysm Other      Prostate cancer Other      Alcohol abuse Other       Allergies  Patient has no known allergies.    Review of Systems   Constitutional:  Positive for fatigue.   HENT: Negative.     Eyes: Negative.    Respiratory:  Positive for cough and shortness of breath.    Cardiovascular: Negative.    Gastrointestinal: Negative.    Endocrine: Negative.    Genitourinary: Negative.    Musculoskeletal: Negative.    Allergic/Immunologic: Negative.    Neurological:  Positive for weakness.   Hematological: Negative.    Psychiatric/Behavioral: Negative.          Physical Exam  Vitals and nursing note reviewed.   Constitutional:       Appearance: He is ill-appearing.   HENT:      Head: Normocephalic and atraumatic.      Nose: Nose normal.      Mouth/Throat:      Mouth: Mucous membranes are moist.   Eyes:      Extraocular Movements: Extraocular movements intact.      Conjunctiva/sclera: Conjunctivae normal.      Pupils: Pupils are equal, round, and reactive to light.   Cardiovascular:      Rate and Rhythm: Normal rate and regular rhythm.      Pulses: Normal pulses.      Heart sounds: Normal heart sounds.   Pulmonary:      Effort: Pulmonary effort is normal.      Breath sounds: Rhonchi present.      Comments: Coarse breath sounds  "bilaterally.  Abdominal:      General: Bowel sounds are normal.      Palpations: Abdomen is soft.   Musculoskeletal:         General: Normal range of motion.   Skin:     General: Skin is warm and dry.      Capillary Refill: Capillary refill takes less than 2 seconds.   Neurological:      General: No focal deficit present.      Mental Status: He is alert and oriented to person, place, and time.   Psychiatric:         Mood and Affect: Mood normal.         Behavior: Behavior normal.          Vital Signs  Blood pressure 108/58, pulse 85, temperature 37.1 °C (98.8 °F), temperature source Temporal, resp. rate 26, height 1.753 m (5' 9\"), weight 67.1 kg (148 lb), SpO2 100%.  Oxygen Therapy  SpO2: 100 %  Medical Gas Therapy: Supplemental oxygen  Medical Gas Delivery Method: Nasal cannula       Intake/Output Summary (Last 24 hours) at 6/5/2025 1456  Last data filed at 6/5/2025 0637  Gross per 24 hour   Intake 1300 ml   Output --   Net 1300 ml      Scheduled medications:  apixaban, 5 mg, oral, BID  aspirin, 81 mg, oral, Daily  atorvastatin, 40 mg, oral, Daily  [START ON 6/6/2025] azithromycin, 500 mg, intravenous, q24h  [START ON 6/6/2025] cefTRIAXone, 1 g, intravenous, Once  divalproex, 500 mg, oral, BID  docusate sodium, 100 mg, oral, BID  fluticasone, 2 spray, Each Nostril, Daily  ipratropium-albuteroL, 3 mL, nebulization, TID  lacosamide, 200 mg, oral, BID  melatonin, 3 mg, oral, Nightly  mirtazapine, 7.5 mg, oral, Nightly  nicotine, 1 patch, transdermal, q24h  oxygen, , inhalation, Continuous - Inhalation  pantoprazole, 40 mg, oral, Daily before breakfast   Or  pantoprazole, 40 mg, intravenous, Daily before breakfast  polyethylene glycol, 17 g, oral, Daily  [Held by provider] tiotropium, 2 puff, inhalation, Daily    Continuous medications:  dextrose 5%-0.45 % sodium chloride, 75 mL/hr    PRN medications: acetaminophen **OR** acetaminophen **OR** acetaminophen, benzocaine-menthol, dextromethorphan-guaifenesin, guaiFENesin, " ipratropium-albuteroL, naloxone, ondansetron ODT **OR** ondansetron, ondansetron ODT, oxyCODONE    Relevant Results  Results for orders placed or performed during the hospital encounter of 06/05/25 (from the past 24 hours)   ECG 12 lead   Result Value Ref Range    Ventricular Rate 132 BPM    Atrial Rate 132 BPM    NH Interval 138 ms    QRS Duration 126 ms    QT Interval 334 ms    QTC Calculation(Bazett) 494 ms    P Axis 78 degrees    R Axis -84 degrees    T Axis 67 degrees    QRS Count 22 beats    Q Onset 204 ms    P Onset 135 ms    P Offset 184 ms    T Offset 371 ms    QTC Fredericia 434 ms   CBC and Auto Differential   Result Value Ref Range    WBC 4.8 4.4 - 11.3 x10*3/uL    nRBC 0.0 0.0 - 0.0 /100 WBCs    RBC 2.92 (L) 4.50 - 5.90 x10*6/uL    Hemoglobin 7.7 (L) 13.5 - 17.5 g/dL    Hematocrit 24.5 (L) 41.0 - 52.0 %    MCV 84 80 - 100 fL    MCH 26.4 26.0 - 34.0 pg    MCHC 31.4 (L) 32.0 - 36.0 g/dL    RDW 16.7 (H) 11.5 - 14.5 %    Platelets 304 150 - 450 x10*3/uL    Immature Granulocytes %, Automated 0.0 0.0 - 0.9 %    Immature Granulocytes Absolute, Automated 0.00 0.00 - 0.70 x10*3/uL   Comprehensive metabolic panel   Result Value Ref Range    Glucose 124 (H) 74 - 99 mg/dL    Sodium 131 (L) 136 - 145 mmol/L    Potassium 3.7 3.5 - 5.3 mmol/L    Chloride 98 98 - 107 mmol/L    Bicarbonate 20 (L) 21 - 32 mmol/L    Anion Gap 17 10 - 20 mmol/L    Urea Nitrogen 22 6 - 23 mg/dL    Creatinine 1.37 (H) 0.50 - 1.30 mg/dL    eGFR 57 (L) >60 mL/min/1.73m*2    Calcium 8.6 8.6 - 10.3 mg/dL    Albumin 3.6 3.4 - 5.0 g/dL    Alkaline Phosphatase 46 33 - 136 U/L    Total Protein 6.3 (L) 6.4 - 8.2 g/dL    AST 15 9 - 39 U/L    Bilirubin, Total 0.4 0.0 - 1.2 mg/dL    ALT 9 (L) 10 - 52 U/L   Lipase   Result Value Ref Range    Lipase 11 9 - 82 U/L   Lactate   Result Value Ref Range    Lactate 3.8 (H) 0.4 - 2.0 mmol/L   Troponin I, High Sensitivity, Initial   Result Value Ref Range    Troponin I, High Sensitivity 46 (H) 0 - 20 ng/L   Manual  Differential   Result Value Ref Range    Neutrophils %, Manual 54.0 40.0 - 80.0 %    Bands %, Manual 25.0 0.0 - 5.0 %    Lymphocytes %, Manual 17.0 13.0 - 44.0 %    Monocytes %, Manual 0.0 2.0 - 10.0 %    Eosinophils %, Manual 0.0 0.0 - 6.0 %    Basophils %, Manual 0.0 0.0 - 2.0 %    Myelocytes %, Manual 4.0 0.0 - 0.0 %    Seg Neutrophils Absolute, Manual 2.59 1.20 - 7.00 x10*3/uL    Bands Absolute, Manual 1.20 (H) 0.00 - 0.70 x10*3/uL    Lymphocytes Absolute, Manual 0.82 (L) 1.20 - 4.80 x10*3/uL    Monocytes Absolute, Manual 0.00 (L) 0.10 - 1.00 x10*3/uL    Eosinophils Absolute, Manual 0.00 0.00 - 0.70 x10*3/uL    Basophils Absolute, Manual 0.00 0.00 - 0.10 x10*3/uL    Myelocytes Absolute, Manual 0.19 0.00 - 0.00 x10*3/uL    Total Cells Counted 100     Neutrophils Absolute, Manual 3.79 1.20 - 7.70 x10*3/uL    RBC Morphology See Below     Hypochromia Mild    Troponin, High Sensitivity, 1 Hour   Result Value Ref Range    Troponin I, High Sensitivity 67 (HH) 0 - 20 ng/L   Lactate   Result Value Ref Range    Lactate 2.1 (H) 0.4 - 2.0 mmol/L   Transthoracic Echo (TTE) Complete   Result Value Ref Range    AV pk marie 1.47 m/s    AV mn grad 5 mmHg    MV E/A ratio 0.93     Tricuspid annular plane systolic excursion 1.9 cm    LV Biplane EF 56 %    LA vol index A/L 27.6 ml/m2    LV EF 58 %    RV free wall pk S' 10.00 cm/s    LVIDd 4.59 cm    AV pk grad 9 mmHg    LV A4C EF 57.7       Transthoracic Echo (TTE) Complete  Result Date: 6/5/2025  CONCLUSIONS:  1. Left ventricular ejection fraction is normal by visual estimate at 55-60%.  2. Spectral Doppler shows a Grade I (impaired relaxation pattern) of left ventricular diastolic filling with normal left atrial filling pressure.  3. There is normal right ventricular global systolic function.  4. The inferior vena cava appears mildly dilated, with IVC inspiratory collapse greater than 50%.     CT angio chest for pulmonary embolism  Result Date: 6/5/2025  Interpreted By:   Artur Lino, STUDY: CT ANGIO CHEST FOR FINDINGS: POTENTIAL LIMITATIONS OF THE STUDY: None   HEART AND VESSELS: There are no discrete filling defects within main pulmonary artery and its branches to suggest acute pulmonary embolism. Main pulmonary artery and its branches are normal in caliber.   The thoracic aorta normal in course and caliber.Mild vascular calcifications are present in the thoracic aorta.Although, the study is not tailored for evaluation of aorta, there is no definite evidence of acute aortic pathology. Moderate coronary artery calcifications are seen. Please note,the study is not optimized for evaluation of coronary arteries.   The cardiac chambers are not enlarged.There are no findings to suggest right heart strain. Trace pericardial effusion is present.   MEDIASTINUM AND WILLIAM, LOWER NECK AND AXILLA: The visualized thyroid gland is within normal limits. There is some increase in size of right hilar lymph nodes compared to prior exam, favored to be reactive in etiology. Esophagus appears within normal limits as seen.   LUNGS AND AIRWAYS: The trachea and central airways are patent. No endobronchial lesion is seen.Small amount of mucus/secretions is present in the mainstem bronchi bilaterally.   There is redemonstration of the area of geographic volume loss/scarring in the left upper lobe, with several new nodular density is present along the pleura (series 5, image 113 and 88) suggestive of superimposed infectious process.   There has been some increase in size of cavitary nodules in the left upper lobe. For example dominant nodule measures up to 1.6 cm in size compared to 1.3 cm on previous study (series 5, image 82).   Additionally, new consolidative airspace opacities are present in the right upper, right middle and right lower lobes, suggestive of multifocal pneumonia. There are small bilateral pleural effusions.   Mild infiltrates are also present in the left lower lobe near the  lingula.   A 1.1 cm cavitary nodule in the right lower lobe is unchanged in appearance to prior study (series 11, image 164)   UPPER ABDOMEN: The visualized subdiaphragmatic structures demonstrate no remarkable findings.   CHEST WALL AND OSSEOUS STRUCTURES: Chest wall is within normal limits. No acute osseous pathology.There are no suspicious osseous lesions.Subtle degenerative changes are present in the thoracic spine without compression fracture or high-grade stenosis.   1. Findings most suggestive of multilobar pneumonia, with new consolidative airspace opacity is present in the L5 lung lobes and small bilateral pleural effusions. 2. Some increase in size of right hilar lymph nodes is favored to be reactive. 3. Redemonstration of the area of volume loss/atelectasis in the left upper lobe, similar in appearance to prior exam, likely representing sequela prior treatment. 4. Slight increase in size of some cavitary nodules in the lungs bilaterally.           Assessment/Plan   Acute hypoxic respiratory failure  Wean oxygen sats allow  Continuous pulse oximetry  Incentive spirometry/pulmonary hygiene    Multilobar pneumonia  Continue IV azithromycin, IV ceftriaxone  Sputum if able  Urine Legionella, strep pneumo, mycoplasma, MRSA, COVID-19, influenza A/B, RSV pending  Follow-up cultures  Swallow evaluation per ST    Acute kidney injury  IV fluids  Monitor renal function  Renal ultrasound pending    Elevated high-sensitivity troponin  Likely type II MI secondary to underlying acute illness  2D echo pending  Cardiology follow-up    Lung nodules/History of left upper lobe small cell carcinoma  July '23 status post bronch/EBUS biopsies showed left upper lobe with invasive moderately differentiated non-small cell carcinoma  Completed chemo/radiation January '24  Currently on surveillance  Heme/Onc consultation    COPD  PFTs October '23 show a severe obstructive ventilatory defect  Continue IBD/ICS  Pulmonary function  testing as outpatient    History of superior mesenteric artery thrombosis  On Eliquis    Tobacco use  > 50 pack/year history  Smoking cessation  Nicotine patch    Prophylaxis  Eliquis  Protonix      Heather Frey, APRN-CNP  Pulmonary & Critical Care Medicine

## 2025-06-05 NOTE — ED PROVIDER NOTES
"Emergency Department Provider Note       History of Present Illness     History provided by: {History Provided By:99242}  Limitations to History: {History Limitations:03700}  External Records Reviewed with Brief Summary: {ED External Records Reviewed with Brief Summary:24920}    HPI:  Martínez Neri is a 64 y.o. male ***    Physical Exam   Triage vitals:  T 37.3 °C (99.2 °F)  HR (!) 132  /79  RR (!) 25  O2 97 % Supplemental oxygen    {ED Physical Exam:62152}      Medical Decision Making & ED Course   Medical Decision Makin y.o. male ***  ----  {Scoring Tools Utilized:31924}    Social Determinants of Health which Significantly Impact Care: {Social Determinants of Health which Significantly Impact Care:29562} {The following actions were taken to address these social determinants (Optional):00223}    EKG Independent Interpretation: {EKG Independent Interpretation:40893}    Independent Result Review and Interpretation: {Independent Result Review and Interpretation:65220::\"Relevant laboratory and radiographic results were reviewed and independently interpreted by myself.  As necessary, they are commented on in the ED Course.\"}    Chronic conditions affecting the patient's care: {Chronic conditions affecting the patient's care:65027::\"As documented above in MDM\"}    The patient was discussed with the following consultants/services: {The patient was discussed with the following consultants/services:90906}    Care Considerations: {Care Considerations:50618::\"As documented above in MDM\"}    ED Course:  ED Course as of 25 0517   Thu 2025   0223 ECG 12 lead  Performed at  0223, HR of 132, NSR, LAD, QTc 494, no sign of STEMI, RBBB    Reviewed and interpreted by me at time performed   [JM]      ED Course User Index  [JM] Chana Stanford MD       Disposition   {ED Disposition:54092}    Procedures   Procedures    Chana Stanford MD  Emergency Medicine       "

## 2025-06-05 NOTE — CARE PLAN
The patient's goals for the shift include comfort     The clinical goals for the shift include monitor breathing    Over the shift, the patient did not make progress toward the following goals. Barriers to progression include increased oxygen needs. Recommendations to address these barriers include continue plan of care.

## 2025-06-05 NOTE — CONSULTS
"Vancomycin Dosing by Pharmacy- [facility] INITIAL    Martínez Neri is a 64 y.o. year old male who Pharmacy has been consulted for vancomycin dosing for pneumonia. Based on the patient's indication and renal status this patient will be dosed based on a target level of Other Indication: 15-20 mcg/mL    Patient is currently in [facility].    Initial Dosin mg x 1    Visit Vitals  /58 (BP Location: Right arm, Patient Position: Lying)   Pulse 85   Temp 37.1 °C (98.8 °F) (Temporal)   Resp 26           Lab Results   Component Value Date    CREATININE 1.37 (H) 2025    CREATININE 0.72 2025    CREATININE 0.78 2024    CREATININE 0.77 10/09/2024       I/O last 3 completed shifts:  In: 1300 (19.4 mL/kg) [IV Piggyback:1300]  Out: - (0 mL/kg)   Weight: 67.1 kg     No results found for: \"PATIENTTEMP\"       Assessment/Plan     Random level within 24 hours of first dose will be obtained on  at 0500. May be obtained sooner if clinically indicated.   Will continue to monitor renal function daily while on vancomycin and order serum creatinine at least every 48 hours if not already ordered.  Follow for continued vancomycin needs, clinical response, and signs/symptoms of toxicity.     Lili Acosta, PharmD   "

## 2025-06-05 NOTE — PROGRESS NOTES
Speech-Language Pathology    Therapy Communication Note    Patient Name: Martínez Neri  MRN: [id number]  Department: [facility]  Room: [address] [place], [place] [address]  Today's Date: 6/5/2025     Discipline: Speech Language Pathology    Missed Time: Attempt    Comment:  Pt referred to SLP Services for Clinical Swallow Evaluation.  Unable to complete at this time due to pt off floor to ultrasound.  Per nurse, pt has been eating, drinking, and taking meds without difficulty.  Will attempt evaluation 6/6/25 as pt status and schedule allows.

## 2025-06-05 NOTE — PROGRESS NOTES
Martínez Neri was admitted earlier today by Dr ELOY Casey and asked to be switched to the hospitalist service.  He is admitted with multifocal bacterial pneumonia and acute kidney injury.  Family at the bedside stated that he deteriorated fairly quickly over the past 24 hours.  There is a need to evaluate for aspiration and a swallow evaluation has been already ordered.  I will broaden his antibiotic coverage to IV vancomycin and Zosyn.  He said that he wants to be discharged home and I have explained to him that he is not medically ready for discharge today.

## 2025-06-05 NOTE — CONSULTS
Reason For Consult  Elevated troponin     History Of Present Illness  Martínez Neri is a 64 y.o. male presenting with Shortness of breath.  Patient has a past medical history significant for cancer, specifically left upper lobe small cell carcinoma he underwent chemotherapy last year.  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  Patient is a rather limited historian.  History was obtained via chart review and wife at bedside.  Patient presented with cough and shortness of breath.  He presented to the ED was hypoxic and tachypneic.  Started on supplemental O2.  Laboratory results reveal sodium of 131, potassium of 3.7 creatinine 1.37, lactate of 3.8.  High-sensitivity troponin elevated at 46 and 67.  CBC within normal limits.  EKG showing sinus tachycardia with a right bundle branch block.  CT angio chest for pulmonary embolism reveals multilobular pneumonia and small bilateral pleural effusions, increase in the right hilar lymph nodes likely reactive, volume loss in the left upper lobe consistent with treatment and increased size and some nodules bilaterally.  He denies chest pain or palpitations.  Of note he is rather adamant on being discharged, instructed him that he is still very sick, he is willing to stay currently.     Past Medical History  He has a past medical history of Allergic rhinitis, Asthma, Brain aneurysm ([facility]-HCC), Cancer (Multi), Carotid artery disease, COPD (chronic obstructive pulmonary disease) (Multi), Epilepsy, High cholesterol, History of alcohol use, Hypertension, Lung cancer (Multi), Neutropenic fever, and Seizure (Multi).    Surgical History  He has a past surgical history that includes Carotid endarterectomy (N/A); Colonoscopy; Hernia repair; and Cerebral aneurysm repair.     Social History  He reports that he has quit smoking. His smoking use included cigarettes. He has  "a 50 pack-year smoking history. He has been exposed to tobacco smoke. He has never used smokeless tobacco. He reports that he does not currently use alcohol. He reports current drug use. Frequency: 4.00 times per week. Drug: Marijuana.    Family History  Family History[1]     Allergies  Patient has no known allergies.    Review of Systems  Review of Systems   Respiratory:  Positive for cough and shortness of breath.    All other systems reviewed and are negative.            Physical Exam  Physical Exam  Constitutional:       General: He is not in acute distress.     Appearance: He is ill-appearing.   Pulmonary:      Effort: Pulmonary effort is normal.      Breath sounds: Rhonchi and rales present.   Musculoskeletal:      Right lower leg: No edema.      Left lower leg: No edema.   Neurological:      Mental Status: He is alert and oriented to person, place, and time.           Last Recorded Vitals  Blood pressure 103/58, pulse 97, temperature 36.6 °C (97.9 °F), temperature source Temporal, resp. rate (!) 27, height 1.753 m (5' 9\"), weight 67.1 kg (148 lb), SpO2 97%.    Relevant Results  Results for orders placed or performed during the hospital encounter of 06/05/25 (from the past 24 hours)   ECG 12 lead   Result Value Ref Range    Ventricular Rate 132 BPM    Atrial Rate 132 BPM    CT Interval 138 ms    QRS Duration 126 ms    QT Interval 334 ms    QTC Calculation(Bazett) 494 ms    P Axis 78 degrees    R Axis -84 degrees    T Axis 67 degrees    QRS Count 22 beats    Q Onset 204 ms    P Onset 135 ms    P Offset 184 ms    T Offset 371 ms    QTC Fredericia 434 ms   CBC and Auto Differential   Result Value Ref Range    WBC 4.8 4.4 - 11.3 x10*3/uL    nRBC 0.0 0.0 - 0.0 /100 WBCs    RBC 2.92 (L) 4.50 - 5.90 x10*6/uL    Hemoglobin 7.7 (L) 13.5 - 17.5 g/dL    Hematocrit 24.5 (L) 41.0 - 52.0 %    MCV 84 80 - 100 fL    MCH 26.4 26.0 - 34.0 pg    MCHC 31.4 (L) 32.0 - 36.0 g/dL    RDW 16.7 (H) 11.5 - 14.5 %    Platelets 304 150 - " 450 x10*3/uL    Immature Granulocytes %, Automated 0.0 0.0 - 0.9 %    Immature Granulocytes Absolute, Automated 0.00 0.00 - 0.70 x10*3/uL   Comprehensive metabolic panel   Result Value Ref Range    Glucose 124 (H) 74 - 99 mg/dL    Sodium 131 (L) 136 - 145 mmol/L    Potassium 3.7 3.5 - 5.3 mmol/L    Chloride 98 98 - 107 mmol/L    Bicarbonate 20 (L) 21 - 32 mmol/L    Anion Gap 17 10 - 20 mmol/L    Urea Nitrogen 22 6 - 23 mg/dL    Creatinine 1.37 (H) 0.50 - 1.30 mg/dL    eGFR 57 (L) >60 mL/min/1.73m*2    Calcium 8.6 8.6 - 10.3 mg/dL    Albumin 3.6 3.4 - 5.0 g/dL    Alkaline Phosphatase 46 33 - 136 U/L    Total Protein 6.3 (L) 6.4 - 8.2 g/dL    AST 15 9 - 39 U/L    Bilirubin, Total 0.4 0.0 - 1.2 mg/dL    ALT 9 (L) 10 - 52 U/L   Lipase   Result Value Ref Range    Lipase 11 9 - 82 U/L   Lactate   Result Value Ref Range    Lactate 3.8 (H) 0.4 - 2.0 mmol/L   Troponin I, High Sensitivity, Initial   Result Value Ref Range    Troponin I, High Sensitivity 46 (H) 0 - 20 ng/L   Manual Differential   Result Value Ref Range    Neutrophils %, Manual 54.0 40.0 - 80.0 %    Bands %, Manual 25.0 0.0 - 5.0 %    Lymphocytes %, Manual 17.0 13.0 - 44.0 %    Monocytes %, Manual 0.0 2.0 - 10.0 %    Eosinophils %, Manual 0.0 0.0 - 6.0 %    Basophils %, Manual 0.0 0.0 - 2.0 %    Myelocytes %, Manual 4.0 0.0 - 0.0 %    Seg Neutrophils Absolute, Manual 2.59 1.20 - 7.00 x10*3/uL    Bands Absolute, Manual 1.20 (H) 0.00 - 0.70 x10*3/uL    Lymphocytes Absolute, Manual 0.82 (L) 1.20 - 4.80 x10*3/uL    Monocytes Absolute, Manual 0.00 (L) 0.10 - 1.00 x10*3/uL    Eosinophils Absolute, Manual 0.00 0.00 - 0.70 x10*3/uL    Basophils Absolute, Manual 0.00 0.00 - 0.10 x10*3/uL    Myelocytes Absolute, Manual 0.19 0.00 - 0.00 x10*3/uL    Total Cells Counted 100     Neutrophils Absolute, Manual 3.79 1.20 - 7.70 x10*3/uL    RBC Morphology See Below     Hypochromia Mild    Troponin, High Sensitivity, 1 Hour   Result Value Ref Range    Troponin I, High Sensitivity  67 (HH) 0 - 20 ng/L   Lactate   Result Value Ref Range    Lactate 2.1 (H) 0.4 - 2.0 mmol/L          Assessment/Plan     Acute hypoxic respiratory failure: Secondary to acute pneumonia.  He remains on IV antibiotics currently on 9 L of O2 with saturations at 97%.  Wean O2 as tolerated.  Pulm is following.    Acute kidney injury: Hydration.  Monitor renal function.    Elevated high-sensitivity troponin: Denies chest pain.  Nonischemic EKG.  Likely this is a type II MI secondary to his underlying acute illness.  Will obtain echocardiogram this admission, to assess LV function and rule out wall motion abnormalities.     Small cell carcinoma: Heme-onc has been consulted.    Hx of SMA: on eliquis.     Coronary artery disease: Stable.  Remains on aspirin and statin    COPD: Management per pulmonology    History of seizure disorder: continue home seizure meds.  Management per primary team.    Overall impression/plan:    6/5: 64-year-old male with history of small cell carcinoma admitted with acute dyspnea.  He was found to be hypoxic started on supplemental O2.  High-sensitivity troponins were found to be elevated.  Patient denies chest pain or anginal equivalent.  Likely that these troponins are elevated secondary to his hypoxia or acute illness.  He did have an elevated lactate.  Will obtain echocardiogram this admission.  Currently he is hemodynamically stable. Albeit uncooperative.  Tells me he would like to be discharged as soon as possible.  I do have concern that he may leave against medical advice. Will continue to follow with you.     I spent 60 minutes in the professional and overall care of this patient.      Yasmin Blackmon PA-C         [1]   Family History  Problem Relation Name Age of Onset    Heart attack Mother      Other (Colorectal cancer) Father      Aneurysm Other      Prostate cancer Other      Alcohol abuse Other

## 2025-06-05 NOTE — H&P
History Of Present Illness  Martínez Neri is a 64 y.o. male presenting with cough chest congestion and shortness of breath started suddenly in middle of the night.  In the emergency room patient was found to have multifocal pneumonia patient is current smoker.  He has history of lung cancer in remission s/p chemotherapy and radiation treatment.  Patient used to be on oxygen in the past but she did rehab and weaned off the oxygen.  Patient is quite upset to be in the hospital and wants to leave as soon as possible.  In ER workup patient also found to have SERINA and elevated troponin trending up   Patient got diagnosed with non-small cell lung cancer in June 2023 in [place] and started treatment in November 2023, severe emphysema, left inguinal hernia,  suspected coronary artery disease, ICD brain aneurysm s/p coiling, SMA thrombosis, hypertension, hyperlipidemia  Past Medical History  Medical History[1]    Surgical History  Surgical History[2]     Social History  He reports that he has quit smoking. His smoking use included cigarettes. He has a 50 pack-year smoking history. He has been exposed to tobacco smoke. He has never used smokeless tobacco. He reports that he does not currently use alcohol. He reports current drug use. Frequency: 4.00 times per week. Drug: Marijuana.    Family History  Family History[3]     Allergies  Patient has no known allergies.    Review of Systems   Constitutional:  Negative for activity change, appetite change, chills, diaphoresis, fatigue, fever and unexpected weight change.   HENT:  Negative for congestion, dental problem, drooling, ear discharge, ear pain, facial swelling, hearing loss, mouth sores, nosebleeds, postnasal drip, rhinorrhea, sinus pressure, sinus pain, sneezing, sore throat, tinnitus, trouble swallowing and voice change.    Eyes:  Negative for photophobia, pain, discharge, redness, itching and visual disturbance.   Respiratory:  Negative for apnea, cough, choking, chest  tightness, shortness of breath, wheezing and stridor.    Cardiovascular:  Negative for chest pain, palpitations and leg swelling.   Gastrointestinal:  Negative for abdominal distention, abdominal pain, anal bleeding, blood in stool, constipation, diarrhea, nausea, rectal pain and vomiting.   Endocrine: Negative for cold intolerance, heat intolerance, polydipsia, polyphagia and polyuria.   Genitourinary:  Negative for decreased urine volume, difficulty urinating, dysuria, enuresis, flank pain, frequency, genital sores, hematuria and urgency.   Musculoskeletal:  Negative for arthralgias, back pain, gait problem, joint swelling, myalgias, neck pain and neck stiffness.   Skin:  Negative for color change, pallor, rash and wound.   Allergic/Immunologic: Negative for environmental allergies, food allergies and immunocompromised state.   Neurological:  Negative for dizziness, tremors, seizures, syncope, facial asymmetry, speech difficulty, weakness, light-headedness, numbness and headaches.   Hematological:  Negative for adenopathy. Does not bruise/bleed easily.   Psychiatric/Behavioral:  Negative for agitation, behavioral problems, confusion, decreased concentration, dysphoric mood, hallucinations, self-injury, sleep disturbance and suicidal ideas. The patient is not nervous/anxious and is not hyperactive.         Physical Exam  Vitals reviewed.   Constitutional:       Appearance: Normal appearance.   HENT:      Head: Normocephalic and atraumatic.      Right Ear: Tympanic membrane, ear canal and external ear normal.      Left Ear: Tympanic membrane, ear canal and external ear normal.      Nose: Nose normal.      Mouth/Throat:      Pharynx: Oropharynx is clear.   Eyes:      Extraocular Movements: Extraocular movements intact.      Conjunctiva/sclera: Conjunctivae normal.      Pupils: Pupils are equal, round, and reactive to light.   Cardiovascular:      Rate and Rhythm: Normal rate and regular rhythm.      Pulses: Normal  "pulses.      Heart sounds: Normal heart sounds.   Pulmonary:      Effort: Pulmonary effort is normal.      Breath sounds: Normal breath sounds.   Abdominal:      General: Abdomen is flat. Bowel sounds are normal.      Palpations: Abdomen is soft.   Musculoskeletal:      Cervical back: Normal range of motion and neck supple.   Skin:     General: Skin is warm and dry.   Neurological:      General: No focal deficit present.      Mental Status: He is alert and oriented to person, place, and time.   Psychiatric:         Mood and Affect: Mood normal.        Last Recorded Vitals  Blood pressure 103/58, pulse 97, temperature 36.6 °C (97.9 °F), temperature source Temporal, resp. rate (!) 27, height 1.753 m (5' 9\"), weight 67.1 kg (148 lb), SpO2 97%.    Relevant Results        Results for orders placed or performed during the hospital encounter of 06/05/25 (from the past 24 hours)   ECG 12 lead   Result Value Ref Range    Ventricular Rate 132 BPM    Atrial Rate 132 BPM    NV Interval 138 ms    QRS Duration 126 ms    QT Interval 334 ms    QTC Calculation(Bazett) 494 ms    P Axis 78 degrees    R Axis -84 degrees    T Axis 67 degrees    QRS Count 22 beats    Q Onset 204 ms    P Onset 135 ms    P Offset 184 ms    T Offset 371 ms    QTC Fredericia 434 ms   CBC and Auto Differential   Result Value Ref Range    WBC 4.8 4.4 - 11.3 x10*3/uL    nRBC 0.0 0.0 - 0.0 /100 WBCs    RBC 2.92 (L) 4.50 - 5.90 x10*6/uL    Hemoglobin 7.7 (L) 13.5 - 17.5 g/dL    Hematocrit 24.5 (L) 41.0 - 52.0 %    MCV 84 80 - 100 fL    MCH 26.4 26.0 - 34.0 pg    MCHC 31.4 (L) 32.0 - 36.0 g/dL    RDW 16.7 (H) 11.5 - 14.5 %    Platelets 304 150 - 450 x10*3/uL    Immature Granulocytes %, Automated 0.0 0.0 - 0.9 %    Immature Granulocytes Absolute, Automated 0.00 0.00 - 0.70 x10*3/uL   Comprehensive metabolic panel   Result Value Ref Range    Glucose 124 (H) 74 - 99 mg/dL    Sodium 131 (L) 136 - 145 mmol/L    Potassium 3.7 3.5 - 5.3 mmol/L    Chloride 98 98 - 107 " mmol/L    Bicarbonate 20 (L) 21 - 32 mmol/L    Anion Gap 17 10 - 20 mmol/L    Urea Nitrogen 22 6 - 23 mg/dL    Creatinine 1.37 (H) 0.50 - 1.30 mg/dL    eGFR 57 (L) >60 mL/min/1.73m*2    Calcium 8.6 8.6 - 10.3 mg/dL    Albumin 3.6 3.4 - 5.0 g/dL    Alkaline Phosphatase 46 33 - 136 U/L    Total Protein 6.3 (L) 6.4 - 8.2 g/dL    AST 15 9 - 39 U/L    Bilirubin, Total 0.4 0.0 - 1.2 mg/dL    ALT 9 (L) 10 - 52 U/L   Lipase   Result Value Ref Range    Lipase 11 9 - 82 U/L   Lactate   Result Value Ref Range    Lactate 3.8 (H) 0.4 - 2.0 mmol/L   Troponin I, High Sensitivity, Initial   Result Value Ref Range    Troponin I, High Sensitivity 46 (H) 0 - 20 ng/L   Manual Differential   Result Value Ref Range    Neutrophils %, Manual 54.0 40.0 - 80.0 %    Bands %, Manual 25.0 0.0 - 5.0 %    Lymphocytes %, Manual 17.0 13.0 - 44.0 %    Monocytes %, Manual 0.0 2.0 - 10.0 %    Eosinophils %, Manual 0.0 0.0 - 6.0 %    Basophils %, Manual 0.0 0.0 - 2.0 %    Myelocytes %, Manual 4.0 0.0 - 0.0 %    Seg Neutrophils Absolute, Manual 2.59 1.20 - 7.00 x10*3/uL    Bands Absolute, Manual 1.20 (H) 0.00 - 0.70 x10*3/uL    Lymphocytes Absolute, Manual 0.82 (L) 1.20 - 4.80 x10*3/uL    Monocytes Absolute, Manual 0.00 (L) 0.10 - 1.00 x10*3/uL    Eosinophils Absolute, Manual 0.00 0.00 - 0.70 x10*3/uL    Basophils Absolute, Manual 0.00 0.00 - 0.10 x10*3/uL    Myelocytes Absolute, Manual 0.19 0.00 - 0.00 x10*3/uL    Total Cells Counted 100     Neutrophils Absolute, Manual 3.79 1.20 - 7.70 x10*3/uL    RBC Morphology See Below     Hypochromia Mild    Troponin, High Sensitivity, 1 Hour   Result Value Ref Range    Troponin I, High Sensitivity 67 (HH) 0 - 20 ng/L   Lactate   Result Value Ref Range    Lactate 2.1 (H) 0.4 - 2.0 mmol/L     *Note: Due to a large number of results and/or encounters for the requested time period, some results have not been displayed. A complete set of results can be found in Results Review.          Assessment & Plan  Pneumonia of  both lower lobes due to infectious organism    Anemia    SERINA (acute kidney injury)    Elevated troponin    Acute respiratory failure    Malignant neoplasm of upper lobe of left lung (Multi)    Superior mesenteric artery thrombosis (Multi)    Seizures (Multi)    Emphysema lung (Multi)      Will start patient on broad-spectrum antibiotic  Breathing treatments  Check speech eval  Oxygen support  Consult oncology for CAT scan changes  Consult pulmonary  Patient probably will need oxygen at discharge  Troponin elevated possibly from demand ischemia  Will consult cardiology   Patient has SERINA  Will start IV fluids  H&H has dropped  Not sure if it is related to his cancer  Monitor closely check Hemoccult stool  Patient refused nicotine patch  Patient is extremely upset to be in the hospital.  Wants to be discharged as soon as possible.  Not sure if we will be able to address all his issues.  Discussed with the daughter  Resume long-term anticoagulation  Continue seizure medications  See orders for details  I spent  minutes in the professional and overall care of this patient.      Radha Casey MD         [1]   Past Medical History:  Diagnosis Date    Allergic rhinitis     Asthma     Brain aneurysm ([facility]HS-HCC)     Cancer (Multi)     Carotid artery disease     COPD (chronic obstructive pulmonary disease) (Multi)     Epilepsy     High cholesterol     History of alcohol use     Hypertension     Lung cancer (Multi)     Neutropenic fever     Seizure (Multi)    [2]   Past Surgical History:  Procedure Laterality Date    CAROTID ENDARTERECTOMY N/A     CEREBRAL ANEURYSM REPAIR      COLONOSCOPY      HERNIA REPAIR     [3]   Family History  Problem Relation Name Age of Onset    Heart attack Mother      Other (Colorectal cancer) Father      Aneurysm Other      Prostate cancer Other      Alcohol abuse Other

## 2025-06-05 NOTE — PROGRESS NOTES
Physical Therapy Evaluation    Patient Name: Martínez Neri  MRN: [id number]  Department: [facility]  Room: 427427-A  Today's Date: 6/5/2025   Time Calculation  Start Time: 1355  Stop Time: 1415  Time Calculation (min): 20 min    Assessment/Plan   PT Assessment  PT Assessment Results: Decreased endurance, Decreased mobility, Decreased cognition, Impaired judgement, Decreased safety awareness, Impaired balance  Rehab Prognosis: Poor  Barriers to Discharge Home: Physical needs, Cognition needs  Cognition Needs: Insight of patient limited regarding functional ability/needs, Cognition-related high falls risk  Physical Needs: Intermittent mobility assistance needed, Intermittent ADL assistance needed  Evaluation/Treatment Tolerance: Treatment limited secondary to agitation  Medical Staff Made Aware: Yes  Strengths: Premorbid level of function  Barriers to Participation: Attitude of self, Comorbidities, Insight into problems  End of Session Communication: Bedside nurse  Assessment Comment: 64 year old male admits with B PNA, elevated Tp, anemia, acute respiratory failure, and SERINA. On eval, he demonstrates impaired safe mobility warranting skilled PT care. Pt agreeable to PT follow up during acute care stay. Low intensity rehab recommended at DC.  End of Session Patient Position:  (On transport cart with team)  IP OR SWING BED PT PLAN  Inpatient or Swing Bed: Inpatient  PT Plan  Treatment/Interventions: Transfer training, Gait training, Balance training, Strengthening, Endurance training, Therapeutic exercise, Therapeutic activity, Home exercise program, Positioning, Postural re-education  PT Plan: Ongoing PT  PT Frequency: 2 times per week  PT Discharge Recommendations: Low intensity level of continued care  PT Recommended Transfer Status: Contact guard  PT - OK to Discharge: Yes    Subjective     PT Visit Info:  PT Received On: 06/05/25  General Visit Information:  General  Reason for Referral: Impaired Mobility-PNA  Referred  By: Dr. Casey  Past Medical History Relevant to Rehab: Seizure, Superior Mesenteric Artery Thrombosis, Emphysema, CAD, COPD, Lung CA  Family/Caregiver Present: Yes  Caregiver Feedback: spouse  Prior to Session Communication: Bedside nurse  Patient Position Received: Bed, 4 rail up, Alarm on  Preferred Learning Style: verbal, visual  General Comment: 64 year old male admits with B PNA, elevated Tp, anemia, acute respiratory failure, and SERINA.  Home Living:  Home Living  Type of Home: House  Lives With: Spouse, Adult children, Grandchildren (spouse, son, DTR, DIL, and 3 grandchildren)  Home Adaptive Equipment: Walker rolling or standard  Home Layout: Multi-level, Stairs to alternate level with rails  Alternate Level Stairs-Rails: Left  Alternate Level Stairs-Number of Steps: 12  Home Access: Stairs to enter without rails  Entrance Stairs-Rails: None  Entrance Stairs-Number of Steps: 2  Bathroom Shower/Tub: Tub/shower unit  Bathroom Equipment: Shower chair with back (but doesnt use)  Prior Level of Function:  Prior Function Per Pt/Caregiver Report  Level of [place]: Independent with ADLs and functional transfers, Independent with homemaking with ambulation  Receives Help From: Family  ADL Assistance: Independent  Homemaking Assistance: Independent  Ambulatory Assistance: Independent (no AD)  Prior Function Comments: Denies fall hx  Precautions:  Precautions  Medical Precautions: Fall precautions      Date/Time Vitals Session Patient Position Pulse Resp SpO2 BP MAP (mmHg)    06/05/25 1338 --  --  --  --  100 %  --  --     06/05/25 1357 --  --  --  --  97 %  --  --            6L NC-SPO2 WNLs throughout session     Objective   Pain:  Pain Assessment  Pain Assessment: FLACC (Face, Legs, Activity, Cry, Consolability)  0-10 (Numeric) Pain Score: 0 - No pain  Cognition:  Cognition  Overall Cognitive Status: Impaired  Cognition Comments: Very aggitated and upset towards situation/being in the hospital. Agreable to eval but  "disinterested in answering home/PLOF questions so spouse assists. Seems to have no understanding of situation. Extremely impulsive (Formal AxO questions not formally asked to avoid upsetting the patient further)  Insight: Severe  Impulsive: Severely    General Assessments:  Activity Tolerance  Endurance: Decreased tolerance for upright activites    Sensation  Light Touch: Not tested (due to cognition)    Strength  Strength Comments: Strength to be assessed via functional mobility only due to cognition  Functional Assessments:  Bed Mobility  Bed Mobility: Yes  Bed Mobility 1  Bed Mobility 1: Supine to sitting, Sitting to supine  Level of Assistance 1: Independent    Transfers  Transfer: Yes  Transfer 1  Transfer From 1: Bed to  Transfer to 1: Stand  Technique 1: Sit to stand, Stand to sit  Transfer Level of Assistance 1: Close supervision  Trials/Comments 1: due to impulsivity    Ambulation/Gait Training  Ambulation/Gait Training Performed: Yes  Ambulation/Gait Training 1  Surface 1: Level tile  Device 1: No device  Assistance 1: Close supervision  Comments/Distance (ft) 1: 10' CS for safety due to impulsive movements. Pt declines CGA \"Don't touch me or else\" Mildly unsteady on feet    Outcome Measures:  [facility] Basic Mobility  Turning from your back to your side while in a flat bed without using bedrails: None  Moving from lying on your back to sitting on the side of a flat bed without using bedrails: None  Moving to and from bed to chair (including a wheelchair): A little  Standing up from a chair using your arms (e.g. wheelchair or bedside chair): A little  To walk in hospital room: A little  Climbing 3-5 steps with railing: A little  Basic Mobility - Total Score: 20    Encounter Problems       Encounter Problems (Active)       Balance       Standing Balance (Progressing)       Start:  06/05/25    Expected End:  06/19/25       Pt will demonstrate good static standing balance to promote safe participation with out of " bed activity, transfers, and mobility              Mobility       Ambulation (Progressing)       Start:  06/05/25    Expected End:  06/19/25       Pt will ambulate 250' independent assist with no device to promote safe home mobility              Mobility       LE Strength (Progressing)       Start:  06/05/25    Expected End:  06/19/25       Pt will demonstrate 5/5 BLE strength            PT Transfers       Sit to stand (Progressing)       Start:  06/05/25    Expected End:  06/19/25       Pt will transfer sit to standing position with modified independent assist and no device to promote safe out of bed activity              Pain - Adult          Safety       Safe Mobility Techniques (Progressing)       Start:  06/05/25    Expected End:  06/19/25       Pt will correctly identify and demonstrate safe mobility techniques to reduce their risks for falls during their acute care stay                   Education Documentation  Mobility Training, taught by Rosendo Moses, PT at 6/5/2025  2:40 PM.  Learner: Significant Other, Patient  Readiness: Refuses  Method: Explanation, Demonstration  Response: Refused Teaching  Comment: safe mobility techniques    Education Comments  No comments found.

## 2025-06-05 NOTE — PROGRESS NOTES
06/05/25 1008   Discharge Planning   Living Arrangements Spouse/significant other;Children;Family members  (Patient lives with his wife, son, daughter, and 3 grandchildren ages 13,7 and 5.)   Support Systems Spouse/significant other;Children   Assistance Needed Patient is independent of all ADLs and IADLs.   Type of Residence Private residence   Who is requesting discharge planning? Provider   Home or Post Acute Services None   Expected Discharge Disposition Home   Does the patient need discharge transport arranged? No   Financial Resource Strain   How hard is it for you to pay for the very basics like food, housing, medical care, and heating? Not very   Housing Stability   In the last 12 months, was there a time when you were not able to pay the mortgage or rent on time? N   In the past 12 months, how many times have you moved where you were living? 0   At any time in the past 12 months, were you homeless or living in a shelter (including now)? N   Transportation Needs   In the past 12 months, has lack of transportation kept you from medical appointments or from getting medications? no   In the past 12 months, has lack of transportation kept you from meetings, work, or from getting things needed for daily living? No   Stroke Family Assessment   Stroke Family Assessment Needed No   Intensity of Service   Intensity of Service 0-30 min     MSW met with patient and wife at bedside. Patient was sleeping; information provided by wife. Patient lives at home withwife, son, daughter, and 3 grandchildren. He is independent of all ADLs and IADLs. Wife denies any home going needs or concerns at this time. Patient plans to return home upon discharge.

## 2025-06-05 NOTE — PROGRESS NOTES
Occupational Therapy    Evaluation    Patient Name: Martínez Neri  MRN: [id number]  Department: [facility]  Room: 427/427-A  Today's Date: 6/5/2025  Time Calculation  Start Time: 1357  Stop Time: 1415  Time Calculation (min): 18 min    Assessment  IP OT Assessment  OT Assessment: Patient is a 64 year old male admitted with PNA of both lower lobes, acute respiratory failure, SERINA, elevated troponin, malignant neoplasm of upper lobe left lung. Patient is presenting with a slight decline in strength, balance, activity tolerance, and function, resulting in a slight increased need for assist with ADL/IADL tasks. Recommend skilled OT to maximize safety and independence with daily tasks.  Prognosis: Good  Barriers to Discharge Home: No anticipated barriers  Evaluation/Treatment Tolerance: Treatment limited secondary to agitation (going off floor for testing)  End of Session Communication: Bedside nurse  End of Session Patient Position: On cart (going to ultrasound)  Plan:  Treatment Interventions: ADL retraining, UE strengthening/ROM, Functional transfer training, Endurance training, Patient/family training, Compensatory technique education, Equipment evaluation/education, Neuromuscular reeducation  OT Frequency: 2 times per week  OT Discharge Recommendations: Low intensity level of continued care  Equipment Recommended upon Discharge: Wheeled walker  OT Recommended Transfer Status: Assist of 1  OT - OK to Discharge: Yes    Subjective   Current Problem:  1. Pneumonia of both lower lobes due to infectious organism        2. Malignant neoplasm of lung, unspecified laterality, unspecified part of lung (Multi)        3. Elevated troponin  Transthoracic Echo (TTE) Complete    Transthoracic Echo (TTE) Complete      4. Shortness of breath  Transthoracic Echo (TTE) Complete    Transthoracic Echo (TTE) Complete        OT Visit Info:  OT Received On: 06/05/25  General Visit Info:  General  Reason for Referral: decline in ADLs, PNA of both  lungs  Referred By: Radha Casey MD  Past Medical History Relevant to Rehab: seizures, lung CA, hyponatremia, aspiration PNA, brain aneurysm anemia  Family/Caregiver Present: Yes  Caregiver Feedback: spouse, assists in providing intake infromation  Co-Treatment: PT  Co-Treatment Reason: safety with OOB mobility and decreased activity tolerance  Prior to Session Communication: Bedside nurse  Patient Position Received: Bed, 3 rail up, Alarm on  Preferred Learning Style: verbal, auditory  General Comment: Patient is a 64 year old male who presented to the ED with c/o SOB. Patient is admitted with PNA of both lower lobes, acute respiratory failure, SERINA, elevated troponin, malignant neoplasm of upper lobe left lung. Patient is cleared by nursing for therapy. Patient in bed upon arrival, agreeable with encouragement. + tele, 6L O2 via NC  Precautions:  Medical Precautions: Fall precautions, Oxygen therapy device and L/min (6L O2 via NC)    Vital Signs Comment: 97% on 6L O2 via NC, HR 92    Pain:  Pain Assessment  Pain Assessment: 0-10  0-10 (Numeric) Pain Score: 0 - No pain (no reported pain)    Objective   Cognition:  Cognition Comments: easily agitated, impulsive tendencies  Insight: Mild  Impulsive: Moderately     Home Living:  Type of Home: House  Lives With: Spouse, Adult children, Grandchildren (spouse, son, DTR, DIL, and 3 grandchildren)  Home Adaptive Equipment: Walker rolling or standard  Home Layout: Multi-level, Stairs to alternate level with rails  Alternate Level Stairs-Rails: Left  Alternate Level Stairs-Number of Steps: 12  Home Access: Stairs to enter without rails  Entrance Stairs-Rails: None  Entrance Stairs-Number of Steps: 2  Bathroom Shower/Tub: Tub/shower unit  Bathroom Equipment: Shower chair with back (but doesnt use)   Prior Function:  Level of [place]: Independent with ADLs and functional transfers, Independent with homemaking with ambulation  Receives Help From: Family  ADL Assistance:  Independent  Homemaking Assistance: Independent  Ambulatory Assistance: Independent (no AD)  Prior Function Comments: Denies fall hx  IADL History:  Homemaking Responsibilities: Yes  IADL Comments: patient is independent at baseline  ADL:  Eating Assistance: Stand by  Eating Deficit: Setup (per clinical judgement)  Grooming Assistance: Stand by  Grooming Deficit: Steadying, Supervision/safety (per clinical judgement)  Bathing Assistance: Stand by  Bathing Deficit: Setup, Steadying, Supervision/safety, Increased time to complete  (per clinical judgement)  UE Dressing Assistance: Stand by  UE Dressing Deficit: Setup (per clinical judgement)  LE Dressing Assistance: Minimal  LE Dressing Deficit: Setup, Steadying, Supervision/safety, Increased time to complete (per clinical judgement)  Toileting Assistance with Device: Stand by  Toileting Deficit: Steadying, Supervison/safety (per clinical judgement)  Activity Tolerance:  Endurance: Decreased tolerance for upright activites  Activity Tolerance Comments: on 6L O2 via NC  Bed Mobility/Transfers: Bed Mobility  Bed Mobility: Yes  Bed Mobility 1  Bed Mobility 1: Supine to sitting  Level of Assistance 1: Close supervision  Bed Mobility Comments 1: head of bed elevated, impulsive  Bed Mobility 2  Bed Mobility  2: Sitting to supine  Level of Assistance 2: Close supervision  Bed Mobility Comments 2: impulsive, transfers onto transport cart    Transfers  Transfer: Yes  Transfer 1  Transfer From 1: Bed to  Transfer to 1: Stand  Technique 1: Sit to stand  Transfer Level of Assistance 1: Close supervision    Functional Mobility:  Functional Mobility  Functional Mobility Performed: Yes  Functional Mobility 1  Surface 1: Level tile  Device 1: No device  Assistance 1: Close supervision  Comments 1: slightly unsteady, but does not want therapist assisting despite cues on safety  Sitting Balance:  Static Sitting Balance  Static Sitting-Balance Support: Feet supported, No upper extremity  supported  Static Sitting-Level of Assistance: Close supervision  Standing Balance:  Static Standing Balance  Static Standing-Balance Support: No upper extremity supported  Static Standing-Level of Assistance: Close supervision    IADL's:   Homemaking Responsibilities: Yes  IADL Comments: patient is independent at baseline  Vision: Vision - Basic Assessment  Current Vision: Wears glasses only for reading  Sensation:  Sensation Comment: patient denies numbness/tingling  Strength:  Strength Comments: B UE ~4/5 grossly  Coordination:  Movements are Fluid and Coordinated: Yes   Hand Function:  Hand Function  Gross Grasp: Functional  Coordination: Functional  Extremities: RUE   RUE : Within Functional Limits and LUE   LUE: Within Functional Limits    Outcome Measures: [facility] Daily Activity  Putting on and taking off regular lower body clothing: A little  Bathing (including washing, rinsing, drying): A little  Putting on and taking off regular upper body clothing: A little  Toileting, which includes using toilet, bedpan or urinal: A little  Taking care of personal grooming such as brushing teeth: A little  Eating Meals: None  Daily Activity - Total Score: 19    Education Documentation  Body Mechanics, taught by Cecilia Jasso OT at 6/5/2025  2:33 PM.  Learner: Patient  Readiness: Acceptance  Method: Explanation, Demonstration  Response: Needs Reinforcement, Verbalizes Understanding  Comment: Education on OT POC. Education on safe functional transfers/mobility    Precautions, taught by Cecilia Jasso OT at 6/5/2025  2:33 PM.  Learner: Patient  Readiness: Acceptance  Method: Explanation, Demonstration  Response: Needs Reinforcement, Verbalizes Understanding  Comment: Education on OT POC. Education on safe functional transfers/mobility    ADL Training, taught by Cecilia Jasso OT at 6/5/2025  2:33 PM.  Learner: Patient  Readiness: Acceptance  Method: Explanation, Demonstration  Response: Needs Reinforcement, Verbalizes  Understanding  Comment: Education on OT POC. Education on safe functional transfers/mobility    Education Comments  No comments found.      Goals:   Encounter Problems       Encounter Problems (Active)       OT Goals       ADLs (Progressing)       Start:  06/05/25    Expected End:  06/26/25       Patient will complete functional transfers with independence in order to increase patient's safety and independence with daily tasks.          Functional Transfers (Progressing)       Start:  06/05/25    Expected End:  06/26/25       Patient will complete functional transfers with [place] to safely return to PLOF.         Functional Mobility  (Progressing)       Start:  06/05/25    Expected End:  06/26/25       Patient will complete item retrieval and functional mobility with [place] with daily tasks.         Activity Tolerance  (Progressing)       Start:  06/05/25    Expected End:  06/26/25       Patient will demonstrate the ability to participate in functional activity at least >/= 25 minutes in order to increase patient's safety and independence with daily tasks.

## 2025-06-06 ENCOUNTER — APPOINTMENT (OUTPATIENT)
Dept: NEUROLOGY | Facility: HOSPITAL | Age: 65
End: 2025-06-06
Payer: COMMERCIAL

## 2025-06-06 LAB
ABO GROUP (TYPE) IN BLOOD: NORMAL
ABO GROUP (TYPE) IN BLOOD: NORMAL
ALBUMIN SERPL BCP-MCNC: 2.7 G/DL (ref 3.4–5)
ALP SERPL-CCNC: 28 U/L (ref 33–136)
ALT SERPL W P-5'-P-CCNC: 16 U/L (ref 10–52)
ANION GAP SERPL CALCULATED.3IONS-SCNC: 10 MMOL/L (ref 10–20)
ANTIBODY SCREEN: NORMAL
APPEARANCE UR: CLEAR
AST SERPL W P-5'-P-CCNC: 38 U/L (ref 9–39)
BACTERIA SPEC RESP CULT: ABNORMAL
BILIRUB SERPL-MCNC: 0.3 MG/DL (ref 0–1.2)
BILIRUB UR STRIP.AUTO-MCNC: NEGATIVE MG/DL
BLOOD EXPIRATION DATE: NORMAL
BUN SERPL-MCNC: 21 MG/DL (ref 6–23)
CALCIUM SERPL-MCNC: 7.8 MG/DL (ref 8.6–10.3)
CHLORIDE SERPL-SCNC: 105 MMOL/L (ref 98–107)
CO2 SERPL-SCNC: 25 MMOL/L (ref 21–32)
COLOR UR: ABNORMAL
CREAT SERPL-MCNC: 0.86 MG/DL (ref 0.5–1.3)
DISPENSE STATUS: NORMAL
EGFRCR SERPLBLD CKD-EPI 2021: >90 ML/MIN/1.73M*2
ERYTHROCYTE [DISTWIDTH] IN BLOOD BY AUTOMATED COUNT: 16.8 % (ref 11.5–14.5)
FERRITIN SERPL-MCNC: 102 NG/ML (ref 20–300)
GLUCOSE SERPL-MCNC: 117 MG/DL (ref 74–99)
GLUCOSE UR STRIP.AUTO-MCNC: NORMAL MG/DL
GRAM STN SPEC: ABNORMAL
HCT VFR BLD AUTO: 20.3 % (ref 41–52)
HCT VFR BLD AUTO: 21.1 % (ref 41–52)
HGB BLD-MCNC: 6.4 G/DL (ref 13.5–17.5)
HGB BLD-MCNC: 6.5 G/DL (ref 13.5–17.5)
IRON SATN MFR SERPL: ABNORMAL %
IRON SERPL-MCNC: <10 UG/DL (ref 35–150)
KETONES UR STRIP.AUTO-MCNC: ABNORMAL MG/DL
LEGIONELLA AG UR QL: NEGATIVE
LEUKOCYTE ESTERASE UR QL STRIP.AUTO: NEGATIVE
MCH RBC QN AUTO: 26.9 PG (ref 26–34)
MCHC RBC AUTO-ENTMCNC: 31.5 G/DL (ref 32–36)
MCV RBC AUTO: 85 FL (ref 80–100)
MRSA DNA SPEC QL NAA+PROBE: NOT DETECTED
NITRITE UR QL STRIP.AUTO: NEGATIVE
NRBC BLD-RTO: 0 /100 WBCS (ref 0–0)
PH UR STRIP.AUTO: 5.5 [PH]
PLATELET # BLD AUTO: 236 X10*3/UL (ref 150–450)
POTASSIUM SERPL-SCNC: 4.1 MMOL/L (ref 3.5–5.3)
PROCALCITONIN SERPL-MCNC: 12.79 NG/ML
PRODUCT BLOOD TYPE: 9500
PRODUCT CODE: NORMAL
PROT SERPL-MCNC: 5.2 G/DL (ref 6.4–8.2)
PROT UR STRIP.AUTO-MCNC: ABNORMAL MG/DL
RBC # BLD AUTO: 2.38 X10*6/UL (ref 4.5–5.9)
RBC # UR STRIP.AUTO: NEGATIVE MG/DL
RBC #/AREA URNS AUTO: NORMAL /HPF
RH FACTOR (ANTIGEN D): NORMAL
RH FACTOR (ANTIGEN D): NORMAL
S PNEUM AG UR QL: NEGATIVE
SODIUM SERPL-SCNC: 136 MMOL/L (ref 136–145)
SP GR UR STRIP.AUTO: 1.03
TIBC SERPL-MCNC: ABNORMAL UG/DL
UIBC SERPL-MCNC: 244 UG/DL (ref 110–370)
UNIT ABO: NORMAL
UNIT NUMBER: NORMAL
UNIT RH: NORMAL
UNIT VOLUME: 279
UROBILINOGEN UR STRIP.AUTO-MCNC: NORMAL MG/DL
VALPROATE SERPL-MCNC: 70 UG/ML (ref 50–100)
VANCOMYCIN SERPL-MCNC: 6.4 UG/ML (ref 5–20)
WBC # BLD AUTO: 12.9 X10*3/UL (ref 4.4–11.3)
WBC #/AREA URNS AUTO: NORMAL /HPF
XM INTEP: NORMAL

## 2025-06-06 PROCEDURE — 86901 BLOOD TYPING SEROLOGIC RH(D): CPT | Performed by: INTERNAL MEDICINE

## 2025-06-06 PROCEDURE — P9040 RBC LEUKOREDUCED IRRADIATED: HCPCS

## 2025-06-06 PROCEDURE — 2500000004 HC RX 250 GENERAL PHARMACY W/ HCPCS (ALT 636 FOR OP/ED): Performed by: INTERNAL MEDICINE

## 2025-06-06 PROCEDURE — 30233N1 TRANSFUSION OF NONAUTOLOGOUS RED BLOOD CELLS INTO PERIPHERAL VEIN, PERCUTANEOUS APPROACH: ICD-10-PCS | Performed by: INTERNAL MEDICINE

## 2025-06-06 PROCEDURE — 99232 SBSQ HOSP IP/OBS MODERATE 35: CPT | Performed by: INTERNAL MEDICINE

## 2025-06-06 PROCEDURE — 84145 PROCALCITONIN (PCT): CPT | Mod: TRILAB | Performed by: NURSE PRACTITIONER

## 2025-06-06 PROCEDURE — 99223 1ST HOSP IP/OBS HIGH 75: CPT | Performed by: INTERNAL MEDICINE

## 2025-06-06 PROCEDURE — 80202 ASSAY OF VANCOMYCIN: CPT

## 2025-06-06 PROCEDURE — 83540 ASSAY OF IRON: CPT | Performed by: INTERNAL MEDICINE

## 2025-06-06 PROCEDURE — 99232 SBSQ HOSP IP/OBS MODERATE 35: CPT

## 2025-06-06 PROCEDURE — 2060000001 HC INTERMEDIATE ICU ROOM DAILY

## 2025-06-06 PROCEDURE — 36415 COLL VENOUS BLD VENIPUNCTURE: CPT | Performed by: INTERNAL MEDICINE

## 2025-06-06 PROCEDURE — 2500000001 HC RX 250 WO HCPCS SELF ADMINISTERED DRUGS (ALT 637 FOR MEDICARE OP): Performed by: INTERNAL MEDICINE

## 2025-06-06 PROCEDURE — 82728 ASSAY OF FERRITIN: CPT | Performed by: INTERNAL MEDICINE

## 2025-06-06 PROCEDURE — 2500000005 HC RX 250 GENERAL PHARMACY W/O HCPCS: Performed by: INTERNAL MEDICINE

## 2025-06-06 PROCEDURE — 99232 SBSQ HOSP IP/OBS MODERATE 35: CPT | Performed by: NURSE PRACTITIONER

## 2025-06-06 PROCEDURE — 86923 COMPATIBILITY TEST ELECTRIC: CPT

## 2025-06-06 PROCEDURE — 2500000004 HC RX 250 GENERAL PHARMACY W/ HCPCS (ALT 636 FOR OP/ED)

## 2025-06-06 PROCEDURE — 2500000001 HC RX 250 WO HCPCS SELF ADMINISTERED DRUGS (ALT 637 FOR MEDICARE OP): Performed by: NURSE PRACTITIONER

## 2025-06-06 PROCEDURE — 36415 COLL VENOUS BLD VENIPUNCTURE: CPT | Performed by: NURSE PRACTITIONER

## 2025-06-06 PROCEDURE — 80164 ASSAY DIPROPYLACETIC ACD TOT: CPT | Performed by: NURSE PRACTITIONER

## 2025-06-06 PROCEDURE — 85018 HEMOGLOBIN: CPT | Performed by: INTERNAL MEDICINE

## 2025-06-06 PROCEDURE — 85027 COMPLETE CBC AUTOMATED: CPT | Performed by: INTERNAL MEDICINE

## 2025-06-06 PROCEDURE — 9420000001 HC RT PATIENT EDUCATION 5 MIN

## 2025-06-06 PROCEDURE — 87641 MR-STAPH DNA AMP PROBE: CPT

## 2025-06-06 PROCEDURE — 92610 EVALUATE SWALLOWING FUNCTION: CPT | Mod: GN

## 2025-06-06 PROCEDURE — 81001 URINALYSIS AUTO W/SCOPE: CPT | Performed by: NURSE PRACTITIONER

## 2025-06-06 PROCEDURE — 87205 SMEAR GRAM STAIN: CPT | Mod: TRILAB | Performed by: NURSE PRACTITIONER

## 2025-06-06 PROCEDURE — 36430 TRANSFUSION BLD/BLD COMPNT: CPT

## 2025-06-06 PROCEDURE — 2500000002 HC RX 250 W HCPCS SELF ADMINISTERED DRUGS (ALT 637 FOR MEDICARE OP, ALT 636 FOR OP/ED): Performed by: INTERNAL MEDICINE

## 2025-06-06 PROCEDURE — 80053 COMPREHEN METABOLIC PANEL: CPT | Performed by: INTERNAL MEDICINE

## 2025-06-06 PROCEDURE — 85014 HEMATOCRIT: CPT | Performed by: INTERNAL MEDICINE

## 2025-06-06 PROCEDURE — 94640 AIRWAY INHALATION TREATMENT: CPT

## 2025-06-06 RX ORDER — OXYCODONE HYDROCHLORIDE 5 MG/1
5 TABLET ORAL EVERY 4 HOURS PRN
Refills: 0 | Status: DISCONTINUED | OUTPATIENT
Start: 2025-06-06 | End: 2025-06-07 | Stop reason: HOSPADM

## 2025-06-06 RX ORDER — DOCUSATE SODIUM 100 MG/1
100 CAPSULE, LIQUID FILLED ORAL 2 TIMES DAILY PRN
Status: DISCONTINUED | OUTPATIENT
Start: 2025-06-06 | End: 2025-06-07 | Stop reason: HOSPADM

## 2025-06-06 RX ORDER — VANCOMYCIN 1.5 G/300ML
1500 INJECTION, SOLUTION INTRAVENOUS ONCE
Status: COMPLETED | OUTPATIENT
Start: 2025-06-06 | End: 2025-06-06

## 2025-06-06 RX ORDER — TRAZODONE HYDROCHLORIDE 50 MG/1
50 TABLET ORAL NIGHTLY PRN
Status: DISCONTINUED | OUTPATIENT
Start: 2025-06-06 | End: 2025-06-07 | Stop reason: HOSPADM

## 2025-06-06 RX ORDER — BENZONATATE 100 MG/1
200 CAPSULE ORAL 3 TIMES DAILY PRN
Status: DISCONTINUED | OUTPATIENT
Start: 2025-06-06 | End: 2025-06-07 | Stop reason: HOSPADM

## 2025-06-06 RX ORDER — POLYETHYLENE GLYCOL 3350 17 G/17G
17 POWDER, FOR SOLUTION ORAL DAILY PRN
Status: DISCONTINUED | OUTPATIENT
Start: 2025-06-06 | End: 2025-06-07 | Stop reason: HOSPADM

## 2025-06-06 RX ORDER — BUDESONIDE 0.5 MG/2ML
0.5 INHALANT ORAL
Status: DISCONTINUED | OUTPATIENT
Start: 2025-06-06 | End: 2025-06-07 | Stop reason: HOSPADM

## 2025-06-06 RX ADMIN — PIPERACILLIN SODIUM AND TAZOBACTAM SODIUM 3.38 G: 3; .375 INJECTION, SOLUTION INTRAVENOUS at 09:01

## 2025-06-06 RX ADMIN — PIPERACILLIN SODIUM AND TAZOBACTAM SODIUM 3.38 G: 3; .375 INJECTION, SOLUTION INTRAVENOUS at 04:11

## 2025-06-06 RX ADMIN — BENZOCAINE AND MENTHOL 1 LOZENGE: 15; 3.6 LOZENGE ORAL at 19:17

## 2025-06-06 RX ADMIN — OXYCODONE 5 MG: 5 TABLET ORAL at 11:38

## 2025-06-06 RX ADMIN — PIPERACILLIN SODIUM AND TAZOBACTAM SODIUM 4.5 G: 4; .5 INJECTION, SOLUTION INTRAVENOUS at 16:33

## 2025-06-06 RX ADMIN — PANTOPRAZOLE SODIUM 40 MG: 40 INJECTION, POWDER, FOR SOLUTION INTRAVENOUS at 06:20

## 2025-06-06 RX ADMIN — OXYCODONE 5 MG: 5 TABLET ORAL at 20:27

## 2025-06-06 RX ADMIN — OXYCODONE 5 MG: 5 TABLET ORAL at 08:06

## 2025-06-06 RX ADMIN — ATORVASTATIN CALCIUM 40 MG: 40 TABLET, FILM COATED ORAL at 20:28

## 2025-06-06 RX ADMIN — IPRATROPIUM BROMIDE AND ALBUTEROL SULFATE 3 ML: 2.5; .5 SOLUTION RESPIRATORY (INHALATION) at 07:45

## 2025-06-06 RX ADMIN — APIXABAN 5 MG: 5 TABLET, FILM COATED ORAL at 20:27

## 2025-06-06 RX ADMIN — TRAZODONE HYDROCHLORIDE 50 MG: 50 TABLET ORAL at 20:29

## 2025-06-06 RX ADMIN — DIVALPROEX SODIUM 500 MG: 500 TABLET, FILM COATED, EXTENDED RELEASE ORAL at 20:29

## 2025-06-06 RX ADMIN — VANCOMYCIN 1.5 G: 1.5 INJECTION, SOLUTION INTRAVENOUS at 17:24

## 2025-06-06 RX ADMIN — BENZONATATE 200 MG: 100 CAPSULE ORAL at 20:27

## 2025-06-06 RX ADMIN — APIXABAN 5 MG: 5 TABLET, FILM COATED ORAL at 08:02

## 2025-06-06 RX ADMIN — IPRATROPIUM BROMIDE AND ALBUTEROL SULFATE 3 ML: 2.5; .5 SOLUTION RESPIRATORY (INHALATION) at 13:37

## 2025-06-06 RX ADMIN — LACOSAMIDE 200 MG: 100 TABLET, FILM COATED ORAL at 20:28

## 2025-06-06 RX ADMIN — ASPIRIN 81 MG: 81 TABLET, CHEWABLE ORAL at 20:28

## 2025-06-06 RX ADMIN — SODIUM CHLORIDE 300 MG: 900 INJECTION, SOLUTION INTRAVENOUS at 20:30

## 2025-06-06 RX ADMIN — LACOSAMIDE 200 MG: 100 TABLET, FILM COATED ORAL at 08:03

## 2025-06-06 RX ADMIN — SODIUM CHLORIDE 100 ML/HR: 900 INJECTION, SOLUTION INTRAVENOUS at 11:00

## 2025-06-06 RX ADMIN — Medication 44 PERCENT: at 20:27

## 2025-06-06 RX ADMIN — Medication 44 PERCENT: at 07:49

## 2025-06-06 RX ADMIN — DIVALPROEX SODIUM 500 MG: 500 TABLET, FILM COATED, EXTENDED RELEASE ORAL at 08:02

## 2025-06-06 RX ADMIN — PIPERACILLIN SODIUM AND TAZOBACTAM SODIUM 4.5 G: 4; .5 INJECTION, SOLUTION INTRAVENOUS at 21:58

## 2025-06-06 RX ADMIN — CEFTRIAXONE 1 G: 1 INJECTION, SOLUTION INTRAVENOUS at 05:16

## 2025-06-06 ASSESSMENT — PAIN - FUNCTIONAL ASSESSMENT
PAIN_FUNCTIONAL_ASSESSMENT: FLACC (FACE, LEGS, ACTIVITY, CRY, CONSOLABILITY)
PAIN_FUNCTIONAL_ASSESSMENT: FLACC (FACE, LEGS, ACTIVITY, CRY, CONSOLABILITY)
PAIN_FUNCTIONAL_ASSESSMENT: 0-10

## 2025-06-06 ASSESSMENT — ENCOUNTER SYMPTOMS
COLOR CHANGE: 0
PALPITATIONS: 0
CONSTIPATION: 0
FATIGUE: 1
NERVOUS/ANXIOUS: 0
POLYDIPSIA: 0
EYE PAIN: 0
NAUSEA: 0
TREMORS: 1
DIFFICULTY URINATING: 0
SINUS PAIN: 0
LIGHT-HEADEDNESS: 1
MYALGIAS: 0
ABDOMINAL PAIN: 0
APPETITE CHANGE: 1
COUGH: 1
DIARRHEA: 0
HEADACHES: 0
SORE THROAT: 0
SEIZURES: 0
POLYPHAGIA: 0
WEAKNESS: 1
CONFUSION: 0
FEVER: 0
HALLUCINATIONS: 1
ABDOMINAL DISTENTION: 0
SLEEP DISTURBANCE: 1
SHORTNESS OF BREATH: 1
DIZZINESS: 0
CHILLS: 0
ACTIVITY CHANGE: 0
WOUND: 0
HEMATURIA: 0
ARTHRALGIAS: 0

## 2025-06-06 ASSESSMENT — PAIN SCALES - WONG BAKER
WONGBAKER_NUMERICALRESPONSE: HURTS WHOLE LOT
WONGBAKER_NUMERICALRESPONSE: NO HURT
WONGBAKER_NUMERICALRESPONSE: NO HURT

## 2025-06-06 ASSESSMENT — COGNITIVE AND FUNCTIONAL STATUS - GENERAL
DAILY ACTIVITIY SCORE: 24
MOBILITY SCORE: 24
MOBILITY SCORE: 24

## 2025-06-06 ASSESSMENT — PAIN DESCRIPTION - LOCATION
LOCATION: GENERALIZED
LOCATION: GENERALIZED

## 2025-06-06 ASSESSMENT — PAIN SCALES - GENERAL
PAINLEVEL_OUTOF10: 0 - NO PAIN
PAINLEVEL_OUTOF10: 7
PAINLEVEL_OUTOF10: 9
PAINLEVEL_OUTOF10: 0 - NO PAIN
PAINLEVEL_OUTOF10: 0 - NO PAIN
PAINLEVEL_OUTOF10: 8

## 2025-06-06 ASSESSMENT — PAIN DESCRIPTION - DESCRIPTORS: DESCRIPTORS: DULL;DISCOMFORT

## 2025-06-06 NOTE — PROGRESS NOTES
"Martínez Neri is a 64 y.o. male on day 1 of admission seen in follow-up for acute hypoxic respiratory failure, pneumonia    Subjective   Wife at bedside. On 8 L mini high flow nasal cannula oxygen; oxygen sats 100%. Decreasaed to 6 L on my exam. R.N. aware. Endorses a productive cough for thick tan sputum only. Denies pain. Afebrile       Objective     Physical Exam  Vitals and nursing note reviewed.   Constitutional:       Appearance: He is ill-appearing.   HENT:      Head: Normocephalic and atraumatic.      Nose: Nose normal.      Mouth/Throat:      Mouth: Mucous membranes are moist.   Eyes:      Extraocular Movements: Extraocular movements intact.      Conjunctiva/sclera: Conjunctivae normal.      Pupils: Pupils are equal, round, and reactive to light.   Cardiovascular:      Rate and Rhythm: Normal rate and regular rhythm.      Pulses: Normal pulses.      Heart sounds: Normal heart sounds.   Pulmonary:      Effort: Pulmonary effort is normal.      Comments: Coarse breath sounds bilaterally.   Abdominal:      General: Bowel sounds are normal.      Palpations: Abdomen is soft.   Musculoskeletal:         General: Normal range of motion.   Skin:     General: Skin is warm and dry.      Capillary Refill: Capillary refill takes less than 2 seconds.   Neurological:      General: No focal deficit present.      Mental Status: He is alert and oriented to person, place, and time.   Psychiatric:         Mood and Affect: Mood normal.         Behavior: Behavior normal.     Last Recorded Vitals  Blood pressure 98/54, pulse 84, temperature 36.7 °C (98.1 °F), temperature source Temporal, resp. rate 18, height 1.753 m (5' 9\"), weight 67.1 kg (148 lb), SpO2 91%.      Intake/Output Summary (Last 24 hours) at 6/6/2025 0914  Last data filed at 6/6/2025 0510  Gross per 24 hour   Intake 400 ml   Output --   Net 400 ml      Scheduled medications:  apixaban, 5 mg, oral, BID  aspirin, 81 mg, oral, Daily  atorvastatin, 40 mg, oral, " Daily  divalproex, 500 mg, oral, BID  fluticasone, 2 spray, Each Nostril, Daily  ipratropium-albuteroL, 3 mL, nebulization, TID  lacosamide, 200 mg, oral, BID  melatonin, 3 mg, oral, Nightly  mirtazapine, 7.5 mg, oral, Nightly  oxygen, , inhalation, Continuous - Inhalation  pantoprazole, 40 mg, oral, Daily before breakfast   Or  pantoprazole, 40 mg, intravenous, Daily before breakfast  piperacillin-tazobactam, 3.375 g, intravenous, q6h  [Held by provider] tiotropium, 2 puff, inhalation, Daily    Continuous medications:  sodium chloride 0.9%, 100 mL/hr, Last Rate: 100 mL/hr (06/05/25 1722)    PRN medications: acetaminophen **OR** acetaminophen **OR** acetaminophen, benzocaine-menthol, dextromethorphan-guaifenesin, docusate sodium, guaiFENesin, ipratropium-albuteroL, naloxone, ondansetron ODT **OR** ondansetron, ondansetron ODT, oxyCODONE, polyethylene glycol, vancomycin    Relevant Results  Results for orders placed or performed during the hospital encounter of 06/05/25 (from the past 24 hours)   Transthoracic Echo (TTE) Complete   Result Value Ref Range    AV pk marie 1.47 m/s    AV mn grad 5 mmHg    MV E/A ratio 0.93     Tricuspid annular plane systolic excursion 1.9 cm    LV Biplane EF 56 %    LA vol index A/L 27.6 ml/m2    LV EF 58 %    RV free wall pk S' 10.00 cm/s    LVIDd 4.59 cm    AV pk grad 9 mmHg    LV A4C EF 57.7    Sars-CoV-2, Influenza A/B and RSV PCR   Result Value Ref Range    Coronavirus 2019, PCR Not Detected Not Detected    Flu A Result Not Detected Not Detected    Flu B Result Not Detected Not Detected    RSV PCR Not Detected Not Detected   MRSA Surveillance for Vancomycin De-escalation, PCR    Specimen: Anterior Nares; Swab   Result Value Ref Range    MRSA PCR Not Detected Not Detected   CBC   Result Value Ref Range    WBC 12.9 (H) 4.4 - 11.3 x10*3/uL    nRBC 0.0 0.0 - 0.0 /100 WBCs    RBC 2.38 (L) 4.50 - 5.90 x10*6/uL    Hemoglobin 6.4 (LL) 13.5 - 17.5 g/dL    Hematocrit 20.3 (L) 41.0 - 52.0 %     MCV 85 80 - 100 fL    MCH 26.9 26.0 - 34.0 pg    MCHC 31.5 (L) 32.0 - 36.0 g/dL    RDW 16.8 (H) 11.5 - 14.5 %    Platelets 236 150 - 450 x10*3/uL   Comprehensive metabolic panel   Result Value Ref Range    Glucose 117 (H) 74 - 99 mg/dL    Sodium 136 136 - 145 mmol/L    Potassium 4.1 3.5 - 5.3 mmol/L    Chloride 105 98 - 107 mmol/L    Bicarbonate 25 21 - 32 mmol/L    Anion Gap 10 10 - 20 mmol/L    Urea Nitrogen 21 6 - 23 mg/dL    Creatinine 0.86 0.50 - 1.30 mg/dL    eGFR >90 >60 mL/min/1.73m*2    Calcium 7.8 (L) 8.6 - 10.3 mg/dL    Albumin 2.7 (L) 3.4 - 5.0 g/dL    Alkaline Phosphatase 28 (L) 33 - 136 U/L    Total Protein 5.2 (L) 6.4 - 8.2 g/dL    AST 38 9 - 39 U/L    Bilirubin, Total 0.3 0.0 - 1.2 mg/dL    ALT 16 10 - 52 U/L   Vancomycin   Result Value Ref Range    Vancomycin 6.4 5.0 - 20.0 ug/mL   Type and screen   Result Value Ref Range    ABO TYPE O     Rh TYPE POS     ANTIBODY SCREEN NEG    Hemoglobin and hematocrit, blood   Result Value Ref Range    Hemoglobin 6.5 (LL) 13.5 - 17.5 g/dL    Hematocrit 21.1 (L) 41.0 - 52.0 %   VERIFY ABO/Rh Group Test   Result Value Ref Range    ABO TYPE O     Rh TYPE POS    Prepare RBC: 1 Units   Result Value Ref Range    PRODUCT CODE E3286X26     Unit Number [id number]     Unit ABO O     Unit RH NEG     XM INTEP COMP     Dispense Status IS     Blood Expiration Date 6/9/2025 11:59:00 PM EDT     PRODUCT BLOOD TYPE 9500     UNIT VOLUME 279       US renal complete  Result Date: 6/5/2025  FINDINGS: The right kidney measures 11.0 cm. There is a 1.7 cm mid to lower pole right renal cyst. The left kidney measures 11.9 cm. No renal stones are identified.  The renal cortical thickness and echogenicity is normal.  No hydronephrosis is identified.   Urinary bladder is nonspecific in appearance with no stones or masses identified. Right renal cyst.       Transthoracic Echo (TTE) Complete  Result Date: 6/5/2025  CONCLUSIONS:  1. Left ventricular ejection fraction is normal by visual  estimate at 55-60%.  2. Spectral Doppler shows a Grade I (impaired relaxation pattern) of left ventricular diastolic filling with normal left atrial filling pressure.  3. There is normal right ventricular global systolic function.  4. The inferior vena cava appears mildly dilated, with IVC inspiratory collapse greater than 50%.     CT angio chest for pulmonary embolism  Result Date: 6/5/2025  FINDINGS: POTENTIAL LIMITATIONS OF THE STUDY: None   HEART AND VESSELS: There are no discrete filling defects within main pulmonary artery and its branches to suggest acute pulmonary embolism. Main pulmonary artery and its branches are normal in caliber.   The thoracic aorta normal in course and caliber.Mild vascular calcifications are present in the thoracic aorta.Although, the study is not tailored for evaluation of aorta, there is no definite evidence of acute aortic pathology. Moderate coronary artery calcifications are seen. Please note,the study is not optimized for evaluation of coronary arteries.   The cardiac chambers are not enlarged.There are no findings to suggest right heart strain. Trace pericardial effusion is present.   MEDIASTINUM AND WILLIAM, LOWER NECK AND AXILLA: The visualized thyroid gland is within normal limits. There is some increase in size of right hilar lymph nodes compared to prior exam, favored to be reactive in etiology. Esophagus appears within normal limits as seen.   LUNGS AND AIRWAYS: The trachea and central airways are patent. No endobronchial lesion is seen.Small amount of mucus/secretions is present in the mainstem bronchi bilaterally.   There is redemonstration of the area of geographic volume loss/scarring in the left upper lobe, with several new nodular density is present along the pleura (series 5, image 113 and 88) suggestive of superimposed infectious process.   There has been some increase in size of cavitary nodules in the left upper lobe. For example dominant nodule measures up to 1.6  cm in size compared to 1.3 cm on previous study (series 5, image 82).   Additionally, new consolidative airspace opacities are present in the right upper, right middle and right lower lobes, suggestive of multifocal pneumonia. There are small bilateral pleural effusions.   Mild infiltrates are also present in the left lower lobe near the lingula.   A 1.1 cm cavitary nodule in the right lower lobe is unchanged in appearance to prior study (series 11, image 164)   UPPER ABDOMEN: The visualized subdiaphragmatic structures demonstrate no remarkable findings.   CHEST WALL AND OSSEOUS STRUCTURES: Chest wall is within normal limits. No acute osseous pathology.There are no suspicious osseous lesions.Subtle degenerative changes are present in the thoracic spine without compression fracture or high-grade stenosis. 1. Findings most suggestive of multilobar pneumonia, with new consolidative airspace opacity is present in the L5 lung lobes and small bilateral pleural effusions. 2. Some increase in size of right hilar lymph nodes is favored to be reactive. 3. Redemonstration of the area of volume loss/atelectasis in the left upper lobe, similar in appearance to prior exam, likely representing sequela prior treatment. 4. Slight increase in size of some cavitary nodules in the lungs bilaterally.            Assessment & Plan    Acute hypoxic respiratory failure  Wean oxygen sats allow  Continuous pulse oximetry  Incentive spirometry/pulmonary hygiene     Multilobar pneumonia  Antibiotic coverage broadened to IV vancomycin, Zosyn  Resend sputum culture  Procalcitonin: 12.79 ng/mL  Follow-up cultures  Swallow evaluation per ST     Acute kidney injury  Resolved  IV fluids  Continue to monitor renal function     Elevated high-sensitivity troponin  Likely type II MI secondary to underlying acute illness  Cardiology signed off     Lung nodules/History of left upper lobe small cell carcinoma  July '23 status post bronch/EBUS biopsies  showed left upper lobe with invasive moderately differentiated non-small cell carcinoma  Completed chemo/radiation January '24  Currently on surveillance  Heme-Onc consultation     Anemia  Trend H&H  1 unit PRBCs transfused today    COPD  PFTs October '23 show a severe obstructive ventilatory defect  Continue IBD/ICS  Pulmonary function testing as outpatient     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

## 2025-06-06 NOTE — PROGRESS NOTES
"Martínez Neri is a 64 y.o. male on day 1 of admission presenting with Pneumonia of both lower lobes due to infectious organism.    Subjective   Patient resting in bed.  Wife is at bedside.  Patient is still rather adamant about going home however he is still very ill.       Objective     Physical Exam  Constitutional:       General: He is not in acute distress.     Appearance: He is ill-appearing.   Cardiovascular:      Rate and Rhythm: Normal rate and regular rhythm.      Pulses: Normal pulses.      Heart sounds: Normal heart sounds.   Pulmonary:      Effort: Pulmonary effort is normal.      Breath sounds: Wheezing and rhonchi present.   Abdominal:      General: Abdomen is flat.      Palpations: Abdomen is soft.   Musculoskeletal:      Right lower leg: No edema.      Left lower leg: No edema.   Skin:     General: Skin is warm and dry.   Neurological:      Mental Status: He is alert and oriented to person, place, and time.         Last Recorded Vitals  Blood pressure 98/54, pulse 84, temperature 36.7 °C (98.1 °F), temperature source Temporal, resp. rate 18, height 1.753 m (5' 9\"), weight 67.1 kg (148 lb), SpO2 91%.  Intake/Output last 3 Shifts:  I/O last 3 completed shifts:  In: 1700 (25.3 mL/kg) [IV Piggyback:1700]  Out: - (0 mL/kg)   Weight: 67.1 kg     Relevant Results       Results for orders placed or performed during the hospital encounter of 06/05/25 (from the past 24 hours)   Transthoracic Echo (TTE) Complete   Result Value Ref Range    AV pk marie 1.47 m/s    AV mn grad 5 mmHg    MV E/A ratio 0.93     Tricuspid annular plane systolic excursion 1.9 cm    LV Biplane EF 56 %    LA vol index A/L 27.6 ml/m2    LV EF 58 %    RV free wall pk S' 10.00 cm/s    LVIDd 4.59 cm    AV pk grad 9 mmHg    LV A4C EF 57.7    Sars-CoV-2, Influenza A/B and RSV PCR   Result Value Ref Range    Coronavirus 2019, PCR Not Detected Not Detected    Flu A Result Not Detected Not Detected    Flu B Result Not Detected Not Detected    RSV " PCR Not Detected Not Detected   MRSA Surveillance for Vancomycin De-escalation, PCR    Specimen: Anterior Nares; Swab   Result Value Ref Range    MRSA PCR Not Detected Not Detected   CBC   Result Value Ref Range    WBC 12.9 (H) 4.4 - 11.3 x10*3/uL    nRBC 0.0 0.0 - 0.0 /100 WBCs    RBC 2.38 (L) 4.50 - 5.90 x10*6/uL    Hemoglobin 6.4 (LL) 13.5 - 17.5 g/dL    Hematocrit 20.3 (L) 41.0 - 52.0 %    MCV 85 80 - 100 fL    MCH 26.9 26.0 - 34.0 pg    MCHC 31.5 (L) 32.0 - 36.0 g/dL    RDW 16.8 (H) 11.5 - 14.5 %    Platelets 236 150 - 450 x10*3/uL   Comprehensive metabolic panel   Result Value Ref Range    Glucose 117 (H) 74 - 99 mg/dL    Sodium 136 136 - 145 mmol/L    Potassium 4.1 3.5 - 5.3 mmol/L    Chloride 105 98 - 107 mmol/L    Bicarbonate 25 21 - 32 mmol/L    Anion Gap 10 10 - 20 mmol/L    Urea Nitrogen 21 6 - 23 mg/dL    Creatinine 0.86 0.50 - 1.30 mg/dL    eGFR >90 >60 mL/min/1.73m*2    Calcium 7.8 (L) 8.6 - 10.3 mg/dL    Albumin 2.7 (L) 3.4 - 5.0 g/dL    Alkaline Phosphatase 28 (L) 33 - 136 U/L    Total Protein 5.2 (L) 6.4 - 8.2 g/dL    AST 38 9 - 39 U/L    Bilirubin, Total 0.3 0.0 - 1.2 mg/dL    ALT 16 10 - 52 U/L   Vancomycin   Result Value Ref Range    Vancomycin 6.4 5.0 - 20.0 ug/mL   Type and screen   Result Value Ref Range    ABO TYPE O     Rh TYPE POS     ANTIBODY SCREEN NEG    Hemoglobin and hematocrit, blood   Result Value Ref Range    Hemoglobin 6.5 (LL) 13.5 - 17.5 g/dL    Hematocrit 21.1 (L) 41.0 - 52.0 %   VERIFY ABO/Rh Group Test   Result Value Ref Range    ABO TYPE O     Rh TYPE POS    Prepare RBC: 1 Units   Result Value Ref Range    PRODUCT CODE J2562R83     Unit Number [id number]     Unit ABO O     Unit RH NEG     XM INTEP COMP     Dispense Status IS     Blood Expiration Date 6/9/2025 11:59:00 PM EDT     PRODUCT BLOOD TYPE 9500     UNIT VOLUME 279      *Note: Due to a large number of results and/or encounters for the requested time period, some results have not been displayed. A complete set of  results can be found in Results Review.                    Assessment & Plan  Pneumonia of both lower lobes due to infectious organism    Seizures (Multi)    Superior mesenteric artery thrombosis (Multi)    Emphysema lung (Multi)    Acute respiratory failure    Anemia    SERINA (acute kidney injury)    Elevated troponin    Malignant neoplasm of upper lobe of left lung (Multi)    Acute hypoxic respiratory failure: Secondary to acute pneumonia.  He remains on IV antibiotics currently on 9 L of O2 with saturations at 97%.  Wean O2 as tolerated.  Pulm is following.     Acute kidney injury: Hydration.  Monitor renal function.     Elevated high-sensitivity troponin: Denies chest pain.  Nonischemic EKG.  Likely this is a type II MI secondary to his underlying acute illness.  Will obtain echocardiogram this admission, to assess LV function and rule out wall motion abnormalities.      Small cell carcinoma: Heme-onc has been consulted.     Hx of SMA: on eliquis.      Coronary artery disease: Stable.  Remains on aspirin and statin     COPD: Management per pulmonology     History of seizure disorder: continue home seizure meds.  Management per primary team.    Anemia: Is to receive blood today.  Management per primary team.     Overall impression/plan:     6/5: 64-year-old male with history of small cell carcinoma admitted with acute dyspnea.  He was found to be hypoxic started on supplemental O2.  High-sensitivity troponins were found to be elevated.  Patient denies chest pain or anginal equivalent.  Likely that these troponins are elevated secondary to his hypoxia or acute illness.  He did have an elevated lactate.  Will obtain echocardiogram this admission.  Currently he is hemodynamically stable. Albeit uncooperative.  Tells me he would like to be discharged as soon as possible.  I do have concern that he may leave against medical advice. Will continue to follow with you.     6/6: As above.  Patient underwent echocardiogram  yesterday which revealed a an EF of 55 to 60%, grade 1 diastolic dysfunction of the left ventricle, normal right ventricular systolic function and a mildly dilated IVC.  No evidence of wall motion abnormalities.  Patient continues to deny chest pain.  He is rather anemic this morning with a hemoglobin of 6.4.  He is to receive blood today.  He remains in a normal sinus rhythm on telemetry without significant ectopy.  Remainder of his lab work reveals a sodium of 136, potassium of 4.1, creatinine 0.86.  He remains on IV antibiotics.  Pulmonology, and oncology are following.  Palliative care is to see today for goals of care discussion.  He is hypotensive this morning at 98/54.  Hopefully with the addition of red blood cells this will improve.  Overall stable from a cardiac standpoint.  We will sign off.  Please do not hesitate to reach out with questions or concerns.      I spent 35 minutes in the professional and overall care of this patient.      Yasmin Blackmon PA-C

## 2025-06-06 NOTE — CONSULTS
Inpatient consult to Palliative Care  Consult performed by: Christina Rivera, APRN-CNP  Consult ordered by: Norm Brooks MD  Reason for consult: GOC        Reason For Consult  Reason for Consult: communication / medical decision making     History Of Present Illness  Martínez Neri is a 64 y.o. male with past medical history of Squamous Cell Lung Cancer, dx 6/23, s/p chemotherapy and 30/33 fractions of radiation, COPD, SMA thrombosis, alcohol use, seizure DO s/p brain surgery 2019 is presenting to ER for reports of acute onset confusion, loss of bladder, impaired coordination and dyspnea, started 0130 per wife and daughter, was fine the prior evening. Did have mild GI upset the week prior to admission. He denies fever/chills. He has a chronic cough, with no acute changes. He presented to the ED was hypoxic and tachypneic. Started on supplemental O2. Laboratory results reveal sodium of 131, potassium of 3.7 creatinine 1.37, lactate of 3.8. High-sensitivity troponin elevated at 46 and 67. CBC within normal limits. EKG showing sinus tachycardia with a right bundle branch block. CT angio chest for pulmonary embolism reveals multilobular pneumonia and small bilateral pleural effusions, increase in the right hilar lymph nodes likely reactive, volume loss in the left upper lobe consistent with treatment and increased size and some nodules bilaterally. His Hgb was low this morning, with 1 PRBC ordered. He is currently on 6L NC, spo2 95%, moist frequent cough with thick, tan/green sputum, traces of bright red blood. Poor sleep and appetite have been ongoing. He tells me that he has some epigastric discomfort as well as some suprapubic discomfort with palpation.      Symptoms (0 - 10, Best to Worst)  [place] Symptom Assessment System  0-10 (Numeric) Pain Score: 9    BM in last 48 hours? unknown    Emotional/Psychological/Spiritual Needs  Over the past two weeks, how often has the patient been bothered by having  little interest or pleasure in doing things?  occurrence (last two weeks): not at all    Over the past two weeks, how often has the patient been bothered by feeling down, depressed, or hopeless?  occurrence (last two weeks): not at all    Screening for spiritual needs?  No    Serious Illness Conversation  What is your understanding now of where you are with your illness:  Family and patient provided extensive education about status of chronic illnesses. Long discussion about current findings on labs and imaging.   How much information about what is likely to be ahead with your illness  would you like from me: fully disclose information to patient, daughter Seble and wife Keri only. Patient does not want other family members to have information  What are your most important goals if your health situation worsens:  quality of life is important. He wants to stay in his home with his family.   What are your biggest fears and worries about the future with your health:  He does not want his family to see him as severely ill, he does not want to suffer without potential for recovery  What gives you strength as you think about the future with your illness:  family support has been critical.   What abilities are so critical to your life that you can’t imagine living without them:  maintaining function and ability to remain in home with family.   If you become sicker, how much are you willing to go through for the possibility of gaining more time:  He does not want CPR or intubation after discussion, but wants to discuss with his wife and daughter.   How much does your family know about your priorities and wishes:  aly Pruett was present for discussion.     Personal/Social History    He reports that he has quit smoking. His smoking use included cigarettes. He has a 50 pack-year smoking history. He has been exposed to tobacco smoke. He has never used smokeless tobacco. He reports that he does not currently use alcohol.  He reports current drug use. Frequency: 4.00 times per week. Drug: Marijuana.    Functional Status    Requires assist with some IADLs, able to perform most ADLs independently. Some difficulty due to tremor.     Caregiving/Caregiver Support  Does the patient require assistance in some or all components of his care, including coordination of medical care? Yes  If Yes, which person serves that role?  daughter   Caregiver emotional or practical needs:      Past Medical History  He has a past medical history of Allergic rhinitis, Asthma, Brain aneurysm ([facility]), Cancer (Multi), Carotid artery disease, COPD (chronic obstructive pulmonary disease) (Multi), Epilepsy, High cholesterol, History of alcohol use, Hypertension, Lung cancer (Multi), Neutropenic fever, and Seizure (Multi).    Surgical History  He has a past surgical history that includes Carotid endarterectomy (N/A); Colonoscopy; Hernia repair; and Cerebral aneurysm repair.     Family History  Family History[1]  Allergies  Patient has no known allergies.    Review of Systems   Constitutional:  Positive for appetite change and fatigue. Negative for activity change, chills and fever.   HENT:  Positive for dental problem (poor dentition). Negative for mouth sores, nosebleeds, sinus pain and sore throat.    Eyes:  Negative for pain.   Respiratory:  Positive for cough and shortness of breath.    Cardiovascular:  Negative for chest pain, palpitations and leg swelling.   Gastrointestinal:  Negative for abdominal distention, abdominal pain, constipation, diarrhea and nausea.   Endocrine: Negative for polydipsia, polyphagia and polyuria.   Genitourinary:  Negative for difficulty urinating and hematuria.   Musculoskeletal:  Negative for arthralgias, gait problem and myalgias.   Skin:  Negative for color change, rash and wound.   Neurological:  Positive for tremors, weakness and light-headedness. Negative for dizziness, seizures and headaches.   Psychiatric/Behavioral:   Positive for hallucinations and sleep disturbance. Negative for confusion. The patient is not nervous/anxious.         Labile moods since brain surgery        Physical Exam  Vitals and nursing note reviewed.   Constitutional:       General: He is in acute distress.      Appearance: He is ill-appearing.   HENT:      Head: Normocephalic and atraumatic.      Nose: Congestion present.      Mouth/Throat:      Mouth: Mucous membranes are moist.      Pharynx: Oropharynx is clear.      Comments: Poor dentition  Eyes:      Extraocular Movements: Extraocular movements intact.      Pupils: Pupils are equal, round, and reactive to light.   Cardiovascular:      Rate and Rhythm: Normal rate and regular rhythm.      Pulses: Normal pulses.      Heart sounds: No murmur heard.  Pulmonary:      Effort: Pulmonary effort is normal. No respiratory distress.      Breath sounds: Normal breath sounds.      Comments: NC6L, severe moist productive cough with thick tan/green, trace amount of blood  Abdominal:      General: Abdomen is flat. Bowel sounds are normal. There is no distension.      Palpations: Abdomen is soft. There is no mass.      Tenderness: There is abdominal tenderness.      Hernia: No hernia is present.      Comments: Epigastric and suprapubic discomfort with palpation. Pain with palpation RUQ   Musculoskeletal:         General: No swelling, tenderness, deformity or signs of injury. Normal range of motion.      Cervical back: Normal range of motion and neck supple.      Right lower leg: No edema.      Left lower leg: No edema.   Skin:     General: Skin is warm and dry.      Capillary Refill: Capillary refill takes 2 to 3 seconds.   Neurological:      General: No focal deficit present.      Mental Status: He is alert and oriented to person, place, and time.   Psychiatric:         Mood and Affect: Mood normal.      Comments: Labile mood         Last Recorded Vitals  Blood pressure 101/54, pulse 90, temperature 36.6 °C (97.9 °F),  "temperature source Temporal, resp. rate 18, height 1.753 m (5' 9\"), weight 67.1 kg (148 lb), SpO2 93%.    Relevant Results  Results for orders placed or performed during the hospital encounter of 06/05/25 (from the past 24 hours)   Respiratory Culture/Smear    Specimen: SPUTUM; Fluid   Result Value Ref Range    Respiratory Culture/Smear (A)      Culture not performed. See Gram stain findings. Recollect if clinically indicated.    Gram Stain (A)      Gram stain indicates specimen contains significant salivary contamination.   CBC   Result Value Ref Range    WBC 12.9 (H) 4.4 - 11.3 x10*3/uL    nRBC 0.0 0.0 - 0.0 /100 WBCs    RBC 2.38 (L) 4.50 - 5.90 x10*6/uL    Hemoglobin 6.4 (LL) 13.5 - 17.5 g/dL    Hematocrit 20.3 (L) 41.0 - 52.0 %    MCV 85 80 - 100 fL    MCH 26.9 26.0 - 34.0 pg    MCHC 31.5 (L) 32.0 - 36.0 g/dL    RDW 16.8 (H) 11.5 - 14.5 %    Platelets 236 150 - 450 x10*3/uL   Comprehensive metabolic panel   Result Value Ref Range    Glucose 117 (H) 74 - 99 mg/dL    Sodium 136 136 - 145 mmol/L    Potassium 4.1 3.5 - 5.3 mmol/L    Chloride 105 98 - 107 mmol/L    Bicarbonate 25 21 - 32 mmol/L    Anion Gap 10 10 - 20 mmol/L    Urea Nitrogen 21 6 - 23 mg/dL    Creatinine 0.86 0.50 - 1.30 mg/dL    eGFR >90 >60 mL/min/1.73m*2    Calcium 7.8 (L) 8.6 - 10.3 mg/dL    Albumin 2.7 (L) 3.4 - 5.0 g/dL    Alkaline Phosphatase 28 (L) 33 - 136 U/L    Total Protein 5.2 (L) 6.4 - 8.2 g/dL    AST 38 9 - 39 U/L    Bilirubin, Total 0.3 0.0 - 1.2 mg/dL    ALT 16 10 - 52 U/L   Procalcitonin   Result Value Ref Range    Procalcitonin 12.79 (H) <=0.07 ng/mL   Vancomycin   Result Value Ref Range    Vancomycin 6.4 5.0 - 20.0 ug/mL   Iron and TIBC   Result Value Ref Range    Iron <10 (L) 35 - 150 ug/dL    UIBC 244 110 - 370 ug/dL    TIBC      % Saturation     Ferritin   Result Value Ref Range    Ferritin 102 20 - 300 ng/mL   Valproic acid level, total   Result Value Ref Range    Valproic Acid 70 50 - 100 ug/mL   Type and screen   Result " Value Ref Range    ABO TYPE O     Rh TYPE POS     ANTIBODY SCREEN NEG    Hemoglobin and hematocrit, blood   Result Value Ref Range    Hemoglobin 6.5 (LL) 13.5 - 17.5 g/dL    Hematocrit 21.1 (L) 41.0 - 52.0 %   VERIFY ABO/Rh Group Test   Result Value Ref Range    ABO TYPE O     Rh TYPE POS    Prepare RBC: 1 Units   Result Value Ref Range    PRODUCT CODE U1763J27     Unit Number [id number]     Unit ABO O     Unit RH NEG     XM INTEP COMP     Dispense Status TR     Blood Expiration Date 6/9/2025 11:59:00 PM EDT     PRODUCT BLOOD TYPE 9500     UNIT VOLUME 279    MRSA Surveillance for Vancomycin De-escalation, PCR    Specimen: Anterior Nares; Swab   Result Value Ref Range    MRSA PCR Not Detected Not Detected   Urinalysis with Reflex Culture and Microscopic   Result Value Ref Range    Color, Urine Light-Yellow Light-Yellow, Yellow, Dark-Yellow    Appearance, Urine Clear Clear    Specific Gravity, Urine 1.032 1.005 - 1.035    pH, Urine 5.5 5.0, 5.5, 6.0, 6.5, 7.0, 7.5, 8.0    Protein, Urine 20 (TRACE) NEGATIVE, 10 (TRACE), 20 (TRACE) mg/dL    Glucose, Urine Normal Normal mg/dL    Blood, Urine NEGATIVE NEGATIVE mg/dL    Ketones, Urine TRACE (A) NEGATIVE mg/dL    Bilirubin, Urine NEGATIVE NEGATIVE mg/dL    Urobilinogen, Urine Normal Normal mg/dL    Nitrite, Urine NEGATIVE NEGATIVE    Leukocyte Esterase, Urine NEGATIVE NEGATIVE   Urinalysis Microscopic   Result Value Ref Range    WBC, Urine NONE 1-5, NONE /HPF    RBC, Urine NONE NONE, 1-2, 3-5 /HPF     *Note: Due to a large number of results and/or encounters for the requested time period, some results have not been displayed. A complete set of results can be found in Results Review.    US renal complete  Result Date: 6/5/2025  Interpreted By:  Martínez Mcallister, STUDY: US RENAL COMPLETE; 6/5/2025 2:36 pm   INDICATION: Signs/Symptoms:mena.   COMPARISON: None   ACCESSION NUMBER(S): [id number]   ORDERING CLINICIAN: JF ALEMAN   TECHNIQUE: Grayscale and color Doppler imaging  of the kidneys   FINDINGS: The right kidney measures 11.0 cm. There is a 1.7 cm mid to lower pole right renal cyst. The left kidney measures 11.9 cm  .     No renal stones are identified.  The renal cortical thickness and echogenicity is normal.  No hydronephrosis is identified.   Urinary bladder is nonspecific in appearance with no stones or masses identified.       Right renal cyst.   MACRO: none   Signed by: Francia Mcallister 6/5/2025 3:12 PM Dictation workstation:   [id number]    Transthoracic Echo (TTE) Complete  Result Date: 6/5/2025            [facility] [address] [id number]             Phone [phone] TRANSTHORACIC ECHOCARDIOGRAM REPORT Patient Name:       FRANCIA Peter Physician:    [id number] Narayan DO Study Date:         6/5/2025             Ordering Provider:    [id number] GERMAINE DEJESUS MRN/PID:            [id number]             Fellow: Accession#:         [id number]         Nurse: Date of Birth/Age:  1960 / 64 years Sonographer:          Bhavna Gonzales RDCS Gender Assigned at                      Additional Staff: Birth: Height:             175.26 cm            Admit Date: Weight:             67.13 kg             Admission Status:     Inpatient -                                                                Routine BSA / BMI:          1.82 m2 / 21.86      Department Location:  [facility]                     kg/ Blood Pressure: 103 /58 mmHg Study Type:    TRANSTHORACIC ECHO (TTE) COMPLETE Diagnosis/ICD: Shortness of breath-R06.02; Elevated Troponin-R79.89 Indication:    elevated troponin, sob CPT Codes:     Echo Complete w Full Doppler-[id number] Patient History: Pertinent History: Elevated troponin. Study Detail: The following Echo studies were performed:  2D, M-Mode, Doppler and               color flow. Technically challenging study due to the patient's               lack of cooperation, prominent lung artifact and body habitus.  PHYSICIAN INTERPRETATION: Left Ventricle: Left ventricular ejection fraction is normal by visual estimate at 55-60%. There are no regional wall motion abnormalities. The left ventricular cavity size is normal. There is normal septal and mildly increased posterior left ventricular wall thickness. There is left ventricular concentric remodeling. Spectral Doppler shows a Grade I (impaired relaxation pattern) of left ventricular diastolic filling with normal left atrial filling pressure. Left Atrium: The left atrial size is normal. Right Ventricle: The right ventricle is normal in size. There is normal right ventricular global systolic function. Right Atrium: The right atrial size is normal. Aortic Valve: There is no evidence of aortic valve regurgitation. Mitral Valve: The mitral valve is normal in structure. There is trace mitral valve regurgitation. The E Vmax is 0.87 m/s. Tricuspid Valve: The tricuspid valve is structurally normal. There is trace tricuspid regurgitation. Pulmonic Valve: The pulmonic valve is not well visualized. The pulmonic valve regurgitation was not assessed. Pericardium: No pericardial effusion noted. Aorta: The aortic root is normal. Systemic Veins: The inferior vena cava appears mildly dilated, with IVC inspiratory collapse greater than 50%.  CONCLUSIONS:  1. Left ventricular ejection fraction is normal by visual estimate at 55-60%.  2. Spectral Doppler shows a Grade I (impaired relaxation pattern) of left ventricular diastolic filling with normal left atrial filling pressure.  3. There is normal right ventricular global systolic function.  4. The inferior vena cava appears mildly dilated, with IVC inspiratory collapse greater than 50%. QUANTITATIVE DATA SUMMARY:  2D MEASUREMENTS:             Normal Ranges: LAs:              3.40 cm     (2.7-4.0cm) IVSd:            0.87 cm     (0.6-1.1cm) LVPWd:           1.10 cm     (0.6-1.1cm) LVIDd:           4.59 cm     (3.9-5.9cm) LVIDs:           3.04 cm LV Mass Index:   85.3 g/m2 LVEDV Index:     64.53 ml/m2 LV % FS          33.8 %  LEFT ATRIUM:                  Normal Ranges: LA Vol A4C:        48.4 ml    (22+/-6mL/m2) LA Vol A2C:        50.2 ml LA Vol BP:         50.2 ml LA Vol Index A4C:  26.6ml/m2 LA Vol Index A2C:  27.6 ml/m2 LA Vol Index BP:   27.6 ml/m2 LA Area A4C:       17.6 cm2 LA Area A2C:       17.6 cm2 LA Major Axis A4C: 5.4 cm LA Major Axis A2C: 5.2 cm LA Volume Index:   26.4 ml/m2 LA Vol A4C:        45.9 ml LA Vol A2C:        48.9 ml LA Vol Index BSA:  26.1 ml/m2  RIGHT ATRIUM:                 Normal Ranges: RA Vol A4C:        33.9 ml    (8.3-19.5ml) RA Vol Index A4C:  18.6 ml/m2 RA Area A4C:       13.3 cm2 RA Major Axis A4C: 4.4 cm  M-MODE MEASUREMENTS:         Normal Ranges: Ao Root:             2.10 cm (2.0-3.7cm) LAs:                 3.70 cm (2.7-4.0cm)  LV SYSTOLIC FUNCTION:                      Normal Ranges: EF-A4C View:    58 % (>=55%) EF-A2C View:    56 % EF-Biplane:     56 % EF-Visual:      58 % LV EF Reported: 58 %  LV DIASTOLIC FUNCTION:           Normal Ranges: MV Peak E:             0.87 m/s  (0.7-1.2 m/s) MV Peak A:             0.94 m/s  (0.42-0.7 m/s) E/A Ratio:             0.93      (1.0-2.2) MV e'                  0.065 m/s (>8.0) MV lateral e'          0.08 m/s MV medial e'           0.05 m/s E/e' Ratio:            13.42     (<8.0)  MITRAL VALVE:          Normal Ranges: MV DT:        194 msec (150-240msec)  AORTIC VALVE:                     Normal Ranges: AoV Vmax:                1.47 m/s (<=1.7m/s) AoV Peak P.6 mmHg (<20mmHg) AoV Mean P.0 mmHg (1.7-11.5mmHg) LVOT Max Vishnu:            0.91 m/s (<=1.1m/s) AoV VTI:                 27.10 cm (18-25cm) LVOT VTI:                14.50 cm AoV Dimensionless Index: 0.54  RIGHT  VENTRICLE: RV Basal 3.78 cm RV Major 7.0 cm TAPSE:   18.7 mm RV s'    0.10 m/s  TRICUSPID VALVE/RVSP:         Normal Ranges: Est. RA Pressure:     8 mmHg IVC Diam:             2.80 cm  PULMONIC VALVE:          Normal Ranges: PV Max Vishnu:     0.9 m/s  (0.6-0.9m/s) PV Max PG:      3.3 mmHg  [id number] Mo Narayan DO Electronically signed on 6/5/2025 at 2:13:13 PM  ** Final **     ECG 12 lead  Result Date: 6/5/2025  Sinus tachycardia Left axis deviation Right bundle branch block Abnormal ECG When compared with ECG of 05-JAN-2024 20:43, Right bundle branch block is now Present Confirmed by Tristian Alcantara ([id number]) on 6/5/2025 12:13:24 PM    CT angio chest for pulmonary embolism  Result Date: 6/5/2025  Interpreted By:  Artur Lino, STUDY: CT ANGIO CHEST FOR PULMONARY EMBOLISM;  6/5/2025 3:46 am   INDICATION: Signs/Symptoms:hx of lung cancer, SOB, chest pain.     COMPARISON: CT chest dated 03/11/2025   ACCESSION NUMBER(S): [id number]   ORDERING CLINICIAN: BRITNEY SANZ   TECHNIQUE: Helical data acquisition of the chest was obtained after intravenous administration of 75 mL Omnipaque 350, as per PE protocol. Images were reformatted in coronal and sagittal planes. Axial and coronal maximum intensity projection (MIP) images were created and reviewed.   FINDINGS: POTENTIAL LIMITATIONS OF THE STUDY: None   HEART AND VESSELS: There are no discrete filling defects within main pulmonary artery and its branches to suggest acute pulmonary embolism. Main pulmonary artery and its branches are normal in caliber.   The thoracic aorta normal in course and caliber.Mild vascular calcifications are present in the thoracic aorta.Although, the study is not tailored for evaluation of aorta, there is no definite evidence of acute aortic pathology. Moderate coronary artery calcifications are seen. Please note,the study is not optimized for evaluation of coronary arteries.   The cardiac chambers are not enlarged.There are no findings  to suggest right heart strain. Trace pericardial effusion is present.   MEDIASTINUM AND WILLIAM, LOWER NECK AND AXILLA: The visualized thyroid gland is within normal limits. There is some increase in size of right hilar lymph nodes compared to prior exam, favored to be reactive in etiology. Esophagus appears within normal limits as seen.   LUNGS AND AIRWAYS: The trachea and central airways are patent. No endobronchial lesion is seen.Small amount of mucus/secretions is present in the mainstem bronchi bilaterally.   There is redemonstration of the area of geographic volume loss/scarring in the left upper lobe, with several new nodular density is present along the pleura (series 5, image 113 and 88) suggestive of superimposed infectious process.   There has been some increase in size of cavitary nodules in the left upper lobe. For example dominant nodule measures up to 1.6 cm in size compared to 1.3 cm on previous study (series 5, image 82).   Additionally, new consolidative airspace opacities are present in the right upper, right middle and right lower lobes, suggestive of multifocal pneumonia. There are small bilateral pleural effusions.   Mild infiltrates are also present in the left lower lobe near the lingula.   A 1.1 cm cavitary nodule in the right lower lobe is unchanged in appearance to prior study (series 11, image 164)   UPPER ABDOMEN: The visualized subdiaphragmatic structures demonstrate no remarkable findings.   CHEST WALL AND OSSEOUS STRUCTURES: Chest wall is within normal limits. No acute osseous pathology.There are no suspicious osseous lesions.Subtle degenerative changes are present in the thoracic spine without compression fracture or high-grade stenosis.       1. Findings most suggestive of multilobar pneumonia, with new consolidative airspace opacity is present in the L5 lung lobes and small bilateral pleural effusions. 2. Some increase in size of right hilar lymph nodes is favored to be reactive. 3.  Redemonstration of the area of volume loss/atelectasis in the left upper lobe, similar in appearance to prior exam, likely representing sequela prior treatment. 4. Slight increase in size of some cavitary nodules in the lungs bilaterally.   MACRO: None   Signed by: Artur Lino 6/5/2025 4:01 AM Dictation workstation:   [id number]        Assessment/Plan   IMP:    Acute respiratory Failure with hypoxia-curently on 6L NC, was on RA at home after weaning off oxygen last year s/p pulmonary rehab.   Multifocal PNA-antibiotics expanded to vanco and zosyn, cultures ordered, atypical workup pending. Procalcitonin 12.79  COPD-Cont duonebs, added ICS  Squamous Cell Lung Cancer-CTChest appears to show some dz progression, recommend f/up with oncology outpatient  Anemia-Hgb <7 this am, currently receiving 1 PRBC, on PPI, hemocult ordered.   Seizure do-concern for possible seizure per family discussion, EEG ordered, valproic acid level ordered.   SMA thrombosis-continue eliquis  Insomnia-trazodone HS prn  Chronic cancer related pain s/p radiation 30/33 to [facility] and supportive oncology note reviewed, increased frequency of oxycodone to q4hr prn pain  SERINA-improved with IVF.   Elevated troponins-demand related to respiratory failure  Palliative Care    Full Code  Capable  No advanced directives. Wife Keri is legal surrogate.     Chart reviewed, discussed with attending and cardiology.   Extensive discussion with patient wife and daughter today regarding current diagnoses, treatment plan. Patient very upset about being in hospital. I explained current illness and need for hospital level care in order to stabilze patient. If patient leaves AMA, he is high risk for respiratory and cardiac arrest and would require much more intensive care than currently requiring, potentially limiting life and/or impacting quality of life. Patient verbalized understanding and expressed his wishes to pursue aggressive treatment of current  diagnoses in order to facilitate return home. See chronic illness discussion.     We had a discussion about advanced directives with daughter present and patient is very interested in filling out HPOA, listing dtr milton and wife arash. He is also interested in living will. We reviewed all code status options and patient would like to review options and discuss further with his family before making any decisions. I will follow up tomorrow.     Symptom Management  Pain: moderate  Medications recommended for pain?  Yes  Tiredness: moderate  Nausea: mild  Depression: na  Anxiety: na  Drowsiness: mild-moderate  Appetite: poor  Wellbeing: acutely ill, relatively functional at baseline.   Dyspnea: severe presently with severe cough  Intervention recommended for dyspnea?  yes  Other: na  Intervention recommended for constipation?  Yes    I spent 120 minutes in the professional and overall care of this patient. Spent over 60min in acp discussion with patient and family.       Christina Rivera, APRN-CNP       [1]   Family History  Problem Relation Name Age of Onset    Heart attack Mother      Other (Colorectal cancer) Father      Aneurysm Other      Prostate cancer Other      Alcohol abuse Other

## 2025-06-06 NOTE — PROGRESS NOTES
Martínez Neri is a 64 y.o. male on day 1 of admission presenting with Pneumonia of both lower lobes due to infectious organism.      Subjective   Patient very adamant about wanting to leave hospital.  He is somewhat minimizing his current condition.  This is causing some frustration between his caregivers which are his wife and daughter and patient.  Discussed current findings regarding CAT scan of chest and slightly enlarging known masses with his history of lung cancer.  Also noted pneumonia on CT.       Objective     Last Recorded Vitals  /59 (BP Location: Right arm, Patient Position: Lying)   Pulse 91   Temp 36.7 °C (98.1 °F) (Temporal)   Resp 16   Wt 67.1 kg (148 lb)   SpO2 94%   Intake/Output last 3 Shifts:    Intake/Output Summary (Last 24 hours) at 6/6/2025 1714  Last data filed at 6/6/2025 1707  Gross per 24 hour   Intake 2167.08 ml   Output 350 ml   Net 1817.08 ml       Admission Weight  Weight: 67.1 kg (148 lb) (06/05/25 0220)    Daily Weight  06/05/25 : 67.1 kg (148 lb)    Image Results  US renal complete  Narrative: Interpreted By:  Martínez Mcallister,   STUDY:  US RENAL COMPLETE; 6/5/2025 2:36 pm      INDICATION:  Signs/Symptoms:mena.      COMPARISON:  None      ACCESSION NUMBER(S):  [id number]      ORDERING CLINICIAN:  JF ALEMAN      TECHNIQUE:  Grayscale and color Doppler imaging of the kidneys      FINDINGS:  The right kidney measures 11.0 cm. There is a 1.7 cm mid to lower  pole right renal cyst. The left kidney measures 11.9 cm  .          No renal stones are identified.  The renal cortical thickness and  echogenicity is normal.  No hydronephrosis is identified.      Urinary bladder is nonspecific in appearance with no stones or masses  identified.      Impression: Right renal cyst.      MACRO:  none      Signed by: Martínez Mcallister 6/5/2025 3:12 PM  Dictation workstation:   [id number]  Transthoracic Echo (TTE) Complete              [facility]  [address], [place]  [place] [id number]              Phone [phone]    TRANSTHORACIC ECHOCARDIOGRAM REPORT    Patient Name:       FRANCIA GOODEN       Brittani Physician:    [id number] Mo Narayan   Study Date:         6/5/2025             Ordering Provider:    [id number] GERMAINE DEJESUS  MRN/PID:            [id number]             Fellow:  Accession#:         [id number]         Nurse:  Date of Birth/Age:  1960 / 64 years Sonographer:          Bhavna Gonzales RDCS  Gender Assigned at                      Additional Staff:  Birth:  Height:             175.26 cm            Admit Date:  Weight:             67.13 kg             Admission Status:     Inpatient -                                                                 Routine  BSA / BMI:          1.82 m2 / 21.86      Department Location:  [facility]                      kg/m2  Blood Pressure: 103 /58 mmHg    Study Type:    TRANSTHORACIC ECHO (TTE) COMPLETE  Diagnosis/ICD: Shortness of breath-R06.02; Elevated Troponin-R79.89  Indication:    elevated troponin, sob  CPT Codes:     Echo Complete w Full Doppler-[id number]    Patient History:  Pertinent History: Elevated troponin.    Study Detail: The following Echo studies were performed: 2D, M-Mode, Doppler and                color flow. Technically challenging study due to the patient's                lack of cooperation, prominent lung artifact and body habitus.       PHYSICIAN INTERPRETATION:  Left Ventricle: Left ventricular ejection fraction is normal by visual estimate at 55-60%. There are no regional wall motion abnormalities. The left ventricular cavity size is normal. There is normal septal and mildly increased posterior left ventricular wall thickness. There is left ventricular concentric remodeling. Spectral Doppler shows a Grade I  (impaired relaxation pattern) of left ventricular diastolic filling with normal left atrial filling pressure.  Left Atrium: The left atrial size is normal.  Right Ventricle: The right ventricle is normal in size. There is normal right ventricular global systolic function.  Right Atrium: The right atrial size is normal.  Aortic Valve: There is no evidence of aortic valve regurgitation.  Mitral Valve: The mitral valve is normal in structure. There is trace mitral valve regurgitation. The E Vmax is 0.87 m/s.  Tricuspid Valve: The tricuspid valve is structurally normal. There is trace tricuspid regurgitation.  Pulmonic Valve: The pulmonic valve is not well visualized. The pulmonic valve regurgitation was not assessed.  Pericardium: No pericardial effusion noted.  Aorta: The aortic root is normal.  Systemic Veins: The inferior vena cava appears mildly dilated, with IVC inspiratory collapse greater than 50%.       CONCLUSIONS:   1. Left ventricular ejection fraction is normal by visual estimate at 55-60%.   2. Spectral Doppler shows a Grade I (impaired relaxation pattern) of left ventricular diastolic filling with normal left atrial filling pressure.   3. There is normal right ventricular global systolic function.   4. The inferior vena cava appears mildly dilated, with IVC inspiratory collapse greater than 50%.    QUANTITATIVE DATA SUMMARY:     2D MEASUREMENTS:             Normal Ranges:  LAs:             3.40 cm     (2.7-4.0cm)  IVSd:            0.87 cm     (0.6-1.1cm)  LVPWd:           1.10 cm     (0.6-1.1cm)  LVIDd:           4.59 cm     (3.9-5.9cm)  LVIDs:           3.04 cm  LV Mass Index:   85.3 g/m2  LVEDV Index:     64.53 ml/m2  LV % FS          33.8 %       LEFT ATRIUM:                  Normal Ranges:  LA Vol A4C:        48.4 ml    (22+/-6mL/m2)  LA Vol A2C:        50.2 ml  LA Vol BP:         50.2 ml  LA Vol Index A4C:  26.6ml/m2  LA Vol Index A2C:  27.6 ml/m2  LA Vol Index BP:   27.6 ml/m2  LA Area A4C:        17.6 cm2  LA Area A2C:       17.6 cm2  LA Major Axis A4C: 5.4 cm  LA Major Axis A2C: 5.2 cm  LA Volume Index:   26.4 ml/m2  LA Vol A4C:        45.9 ml  LA Vol A2C:        48.9 ml  LA Vol Index BSA:  26.1 ml/m2       RIGHT ATRIUM:                 Normal Ranges:  RA Vol A4C:        33.9 ml    (8.3-19.5ml)  RA Vol Index A4C:  18.6 ml/m2  RA Area A4C:       13.3 cm2  RA Major Axis A4C: 4.4 cm       M-MODE MEASUREMENTS:         Normal Ranges:  Ao Root:             2.10 cm (2.0-3.7cm)  LAs:                 3.70 cm (2.7-4.0cm)       LV SYSTOLIC FUNCTION:                       Normal Ranges:  EF-A4C View:    58 % (>=55%)  EF-A2C View:    56 %  EF-Biplane:     56 %  EF-Visual:      58 %  LV EF Reported: 58 %       LV DIASTOLIC FUNCTION:           Normal Ranges:  MV Peak E:             0.87 m/s  (0.7-1.2 m/s)  MV Peak A:             0.94 m/s  (0.42-0.7 m/s)  E/A Ratio:             0.93      (1.0-2.2)  MV e'                  0.065 m/s (>8.0)  MV lateral e'          0.08 m/s  MV medial e'           0.05 m/s  E/e' Ratio:            13.42     (<8.0)       MITRAL VALVE:          Normal Ranges:  MV DT:        194 msec (150-240msec)       AORTIC VALVE:                     Normal Ranges:  AoV Vmax:                1.47 m/s (<=1.7m/s)  AoV Peak P.6 mmHg (<20mmHg)  AoV Mean P.0 mmHg (1.7-11.5mmHg)  LVOT Max Vishnu:            0.91 m/s (<=1.1m/s)  AoV VTI:                 27.10 cm (18-25cm)  LVOT VTI:                14.50 cm  AoV Dimensionless Index: 0.54       RIGHT VENTRICLE:  RV Basal 3.78 cm  RV Major 7.0 cm  TAPSE:   18.7 mm  RV s'    0.10 m/s       TRICUSPID VALVE/RVSP:         Normal Ranges:  Est. RA Pressure:     8 mmHg  IVC Diam:             2.80 cm       PULMONIC VALVE:          Normal Ranges:  PV Max Vishnu:     0.9 m/s  (0.6-0.9m/s)  PV Max PG:      3.3 mmHg       [id number] Mo Narayan DO  Electronically signed on 2025 at 2:13:13 PM       ** Final **  ECG 12 lead  Sinus tachycardia  Left axis  deviation  Right bundle branch block  Abnormal ECG  When compared with ECG of 05-JAN-2024 20:43,  Right bundle branch block is now Present  Confirmed by Tristian Alcantara ([id number]) on 6/5/2025 12:13:24 PM  CT angio chest for pulmonary embolism  Narrative: Interpreted By:  Artur Lino,   STUDY:  CT ANGIO CHEST FOR PULMONARY EMBOLISM;  6/5/2025 3:46 am      INDICATION:  Signs/Symptoms:hx of lung cancer, SOB, chest pain.          COMPARISON:  CT chest dated 03/11/2025      ACCESSION NUMBER(S):  [id number]      ORDERING CLINICIAN:  BRITNEY SANZ      TECHNIQUE:  Helical data acquisition of the chest was obtained after intravenous  administration of 75 mL Omnipaque 350, as per PE protocol. Images  were reformatted in coronal and sagittal planes. Axial and coronal  maximum intensity projection (MIP) images were created and reviewed.      FINDINGS:  POTENTIAL LIMITATIONS OF THE STUDY: None      HEART AND VESSELS:  There are no discrete filling defects within main pulmonary artery  and its branches to suggest acute pulmonary embolism. Main pulmonary  artery and its branches are normal in caliber.      The thoracic aorta normal in course and caliber.Mild vascular  calcifications are present in the thoracic aorta.Although, the study  is not tailored for evaluation of aorta, there is no definite  evidence of acute aortic pathology. Moderate coronary artery  calcifications are seen. Please note,the study is not optimized for  evaluation of coronary arteries.      The cardiac chambers are not enlarged.There are no findings to  suggest right heart strain. Trace pericardial effusion is present.      MEDIASTINUM AND WILLIAM, LOWER NECK AND AXILLA:  The visualized thyroid gland is within normal limits.  There is some increase in size of right hilar lymph nodes compared to  prior exam, favored to be reactive in etiology. Esophagus appears  within normal limits as seen.      LUNGS AND AIRWAYS:  The trachea and central  airways are patent. No endobronchial lesion  is seen.Small amount of mucus/secretions is present in the mainstem  bronchi bilaterally.      There is redemonstration of the area of geographic volume  loss/scarring in the left upper lobe, with several new nodular  density is present along the pleura (series 5, image 113 and 88)  suggestive of superimposed infectious process.      There has been some increase in size of cavitary nodules in the left  upper lobe. For example dominant nodule measures up to 1.6 cm in size  compared to 1.3 cm on previous study (series 5, image 82).      Additionally, new consolidative airspace opacities are present in the  right upper, right middle and right lower lobes, suggestive of  multifocal pneumonia. There are small bilateral pleural effusions.      Mild infiltrates are also present in the left lower lobe near the  lingula.      A 1.1 cm cavitary nodule in the right lower lobe is unchanged in  appearance to prior study (series 11, image 164)      UPPER ABDOMEN:  The visualized subdiaphragmatic structures demonstrate no remarkable  findings.      CHEST WALL AND OSSEOUS STRUCTURES:  Chest wall is within normal limits.  No acute osseous pathology.There are no suspicious osseous  lesions.Subtle degenerative changes are present in the thoracic spine  without compression fracture or high-grade stenosis.      Impression: 1. Findings most suggestive of multilobar pneumonia, with new  consolidative airspace opacity is present in the L5 lung lobes and  small bilateral pleural effusions.  2. Some increase in size of right hilar lymph nodes is favored to be  reactive.  3. Redemonstration of the area of volume loss/atelectasis in the left  upper lobe, similar in appearance to prior exam, likely representing  sequela prior treatment.  4. Slight increase in size of some cavitary nodules in the lungs  bilaterally.      MACRO:  None      Signed by: Artur Lino 6/5/2025 4:01 AM  Dictation  workstation:   [id number]      Physical Exam  HENT:      Head: Normocephalic and atraumatic.      Nose: Nose normal.   Eyes:      Conjunctiva/sclera: Conjunctivae normal.      Pupils: Pupils are equal, round, and reactive to light.   Cardiovascular:      Rate and Rhythm: Normal rate and regular rhythm.   Pulmonary:      Effort: Pulmonary effort is normal.      Breath sounds: Normal breath sounds.   Abdominal:      General: Bowel sounds are normal.   Musculoskeletal:      Cervical back: Normal range of motion.   Neurological:      Mental Status: He is alert.         Relevant Results  Scheduled medications  Scheduled Medications[1]  Continuous medications  Continuous Medications[2]  PRN medications  PRN Medications[3]  Results for orders placed or performed during the hospital encounter of 06/05/25 (from the past 24 hours)   Respiratory Culture/Smear    Specimen: SPUTUM; Fluid   Result Value Ref Range    Respiratory Culture/Smear (A)      Culture not performed. See Gram stain findings. Recollect if clinically indicated.    Gram Stain (A)      Gram stain indicates specimen contains significant salivary contamination.   CBC   Result Value Ref Range    WBC 12.9 (H) 4.4 - 11.3 x10*3/uL    nRBC 0.0 0.0 - 0.0 /100 WBCs    RBC 2.38 (L) 4.50 - 5.90 x10*6/uL    Hemoglobin 6.4 (LL) 13.5 - 17.5 g/dL    Hematocrit 20.3 (L) 41.0 - 52.0 %    MCV 85 80 - 100 fL    MCH 26.9 26.0 - 34.0 pg    MCHC 31.5 (L) 32.0 - 36.0 g/dL    RDW 16.8 (H) 11.5 - 14.5 %    Platelets 236 150 - 450 x10*3/uL   Comprehensive metabolic panel   Result Value Ref Range    Glucose 117 (H) 74 - 99 mg/dL    Sodium 136 136 - 145 mmol/L    Potassium 4.1 3.5 - 5.3 mmol/L    Chloride 105 98 - 107 mmol/L    Bicarbonate 25 21 - 32 mmol/L    Anion Gap 10 10 - 20 mmol/L    Urea Nitrogen 21 6 - 23 mg/dL    Creatinine 0.86 0.50 - 1.30 mg/dL    eGFR >90 >60 mL/min/1.73m*2    Calcium 7.8 (L) 8.6 - 10.3 mg/dL    Albumin 2.7 (L) 3.4 - 5.0 g/dL    Alkaline Phosphatase 28 (L)  33 - 136 U/L    Total Protein 5.2 (L) 6.4 - 8.2 g/dL    AST 38 9 - 39 U/L    Bilirubin, Total 0.3 0.0 - 1.2 mg/dL    ALT 16 10 - 52 U/L   Procalcitonin   Result Value Ref Range    Procalcitonin 12.79 (H) <=0.07 ng/mL   Vancomycin   Result Value Ref Range    Vancomycin 6.4 5.0 - 20.0 ug/mL   Iron and TIBC   Result Value Ref Range    Iron <10 (L) 35 - 150 ug/dL    UIBC 244 110 - 370 ug/dL    TIBC      % Saturation     Ferritin   Result Value Ref Range    Ferritin 102 20 - 300 ng/mL   Valproic acid level, total   Result Value Ref Range    Valproic Acid 70 50 - 100 ug/mL   Type and screen   Result Value Ref Range    ABO TYPE O     Rh TYPE POS     ANTIBODY SCREEN NEG    Hemoglobin and hematocrit, blood   Result Value Ref Range    Hemoglobin 6.5 (LL) 13.5 - 17.5 g/dL    Hematocrit 21.1 (L) 41.0 - 52.0 %   VERIFY ABO/Rh Group Test   Result Value Ref Range    ABO TYPE O     Rh TYPE POS    Prepare RBC: 1 Units   Result Value Ref Range    PRODUCT CODE B0494H33     Unit Number [id number]     Unit ABO O     Unit RH NEG     XM INTEP COMP     Dispense Status TR     Blood Expiration Date 6/9/2025 11:59:00 PM EDT     PRODUCT BLOOD TYPE 9500     UNIT VOLUME 279    MRSA Surveillance for Vancomycin De-escalation, PCR    Specimen: Anterior Nares; Swab   Result Value Ref Range    MRSA PCR Not Detected Not Detected   Urinalysis with Reflex Culture and Microscopic   Result Value Ref Range    Color, Urine Light-Yellow Light-Yellow, Yellow, Dark-Yellow    Appearance, Urine Clear Clear    Specific Gravity, Urine 1.032 1.005 - 1.035    pH, Urine 5.5 5.0, 5.5, 6.0, 6.5, 7.0, 7.5, 8.0    Protein, Urine 20 (TRACE) NEGATIVE, 10 (TRACE), 20 (TRACE) mg/dL    Glucose, Urine Normal Normal mg/dL    Blood, Urine NEGATIVE NEGATIVE mg/dL    Ketones, Urine TRACE (A) NEGATIVE mg/dL    Bilirubin, Urine NEGATIVE NEGATIVE mg/dL    Urobilinogen, Urine Normal Normal mg/dL    Nitrite, Urine NEGATIVE NEGATIVE    Leukocyte Esterase, Urine NEGATIVE NEGATIVE    Urinalysis Microscopic   Result Value Ref Range    WBC, Urine NONE 1-5, NONE /HPF    RBC, Urine NONE NONE, 1-2, 3-5 /HPF     Impression: 64-year-old with 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  Patient found to have multifocal pneumonia.    Plan:    1.  Multifocal pneumonia  - Continue with current antibiotics  - Pulmonary input appreciated, continue with the recommendations  - Respiratory therapy for nebulizers and airway clearance technique    2.  History of small cell lung cancer  - Oncology has been consulted, CT of the chest did show roughly half a centimeter increase in lung nodules from previous.  Patient has been in a surveillance period since March and was unable to complete immunotherapy previously.  Will wait for further recommendations from oncology however patient has been following with [facility] palliative care and palliative care has been consulted as well.    3.  History of seizures  - Checking an EEG based on palliative care concerns of possible seizure per their discussion with patient and family today.  I have no objection to this.  Checking a valproic acid level.    4.  Elevated troponin  - Appreciate cardiology input echocardiogram with no acute findings, no wall abnormality, EF normal.  Patient with history of CHD continue with current statin and aspirin.    5.  Acute kidney injury  - Resolved                                       Norm Brooks MD           [1] apixaban, 5 mg, oral, BID  aspirin, 81 mg, oral, Daily  atorvastatin, 40 mg, oral, Daily  budesonide, 0.5 mg, nebulization, BID  divalproex, 500 mg, oral, BID  fluticasone, 2 spray, Each Nostril, Daily  ipratropium-albuteroL, 3 mL, nebulization, TID  lacosamide, 200 mg, oral, BID  melatonin, 3 mg, oral, Nightly  mirtazapine, 7.5 mg, oral, Nightly  oxygen, , inhalation, Continuous - Inhalation  pantoprazole, 40 mg, oral, Daily before breakfast    Or  pantoprazole, 40 mg, intravenous, Daily before breakfast  piperacillin-tazobactam, 4.5 g, intravenous, q6h  [Held by provider] tiotropium, 2 puff, inhalation, Daily  vancomycin, 1,500 mg, intravenous, Once  [2] sodium chloride 0.9%, 100 mL/hr, Last Rate: 100 mL/hr (06/06/25 1326)  [3] PRN medications: acetaminophen **OR** acetaminophen **OR** acetaminophen, benzocaine-menthol, benzonatate, dextromethorphan-guaifenesin, docusate sodium, guaiFENesin, ipratropium-albuteroL, naloxone, ondansetron ODT **OR** ondansetron, oxyCODONE, polyethylene glycol, traZODone, vancomycin

## 2025-06-06 NOTE — PROGRESS NOTES
"Physical Therapy                 Therapy Communication Note    Patient Name: Martínez Neri  MRN: [id number]  Department: [address]  Room: [address] [place], [place] [address]  Today's Date: 6/6/2025     Discipline: Physical Therapy    Missed Visit:       Missed Visit Reason: Missed Visit Reason: Patient refused (Pt declined any therapy activity this date, c/o being in bed x 2 days, but then not interested in getting OOB or doing LE exercises, states it would \"cause me grief,\" and then it would make him \"have a seizure.\" Spouse present.)     Missed Time: Attempt    Comment:      "

## 2025-06-06 NOTE — CONSULTS
Inpatient consult to Hematology-Oncology  Consult performed by: Melita Antony MD  Consult ordered by: Radha Casey MD  Reason for consult: Lung cancer  Assessment/Recommendations: P/w shortness of breath, fatigue, and confusion. CTA Chest did not show PE but multilobar pneumonia. There is a concern of recurrence of his lung cancer, hence consult was called  Denies having fever. Feels the same as yesterday re dyspnea.   He was seen by Dr. Ng in March 2025 and CT scans did not show evidence of recurrence at that time.     Lab reveal significant anemia. Iron sat low. Ferritin at 100s  I reviewed CT images: Cavitary lesions overall stable compared to March 2025, afew of them may have inccreased in size slightly. Etiology of those lesions is not clear but do not look malignant on scans. No definite increasing mediastinal LAPs.     - Lung cancer: s.p chemoRT. Completed in Jan 2024. As per my review and also Radiologist's report, we cannot say there is recurrence of cancer. Contrary, I do not see any evidence of recurrence  - His dypsnea may be due to pneumonia and needs to continue antibiotics. Follow up Pulm  - Anemia is worrisome: Iron indices are low => needs IV iron. Will order venofer. Also needs an extra unit of PRBC          Reason For Consult  Lung cancer    History Of Present Illness  Martínez Neri is a 64 y.o. male presenting with dyspnea.     Past Medical History  He has a past medical history of Allergic rhinitis, Asthma, Brain aneurysm (HHS-HCC), Cancer (Multi), Carotid artery disease, COPD (chronic obstructive pulmonary disease) (Multi), Epilepsy, High cholesterol, History of alcohol use, Hypertension, Lung cancer (Multi), Neutropenic fever, and Seizure (Multi).    Surgical History  He has a past surgical history that includes Carotid endarterectomy (N/A); Colonoscopy; Hernia repair; and Cerebral aneurysm repair.     Social History  He reports that he has quit smoking. His smoking use included  "cigarettes. He has a 50 pack-year smoking history. He has been exposed to tobacco smoke. He has never used smokeless tobacco. He reports that he does not currently use alcohol. He reports current drug use. Frequency: 4.00 times per week. Drug: Marijuana.    Family History  Family History[1]     Allergies  Patient has no known allergies.    Review of Systems   Constitutional:  Positive for fatigue.   Respiratory:  Positive for cough and shortness of breath.    Genitourinary: Negative.         Physical Exam  HENT:      Head: Normocephalic and atraumatic.   Eyes:      General: No scleral icterus.  Pulmonary:      Effort: Pulmonary effort is normal.   Musculoskeletal:         General: Normal range of motion.   Skin:     Coloration: Skin is not jaundiced.   Neurological:      General: No focal deficit present.      Mental Status: He is alert and oriented to person, place, and time.          Last Recorded Vitals  Blood pressure 106/59, pulse 91, temperature 36.7 °C (98.1 °F), temperature source Temporal, resp. rate 16, height 1.753 m (5' 9\"), weight 67.1 kg (148 lb), SpO2 94%.    Relevant Results  See above     Assessment/Plan     inocencia DIXON spent 60 minutes in the professional and overall care of this patient.               [1]   Family History  Problem Relation Name Age of Onset    Heart attack Mother      Other (Colorectal cancer) Father      Aneurysm Other      Prostate cancer Other      Alcohol abuse Other       "

## 2025-06-06 NOTE — PROGRESS NOTES
Speech-Language Pathology    Inpatient Speech-Language Pathology Clinical Swallow Evaluation       Patient Name: Martínez Neri  MRN: [id number]  : 1960  Today's Date: 25  Time Calculation  Start Time: 920  Stop Time: 1000  Time Calculation (min): 40 min       Current Problem:  1. Pneumonia of both lower lobes due to infectious organism        2. Malignant neoplasm of lung, unspecified laterality, unspecified part of lung (Multi)        3. Elevated troponin  Transthoracic Echo (TTE) Complete    Transthoracic Echo (TTE) Complete      4. Shortness of breath  Transthoracic Echo (TTE) Complete    Transthoracic Echo (TTE) Complete          Past Medical History:  Medical History[1]    Reason for Referral:  Pt presented to ED on 25 with cough, congestion, and SOB.  Pt admitted for pneumonia with concern for aspiration.  Pt referred to Speech-Language Pathology services for Clinical Swallow Evaluation in order to assess current swallow skills and determine plan of treatment.      Relevant Imaging Results:  CXR --not ordered     CT angio chest 25--  IMPRESSION:  1. Findings most suggestive of multilobar pneumonia, with new  consolidative airspace opacity is present in the L5 lung lobes and  small bilateral pleural effusions.  2. Some increase in size of right hilar lymph nodes is favored to be  reactive.  3. Redemonstration of the area of volume loss/atelectasis in the left  upper lobe, similar in appearance to prior exam, likely representing  sequela prior treatment.  4. Slight increase in size of some cavitary nodules in the lungs  bilaterally.    Assessment    Impression: Pt with limited cooperation due to feeling tired and expressing that he is not having any trouble swallowing, so he questions the necessity for this consult.  SLP discussed pt's last swallowing evaluation and recommendation for MBSS as an outpatient upon return home after hospital stay approximately one year ago.  MBSS was not  competed as an outpatient, and pt had not been diagnosed with pneumonia until this admission.  Because pt presents with baseline resting cough and throat clearing post swallow, unable to clearly determine at bedside if cough and throat clear are related to dysphagia.  Pt is adamant that he has no swallowing difficulties and states he is going home as soon as his blood transfusion is completed.  Pt not likely to participate in MBSS if recommended.  Recommend continue current diet and follow up one additional session for meal analysis when pt feeling more rested and cooperative.    Consistencies Trialed: Thin (IDDSI Level 0) - Straw   Dentition: Adequate/Natural   Medical Staff Made Aware: Yes       Recommendations:  Risk for Aspiration: Yes   Solid Diet Recommendations : Regular (IDDSI Level 7)   Liquid Diet Recommendations: Thin (IDDSI Level 0)       Is MBSS Recommended?: To be determined upon further treatment at bedside    Medication Intake Method: Whole with liquid  Compensatory Swallow Strategies:   - Upright positioning as close to 90º as possible  - Small bites/sips  - Small single sips  - Slow rate of intake    Recommendation for next level of care: Skilled dysphagia treatment may be warranted at next level of care--to be determined upon further treatment.      Plan  SLP Plan: Skilled SLP   SLP Frequency: Follow-up visit only to determine if MBSS indicated.  If not, POC can be discontinued.  Duration: 3 weeks   Next Treatment Priority: Diet tolerance   Discussed POC: Patient, Caregiver/family   Discussed Risks/Benefits: Yes   Patient/Caregiver Agreeable: Yes     Further treatment in acute setting:  Skilled dysphagia treatment is warranted for further education and training on dysphagia management including instruction on oropharyngeal therapeutic exercises and/or compensatory swallowing strategies.    Dysphagia Goals: (Established 06/06/25, projected discontinuation 3 weeks)    - Pt will safely swallow  recommended diet without s/s aspiration for 90% of observed trials in order to maintain adequate nutrition and hydration.    Subjective   Pt sleepy with limited cooperation.  Wife and dtr present for assessment.      General Visit Information:  Ordering Physician: Radha Casey MD     Reason for Referral: Assess swallowing in setting of BLL pneumonia  Current Diet : Reg/thin   Prior to Session Communication: Bedside nurse   Self feeding: Independent    Objective  Clinical Swallow Evaluation completed consisting of patient/caregiver interview, oral motor assessment, and analysis of swallow abilities with PO trials ( 3 oz water via straw)    ORAL PHASE:   Pt not willing to participate in oral mechanism exam.  Mastication not assessed as pt declined trials of food.    PHARYNGEAL PHASE:   No suspected delay in onset of timeliness of swallow.  Laryngeal movement was visualized with all trials, however adequacy of hyolaryngeal elevation/excursion cannot be determined at bedside.   Pt demonstrated immediate and delayed throat clear post straw sips thin liquids.     Baseline Assessment:  Behavior/Cognition: Alert, Other (comment) (Argumentative regarding necessity of consultation. Required encouragement of family and lengthy explanation to participate.)  Patient Positioning: Partially/Semi Reclined      Pain:  Pain Assessment  Pain Assessment: 0-10  0-10 (Numeric) Pain Score: 0 - No pain     Oxygen Status:  Oxygen via nasal canula    Oral Motor Assessment:  Oral/Motor Assessment  Oral Hygiene:  (Not able to assess)  Dentition: Adequate/Natural    Inpatient Education:   Individual(s) Educated: Patient, Spouse, Child  Verbal Education : Risk of aspiration/pneumonia  Risk and Benefits Discussed with Patient/Caregiver/Other: yes  Patient/Caregiver Demonstrated Understanding: yes  Plan of Care Discussed and Agreed Upon: yes  Patient Response to Education: Patient/Caregiver Verbalized Understanding of Information    Communication  with Medical Team:  SLP communicated with bedside nurse prior to evaluation to obtain clearance for patient participation.  Results of the clinical swallow evaluation were discussed verbally with nurse upon completion with verbal understanding noted.           [1]   Past Medical History:  Diagnosis Date    Allergic rhinitis     Asthma     Brain aneurysm ([facility])     Cancer (Multi)     Carotid artery disease     COPD (chronic obstructive pulmonary disease) (Multi)     Epilepsy     High cholesterol     History of alcohol use     Hypertension     Lung cancer (Multi)     Neutropenic fever     Seizure (Multi)

## 2025-06-06 NOTE — PROGRESS NOTES
Vancomycin Dosing by Pharmacy- [facility]    Martínez Neri is a 64 y.o. year old male who Pharmacy has been consulted for vancomycin dosing for pneumonia. Based on the patient's indication and renal status this patient will be dosed based on a target level of Other Indication: 15-20 mcg/mL    Patient is currently in [facility]    Last vancomycin dose (incl. date and time): 1250 mg on 6/5 at 1721    Vancomycin level (incl. date and time): 6.4 mcg/mL on 6/6 at 0507    Lab Results   Component Value Date    VANCORANDOM 6.4 06/06/2025       Visit Vitals  BP 98/54 (BP Location: Right arm, Patient Position: Lying)   Pulse 84   Temp 36.7 °C (98.1 °F) (Temporal)   Resp 18           Lab Results   Component Value Date    CREATININE 0.86 06/06/2025    CREATININE 1.37 (H) 06/05/2025    CREATININE 0.72 03/11/2025    CREATININE 0.78 12/18/2024       I/O last 3 completed shifts:  In: 1700 (25.3 mL/kg) [IV Piggyback:1700]  Out: - (0 mL/kg)   Weight: 67.1 kg     Assessment/Plan     Level is below target trough goal.   re-dose vancomycin with one time order of 1500 mg.  Random level within 24 hours will be obtained on 6/7 at 0500. May be obtained sooner if clinically indicated.   Will continue to monitor renal function daily while on vancomycin and order serum creatinine at least every 48 hours if not already ordered.  Follow for continued vancomycin needs, clinical response, and signs/symptoms of toxicity.     Lili Acosta, GordyD

## 2025-06-07 ENCOUNTER — APPOINTMENT (OUTPATIENT)
Dept: RADIOLOGY | Facility: HOSPITAL | Age: 65
End: 2025-06-07
Payer: COMMERCIAL

## 2025-06-07 ENCOUNTER — HOSPITAL ENCOUNTER (INPATIENT)
Facility: HOSPITAL | Age: 65
LOS: 7 days | Discharge: HOME | DRG: 177 | End: 2025-06-14
Attending: INTERNAL MEDICINE | Admitting: INTERNAL MEDICINE
Payer: COMMERCIAL

## 2025-06-07 ENCOUNTER — APPOINTMENT (OUTPATIENT)
Dept: CARDIOLOGY | Facility: HOSPITAL | Age: 65
End: 2025-06-07
Payer: COMMERCIAL

## 2025-06-07 VITALS
OXYGEN SATURATION: 99 % | HEART RATE: 74 BPM | BODY MASS INDEX: 21.92 KG/M2 | SYSTOLIC BLOOD PRESSURE: 113 MMHG | WEIGHT: 148 LBS | DIASTOLIC BLOOD PRESSURE: 68 MMHG | HEIGHT: 69 IN | RESPIRATION RATE: 16 BRPM | TEMPERATURE: 97.5 F

## 2025-06-07 DIAGNOSIS — C34.12 MALIGNANT NEOPLASM OF UPPER LOBE, LEFT BRONCHUS OR LUNG (MULTI): ICD-10-CM

## 2025-06-07 DIAGNOSIS — C34.12 PRIMARY CANCER OF LEFT UPPER LOBE OF LUNG (MULTI): ICD-10-CM

## 2025-06-07 DIAGNOSIS — J18.9 PNEUMONIA OF BOTH LOWER LOBES DUE TO INFECTIOUS ORGANISM: ICD-10-CM

## 2025-06-07 DIAGNOSIS — E78.00 HYPERCHOLESTEROLEMIA: ICD-10-CM

## 2025-06-07 DIAGNOSIS — J18.9 MULTIFOCAL PNEUMONIA: Primary | ICD-10-CM

## 2025-06-07 DIAGNOSIS — F51.02 ADJUSTMENT INSOMNIA: ICD-10-CM

## 2025-06-07 DIAGNOSIS — J96.01 ACUTE HYPOXEMIC RESPIRATORY FAILURE: ICD-10-CM

## 2025-06-07 DIAGNOSIS — J96.01 ACUTE RESPIRATORY FAILURE WITH HYPOXIA: ICD-10-CM

## 2025-06-07 DIAGNOSIS — J43.9 PULMONARY EMPHYSEMA, UNSPECIFIED EMPHYSEMA TYPE (MULTI): ICD-10-CM

## 2025-06-07 DIAGNOSIS — C34.12 MALIGNANT NEOPLASM OF UPPER LOBE OF LEFT LUNG (MULTI): ICD-10-CM

## 2025-06-07 DIAGNOSIS — K59.03 DRUG-INDUCED CONSTIPATION: ICD-10-CM

## 2025-06-07 LAB
ABO GROUP (TYPE) IN BLOOD: NORMAL
ALBUMIN SERPL BCP-MCNC: 2.1 G/DL (ref 3.4–5)
ALP SERPL-CCNC: 32 U/L (ref 33–136)
ALT SERPL W P-5'-P-CCNC: 29 U/L (ref 10–52)
ANION GAP BLDA CALCULATED.4IONS-SCNC: 7 MMO/L (ref 10–25)
ANION GAP SERPL CALC-SCNC: 10 MMOL/L (ref 10–20)
ANION GAP SERPL CALCULATED.3IONS-SCNC: 10 MMOL/L (ref 10–20)
ANTIBODY SCREEN: NORMAL
APPARATUS: ABNORMAL
APTT PPP: 33 SECONDS (ref 26–36)
ARTERIAL PATENCY WRIST A: POSITIVE
AST SERPL W P-5'-P-CCNC: 51 U/L (ref 9–39)
BASE EXCESS BLDA CALC-SCNC: 1.9 MMOL/L (ref -2–3)
BASOPHILS # BLD AUTO: 0.03 X10*3/UL (ref 0–0.1)
BASOPHILS # BLD MANUAL: 0 X10*3/UL (ref 0–0.1)
BASOPHILS NFR BLD AUTO: 0.2 %
BASOPHILS NFR BLD MANUAL: 0 %
BILIRUB SERPL-MCNC: 0.4 MG/DL (ref 0–1.2)
BLOOD EXPIRATION DATE: NORMAL
BODY TEMPERATURE: 37 DEGREES CELSIUS
BUN SERPL-MCNC: 12 MG/DL (ref 6–23)
BUN SERPL-MCNC: 14 MG/DL (ref 6–23)
CA-I BLDA-SCNC: 1.25 MMOL/L (ref 1.1–1.33)
CALCIUM SERPL-MCNC: 6.7 MG/DL (ref 8.6–10.6)
CALCIUM SERPL-MCNC: 8.3 MG/DL (ref 8.6–10.3)
CARDIAC TROPONIN I PNL SERPL HS: 30 NG/L (ref 0–53)
CHLORIDE BLDA-SCNC: 103 MMOL/L (ref 98–107)
CHLORIDE SERPL-SCNC: 105 MMOL/L (ref 98–107)
CHLORIDE SERPL-SCNC: 112 MMOL/L (ref 98–107)
CO2 SERPL-SCNC: 22 MMOL/L (ref 21–32)
CO2 SERPL-SCNC: 25 MMOL/L (ref 21–32)
CREAT SERPL-MCNC: 0.6 MG/DL (ref 0.5–1.3)
CREAT SERPL-MCNC: 0.66 MG/DL (ref 0.5–1.3)
DISPENSE STATUS: NORMAL
EGFRCR SERPLBLD CKD-EPI 2021: >90 ML/MIN/1.73M*2
EGFRCR SERPLBLD CKD-EPI 2021: >90 ML/MIN/1.73M*2
EOSINOPHIL # BLD AUTO: 0.03 X10*3/UL (ref 0–0.7)
EOSINOPHIL # BLD MANUAL: 0.17 X10*3/UL (ref 0–0.7)
EOSINOPHIL NFR BLD AUTO: 0.2 %
EOSINOPHIL NFR BLD MANUAL: 1 %
ERYTHROCYTE [DISTWIDTH] IN BLOOD BY AUTOMATED COUNT: 17.3 % (ref 11.5–14.5)
ERYTHROCYTE [DISTWIDTH] IN BLOOD BY AUTOMATED COUNT: 18 % (ref 11.5–14.5)
FLOW: 35 LPM
GLUCOSE BLDA-MCNC: 102 MG/DL (ref 74–99)
GLUCOSE SERPL-MCNC: 64 MG/DL (ref 74–99)
GLUCOSE SERPL-MCNC: 76 MG/DL (ref 74–99)
HCO3 BLDA-SCNC: 26.6 MMOL/L (ref 22–26)
HCT VFR BLD AUTO: 26.5 % (ref 41–52)
HCT VFR BLD AUTO: 29.9 % (ref 41–52)
HCT VFR BLD EST: 30 % (ref 41–52)
HGB BLD-MCNC: 8.5 G/DL (ref 13.5–17.5)
HGB BLD-MCNC: 9.8 G/DL (ref 13.5–17.5)
HGB BLDA-MCNC: 10 G/DL (ref 13.5–17.5)
HYPOCHROMIA BLD QL SMEAR: ABNORMAL
IMM GRANULOCYTES # BLD AUTO: 0.09 X10*3/UL (ref 0–0.7)
IMM GRANULOCYTES # BLD AUTO: 0.11 X10*3/UL (ref 0–0.7)
IMM GRANULOCYTES NFR BLD AUTO: 0.7 % (ref 0–0.9)
IMM GRANULOCYTES NFR BLD AUTO: 0.7 % (ref 0–0.9)
INHALED O2 CONCENTRATION: 80 %
INR PPP: 1.2 (ref 0.9–1.1)
LACTATE BLDA-SCNC: 1.4 MMOL/L (ref 0.4–2)
LACTATE SERPL-SCNC: 1.3 MMOL/L (ref 0.4–2)
LYMPHOCYTES # BLD AUTO: 1.06 X10*3/UL (ref 1.2–4.8)
LYMPHOCYTES # BLD MANUAL: 1 X10*3/UL (ref 1.2–4.8)
LYMPHOCYTES NFR BLD AUTO: 7.9 %
LYMPHOCYTES NFR BLD MANUAL: 6 %
MAGNESIUM SERPL-MCNC: 1.54 MG/DL (ref 1.6–2.4)
MCH RBC QN AUTO: 26.6 PG (ref 26–34)
MCH RBC QN AUTO: 27 PG (ref 26–34)
MCHC RBC AUTO-ENTMCNC: 32.1 G/DL (ref 32–36)
MCHC RBC AUTO-ENTMCNC: 32.8 G/DL (ref 32–36)
MCV RBC AUTO: 81 FL (ref 80–100)
MCV RBC AUTO: 84 FL (ref 80–100)
MONOCYTES # BLD AUTO: 1.25 X10*3/UL (ref 0.1–1)
MONOCYTES # BLD MANUAL: 1 X10*3/UL (ref 0.1–1)
MONOCYTES NFR BLD AUTO: 9.3 %
MONOCYTES NFR BLD MANUAL: 6 %
MRSA DNA SPEC QL NAA+PROBE: NOT DETECTED
NEUTROPHILS # BLD AUTO: 10.97 X10*3/UL (ref 1.2–7.7)
NEUTROPHILS # BLD MANUAL: 14.44 X10*3/UL (ref 1.2–7.7)
NEUTROPHILS NFR BLD AUTO: 81.7 %
NEUTS BAND # BLD MANUAL: 0.5 X10*3/UL (ref 0–0.7)
NEUTS BAND NFR BLD MANUAL: 3 %
NEUTS SEG # BLD MANUAL: 13.94 X10*3/UL (ref 1.2–7)
NEUTS SEG NFR BLD MANUAL: 84 %
NRBC BLD-RTO: 0 /100 WBCS (ref 0–0)
NRBC BLD-RTO: 0 /100 WBCS (ref 0–0)
OVALOCYTES BLD QL SMEAR: ABNORMAL
OXYHGB MFR BLDA: 97.9 % (ref 94–98)
PCO2 BLDA: 41 MM HG (ref 38–42)
PH BLDA: 7.42 PH (ref 7.38–7.42)
PHOSPHATE SERPL-MCNC: 2.4 MG/DL (ref 2.5–4.9)
PLATELET # BLD AUTO: 251 X10*3/UL (ref 150–450)
PLATELET # BLD AUTO: 262 X10*3/UL (ref 150–450)
PO2 BLDA: 165 MM HG (ref 85–95)
POTASSIUM BLDA-SCNC: 3.2 MMOL/L (ref 3.5–5.3)
POTASSIUM SERPL-SCNC: 2.9 MMOL/L (ref 3.5–5.3)
POTASSIUM SERPL-SCNC: 3.3 MMOL/L (ref 3.5–5.3)
PREALB SERPL-MCNC: 6.2 MG/DL (ref 18–40)
PRODUCT BLOOD TYPE: 9500
PRODUCT CODE: NORMAL
PROT SERPL-MCNC: 3.8 G/DL (ref 6.4–8.2)
PROTHROMBIN TIME: 13.7 SECONDS (ref 9.8–12.4)
RBC # BLD AUTO: 3.15 X10*6/UL (ref 4.5–5.9)
RBC # BLD AUTO: 3.68 X10*6/UL (ref 4.5–5.9)
RBC MORPH BLD: ABNORMAL
RH FACTOR (ANTIGEN D): NORMAL
SAO2 % BLDA: 99 % (ref 94–100)
SODIUM BLDA-SCNC: 133 MMOL/L (ref 136–145)
SODIUM SERPL-SCNC: 137 MMOL/L (ref 136–145)
SODIUM SERPL-SCNC: 141 MMOL/L (ref 136–145)
SPECIMEN DRAWN FROM PATIENT: ABNORMAL
STAPHYLOCOCCUS SPEC CULT: NORMAL
TOTAL CELLS COUNTED BLD: 200
UNIT ABO: NORMAL
UNIT NUMBER: NORMAL
UNIT RH: NORMAL
UNIT VOLUME: 350
VANCOMYCIN SERPL-MCNC: 9 UG/ML (ref 5–20)
WBC # BLD AUTO: 13.4 X10*3/UL (ref 4.4–11.3)
WBC # BLD AUTO: 16.6 X10*3/UL (ref 4.4–11.3)
XM INTEP: NORMAL

## 2025-06-07 PROCEDURE — 2500000001 HC RX 250 WO HCPCS SELF ADMINISTERED DRUGS (ALT 637 FOR MEDICARE OP): Performed by: NURSE PRACTITIONER

## 2025-06-07 PROCEDURE — 85007 BL SMEAR W/DIFF WBC COUNT: CPT | Performed by: NURSE PRACTITIONER

## 2025-06-07 PROCEDURE — 82435 ASSAY OF BLOOD CHLORIDE: CPT | Performed by: STUDENT IN AN ORGANIZED HEALTH CARE EDUCATION/TRAINING PROGRAM

## 2025-06-07 PROCEDURE — 80202 ASSAY OF VANCOMYCIN: CPT

## 2025-06-07 PROCEDURE — 93005 ELECTROCARDIOGRAM TRACING: CPT

## 2025-06-07 PROCEDURE — 2500000004 HC RX 250 GENERAL PHARMACY W/ HCPCS (ALT 636 FOR OP/ED): Performed by: INTERNAL MEDICINE

## 2025-06-07 PROCEDURE — 84134 ASSAY OF PREALBUMIN: CPT | Mod: TRILAB | Performed by: NURSE PRACTITIONER

## 2025-06-07 PROCEDURE — 36415 COLL VENOUS BLD VENIPUNCTURE: CPT

## 2025-06-07 PROCEDURE — 36600 WITHDRAWAL OF ARTERIAL BLOOD: CPT

## 2025-06-07 PROCEDURE — 2500000001 HC RX 250 WO HCPCS SELF ADMINISTERED DRUGS (ALT 637 FOR MEDICARE OP)

## 2025-06-07 PROCEDURE — 99291 CRITICAL CARE FIRST HOUR: CPT | Performed by: STUDENT IN AN ORGANIZED HEALTH CARE EDUCATION/TRAINING PROGRAM

## 2025-06-07 PROCEDURE — 94640 AIRWAY INHALATION TREATMENT: CPT

## 2025-06-07 PROCEDURE — 36415 COLL VENOUS BLD VENIPUNCTURE: CPT | Performed by: NURSE PRACTITIONER

## 2025-06-07 PROCEDURE — 84484 ASSAY OF TROPONIN QUANT: CPT

## 2025-06-07 PROCEDURE — 2500000002 HC RX 250 W HCPCS SELF ADMINISTERED DRUGS (ALT 637 FOR MEDICARE OP, ALT 636 FOR OP/ED)

## 2025-06-07 PROCEDURE — 83880 ASSAY OF NATRIURETIC PEPTIDE: CPT

## 2025-06-07 PROCEDURE — 9420000001 HC RT PATIENT EDUCATION 5 MIN

## 2025-06-07 PROCEDURE — 82306 VITAMIN D 25 HYDROXY: CPT | Performed by: NURSE PRACTITIONER

## 2025-06-07 PROCEDURE — 2500000004 HC RX 250 GENERAL PHARMACY W/ HCPCS (ALT 636 FOR OP/ED)

## 2025-06-07 PROCEDURE — 94667 MNPJ CHEST WALL 1ST: CPT

## 2025-06-07 PROCEDURE — 85610 PROTHROMBIN TIME: CPT

## 2025-06-07 PROCEDURE — 2500000004 HC RX 250 GENERAL PHARMACY W/ HCPCS (ALT 636 FOR OP/ED): Performed by: STUDENT IN AN ORGANIZED HEALTH CARE EDUCATION/TRAINING PROGRAM

## 2025-06-07 PROCEDURE — 2500000005 HC RX 250 GENERAL PHARMACY W/O HCPCS: Performed by: INTERNAL MEDICINE

## 2025-06-07 PROCEDURE — 2500000001 HC RX 250 WO HCPCS SELF ADMINISTERED DRUGS (ALT 637 FOR MEDICARE OP): Performed by: INTERNAL MEDICINE

## 2025-06-07 PROCEDURE — 83605 ASSAY OF LACTIC ACID: CPT

## 2025-06-07 PROCEDURE — 87389 HIV-1 AG W/HIV-1&-2 AB AG IA: CPT | Performed by: NURSE PRACTITIONER

## 2025-06-07 PROCEDURE — 87641 MR-STAPH DNA AMP PROBE: CPT

## 2025-06-07 PROCEDURE — 2060000001 HC INTERMEDIATE ICU ROOM DAILY

## 2025-06-07 PROCEDURE — 83735 ASSAY OF MAGNESIUM: CPT

## 2025-06-07 PROCEDURE — 71045 X-RAY EXAM CHEST 1 VIEW: CPT

## 2025-06-07 PROCEDURE — 99291 CRITICAL CARE FIRST HOUR: CPT | Performed by: INTERNAL MEDICINE

## 2025-06-07 PROCEDURE — 83036 HEMOGLOBIN GLYCOSYLATED A1C: CPT

## 2025-06-07 PROCEDURE — 71045 X-RAY EXAM CHEST 1 VIEW: CPT | Performed by: RADIOLOGY

## 2025-06-07 PROCEDURE — 84132 ASSAY OF SERUM POTASSIUM: CPT | Performed by: STUDENT IN AN ORGANIZED HEALTH CARE EDUCATION/TRAINING PROGRAM

## 2025-06-07 PROCEDURE — 5A0935A ASSISTANCE WITH RESPIRATORY VENTILATION, LESS THAN 24 CONSECUTIVE HOURS, HIGH NASAL FLOW/VELOCITY: ICD-10-PCS | Performed by: INTERNAL MEDICINE

## 2025-06-07 PROCEDURE — 84295 ASSAY OF SERUM SODIUM: CPT

## 2025-06-07 PROCEDURE — 2500000002 HC RX 250 W HCPCS SELF ADMINISTERED DRUGS (ALT 637 FOR MEDICARE OP, ALT 636 FOR OP/ED): Performed by: INTERNAL MEDICINE

## 2025-06-07 PROCEDURE — 85027 COMPLETE CBC AUTOMATED: CPT | Performed by: NURSE PRACTITIONER

## 2025-06-07 PROCEDURE — 86900 BLOOD TYPING SEROLOGIC ABO: CPT

## 2025-06-07 PROCEDURE — 2500000002 HC RX 250 W HCPCS SELF ADMINISTERED DRUGS (ALT 637 FOR MEDICARE OP, ALT 636 FOR OP/ED): Performed by: NURSE PRACTITIONER

## 2025-06-07 PROCEDURE — 84100 ASSAY OF PHOSPHORUS: CPT

## 2025-06-07 PROCEDURE — 80074 ACUTE HEPATITIS PANEL: CPT | Performed by: NURSE PRACTITIONER

## 2025-06-07 PROCEDURE — 99291 CRITICAL CARE FIRST HOUR: CPT

## 2025-06-07 PROCEDURE — 82947 ASSAY GLUCOSE BLOOD QUANT: CPT

## 2025-06-07 PROCEDURE — 85025 COMPLETE CBC W/AUTO DIFF WBC: CPT

## 2025-06-07 PROCEDURE — 80048 BASIC METABOLIC PNL TOTAL CA: CPT | Performed by: NURSE PRACTITIONER

## 2025-06-07 RX ORDER — IPRATROPIUM BROMIDE AND ALBUTEROL SULFATE 2.5; .5 MG/3ML; MG/3ML
3 SOLUTION RESPIRATORY (INHALATION) 4 TIMES DAILY PRN
Status: DISCONTINUED | OUTPATIENT
Start: 2025-06-07 | End: 2025-06-14 | Stop reason: HOSPADM

## 2025-06-07 RX ORDER — QUETIAPINE FUMARATE 25 MG/1
12.5 TABLET, FILM COATED ORAL NIGHTLY
Status: DISCONTINUED | OUTPATIENT
Start: 2025-06-07 | End: 2025-06-14 | Stop reason: HOSPADM

## 2025-06-07 RX ORDER — TRAZODONE HYDROCHLORIDE 50 MG/1
50 TABLET ORAL NIGHTLY PRN
Status: DISCONTINUED | OUTPATIENT
Start: 2025-06-07 | End: 2025-06-07

## 2025-06-07 RX ORDER — IPRATROPIUM BROMIDE AND ALBUTEROL SULFATE 2.5; .5 MG/3ML; MG/3ML
3 SOLUTION RESPIRATORY (INHALATION)
Status: DISCONTINUED | OUTPATIENT
Start: 2025-06-07 | End: 2025-06-10

## 2025-06-07 RX ORDER — TALC
3 POWDER (GRAM) TOPICAL NIGHTLY
Status: DISCONTINUED | OUTPATIENT
Start: 2025-06-07 | End: 2025-06-14 | Stop reason: HOSPADM

## 2025-06-07 RX ORDER — POTASSIUM CHLORIDE 20 MEQ/1
40 TABLET, EXTENDED RELEASE ORAL EVERY 4 HOURS
Status: DISCONTINUED | OUTPATIENT
Start: 2025-06-07 | End: 2025-06-08

## 2025-06-07 RX ORDER — VANCOMYCIN 1.75 G/350ML
1250 INJECTION, SOLUTION INTRAVENOUS EVERY 12 HOURS
Status: DISCONTINUED | OUTPATIENT
Start: 2025-06-07 | End: 2025-06-07 | Stop reason: HOSPADM

## 2025-06-07 RX ORDER — METOPROLOL TARTRATE 1 MG/ML
INJECTION, SOLUTION INTRAVENOUS
Status: COMPLETED
Start: 2025-06-07 | End: 2025-06-07

## 2025-06-07 RX ORDER — BENZONATATE 100 MG/1
100 CAPSULE ORAL 3 TIMES DAILY PRN
Status: DISCONTINUED | OUTPATIENT
Start: 2025-06-07 | End: 2025-06-14 | Stop reason: HOSPADM

## 2025-06-07 RX ORDER — GUAIFENESIN/DEXTROMETHORPHAN 100-10MG/5
5 SYRUP ORAL EVERY 4 HOURS PRN
Status: DISCONTINUED | OUTPATIENT
Start: 2025-06-07 | End: 2025-06-14 | Stop reason: HOSPADM

## 2025-06-07 RX ORDER — HEPARIN SODIUM 10000 [USP'U]/100ML
0-4500 INJECTION, SOLUTION INTRAVENOUS CONTINUOUS
Status: DISCONTINUED | OUTPATIENT
Start: 2025-06-08 | End: 2025-06-09

## 2025-06-07 RX ORDER — BUDESONIDE 0.5 MG/2ML
0.5 INHALANT ORAL
Status: DISCONTINUED | OUTPATIENT
Start: 2025-06-07 | End: 2025-06-14 | Stop reason: HOSPADM

## 2025-06-07 RX ORDER — POLYETHYLENE GLYCOL 3350 17 G/17G
17 POWDER, FOR SOLUTION ORAL DAILY PRN
Status: DISCONTINUED | OUTPATIENT
Start: 2025-06-07 | End: 2025-06-14 | Stop reason: HOSPADM

## 2025-06-07 RX ORDER — VANCOMYCIN HYDROCHLORIDE 1 G/20ML
INJECTION, POWDER, LYOPHILIZED, FOR SOLUTION INTRAVENOUS DAILY PRN
Status: DISCONTINUED | OUTPATIENT
Start: 2025-06-07 | End: 2025-06-08

## 2025-06-07 RX ORDER — ATORVASTATIN CALCIUM 40 MG/1
40 TABLET, FILM COATED ORAL NIGHTLY
Status: DISCONTINUED | OUTPATIENT
Start: 2025-06-07 | End: 2025-06-08

## 2025-06-07 RX ORDER — HYDROXYZINE HYDROCHLORIDE 25 MG/1
25 TABLET, FILM COATED ORAL EVERY 4 HOURS PRN
Status: DISCONTINUED | OUTPATIENT
Start: 2025-06-07 | End: 2025-06-07 | Stop reason: HOSPADM

## 2025-06-07 RX ORDER — MICONAZOLE NITRATE 2 %
2 CREAM (GRAM) TOPICAL EVERY 2 HOUR PRN
Status: DISCONTINUED | OUTPATIENT
Start: 2025-06-07 | End: 2025-06-14 | Stop reason: HOSPADM

## 2025-06-07 RX ORDER — ACETAMINOPHEN 325 MG/1
650 TABLET ORAL EVERY 6 HOURS PRN
Status: DISCONTINUED | OUTPATIENT
Start: 2025-06-07 | End: 2025-06-14 | Stop reason: HOSPADM

## 2025-06-07 RX ORDER — MICONAZOLE NITRATE 2 %
2 CREAM (GRAM) TOPICAL EVERY 2 HOUR PRN
Status: DISCONTINUED | OUTPATIENT
Start: 2025-06-07 | End: 2025-06-07 | Stop reason: HOSPADM

## 2025-06-07 RX ORDER — MIRTAZAPINE 15 MG/1
7.5 TABLET, FILM COATED ORAL NIGHTLY
Status: DISCONTINUED | OUTPATIENT
Start: 2025-06-07 | End: 2025-06-14 | Stop reason: HOSPADM

## 2025-06-07 RX ORDER — DOCUSATE SODIUM 100 MG/1
100 CAPSULE, LIQUID FILLED ORAL 2 TIMES DAILY
Status: DISCONTINUED | OUTPATIENT
Start: 2025-06-07 | End: 2025-06-14 | Stop reason: HOSPADM

## 2025-06-07 RX ORDER — OXYCODONE HYDROCHLORIDE 5 MG/1
5 TABLET ORAL EVERY 4 HOURS PRN
Status: DISCONTINUED | OUTPATIENT
Start: 2025-06-07 | End: 2025-06-14 | Stop reason: HOSPADM

## 2025-06-07 RX ORDER — HYDROXYZINE HYDROCHLORIDE 25 MG/1
25 TABLET, FILM COATED ORAL EVERY 4 HOURS PRN
Status: DISCONTINUED | OUTPATIENT
Start: 2025-06-07 | End: 2025-06-07

## 2025-06-07 RX ORDER — ONDANSETRON HYDROCHLORIDE 2 MG/ML
4 INJECTION, SOLUTION INTRAVENOUS EVERY 8 HOURS PRN
Status: DISCONTINUED | OUTPATIENT
Start: 2025-06-07 | End: 2025-06-14 | Stop reason: HOSPADM

## 2025-06-07 RX ORDER — METOPROLOL TARTRATE 1 MG/ML
5 INJECTION, SOLUTION INTRAVENOUS ONCE
Status: COMPLETED | OUTPATIENT
Start: 2025-06-07 | End: 2025-06-07

## 2025-06-07 RX ORDER — FLUTICASONE PROPIONATE 50 MCG
2 SPRAY, SUSPENSION (ML) NASAL DAILY
Status: DISCONTINUED | OUTPATIENT
Start: 2025-06-07 | End: 2025-06-14 | Stop reason: HOSPADM

## 2025-06-07 RX ORDER — GUAIFENESIN 600 MG/1
600 TABLET, EXTENDED RELEASE ORAL 2 TIMES DAILY PRN
Status: DISCONTINUED | OUTPATIENT
Start: 2025-06-07 | End: 2025-06-14 | Stop reason: HOSPADM

## 2025-06-07 RX ORDER — DIVALPROEX SODIUM 500 MG/1
500 TABLET, FILM COATED, EXTENDED RELEASE ORAL 2 TIMES DAILY
Status: DISCONTINUED | OUTPATIENT
Start: 2025-06-07 | End: 2025-06-14 | Stop reason: HOSPADM

## 2025-06-07 RX ORDER — FUROSEMIDE 10 MG/ML
20 INJECTION INTRAMUSCULAR; INTRAVENOUS ONCE
Status: COMPLETED | OUTPATIENT
Start: 2025-06-07 | End: 2025-06-07

## 2025-06-07 RX ORDER — LACOSAMIDE 100 MG/1
200 TABLET ORAL 2 TIMES DAILY
Status: DISCONTINUED | OUTPATIENT
Start: 2025-06-07 | End: 2025-06-14 | Stop reason: HOSPADM

## 2025-06-07 RX ORDER — PANTOPRAZOLE SODIUM 40 MG/10ML
40 INJECTION, POWDER, LYOPHILIZED, FOR SOLUTION INTRAVENOUS
Status: DISCONTINUED | OUTPATIENT
Start: 2025-06-08 | End: 2025-06-08

## 2025-06-07 RX ORDER — PANTOPRAZOLE SODIUM 40 MG/1
40 TABLET, DELAYED RELEASE ORAL
Status: DISCONTINUED | OUTPATIENT
Start: 2025-06-08 | End: 2025-06-14 | Stop reason: HOSPADM

## 2025-06-07 RX ORDER — NAPROXEN SODIUM 220 MG/1
81 TABLET, FILM COATED ORAL DAILY
Status: DISCONTINUED | OUTPATIENT
Start: 2025-06-07 | End: 2025-06-14 | Stop reason: HOSPADM

## 2025-06-07 RX ORDER — POTASSIUM CHLORIDE 14.9 MG/ML
20 INJECTION INTRAVENOUS ONCE
Status: DISCONTINUED | OUTPATIENT
Start: 2025-06-07 | End: 2025-06-07 | Stop reason: HOSPADM

## 2025-06-07 RX ADMIN — PIPERACILLIN SODIUM AND TAZOBACTAM SODIUM 4.5 G: 4; .5 INJECTION, SOLUTION INTRAVENOUS at 16:21

## 2025-06-07 RX ADMIN — FUROSEMIDE 20 MG: 10 INJECTION, SOLUTION INTRAMUSCULAR; INTRAVENOUS at 12:23

## 2025-06-07 RX ADMIN — POTASSIUM CHLORIDE 20 MEQ: 14.9 INJECTION, SOLUTION INTRAVENOUS at 15:19

## 2025-06-07 RX ADMIN — Medication 35 L/MIN: at 11:58

## 2025-06-07 RX ADMIN — PIPERACILLIN SODIUM AND TAZOBACTAM SODIUM 4.5 G: 4; .5 INJECTION, SOLUTION INTRAVENOUS at 23:19

## 2025-06-07 RX ADMIN — IPRATROPIUM BROMIDE AND ALBUTEROL SULFATE 3 ML: 2.5; .5 SOLUTION RESPIRATORY (INHALATION) at 12:00

## 2025-06-07 RX ADMIN — HYDROXYZINE HYDROCHLORIDE 25 MG: 25 TABLET, FILM COATED ORAL at 08:19

## 2025-06-07 RX ADMIN — PIPERACILLIN SODIUM AND TAZOBACTAM SODIUM 4.5 G: 4; .5 INJECTION, SOLUTION INTRAVENOUS at 10:04

## 2025-06-07 RX ADMIN — SODIUM CHLORIDE 100 ML/HR: 900 INJECTION, SOLUTION INTRAVENOUS at 10:55

## 2025-06-07 RX ADMIN — BUDESONIDE 0.5 MG: 0.5 INHALANT RESPIRATORY (INHALATION) at 07:37

## 2025-06-07 RX ADMIN — LACOSAMIDE 200 MG: 100 TABLET, FILM COATED ORAL at 21:21

## 2025-06-07 RX ADMIN — PIPERACILLIN SODIUM AND TAZOBACTAM SODIUM 4.5 G: 4; .5 INJECTION, SOLUTION INTRAVENOUS at 04:15

## 2025-06-07 RX ADMIN — BENZONATATE 200 MG: 100 CAPSULE ORAL at 07:30

## 2025-06-07 RX ADMIN — FLUTICASONE PROPIONATE 2 SPRAY: 50 SPRAY, METERED NASAL at 21:19

## 2025-06-07 RX ADMIN — GUAIFENESIN AND DEXTROMETHORPHAN 5 ML: 100; 10 SYRUP ORAL at 07:30

## 2025-06-07 RX ADMIN — ATORVASTATIN CALCIUM 40 MG: 80 TABLET, FILM COATED ORAL at 21:21

## 2025-06-07 RX ADMIN — DIVALPROEX SODIUM 500 MG: 500 TABLET, FILM COATED, EXTENDED RELEASE ORAL at 08:19

## 2025-06-07 RX ADMIN — VANCOMYCIN HYDROCHLORIDE 1250 MG: 1.25 INJECTION, POWDER, LYOPHILIZED, FOR SOLUTION INTRAVENOUS at 21:39

## 2025-06-07 RX ADMIN — APIXABAN 5 MG: 5 TABLET, FILM COATED ORAL at 08:19

## 2025-06-07 RX ADMIN — IPRATROPIUM BROMIDE AND ALBUTEROL SULFATE 3 ML: 2.5; .5 SOLUTION RESPIRATORY (INHALATION) at 07:37

## 2025-06-07 RX ADMIN — DIVALPROEX SODIUM 500 MG: 500 TABLET, FILM COATED, EXTENDED RELEASE ORAL at 21:21

## 2025-06-07 RX ADMIN — VANCOMYCIN 1250 MG: 1.75 INJECTION, SOLUTION INTRAVENOUS at 07:43

## 2025-06-07 RX ADMIN — IPRATROPIUM BROMIDE AND ALBUTEROL SULFATE 3 ML: .5; 3 SOLUTION RESPIRATORY (INHALATION) at 20:20

## 2025-06-07 RX ADMIN — POTASSIUM CHLORIDE 40 MEQ: 1500 TABLET, EXTENDED RELEASE ORAL at 21:20

## 2025-06-07 RX ADMIN — METOPROLOL TARTRATE 5 MG: 5 INJECTION INTRAVENOUS at 13:29

## 2025-06-07 RX ADMIN — Medication 15 L/MIN: at 07:42

## 2025-06-07 RX ADMIN — LACOSAMIDE 200 MG: 100 TABLET, FILM COATED ORAL at 08:19

## 2025-06-07 RX ADMIN — PANTOPRAZOLE SODIUM 40 MG: 40 INJECTION, POWDER, FOR SOLUTION INTRAVENOUS at 06:41

## 2025-06-07 RX ADMIN — OXYCODONE 5 MG: 5 TABLET ORAL at 07:27

## 2025-06-07 RX ADMIN — METOPROLOL TARTRATE 5 MG: 1 INJECTION, SOLUTION INTRAVENOUS at 13:29

## 2025-06-07 RX ADMIN — BUDESONIDE 0.5 MG: 0.5 INHALANT RESPIRATORY (INHALATION) at 20:20

## 2025-06-07 ASSESSMENT — PAIN - FUNCTIONAL ASSESSMENT
PAIN_FUNCTIONAL_ASSESSMENT: 0-10
PAIN_FUNCTIONAL_ASSESSMENT: WONG-BAKER FACES

## 2025-06-07 ASSESSMENT — PAIN SCALES - GENERAL
PAINLEVEL_OUTOF10: 1
PAINLEVEL_OUTOF10: 10 - WORST POSSIBLE PAIN
PAINLEVEL_OUTOF10: 0 - NO PAIN

## 2025-06-07 ASSESSMENT — PAIN SCALES - WONG BAKER: WONGBAKER_NUMERICALRESPONSE: NO HURT

## 2025-06-07 ASSESSMENT — COGNITIVE AND FUNCTIONAL STATUS - GENERAL
MOBILITY SCORE: 20
DAILY ACTIVITIY SCORE: 24
WALKING IN HOSPITAL ROOM: A LITTLE
CLIMB 3 TO 5 STEPS WITH RAILING: TOTAL

## 2025-06-07 ASSESSMENT — PAIN DESCRIPTION - DESCRIPTORS: DESCRIPTORS: ACHING

## 2025-06-07 ASSESSMENT — PAIN DESCRIPTION - LOCATION: LOCATION: GENERALIZED

## 2025-06-07 NOTE — CARE PLAN
The patient's goals for the shift include  keep O2 on, Safety when ambulating    The clinical goals for the shift include receive unit of prbc, antibiotics    Over the shift, the patient did not make progress toward the following goals. Barriers to progression include disease process. Recommendations to address these barriers include continue plan of care.

## 2025-06-07 NOTE — SIGNIFICANT EVENT
06/07/25 0814   Medical Gas Therapy/Pulse Ox   Medical Gas Therapy Supplemental oxygen   Medical Gas Delivery Method High flow nasal cannula  (35l)   Humidification Yes   O2 Temperature 33 °C (91.4 °F)   Water Level Full   SpO2 93 %   Patient Activity During SpO2 Measurement At rest   Pulse Oximetry Type Continuous   Oximetry Probe Site Changed Yes   FiO2 (%) 80 %     PT PLACED ON HFNC

## 2025-06-07 NOTE — PROGRESS NOTES
Francia Neri is a 64 y.o. male on day 2 of admission presenting with Pneumonia of both lower lobes due to infectious organism.      Subjective   Patient tells me that he is doing better.  He is on high flow oxygen with a hypersensitive 5 L.  According to the nurse, patient was trying to take his oxygen off earlier today and his heart rate went in 160s.       Objective     Last Recorded Vitals  BP 93/59 (BP Location: Right arm, Patient Position: Lying)   Pulse (!) 127   Temp 37.6 °C (99.7 °F) (Temporal)   Resp 22   Wt 67.1 kg (148 lb)   SpO2 93%   Intake/Output last 3 Shifts:    Intake/Output Summary (Last 24 hours) at 6/7/2025 1126  Last data filed at 6/7/2025 1055  Gross per 24 hour   Intake 3593.75 ml   Output 1530 ml   Net 2063.75 ml       Admission Weight  Weight: 67.1 kg (148 lb) (06/05/25 0220)    Daily Weight  06/05/25 : 67.1 kg (148 lb)    Image Results  US renal complete  Narrative: Interpreted By:  Francia Mcallister,   STUDY:  US RENAL COMPLETE; 6/5/2025 2:36 pm      INDICATION:  Signs/Symptoms:mena.      COMPARISON:  None      ACCESSION NUMBER(S):  [id number]      ORDERING CLINICIAN:  JF ALEMAN      TECHNIQUE:  Grayscale and color Doppler imaging of the kidneys      FINDINGS:  The right kidney measures 11.0 cm. There is a 1.7 cm mid to lower  pole right renal cyst. The left kidney measures 11.9 cm  .          No renal stones are identified.  The renal cortical thickness and  echogenicity is normal.  No hydronephrosis is identified.      Urinary bladder is nonspecific in appearance with no stones or masses  identified.      Impression: Right renal cyst.      MACRO:  none      Signed by: Francia Mcallister 6/5/2025 3:12 PM  Dictation workstation:   [id number]  Transthoracic Echo (TTE) Complete              [place] [address], [place] [id number]              Phone [phone]    TRANSTHORACIC ECHOCARDIOGRAM REPORT    Patient Name:       FRANCIA NERI       Reading Physician:     [id number] Mo Narayan DO  Study Date:         6/5/2025             Ordering Provider:    [id number] GERMAINE CARTAGENA                                                                 ANJELICA  MRN/PID:            [id number]             Fellow:  Accession#:         [id number]         Nurse:  Date of Birth/Age:  1960 / 64 years Sonographer:          Bhavna Gonzales RDCS  Gender Assigned at  M                    Additional Staff:  Birth:  Height:             175.26 cm            Admit Date:  Weight:             67.13 kg             Admission Status:     Inpatient -                                                                 Routine  BSA / BMI:          1.82 m2 / 21.86      Department Location:  [facility]                      kg/m2  Blood Pressure: 103 /58 mmHg    Study Type:    TRANSTHORACIC ECHO (TTE) COMPLETE  Diagnosis/ICD: Shortness of breath-R06.02; Elevated Troponin-R79.89  Indication:    elevated troponin, sob  CPT Codes:     Echo Complete w Full Doppler-59815    Patient History:  Pertinent History: Elevated troponin.    Study Detail: The following Echo studies were performed: 2D, M-Mode, Doppler and                color flow. Technically challenging study due to the patient's                lack of cooperation, prominent lung artifact and body habitus.       PHYSICIAN INTERPRETATION:  Left Ventricle: Left ventricular ejection fraction is normal by visual estimate at 55-60%. There are no regional wall motion abnormalities. The left ventricular cavity size is normal. There is normal septal and mildly increased posterior left ventricular wall thickness. There is left ventricular concentric remodeling. Spectral Doppler shows a Grade I (impaired relaxation pattern) of left ventricular diastolic filling with normal left atrial filling pressure.  Left Atrium: The left atrial size is  normal.  Right Ventricle: The right ventricle is normal in size. There is normal right ventricular global systolic function.  Right Atrium: The right atrial size is normal.  Aortic Valve: There is no evidence of aortic valve regurgitation.  Mitral Valve: The mitral valve is normal in structure. There is trace mitral valve regurgitation. The E Vmax is 0.87 m/s.  Tricuspid Valve: The tricuspid valve is structurally normal. There is trace tricuspid regurgitation.  Pulmonic Valve: The pulmonic valve is not well visualized. The pulmonic valve regurgitation was not assessed.  Pericardium: No pericardial effusion noted.  Aorta: The aortic root is normal.  Systemic Veins: The inferior vena cava appears mildly dilated, with IVC inspiratory collapse greater than 50%.       CONCLUSIONS:   1. Left ventricular ejection fraction is normal by visual estimate at 55-60%.   2. Spectral Doppler shows a Grade I (impaired relaxation pattern) of left ventricular diastolic filling with normal left atrial filling pressure.   3. There is normal right ventricular global systolic function.   4. The inferior vena cava appears mildly dilated, with IVC inspiratory collapse greater than 50%.    QUANTITATIVE DATA SUMMARY:     2D MEASUREMENTS:             Normal Ranges:  LAs:             3.40 cm     (2.7-4.0cm)  IVSd:            0.87 cm     (0.6-1.1cm)  LVPWd:           1.10 cm     (0.6-1.1cm)  LVIDd:           4.59 cm     (3.9-5.9cm)  LVIDs:           3.04 cm  LV Mass Index:   85.3 g/m2  LVEDV Index:     64.53 ml/m2  LV % FS          33.8 %       LEFT ATRIUM:                  Normal Ranges:  LA Vol A4C:        48.4 ml    (22+/-6mL/m2)  LA Vol A2C:        50.2 ml  LA Vol BP:         50.2 ml  LA Vol Index A4C:  26.6ml/m2  LA Vol Index A2C:  27.6 ml/m2  LA Vol Index BP:   27.6 ml/m2  LA Area A4C:       17.6 cm2  LA Area A2C:       17.6 cm2  LA Major Axis A4C: 5.4 cm  LA Major Axis A2C: 5.2 cm  LA Volume Index:   26.4 ml/m2  LA Vol A4C:        45.9  ml  LA Vol A2C:        48.9 ml  LA Vol Index BSA:  26.1 ml/m2       RIGHT ATRIUM:                 Normal Ranges:  RA Vol A4C:        33.9 ml    (8.3-19.5ml)  RA Vol Index A4C:  18.6 ml/m2  RA Area A4C:       13.3 cm2  RA Major Axis A4C: 4.4 cm       M-MODE MEASUREMENTS:         Normal Ranges:  Ao Root:             2.10 cm (2.0-3.7cm)  LAs:                 3.70 cm (2.7-4.0cm)       LV SYSTOLIC FUNCTION:                       Normal Ranges:  EF-A4C View:    58 % (>=55%)  EF-A2C View:    56 %  EF-Biplane:     56 %  EF-Visual:      58 %  LV EF Reported: 58 %       LV DIASTOLIC FUNCTION:           Normal Ranges:  MV Peak E:             0.87 m/s  (0.7-1.2 m/s)  MV Peak A:             0.94 m/s  (0.42-0.7 m/s)  E/A Ratio:             0.93      (1.0-2.2)  MV e'                  0.065 m/s (>8.0)  MV lateral e'          0.08 m/s  MV medial e'           0.05 m/s  E/e' Ratio:            13.42     (<8.0)       MITRAL VALVE:          Normal Ranges:  MV DT:        194 msec (150-240msec)       AORTIC VALVE:                     Normal Ranges:  AoV Vmax:                1.47 m/s (<=1.7m/s)  AoV Peak P.6 mmHg (<20mmHg)  AoV Mean P.0 mmHg (1.7-11.5mmHg)  LVOT Max Vishnu:            0.91 m/s (<=1.1m/s)  AoV VTI:                 27.10 cm (18-25cm)  LVOT VTI:                14.50 cm  AoV Dimensionless Index: 0.54       RIGHT VENTRICLE:  RV Basal 3.78 cm  RV Major 7.0 cm  TAPSE:   18.7 mm  RV s'    0.10 m/s       TRICUSPID VALVE/RVSP:         Normal Ranges:  Est. RA Pressure:     8 mmHg  IVC Diam:             2.80 cm       PULMONIC VALVE:          Normal Ranges:  PV Max Vishnu:     0.9 m/s  (0.6-0.9m/s)  PV Max PG:      3.3 mmHg       [id number] Mo Narayan DO  Electronically signed on 2025 at 2:13:13 PM       ** Final **  ECG 12 lead  Sinus tachycardia  Left axis deviation  Right bundle branch block  Abnormal ECG  When compared with ECG of 2024 20:43,  Right bundle branch block is now Present  Confirmed by  Tristian Alcantara ([id number]) on 6/5/2025 12:13:24 PM  CT angio chest for pulmonary embolism  Narrative: Interpreted By:  Artur Lino,   STUDY:  CT ANGIO CHEST FOR PULMONARY EMBOLISM;  6/5/2025 3:46 am      INDICATION:  Signs/Symptoms:hx of lung cancer, SOB, chest pain.          COMPARISON:  CT chest dated 03/11/2025      ACCESSION NUMBER(S):  [id number]      ORDERING CLINICIAN:  BRITNEY SANZ      TECHNIQUE:  Helical data acquisition of the chest was obtained after intravenous  administration of 75 mL Omnipaque 350, as per PE protocol. Images  were reformatted in coronal and sagittal planes. Axial and coronal  maximum intensity projection (MIP) images were created and reviewed.      FINDINGS:  POTENTIAL LIMITATIONS OF THE STUDY: None      HEART AND VESSELS:  There are no discrete filling defects within main pulmonary artery  and its branches to suggest acute pulmonary embolism. Main pulmonary  artery and its branches are normal in caliber.      The thoracic aorta normal in course and caliber.Mild vascular  calcifications are present in the thoracic aorta.Although, the study  is not tailored for evaluation of aorta, there is no definite  evidence of acute aortic pathology. Moderate coronary artery  calcifications are seen. Please note,the study is not optimized for  evaluation of coronary arteries.      The cardiac chambers are not enlarged.There are no findings to  suggest right heart strain. Trace pericardial effusion is present.      MEDIASTINUM AND WILLIAM, LOWER NECK AND AXILLA:  The visualized thyroid gland is within normal limits.  There is some increase in size of right hilar lymph nodes compared to  prior exam, favored to be reactive in etiology. Esophagus appears  within normal limits as seen.      LUNGS AND AIRWAYS:  The trachea and central airways are patent. No endobronchial lesion  is seen.Small amount of mucus/secretions is present in the mainstem  bronchi bilaterally.      There is  redemonstration of the area of geographic volume  loss/scarring in the left upper lobe, with several new nodular  density is present along the pleura (series 5, image 113 and 88)  suggestive of superimposed infectious process.      There has been some increase in size of cavitary nodules in the left  upper lobe. For example dominant nodule measures up to 1.6 cm in size  compared to 1.3 cm on previous study (series 5, image 82).      Additionally, new consolidative airspace opacities are present in the  right upper, right middle and right lower lobes, suggestive of  multifocal pneumonia. There are small bilateral pleural effusions.      Mild infiltrates are also present in the left lower lobe near the  lingula.      A 1.1 cm cavitary nodule in the right lower lobe is unchanged in  appearance to prior study (series 11, image 164)      UPPER ABDOMEN:  The visualized subdiaphragmatic structures demonstrate no remarkable  findings.      CHEST WALL AND OSSEOUS STRUCTURES:  Chest wall is within normal limits.  No acute osseous pathology.There are no suspicious osseous  lesions.Subtle degenerative changes are present in the thoracic spine  without compression fracture or high-grade stenosis.      Impression: 1. Findings most suggestive of multilobar pneumonia, with new  consolidative airspace opacity is present in the L5 lung lobes and  small bilateral pleural effusions.  2. Some increase in size of right hilar lymph nodes is favored to be  reactive.  3. Redemonstration of the area of volume loss/atelectasis in the left  upper lobe, similar in appearance to prior exam, likely representing  sequela prior treatment.  4. Slight increase in size of some cavitary nodules in the lungs  bilaterally.      MACRO:  None      Signed by: Artur Lino 6/5/2025 4:01 AM  Dictation workstation:   [id number]    Physical exam.  Patient's wife is at bedside.  Pt is acute respiratory distress.  He is currently on high flow oxygen,  80% 3 to 5 L  Patient is somewhat angry about being in the hospital and he tries to remove oxygen off.  A, Ox3.  Face is symmetrical.  Skin - no lesions.  Lungs: clear to auscultations B/L. No wheezes, rales.  Few rhonchi bilaterally  Heart: regular tachycardic S1S2.  Abdomen: soft, NT, ND. BS positive.  Extr.: no edema, cords, cyanosis.  Moves all extr.     Relevant Results  Scheduled medications  Scheduled Medications[1]  Continuous medications  Continuous Medications[2]  PRN medications  PRN Medications[3]  Results for orders placed or performed during the hospital encounter of 06/05/25 (from the past 24 hours)   MRSA Surveillance for Vancomycin De-escalation, PCR    Specimen: Anterior Nares; Swab   Result Value Ref Range    MRSA PCR Not Detected Not Detected   Urinalysis with Reflex Culture and Microscopic   Result Value Ref Range    Color, Urine Light-Yellow Light-Yellow, Yellow, Dark-Yellow    Appearance, Urine Clear Clear    Specific Gravity, Urine 1.032 1.005 - 1.035    pH, Urine 5.5 5.0, 5.5, 6.0, 6.5, 7.0, 7.5, 8.0    Protein, Urine 20 (TRACE) NEGATIVE, 10 (TRACE), 20 (TRACE) mg/dL    Glucose, Urine Normal Normal mg/dL    Blood, Urine NEGATIVE NEGATIVE mg/dL    Ketones, Urine TRACE (A) NEGATIVE mg/dL    Bilirubin, Urine NEGATIVE NEGATIVE mg/dL    Urobilinogen, Urine Normal Normal mg/dL    Nitrite, Urine NEGATIVE NEGATIVE    Leukocyte Esterase, Urine NEGATIVE NEGATIVE   Urinalysis Microscopic   Result Value Ref Range    WBC, Urine NONE 1-5, NONE /HPF    RBC, Urine NONE NONE, 1-2, 3-5 /HPF   Respiratory Culture/Smear    Specimen: SPUTUM; Fluid   Result Value Ref Range    Gram Stain       Gram stain indicates specimen consists of lower respiratory tract secretions.    Gram Stain No predominant organism    Prepare RBC: 1 Units   Result Value Ref Range    PRODUCT CODE S3777Q89     Unit Number [id number]     Unit ABO O     Unit RH NEG     XM INTEP COMP     Dispense Status TR     Blood Expiration Date  6/12/2025 11:59:00 PM EDT     PRODUCT BLOOD TYPE 9500     UNIT VOLUME 350    Vancomycin   Result Value Ref Range    Vancomycin 9.0 5.0 - 20.0 ug/mL   CBC and Auto Differential   Result Value Ref Range    WBC 16.6 (H) 4.4 - 11.3 x10*3/uL    nRBC 0.0 0.0 - 0.0 /100 WBCs    RBC 3.68 (L) 4.50 - 5.90 x10*6/uL    Hemoglobin 9.8 (L) 13.5 - 17.5 g/dL    Hematocrit 29.9 (L) 41.0 - 52.0 %    MCV 81 80 - 100 fL    MCH 26.6 26.0 - 34.0 pg    MCHC 32.8 32.0 - 36.0 g/dL    RDW 17.3 (H) 11.5 - 14.5 %    Platelets 262 150 - 450 x10*3/uL    Immature Granulocytes %, Automated 0.7 0.0 - 0.9 %    Immature Granulocytes Absolute, Automated 0.11 0.00 - 0.70 x10*3/uL   Basic Metabolic Panel   Result Value Ref Range    Glucose 76 74 - 99 mg/dL    Sodium 137 136 - 145 mmol/L    Potassium 3.3 (L) 3.5 - 5.3 mmol/L    Chloride 105 98 - 107 mmol/L    Bicarbonate 25 21 - 32 mmol/L    Anion Gap 10 10 - 20 mmol/L    Urea Nitrogen 14 6 - 23 mg/dL    Creatinine 0.66 0.50 - 1.30 mg/dL    eGFR >90 >60 mL/min/1.73m*2    Calcium 8.3 (L) 8.6 - 10.3 mg/dL   Manual Differential   Result Value Ref Range    Neutrophils %, Manual 84.0 40.0 - 80.0 %    Bands %, Manual 3.0 0.0 - 5.0 %    Lymphocytes %, Manual 6.0 13.0 - 44.0 %    Monocytes %, Manual 6.0 2.0 - 10.0 %    Eosinophils %, Manual 1.0 0.0 - 6.0 %    Basophils %, Manual 0.0 0.0 - 2.0 %    Seg Neutrophils Absolute, Manual 13.94 (H) 1.20 - 7.00 x10*3/uL    Bands Absolute, Manual 0.50 0.00 - 0.70 x10*3/uL    Lymphocytes Absolute, Manual 1.00 (L) 1.20 - 4.80 x10*3/uL    Monocytes Absolute, Manual 1.00 0.10 - 1.00 x10*3/uL    Eosinophils Absolute, Manual 0.17 0.00 - 0.70 x10*3/uL    Basophils Absolute, Manual 0.00 0.00 - 0.10 x10*3/uL    Total Cells Counted 200     Neutrophils Absolute, Manual 14.44 (H) 1.20 - 7.70 x10*3/uL    RBC Morphology See Below     Hypochromia Mild     Ovalocytes Few      Impression: 64-year-old with history of squamous cell carcinoma status post chemotherapy and radiation presents  acute shortness of breath, cough.  Patient found to have multifocal pneumonia.    Plan:    1.  Multifocal pneumonia  - Continue with current antibiotics  - Pulmonary input appreciated, continue with the recommendations  - Respiratory therapy for nebulizers and airway clearance technique    2.  History of small cell lung cancer  - Oncology has been consulted, CT of the chest did show roughly half a centimeter increase in lung nodules from previous.  Patient has been in a surveillance period since March and was unable to complete immunotherapy previously.  Will wait for further recommendations from oncology however patient has been following with [facility] palliative care and palliative care has been consulted as well.    3.  History of seizures  - Checking an EEG based on palliative care concerns of possible seizure per their discussion with patient and family today.  I have no objection to this.  Checking a valproic acid level.    4.  Elevated troponin  - Appreciate cardiology input echocardiogram with no acute findings, no wall abnormality, EF normal.  Patient with history of CHD continue with current statin and aspirin.    5.  Acute kidney injury  - Resolved                06/07/2025:    Labs reviewed.    Acute respiratory failure with hypoxia due to bilateral pneumonia.  Continue oxygen support.  Instructed patient that he needs to have oxygen on all the time and he may even die if he keeps taking oxygen off.    Bilateral pneumonia.  Continue broad-spectrum antibiotics.  Labs reviewed.  Cultures are negative so far.  Repeat chest x-ray portable today.  Discussed with pulmonology.      Acute renal failure, likely due to IV dye exposure.  Resolved.  Monitor renal function.  BMP in the morning.  Severe anemia with iron deficiency.  No evidence of active bleeding.  He had blood transfusion yesterday.  Followed by heme-onc.    Acute cardiac.  Monitor demonstrates sinus tachycardia.  Likely due to stress from acute  respiratory failure.    Lung cancer.  Status post chemoradiation therapy.    Anxiety.  Better with hydroxyzine.    Leukocytosis, improving.  CBC in the morning    Critical care time spent with patient 35 minutes                       Eula Javier MD           [1] apixaban, 5 mg, oral, BID  aspirin, 81 mg, oral, Daily  atorvastatin, 40 mg, oral, Daily  budesonide, 0.5 mg, nebulization, BID  divalproex, 500 mg, oral, BID  fluticasone, 2 spray, Each Nostril, Daily  ipratropium-albuteroL, 3 mL, nebulization, TID  iron sucrose, 300 mg, intravenous, Daily  lacosamide, 200 mg, oral, BID  melatonin, 3 mg, oral, Nightly  mirtazapine, 7.5 mg, oral, Nightly  oxygen, , inhalation, q4h  pantoprazole, 40 mg, oral, Daily before breakfast   Or  pantoprazole, 40 mg, intravenous, Daily before breakfast  piperacillin-tazobactam, 4.5 g, intravenous, q6h  [Held by provider] tiotropium, 2 puff, inhalation, Daily  vancomycin, 1,250 mg, intravenous, q12h     [2] sodium chloride 0.9%, 100 mL/hr, Last Rate: 100 mL/hr (06/07/25 1055)     [3] PRN medications: acetaminophen **OR** acetaminophen **OR** acetaminophen, benzocaine-menthol, benzonatate, dextromethorphan-guaifenesin, docusate sodium, guaiFENesin, hydrOXYzine HCL, ipratropium-albuteroL, naloxone, ondansetron ODT **OR** ondansetron, oxyCODONE, polyethylene glycol, traZODone, vancomycin

## 2025-06-07 NOTE — CARE PLAN
The patient's goals for the shift include  rest    The clinical goals for the shift include keep spo2 above 90%  Problem: Pain - Adult  Goal: Verbalizes/displays adequate comfort level or baseline comfort level  Outcome: Not Progressing     Problem: Chronic Conditions and Co-morbidities  Goal: Patient's chronic conditions and co-morbidity symptoms are monitored and maintained or improved  Outcome: Not Progressing     Problem: Respiratory  Goal: Minimize anxiety/maximize coping throughout shift  Outcome: Not Progressing  Goal: Minimal/no exertional discomfort or dyspnea this shift  Outcome: Not Progressing  Goal: Wean oxygen to maintain O2 saturation per order/standard this shift  Outcome: Not Progressing

## 2025-06-07 NOTE — PROGRESS NOTES
"Martínez Neri is a 64 y.o. male on day 2 of admission seen in follow-up for acute hypoxic respiratory failure, pneumonia    Subjective   Wife at bedside. O2 requirement increased from 6L yesterday to vapotherm 35L and 80%. Received 2 units of blood and was getting IV fluids at 100ml/hr. Early this afternoon he had an episode of a-fib with RVR treated with 5mg IV metoprolol after which he converted to normal sinus rhythm. He is also delirious this morning - thinking the TV is on when it is not, talking to family members who aren't there.      Objective     Physical Exam  Vitals and nursing note reviewed.   Constitutional:       Appearance: He is ill-appearing.   HENT:      Head: Normocephalic and atraumatic.      Nose: Nose normal.      Mouth/Throat:      Mouth: Mucous membranes are moist.   Eyes:      Extraocular Movements: Extraocular movements intact.      Conjunctiva/sclera: Conjunctivae normal.      Pupils: Pupils are equal, round, and reactive to light.   Cardiovascular:      Rate and Rhythm: Normal rate and regular rhythm.      Pulses: Normal pulses.      Heart sounds: Normal heart sounds.   Pulmonary:      Effort: Pulmonary effort is normal.      Comments: Coarse breath sounds bilaterally.   Abdominal:      General: Bowel sounds are normal.      Palpations: Abdomen is soft.   Musculoskeletal:         General: Normal range of motion.   Skin:     General: Skin is warm and dry.      Capillary Refill: Capillary refill takes less than 2 seconds.   Neurological:      General: No focal deficit present.      Mental Status: He is alert and oriented to person, place, and time.   Psychiatric:      Comments: Positive for Delirium - thinking the TV is on when it isn't. Talking to family members who aren't there.      Last Recorded Vitals  Blood pressure 101/83, pulse (!) 128, temperature 36.4 °C (97.5 °F), temperature source Temporal, resp. rate 17, height 1.753 m (5' 9\"), weight 67.1 kg (148 lb), SpO2 " 98%.      Intake/Output Summary (Last 24 hours) at 6/7/2025 1224  Last data filed at 6/7/2025 1153  Gross per 24 hour   Intake 3358.33 ml   Output 1855 ml   Net 1503.33 ml      Scheduled medications:  apixaban, 5 mg, oral, BID  aspirin, 81 mg, oral, Daily  atorvastatin, 40 mg, oral, Daily  divalproex, 500 mg, oral, BID  fluticasone, 2 spray, Each Nostril, Daily  ipratropium-albuteroL, 3 mL, nebulization, TID  lacosamide, 200 mg, oral, BID  melatonin, 3 mg, oral, Nightly  mirtazapine, 7.5 mg, oral, Nightly  oxygen, , inhalation, Continuous - Inhalation  pantoprazole, 40 mg, oral, Daily before breakfast   Or  pantoprazole, 40 mg, intravenous, Daily before breakfast  piperacillin-tazobactam, 3.375 g, intravenous, q6h  [Held by provider] tiotropium, 2 puff, inhalation, Daily    Continuous medications:  sodium chloride 0.9%, 100 mL/hr, Last Rate: 100 mL/hr (06/05/25 1722)    PRN medications: acetaminophen **OR** acetaminophen **OR** acetaminophen, benzocaine-menthol, dextromethorphan-guaifenesin, docusate sodium, guaiFENesin, ipratropium-albuteroL, naloxone, ondansetron ODT **OR** ondansetron, ondansetron ODT, oxyCODONE, polyethylene glycol, vancomycin    Relevant Results  Results for orders placed or performed during the hospital encounter of 06/05/25 (from the past 24 hours)   Urinalysis with Reflex Culture and Microscopic   Result Value Ref Range    Color, Urine Light-Yellow Light-Yellow, Yellow, Dark-Yellow    Appearance, Urine Clear Clear    Specific Gravity, Urine 1.032 1.005 - 1.035    pH, Urine 5.5 5.0, 5.5, 6.0, 6.5, 7.0, 7.5, 8.0    Protein, Urine 20 (TRACE) NEGATIVE, 10 (TRACE), 20 (TRACE) mg/dL    Glucose, Urine Normal Normal mg/dL    Blood, Urine NEGATIVE NEGATIVE mg/dL    Ketones, Urine TRACE (A) NEGATIVE mg/dL    Bilirubin, Urine NEGATIVE NEGATIVE mg/dL    Urobilinogen, Urine Normal Normal mg/dL    Nitrite, Urine NEGATIVE NEGATIVE    Leukocyte Esterase, Urine NEGATIVE NEGATIVE   Urinalysis Microscopic    Result Value Ref Range    WBC, Urine NONE 1-5, NONE /HPF    RBC, Urine NONE NONE, 1-2, 3-5 /HPF   Respiratory Culture/Smear    Specimen: SPUTUM; Fluid   Result Value Ref Range    Gram Stain       Gram stain indicates specimen consists of lower respiratory tract secretions.    Gram Stain No predominant organism    Prepare RBC: 1 Units   Result Value Ref Range    PRODUCT CODE I2617V80     Unit Number [id number]     Unit ABO O     Unit RH NEG     XM INTEP COMP     Dispense Status TR     Blood Expiration Date 6/12/2025 11:59:00 PM EDT     PRODUCT BLOOD TYPE 9500     UNIT VOLUME 350    Vancomycin   Result Value Ref Range    Vancomycin 9.0 5.0 - 20.0 ug/mL   CBC and Auto Differential   Result Value Ref Range    WBC 16.6 (H) 4.4 - 11.3 x10*3/uL    nRBC 0.0 0.0 - 0.0 /100 WBCs    RBC 3.68 (L) 4.50 - 5.90 x10*6/uL    Hemoglobin 9.8 (L) 13.5 - 17.5 g/dL    Hematocrit 29.9 (L) 41.0 - 52.0 %    MCV 81 80 - 100 fL    MCH 26.6 26.0 - 34.0 pg    MCHC 32.8 32.0 - 36.0 g/dL    RDW 17.3 (H) 11.5 - 14.5 %    Platelets 262 150 - 450 x10*3/uL    Immature Granulocytes %, Automated 0.7 0.0 - 0.9 %    Immature Granulocytes Absolute, Automated 0.11 0.00 - 0.70 x10*3/uL   Basic Metabolic Panel   Result Value Ref Range    Glucose 76 74 - 99 mg/dL    Sodium 137 136 - 145 mmol/L    Potassium 3.3 (L) 3.5 - 5.3 mmol/L    Chloride 105 98 - 107 mmol/L    Bicarbonate 25 21 - 32 mmol/L    Anion Gap 10 10 - 20 mmol/L    Urea Nitrogen 14 6 - 23 mg/dL    Creatinine 0.66 0.50 - 1.30 mg/dL    eGFR >90 >60 mL/min/1.73m*2    Calcium 8.3 (L) 8.6 - 10.3 mg/dL   Manual Differential   Result Value Ref Range    Neutrophils %, Manual 84.0 40.0 - 80.0 %    Bands %, Manual 3.0 0.0 - 5.0 %    Lymphocytes %, Manual 6.0 13.0 - 44.0 %    Monocytes %, Manual 6.0 2.0 - 10.0 %    Eosinophils %, Manual 1.0 0.0 - 6.0 %    Basophils %, Manual 0.0 0.0 - 2.0 %    Seg Neutrophils Absolute, Manual 13.94 (H) 1.20 - 7.00 x10*3/uL    Bands Absolute, Manual 0.50 0.00 - 0.70  x10*3/uL    Lymphocytes Absolute, Manual 1.00 (L) 1.20 - 4.80 x10*3/uL    Monocytes Absolute, Manual 1.00 0.10 - 1.00 x10*3/uL    Eosinophils Absolute, Manual 0.17 0.00 - 0.70 x10*3/uL    Basophils Absolute, Manual 0.00 0.00 - 0.10 x10*3/uL    Total Cells Counted 200     Neutrophils Absolute, Manual 14.44 (H) 1.20 - 7.70 x10*3/uL    RBC Morphology See Below     Hypochromia Mild     Ovalocytes Few       US renal complete  Result Date: 6/5/2025  FINDINGS: The right kidney measures 11.0 cm. There is a 1.7 cm mid to lower pole right renal cyst. The left kidney measures 11.9 cm. No renal stones are identified.  The renal cortical thickness and echogenicity is normal.  No hydronephrosis is identified.   Urinary bladder is nonspecific in appearance with no stones or masses identified. Right renal cyst.       Transthoracic Echo (TTE) Complete  Result Date: 6/5/2025  CONCLUSIONS:  1. Left ventricular ejection fraction is normal by visual estimate at 55-60%.  2. Spectral Doppler shows a Grade I (impaired relaxation pattern) of left ventricular diastolic filling with normal left atrial filling pressure.  3. There is normal right ventricular global systolic function.  4. The inferior vena cava appears mildly dilated, with IVC inspiratory collapse greater than 50%.     CT angio chest for pulmonary embolism  Result Date: 6/5/2025  FINDINGS: POTENTIAL LIMITATIONS OF THE STUDY: None   HEART AND VESSELS: There are no discrete filling defects within main pulmonary artery and its branches to suggest acute pulmonary embolism. Main pulmonary artery and its branches are normal in caliber.   The thoracic aorta normal in course and caliber.Mild vascular calcifications are present in the thoracic aorta.Although, the study is not tailored for evaluation of aorta, there is no definite evidence of acute aortic pathology. Moderate coronary artery calcifications are seen. Please note,the study is not optimized for evaluation of coronary  arteries.   The cardiac chambers are not enlarged.There are no findings to suggest right heart strain. Trace pericardial effusion is present.   MEDIASTINUM AND WILLIAM, LOWER NECK AND AXILLA: The visualized thyroid gland is within normal limits. There is some increase in size of right hilar lymph nodes compared to prior exam, favored to be reactive in etiology. Esophagus appears within normal limits as seen.   LUNGS AND AIRWAYS: The trachea and central airways are patent. No endobronchial lesion is seen.Small amount of mucus/secretions is present in the mainstem bronchi bilaterally.   There is redemonstration of the area of geographic volume loss/scarring in the left upper lobe, with several new nodular density is present along the pleura (series 5, image 113 and 88) suggestive of superimposed infectious process.   There has been some increase in size of cavitary nodules in the left upper lobe. For example dominant nodule measures up to 1.6 cm in size compared to 1.3 cm on previous study (series 5, image 82).   Additionally, new consolidative airspace opacities are present in the right upper, right middle and right lower lobes, suggestive of multifocal pneumonia. There are small bilateral pleural effusions.   Mild infiltrates are also present in the left lower lobe near the lingula.   A 1.1 cm cavitary nodule in the right lower lobe is unchanged in appearance to prior study (series 11, image 164)   UPPER ABDOMEN: The visualized subdiaphragmatic structures demonstrate no remarkable findings.   CHEST WALL AND OSSEOUS STRUCTURES: Chest wall is within normal limits. No acute osseous pathology.There are no suspicious osseous lesions.Subtle degenerative changes are present in the thoracic spine without compression fracture or high-grade stenosis. 1. Findings most suggestive of multilobar pneumonia, with new consolidative airspace opacity is present in the L5 lung lobes and small bilateral pleural effusions. 2. Some increase  in size of right hilar lymph nodes is favored to be reactive. 3. Redemonstration of the area of volume loss/atelectasis in the left upper lobe, similar in appearance to prior exam, likely representing sequela prior treatment. 4. Slight increase in size of some cavitary nodules in the lungs bilaterally.            Assessment & Plan    65 y/o man with history of squamous cell carcinoma of the lung treated with radiation and chemotherapy now thought to be in remission who was admitted with hypoxic respiratory failure due to multifocal pneumonia. Was anemic yesterday with hgb 6.5 and transfused 2 units PRBCs. Also receiving IV fluids for SERINA. Overnight developed tachycardia and increased O2 requirement. Transferred to ICU this afternoon. Request for transfer to [facility] made per family request as he receives his oncology care at [facility]. Accepted to [facility] MICU and now awaiting bed assignment and transport.     Acute hypoxic respiratory failure  Now on vapotherm 35L 80%. ABG done with normal pH and pCO2. pO2 165. - Wean O2 as able.  Trial of CPAP prior to transfer as cannot transport on vapotherm.  Lasix 20mg once today - monitor urine output.  Continuous pulse oximetry  Incentive spirometry/aerobika     Multilobar pneumonia  Antibiotic coverage broadened to IV vancomycin, Zosyn  Resend sputum culture - pending  Procalcitonin: 12.79 ng/mL  Follow-up cultures  MBSS per ST if patient willing to undergo testing    Atrial fibrillation with RVR  No history of a-fib  On DOAC for SMA thrombus  Given 5mg IV metoprolol and converted to NSR  Will keep K+>4 and MG++>2     Acute kidney injury  Resolved  IV fluids - discontinue IV fluids  Continue to monitor renal function     Elevated high-sensitivity troponin  Likely type II MI secondary to underlying acute illness  Cardiology signed off     Lung nodules/History of left upper lobe small cell carcinoma  July '23 status post bronch/EBUS biopsies showed left upper lobe with invasive  moderately differentiated non-small cell carcinoma  Completed chemo/radiation January '24  Currently on surveillance  Heme-Onc consultation - do not think cancer has recurred   Surveillance scans scheduled for next month     Anemia  Trend H&H  2 unit PRBCs transfused yesterday    COPD  PFTs October '23 show a severe obstructive ventilatory defect  Continue IBD/ICS  Pulmonary function testing as outpatient     History of superior mesenteric artery thrombosis  On Eliquis     Tobacco use  > 50 pack/year history  Smoking cessation - no longer smoking     Prophylaxis  Eliquis  Protonix    Code Status: Full Code   Palliative Medicine consultation      Marianela Edwards MD PhD  Pulmonary & Critical Care Medicine

## 2025-06-07 NOTE — PROGRESS NOTES
Martínez Neri is a 64 y.o. male on day 2 of admission presenting with Pneumonia of both lower lobes due to infectious organism.    Subjective   Symptoms (0 - 10, Best to Worst)  [place] Symptom Assessment System  0-10 (Numeric) Pain Score: 10 - Worst possible pain  More agitated today, restless. Now on HFNC 35LPM/80%Fio2. Continues to have moist productive cough. Staff reports patient frequently pulling off oxygen. Wife at bedside assisting with oxygen compliance. Had anxiety this am, improved with hydroxyzine, but wife concerned as patient had increased confusion after administration. Afebrile. No co abd pain or discomfort today. Tachycardic on monitoring.        Objective     Vitals and nursing note reviewed.   Constitutional:       General: He is in acute distress. Restless, agitated     Appearance: He is ill-appearing.   HENT:      Head: Normocephalic and atraumatic.      Nose: Congestion present.      Mouth/Throat:      Mouth: Mucous membranes are moist.      Pharynx: Oropharynx is clear.      Comments: Poor dentition  Eyes:      Extraocular Movements: Extraocular movements intact.      Pupils: Pupils are equal, round, and reactive to light.   Cardiovascular:      Rate and Rhythm: tachycardic rate and regular rhythm.      Pulses: Normal pulses.      Heart sounds: No murmur heard.  Pulmonary:      Effort: Pulmonary effort is normal. No respiratory distress.      Breath sounds: rhonchi     Comments: VUWN29Eel/80%Fio2, moist productive cough with thick tan/green, trace amount of blood  Abdominal:      General: Abdomen is flat. Bowel sounds are normal. There is no distension.      Palpations: Abdomen is soft. There is no mass.      Tenderness: There is no discomfort.     Hernia: No hernia is present.      Comments:   Musculoskeletal:         General: No swelling, tenderness, deformity or signs of injury. Normal range of motion.      Cervical back: Normal range of motion and neck supple.      Right lower leg: No  "edema.      Left lower leg: No edema.   Skin:     General: Skin is warm and dry.      Capillary Refill: Capillary refill takes 2 to 3 seconds.   Neurological:      General: No focal deficit present.      Mental Status: He is alert and oriented to person, place. Periods of confusion per wife.   Psychiatric:         Mood and Affect: Mood agitated     Comments: Labile mood     Last Recorded Vitals  Blood pressure 101/83, pulse (!) 128, temperature 36.4 °C (97.5 °F), temperature source Temporal, resp. rate 17, height 1.753 m (5' 9\"), weight 67.1 kg (148 lb), SpO2 98%.  Intake/Output last 3 Shifts:  I/O last 3 completed shifts:  In: 3185.4 (47.5 mL/kg) [I.V.:1791.7 (26.7 mL/kg); Blood:643.8; IV Piggyback:750]  Out: 1530 (22.8 mL/kg) [Urine:1530 (0.6 mL/kg/hr)]  Weight: 67.1 kg     Relevant Results  Results for orders placed or performed during the hospital encounter of 06/05/25 (from the past 24 hours)   Urinalysis with Reflex Culture and Microscopic   Result Value Ref Range    Color, Urine Light-Yellow Light-Yellow, Yellow, Dark-Yellow    Appearance, Urine Clear Clear    Specific Gravity, Urine 1.032 1.005 - 1.035    pH, Urine 5.5 5.0, 5.5, 6.0, 6.5, 7.0, 7.5, 8.0    Protein, Urine 20 (TRACE) NEGATIVE, 10 (TRACE), 20 (TRACE) mg/dL    Glucose, Urine Normal Normal mg/dL    Blood, Urine NEGATIVE NEGATIVE mg/dL    Ketones, Urine TRACE (A) NEGATIVE mg/dL    Bilirubin, Urine NEGATIVE NEGATIVE mg/dL    Urobilinogen, Urine Normal Normal mg/dL    Nitrite, Urine NEGATIVE NEGATIVE    Leukocyte Esterase, Urine NEGATIVE NEGATIVE   Urinalysis Microscopic   Result Value Ref Range    WBC, Urine NONE 1-5, NONE /HPF    RBC, Urine NONE NONE, 1-2, 3-5 /HPF   Respiratory Culture/Smear    Specimen: SPUTUM; Fluid   Result Value Ref Range    Gram Stain       Gram stain indicates specimen consists of lower respiratory tract secretions.    Gram Stain No predominant organism    Prepare RBC: 1 Units   Result Value Ref Range    PRODUCT CODE " [id number]     Unit Number [id number]     Unit ABO O     Unit RH NEG     XM INTEP COMP     Dispense Status TR     Blood Expiration Date 6/12/2025 11:59:00 PM EDT     PRODUCT BLOOD TYPE 9500     UNIT VOLUME 350    Vancomycin   Result Value Ref Range    Vancomycin 9.0 5.0 - 20.0 ug/mL   CBC and Auto Differential   Result Value Ref Range    WBC 16.6 (H) 4.4 - 11.3 x10*3/uL    nRBC 0.0 0.0 - 0.0 /100 WBCs    RBC 3.68 (L) 4.50 - 5.90 x10*6/uL    Hemoglobin 9.8 (L) 13.5 - 17.5 g/dL    Hematocrit 29.9 (L) 41.0 - 52.0 %    MCV 81 80 - 100 fL    MCH 26.6 26.0 - 34.0 pg    MCHC 32.8 32.0 - 36.0 g/dL    RDW 17.3 (H) 11.5 - 14.5 %    Platelets 262 150 - 450 x10*3/uL    Immature Granulocytes %, Automated 0.7 0.0 - 0.9 %    Immature Granulocytes Absolute, Automated 0.11 0.00 - 0.70 x10*3/uL   Basic Metabolic Panel   Result Value Ref Range    Glucose 76 74 - 99 mg/dL    Sodium 137 136 - 145 mmol/L    Potassium 3.3 (L) 3.5 - 5.3 mmol/L    Chloride 105 98 - 107 mmol/L    Bicarbonate 25 21 - 32 mmol/L    Anion Gap 10 10 - 20 mmol/L    Urea Nitrogen 14 6 - 23 mg/dL    Creatinine 0.66 0.50 - 1.30 mg/dL    eGFR >90 >60 mL/min/1.73m*2    Calcium 8.3 (L) 8.6 - 10.3 mg/dL   Manual Differential   Result Value Ref Range    Neutrophils %, Manual 84.0 40.0 - 80.0 %    Bands %, Manual 3.0 0.0 - 5.0 %    Lymphocytes %, Manual 6.0 13.0 - 44.0 %    Monocytes %, Manual 6.0 2.0 - 10.0 %    Eosinophils %, Manual 1.0 0.0 - 6.0 %    Basophils %, Manual 0.0 0.0 - 2.0 %    Seg Neutrophils Absolute, Manual 13.94 (H) 1.20 - 7.00 x10*3/uL    Bands Absolute, Manual 0.50 0.00 - 0.70 x10*3/uL    Lymphocytes Absolute, Manual 1.00 (L) 1.20 - 4.80 x10*3/uL    Monocytes Absolute, Manual 1.00 0.10 - 1.00 x10*3/uL    Eosinophils Absolute, Manual 0.17 0.00 - 0.70 x10*3/uL    Basophils Absolute, Manual 0.00 0.00 - 0.10 x10*3/uL    Total Cells Counted 200     Neutrophils Absolute, Manual 14.44 (H) 1.20 - 7.70 x10*3/uL    RBC Morphology See Below     Hypochromia Mild      Ovalocytes Few    Blood Gas Arterial Full Panel   Result Value Ref Range    POCT pH, Arterial 7.42 7.38 - 7.42 pH    POCT pCO2, Arterial 41 38 - 42 mm Hg    POCT pO2, Arterial 165 (H) 85 - 95 mm Hg    POCT SO2, Arterial 99 94 - 100 %    POCT Oxy Hemoglobin, Arterial 97.9 94.0 - 98.0 %    POCT Hematocrit Calculated, Arterial 30.0 (L) 41.0 - 52.0 %    POCT Sodium, Arterial 133 (L) 136 - 145 mmol/L    POCT Potassium, Arterial 3.2 (L) 3.5 - 5.3 mmol/L    POCT Chloride, Arterial 103 98 - 107 mmol/L    POCT Ionized Calcium, Arterial 1.25 1.10 - 1.33 mmol/L    POCT Glucose, Arterial 102 (H) 74 - 99 mg/dL    POCT Lactate, Arterial 1.4 0.4 - 2.0 mmol/L    POCT Base Excess, Arterial 1.9 -2.0 - 3.0 mmol/L    POCT HCO3 Calculated, Arterial 26.6 (H) 22.0 - 26.0 mmol/L    POCT Hemoglobin, Arterial 10.0 (L) 13.5 - 17.5 g/dL    POCT Anion Gap, Arterial 7 (L) 10 - 25 mmo/L    Patient Temperature 37.0 degrees Celsius    FiO2 80 %    Apparatus HIGH FLOW CANNULA     Flow 35.0 LPM    Site of Arterial Puncture Radial Right     Inder's Test Positive     US renal complete  Result Date: 6/5/2025  Interpreted By:  Martínez Mcallister, STUDY: US RENAL COMPLETE; 6/5/2025 2:36 pm   INDICATION: Signs/Symptoms:mena.   COMPARISON: None   ACCESSION NUMBER(S): [id number]   ORDERING CLINICIAN: JF ALEMAN   TECHNIQUE: Grayscale and color Doppler imaging of the kidneys   FINDINGS: The right kidney measures 11.0 cm. There is a 1.7 cm mid to lower pole right renal cyst. The left kidney measures 11.9 cm  .     No renal stones are identified.  The renal cortical thickness and echogenicity is normal.  No hydronephrosis is identified.   Urinary bladder is nonspecific in appearance with no stones or masses identified.       Right renal cyst.   MACRO: none   Signed by: Martínez Mcallister 6/5/2025 3:12 PM Dictation workstation:   [id number]    Transthoracic Echo (TTE) Complete  Result Date: 6/5/2025            [facility] [address], [place]  [place] [id number]             Phone [phone] TRANSTHORACIC ECHOCARDIOGRAM REPORT Patient Name:       FRANCIA GOODEN       Brittani Physician:    [id number] Mo Narayan  Study Date:         6/5/2025             Ordering Provider:    [id number] GERMAINE DEJESUS MRN/PID:            [id number]             Fellow: Accession#:         [id number]         Nurse: Date of Birth/Age:  1960 / 64 years Sonographer:          Bhavna Gonzales RDCS Gender Assigned at                      Additional Staff: Birth: Height:             175.26 cm            Admit Date: Weight:             67.13 kg             Admission Status:     Inpatient -                                                                Routine BSA / BMI:          1.82 m2 / 21.86      Department Location:  [facility]                     kg/m2 Blood Pressure: 103 /58 mmHg Study Type:    TRANSTHORACIC ECHO (TTE) COMPLETE Diagnosis/ICD: Shortness of breath-R06.02; Elevated Troponin-R79.89 Indication:    elevated troponin, sob CPT Codes:     Echo Complete w Full Doppler-[id number] Patient History: Pertinent History: Elevated troponin. Study Detail: The following Echo studies were performed: 2D, M-Mode, Doppler and               color flow. Technically challenging study due to the patient's               lack of cooperation, prominent lung artifact and body habitus.  PHYSICIAN INTERPRETATION: Left Ventricle: Left ventricular ejection fraction is normal by visual estimate at 55-60%. There are no regional wall motion abnormalities. The left ventricular cavity size is normal. There is normal septal and mildly increased posterior left ventricular wall thickness. There is left ventricular concentric remodeling. Spectral Doppler shows a Grade I (impaired relaxation pattern) of left  ventricular diastolic filling with normal left atrial filling pressure. Left Atrium: The left atrial size is normal. Right Ventricle: The right ventricle is normal in size. There is normal right ventricular global systolic function. Right Atrium: The right atrial size is normal. Aortic Valve: There is no evidence of aortic valve regurgitation. Mitral Valve: The mitral valve is normal in structure. There is trace mitral valve regurgitation. The E Vmax is 0.87 m/s. Tricuspid Valve: The tricuspid valve is structurally normal. There is trace tricuspid regurgitation. Pulmonic Valve: The pulmonic valve is not well visualized. The pulmonic valve regurgitation was not assessed. Pericardium: No pericardial effusion noted. Aorta: The aortic root is normal. Systemic Veins: The inferior vena cava appears mildly dilated, with IVC inspiratory collapse greater than 50%.  CONCLUSIONS:  1. Left ventricular ejection fraction is normal by visual estimate at 55-60%.  2. Spectral Doppler shows a Grade I (impaired relaxation pattern) of left ventricular diastolic filling with normal left atrial filling pressure.  3. There is normal right ventricular global systolic function.  4. The inferior vena cava appears mildly dilated, with IVC inspiratory collapse greater than 50%. QUANTITATIVE DATA SUMMARY:  2D MEASUREMENTS:             Normal Ranges: LAs:             3.40 cm     (2.7-4.0cm) IVSd:            0.87 cm     (0.6-1.1cm) LVPWd:           1.10 cm     (0.6-1.1cm) LVIDd:           4.59 cm     (3.9-5.9cm) LVIDs:           3.04 cm LV Mass Index:   85.3 g/m2 LVEDV Index:     64.53 ml/m2 LV % FS          33.8 %  LEFT ATRIUM:                  Normal Ranges: LA Vol A4C:        48.4 ml    (22+/-6mL/m2) LA Vol A2C:        50.2 ml LA Vol BP:         50.2 ml LA Vol Index A4C:  26.6ml/m2 LA Vol Index A2C:  27.6 ml/m2 LA Vol Index BP:   27.6 ml/m2 LA Area A4C:       17.6 cm2 LA Area A2C:       17.6 cm2 LA Major Axis A4C: 5.4 cm LA Major Axis A2C: 5.2  cm LA Volume Index:   26.4 ml/m2 LA Vol A4C:        45.9 ml LA Vol A2C:        48.9 ml LA Vol Index BSA:  26.1 ml/m2  RIGHT ATRIUM:                 Normal Ranges: RA Vol A4C:        33.9 ml    (8.3-19.5ml) RA Vol Index A4C:  18.6 ml/m2 RA Area A4C:       13.3 cm2 RA Major Axis A4C: 4.4 cm  M-MODE MEASUREMENTS:         Normal Ranges: Ao Root:             2.10 cm (2.0-3.7cm) LAs:                 3.70 cm (2.7-4.0cm)  LV SYSTOLIC FUNCTION:                      Normal Ranges: EF-A4C View:    58 % (>=55%) EF-A2C View:    56 % EF-Biplane:     56 % EF-Visual:      58 % LV EF Reported: 58 %  LV DIASTOLIC FUNCTION:           Normal Ranges: MV Peak E:             0.87 m/s  (0.7-1.2 m/s) MV Peak A:             0.94 m/s  (0.42-0.7 m/s) E/A Ratio:             0.93      (1.0-2.2) MV e'                  0.065 m/s (>8.0) MV lateral e'          0.08 m/s MV medial e'           0.05 m/s E/e' Ratio:            13.42     (<8.0)  MITRAL VALVE:          Normal Ranges: MV DT:        194 msec (150-240msec)  AORTIC VALVE:                     Normal Ranges: AoV Vmax:                1.47 m/s (<=1.7m/s) AoV Peak P.6 mmHg (<20mmHg) AoV Mean P.0 mmHg (1.7-11.5mmHg) LVOT Max Vishnu:            0.91 m/s (<=1.1m/s) AoV VTI:                 27.10 cm (18-25cm) LVOT VTI:                14.50 cm AoV Dimensionless Index: 0.54  RIGHT VENTRICLE: RV Basal 3.78 cm RV Major 7.0 cm TAPSE:   18.7 mm RV s'    0.10 m/s  TRICUSPID VALVE/RVSP:         Normal Ranges: Est. RA Pressure:     8 mmHg IVC Diam:             2.80 cm  PULMONIC VALVE:          Normal Ranges: PV Max Vishnu:     0.9 m/s  (0.6-0.9m/s) PV Max PG:      3.3 mmHg  [id number] Mo Narayan DO Electronically signed on 2025 at 2:13:13 PM  ** Final **     ECG 12 lead  Result Date: 2025  Sinus tachycardia Left axis deviation Right bundle branch block Abnormal ECG When compared with ECG of 2024 20:43, Right bundle branch block is now Present Confirmed by Tristian Alcantara  ([id number]) on 6/5/2025 12:13:24 PM    CT angio chest for pulmonary embolism  Result Date: 6/5/2025  Interpreted By:  Artur Lino, STUDY: CT ANGIO CHEST FOR PULMONARY EMBOLISM;  6/5/2025 3:46 am   INDICATION: Signs/Symptoms:hx of lung cancer, SOB, chest pain.     COMPARISON: CT chest dated 03/11/2025   ACCESSION NUMBER(S): [id number]   ORDERING CLINICIAN: BRITNEY SANZ   TECHNIQUE: Helical data acquisition of the chest was obtained after intravenous administration of 75 mL Omnipaque 350, as per PE protocol. Images were reformatted in coronal and sagittal planes. Axial and coronal maximum intensity projection (MIP) images were created and reviewed.   FINDINGS: POTENTIAL LIMITATIONS OF THE STUDY: None   HEART AND VESSELS: There are no discrete filling defects within main pulmonary artery and its branches to suggest acute pulmonary embolism. Main pulmonary artery and its branches are normal in caliber.   The thoracic aorta normal in course and caliber.Mild vascular calcifications are present in the thoracic aorta.Although, the study is not tailored for evaluation of aorta, there is no definite evidence of acute aortic pathology. Moderate coronary artery calcifications are seen. Please note,the study is not optimized for evaluation of coronary arteries.   The cardiac chambers are not enlarged.There are no findings to suggest right heart strain. Trace pericardial effusion is present.   MEDIASTINUM AND WILLIAM, LOWER NECK AND AXILLA: The visualized thyroid gland is within normal limits. There is some increase in size of right hilar lymph nodes compared to prior exam, favored to be reactive in etiology. Esophagus appears within normal limits as seen.   LUNGS AND AIRWAYS: The trachea and central airways are patent. No endobronchial lesion is seen.Small amount of mucus/secretions is present in the mainstem bronchi bilaterally.   There is redemonstration of the area of geographic volume loss/scarring in the left  upper lobe, with several new nodular density is present along the pleura (series 5, image 113 and 88) suggestive of superimposed infectious process.   There has been some increase in size of cavitary nodules in the left upper lobe. For example dominant nodule measures up to 1.6 cm in size compared to 1.3 cm on previous study (series 5, image 82).   Additionally, new consolidative airspace opacities are present in the right upper, right middle and right lower lobes, suggestive of multifocal pneumonia. There are small bilateral pleural effusions.   Mild infiltrates are also present in the left lower lobe near the lingula.   A 1.1 cm cavitary nodule in the right lower lobe is unchanged in appearance to prior study (series 11, image 164)   UPPER ABDOMEN: The visualized subdiaphragmatic structures demonstrate no remarkable findings.   CHEST WALL AND OSSEOUS STRUCTURES: Chest wall is within normal limits. No acute osseous pathology.There are no suspicious osseous lesions.Subtle degenerative changes are present in the thoracic spine without compression fracture or high-grade stenosis.       1. Findings most suggestive of multilobar pneumonia, with new consolidative airspace opacity is present in the L5 lung lobes and small bilateral pleural effusions. 2. Some increase in size of right hilar lymph nodes is favored to be reactive. 3. Redemonstration of the area of volume loss/atelectasis in the left upper lobe, similar in appearance to prior exam, likely representing sequela prior treatment. 4. Slight increase in size of some cavitary nodules in the lungs bilaterally.   MACRO: None   Signed by: Artur Lino 6/5/2025 4:01 AM Dictation workstation:   [id number]       Assessment/Plan   IMP:    Acute respiratory Failure with hypoxia-curently on 6L NC, was on RA at home after weaning off oxygen last year s/p pulmonary rehab.   Multifocal PNA-antibiotics expanded to vanco and zosyn, cultures ordered, atypical workup  pending. Procalcitonin 12.79  Encephalopathy-acute, most likely related to infection, hypoxia.   COPD-Cont duonebs, ICS  Squamous Cell Lung Cancer-CTChest appears to show some dz progression, reviewed by oncology, felt to not show progression, recommend f/up with oncology outpatient  Anemia-Hgb <7, received 2 PRBC, IV iron ordered by oncology, on PPI, hemocult ordered.   Seizure do-concern for possible seizure per family discussion, EEG ordered, valproic acid level within therapeutic range.  SMA thrombosis-continue eliquis  Insomnia-trazodone HS prn  Chronic cancer related pain s/p radiation 30/33 to [facility] and supportive oncology note reviewed, increased frequency of oxycodone to q4hr prn pain  SERINA-improved with IVF.   Elevated troponins-demand related to respiratory failure per cardiology, echo with EF 55%, grade I diastolic dysfunction.  Palliative Care     Full Code  Not Capable currently.   No advanced directives. Wife Keri is legal surrogate.      6/7  Worsened hypoxia, restless, confused. Discussed with attending and pulmonology. Lasix per pulmonology today, concern for volume overload, echo with preserved EF 55%, grade I diastolic dysfunction. Wife at bedside. Aggressive treatment model of care, full code. Unable to follow up on advanced directive discussion due to confusion.     6/6  Chart reviewed, discussed with attending and cardiology.   Extensive discussion with patient wife and daughter today regarding current diagnoses, treatment plan. Patient very upset about being in hospital. I explained current illness and need for hospital level care in order to stabilze patient. If patient leaves AMA, he is high risk for respiratory and cardiac arrest and would require much more intensive care than currently requiring, potentially limiting life and/or impacting quality of life. Patient verbalized understanding and expressed his wishes to pursue aggressive treatment of current diagnoses in order to  facilitate return home. See chronic illness discussion.      We had a discussion about advanced directives with daughter present and patient is very interested in filling out HPOA, listing dtr milton and wife arash. He is also interested in living will. We reviewed all code status options and patient would like to review options and discuss further with his family before making any decisions. I will follow up tomorrow.     I spent 30 minutes in the professional and overall care of this patient.      Christina Rivera, APRN-CNP

## 2025-06-07 NOTE — CONSULTS
Vancomycin Dosing by Pharmacy- INITIAL    Martínez Neri is a 64 y.o. year old male who Pharmacy has been consulted for vancomycin dosing for pneumonia. Based on the patient's indication and renal status this patient will be dosed based on a goal AUC of 400-600.     Renal function is currently stable.    There were no vitals taken for this visit.     Lab Results   Component Value Date    CREATININE 0.66 2025    CREATININE 0.86 2025    CREATININE 1.37 (H) 2025    CREATININE 0.72 2025        Patient weight is as follows: There were no vitals filed for this visit.    Cultures:  No results found for the encounter in last 14 days.        No intake/output data recorded.  I/O during current shift:  No intake/output data recorded.    Temp (24hrs), Av.9 °C (98.5 °F), Min:36.4 °C (97.5 °F), Max:37.6 °C (99.7 °F)         Assessment/Plan     Patient transferred from [facility], been on vanco for 3 days. Will continue same dose of 1250 mg every 12 hours.    This dosing regimen is predicted by [employer] to result in the following pharmacokinetic parameters:    Loading dose: N/A  Regimen: 1250 mg IV every 12 hours.  Start time: 19:43 on 2025  Exposure target: AUC24 (range) 400-600 mg/L.hr   [id number]: 482 mg/L.hr  AUC24,ss: 501 mg/L.hr  Probability of AUC24 > 400: 91 %  Ctrough,ss: 14.4 mg/L  Probability of Ctrough,ss > 20: 12 %      Follow-up level will be ordered on  at 1400 unless clinically indicated sooner.  Will continue to monitor renal function daily while on vancomycin and order serum creatinine at least every 48 hours if not already ordered.  Follow for continued vancomycin needs, clinical response, and signs/symptoms of toxicity.       Jeff Cruz, GordyD

## 2025-06-07 NOTE — PROGRESS NOTES
Vancomycin Dosing by Pharmacy- FOLLOW UP    Martínez Neri is a 64 y.o. year old male who Pharmacy has been consulted for vancomycin dosing for pneumonia. Based on the patient's indication and renal status this patient is being dosed based on a goal AUC of 400-600.     Renal function is currently improving.    Current vancomycin dose: Dosing by levels- last dose was 1500 mg on  @ 1721    Most recent random level: 9.0 mcg/mL    Visit Vitals  BP 93/59 (BP Location: Right arm, Patient Position: Lying)   Pulse (!) 127   Temp 37.6 °C (99.7 °F) (Temporal)   Resp 22        Lab Results   Component Value Date    CREATININE 0.66 2025    CREATININE 0.86 2025    CREATININE 1.37 (H) 2025    CREATININE 0.72 2025        Patient weight is as follows:   Vitals:    25 0220   Weight: 67.1 kg (148 lb)       Cultures:  No results found for the encounter in last 14 days.       I/O last 3 completed shifts:  In: 3185.4 (47.5 mL/kg) [I.V.:1791.7 (26.7 mL/kg); Blood:643.8; IV Piggyback:750]  Out: 1530 (22.8 mL/kg) [Urine:1530 (0.6 mL/kg/hr)]  Weight: 67.1 kg   I/O during current shift:  No intake/output data recorded.    Temp (24hrs), Av.9 °C (98.4 °F), Min:36.6 °C (97.9 °F), Max:37.6 °C (99.7 °F)      Assessment/Plan    Below goal random/trough level. Orders placed for new vancomycin regimen of 1250 every 12 hours to begin at 0800 .    This dosing regimen is predicted by [employer] to result in the following pharmacokinetic parameters:  <<<<<[employer] DATA>>>>>  Loading dose: N/A  Regimen: 1250 mg IV every 12 hours.  Start time: 17:24 on 2025  Exposure target: AUC24 (range) 400-600 mg/L.hr   [id number]: 453 mg/L.hr  AUC24,ss: 497 mg/L.hr  Probability of AUC24 > 400: 91 %  Ctrough,ss: 14.3 mg/L  Probability of Ctrough,ss > 20: 11 %    The next level will be obtained on  at 0500. May be obtained sooner if clinically indicated.   Will continue to monitor renal function daily while on vancomycin and  order serum creatinine at least every 48 hours if not already ordered.  Follow for continued vancomycin needs, clinical response, and signs/symptoms of toxicity.       LAURENCE Robert, PharmD

## 2025-06-07 NOTE — SIGNIFICANT EVENT
06/07/25 1554   Medical Gas Therapy/Pulse Ox   Medical Gas Therapy Supplemental oxygen   Medical Gas Delivery Method (S)  Non-rebreather mask  (15L)   SpO2 99 %   Patient Activity During SpO2 Measurement At rest   Pulse Oximetry Type Continuous     PATIENT LIBERATED FROM HFNC AND PLACED ON 15L NRB FOR TRIAL TO MAINTAIN SATURATION BEFORE TRANSPORTATION TO OUTSIDE FACILITY.

## 2025-06-07 NOTE — PROGRESS NOTES
Occupational Therapy                 Therapy Communication Note    Patient Name: Martínez Neri  MRN: [id number]  Department: [address]  Room: [address] [place] [address]  Today's Date: 6/7/2025     Discipline: Occupational Therapy    Missed Visit Reason:  hold  per nursing due to being maxed out on high flow oxygen    Missed Time: Cancel

## 2025-06-07 NOTE — DISCHARGE SUMMARY
Discharge Diagnosis  Pneumonia of both lower lobes due to infectious organism     Acute respiratory failure with hypoxia  COPD exacerbation  Atrial fibrillation with RVR  History of seizure disorder        Discharge Meds     Medication List      CONTINUE taking these medications     inhalat.spacing dev,med. mask spacer   nebulizer accessories misc; Use twice daily   nebulizer and compressor device; Use as directed     ASK your doctor about these medications     albuterol 90 mcg/actuation inhaler; Commonly known as: Proventil HFA;   Inhale 2 puffs every 4 hours if needed for wheezing or shortness of   breath.   aspirin 81 mg chewable tablet; Chew 1 tablet (81 mg) once daily.   atorvastatin 40 mg tablet; Commonly known as: Lipitor; Take 1 tablet (40   mg) by mouth once daily.   divalproex 500 mg 24 hr tablet; Commonly known as: Depakote ER; Take 1   tablet (500 mg) by mouth 2 times a day. Do not crush, chew, or split.   Eliquis 5 mg tablet; Generic drug: apixaban; TAKE ONE TABLET BY MOUTH   TWO TIMES A DAY   fluticasone 50 mcg/actuation nasal spray; Commonly known as: Flonase;   Administer 2 sprays into each nostril once daily. Shake gently. Before   first use, prime pump. After use, clean tip and replace cap.   ipratropium-albuteroL 0.5-2.5 mg/3 mL nebulizer solution; Commonly known   as: Duo-Neb; Take 3 mL by nebulization 4 times a day as needed for   wheezing or shortness of breath.   lacosamide 200 mg tablet tablet; Commonly known as: Vimpat; TAKE ONE   TABLET BY MOUTH TWO TIMES A DAY   mirtazapine 7.5 mg tablet; Commonly known as: Remeron; Take 1 tablet   (7.5 mg) by mouth once daily at bedtime.   multivitamin tablet   naloxone 4 mg/0.1 mL nasal spray; Commonly known as: Narcan; Administer   1 spray (4 mg) into affected nostril(s) if needed for opioid reversal. May   repeat every 2-3 minutes if needed, alternating nostrils, until medical   assistance becomes available.   nicotine 7 mg/24 hr patch; Commonly known  as: Nicoderm CQ; Place 1 patch   over 24 hours on the skin once every 24 hours.   ondansetron 8 mg tablet; Commonly known as: Zofran; Take 1 tablet (8 mg)   by mouth every 8 hours if needed for nausea or vomiting.   oxyCODONE 5 mg immediate release tablet; Commonly known as: Roxicodone;   Take 1 tablet (5 mg) by mouth every 6 hours if needed for severe pain (7 -   10).   oxygen gas therapy; Commonly known as: O2   Spiriva Respimat 2.5 mcg/actuation inhaler; Generic drug: tiotropium       Test Results Pending At Discharge  Pending Labs       Order Current Status    Extra Urine Gray Tube Collected (06/06/25 7835)    Urinalysis with Reflex Culture and Microscopic In process    Respiratory Culture/Smear Preliminary result            Hospital Course     Martínez Neri is a 64 y.o. male presenting with cough chest congestion and shortness of breath started suddenly in middle of the night.  In the emergency room patient was found to have multifocal pneumonia patient is current smoker.  He has history of lung cancer in remission s/p chemotherapy and radiation treatment.  Patient used to be on oxygen in the past but she did rehab and weaned off the oxygen.  Patient is quite upset to be in the hospital and wants to leave as soon as possible.  In ER workup patient also found to have SERINA and elevated troponin trending up   Patient got diagnosed with non-small cell lung cancer in June 2023 in [place] and started treatment in November 2023, severe emphysema, left inguinal hernia,  suspected coronary artery disease, ICD brain aneurysm s/p coiling, SMA thrombosis, hypertension, hyperlipidemia    Patient was consulted by cardiology, oncology, and pulmonology.  CT of the chest:1. Findings most suggestive of multilobar pneumonia, with new  consolidative airspace opacity is present in the L5 lung lobes and  small bilateral pleural effusions.  2. Some increase in size of right hilar lymph nodes is favored to be  reactive.  3.  Redemonstration of the area of volume loss/atelectasis in the left  upper lobe, similar in appearance to prior exam, likely representing  sequela prior treatment.  4. Slight increase in size of some cavitary nodules in the lungs  bilaterally.  Renal ultrasound, right renal cyst.    Patient was treated with systemic steroids, antibiotics.  He required high flow oxygen.  Despite that, patient developed A-fib with RVR, was transferred to intensive care unit.  Family requested transfer to [facility] where patient receives his oncologic care.  Patient was transferred to ICU at [facility].      Outpatient Follow-Up  Future Appointments   Date Time Provider Department Center   7/17/2025  9:00 AM [facility] [place]   7/21/2025  9:30 AM Tisha Medley, APRN-CNP [id number] [place]   7/22/2025  8:40 AM Saida Ng MD [id number] [facility]   9/29/2025  8:30 AM Samantha Abreu MD [id number] [place]   11/4/2025  4:30 PM Bhavna Reed APRN-CNP [id number] [facility]         Eula Javier MD

## 2025-06-07 NOTE — SIGNIFICANT EVENT
06/07/25 0742   Medical Gas Therapy/Pulse Ox   Medical Gas Delivery Method (S)  High flow nasal cannula  (15l)     PATIENT INCREASED TO 15L BUBBLER FOR DESATURATION DURING COUGHING SPELL. AEROSOLIZED BREATHING MEDICATIONS ADMINISTERED VIA OXYGEN. PATIENT REMAINS ON 15L AT THIS TIME. RN HAS CONTACTED [facility] AND WILL ADMINISTER COUGH EXPECTORANT.

## 2025-06-07 NOTE — H&P
Medical Intensive Care - History and Physical   Subjective    Martínez Neri is a 64 y.o. year old male patient admitted on 6/7/2025 with following ICU needs: Multifocal PNA requiring HFNC    HPI:  Martínez Neri 64 y.o. 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  In the ED patient was found to have a multifocal pneumonia.  Patient was also found to have a SERINA and elevated troponin 46->67. Hb 7.7 down from 10.6 on 3/11. Lactate of 3.8.  On arrival to the ED patient was hypoxic 87% on room air and tachypneic he was initially placed on 9 L high flow nasal cannula with improvement in his O2 saturation greater than 92%.  A CT angio was obtained and did not reveal any evidence of a pulmonary embolism however there was a new consolidative airspace opacity present in L5 lung lobes and small bilateral pleural effusions, some increase in size of right hilar lymph nodes is favored to be reactive, redemonstration of the area of volume loss/atelectasis in the left upper lobe, similar in appearance to prior exam and increase in size of some cavitary nodules in the lungs bilaterally.  He was giving a DuoNeb breathing treatment, IV azithromycin x 1, IV ceftriaxone x 1 and 125 mg IV Solu-Medrol in the ED.    On arrival patient, is on 11 L NRB. He denies any hematochezia, melena, hemoptysis, hematuria.  He denies any alcohol, recreational drug usage.    Review of Systems:  Review of Systems       Meds    Home medications:  Current Outpatient Medications   Medication Instructions    albuterol (Proventil HFA) 90 mcg/actuation inhaler 2 puffs, inhalation, Every 4 hours PRN    aspirin 81 mg, oral, Daily    atorvastatin (LIPITOR) 40 mg, oral, Daily    divalproex (DEPAKOTE ER) 500  mg, oral, 2 times daily, Do not crush, chew, or split.    Eliquis 5 mg, oral, 2 times daily    fluticasone (Flonase) 50 mcg/actuation nasal spray 2 sprays, Each Nostril, Daily, Shake gently. Before first use, prime pump. After use, clean tip and replace cap.    inhalat.spacing dev,med. mask spacer No dose, route, or frequency recorded.    ipratropium-albuteroL (Duo-Neb) 0.5-2.5 mg/3 mL nebulizer solution 3 mL, nebulization, 4 times daily PRN    lacosamide (VIMPAT) 200 mg, oral, 2 times daily    mirtazapine (REMERON) 7.5 mg, oral, Nightly    multivitamin tablet 1 tablet, Daily    naloxone (NARCAN) 4 mg, nasal, As needed, May repeat every 2-3 minutes if needed, alternating nostrils, until medical assistance becomes available.    nebulizer accessories misc Use twice daily    nebulizer and compressor device Use as directed    nicotine (Nicoderm CQ) 7 mg/24 hr patch 1 patch, transdermal, Every 24 hours    ondansetron (ZOFRAN) 8 mg, oral, Every 8 hours PRN    oxyCODONE (ROXICODONE) 5 mg, oral, Every 6 hours PRN    oxygen (O2) gas therapy 2 each, Continuous    Spiriva Respimat 2.5 mcg/actuation inhaler         Inpatient medications:  Scheduled medications  [Held by provider] aspirin, 81 mg, oral, Daily  atorvastatin, 40 mg, oral, Nightly  budesonide, 0.5 mg, nebulization, BID  divalproex, 500 mg, oral, BID  docusate sodium, 100 mg, oral, BID  fluticasone, 2 spray, Each Nostril, Daily  ipratropium-albuteroL, 3 mL, nebulization, q6h  lacosamide, 200 mg, oral, BID  melatonin, 3 mg, oral, Nightly  mirtazapine, 7.5 mg, oral, Nightly  [START ON 6/8/2025] pantoprazole, 40 mg, oral, Daily before breakfast   Or  [START ON 6/8/2025] pantoprazole, 40 mg, intravenous, Daily before breakfast  piperacillin-tazobactam, 4.5 g, intravenous, q6h  QUEtiapine, 12.5 mg, oral, Nightly  vancomycin, 1,250 mg, intravenous, q12h      Continuous medications     PRN medications  PRN medications: acetaminophen, benzocaine-menthol, benzonatate,  dextromethorphan-guaifenesin, guaiFENesin, ipratropium-albuteroL, nicotine polacrilex, ondansetron, oxyCODONE, polyethylene glycol, vancomycin     Objective    Pulse 87, resp. rate 18, weight 66.6 kg (146 lb 13.2 oz), SpO2 91%.     Physical Exam   General: Awake, alert, in no acute distress  Eyes: Gaze conjugate.  No scleral icterus or injection  HENT: Normo-cephalic, atraumatic. No stridor  CV: Regular rate, regular rhythm. Radial pulses 2+ bilaterally  Resp: Breathing non-labored, speaking in full sentences. Coarse breath sounds throughout and diffuse wheezes heard  GI: Soft, non-distended, non-tender. No rebound or guarding.  : Deferred  MSK/Extremities: No gross bony deformities. Moving all extremities.    Skin: Warm. Appropriate color  Neuro: Alert. Oriented. Face symmetric. Speech is fluent.  Gross strength and sensation intact in b/l UE and LEs  Psych: Appropriate mood and affect    No intake or output data in the 24 hours ending 06/07/25 1813    Labs:   Results from last 72 hours   Lab Units 06/07/25  0613 06/06/25  0422 06/05/25  0251   SODIUM mmol/L 137 136 131*   POTASSIUM mmol/L 3.3* 4.1 3.7   CHLORIDE mmol/L 105 105 98   CO2 mmol/L 25 25 20*   BUN mg/dL 14 21 22   CREATININE mg/dL 0.66 0.86 1.37*   GLUCOSE mg/dL 76 117* 124*   CALCIUM mg/dL 8.3* 7.8* 8.6   ANION GAP mmol/L 10 10 17   EGFR mL/min/1.73m*2 >90 >90 57*      Results from last 72 hours   Lab Units 06/07/25  0613 06/06/25  0529 06/06/25  0422 06/05/25  0251   WBC AUTO x10*3/uL 16.6*  --  12.9* 4.8   HEMOGLOBIN g/dL 9.8* 6.5* 6.4* 7.7*   HEMATOCRIT % 29.9* 21.1* 20.3* 24.5*   PLATELETS AUTO x10*3/uL 262  --  236 304   LYMPHO PCT MAN % 6.0  --   --  17.0   MONO PCT MAN % 6.0  --   --  0.0   EOSINO PCT MAN % 1.0  --   --  0.0      Results from last 72 hours   Lab Units 06/07/25  1346   POCT PH, ARTERIAL pH 7.42   POCT PCO2, ARTERIAL mm Hg 41   POCT PO2, ARTERIAL mm Hg 165*   POCT SO2, ARTERIAL % 99            Micro/ID:     Lab Results    Component Value Date    BLOODCULT No growth at 4 days -  FINAL REPORT 01/05/2024    BLOODCULT No growth at 4 days -  FINAL REPORT 01/05/2024       Summary of Key Imaging Results  6/5 CT PE-Findings most suggestive of multilobar pneumonia, with new consolidative airspace opacity is present in the L5 lung lobes and small b/l pleural effusions. Some increase in size of R hilar LN is favored to be reactive. Re- demonstration of the area of volume loss/atelectasis in the THOMAS, similar in appearance to prior exam, likely representing sequela prior treatment. Slight increase in size of some cavitary nodules in the lungs b/l.   6/5 Renal US -no hydronephrosis, right renal cyst ~ 1.7 cm  6/7 CXR - Probable development of a small left effusion and right lower lung infiltrate and effusion.     Assessment and Plan     Assessment:  Martínez Neri is a 64 y.o. year old male PMH squamous cell carcinoma of the lung s/p chemo and radiation in remission transferred from [facility] and admitted to the MICU on 06/07/25 for hypoxic respiratory failure 2/2 multifocal PNA.    Mechanical Ventilation: < 4 days  Sedation/Analgesia:  none  Restraints: no    Plan:  NEUROLOGY/PSYCH:  -Hx seizures-continue home Depakote 500mg BID and Vimpat 200mg BID  -Melatonin 3mg nightly  -Continue home mirtazapine 7.5 mg nightly  -PRN tylenol  -Continue home oxycodone 5mg q4h PRN  -Seroquel 12.5 mg nightly  -Nicotine gum        CARDIOVASCULAR:  #Hx SMA thrombosis- hold home eliquis, start heparin gtt pending CBC  #Afib with RVR at OSH - resolved with IV metoprolol  -Elevated troponin in the ED 46->67  -6/5 Echo - EF 55-60%, Grade I impaired relaxation pattern of LV with normal LA filling pressure  -Continue home atorvastatin  -Hold home ASA pending CBC results    PULMONARY:  #Multifocal PNA  -Continue home pulmicort BID  -Continue home flonase  -PRN throat lozenge  -PRN tessalon, robitussin DM, mucinex  -Duoneb QID PRN    RENAL/GENITOURINARY:  #SERINA now  "resolved  -Renal US- No hydronephrosis, right renal cyst ~1.7cm    GASTROENTEROLOGY:  -NPO except sips with meds, can make regular diet depending on O2 requirement  -PPI  -PRN colace and miralax  -PRN zofran    ENDOCRINOLOGY: WILFRED    HEMATOLOGY:  -Anemia- low iron <10, normal ferritin (102) s/p 2 pRBC and venofer at OSH  -Oncology consult at OSH \"Cavitary lesions overall stable compared to March 2025, afew of them may have inccreased in size slightly. Etiology of those lesions is not clear but do not look malignant on scans. No definite increasing mediastinal LAPs.\"  -Will engage oncology tomorrow   -Keep active Type and screen  -Hold hep gtt pending CBC results    SKIN:WILFRED    MUSCULOSKELETAL:WILFRED    INFECTIOUS DISEASE:  #Leukocytosis-4.8->12.9->16.6 possibly due to solumedrol in ED  -6/5 Sputum cx-Gram stain indicates specimen contains significant salivary contamination  -6/6 Sputum cx- Gram stain indicates specimen consists of lower respiratory tract secretions.  -S. Pneumoniae, Legionella, MRSA all negative  -Covid/Flu/RSV all negative  -Ceftriaxone 6/5-6/6  -Azithromycin 1x 6/5  -Vancomycin 6/5-  -Zosyn 6/5 -    ICU Check List     ICU Liberation: Intervention:   Assess, Prevent, Manage Pain   N/a   Both SAT and SBT [] SAT  [] SBT 30-60 min [] Extubate to NC  [] Extubate to NIV  [] Discuss Trach   Choice of analgesia and sedation   none     Delirium: Assess, prevent and manage  CAM ICU   N/a   Early Mobility and Exercise   [x] PT /OT consult   Family Engagement and Empowerment [x]Family updated []SW consult     FEN  Fluids: PRN  Electrolytes: PRN  Nutrition: NPO except meds for now  Prophylaxis:  DVT ppx: Hold pending CBC results  GI ppx: Protonix  Bowel care: PRN Miralax and colace  Hardware:                   Social:  Code: Full Code    HPOA: Keri Neri (wife) [phone]  Disposition: ANUP Springer MD   06/07/25 at 6:13 PM     Disclaimer: Documentation completed with the information " available at the time of input. The times in the chart may not be reflective of actual patient care times, interventions, or procedures. Documentation occurs after the physical care of the patient.

## 2025-06-08 LAB
25(OH)D3 SERPL-MCNC: 30 NG/ML (ref 30–100)
ALBUMIN SERPL BCP-MCNC: 2.5 G/DL (ref 3.4–5)
ALBUMIN SERPL BCP-MCNC: 2.6 G/DL (ref 3.4–5)
ALP SERPL-CCNC: 51 U/L (ref 33–136)
ALT SERPL W P-5'-P-CCNC: 36 U/L (ref 10–52)
ANION GAP BLDV CALCULATED.4IONS-SCNC: 6 MMOL/L (ref 10–25)
ANION GAP SERPL CALC-SCNC: 12 MMOL/L (ref 10–20)
AST SERPL W P-5'-P-CCNC: 67 U/L (ref 9–39)
BACTERIA SPEC RESP CULT: NORMAL
BASE EXCESS BLDV CALC-SCNC: 2.7 MMOL/L (ref -2–3)
BASOPHILS # BLD AUTO: 0.03 X10*3/UL (ref 0–0.1)
BASOPHILS NFR BLD AUTO: 0.3 %
BILIRUB DIRECT SERPL-MCNC: 0.1 MG/DL (ref 0–0.3)
BILIRUB SERPL-MCNC: 0.5 MG/DL (ref 0–1.2)
BNP SERPL-MCNC: 277 PG/ML (ref 0–99)
BODY TEMPERATURE: 37 DEGREES CELSIUS
BUN SERPL-MCNC: 12 MG/DL (ref 6–23)
CA-I BLD-SCNC: 1.2 MMOL/L (ref 1.1–1.33)
CA-I BLDV-SCNC: 1.24 MMOL/L (ref 1.1–1.33)
CALCIUM SERPL-MCNC: 8.7 MG/DL (ref 8.6–10.6)
CHLORIDE BLDV-SCNC: 105 MMOL/L (ref 98–107)
CHLORIDE SERPL-SCNC: 109 MMOL/L (ref 98–107)
CO2 SERPL-SCNC: 28 MMOL/L (ref 21–32)
CREAT SERPL-MCNC: 0.59 MG/DL (ref 0.5–1.3)
EGFRCR SERPLBLD CKD-EPI 2021: >90 ML/MIN/1.73M*2
EOSINOPHIL # BLD AUTO: 0.09 X10*3/UL (ref 0–0.7)
EOSINOPHIL NFR BLD AUTO: 0.9 %
ERYTHROCYTE [DISTWIDTH] IN BLOOD BY AUTOMATED COUNT: 16.8 % (ref 11.5–14.5)
EST. AVERAGE GLUCOSE BLD GHB EST-MCNC: 111 MG/DL
GLUCOSE BLDV-MCNC: 76 MG/DL (ref 74–99)
GLUCOSE SERPL-MCNC: 81 MG/DL (ref 74–99)
GRAM STN SPEC: NORMAL
GRAM STN SPEC: NORMAL
HAV IGM SER QL: NONREACTIVE
HBA1C MFR BLD: 5.5 % (ref ?–5.7)
HBV CORE IGM SER QL: NONREACTIVE
HBV SURFACE AG SERPL QL IA: NONREACTIVE
HCO3 BLDV-SCNC: 27.9 MMOL/L (ref 22–26)
HCT VFR BLD AUTO: 26.1 % (ref 41–52)
HCT VFR BLD EST: 28 % (ref 41–52)
HCV AB SER QL: NONREACTIVE
HGB BLD-MCNC: 8.9 G/DL (ref 13.5–17.5)
HGB BLDV-MCNC: 9.4 G/DL (ref 13.5–17.5)
HIV 1+2 AB+HIV1 P24 AG SERPL QL IA: NONREACTIVE
IMM GRANULOCYTES # BLD AUTO: 0.17 X10*3/UL (ref 0–0.7)
IMM GRANULOCYTES NFR BLD AUTO: 1.6 % (ref 0–0.9)
INHALED O2 CONCENTRATION: 50 %
LACTATE BLDV-SCNC: 1.3 MMOL/L (ref 0.4–2)
LYMPHOCYTES # BLD AUTO: 0.88 X10*3/UL (ref 1.2–4.8)
LYMPHOCYTES NFR BLD AUTO: 8.3 %
MAGNESIUM SERPL-MCNC: 2.71 MG/DL (ref 1.6–2.4)
MCH RBC QN AUTO: 27.2 PG (ref 26–34)
MCHC RBC AUTO-ENTMCNC: 34.1 G/DL (ref 32–36)
MCV RBC AUTO: 80 FL (ref 80–100)
MONOCYTES # BLD AUTO: 1.18 X10*3/UL (ref 0.1–1)
MONOCYTES NFR BLD AUTO: 11.2 %
NEUTROPHILS # BLD AUTO: 8.19 X10*3/UL (ref 1.2–7.7)
NEUTROPHILS NFR BLD AUTO: 77.7 %
NRBC BLD-RTO: 0 /100 WBCS (ref 0–0)
OXYHGB MFR BLDV: 70.9 % (ref 45–75)
PCO2 BLDV: 45 MM HG (ref 41–51)
PH BLDV: 7.4 PH (ref 7.33–7.43)
PHOSPHATE SERPL-MCNC: 3.1 MG/DL (ref 2.5–4.9)
PLATELET # BLD AUTO: 144 X10*3/UL (ref 150–450)
PO2 BLDV: 42 MM HG (ref 35–45)
POTASSIUM BLDV-SCNC: 5.3 MMOL/L (ref 3.5–5.3)
POTASSIUM SERPL-SCNC: 4.5 MMOL/L (ref 3.5–5.3)
PROT SERPL-MCNC: 5.2 G/DL (ref 6.4–8.2)
RBC # BLD AUTO: 3.27 X10*6/UL (ref 4.5–5.9)
SAO2 % BLDV: 73 % (ref 45–75)
SODIUM BLDV-SCNC: 134 MMOL/L (ref 136–145)
SODIUM SERPL-SCNC: 144 MMOL/L (ref 136–145)
UFH PPP CHRO-ACNC: 0.2 IU/ML (ref ?–1.1)
UFH PPP CHRO-ACNC: 0.3 IU/ML (ref ?–1.1)
UFH PPP CHRO-ACNC: 0.3 IU/ML (ref ?–1.1)
WBC # BLD AUTO: 10.5 X10*3/UL (ref 4.4–11.3)

## 2025-06-08 PROCEDURE — 2500000001 HC RX 250 WO HCPCS SELF ADMINISTERED DRUGS (ALT 637 FOR MEDICARE OP): Performed by: NURSE PRACTITIONER

## 2025-06-08 PROCEDURE — 2500000002 HC RX 250 W HCPCS SELF ADMINISTERED DRUGS (ALT 637 FOR MEDICARE OP, ALT 636 FOR OP/ED)

## 2025-06-08 PROCEDURE — 2500000004 HC RX 250 GENERAL PHARMACY W/ HCPCS (ALT 636 FOR OP/ED): Performed by: NURSE PRACTITIONER

## 2025-06-08 PROCEDURE — 82330 ASSAY OF CALCIUM: CPT | Performed by: NURSE PRACTITIONER

## 2025-06-08 PROCEDURE — 36415 COLL VENOUS BLD VENIPUNCTURE: CPT | Performed by: NURSE PRACTITIONER

## 2025-06-08 PROCEDURE — 86022 PLATELET ANTIBODIES: CPT | Performed by: NURSE PRACTITIONER

## 2025-06-08 PROCEDURE — 85025 COMPLETE CBC W/AUTO DIFF WBC: CPT | Performed by: NURSE PRACTITIONER

## 2025-06-08 PROCEDURE — 2500000005 HC RX 250 GENERAL PHARMACY W/O HCPCS: Performed by: NURSE PRACTITIONER

## 2025-06-08 PROCEDURE — 82435 ASSAY OF BLOOD CHLORIDE: CPT | Performed by: NURSE PRACTITIONER

## 2025-06-08 PROCEDURE — 85060 BLOOD SMEAR INTERPRETATION: CPT | Performed by: PATHOLOGY

## 2025-06-08 PROCEDURE — 2500000002 HC RX 250 W HCPCS SELF ADMINISTERED DRUGS (ALT 637 FOR MEDICARE OP, ALT 636 FOR OP/ED): Performed by: NURSE PRACTITIONER

## 2025-06-08 PROCEDURE — 83735 ASSAY OF MAGNESIUM: CPT | Performed by: NURSE PRACTITIONER

## 2025-06-08 PROCEDURE — 94640 AIRWAY INHALATION TREATMENT: CPT

## 2025-06-08 PROCEDURE — 82040 ASSAY OF SERUM ALBUMIN: CPT | Performed by: NURSE PRACTITIONER

## 2025-06-08 PROCEDURE — 2060000001 HC INTERMEDIATE ICU ROOM DAILY

## 2025-06-08 PROCEDURE — 84075 ASSAY ALKALINE PHOSPHATASE: CPT | Performed by: NURSE PRACTITIONER

## 2025-06-08 PROCEDURE — 85520 HEPARIN ASSAY: CPT | Performed by: NURSE PRACTITIONER

## 2025-06-08 RX ORDER — DEXTROSE 50 % IN WATER (D50W) INTRAVENOUS SYRINGE
25
Status: DISCONTINUED | OUTPATIENT
Start: 2025-06-08 | End: 2025-06-14 | Stop reason: HOSPADM

## 2025-06-08 RX ORDER — MAGNESIUM SULFATE HEPTAHYDRATE 40 MG/ML
2 INJECTION, SOLUTION INTRAVENOUS ONCE
Status: COMPLETED | OUTPATIENT
Start: 2025-06-08 | End: 2025-06-08

## 2025-06-08 RX ORDER — POTASSIUM CHLORIDE 14.9 MG/ML
20 INJECTION INTRAVENOUS ONCE
Status: COMPLETED | OUTPATIENT
Start: 2025-06-08 | End: 2025-06-08

## 2025-06-08 RX ORDER — DEXTROSE 50 % IN WATER (D50W) INTRAVENOUS SYRINGE
12.5
Status: DISCONTINUED | OUTPATIENT
Start: 2025-06-08 | End: 2025-06-14 | Stop reason: HOSPADM

## 2025-06-08 RX ADMIN — Medication 3 L/MIN: at 09:00

## 2025-06-08 RX ADMIN — IPRATROPIUM BROMIDE AND ALBUTEROL SULFATE 3 ML: .5; 3 SOLUTION RESPIRATORY (INHALATION) at 20:24

## 2025-06-08 RX ADMIN — MAGNESIUM SULFATE HEPTAHYDRATE 2 G: 40 INJECTION, SOLUTION INTRAVENOUS at 01:17

## 2025-06-08 RX ADMIN — HEPARIN SODIUM 1200 UNITS/HR: 10000 INJECTION, SOLUTION INTRAVENOUS at 04:06

## 2025-06-08 RX ADMIN — PIPERACILLIN SODIUM AND TAZOBACTAM SODIUM 4.5 G: 4; .5 INJECTION, SOLUTION INTRAVENOUS at 05:44

## 2025-06-08 RX ADMIN — FLUTICASONE PROPIONATE 2 SPRAY: 50 SPRAY, METERED NASAL at 17:58

## 2025-06-08 RX ADMIN — IPRATROPIUM BROMIDE AND ALBUTEROL SULFATE 3 ML: .5; 3 SOLUTION RESPIRATORY (INHALATION) at 08:04

## 2025-06-08 RX ADMIN — BUDESONIDE 0.5 MG: 0.5 INHALANT RESPIRATORY (INHALATION) at 20:25

## 2025-06-08 RX ADMIN — Medication 8 L/MIN: at 01:16

## 2025-06-08 RX ADMIN — IPRATROPIUM BROMIDE AND ALBUTEROL SULFATE 3 ML: .5; 3 SOLUTION RESPIRATORY (INHALATION) at 14:43

## 2025-06-08 RX ADMIN — Medication 4 L/MIN: at 20:35

## 2025-06-08 RX ADMIN — QUETIAPINE FUMARATE 12.5 MG: 25 TABLET ORAL at 20:34

## 2025-06-08 RX ADMIN — PIPERACILLIN SODIUM AND TAZOBACTAM SODIUM 4.5 G: 4; .5 INJECTION, SOLUTION INTRAVENOUS at 17:58

## 2025-06-08 RX ADMIN — Medication 2 L/MIN: at 11:00

## 2025-06-08 RX ADMIN — DIVALPROEX SODIUM 500 MG: 500 TABLET, FILM COATED, EXTENDED RELEASE ORAL at 22:01

## 2025-06-08 RX ADMIN — Medication 3 MG: at 20:34

## 2025-06-08 RX ADMIN — LACOSAMIDE 200 MG: 100 TABLET, FILM COATED ORAL at 08:34

## 2025-06-08 RX ADMIN — GUAIFENESIN AND DEXTROMETHORPHAN 5 ML: 100; 10 SYRUP ORAL at 08:34

## 2025-06-08 RX ADMIN — DOCUSATE SODIUM 100 MG: 100 CAPSULE, LIQUID FILLED ORAL at 08:34

## 2025-06-08 RX ADMIN — DIVALPROEX SODIUM 500 MG: 500 TABLET, FILM COATED, EXTENDED RELEASE ORAL at 12:11

## 2025-06-08 RX ADMIN — LACOSAMIDE 200 MG: 100 TABLET, FILM COATED ORAL at 20:34

## 2025-06-08 RX ADMIN — DOCUSATE SODIUM 100 MG: 100 CAPSULE, LIQUID FILLED ORAL at 20:34

## 2025-06-08 RX ADMIN — MIRTAZAPINE 7.5 MG: 15 TABLET, FILM COATED ORAL at 20:34

## 2025-06-08 RX ADMIN — PIPERACILLIN SODIUM AND TAZOBACTAM SODIUM 4.5 G: 4; .5 INJECTION, SOLUTION INTRAVENOUS at 12:11

## 2025-06-08 RX ADMIN — Medication 8 L/MIN: at 08:07

## 2025-06-08 RX ADMIN — POTASSIUM CHLORIDE 20 MEQ: 14.9 INJECTION, SOLUTION INTRAVENOUS at 03:37

## 2025-06-08 ASSESSMENT — COGNITIVE AND FUNCTIONAL STATUS - GENERAL
DRESSING REGULAR LOWER BODY CLOTHING: A LITTLE
EATING MEALS: A LITTLE
DRESSING REGULAR LOWER BODY CLOTHING: A LITTLE
HELP NEEDED FOR BATHING: A LITTLE
MOVING TO AND FROM BED TO CHAIR: A LITTLE
WALKING IN HOSPITAL ROOM: A LITTLE
STANDING UP FROM CHAIR USING ARMS: A LITTLE
PERSONAL GROOMING: A LITTLE
CLIMB 3 TO 5 STEPS WITH RAILING: A LOT
EATING MEALS: A LITTLE
WALKING IN HOSPITAL ROOM: A LITTLE
DAILY ACTIVITIY SCORE: 18
PERSONAL GROOMING: A LITTLE
DRESSING REGULAR UPPER BODY CLOTHING: A LITTLE
TOILETING: A LITTLE
TURNING FROM BACK TO SIDE WHILE IN FLAT BAD: A LITTLE
DRESSING REGULAR UPPER BODY CLOTHING: A LITTLE
MOVING TO AND FROM BED TO CHAIR: A LITTLE
MOBILITY SCORE: 18
DAILY ACTIVITIY SCORE: 18
MOBILITY SCORE: 19
CLIMB 3 TO 5 STEPS WITH RAILING: A LOT
STANDING UP FROM CHAIR USING ARMS: A LITTLE
TOILETING: A LITTLE
HELP NEEDED FOR BATHING: A LITTLE

## 2025-06-08 ASSESSMENT — PAIN - FUNCTIONAL ASSESSMENT
PAIN_FUNCTIONAL_ASSESSMENT: 0-10

## 2025-06-08 ASSESSMENT — PAIN SCALES - GENERAL
PAINLEVEL_OUTOF10: 0 - NO PAIN

## 2025-06-08 NOTE — SIGNIFICANT EVENT
ICU to [facility] Transfer Summary     I:  ICU Admission Reason & Brief ICU Course:        HPI:  Martínez Neri 64 y.o. 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  In the ED patient was found to have a multifocal pneumonia.  Patient was also found to have a SERINA and elevated troponin 46->67. Hb 7.7 down from 10.6 on 3/11. Lactate of 3.8.  On arrival to the ED patient was hypoxic 87% on room air and tachypneic he was initially placed on 9 L high flow nasal cannula with improvement in his O2 saturation greater than 92%.  A CT angio was obtained and did not reveal any evidence of a pulmonary embolism however there was a new consolidative airspace opacity present in L5 lung lobes and small bilateral pleural effusions, some increase in size of right hilar lymph nodes is favored to be reactive, redemonstration of the area of volume loss/atelectasis in the left upper lobe, similar in appearance to prior exam and increase in size of some cavitary nodules in the lungs bilaterally.  He was giving a DuoNeb breathing treatment, IV azithromycin x 1, IV ceftriaxone x 1 and 125 mg IV Solu-Medrol in the ED.     On arrival patient, is on 11 L NRB. He denies any hematochezia, melena, hemoptysis, hematuria.  He denies any alcohol, recreational drug usage.      Summary of Key Imaging Results  6/5 CT PE-Findings most suggestive of multilobar pneumonia, with new consolidative airspace opacity is present in the L5 lung lobes and small b/l pleural effusions. Some increase in size of R hilar LN is favored to be reactive. Re- demonstration of the area of volume loss/atelectasis in the THOMAS, similar in appearance to prior exam, likely representing sequela prior treatment. Slight increase in  size of some cavitary nodules in the lungs b/l.   6/5 Renal US -no hydronephrosis, right renal cyst ~ 1.7 cm  6/7 CXR - Probable development of a small left effusion and right lower lung infiltrate and effusion.           C: Code Status/DPOA Info/Goals of Care/ACP Note    Full Code  DPOA/Contact Number:  Keri Neri (wife) [phone]     U: Unprescribing & Pertinent High-Risk Medications    Changes to home meds: None     Anticoagulation: Patient was on Eliquis before. He will need a heparin assay before the heparin drip can be started due to his history of SMA thrombus.       Antibiotics:     [x]  Zosyn indication pneumonia start date 6/5 planned duration 7 days. Vancomycin (same start date)    P: Pending Tests at the Time of Transfer         A: Active consultants, including Rehab:   []  Subspecialty Consultants: pulmonary  []  PT  []  OT  []  SLP  []  Wound Care    U: Uncertainty Measure/Diagnostic Pause:    Working diagnosis at the time of transfer nultifocal pneumonia, though ddx includes swuamous cell carnicnoma and pneumonia     Diagnosis Degree of Certainty: 1. High degree of certainty about the clinical diagnosis.     S: Summary of Major Problems and To-Dos:   #Monitor expiratory function  #De-escalate oygenation support         E: Exam, including Lines/Drains/Airways & Data Review:       Physical Exam      General: Asleep but arousable, in no acute distress  Eyes: Gaze conjugate.  No scleral icterus or injection  HEENT: No stridor. No lymphadenopathy  CV: Regular rate, regular rhythm. Radial pulses 2+ bilaterally  Resp: Breathing non-labored, speaking in full sentences. Coarse breath sounds throughout and diffuse wheezes heard  GI: Soft, non-distended, non-tender. No rebound or guarding.  : Deferred  MSK/Extremities: No gross bony deformities. Moving all extremities.    Skin: Warm. Appropriate color  Neuro: Alert. Oriented. Face symmetric. Speech is fluent.  Gross strength and sensation intact in b/l  UE and LEs  Psych: Appropriate mood and affect          Difficult airway? No  Lines/drains assessed for removal? Yes    Within 30 minutes of the patient physically leaving the floor, a Floor Readiness Note needs to be placed with updated vitals.

## 2025-06-08 NOTE — SIGNIFICANT EVENT
Floor Readiness Note       I, personally, evaluated Martínez Neri prior to transfer to the floor, including reviewing all current laboratory and imaging studies. The patient remains appropriate for transfer to the floor. Bedside nurse and respiratory therapy are also in agreement of patient's readiness for the floor.     Brief summary:  Martínez Neri is a 64 y.o. male who was admitted to the MICU on 6/7 for hypoxic respiratory failure. They have been treated with vancomycin and zosyn and supplemental oxygen.     Patient was on Eliquis before. He will need a heparin assay before the heparin drip can be started due to his history of SMA thrombus.       Martínez Neri 64 y.o. 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  In the ED patient was found to have a multifocal pneumonia.  Patient was also found to have a SERINA and elevated troponin 46->67. Hb 7.7 down from 10.6 on 3/11. Lactate of 3.8.  On arrival to the ED patient was hypoxic 87% on room air and tachypneic he was initially placed on 9 L high flow nasal cannula with improvement in his O2 saturation greater than 92%.  A CT angio was obtained and did not reveal any evidence of a pulmonary embolism however there was a new consolidative airspace opacity present in L5 lung lobes and small bilateral pleural effusions, some increase in size of right hilar lymph nodes is favored to be reactive, redemonstration of the area of volume loss/atelectasis in the left upper lobe, similar in appearance to prior exam and increase in size of some cavitary nodules in the lungs bilaterally.  He was giving a DuoNeb breathing treatment, IV azithromycin x 1, IV ceftriaxone x 1 and 125 mg IV Solu-Medrol in the ED.     On arrival  patient, is on 11 L NRB. He denies any hematochezia, melena, hemoptysis, hematuria.  He denies any alcohol, recreational drug usage.          Updated focused Physical Exam:          Physical Exam     General: Asleep but arousable, in no acute distress  Eyes: Gaze conjugate.  No scleral icterus or injection  HEENT: No stridor. No lymphadenopathy  CV: Regular rate, regular rhythm. Radial pulses 2+ bilaterally  Resp: Breathing non-labored, speaking in full sentences. Coarse breath sounds throughout and diffuse wheezes heard  GI: Soft, non-distended, non-tender. No rebound or guarding.  : Deferred  MSK/Extremities: No gross bony deformities. Moving all extremities.    Skin: Warm. Appropriate color  Neuro: Alert. Oriented. Face symmetric. Speech is fluent.  Gross strength and sensation intact in b/l UE and LEs  Psych: Appropriate mood and affect        Current Vital Signs:  Heart Rate: 80 (06/07/25 2300 : User, System Default)  BP: 106/54 (06/07/25 2300 : User, System Default)  Temp: 36.9 °C (98.4 °F) (06/07/25 2000 : Elizabeth Carbajal RN)  Resp: 20 (06/07/25 2300 : User, System Default)  SpO2: 92 % (06/07/25 2300 : User, System Default)    Relevant updates since rounds:    Patient has been oxygenating appropriately            Accepting team, Step Down Unit, received verbal sign out and the Provider Care team/Attending has been updated. Bedside nurse will now call accepting nurse for report and patient will be transferred to [facility] [place] floor six.    Db Moore MD

## 2025-06-08 NOTE — PROGRESS NOTES
"Medical Intensive Care Stepdown Unit - Daily Progress Note   Subjective    Martínez Neri is a 64 y.o. year old male patient admitted on 6/7/2025 with Acute hypoxic resp failure 2/2 multifocal PNA    Interval History:  Overnight pt was argumentative with staff making abrasive comments.  [facility] team involved.  This am, pt resting comfortably in bed, states he wants to go home.    Weaned to 4 L NC then 2 L NC throughtout the morning with SpO2 remaining >95%  Wife at bedside, updated on POC    Meds    Scheduled medications  Scheduled Medications[1]  Continuous medications  Continuous Medications[2]  PRN medications  PRN Medications[3]     Objective    Blood pressure 108/58, pulse 75, temperature 36.8 °C (98.2 °F), temperature source Temporal, resp. rate (!) 27, height 1.754 m (5' 9.06\"), weight 69.2 kg (152 lb 8 oz), SpO2 100%.     Constitutional: pt in NAD, alert and cooperative  Eyes: PERRL, EOMI, no icterus   ENMT: mucous membranes moist, no apparent injury, no lesions seen  Head/Neck: Neck supple, no apparent injury  Respiratory/Thorax: Lungs  david rhonchi, + cough, on 8 L NC  Cardiovascular: Regular, rate and rhythm, no murmurs, normal S1 and S2  Gastrointestinal: Nondistended, soft, non-tender, BS present x 4  Musculoskeletal: ROM intact, no joint swelling, normal strength  Extremities: normal extremities, no edema, contusions or wounds  Neurological: alert and oriented x 3, speech clear, follows commands appropriately, cr. n. II-XII intact, sensation grossly intact, motor 5/5 throughout  Skin: Warm and dry, no lesions, no rashes      Intake/Output Summary (Last 24 hours) at 6/8/2025 0609  Last data filed at 6/8/2025 0152  Gross per 24 hour   Intake 350 ml   Output 500 ml   Net -150 ml     Labs:   Results from last 72 hours   Lab Units 06/07/25  1829 06/07/25  0613 06/06/25  0422   SODIUM mmol/L 141 137 136   POTASSIUM mmol/L 2.9* 3.3* 4.1   CHLORIDE mmol/L 112* 105 105   CO2 mmol/L 22 25 25   BUN mg/dL 12 14 21 "   CREATININE mg/dL 0.60 0.66 0.86   GLUCOSE mg/dL 64* 76 117*   CALCIUM mg/dL 6.7* 8.3* 7.8*   ANION GAP mmol/L 10 10 10   EGFR mL/min/1.73m*2 >90 >90 >90   PHOSPHORUS mg/dL 2.4*  --   --       Results from last 72 hours   Lab Units 06/08/25  0339 06/07/25  1829 06/07/25  0613   WBC AUTO x10*3/uL 10.5 13.4* 16.6*   HEMOGLOBIN g/dL 8.9* 8.5* 9.8*   HEMATOCRIT % 26.1* 26.5* 29.9*   PLATELETS AUTO x10*3/uL 144* 251 262   NEUTROS PCT AUTO % 77.7 81.7  --    LYMPHO PCT MAN %  --   --  6.0   LYMPHS PCT AUTO % 8.3 7.9  --    MONO PCT MAN %  --   --  6.0   MONOS PCT AUTO % 11.2 9.3  --    EOSINO PCT MAN %  --   --  1.0   EOS PCT AUTO % 0.9 0.2  --        6/5 CT PE: 1. Findings most suggestive of multilobar pneumonia, with new consolidative airspace opacity is present in the L5 lung lobes and small bilateral pleural effusions. 2. Some increase in size of right hilar lymph nodes is favored to be reactive. 3. Redemonstration of the area of volume loss/atelectasis in the left  upper lobe, similar in appearance to prior exam, likely representing sequela prior treatment. 4. Slight increase in size of some cavitary nodules in the lungs bilaterally.  Summary of key imaging results from the last 24 hours  N/A    Assessment and Plan     Assessment: Martínez Neri 64 y.o. 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 Admitted on 6/7/2025 with Acute hypoxic resp failure 2/2 multifocal PNA    Restraints: no    Summary for 06/08/25  :  8 L NC--> weaned to 2 L NC  Stopping Vanc as MRSA neg.  Continuing Zosyn.  Adding vPEP    Plan:  NEUROLOGY/PSYCH:  Dx: hx of ICD brain aneurysm s/p coiling, Seizures  Management:  Home Meds: Depakote  mg BID, vimpat 200 mg BID, Remeron 7.5 mg at  bedtime  Continue home meds  Melatonin for sleep  PRN Tylenol or Oxy for pain  Continue Seroquel 12.5 mg QHS   OOB to chair.  No PT/OT needs    CARDIOVASCULAR:  Dx: hx of HTN, HLP. Afib/RVR at OSH, converted to NSR with IV Metop x1.  Elevated trop, Type II MI demand ischemia  6/5 TTE: LVEF 55-60%, Diastolic dysfunction, Inferior vena cava mildly dilated with IVC ins collapse >50%  Management:  HDS  Home Meds: ASA 81 mg daily, Atorvastatin 40 mg daily  Continue Atorvastatin 40 mg daily  ASA has been held 2/2 anemia, resume today    PULMONARY:  Dx: history of NSCLC of THOMAS s/p  chemotherapy and radiation in remission. COPD.  Former Smoker  PFTs (10/23): FVC 88, FEV1 40, FEV1/FVC 15 indicating severe airway obstruction  July '23 status post bronch/EBUS biopsies showed left upper lobe with invasive moderately differentiated non-small cell carcinoma  Completed chemo/radiation January '24  Currently on surveillance  Management:  Home Meds: Proventil HFA PRN, fluticasone 50 mcg nasal daily, DuoNebs PRN, Nicoderm, Spiriva inhl  S/p 20 mg Furosemide x1 on 6/7  Requiring 8 L NC, weaned to 2 L NC  Continue Budesonide, DuoNebs  vPEP TID  CXR in am  May consider bronch  Consult Onc in am, recurrence of lung CA?    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 Last transfusion 6/7  No signs of bleeding  Management:  Thrombocytopenia work up including PF4, Hepatitis panel, HIV, peripheral smear  Continue heparin drip for SMA thrombus hx    MUSCULOSKELETAL/ SKIN:  Dx: No acute issues    INFECTIOUS DISEASE:  Dx: Multifocal PNA, sepsis (resolved)  On admit to  OSH, tachycardic, tachypnea, elevated lactate, WBC 12-16 & SERINA  Management:  Tmax: 36.9 C, WBC 10.5  MICRO: 6/5 MRSA PCR neg, 6/5 Sputum cx salivary contamination, 6/6 Sputum cx pending  6/5 Strep pneumoniae Ag and Legionella Ag neg  6/6 Procalc 12.79  Antimicrobials: Azithro (6/4), CTX (6/4-6/5), zosyn (6/5- *), Vanc (6/5- 6/8)    ICU/SDU Check List                  FEN  Fluids: PRN  Electrolytes: PRN  Nutrition: Regular  Prophylaxis:  DVT ppx: Heparin drip  GI ppx: PPI  Hardware:  Catheter: None  Drains: None  Lines: PIV x2  Social:  Code: Full Code    HPOA: Keri Neri [phone]    Disposition: SDU  Discharge Planning: Home when medically stable.  Pt declines SNF.  Agreeable to outpatient PT if needed, will provide script at discharge.    ARIC Resendiz-CNP   06/08/25 at 6:09 AM     Pt discussed with Dr. Justice seen and examined. All labs, VS and previous plan of care reviewed.  I spent 45 minutes in the professional and overall care of this patient.      Disclaimer: Documentation completed with the information available at the time of input. The times in the chart may not be reflective of actual patient care times, interventions, or procedures. Documentation occurs after the physical care of the patient.            [1] [Held by provider] aspirin, 81 mg, oral, Daily  atorvastatin, 40 mg, oral, Nightly  budesonide, 0.5 mg, nebulization, BID  divalproex, 500 mg, oral, BID  docusate sodium, 100 mg, oral, BID  fluticasone, 2 spray, Each Nostril, Daily  ipratropium-albuteroL, 3 mL, nebulization, q6h  lacosamide, 200 mg, oral, BID  melatonin, 3 mg, oral, Nightly  mirtazapine, 7.5 mg, oral, Nightly  oxygen, , inhalation, Continuous - Inhalation  pantoprazole, 40 mg, oral, Daily before breakfast   Or  pantoprazole, 40 mg, intravenous, Daily before breakfast  piperacillin-tazobactam, 4.5 g, intravenous, q6h  QUEtiapine, 12.5 mg, oral, Nightly  vancomycin, 1,250 mg, intravenous, q12h  [2] heparin, 0-4,500  Units/hr, Last Rate: 1,200 Units/hr (06/08/25 0406)  [3] PRN medications: acetaminophen, benzocaine-menthol, benzonatate, dextromethorphan-guaifenesin, dextrose, dextrose, glucagon, glucagon, guaiFENesin, heparin, ipratropium-albuteroL, nicotine polacrilex, ondansetron, oxyCODONE, polyethylene glycol, vancomycin

## 2025-06-08 NOTE — CARE PLAN
The clinical goals for the shift include wean down oxygen by end of shift on 6/8/2025 at 1900  .    Over the shift, the patient did make progress toward the following goals. Barriers to progression include PNA. Recommendations to address these barriers include wean O2 as SpO2 allows and encourage pt to continue to cough up secretions.      Problem: Pain - Adult  Goal: Verbalizes/displays adequate comfort level or baseline comfort level  Outcome: Progressing     Problem: Safety - Adult  Goal: Free from fall injury  Outcome: Progressing     Problem: Discharge Planning  Goal: Discharge to home or other facility with appropriate resources  Outcome: Progressing     Problem: Chronic Conditions and Co-morbidities  Goal: Patient's chronic conditions and co-morbidity symptoms are monitored and maintained or improved  Outcome: Progressing     Problem: Nutrition  Goal: Nutrient intake appropriate for maintaining nutritional needs  Outcome: Progressing     Problem: Skin  Goal: Decreased wound size/increased tissue granulation at next dressing change  Outcome: Progressing  Goal: Participates in plan/prevention/treatment measures  Outcome: Progressing  Goal: Prevent/manage excess moisture  Outcome: Progressing  Goal: Prevent/minimize sheer/friction injuries  Outcome: Progressing  Goal: Promote/optimize nutrition  Outcome: Progressing  Goal: Promote skin healing  Outcome: Progressing

## 2025-06-08 NOTE — CARE PLAN
Problem: Safety - Adult  Goal: Free from fall injury  Outcome: Progressing     Problem: Chronic Conditions and Co-morbidities  Goal: Patient's chronic conditions and co-morbidity symptoms are monitored and maintained or improved  Outcome: Progressing     Problem: Skin  Goal: Decreased wound size/increased tissue granulation at next dressing change  Flowsheets (Taken 6/8/2025 1055)  Decreased wound size/increased tissue granulation at next dressing change: Protective dressings over bony prominences  Goal: Participates in plan/prevention/treatment measures  Flowsheets (Taken 6/8/2025 1055)  Participates in plan/prevention/treatment measures:   Elevate heels   Increase activity/out of bed for meals  Goal: Prevent/manage excess moisture  Flowsheets (Taken 6/8/2025 1055)  Prevent/manage excess moisture:   Moisturize dry skin   Cleanse incontinence/protect with barrier cream  Goal: Prevent/minimize sheer/friction injuries  Flowsheets (Taken 6/8/2025 1055)  Prevent/minimize sheer/friction injuries:   HOB 30 degrees or less   Increase activity/out of bed for meals

## 2025-06-08 NOTE — PROGRESS NOTES
Vancomycin Dosing by Pharmacy- Cessation of Therapy    Consult to pharmacy for vancomycin dosing has been discontinued by the prescriber, pharmacy will sign off at this time.    Please call pharmacy if there are further questions or re-enter a consult if vancomycin is resumed.     Lissette Villeda, PharmD

## 2025-06-08 NOTE — HOSPITAL COURSE
Martínez Neri 64 y.o. 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      In the ED patient was found to have a multifocal pneumonia.  Patient was also found to have a SERINA and elevated troponin 46->67. Hb 7.7 down from 10.6 on 3/11. Lactate of 3.8.  On arrival to the ED patient was hypoxic 87% on room air and tachypneic he was initially placed on 9 L high flow nasal cannula with improvement in his O2 saturation greater than 92%.  A CT angio was obtained and did not reveal any evidence of a pulmonary embolism however there was a new consolidative airspace opacity present in L5 lung lobes and small bilateral pleural effusions, some increase in size of right hilar lymph nodes is favored to be reactive, redemonstration of the area of volume loss/atelectasis in the left upper lobe, similar in appearance to prior exam and increase in size of some cavitary nodules in the lungs bilaterally.  He was giving a DuoNeb breathing treatment, IV azithromycin x 1, IV ceftriaxone x 1 and 125 mg IV Solu-Medrol in the ED.     Patient  admitted to [facility] MICU as a transfer from [facility] for [facility] mulitfocal PNA and oncology evaluation for cavitary lesion c/f re-current lung cancer.  Patient on Airvo 30L/45%, Vanco/Zosyn and heparin started (h/o SMA thrombus on Eliquis) as Hgb stable 8.5.   Patient remained HDS and transferred to SDU on 6/8 for continuation of care.  [facility] team following as patient with aggressive behavior noted to staff and wife.  Zosyn stopped on 6/9 and changed to PO Levaquin (6/9- 6/11) >Augmentin (6/11- Stop 6/16) for total 7 days from start of Levaquin.  Heparin drip stopped this evening and started home Apixiban for SMA thrombus hx .  Walking pulse O2  completed, home O2 orders placed for discharge.      Patient discharge held on 6/10 as Oxygen requirement increased to 15 L on 6/10 with CXR imaging noting atelectasis; Added Volera q4 hrs and continue with Budesonide and Duonebs,   ABX was again changed to zosyn  On 6/13 he underwent bronch with washout d/t large mucous plug.  Cultures were sent out.  No bx was done d/t patient recently received Eliquis. Respiratory function rapidly improved.  He was eventually weaned to 3L NC.  He was discharged home on 6/14  with home O2 3L NC,  after we obtained walking Pox. ( Pt declines SNF prior) .

## 2025-06-09 ENCOUNTER — APPOINTMENT (OUTPATIENT)
Dept: CARDIOLOGY | Facility: HOSPITAL | Age: 65
DRG: 177 | End: 2025-06-09
Payer: COMMERCIAL

## 2025-06-09 ENCOUNTER — APPOINTMENT (OUTPATIENT)
Dept: RADIOLOGY | Facility: HOSPITAL | Age: 65
DRG: 177 | End: 2025-06-09
Payer: COMMERCIAL

## 2025-06-09 LAB
ALBUMIN SERPL BCP-MCNC: 2.5 G/DL (ref 3.4–5)
ANION GAP BLDV CALCULATED.4IONS-SCNC: 9 MMOL/L (ref 10–25)
ANION GAP SERPL CALC-SCNC: 10 MMOL/L (ref 10–20)
BACTERIA SPEC RESP CULT: NORMAL
BASE EXCESS BLDV CALC-SCNC: 1.4 MMOL/L (ref -2–3)
BASOPHILS # BLD AUTO: 0.03 X10*3/UL (ref 0–0.1)
BASOPHILS NFR BLD AUTO: 0.4 %
BODY TEMPERATURE: 37 DEGREES CELSIUS
BUN SERPL-MCNC: 8 MG/DL (ref 6–23)
CA-I BLDV-SCNC: 1.25 MMOL/L (ref 1.1–1.33)
CALCIUM SERPL-MCNC: 8.2 MG/DL (ref 8.6–10.6)
CHLORIDE BLDV-SCNC: 103 MMOL/L (ref 98–107)
CHLORIDE SERPL-SCNC: 103 MMOL/L (ref 98–107)
CO2 SERPL-SCNC: 28 MMOL/L (ref 21–32)
CREAT SERPL-MCNC: 0.56 MG/DL (ref 0.5–1.3)
EGFRCR SERPLBLD CKD-EPI 2021: >90 ML/MIN/1.73M*2
EOSINOPHIL # BLD AUTO: 0.06 X10*3/UL (ref 0–0.7)
EOSINOPHIL NFR BLD AUTO: 0.7 %
ERYTHROCYTE [DISTWIDTH] IN BLOOD BY AUTOMATED COUNT: 17.2 % (ref 11.5–14.5)
GLUCOSE BLDV-MCNC: 80 MG/DL (ref 74–99)
GLUCOSE SERPL-MCNC: 113 MG/DL (ref 74–99)
GRAM STN SPEC: NORMAL
GRAM STN SPEC: NORMAL
HCO3 BLDV-SCNC: 26.6 MMOL/L (ref 22–26)
HCT VFR BLD AUTO: 26.2 % (ref 41–52)
HCT VFR BLD EST: 28 % (ref 41–52)
HGB BLD-MCNC: 8.8 G/DL (ref 13.5–17.5)
HGB BLDV-MCNC: 9.2 G/DL (ref 13.5–17.5)
IMM GRANULOCYTES # BLD AUTO: 0.17 X10*3/UL (ref 0–0.7)
IMM GRANULOCYTES NFR BLD AUTO: 2 % (ref 0–0.9)
INHALED O2 CONCENTRATION: 50 %
INTERPRETATION FOR ANTI-PLATELET FACTOR 4 ANTIBODY: NEGATIVE
LACTATE BLDV-SCNC: 1.2 MMOL/L (ref 0.4–2)
LYMPHOCYTES # BLD AUTO: 0.9 X10*3/UL (ref 1.2–4.8)
LYMPHOCYTES NFR BLD AUTO: 10.5 %
MAGNESIUM SERPL-MCNC: 2.04 MG/DL (ref 1.6–2.4)
MCH RBC QN AUTO: 27.2 PG (ref 26–34)
MCHC RBC AUTO-ENTMCNC: 33.6 G/DL (ref 32–36)
MCV RBC AUTO: 81 FL (ref 80–100)
MONOCYTES # BLD AUTO: 1.17 X10*3/UL (ref 0.1–1)
MONOCYTES NFR BLD AUTO: 13.7 %
NEUTROPHILS # BLD AUTO: 6.21 X10*3/UL (ref 1.2–7.7)
NEUTROPHILS NFR BLD AUTO: 72.7 %
NRBC BLD-RTO: 0 /100 WBCS (ref 0–0)
OXYHGB MFR BLDV: 68.4 % (ref 45–75)
PATH REVIEW-CBC DIFFERENTIAL: NORMAL
PCO2 BLDV: 44 MM HG (ref 41–51)
PH BLDV: 7.39 PH (ref 7.33–7.43)
PHOSPHATE SERPL-MCNC: 2.7 MG/DL (ref 2.5–4.9)
PLATELET # BLD AUTO: 219 X10*3/UL (ref 150–450)
PO2 BLDV: 44 MM HG (ref 35–45)
POTASSIUM BLDV-SCNC: 3.4 MMOL/L (ref 3.5–5.3)
POTASSIUM SERPL-SCNC: 3.2 MMOL/L (ref 3.5–5.3)
RBC # BLD AUTO: 3.23 X10*6/UL (ref 4.5–5.9)
SAO2 % BLDV: 70 % (ref 45–75)
SERUM AND PLATELET FACTOR 4: 0.15 OD UNITS
SODIUM BLDV-SCNC: 135 MMOL/L (ref 136–145)
SODIUM SERPL-SCNC: 138 MMOL/L (ref 136–145)
UFH PPP CHRO-ACNC: 0.4 IU/ML (ref ?–1.1)
WBC # BLD AUTO: 8.5 X10*3/UL (ref 4.4–11.3)

## 2025-06-09 PROCEDURE — 97165 OT EVAL LOW COMPLEX 30 MIN: CPT | Mod: GO

## 2025-06-09 PROCEDURE — 94640 AIRWAY INHALATION TREATMENT: CPT

## 2025-06-09 PROCEDURE — 80069 RENAL FUNCTION PANEL: CPT | Performed by: NURSE PRACTITIONER

## 2025-06-09 PROCEDURE — 2500000001 HC RX 250 WO HCPCS SELF ADMINISTERED DRUGS (ALT 637 FOR MEDICARE OP): Performed by: NURSE PRACTITIONER

## 2025-06-09 PROCEDURE — 93005 ELECTROCARDIOGRAM TRACING: CPT

## 2025-06-09 PROCEDURE — 83735 ASSAY OF MAGNESIUM: CPT | Performed by: NURSE PRACTITIONER

## 2025-06-09 PROCEDURE — 97530 THERAPEUTIC ACTIVITIES: CPT | Mod: GP

## 2025-06-09 PROCEDURE — 2500000004 HC RX 250 GENERAL PHARMACY W/ HCPCS (ALT 636 FOR OP/ED): Performed by: NURSE PRACTITIONER

## 2025-06-09 PROCEDURE — 2500000002 HC RX 250 W HCPCS SELF ADMINISTERED DRUGS (ALT 637 FOR MEDICARE OP, ALT 636 FOR OP/ED): Performed by: NURSE PRACTITIONER

## 2025-06-09 PROCEDURE — 71045 X-RAY EXAM CHEST 1 VIEW: CPT | Performed by: RADIOLOGY

## 2025-06-09 PROCEDURE — 36415 COLL VENOUS BLD VENIPUNCTURE: CPT | Performed by: NURSE PRACTITIONER

## 2025-06-09 PROCEDURE — 99232 SBSQ HOSP IP/OBS MODERATE 35: CPT | Performed by: STUDENT IN AN ORGANIZED HEALTH CARE EDUCATION/TRAINING PROGRAM

## 2025-06-09 PROCEDURE — 97161 PT EVAL LOW COMPLEX 20 MIN: CPT | Mod: GP

## 2025-06-09 PROCEDURE — 85025 COMPLETE CBC W/AUTO DIFF WBC: CPT | Performed by: NURSE PRACTITIONER

## 2025-06-09 PROCEDURE — 2500000005 HC RX 250 GENERAL PHARMACY W/O HCPCS: Performed by: NURSE PRACTITIONER

## 2025-06-09 PROCEDURE — 97535 SELF CARE MNGMENT TRAINING: CPT | Mod: GO

## 2025-06-09 PROCEDURE — 1100000001 HC PRIVATE ROOM DAILY

## 2025-06-09 PROCEDURE — 71045 X-RAY EXAM CHEST 1 VIEW: CPT

## 2025-06-09 PROCEDURE — 85520 HEPARIN ASSAY: CPT | Performed by: NURSE PRACTITIONER

## 2025-06-09 RX ORDER — IPRATROPIUM BROMIDE 0.5 MG/2.5ML
SOLUTION RESPIRATORY (INHALATION)
Status: DISPENSED
Start: 2025-06-09 | End: 2025-06-09

## 2025-06-09 RX ORDER — ACETAMINOPHEN 325 MG/1
650 TABLET ORAL EVERY 6 HOURS PRN
Start: 2025-06-09 | End: 2025-06-14

## 2025-06-09 RX ORDER — POTASSIUM CHLORIDE 20 MEQ/1
40 TABLET, EXTENDED RELEASE ORAL ONCE
Status: COMPLETED | OUTPATIENT
Start: 2025-06-09 | End: 2025-06-09

## 2025-06-09 RX ORDER — LEVOFLOXACIN 750 MG/1
750 TABLET, FILM COATED ORAL EVERY 24 HOURS
Status: DISCONTINUED | OUTPATIENT
Start: 2025-06-09 | End: 2025-06-11

## 2025-06-09 RX ADMIN — LEVOFLOXACIN 750 MG: 750 TABLET, FILM COATED ORAL at 18:32

## 2025-06-09 RX ADMIN — OXYCODONE 5 MG: 5 TABLET ORAL at 03:28

## 2025-06-09 RX ADMIN — IPRATROPIUM BROMIDE AND ALBUTEROL SULFATE 3 ML: .5; 3 SOLUTION RESPIRATORY (INHALATION) at 02:09

## 2025-06-09 RX ADMIN — ASPIRIN 81 MG: 81 TABLET, CHEWABLE ORAL at 08:12

## 2025-06-09 RX ADMIN — BENZOCAINE AND MENTHOL 1 LOZENGE: 15; 3.6 LOZENGE ORAL at 11:15

## 2025-06-09 RX ADMIN — PIPERACILLIN SODIUM AND TAZOBACTAM SODIUM 4.5 G: 4; .5 INJECTION, SOLUTION INTRAVENOUS at 00:05

## 2025-06-09 RX ADMIN — PIPERACILLIN SODIUM AND TAZOBACTAM SODIUM 4.5 G: 4; .5 INJECTION, SOLUTION INTRAVENOUS at 11:08

## 2025-06-09 RX ADMIN — Medication 4 L/MIN: at 20:00

## 2025-06-09 RX ADMIN — APIXABAN 5 MG: 5 TABLET, FILM COATED ORAL at 18:32

## 2025-06-09 RX ADMIN — PIPERACILLIN SODIUM AND TAZOBACTAM SODIUM 4.5 G: 4; .5 INJECTION, SOLUTION INTRAVENOUS at 05:20

## 2025-06-09 RX ADMIN — LACOSAMIDE 200 MG: 100 TABLET, FILM COATED ORAL at 08:12

## 2025-06-09 RX ADMIN — OXYCODONE 5 MG: 5 TABLET ORAL at 08:15

## 2025-06-09 RX ADMIN — DIVALPROEX SODIUM 500 MG: 500 TABLET, FILM COATED, EXTENDED RELEASE ORAL at 21:32

## 2025-06-09 RX ADMIN — HEPARIN SODIUM 1200 UNITS/HR: 10000 INJECTION, SOLUTION INTRAVENOUS at 02:45

## 2025-06-09 RX ADMIN — DIVALPROEX SODIUM 500 MG: 500 TABLET, FILM COATED, EXTENDED RELEASE ORAL at 08:12

## 2025-06-09 RX ADMIN — IPRATROPIUM BROMIDE AND ALBUTEROL SULFATE 3 ML: .5; 3 SOLUTION RESPIRATORY (INHALATION) at 16:49

## 2025-06-09 RX ADMIN — BENZONATATE 100 MG: 100 CAPSULE ORAL at 08:16

## 2025-06-09 RX ADMIN — POTASSIUM CHLORIDE 40 MEQ: 1500 TABLET, EXTENDED RELEASE ORAL at 08:12

## 2025-06-09 RX ADMIN — LACOSAMIDE 200 MG: 100 TABLET, FILM COATED ORAL at 21:31

## 2025-06-09 RX ADMIN — BUDESONIDE 0.5 MG: 0.5 INHALANT RESPIRATORY (INHALATION) at 16:50

## 2025-06-09 RX ADMIN — Medication 2 L/MIN: at 08:08

## 2025-06-09 ASSESSMENT — COGNITIVE AND FUNCTIONAL STATUS - GENERAL
EATING MEALS: A LITTLE
PERSONAL GROOMING: A LITTLE
MOVING TO AND FROM BED TO CHAIR: A LITTLE
MOVING TO AND FROM BED TO CHAIR: A LITTLE
DRESSING REGULAR UPPER BODY CLOTHING: A LITTLE
DAILY ACTIVITIY SCORE: 18
CLIMB 3 TO 5 STEPS WITH RAILING: A LOT
DRESSING REGULAR UPPER BODY CLOTHING: A LITTLE
DAILY ACTIVITIY SCORE: 21
MOBILITY SCORE: 19
HELP NEEDED FOR BATHING: A LITTLE
EATING MEALS: A LITTLE
DAILY ACTIVITIY SCORE: 18
WALKING IN HOSPITAL ROOM: A LITTLE
WALKING IN HOSPITAL ROOM: A LITTLE
TOILETING: A LITTLE
DRESSING REGULAR LOWER BODY CLOTHING: A LITTLE
HELP NEEDED FOR BATHING: A LITTLE
CLIMB 3 TO 5 STEPS WITH RAILING: A LOT
HELP NEEDED FOR BATHING: A LITTLE
DRESSING REGULAR LOWER BODY CLOTHING: A LITTLE
STANDING UP FROM CHAIR USING ARMS: A LITTLE
STANDING UP FROM CHAIR USING ARMS: A LITTLE
PERSONAL GROOMING: A LITTLE
DRESSING REGULAR LOWER BODY CLOTHING: A LITTLE
TOILETING: A LITTLE
TOILETING: A LITTLE
MOBILITY SCORE: 19

## 2025-06-09 ASSESSMENT — PAIN - FUNCTIONAL ASSESSMENT
PAIN_FUNCTIONAL_ASSESSMENT: 0-10

## 2025-06-09 ASSESSMENT — ACTIVITIES OF DAILY LIVING (ADL)
ADL_ASSISTANCE: INDEPENDENT
ADL_ASSISTANCE: INDEPENDENT
HOME_MANAGEMENT_TIME_ENTRY: 8

## 2025-06-09 ASSESSMENT — PAIN SCALES - GENERAL
PAINLEVEL_OUTOF10: 0 - NO PAIN
PAINLEVEL_OUTOF10: 8
PAINLEVEL_OUTOF10: 8
PAINLEVEL_OUTOF10: 4

## 2025-06-09 ASSESSMENT — PAIN DESCRIPTION - DESCRIPTORS: DESCRIPTORS: ACHING

## 2025-06-09 NOTE — PROGRESS NOTES
Occupational Therapy    Evaluation/Treatment    Patient Name: Martínez Neri  MRN: [id number]  Today's Date: 6/9/2025  Time Calculation  Start Time: 0914  Stop Time: 0937  Time Calculation (min): 23 min    Assessment  IP OT Assessment  OT Assessment: Pt required SBA with functional bed mobility, able to complete transfers and ambulation with SBA and good dynamic standing balance. pt required SBA with toileting in standing position.  Prognosis: Good  Barriers to Discharge Home: No anticipated barriers  Evaluation/Treatment Tolerance: Patient tolerated treatment well  Medical Staff Made Aware: Yes  End of Session Communication: Bedside nurse  End of Session Patient Position: Bed, 3 rail up, Alarm off, not on at start of session, Alarm off, caregiver present  Plan:  Treatment Interventions: ADL retraining, Functional transfer training, Endurance training  OT Frequency: 2 times per week  OT Discharge Recommendations: Low intensity level of continued care  OT Recommended Transfer Status: Stand by assist  OT - OK to Discharge: Yes    Subjective     Current Problem:  1. Multifocal pneumonia  Oxygen therapy for home    acetaminophen (Tylenol) 325 mg tablet    Referral to Physical Therapy      2. 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  Prior to Session Communication: Bedside nurse  Patient Position Received: Bed, 3 rail up, Alarm off, caregiver present,  Alarm off, not on at start of session  Family/Caregiver Present: Yes  Caregiver Feedback: pt's wife was present  General Comment: supine with HOB elevated. easily becomes angry. Wife present and was supportive.     Precautions:  Medical Precautions: Fall precautions, Oxygen therapy device and L/min    Pain:  Pain Assessment  Pain Assessment: 0-10  0-10 (Numeric) Pain Score:  (not rated)  Pain Type: Acute pain  Pain Location: Chest  Pain Interventions: Repositioned      Objective   Cognition:  Overall Cognitive Status:  (easily becomes angry and appeared irritable through-out the session.)  Insight: Mild             Home Living:  Type of Home: House  Lives With: Spouse, Adult children, Grandchildren  Home Adaptive Equipment: Walker rolling or standard  Home Layout: Multi-level, Stairs to alternate level with rails  Alternate Level Stairs-Rails: Left  Alternate Level Stairs-Number of Steps: 15  Home Access: Stairs to enter without rails  Entrance Stairs-Rails: None  Entrance Stairs-Number of Steps: 2  Bathroom Shower/Tub: Tub/shower unit, Walk-in shower  Bathroom Equipment: Shower chair with back     Prior Function:  Level of [place]: Independent with ADLs and functional transfers, Independent with homemaking with ambulation  Receives Help From: Family  ADL Assistance: Independent  Homemaking Assistance: Independent  Ambulatory Assistance: Independent    ADL:  Eating Assistance: Independent  UE Dressing Assistance: Independent    Balance:  Dynamic Standing Balance  Dynamic Standing-Level of Assistance: Distant supervision  Dynamic Standing-Balance:  (functional ambulation in the room and to the bathroom)    Bed Mobility/Transfers:     and Transfers  Transfer: Yes  Transfer 1  Transfer From 1: Bed to  Transfer to 1: Stand  Technique 1: Sit to stand, Stand to sit  Transfer Level of Assistance 1: Close supervision    Vision:     and Vision - Complex Assessment  Ocular Range of Motion: Within Functional  Limits    Sensation:  Light Touch: No apparent deficits    Perception:  Inattention/Neglect: Appears intact    Coordination:  Movements are Fluid and Coordinated: Yes     Hand Function:  Hand Function  Gross Grasp: Functional  Coordination: Functional    Extremities: RUE   RUE : Within Functional Limits, LUE   LUE: Within Functional Limits,     Outcome Measures: [facility] Daily Activity  Putting on and taking off regular lower body clothing: A little  Bathing (including washing, rinsing, drying): A little  Putting on and taking off regular upper body clothing: None  Toileting, which includes using toilet, bedpan or urinal: A little  Taking care of personal grooming such as brushing teeth: None  Eating Meals: None  Daily Activity - Total Score: 21         ,     OT Adult Other Outcome Measures  4AT: negative    Education Documentation  Body Mechanics, taught by Pinky Nava OT at 6/9/2025  4:16 PM.  Learner: Patient  Readiness: Acceptance  Method: Explanation  Response: Verbalizes Understanding  Comment: safety    ADL Training, taught by Pinky Nava OT at 6/9/2025  4:16 PM.  Learner: Patient  Readiness: Acceptance  Method: Explanation  Response: Verbalizes Understanding  Comment: safety    Education Comments  No comments found.        Goals:   Encounter Problems       Encounter Problems (Active)       ADLs       Patient will perform UB and LB bathing  with modified independent level of assistance and grab bars. (Progressing)       Start:  06/09/25    Expected End:  06/30/25            Patient with complete lower body dressing with modified independent level of assistance donning and doffing all LE clothes  with PRN adaptive equipment while edge of bed  (Progressing)       Start:  06/09/25    Expected End:  06/30/25            Patient will complete toileting including hygiene clothing management/hygiene with modified independent level of assistance and grab bars. (Progressing)       Start:  06/09/25    Expected End:   06/30/25               BALANCE       Pt will maintain dynamic standing balance during ADL task with modified independent level of assistance in order to demonstrate decreased risk of falling and improved postural control. (Progressing)       Start:  06/09/25    Expected End:  06/30/25               MOBILITY       Patient will perform Functional mobility min Household distances/Community Distances with modified independent level of assistance and least restrictive device in order to improve safety and functional mobility. (Progressing)       Start:  06/09/25    Expected End:  06/30/25               TRANSFERS       Patient will perform bed mobility modified independent level of assistance and bed rails in order to improve safety and independence with mobility (Progressing)       Start:  06/09/25    Expected End:  06/30/25            Patient will complete sit to stand transfer with modified independent level of assistance and least restrictive device in order to improve safety and prepare for out of bed mobility. (Progressing)       Start:  06/09/25    Expected End:  06/30/25                   Treatment Completed on Evaluation      Activities of Daily Living:      Toileting  Toileting Level of Assistance: Close supervision  Where Assessed: Toilet  Toileting Comments: standing             06/09/25 at 4:17 PM   Pinky LUNDBERG/L, ZORAIDA  Rehab Office: [phone]

## 2025-06-09 NOTE — PROGRESS NOTES
Pharmacy Medication History Review    Martínez Neri is a 64 y.o. male admitted for Multifocal pneumonia. Pharmacy reviewed the patient's zcoig-en-yvgxzligi medications and allergies for accuracy.    Medications ADDED:  None  Medications CHANGED:  None  Medications REMOVED:   duoneb     The list below reflects the updated PTA list.   Prior to Admission Medications   Prescriptions Last Dose Informant   Eliquis 5 mg tablet  Self, Spouse/Significant Other   Sig: TAKE ONE TABLET BY MOUTH TWO TIMES A DAY   Spiriva Respimat 2.5 mcg/actuation inhaler  Self, Spouse/Significant Other   Sig: Inhale 2 puffs once daily.   albuterol (Proventil HFA) 90 mcg/actuation inhaler  Self, Spouse/Significant Other   Sig: Inhale 2 puffs every 4 hours if needed for wheezing or shortness of breath.   aspirin 81 mg chewable tablet  Self, Spouse/Significant Other   Sig: Chew 1 tablet (81 mg) once daily.   atorvastatin (Lipitor) 40 mg tablet  Self, Spouse/Significant Other   Sig: Take 1 tablet (40 mg) by mouth once daily.   divalproex (Depakote ER) 500 mg 24 hr tablet  Self, Spouse/Significant Other   Sig: Take 1 tablet (500 mg) by mouth 2 times a day. Do not crush, chew, or split.   fluticasone (Flonase) 50 mcg/actuation nasal spray  Self, Spouse/Significant Other   Sig: Administer 2 sprays into each nostril once daily. Shake gently. Before first use, prime pump. After use, clean tip and replace cap.   lacosamide (Vimpat) 200 mg tablet tablet  Self, Spouse/Significant Other   Sig: TAKE ONE TABLET BY MOUTH TWO TIMES A DAY   mirtazapine (Remeron) 7.5 mg tablet  Self, Spouse/Significant Other   Sig: Take 1 tablet (7.5 mg) by mouth once daily at bedtime.   multivitamin tablet  Self, Spouse/Significant Other   Sig: Take 1 tablet by mouth once daily.   naloxone (Narcan) 4 mg/0.1 mL nasal spray  Self, Spouse/Significant Other   Sig: Administer 1 spray (4 mg) into affected nostril(s) if needed for opioid reversal. May repeat every 2-3 minutes if  "needed, alternating nostrils, until medical assistance becomes available.   nicotine (Nicoderm CQ) 7 mg/24 hr patch Not Taking Self, Spouse/Significant Other   Sig: Place 1 patch over 24 hours on the skin once every 24 hours.   Patient not taking: Reported on 6/9/2025   oxyCODONE (Roxicodone) 5 mg immediate release tablet  Self, Spouse/Significant Other   Sig: Take 1 tablet (5 mg) by mouth every 6 hours if needed for severe pain (7 - 10).      Facility-Administered Medications: None        The list below reflects the updated allergy list. Please review each documented allergy for additional clarification and justification.  Allergies  Reviewed by Brady Ann RN on 6/7/2025   No Known Allergies         Patient declines M2B at discharge.     Sources:   [facility]  Pharmacy dispense history  Patient Interview good historian, wife present to assist  Spouse wife present in room to help confirm home med details     Chart Review  Care Everywhere  [facility]eme onc OV note 3/18/25  Pall med OV note 6/2/25    Additional Comments:  Albuterol HFA and Spiriva inhalers-  patient reports needing new scripts for refills, currently does not have at home      Lawson Roberto, PharmD  Transitions of Care Pharmacist  06/09/25     Secure Chat preferred   If no response call [phone] or [employer] \"Med Rec\"    "

## 2025-06-09 NOTE — PROGRESS NOTES
Physical Therapy    Physical Therapy Evaluation & Treatment    Patient Name: Martínez Neri  MRN: [id number]  Department: [address]  Room: 6015/6015-A  Today's Date: 6/9/2025   Time Calculation  Start Time: 0914  Stop Time: 0937  Time Calculation (min): 23 min    Assessment/Plan   PT Assessment  PT Assessment Results: Decreased endurance, Decreased mobility, Decreased cognition, Impaired judgement, Decreased safety awareness, Impaired balance  Rehab Prognosis: Good  Barriers to Discharge Home: Physical needs, Cognition needs  Cognition Needs: Insight of patient limited regarding functional ability/needs, Cognition-related high falls risk  Physical Needs: Intermittent mobility assistance needed, Intermittent ADL assistance needed  Evaluation/Treatment Tolerance: Patient tolerated treatment well  Medical Staff Made Aware: Yes  End of Session Communication: Bedside nurse  Assessment Comment: 65yo male presents with dec strength, balance, endurance, & imp safety awareness limiting functional mobility.  Pt would benefit from continued therapy to address these deficits and improve safety and indep  End of Session Patient Position: Bed, 3 rail up, Alarm off, not on at start of session, Alarm off, caregiver present   IP OR SWING BED PT PLAN  Inpatient or Swing Bed: Inpatient  PT Plan  Treatment/Interventions: Bed mobility, Transfer training, Gait training, Stair training, Balance training, Strengthening, Endurance training, Therapeutic exercise, Therapeutic activity  PT Plan: Ongoing PT  PT Frequency: 3 times per week  PT Discharge Recommendations: Low intensity level of continued care  PT Recommended Transfer Status: Contact guard  PT - OK to Discharge: Yes (PT eval completed & recs made)    Subjective     PT Visit Info:  PT Received On: 06/09/25  General Visit Information:  General  Reason for Referral: patient admitted on 6/7/2025 with Acute hypoxic resp failure 2/2 multifocal PNA  Past Medical History Relevant to Rehab:  NSCLC s/p  chemotherapy and radiation in remission, left inguinal hernia, ICD brain aneurysm s/p coiling, 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  Home Living:  Home Living  Type of Home: House  Lives With: Spouse, Adult children, Grandchildren (spouse, son, dtr, DIL, grandchildren)  Home Adaptive Equipment: Walker rolling or standard  Home Layout: Multi-level, Stairs to alternate level with rails  Alternate Level Stairs-Rails: Left  Alternate Level Stairs-Number of Steps: 15  Home Access: Stairs to enter without rails  Entrance Stairs-Rails: None  Entrance Stairs-Number of Steps: 2  Bathroom Shower/Tub: Tub/shower unit, Walk-in shower  Prior Level of Function:  Prior Function Per Pt/Caregiver Report  Level of [place]: Independent with ADLs and functional transfers, Independent with homemaking with ambulation  Receives Help From: Family  ADL Assistance: Independent  Homemaking Assistance: Independent  Ambulatory Assistance: Independent  Prior Function Comments: Denies falls  Precautions:  Precautions  Medical Precautions: Fall precautions, Oxygen therapy device and L/min (2L)    Vital Signs Comment: O2 sats >/= 91% on 2L during mobility    Objective   Pain:  Pain Assessment  Pain Assessment: 0-10  0-10 (Numeric) Pain Score:  (not numerically rated when asked)  Pain Type: Acute pain  Pain Location: Chest  Pain Interventions: Repositioned  Response to Interventions:  Resting quietly  Cognition:  Cognition  Overall Cognitive Status:  (difficult to assess 2/2 inc agitation with questioning)  Insight: Moderate  Impulsive: Moderately    General Assessments:     Activity Tolerance  Endurance: Tolerates 10 - 20 min exercise with multiple rests    Sensation  Light Touch: No apparent deficits  Sensation Comment: patient denies numbness/tingling    Strength  Strength Comments: grossly >/= 3/5 as evidenced by functional mobility  Strength  Strength Comments: grossly >/= 3/5 as evidenced by functional mobility    Perception  Inattention/Neglect: Appears intact  Initiation: Appears intact  Motor Planning: Appears intact  Perseveration: Not present    Coordination  Movements are Fluid and Coordinated: Yes    Postural Control  Postural Control: Within Functional Limits    Static Sitting Balance  Static Sitting-Balance Support: Feet supported  Static Sitting-Level of Assistance: Close supervision    Static Standing Balance  Static Standing-Balance Support: No upper extremity supported  Static Standing-Level of Assistance: Contact guard  Dynamic Standing Balance  Dynamic Standing-Balance Support: No upper extremity supported  Dynamic Standing-Level of Assistance: Contact guard  Dynamic Standing-Balance: Turning  Functional Assessments:  Bed Mobility  Bed Mobility: Yes  Bed Mobility 1  Bed Mobility 1: Supine to sitting  Level of Assistance 1: Close supervision  Bed Mobility Comments 1: HOB elevated    Transfers  Transfer: Yes  Transfer 1  Technique 1: Sit to stand, Stand to sit  Transfer Level of Assistance 1: Close supervision    Ambulation/Gait Training  Ambulation/Gait Training Performed: Yes  Ambulation/Gait Training 1  Surface 1: Level tile  Device 1: No device  Assistance 1: Close supervision, Contact guard  Quality of Gait 1: Decreased step length (mild path deviations without acute LOB)  Comments/Distance (ft) 1: 10'x2    Stairs  Stairs: No  Extremity/Trunk Assessments:  BEN CONTRERAS :  Within Functional Limits  LUE   LUE: Within Functional Limits  RLE   RLE : Within Functional Limits  LLE   LLE : Within Functional Limits  Treatments:  Therapeutic Activity  Therapeutic Activity Performed: Yes  Therapeutic Activity 1: Inc time & cueing during functional transfers & amb to maintain pt safety with fair carryover and compliance.  Static standing in bathroom without UE support x ~5 min with close SBA.  Outcome Measures:       Encounter Problems       Encounter Problems (Active)       Balance       Pt will score >24 on Tinetti assessment to indicate low falls risk  (Progressing)       Start:  06/09/25    Expected End:  06/23/25               Mobility       STG - Patient will ambulate >125' indep with VSS (Progressing)       Start:  06/09/25    Expected End:  06/23/25            STG - Patient will ascend and descend a flight of stairs with HR and SBA to simulate home environment  (Not Progressing)       Start:  06/09/25    Expected End:  06/23/25               PT Transfers       STG - Patient to transfer to and from sit to supine indep from flat bed, no rails  (Progressing)       Start:  06/09/25    Expected End:  06/23/25            STG - Patient will transfer sit to and from stand indep  (Progressing)       Start:  06/09/25    Expected End:  06/23/25               Pain - Adult              Education Documentation  Precautions, taught by Batsheva Robles, PT at 6/9/2025  2:15 PM.  Learner: Significant Other, Patient  Readiness: Acceptance  Method: Explanation  Response: Verbalizes Understanding, Needs Reinforcement  Comment: safety with mobility, progression of therapy    Mobility Training, taught by Batsheva Robles, PT at 6/9/2025  2:15 PM.  Learner: Significant Other, Patient  Readiness: Acceptance  Method: Explanation  Response: Verbalizes Understanding, Needs Reinforcement  Comment: safety with mobility, progression of therapy    Education Comments  No comments found.      06/09/25 at 2:16 PM -  Batsheva Robles, PT

## 2025-06-09 NOTE — CARE PLAN
Problem: Safety - Adult  Goal: Free from fall injury  Outcome: Progressing     Problem: Chronic Conditions and Co-morbidities  Goal: Patient's chronic conditions and co-morbidity symptoms are monitored and maintained or improved  Outcome: Progressing

## 2025-06-09 NOTE — CARE PLAN
The patient's goals for the shift include      The clinical goals for the shift include Patient will remain HDS    Over the shift, the patient did not make progress toward the following goals. Barriers to progression include   Problem: Pain - Adult  Goal: Verbalizes/displays adequate comfort level or baseline comfort level  Outcome: Progressing     Problem: Safety - Adult  Goal: Free from fall injury  Outcome: Progressing     Problem: Chronic Conditions and Co-morbidities  Goal: Patient's chronic conditions and co-morbidity symptoms are monitored and maintained or improved  Outcome: Progressing     Problem: Nutrition  Goal: Nutrient intake appropriate for maintaining nutritional needs  Outcome: Progressing     Problem: Skin  Goal: Decreased wound size/increased tissue granulation at next dressing change  Outcome: Progressing  Goal: Participates in plan/prevention/treatment measures  Outcome: Progressing  Goal: Prevent/manage excess moisture  Outcome: Progressing  Goal: Prevent/minimize sheer/friction injuries  Outcome: Progressing  Goal: Promote/optimize nutrition  Outcome: Progressing  Goal: Promote skin healing  Outcome: Progressing

## 2025-06-09 NOTE — SIGNIFICANT EVENT
StepDown Unit to [facility] Transfer Summary     I:  ICU Admission Reason & Brief ICU Course:    Martínez Neri 64 y.o. 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      In the ED patient was found to have a multifocal pneumonia.  Patient was also found to have a SERINA and elevated troponin 46->67. Hb 7.7 down from 10.6 on 3/11. Lactate of 3.8.  On arrival to the ED patient was hypoxic 87% on room air and tachypneic he was initially placed on 9 L high flow nasal cannula with improvement in his O2 saturation greater than 92%.  A CT angio was obtained and did not reveal any evidence of a pulmonary embolism however there was a new consolidative airspace opacity present in L5 lung lobes and small bilateral pleural effusions, some increase in size of right hilar lymph nodes is favored to be reactive, redemonstration of the area of volume loss/atelectasis in the left upper lobe, similar in appearance to prior exam and increase in size of some cavitary nodules in the lungs bilaterally.  He was giving a DuoNeb breathing treatment, IV azithromycin x 1, IV ceftriaxone x 1 and 125 mg IV Solu-Medrol in the ED.     Patient  admitted to [facility] MICU as a transfer from [facility] for AHRF iso mulitfocal PNA and oncology evaluation for cavitary lesion c/f re-current lung cancer.  Patient on Airvo 30L/45%, Vanco/Zosyn and heparin started (h/o SMA thrombus on Eliquis) as Hgb stable 8.5.   Patient remained HDS and transferred to SDU on 6/8 for continuation of care.   [facility] team following as patient with aggressive behavior noted to staff and wife.  Zosyn stopped on 6/9 and changed to PO Levaquin for 7-10 day course.   Heparin drip stopped this evening and started home Apixiban for SMA  thrombus hx .  Walking pulse O2 completed, home O2 orders placed for discharge.  Medical stable to transfer to [facility] on 6/9 on 4L NC.     Dispo:   Home when medically stable.  Pt declines SNF.  Agreeable to outpatient PT if needed, will provide script at discharge (referral placed in Epic).  Walking pulse O2 completed, home O2 orders placed for discharge     C: Code Status/DPOA Info/Goals of Care/ACP Note    Full Code  DPOA/Contact Number: Keri Neri [phone]    U: Unprescribing & Pertinent High-Risk Medications    Changes to home meds: None     Anticoagulation: Restarted home Apixaban on 6/9    Antibiotics:   []  Stopped Zosyn (6/-5 -6/9) to PO Levaquin (6/9 - *) for 7-10 day treatment for multifocal PNA   []  Will need CT chest in 4 weeks; has one scheduled for July and will need to f/up with Dr Ng after imaging    P: Pending Tests at the Time of Transfer   N/A    A: Active consultants, including Rehab:   []  Subspecialty Consultants: N/A  [x]  PT  [x]  OT  []  SLP  []  Wound Care    U: Uncertainty Measure/Diagnostic Pause:    Working diagnosis at the time of transfer: AHRF iso Multifocal pneumonia, though ddx includes squamous cell carnicnoma      Diagnosis Degree of Certainty: 1. High degree of certainty about the clinical diagnosis.     S: Summary of Major Problems and To-Dos:   Martínez Neri 64 y.o. 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 Admitted on 6/7/2025 with Acute hypoxic resp failure 2/2 multifocal PNA     Restraints: no     Summary for 06/09/25  :  Stopped Zosyn, change to PO Levaquin for 7 -10 day course  Stopped Heparin drip; started home Apixiban for SMA thrombus hx  Walking pulse O2 completed, home O2 orders placed for  discharge     Plan:  NEUROLOGY/PSYCH:  Dx: hx of ICD brain aneurysm s/p coiling, Seizures  Management:  Home Meds: Depakote  mg BID, vimpat 200 mg BID, Remeron 7.5 mg at bedtime  Continue home meds  Melatonin for sleep  PRN Tylenol or Oxy for pain  Continue Seroquel 12.5 mg QHS   OOB to chair.  No PT/OT needs     CARDIOVASCULAR:  Dx: hx of HTN, HLP. Afib/RVR at OSH, converted to NSR with IV Metop x1.  Elevated trop, Type II MI demand ischemia  6/5 TTE: LVEF 55-60%, Diastolic dysfunction, Inferior vena cava mildly dilated with IVC ins collapse >50%  Management:  HDS  Home Meds: ASA 81 mg daily, Atorvastatin 40 mg daily  Continue Atorvastatin 40 mg daily  ASA has been held 2/2 anemia, resume today     PULMONARY:  Dx: history of NSCLC of THOMAS s/p  chemotherapy and radiation in remission. COPD.  Former Smoker  PFTs (10/23): FVC 88, FEV1 40, FEV1/FVC 15 indicating severe airway obstruction  July '23 status post bronch/EBUS biopsies showed left upper lobe with invasive moderately differentiated non-small cell carcinoma  Completed chemo/radiation January '24  Currently on surveillance  Management:  Home Meds: Proventil HFA PRN, fluticasone 50 mcg nasal daily, DuoNebs PRN, Nicoderm, Spiriva inhl  S/p 20 mg Furosemide x1 on 6/7  Requiring 8 L NC, weaned to 2 L NC, requires 3 L with exertion  Continue Budesonide, DuoNebs  vPEP TID  Will need CT chest in 4 weeks-- has one scheduled for July & will follow up with Dr Ng after     RENAL/GENITOURINARY:  Dx: SERINA (resolved)  Management:  Daily RFP     GASTROENTEROLOGY:  Dx: No acute issues  Management:  Diet: Regular  Bowel Regimen: Docusate BID  PPI     ENDOCRINOLOGY:  Dx: No acute issues  Management:  A1C: 5.5 (6/2025)     HEMATOLOGY:  Dx: Normocytic Anemia, Acute Thrombocytopenia- resolving (d/dx: drug induced, sepsis related, ITP), hx of 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 Last transfusion 6/7  No signs of  bleeding  Management:  Thrombocytopenia work up including PF4 (neg), Hepatitis panel, HIV, peripheral smear  Stopped heparin drip & start PO Apixaban on 6/9     MUSCULOSKELETAL/ SKIN:  Dx: No acute issues     INFECTIOUS DISEASE:  Dx: Multifocal PNA, sepsis (resolved)  On admit to OSH, tachycardic, tachypnea, elevated lactate, WBC 12-16 & SERINA  Management:  Tmax: 36.9 C  MICRO: 6/5 MRSA PCR neg, 6/5 Sputum cx salivary contamination, 6/6 Sputum cx pending  6/5 Strep pneumoniae Ag and Legionella Ag neg  6/6 Procalc 12.79  Antimicrobials: Azithro (6/4), CTX (6/4-6/5), Vanco (6/5- 6/8), zosyn (6/5- 6/9) to PO Levaquin (6/9- *) for 7-10 day course.     To-do list prior to transfer:  [x] Wean oxygen as tolerated      E: Exam, including Lines/Drains/Airways & Data Review:   Constitutional: pt in NAD, alert and cooperative  Eyes: PERRL, EOMI, no icterus   ENMT: MMM, no apparent injury, no lesions seen  Head/Neck: Neck supple, no apparent injury  Respiratory/Thorax: Lungs  david rhonchi, + cough, on 4 L NC  Cardiovascular: Regular, rate and rhythm, no murmurs, normal S1 and S2  Gastrointestinal: NTND, soft, non-tender, BS present x 4  Musculoskeletal: ROM intact, no joint swelling, normal strength  Extremities: normal extremities, no edema, contusions or wounds  Neurological: alert and oriented x 3, speech clear, follows commands appropriately, cr. n. II-XII intact, sensation grossly intact, motor 5/5 throughout  Skin: Warm and dry, no lesions, no rashes    Difficult airway? Yes  Lines/drains assessed for removal? N/A    Within 30 minutes of the patient physically leaving the floor, a Floor Readiness Note needs to be placed with updated vitals.        Olga Manzo, APRN-CNP

## 2025-06-09 NOTE — CARE PLAN
The patient's goals for the shift include      The clinical goals for the shift include Patient will remain HDS    Over the shift, the patient did not make progress toward the following goals. Barriers to progression include   Problem: Pain - Adult  Goal: Verbalizes/displays adequate comfort level or baseline comfort level  Outcome: Progressing     Problem: Safety - Adult  Goal: Free from fall injury  Outcome: Progressing     Problem: Chronic Conditions and Co-morbidities  Goal: Patient's chronic conditions and co-morbidity symptoms are monitored and maintained or improved  Outcome: Progressing     Problem: Nutrition  Goal: Nutrient intake appropriate for maintaining nutritional needs  Outcome: Progressing     Problem: Skin  Goal: Decreased wound size/increased tissue granulation at next dressing change  6/8/2025 2324 by Danae Rodriguez RN  Outcome: Progressing  Flowsheets (Taken 6/8/2025 2324)  Decreased wound size/increased tissue granulation at next dressing change:   Promote sleep for wound healing   Utilize specialty bed per algorithm  6/8/2025 2324 by Danae Rodriguez RN  Outcome: Progressing  Flowsheets (Taken 6/8/2025 2324)  Decreased wound size/increased tissue granulation at next dressing change:   Promote sleep for wound healing   Utilize specialty bed per algorithm  Goal: Participates in plan/prevention/treatment measures  6/8/2025 2324 by Danae Rodriguez RN  Outcome: Progressing  Flowsheets (Taken 6/8/2025 2324)  Participates in plan/prevention/treatment measures:   Elevate heels   Increase activity/out of bed for meals  6/8/2025 2324 by Danae Rodriguez RN  Outcome: Progressing  Flowsheets (Taken 6/8/2025 2324)  Participates in plan/prevention/treatment measures:   Elevate heels   Increase activity/out of bed for meals  Goal: Prevent/manage excess moisture  6/8/2025 2324 by Danae Rodriguez RN  Outcome: Progressing  Flowsheets (Taken 6/8/2025 2324)  Prevent/manage excess moisture:   Moisturize dry  skin   Monitor for/manage infection if present  6/8/2025 2324 by Danae Rodriguez RN  Outcome: Progressing  Flowsheets (Taken 6/8/2025 2324)  Prevent/manage excess moisture:   Moisturize dry skin   Monitor for/manage infection if present  Goal: Prevent/minimize sheer/friction injuries  6/8/2025 2324 by Danae Rodriguez RN  Outcome: Progressing  Flowsheets (Taken 6/8/2025 2324)  Prevent/minimize sheer/friction injuries:   Complete micro-shifts as needed if patient unable. Adjust patient position to relieve pressure points, not a full turn   Increase activity/out of bed for meals   Use pull sheet   HOB 30 degrees or less  6/8/2025 2324 by Danae Rodriguez RN  Outcome: Progressing  Flowsheets (Taken 6/8/2025 2324)  Prevent/minimize sheer/friction injuries:   Complete micro-shifts as needed if patient unable. Adjust patient position to relieve pressure points, not a full turn   Increase activity/out of bed for meals   Use pull sheet   HOB 30 degrees or less  Goal: Promote/optimize nutrition  6/8/2025 2324 by Danae Rodriguez RN  Outcome: Progressing  Flowsheets (Taken 6/8/2025 2324)  Promote/optimize nutrition:   Monitor/record intake including meals   Consume > 50% meals/supplements   Offer water/supplements/favorite foods  6/8/2025 2324 by Danae Rodriguez RN  Outcome: Progressing  Flowsheets (Taken 6/8/2025 2324)  Promote/optimize nutrition:   Monitor/record intake including meals   Consume > 50% meals/supplements   Offer water/supplements/favorite foods  Goal: Promote skin healing  6/8/2025 2324 by Danae Rodriguez RN  Outcome: Progressing  Flowsheets (Taken 6/8/2025 2324)  Promote skin healing:   Assess skin/pad under line(s)/device(s)   Turn/reposition every 2 hours/use positioning/transfer devices  6/8/2025 2324 by Danae Rodriguez RN  Outcome: Progressing  Flowsheets (Taken 6/8/2025 2324)  Promote skin healing:   Assess skin/pad under line(s)/device(s)   Turn/reposition every 2 hours/use positioning/transfer  devices

## 2025-06-09 NOTE — PROGRESS NOTES
"Medical Intensive Care Stepdown Unit - Daily Progress Note   Subjective    Martínez Neri is a 64 y.o. year old male patient admitted on 6/7/2025 with Acute hypoxic resp failure 2/2 multifocal PNA    Interval History:     No events overnight.  Requiring 2 L NC at rest, 3 L NC with exertion     States his breathing is \"much better\".  Anxious to go home  Meds    Scheduled medications  Scheduled Medications[1]  Continuous medications  Continuous Medications[2]  PRN medications  PRN Medications[3]     Objective    Blood pressure 127/62, pulse 72, temperature 36.9 °C (98.5 °F), temperature source Temporal, resp. rate 18, height 1.754 m (5' 9.06\"), weight 68.4 kg (150 lb 11.2 oz), SpO2 100%.     Constitutional: pt in NAD, alert and cooperative  Eyes: PERRL, EOMI, no icterus   ENMT: mucous membranes moist, no apparent injury, no lesions seen  Head/Neck: Neck supple, no apparent injury  Respiratory/Thorax: Lungs  david rhonchi, + cough, on 2 L NC  Cardiovascular: Regular, rate and rhythm, no murmurs, normal S1 and S2  Gastrointestinal: Nondistended, soft, non-tender, BS present x 4  Musculoskeletal: ROM intact, no joint swelling, normal strength  Extremities: normal extremities, no edema, contusions or wounds  Neurological: alert and oriented x 3, speech clear, follows commands appropriately, cr. n. II-XII intact, sensation grossly intact, motor 5/5 throughout  Skin: Warm and dry, no lesions, no rashes      Intake/Output Summary (Last 24 hours) at 6/9/2025 0656  Last data filed at 6/9/2025 0031  Gross per 24 hour   Intake 1147.4 ml   Output 450 ml   Net 697.4 ml     Labs:   Results from last 72 hours   Lab Units 06/08/25  0339 06/07/25  1829 06/07/25  0613   SODIUM mmol/L 144 141 137   POTASSIUM mmol/L 4.5 2.9* 3.3*   CHLORIDE mmol/L 109* 112* 105   CO2 mmol/L 28 22 25   BUN mg/dL 12 12 14   CREATININE mg/dL 0.59 0.60 0.66   GLUCOSE mg/dL 81 64* 76   CALCIUM mg/dL 8.7 6.7* 8.3*   ANION GAP mmol/L 12 10 10   EGFR " mL/min/1.73m*2 >90 >90 >90   PHOSPHORUS mg/dL 3.1 2.4*  --       Results from last 72 hours   Lab Units 06/08/25  0339 06/07/25  1829 06/07/25  0613   WBC AUTO x10*3/uL 10.5 13.4* 16.6*   HEMOGLOBIN g/dL 8.9* 8.5* 9.8*   HEMATOCRIT % 26.1* 26.5* 29.9*   PLATELETS AUTO x10*3/uL 144* 251 262   NEUTROS PCT AUTO % 77.7 81.7  --    LYMPHO PCT MAN %  --   --  6.0   LYMPHS PCT AUTO % 8.3 7.9  --    MONO PCT MAN %  --   --  6.0   MONOS PCT AUTO % 11.2 9.3  --    EOSINO PCT MAN %  --   --  1.0   EOS PCT AUTO % 0.9 0.2  --        6/5 CT PE: 1. Findings most suggestive of multilobar pneumonia, with new consolidative airspace opacity is present in the L5 lung lobes and small bilateral pleural effusions. 2. Some increase in size of right hilar lymph nodes is favored to be reactive. 3. Redemonstration of the area of volume loss/atelectasis in the left  upper lobe, similar in appearance to prior exam, likely representing sequela prior treatment. 4. Slight increase in size of some cavitary nodules in the lungs bilaterally.  Summary of key imaging results from the last 24 hours  N/A    Assessment and Plan     Assessment: Martínez Neri 64 y.o. 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 Admitted on 6/7/2025 with Acute hypoxic resp failure 2/2 multifocal PNA    Restraints: no    Summary for 06/09/25  :  Stop Zosyn, change to PO Levaquin  Stop Heparin drip this pm, start home Apixiban for SMA thrombus hx  Walking pulse O2 completed, home O2 orders placed for discharge  Stable for transfer to [facility]    Plan:  NEUROLOGY/PSYCH:  Dx: hx of ICD brain aneurysm s/p coiling, Seizures  Management:  Home Meds: Depakote  mg BID, vimpat 200 mg BID, Remeron 7.5 mg at bedtime  Continue  home meds  Melatonin for sleep  PRN Tylenol or Oxy for pain  Continue Seroquel 12.5 mg QHS   OOB to chair.  No PT/OT needs    CARDIOVASCULAR:  Dx: hx of HTN, HLP. Afib/RVR at OSH, converted to NSR with IV Metop x1.  Elevated trop, Type II MI demand ischemia  6/5 TTE: LVEF 55-60%, Diastolic dysfunction, Inferior vena cava mildly dilated with IVC ins collapse >50%  Management:  HDS  Home Meds: ASA 81 mg daily, Atorvastatin 40 mg daily  Continue Atorvastatin 40 mg daily  ASA has been held 2/2 anemia, resume today    PULMONARY:  Dx: history of NSCLC of THOMAS s/p  chemotherapy and radiation in remission. COPD.  Former Smoker  PFTs (10/23): FVC 88, FEV1 40, FEV1/FVC 15 indicating severe airway obstruction  July '23 status post bronch/EBUS biopsies showed left upper lobe with invasive moderately differentiated non-small cell carcinoma  Completed chemo/radiation January '24  Currently on surveillance  Management:  Home Meds: Proventil HFA PRN, fluticasone 50 mcg nasal daily, DuoNebs PRN, Nicoderm, Spiriva inhl  S/p 20 mg Furosemide x1 on 6/7  Requiring 8 L NC, weaned to 2 L NC, requires 3 L with exertion  Continue Budesonide, DuoNebs  vPEP TID  Will need CT chest in 4 weeks-- has one scheduled for July & will follow up with Dr Ng after    RENAL/GENITOURINARY:  Dx: SERINA (resolved)  Management:  Daily RFP    GASTROENTEROLOGY:  Dx: No acute issues  Management:  Diet: Regular  Bowel Regimen: Docusate BID  PPI    ENDOCRINOLOGY:  Dx: No acute issues  Management:  A1C: 5.5 (6/2025)    HEMATOLOGY:  Dx: Normocytic Anemia, Acute Thrombocytopenia- resolving (d/dx: drug induced, sepsis related, ITP), hx of 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 Last transfusion 6/7  No signs of bleeding  Management:  Thrombocytopenia work up including PF4 (neg), Hepatitis panel, HIV, peripheral smear  Stop heparin drip & start PO apixiban    MUSCULOSKELETAL/ SKIN:  Dx: No acute issues    INFECTIOUS DISEASE:  Dx:  Multifocal PNA, sepsis (resolved)  On admit to OSH, tachycardic, tachypnea, elevated lactate, WBC 12-16 & SERINA  Management:  Tmax: 36.9 C  MICRO: 6/5 MRSA PCR neg, 6/5 Sputum cx salivary contamination, 6/6 Sputum cx pending  6/5 Strep pneumoniae Ag and Legionella Ag neg  6/6 Procalc 12.79  Antimicrobials: Azithro (6/4), CTX (6/4-6/5), zosyn (6/5- 6/9), Vanc (6/5- 6/8), PO Levaquin (6/9- *))    ICU/SDU Check List                  FEN  Fluids: PRN  Electrolytes: PRN  Nutrition: Regular  Prophylaxis:  DVT ppx: Heparin drip  GI ppx: PPI  Hardware:  Catheter: None  Drains: None  Lines: PIV x2  Social:  Code: Full Code    HPOA: Keri Neri [phone]    Disposition: SDU  Discharge Planning: Home when medically stable.  Pt declines SNF.  Agreeable to outpatient PT if needed, will provide script at discharge (referral placed in Epic).  Walking pulse O2 completed, home O2 orders placed for discharge    ARIC Resendiz-CNP   06/09/25 at 6:56 AM     Pt discussed with Dr. Noble seen and examined. All labs, VS and previous plan of care reviewed.  I spent 45 minutes in the professional and overall care of this patient.      Disclaimer: Documentation completed with the information available at the time of input. The times in the chart may not be reflective of actual patient care times, interventions, or procedures. Documentation occurs after the physical care of the patient.            [1] aspirin, 81 mg, oral, Daily  budesonide, 0.5 mg, nebulization, BID  divalproex, 500 mg, oral, BID  docusate sodium, 100 mg, oral, BID  fluticasone, 2 spray, Each Nostril, Daily  ipratropium-albuteroL, 3 mL, nebulization, q6h  lacosamide, 200 mg, oral, BID  melatonin, 3 mg, oral, Nightly  mirtazapine, 7.5 mg, oral, Nightly  oxygen, , inhalation, Continuous - Inhalation  pantoprazole, 40 mg, oral, Daily before breakfast  piperacillin-tazobactam, 4.5 g, intravenous, q6h  QUEtiapine, 12.5 mg, oral, Nightly     [2] heparin, 0-4,500  Units/hr, Last Rate: 1,200 Units/hr (06/09/25 5045)     [3] PRN medications: acetaminophen, benzocaine-menthol, benzonatate, dextromethorphan-guaifenesin, dextrose, dextrose, glucagon, glucagon, guaiFENesin, heparin, ipratropium-albuteroL, nicotine polacrilex, ondansetron, oxyCODONE, polyethylene glycol

## 2025-06-09 NOTE — PROGRESS NOTES
06/09/25 1600   Discharge Planning   Living Arrangements Spouse/significant other   Support Systems Spouse/significant other;Children   Assistance Needed none   Type of Residence Private residence   Who is requesting discharge planning? Provider   Home or Post Acute Services Other (Comment)  (outpatient PT)   Expected Discharge Disposition Home     Per medical team, patient will require home O2 at discharge. Walking SPO2 completed. Referral sent to [facility] via careport. [facility] able to supply home O2. Per [employer] rep Odette, O2 tank will be delivered to patients room this evening. ADOD 6/10 per team. Patient continues to decline home PT per recommendation. Patient would prefer outpatient PT, medical team aware. Rosanna Liz RN TCC

## 2025-06-10 ENCOUNTER — APPOINTMENT (OUTPATIENT)
Dept: RADIOLOGY | Facility: HOSPITAL | Age: 65
DRG: 177 | End: 2025-06-10
Payer: COMMERCIAL

## 2025-06-10 LAB
ALBUMIN SERPL BCP-MCNC: 2.4 G/DL (ref 3.4–5)
ANION GAP SERPL CALC-SCNC: 14 MMOL/L (ref 10–20)
ATRIAL RATE: 77 BPM
BASOPHILS # BLD AUTO: 0.05 X10*3/UL (ref 0–0.1)
BASOPHILS NFR BLD AUTO: 0.7 %
BUN SERPL-MCNC: 9 MG/DL (ref 6–23)
CALCIUM SERPL-MCNC: 7.8 MG/DL (ref 8.6–10.6)
CHLORIDE SERPL-SCNC: 103 MMOL/L (ref 98–107)
CO2 SERPL-SCNC: 26 MMOL/L (ref 21–32)
CREAT SERPL-MCNC: 0.51 MG/DL (ref 0.5–1.3)
EGFRCR SERPLBLD CKD-EPI 2021: >90 ML/MIN/1.73M*2
EOSINOPHIL # BLD AUTO: 0.08 X10*3/UL (ref 0–0.7)
EOSINOPHIL NFR BLD AUTO: 1.1 %
ERYTHROCYTE [DISTWIDTH] IN BLOOD BY AUTOMATED COUNT: 17.2 % (ref 11.5–14.5)
GLUCOSE SERPL-MCNC: 88 MG/DL (ref 74–99)
HCT VFR BLD AUTO: 26.7 % (ref 41–52)
HGB BLD-MCNC: 8.8 G/DL (ref 13.5–17.5)
IMM GRANULOCYTES # BLD AUTO: 0.36 X10*3/UL (ref 0–0.7)
IMM GRANULOCYTES NFR BLD AUTO: 5 % (ref 0–0.9)
LYMPHOCYTES # BLD AUTO: 1.02 X10*3/UL (ref 1.2–4.8)
LYMPHOCYTES NFR BLD AUTO: 14.3 %
MAGNESIUM SERPL-MCNC: 1.89 MG/DL (ref 1.6–2.4)
MCH RBC QN AUTO: 26.7 PG (ref 26–34)
MCHC RBC AUTO-ENTMCNC: 33 G/DL (ref 32–36)
MCV RBC AUTO: 81 FL (ref 80–100)
MONOCYTES # BLD AUTO: 0.99 X10*3/UL (ref 0.1–1)
MONOCYTES NFR BLD AUTO: 13.8 %
NEUTROPHILS # BLD AUTO: 4.65 X10*3/UL (ref 1.2–7.7)
NEUTROPHILS NFR BLD AUTO: 65.1 %
NRBC BLD-RTO: 0 /100 WBCS (ref 0–0)
P AXIS: 82 DEGREES
P OFFSET: 197 MS
P ONSET: 168 MS
PHOSPHATE SERPL-MCNC: 2.8 MG/DL (ref 2.5–4.9)
PLATELET # BLD AUTO: 176 X10*3/UL (ref 150–450)
POTASSIUM SERPL-SCNC: 4.7 MMOL/L (ref 3.5–5.3)
PR INTERVAL: 114 MS
Q ONSET: 225 MS
QRS COUNT: 12 BEATS
QRS DURATION: 126 MS
QT INTERVAL: 390 MS
QTC CALCULATION(BAZETT): 441 MS
QTC FREDERICIA: 423 MS
R AXIS: -75 DEGREES
RBC # BLD AUTO: 3.29 X10*6/UL (ref 4.5–5.9)
SODIUM SERPL-SCNC: 138 MMOL/L (ref 136–145)
T AXIS: 44 DEGREES
T OFFSET: 420 MS
VENTRICULAR RATE: 77 BPM
WBC # BLD AUTO: 7.2 X10*3/UL (ref 4.4–11.3)

## 2025-06-10 PROCEDURE — 2500000002 HC RX 250 W HCPCS SELF ADMINISTERED DRUGS (ALT 637 FOR MEDICARE OP, ALT 636 FOR OP/ED): Performed by: NURSE PRACTITIONER

## 2025-06-10 PROCEDURE — 80069 RENAL FUNCTION PANEL: CPT | Performed by: NURSE PRACTITIONER

## 2025-06-10 PROCEDURE — 2500000001 HC RX 250 WO HCPCS SELF ADMINISTERED DRUGS (ALT 637 FOR MEDICARE OP): Performed by: NURSE PRACTITIONER

## 2025-06-10 PROCEDURE — 94667 MNPJ CHEST WALL 1ST: CPT

## 2025-06-10 PROCEDURE — 2500000005 HC RX 250 GENERAL PHARMACY W/O HCPCS: Performed by: NURSE PRACTITIONER

## 2025-06-10 PROCEDURE — 94668 MNPJ CHEST WALL SBSQ: CPT

## 2025-06-10 PROCEDURE — 1100000001 HC PRIVATE ROOM DAILY

## 2025-06-10 PROCEDURE — 71045 X-RAY EXAM CHEST 1 VIEW: CPT

## 2025-06-10 PROCEDURE — 83735 ASSAY OF MAGNESIUM: CPT | Performed by: NURSE PRACTITIONER

## 2025-06-10 PROCEDURE — 99232 SBSQ HOSP IP/OBS MODERATE 35: CPT | Performed by: STUDENT IN AN ORGANIZED HEALTH CARE EDUCATION/TRAINING PROGRAM

## 2025-06-10 PROCEDURE — 94640 AIRWAY INHALATION TREATMENT: CPT

## 2025-06-10 PROCEDURE — 36415 COLL VENOUS BLD VENIPUNCTURE: CPT | Performed by: NURSE PRACTITIONER

## 2025-06-10 PROCEDURE — 85025 COMPLETE CBC W/AUTO DIFF WBC: CPT | Performed by: NURSE PRACTITIONER

## 2025-06-10 PROCEDURE — 71045 X-RAY EXAM CHEST 1 VIEW: CPT | Performed by: RADIOLOGY

## 2025-06-10 RX ORDER — IPRATROPIUM BROMIDE AND ALBUTEROL SULFATE 2.5; .5 MG/3ML; MG/3ML
3 SOLUTION RESPIRATORY (INHALATION)
Status: DISCONTINUED | OUTPATIENT
Start: 2025-06-10 | End: 2025-06-12

## 2025-06-10 RX ORDER — FUROSEMIDE 10 MG/ML
20 INJECTION INTRAMUSCULAR; INTRAVENOUS ONCE
Status: DISCONTINUED | OUTPATIENT
Start: 2025-06-10 | End: 2025-06-11

## 2025-06-10 RX ADMIN — APIXABAN 5 MG: 5 TABLET, FILM COATED ORAL at 21:24

## 2025-06-10 RX ADMIN — OXYCODONE 5 MG: 5 TABLET ORAL at 21:24

## 2025-06-10 RX ADMIN — LACOSAMIDE 200 MG: 100 TABLET, FILM COATED ORAL at 21:24

## 2025-06-10 RX ADMIN — BUDESONIDE 0.5 MG: 0.5 INHALANT RESPIRATORY (INHALATION) at 20:10

## 2025-06-10 RX ADMIN — LEVOFLOXACIN 750 MG: 750 TABLET, FILM COATED ORAL at 17:55

## 2025-06-10 RX ADMIN — IPRATROPIUM BROMIDE AND ALBUTEROL SULFATE 3 ML: .5; 3 SOLUTION RESPIRATORY (INHALATION) at 13:41

## 2025-06-10 RX ADMIN — LACOSAMIDE 200 MG: 100 TABLET, FILM COATED ORAL at 08:09

## 2025-06-10 RX ADMIN — Medication 5 L/MIN: at 20:00

## 2025-06-10 RX ADMIN — IPRATROPIUM BROMIDE AND ALBUTEROL SULFATE 3 ML: .5; 3 SOLUTION RESPIRATORY (INHALATION) at 08:24

## 2025-06-10 RX ADMIN — Medication 6 L/MIN: at 08:15

## 2025-06-10 RX ADMIN — BUDESONIDE 0.5 MG: 0.5 INHALANT RESPIRATORY (INHALATION) at 08:24

## 2025-06-10 RX ADMIN — ASPIRIN 81 MG: 81 TABLET, CHEWABLE ORAL at 08:09

## 2025-06-10 RX ADMIN — IPRATROPIUM BROMIDE AND ALBUTEROL SULFATE 3 ML: .5; 3 SOLUTION RESPIRATORY (INHALATION) at 23:15

## 2025-06-10 RX ADMIN — IPRATROPIUM BROMIDE AND ALBUTEROL SULFATE 3 ML: .5; 3 SOLUTION RESPIRATORY (INHALATION) at 20:09

## 2025-06-10 RX ADMIN — APIXABAN 5 MG: 5 TABLET, FILM COATED ORAL at 08:09

## 2025-06-10 RX ADMIN — DIVALPROEX SODIUM 500 MG: 500 TABLET, FILM COATED, EXTENDED RELEASE ORAL at 21:25

## 2025-06-10 RX ADMIN — DIVALPROEX SODIUM 500 MG: 500 TABLET, FILM COATED, EXTENDED RELEASE ORAL at 08:09

## 2025-06-10 SDOH — SOCIAL STABILITY: SOCIAL INSECURITY: WITHIN THE LAST YEAR, HAVE YOU BEEN HUMILIATED OR EMOTIONALLY ABUSED IN OTHER WAYS BY YOUR PARTNER OR EX-PARTNER?: NO

## 2025-06-10 SDOH — ECONOMIC STABILITY: HOUSING INSECURITY: AT ANY TIME IN THE PAST 12 MONTHS, WERE YOU HOMELESS OR LIVING IN A SHELTER (INCLUDING NOW)?: NO

## 2025-06-10 SDOH — SOCIAL STABILITY: SOCIAL INSECURITY: DO YOU FEEL UNSAFE GOING BACK TO THE PLACE WHERE YOU ARE LIVING?: YES

## 2025-06-10 SDOH — ECONOMIC STABILITY: HOUSING INSECURITY: IN THE LAST 12 MONTHS, WAS THERE A TIME WHEN YOU WERE NOT ABLE TO PAY THE MORTGAGE OR RENT ON TIME?: NO

## 2025-06-10 SDOH — ECONOMIC STABILITY: INCOME INSECURITY: IN THE PAST 12 MONTHS HAS THE ELECTRIC, GAS, OIL, OR WATER COMPANY THREATENED TO SHUT OFF SERVICES IN YOUR HOME?: NO

## 2025-06-10 SDOH — ECONOMIC STABILITY: FOOD INSECURITY: WITHIN THE PAST 12 MONTHS, THE FOOD YOU BOUGHT JUST DIDN'T LAST AND YOU DIDN'T HAVE MONEY TO GET MORE.: NEVER TRUE

## 2025-06-10 SDOH — ECONOMIC STABILITY: FOOD INSECURITY: WITHIN THE PAST 12 MONTHS, YOU WORRIED THAT YOUR FOOD WOULD RUN OUT BEFORE YOU GOT THE MONEY TO BUY MORE.: NEVER TRUE

## 2025-06-10 SDOH — SOCIAL STABILITY: SOCIAL INSECURITY: WITHIN THE LAST YEAR, HAVE YOU BEEN AFRAID OF YOUR PARTNER OR EX-PARTNER?: NO

## 2025-06-10 SDOH — SOCIAL STABILITY: SOCIAL INSECURITY: HAVE YOU HAD ANY THOUGHTS OF HARMING ANYONE ELSE?: NO

## 2025-06-10 SDOH — SOCIAL STABILITY: SOCIAL INSECURITY: ARE YOU OR HAVE YOU BEEN THREATENED OR ABUSED PHYSICALLY, EMOTIONALLY, OR SEXUALLY BY ANYONE?: NO

## 2025-06-10 SDOH — ECONOMIC STABILITY: FOOD INSECURITY: HOW HARD IS IT FOR YOU TO PAY FOR THE VERY BASICS LIKE FOOD, HOUSING, MEDICAL CARE, AND HEATING?: NOT HARD AT ALL

## 2025-06-10 SDOH — SOCIAL STABILITY: SOCIAL INSECURITY: DOES ANYONE TRY TO KEEP YOU FROM HAVING/CONTACTING OTHER FRIENDS OR DOING THINGS OUTSIDE YOUR HOME?: NO

## 2025-06-10 SDOH — SOCIAL STABILITY: SOCIAL INSECURITY: DO YOU FEEL ANYONE HAS EXPLOITED OR TAKEN ADVANTAGE OF YOU FINANCIALLY OR OF YOUR PERSONAL PROPERTY?: NO

## 2025-06-10 SDOH — SOCIAL STABILITY: SOCIAL INSECURITY: ABUSE: ADULT

## 2025-06-10 SDOH — ECONOMIC STABILITY: HOUSING INSECURITY: IN THE PAST 12 MONTHS, HOW MANY TIMES HAVE YOU MOVED WHERE YOU WERE LIVING?: 0

## 2025-06-10 SDOH — SOCIAL STABILITY: SOCIAL INSECURITY: ARE THERE ANY APPARENT SIGNS OF INJURIES/BEHAVIORS THAT COULD BE RELATED TO ABUSE/NEGLECT?: NO

## 2025-06-10 SDOH — SOCIAL STABILITY: SOCIAL INSECURITY: HAVE YOU HAD THOUGHTS OF HARMING ANYONE ELSE?: NO

## 2025-06-10 SDOH — SOCIAL STABILITY: SOCIAL INSECURITY: HAS ANYONE EVER THREATENED TO HURT YOUR FAMILY OR YOUR PETS?: NO

## 2025-06-10 SDOH — ECONOMIC STABILITY: TRANSPORTATION INSECURITY: IN THE PAST 12 MONTHS, HAS LACK OF TRANSPORTATION KEPT YOU FROM MEDICAL APPOINTMENTS OR FROM GETTING MEDICATIONS?: NO

## 2025-06-10 SDOH — SOCIAL STABILITY: SOCIAL INSECURITY: WERE YOU ABLE TO COMPLETE ALL THE BEHAVIORAL HEALTH SCREENINGS?: YES

## 2025-06-10 ASSESSMENT — COGNITIVE AND FUNCTIONAL STATUS - GENERAL
MOBILITY SCORE: 20
DRESSING REGULAR UPPER BODY CLOTHING: A LITTLE
MOBILITY SCORE: 20
MOVING TO AND FROM BED TO CHAIR: A LITTLE
WALKING IN HOSPITAL ROOM: A LITTLE
DRESSING REGULAR UPPER BODY CLOTHING: A LITTLE
PERSONAL GROOMING: A LITTLE
CLIMB 3 TO 5 STEPS WITH RAILING: A LITTLE
STANDING UP FROM CHAIR USING ARMS: A LITTLE
PERSONAL GROOMING: A LITTLE
DAILY ACTIVITIY SCORE: 20
EATING MEALS: A LITTLE
HELP NEEDED FOR BATHING: A LITTLE
DRESSING REGULAR LOWER BODY CLOTHING: A LITTLE
TOILETING: A LITTLE
WALKING IN HOSPITAL ROOM: A LITTLE
STANDING UP FROM CHAIR USING ARMS: A LITTLE
CLIMB 3 TO 5 STEPS WITH RAILING: A LITTLE
MOVING TO AND FROM BED TO CHAIR: A LITTLE
DRESSING REGULAR UPPER BODY CLOTHING: A LITTLE
DAILY ACTIVITIY SCORE: 18
DRESSING REGULAR LOWER BODY CLOTHING: A LITTLE
TOILETING: A LITTLE
MOBILITY SCORE: 19
HELP NEEDED FOR BATHING: A LITTLE
CLIMB 3 TO 5 STEPS WITH RAILING: A LOT
EATING MEALS: A LITTLE
MOVING TO AND FROM BED TO CHAIR: A LITTLE
WALKING IN HOSPITAL ROOM: A LITTLE
DRESSING REGULAR LOWER BODY CLOTHING: A LITTLE
PATIENT BASELINE BEDBOUND: NO
DAILY ACTIVITIY SCORE: 18
STANDING UP FROM CHAIR USING ARMS: A LITTLE
TOILETING: A LITTLE
HELP NEEDED FOR BATHING: A LITTLE

## 2025-06-10 ASSESSMENT — ACTIVITIES OF DAILY LIVING (ADL)
FEEDING YOURSELF: INDEPENDENT
WALKS IN HOME: INDEPENDENT
TOILETING: INDEPENDENT
LACK_OF_TRANSPORTATION: NO
ADEQUATE_TO_COMPLETE_ADL: YES
BATHING: INDEPENDENT
PATIENT'S MEMORY ADEQUATE TO SAFELY COMPLETE DAILY ACTIVITIES?: YES
GROOMING: INDEPENDENT
HEARING - RIGHT EAR: FUNCTIONAL
JUDGMENT_ADEQUATE_SAFELY_COMPLETE_DAILY_ACTIVITIES: YES
HEARING - LEFT EAR: FUNCTIONAL
ASSISTIVE_DEVICE: EYEGLASSES
DRESSING YOURSELF: INDEPENDENT

## 2025-06-10 ASSESSMENT — PAIN - FUNCTIONAL ASSESSMENT
PAIN_FUNCTIONAL_ASSESSMENT: 0-10
PAIN_FUNCTIONAL_ASSESSMENT: 0-10

## 2025-06-10 ASSESSMENT — PATIENT HEALTH QUESTIONNAIRE - PHQ9
2. FEELING DOWN, DEPRESSED OR HOPELESS: NOT AT ALL
1. LITTLE INTEREST OR PLEASURE IN DOING THINGS: NOT AT ALL
SUM OF ALL RESPONSES TO PHQ9 QUESTIONS 1 & 2: 0

## 2025-06-10 ASSESSMENT — LIFESTYLE VARIABLES
HOW OFTEN DO YOU HAVE A DRINK CONTAINING ALCOHOL: NEVER
HOW OFTEN DO YOU HAVE 6 OR MORE DRINKS ON ONE OCCASION: NEVER
HOW MANY STANDARD DRINKS CONTAINING ALCOHOL DO YOU HAVE ON A TYPICAL DAY: PATIENT DOES NOT DRINK
SKIP TO QUESTIONS 9-10: 1
AUDIT-C TOTAL SCORE: 0
PRESCIPTION_ABUSE_PAST_12_MONTHS: NO
SUBSTANCE_ABUSE_PAST_12_MONTHS: NO
AUDIT-C TOTAL SCORE: 0

## 2025-06-10 ASSESSMENT — PAIN DESCRIPTION - LOCATION: LOCATION: CHEST

## 2025-06-10 ASSESSMENT — PAIN SCALES - GENERAL
PAINLEVEL_OUTOF10: 8
PAINLEVEL_OUTOF10: 0 - NO PAIN

## 2025-06-10 NOTE — CARE PLAN
The clinical goals for the shift include Pt will remain HDS      Problem: Pain - Adult  Goal: Verbalizes/displays adequate comfort level or baseline comfort level  Outcome: Progressing     Problem: Safety - Adult  Goal: Free from fall injury  Outcome: Progressing     Problem: Discharge Planning  Goal: Discharge to home or other facility with appropriate resources  Outcome: Progressing     Problem: Chronic Conditions and Co-morbidities  Goal: Patient's chronic conditions and co-morbidity symptoms are monitored and maintained or improved  Outcome: Progressing     Problem: Nutrition  Goal: Nutrient intake appropriate for maintaining nutritional needs  Outcome: Progressing     Problem: Skin  Goal: Decreased wound size/increased tissue granulation at next dressing change  Outcome: Progressing  Goal: Participates in plan/prevention/treatment measures  Outcome: Progressing  Goal: Prevent/manage excess moisture  Outcome: Progressing  Goal: Prevent/minimize sheer/friction injuries  Outcome: Progressing  Goal: Promote/optimize nutrition  Outcome: Progressing  Goal: Promote skin healing  Outcome: Progressing

## 2025-06-10 NOTE — SIGNIFICANT EVENT
Rapid Response Nurse Note: RADAR alert: 7    Pager time: [phone]  Arrival time: 940  Event end time:   Location: [facility] 6  [] Triage by phone or secure messaging    Rapid response initiated by:  [] Rapid response RN [] Family [] Nursing Supervisor [] Physician   [x] RADAR auto page [] Sepsis auto-page [] RN [] RT   [] NP/PA [] Other:     Primary reason for call:   [] BAT [] New CPAP/BiPAP [] Bleeding [] Change in mental status   [] Chest pain [] Code blue [] FiO2 >/= 50% [] HR </= 40 bpm   [] HR >/= 130 bpm [] Hyperglycemia [] Hypoglycemia [x] RADAR    [] RR </= 8 bpm [] RR >/= 30 bpm [] SBP </= 90 mmHg [] SpO2 < 90%   [] Seizure [] Sepsis [] Shortness of breath  [] Staff concern: see comments     Initial VS and/or RADAR VS: T 36.8 °C; HR 84; RR 22; /70; SPO2 88%.    Providers present at bedside (if applicable):     Name of ICU Provider contacted (if applicable):     Interventions:  [x] None [] ABG/VBG [] Assist w/ICU transfer [] BAT paged    [] Bag mask [] Blood [] Cardioversion [] Code Blue   [] Code blue for intubation [] Code status changed [] Chest x-ray [] EKG   [] IV fluid/bolus [] KUB x-ray [] Labs/cultures [] Medication   [] Nebulizer treatment [] NIPPV (CPAP/BiPAP) [] Oxygen [] Oral airway   [] Peripheral IV [] Palliative care consult [] CT/MRI [] Sepsis protocol    [] Suctioned [] Other:     Outcome:  [] Coded and  [] Code blue for intubation [] Coded and transferred to ICU []  on division   [x] Remained on division (no change)MIU/SDU [] Remained on division + additional monitoring [] Remained in ED [] Transferred to ED   [] Transferred to ICU [] Transferred to inpatient status [] Transferred for interventions (procedure) [] Transferred to ICU stepdown    [] Transferred to surgery [] Transferred to telemetry [] Sepsis protocol [] STEMI protocol   [] Stroke protocol [x] Bedside nurse instructed to page rapid response for any concerns or acute change in condition/VS     Additional  Comments: RADAR alert- score of 7. Upon review of VS- pulse ox documented @ 90% shortly following radar vitals. Upon arrival pt resting in bed- on 5L NC HF and RR even and non labored. Pulse ox 94%- variable 94-96%. Pt with no complaints. Pt to remain monitored in the MIU/SDU.

## 2025-06-10 NOTE — PROGRESS NOTES
"Medical Intensive Care Stepdown Unit - Daily Progress Note   Subjective    Martínez Neri is a 64 y.o. year old male patient admitted on 6/7/2025 with Acute hypoxic resp failure 2/2 multifocal PNA    Interval History:     No events overnight.  On 5L NC with episode of desaturation to mid 80s.  Improved with nebs and airway clearance   Patient is very adamant on going home today  Meds    Scheduled medications  Scheduled Medications[1]  Continuous medications  Continuous Medications[2]  PRN medications  PRN Medications[3]     Objective    Blood pressure 139/70, pulse 84, temperature 36.8 °C (98.2 °F), temperature source Temporal, resp. rate 26, height 1.754 m (5' 9.06\"), weight 72.1 kg (159 lb), SpO2 90%.     Constitutional: pt in NAD, alert and cooperative  Eyes: PERRL, EOMI, no icterus   ENMT: mucous membranes moist, no apparent injury, no lesions seen  Head/Neck: Neck supple, no apparent injury  Respiratory/Thorax: Lungs  david rhonchi, some expiratory wheeze, moist productie cough, on 5L NC   Cardiovascular: Regular, rate and rhythm, no murmurs, normal S1 and S2  Gastrointestinal: Nondistended, soft, non-tender, BS present x 4  Musculoskeletal: ROM intact, no joint swelling, normal strength  Extremities: normal extremities, no edema, contusions or wounds  Neurological: alert and oriented x 3, speech clear, follows commands appropriately, motor 5/5 throughout  Skin: Warm and dry, no lesions, no rashes      Intake/Output Summary (Last 24 hours) at 6/10/2025 1044  Last data filed at 6/10/2025 0400  Gross per 24 hour   Intake 400 ml   Output 900 ml   Net -500 ml     Labs:   Results from last 72 hours   Lab Units 06/10/25  0459 06/09/25  0532 06/08/25  0339   SODIUM mmol/L 138 138 144   POTASSIUM mmol/L 4.7 3.2* 4.5   CHLORIDE mmol/L 103 103 109*   CO2 mmol/L 26 28 28   BUN mg/dL 9 8 12   CREATININE mg/dL 0.51 0.56 0.59   GLUCOSE mg/dL 88 113* 81   CALCIUM mg/dL 7.8* 8.2* 8.7   ANION GAP mmol/L 14 10 12   EGFR " mL/min/1.73m*2 >90 >90 >90   PHOSPHORUS mg/dL 2.8 2.7 3.1      Results from last 72 hours   Lab Units 06/10/25  0459 06/09/25  0532 06/08/25  0339   WBC AUTO x10*3/uL 7.2 8.5 10.5   HEMOGLOBIN g/dL 8.8* 8.8* 8.9*   HEMATOCRIT % 26.7* 26.2* 26.1*   PLATELETS AUTO x10*3/uL 176 219 144*   NEUTROS PCT AUTO % 65.1 72.7 77.7   LYMPHS PCT AUTO % 14.3 10.5 8.3   MONOS PCT AUTO % 13.8 13.7 11.2   EOS PCT AUTO % 1.1 0.7 0.9        Summary of key imaging results     XR chest 1 view 6/9/2025  1. Worsening multifocal pneumonia plus/minus atelectasis..         US renal complete  6/5/2025  The right kidney measures 11.0 cm. There is a 1.7 cm mid to lower pole right renal cyst. The left kidney measures 11.9 cm  .     No renal stones are identified.  The renal cortical thickness and echogenicity is normal.  No hydronephrosis is identified.   Urinary bladder is nonspecific in appearance with no stones or masses identified.     Right renal cyst.       Transthoracic Echo (TTE) Complete: 6/5/2025   1. Left ventricular ejection fraction is normal by visual estimate at 55-60%.    2. Spectral Doppler shows a Grade I (impaired relaxation pattern) of left ventricular diastolic filling with normal left atrial filling pressure.    3. There is normal right ventricular global systolic function.        Assessment and Plan     Assessment: Martínez Neri 64 y.o. 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 Admitted on 6/7/2025 with Acute hypoxic resp failure 2/2 multifocal PNA    Restraints: no    Summary for 06/10/25  :  ABX changed from Zosyn to PO Levaquin on 6/9 with plan for 7 day course (stop 6/12)   Switched to Apixaban on 6/9  for SMA thrombus hx  Walking pulse O2 completed, home O2  orders placed for discharge  Holding discharge and transfer for now as increased O2 needs - CXR with worsening Atelectasis, added Volera  will repeat CXR    Plan:  NEUROLOGY/PSYCH:  Dx: hx of ICD brain aneurysm s/p coiling, Seizures  Management:  Home Meds: Depakote  mg BID, vimpat 200 mg BID, Remeron 7.5 mg at bedtime  Continue home meds  Melatonin for sleep  PRN Tylenol or Oxy for pain  Continue Seroquel 12.5 mg QHS   OOB to chair.  No PT/OT needs    CARDIOVASCULAR:  Dx: hx of HTN, HLP. Afib/RVR at OSH, converted to NSR with IV Metop x1.  Elevated trop, Type II MI demand ischemia  6/5 TTE: LVEF 55-60%, Diastolic dysfunction, Inferior vena cava mildly dilated with IVC ins collapse >50%  Management:  HDS  Home Meds: ASA 81 mg daily, Atorvastatin 40 mg daily  Continue Atorvastatin 40 mg daily  ASA has been held 2/2 anemia, resume today    PULMONARY:  Dx: history of NSCLC of THOMAS s/p  chemotherapy and radiation in remission. COPD.  Former Smoker  PFTs (10/23): FVC 88, FEV1 40, FEV1/FVC 15 indicating severe airway obstruction  July '23 status post bronch/EBUS biopsies showed left upper lobe with invasive moderately differentiated non-small cell carcinoma  Completed chemo/radiation January '24  Currently on surveillance  Management:  Home Meds: Proventil HFA PRN, fluticasone 50 mcg nasal daily, DuoNebs PRN, Nicoderm, Spiriva inhl  S/p 20 mg Furosemide x1 on 6/7  Requiring 8 L NC, weaned to 2 L NC, now back on 6-8 L with worsening atelectasis on CXR from 6/10   Continue Budesonide, DuoNebs  Added Benny q4 hrs on 6/10 and repeat CXR   Will need CT chest in 4 weeks-- has one scheduled for July & will follow up with Dr Ng after    RENAL/GENITOURINARY:  Dx: SERINA (resolved)  Management:  Daily RFP    GASTROENTEROLOGY:  Dx: No acute issues  Management:  Diet: Regular  Bowel Regimen: Docusate BID  PPI    ENDOCRINOLOGY:  Dx: No acute issues  Management:  A1C: 5.5 (6/2025)    HEMATOLOGY:  Dx: Normocytic Anemia, Acute  Thrombocytopenia- resolving (d/dx: drug induced, sepsis related, ITP), hx of 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 Last transfusion 6/7  No signs of bleeding  Management:  Thrombocytopenia work up including PF4 (neg), Hepatitis panel, HIV, peripheral smear  Stop heparin drip & start PO apixiban    MUSCULOSKELETAL/ SKIN:  Dx: No acute issues    INFECTIOUS DISEASE:  Dx: Multifocal PNA, sepsis (resolved)  On admit to OSH, tachycardic, tachypnea, elevated lactate, WBC 12-16 & SERINA  Management:  Tmax: 36.6 C  MICRO: 6/5 MRSA PCR neg, 6/5 Sputum cx salivary contamination, 6/6 Sputum cx pending  6/5 Strep pneumoniae Ag and Legionella Ag neg  6/6 Procalc 12.79  Antimicrobials: Azithro (6/4), CTX (6/4-6/5), zosyn (6/5- 6/9), Vanc (6/5- 6/8), PO Levaquin (6/9- *))- plan for 7 day course     ICU/SDU Check List                  FEN  Fluids: PRN  Electrolytes: PRN  Nutrition: Regular  Prophylaxis:  DVT ppx: Apixaban   GI ppx: PPI  Hardware:  Catheter: None  Drains: None  Lines: PIV x2  Social:  Code: Full Code    HPOA: Keri Neri [phone]    Disposition: SDU for now, consider transfer to medicine floor if improved O2 needs  Discharge Planning: Home when medically stable.  Pt declines SNF.  Agreeable to outpatient PT if needed, will provide script at discharge (referral placed in Epic).  Walking pulse O2 completed, home O2 orders placed for discharge.      ARIC Gaona-CNP   06/10/25 at 10:44 AM     Pt discussed with Dr. Noble seen and examined. All labs, VS and previous plan of care reviewed.  I spent 45 minutes in the professional and overall care of this patient.      Disclaimer: Documentation completed with the information available at the time of input. The times in the chart may not be reflective of actual patient care times, interventions, or procedures. Documentation occurs after the physical care of the patient.            [1] apixaban, 5 mg, oral, BID  aspirin, 81 mg,  oral, Daily  budesonide, 0.5 mg, nebulization, BID  divalproex, 500 mg, oral, BID  docusate sodium, 100 mg, oral, BID  fluticasone, 2 spray, Each Nostril, Daily  ipratropium-albuteroL, 3 mL, nebulization, q6h  lacosamide, 200 mg, oral, BID  levoFLOXacin, 750 mg, oral, q24h  melatonin, 3 mg, oral, Nightly  mirtazapine, 7.5 mg, oral, Nightly  oxygen, , inhalation, Continuous - Inhalation  pantoprazole, 40 mg, oral, Daily before breakfast  QUEtiapine, 12.5 mg, oral, Nightly     [2]    [3] PRN medications: acetaminophen, benzocaine-menthol, benzonatate, dextromethorphan-guaifenesin, dextrose, dextrose, glucagon, glucagon, guaiFENesin, ipratropium-albuteroL, nicotine polacrilex, ondansetron, oxyCODONE, polyethylene glycol

## 2025-06-10 NOTE — CARE PLAN
Problem: Pain - Adult  Goal: Verbalizes/displays adequate comfort level or baseline comfort level  Outcome: Progressing     Problem: Safety - Adult  Goal: Free from fall injury  Outcome: Progressing     Problem: Chronic Conditions and Co-morbidities  Goal: Patient's chronic conditions and co-morbidity symptoms are monitored and maintained or improved  Outcome: Progressing     Problem: Nutrition  Goal: Nutrient intake appropriate for maintaining nutritional needs  Outcome: Progressing     Problem: Skin  Goal: Participates in plan/prevention/treatment measures  Outcome: Progressing

## 2025-06-10 NOTE — NURSING NOTE
Walking pulse ox assessment    Patient is 88% or below “room air at rest” YES  If no:  (If not the case, all 3 of the following are required)     85% “room air at rest   82% “room air while walking    96% “on 02 while walking/ambulating     3 Liters used while exercising/walking     Walking Pulse Ox Assessment completed along with Bailey Evans RN.  
"KOKI Progress Note:     KOKI services following patient due to irritability and poor distress tolerance.     Per bedside nurse, patient was irritable, yelling, and swearing during evening medication pass. Per primary team, patient's irritability is at baseline and patient is less disruptive when left alone. In an effort to not further agitate the patient, KOKI RN did not engage with Martínez this evening. Encourage cluster care to minimize interruptions, as able.     KOKI services available to patient and staff 24/7 throughout hospitalization. KOKI will continue to perform q12 hr rounding to ensure safety of patient and staff. Please secure chat \"KOKI\" with any questions or concerns.   "
"KOKI RN NOTE    Secure messaged about patient due to reported history of irritability at base line and outbursts including physical aggression toward his wife while she was attempting to encourage patient into compliance with medication. He appeared to be sleeping in his room and not was disturbed. One of his reported frustrations is disruption of his sleep pattern in the hospital.     Recommendations:    - As he is highly motivated to return home, regularly reinforce that adherence to medication and treatment plan is the most direct path to stabilization and discharge.  - Avoid challenging or confrontational language. Notably, patient's wife appears to unintentionally escalate agitation through argumentative or provocative actions.  - Attempt to understand the patient's reasoning for refusing medications. Offer choices when appropriate and with thoughtful education to help promote compliance. Continue using IV alternatives as appropriate.  - Continue approaching care with empathy, recognizing his self-reported fears of suffering without potential for recover and his family seeing him severelly ill  - Cluster care to minimize interruptions epically during the night  - If the patient's agitation increases or becomes unmanageable through non-pharmacologic strategies, consider a psychiatric consult for further support    KOKI services available to patient and staff 24/7 throughout hospitalization. KOKI will continue to perform q12 hr rounding to ensure safety and support of patient and staff. Please secure chat \"KOKI\" with any questions or concerns     "
1631 - Patient was having a hard time breathing and also coughing; had patient sit up, increased his O2 as needed - was up to 8L and sat at 90% - called respiratory for breathing treatment and Nette ACEVEDO also made aware. Able to titrate down to 4L after treatment - will continue to titrate as tolerated.   
no...

## 2025-06-11 ENCOUNTER — APPOINTMENT (OUTPATIENT)
Dept: RADIOLOGY | Facility: HOSPITAL | Age: 65
DRG: 177 | End: 2025-06-11
Payer: COMMERCIAL

## 2025-06-11 LAB
ALBUMIN SERPL BCP-MCNC: 2.6 G/DL (ref 3.4–5)
ANION GAP SERPL CALC-SCNC: 13 MMOL/L (ref 10–20)
BASOPHILS # BLD MANUAL: 0 X10*3/UL (ref 0–0.1)
BASOPHILS NFR BLD MANUAL: 0 %
BLASTS # BLD MANUAL: 0 X10*3/UL
BLASTS NFR BLD MANUAL: 0 %
BUN SERPL-MCNC: 11 MG/DL (ref 6–23)
CALCIUM SERPL-MCNC: 8.1 MG/DL (ref 8.6–10.6)
CHLORIDE SERPL-SCNC: 101 MMOL/L (ref 98–107)
CO2 SERPL-SCNC: 26 MMOL/L (ref 21–32)
CREAT SERPL-MCNC: 0.65 MG/DL (ref 0.5–1.3)
EGFRCR SERPLBLD CKD-EPI 2021: >90 ML/MIN/1.73M*2
EOSINOPHIL # BLD MANUAL: 0.06 X10*3/UL (ref 0–0.7)
EOSINOPHIL NFR BLD MANUAL: 0.9 %
ERYTHROCYTE [DISTWIDTH] IN BLOOD BY AUTOMATED COUNT: 17.3 % (ref 11.5–14.5)
GLUCOSE SERPL-MCNC: 108 MG/DL (ref 74–99)
HCT VFR BLD AUTO: 29.4 % (ref 41–52)
HGB BLD-MCNC: 9.1 G/DL (ref 13.5–17.5)
HYPOCHROMIA BLD QL SMEAR: ABNORMAL
IMM GRANULOCYTES # BLD AUTO: 0.31 X10*3/UL (ref 0–0.7)
IMM GRANULOCYTES NFR BLD AUTO: 4.4 % (ref 0–0.9)
LYMPHOCYTES # BLD MANUAL: 0.54 X10*3/UL (ref 1.2–4.8)
LYMPHOCYTES NFR BLD MANUAL: 7.6 %
MAGNESIUM SERPL-MCNC: 1.86 MG/DL (ref 1.6–2.4)
MCH RBC QN AUTO: 26.4 PG (ref 26–34)
MCHC RBC AUTO-ENTMCNC: 31 G/DL (ref 32–36)
MCV RBC AUTO: 85 FL (ref 80–100)
METAMYELOCYTES # BLD MANUAL: 0 X10*3/UL
METAMYELOCYTES NFR BLD MANUAL: 0 %
MONOCYTES # BLD MANUAL: 0.84 X10*3/UL (ref 0.1–1)
MONOCYTES NFR BLD MANUAL: 11.9 %
MYELOCYTES # BLD MANUAL: 0.06 X10*3/UL
MYELOCYTES NFR BLD MANUAL: 0.8 %
NEUTROPHILS # BLD MANUAL: 5.54 X10*3/UL (ref 1.2–7.7)
NEUTS BAND # BLD MANUAL: 0 X10*3/UL (ref 0–0.7)
NEUTS BAND NFR BLD MANUAL: 0 %
NEUTS SEG # BLD MANUAL: 5.54 X10*3/UL (ref 1.2–7)
NEUTS SEG NFR BLD MANUAL: 78 %
NRBC BLD MANUAL-RTO: 0 % (ref 0–0)
NRBC BLD-RTO: 0 /100 WBCS (ref 0–0)
PHOSPHATE SERPL-MCNC: 3.1 MG/DL (ref 2.5–4.9)
PLASMA CELLS # BLD MANUAL: 0.06 X10*3/UL
PLASMA CELLS NFR BLD MANUAL: 0.8 %
PLATELET # BLD AUTO: 293 X10*3/UL (ref 150–450)
POTASSIUM SERPL-SCNC: 3.8 MMOL/L (ref 3.5–5.3)
PROMYELOCYTES # BLD MANUAL: 0 X10*3/UL
PROMYELOCYTES NFR BLD MANUAL: 0 %
RBC # BLD AUTO: 3.45 X10*6/UL (ref 4.5–5.9)
RBC MORPH BLD: ABNORMAL
SODIUM SERPL-SCNC: 136 MMOL/L (ref 136–145)
TOTAL CELLS COUNTED BLD: 118
VARIANT LYMPHS # BLD MANUAL: 0 X10*3/UL (ref 0–0.5)
VARIANT LYMPHS NFR BLD: 0 %
WBC # BLD AUTO: 7.1 X10*3/UL (ref 4.4–11.3)

## 2025-06-11 PROCEDURE — 85007 BL SMEAR W/DIFF WBC COUNT: CPT | Performed by: NURSE PRACTITIONER

## 2025-06-11 PROCEDURE — 85027 COMPLETE CBC AUTOMATED: CPT | Performed by: NURSE PRACTITIONER

## 2025-06-11 PROCEDURE — 94667 MNPJ CHEST WALL 1ST: CPT

## 2025-06-11 PROCEDURE — 2500000002 HC RX 250 W HCPCS SELF ADMINISTERED DRUGS (ALT 637 FOR MEDICARE OP, ALT 636 FOR OP/ED): Performed by: NURSE PRACTITIONER

## 2025-06-11 PROCEDURE — 2500000001 HC RX 250 WO HCPCS SELF ADMINISTERED DRUGS (ALT 637 FOR MEDICARE OP): Performed by: NURSE PRACTITIONER

## 2025-06-11 PROCEDURE — 94640 AIRWAY INHALATION TREATMENT: CPT

## 2025-06-11 PROCEDURE — 2500000005 HC RX 250 GENERAL PHARMACY W/O HCPCS: Performed by: NURSE PRACTITIONER

## 2025-06-11 PROCEDURE — 71045 X-RAY EXAM CHEST 1 VIEW: CPT

## 2025-06-11 PROCEDURE — 1100000001 HC PRIVATE ROOM DAILY

## 2025-06-11 PROCEDURE — 36415 COLL VENOUS BLD VENIPUNCTURE: CPT | Performed by: NURSE PRACTITIONER

## 2025-06-11 PROCEDURE — 99232 SBSQ HOSP IP/OBS MODERATE 35: CPT | Performed by: STUDENT IN AN ORGANIZED HEALTH CARE EDUCATION/TRAINING PROGRAM

## 2025-06-11 PROCEDURE — 83735 ASSAY OF MAGNESIUM: CPT | Performed by: NURSE PRACTITIONER

## 2025-06-11 PROCEDURE — 94669 MECHANICAL CHEST WALL OSCILL: CPT

## 2025-06-11 PROCEDURE — 71045 X-RAY EXAM CHEST 1 VIEW: CPT | Performed by: RADIOLOGY

## 2025-06-11 PROCEDURE — 80069 RENAL FUNCTION PANEL: CPT | Performed by: NURSE PRACTITIONER

## 2025-06-11 PROCEDURE — 2500000004 HC RX 250 GENERAL PHARMACY W/ HCPCS (ALT 636 FOR OP/ED): Performed by: NURSE PRACTITIONER

## 2025-06-11 RX ORDER — AZITHROMYCIN 500 MG/1
500 TABLET, FILM COATED ORAL EVERY 24 HOURS
Status: COMPLETED | OUTPATIENT
Start: 2025-06-11 | End: 2025-06-11

## 2025-06-11 RX ORDER — AMOXICILLIN AND CLAVULANATE POTASSIUM 500; 125 MG/1; MG/1
1 TABLET, FILM COATED ORAL EVERY 8 HOURS SCHEDULED
Status: DISCONTINUED | OUTPATIENT
Start: 2025-06-11 | End: 2025-06-12

## 2025-06-11 RX ORDER — FUROSEMIDE 10 MG/ML
40 INJECTION INTRAMUSCULAR; INTRAVENOUS ONCE
Status: COMPLETED | OUTPATIENT
Start: 2025-06-11 | End: 2025-06-11

## 2025-06-11 RX ADMIN — IPRATROPIUM BROMIDE AND ALBUTEROL SULFATE 3 ML: .5; 3 SOLUTION RESPIRATORY (INHALATION) at 14:04

## 2025-06-11 RX ADMIN — IPRATROPIUM BROMIDE AND ALBUTEROL SULFATE 3 ML: .5; 3 SOLUTION RESPIRATORY (INHALATION) at 19:57

## 2025-06-11 RX ADMIN — FUROSEMIDE 40 MG: 10 INJECTION INTRAMUSCULAR; INTRAVENOUS at 09:38

## 2025-06-11 RX ADMIN — OXYCODONE 5 MG: 5 TABLET ORAL at 08:07

## 2025-06-11 RX ADMIN — AMOXICILLIN AND CLAVULANATE POTASSIUM 1 TABLET: 500; 125 TABLET, FILM COATED ORAL at 21:09

## 2025-06-11 RX ADMIN — APIXABAN 5 MG: 5 TABLET, FILM COATED ORAL at 08:09

## 2025-06-11 RX ADMIN — APIXABAN 5 MG: 5 TABLET, FILM COATED ORAL at 21:07

## 2025-06-11 RX ADMIN — PIPERACILLIN SODIUM AND TAZOBACTAM SODIUM 3.38 G: 3; .375 INJECTION, SOLUTION INTRAVENOUS at 12:03

## 2025-06-11 RX ADMIN — LACOSAMIDE 200 MG: 100 TABLET, FILM COATED ORAL at 21:07

## 2025-06-11 RX ADMIN — ASPIRIN 81 MG: 81 TABLET, CHEWABLE ORAL at 08:11

## 2025-06-11 RX ADMIN — DIVALPROEX SODIUM 500 MG: 500 TABLET, FILM COATED, EXTENDED RELEASE ORAL at 08:19

## 2025-06-11 RX ADMIN — Medication 3 L/MIN: at 20:00

## 2025-06-11 RX ADMIN — FLUTICASONE PROPIONATE 2 SPRAY: 50 SPRAY, METERED NASAL at 22:25

## 2025-06-11 RX ADMIN — Medication 7 L/MIN: at 10:00

## 2025-06-11 RX ADMIN — BUDESONIDE 0.5 MG: 0.5 INHALANT RESPIRATORY (INHALATION) at 08:22

## 2025-06-11 RX ADMIN — Medication 10 L/MIN: at 08:21

## 2025-06-11 RX ADMIN — LACOSAMIDE 200 MG: 100 TABLET, FILM COATED ORAL at 08:18

## 2025-06-11 RX ADMIN — AZITHROMYCIN DIHYDRATE 500 MG: 500 TABLET ORAL at 13:22

## 2025-06-11 RX ADMIN — IPRATROPIUM BROMIDE AND ALBUTEROL SULFATE 3 ML: .5; 3 SOLUTION RESPIRATORY (INHALATION) at 05:23

## 2025-06-11 RX ADMIN — DIVALPROEX SODIUM 500 MG: 500 TABLET, FILM COATED, EXTENDED RELEASE ORAL at 21:08

## 2025-06-11 ASSESSMENT — COGNITIVE AND FUNCTIONAL STATUS - GENERAL
DRESSING REGULAR UPPER BODY CLOTHING: A LITTLE
HELP NEEDED FOR BATHING: A LITTLE
DRESSING REGULAR LOWER BODY CLOTHING: A LITTLE
DAILY ACTIVITIY SCORE: 19
TOILETING: A LITTLE
MOVING TO AND FROM BED TO CHAIR: A LITTLE
HELP NEEDED FOR BATHING: A LITTLE
STANDING UP FROM CHAIR USING ARMS: A LITTLE
CLIMB 3 TO 5 STEPS WITH RAILING: A LOT
CLIMB 3 TO 5 STEPS WITH RAILING: A LOT
WALKING IN HOSPITAL ROOM: A LITTLE
WALKING IN HOSPITAL ROOM: A LITTLE
DAILY ACTIVITIY SCORE: 20
DRESSING REGULAR LOWER BODY CLOTHING: A LITTLE
MOBILITY SCORE: 19
DRESSING REGULAR UPPER BODY CLOTHING: A LITTLE
PERSONAL GROOMING: A LITTLE
STANDING UP FROM CHAIR USING ARMS: A LITTLE
MOBILITY SCORE: 19
MOVING TO AND FROM BED TO CHAIR: A LITTLE
TOILETING: A LITTLE

## 2025-06-11 ASSESSMENT — PAIN SCALES - GENERAL
PAINLEVEL_OUTOF10: 0 - NO PAIN
PAINLEVEL_OUTOF10: 6
PAINLEVEL_OUTOF10: 0 - NO PAIN
PAINLEVEL_OUTOF10: 10 - WORST POSSIBLE PAIN
PAINLEVEL_OUTOF10: 0 - NO PAIN

## 2025-06-11 ASSESSMENT — PAIN DESCRIPTION - LOCATION: LOCATION: CHEST

## 2025-06-11 ASSESSMENT — PAIN - FUNCTIONAL ASSESSMENT
PAIN_FUNCTIONAL_ASSESSMENT: 0-10

## 2025-06-11 ASSESSMENT — PAIN DESCRIPTION - DESCRIPTORS
DESCRIPTORS: ACHING
DESCRIPTORS: STABBING

## 2025-06-11 NOTE — CARE PLAN
Problem: Pain - Adult  Goal: Verbalizes/displays adequate comfort level or baseline comfort level  Outcome: Progressing  Flowsheets (Taken 6/11/2025 1009)  Verbalizes/displays adequate comfort level or baseline comfort level:   Encourage patient to monitor pain and request assistance   Administer analgesics based on type and severity of pain and evaluate response   Consider cultural and social influences on pain and pain management   Assess pain using appropriate pain scale   Implement non-pharmacological measures as appropriate and evaluate response     Problem: Safety - Adult  Goal: Free from fall injury  Outcome: Progressing  Flowsheets (Taken 6/11/2025 1009)  Free from fall injury: Instruct family/caregiver on patient safety     Problem: Discharge Planning  Goal: Discharge to home or other facility with appropriate resources  Outcome: Progressing  Flowsheets (Taken 6/11/2025 1009)  Discharge to home or other facility with appropriate resources:   Identify barriers to discharge with patient and caregiver   Identify discharge learning needs (meds, wound care, etc)     Problem: Chronic Conditions and Co-morbidities  Goal: Patient's chronic conditions and co-morbidity symptoms are monitored and maintained or improved  Outcome: Progressing  Flowsheets (Taken 6/11/2025 1009)  Care Plan - Patient's Chronic Conditions and Co-Morbidity Symptoms are Monitored and Maintained or Improved:   Collaborate with multidisciplinary team to address chronic and comorbid conditions and prevent exacerbation or deterioration   Update acute care plan with appropriate goals if chronic or comorbid symptoms are exacerbated and prevent overall improvement and discharge   Monitor and assess patient's chronic conditions and comorbid symptoms for stability, deterioration, or improvement     Problem: Nutrition  Goal: Nutrient intake appropriate for maintaining nutritional needs  Outcome: Progressing     Problem: Skin  Goal: Participates in  plan/prevention/treatment measures  Outcome: Progressing  Flowsheets (Taken 6/11/2025 1009)  Participates in plan/prevention/treatment measures:   Elevate heels   Increase activity/out of bed for meals  Goal: Prevent/manage excess moisture  Outcome: Progressing  Flowsheets (Taken 6/11/2025 1009)  Prevent/manage excess moisture:   Cleanse incontinence/protect with barrier cream   Moisturize dry skin  Goal: Prevent/minimize sheer/friction injuries  Outcome: Progressing  Flowsheets (Taken 6/11/2025 1009)  Prevent/minimize sheer/friction injuries:   Complete micro-shifts as needed if patient unable. Adjust patient position to relieve pressure points, not a full turn   HOB 30 degrees or less   Increase activity/out of bed for meals  Goal: Promote/optimize nutrition  Outcome: Progressing  Flowsheets (Taken 6/11/2025 1009)  Promote/optimize nutrition: Monitor/record intake including meals  Goal: Promote skin healing  Outcome: Progressing  Flowsheets (Taken 6/11/2025 1009)  Promote skin healing:   Assess skin/pad under line(s)/device(s)   Turn/reposition every 2 hours/use positioning/transfer devices

## 2025-06-11 NOTE — PROGRESS NOTES
"Medical Intensive Care Stepdown Unit - Daily Progress Note   Subjective    Martínez Neri is a 64 y.o. year old male patient admitted on 6/7/2025 with Acute hypoxic resp failure 2/2 multifocal PNA    Interval History:      Increased O2 needs to 15 L NC , continue with [place] and nebs    Meds    Scheduled medications  Scheduled Medications[1]  Continuous medications  Continuous Medications[2]  PRN medications  PRN Medications[3]     Objective    Blood pressure 128/68, pulse 70, temperature 36.5 °C (97.7 °F), temperature source Temporal, resp. rate 26, height 1.754 m (5' 9.06\"), weight 70.5 kg (155 lb 8 oz), SpO2 97%.     Constitutional: pt in NAD, alert and cooperative  Eyes: PERRL, EOMI, no icterus   ENMT: mucous membranes moist, no apparent injury, no lesions seen  Head/Neck: Neck supple, no apparent injury  Respiratory/Thorax: Lungs  david rhonchi, some expiratory wheeze, moist productie cough, on 5L NC   Cardiovascular: Regular, rate and rhythm, no murmurs, normal S1 and S2  Gastrointestinal: Nondistended, soft, non-tender, BS present x 4  Musculoskeletal: ROM intact, no joint swelling, normal strength  Extremities: normal extremities, no edema, contusions or wounds  Neurological: alert and oriented x 3, speech clear, follows commands appropriately, motor 5/5 throughout  Skin: Warm and dry, no lesions, no rashes      Intake/Output Summary (Last 24 hours) at 6/11/2025 0638  Last data filed at 6/11/2025 0300  Gross per 24 hour   Intake 480 ml   Output 800 ml   Net -320 ml     Labs:   Results from last 72 hours   Lab Units 06/10/25  0459 06/09/25  0532   SODIUM mmol/L 138 138   POTASSIUM mmol/L 4.7 3.2*   CHLORIDE mmol/L 103 103   CO2 mmol/L 26 28   BUN mg/dL 9 8   CREATININE mg/dL 0.51 0.56   GLUCOSE mg/dL 88 113*   CALCIUM mg/dL 7.8* 8.2*   ANION GAP mmol/L 14 10   EGFR mL/min/1.73m*2 >90 >90   PHOSPHORUS mg/dL 2.8 2.7      Results from last 72 hours   Lab Units 06/11/25  0525 06/10/25  0459 06/09/25  0532   WBC " AUTO x10*3/uL 7.1 7.2 8.5   HEMOGLOBIN g/dL 9.1* 8.8* 8.8*   HEMATOCRIT % 29.4* 26.7* 26.2*   PLATELETS AUTO x10*3/uL 293 176 219   NEUTROS PCT AUTO %  --  65.1 72.7   LYMPHS PCT AUTO %  --  14.3 10.5   MONOS PCT AUTO %  --  13.8 13.7   EOS PCT AUTO %  --  1.1 0.7        Summary of key imaging results     XR chest 1 view 6/9/2025  1. Worsening multifocal pneumonia plus/minus atelectasis..         US renal complete  6/5/2025  The right kidney measures 11.0 cm. There is a 1.7 cm mid to lower pole right renal cyst. The left kidney measures 11.9 cm  .     No renal stones are identified.  The renal cortical thickness and echogenicity is normal.  No hydronephrosis is identified.   Urinary bladder is nonspecific in appearance with no stones or masses identified.     Right renal cyst.       Transthoracic Echo (TTE) Complete: 6/5/2025   1. Left ventricular ejection fraction is normal by visual estimate at 55-60%.    2. Spectral Doppler shows a Grade I (impaired relaxation pattern) of left ventricular diastolic filling with normal left atrial filling pressure.    3. There is normal right ventricular global systolic function.        Assessment and Plan     Assessment: Martínez Neri 64 y.o. 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 Admitted on 6/7/2025 with Acute hypoxic resp failure 2/2 multifocal PNA    Restraints: no    Summary for 06/11/25  :  Increased O2 needs with atelectasis on CXR- added Benny , nebs, 40 IV Lasix  Sputum cx   Levaquin switched to Augmentin x7 days and Azithro x1 dose   Switched to Apixaban on 6/9  for SMA thrombus hx  Will go home with home O2 (has delivered to bedside)       Plan:  NEUROLOGY/PSYCH:  Dx: hx of ICD brain aneurysm s/p coiling,  Seizures  Management:  Home Meds: Depakote  mg BID, vimpat 200 mg BID, Remeron 7.5 mg at bedtime  Continue home meds  Melatonin for sleep  PRN Tylenol or Oxy for pain  Continue Seroquel 12.5 mg QHS   OOB to chair.  No PT/OT needs    CARDIOVASCULAR:  Dx: hx of HTN, HLP. Afib/RVR at OSH, converted to NSR with IV Metop x1.  Elevated trop, Type II MI demand ischemia  6/5 TTE: LVEF 55-60%, Diastolic dysfunction, Inferior vena cava mildly dilated with IVC ins collapse >50%  Management:  HDS  Home Meds: ASA 81 mg daily, Atorvastatin 40 mg daily  Continue Atorvastatin 40 mg daily  ASA has been held 2/2 anemia, resume today    PULMONARY:  Dx: history of NSCLC of THOMAS s/p  chemotherapy and radiation in remission. COPD.  Former Smoker  PFTs (10/23): FVC 88, FEV1 40, FEV1/FVC 15 indicating severe airway obstruction  July '23 status post bronch/EBUS biopsies showed left upper lobe with invasive moderately differentiated non-small cell carcinoma  Completed chemo/radiation January '24  Currently on surveillance  Management:  Home Meds: Proventil HFA PRN, fluticasone 50 mcg nasal daily, DuoNebs PRN, Nicoderm, Spiriva inhl  S/p 20 mg Furosemide x1 on 6/7, 40 mg IV 6/11  O2 increased to 15 L on 6/11 with atelectasis on CXR from 6/10   Continue Budesonide, DuoNebs  Added [place] q4 hrs on 6/10 and repeat CXR   Will need CT chest in 4 weeks-- has one scheduled for July & will follow up with Dr Ng after    RENAL/GENITOURINARY:  Dx: SERINA (resolved)  Management:  Daily RFP    GASTROENTEROLOGY:  Dx: No acute issues  Management:  Diet: Regular  Bowel Regimen: Docusate BID  PPI    ENDOCRINOLOGY:  Dx: No acute issues  Management:  A1C: 5.5 (6/2025)    HEMATOLOGY:  Dx: Normocytic Anemia, Acute Thrombocytopenia- resolving (d/dx: drug induced, sepsis related, ITP), hx of 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 Last transfusion 6/7  No signs of bleeding  Management:  Thrombocytopenia work up including PF4  (neg), Hepatitis panel, HIV, peripheral smear  Stop heparin drip & start PO apixiban    MUSCULOSKELETAL/ SKIN:  Dx: No acute issues    INFECTIOUS DISEASE:  Dx: Multifocal PNA, sepsis (resolved)  On admit to OSH, tachycardic, tachypnea, elevated lactate, WBC 12-16 & SERINA  Management:  Afebrile, no leukocytosis  MICRO: 6/5 MRSA PCR neg, 6/5 Sputum cx salivary contamination, 6/6 Sputum cx pending  6/5 Strep pneumoniae Ag and Legionella Ag neg  6/6 Procalc 12.79  Antimicrobials: Azithro (6/4), CTX (6/4-6/5), zosyn (6/5- 6/9), Vanc (6/5- 6/8), PO Levaquin (6/9- 6/11, Azithro 6/11, Augmentin 6/11- 6/16 (total 7 days from start of Levaquin     ICU/SDU Check List                  FEN  Fluids: PRN  Electrolytes: PRN  Nutrition: Regular  Prophylaxis:  DVT ppx: Apixaban   GI ppx: PPI  Hardware:  Catheter: None  Drains: None  Lines: PIV x2  Social:  Code: Full Code    HPOA: Keri Neri [phone]    Disposition: SDU for now, consider transfer to medicine floor if improved O2 needs  Discharge Planning: Home when medically stable.  Pt declines SNF.  Agreeable to outpatient PT if needed, will provide script at discharge (referral placed in Epic).  Walking pulse O2 completed, home O2 orders placed for discharge.      ARIC Gaona-CNP   06/11/25 at 6:38 AM     Pt discussed with Dr. Noble seen and examined. All labs, VS and previous plan of care reviewed.  I spent 45 minutes in the professional and overall care of this patient.      Disclaimer: Documentation completed with the information available at the time of input. The times in the chart may not be reflective of actual patient care times, interventions, or procedures. Documentation occurs after the physical care of the patient.            [1] apixaban, 5 mg, oral, BID  aspirin, 81 mg, oral, Daily  budesonide, 0.5 mg, nebulization, BID  divalproex, 500 mg, oral, BID  docusate sodium, 100 mg, oral, BID  fluticasone, 2 spray, Each Nostril, Daily  [Held by provider]  furosemide, 20 mg, intravenous, Once  ipratropium-albuteroL, 3 mL, nebulization, q4h while awake  lacosamide, 200 mg, oral, BID  levoFLOXacin, 750 mg, oral, q24h  melatonin, 3 mg, oral, Nightly  mirtazapine, 7.5 mg, oral, Nightly  oxygen, , inhalation, Continuous - Inhalation  pantoprazole, 40 mg, oral, Daily before breakfast  QUEtiapine, 12.5 mg, oral, Nightly     [2]    [3] PRN medications: acetaminophen, benzocaine-menthol, benzonatate, dextromethorphan-guaifenesin, dextrose, dextrose, glucagon, glucagon, guaiFENesin, ipratropium-albuteroL, nicotine polacrilex, ondansetron, oxyCODONE, polyethylene glycol

## 2025-06-12 ENCOUNTER — APPOINTMENT (OUTPATIENT)
Dept: RADIOLOGY | Facility: HOSPITAL | Age: 65
DRG: 177 | End: 2025-06-12
Payer: COMMERCIAL

## 2025-06-12 ENCOUNTER — APPOINTMENT (OUTPATIENT)
Dept: GASTROENTEROLOGY | Facility: HOSPITAL | Age: 65
DRG: 177 | End: 2025-06-12
Payer: COMMERCIAL

## 2025-06-12 LAB
ALBUMIN SERPL BCP-MCNC: 2.8 G/DL (ref 3.4–5)
ANION GAP SERPL CALC-SCNC: 11 MMOL/L (ref 10–20)
BACTERIA SPEC RESP CULT: ABNORMAL
BASOPHILS # BLD AUTO: 0.04 X10*3/UL (ref 0–0.1)
BASOPHILS NFR BLD AUTO: 0.5 %
BUN SERPL-MCNC: 11 MG/DL (ref 6–23)
CALCIUM SERPL-MCNC: 8.8 MG/DL (ref 8.6–10.6)
CHLORIDE SERPL-SCNC: 99 MMOL/L (ref 98–107)
CO2 SERPL-SCNC: 30 MMOL/L (ref 21–32)
CREAT SERPL-MCNC: 0.57 MG/DL (ref 0.5–1.3)
EGFRCR SERPLBLD CKD-EPI 2021: >90 ML/MIN/1.73M*2
EOSINOPHIL # BLD AUTO: 0.05 X10*3/UL (ref 0–0.7)
EOSINOPHIL NFR BLD AUTO: 0.6 %
ERYTHROCYTE [DISTWIDTH] IN BLOOD BY AUTOMATED COUNT: 17.5 % (ref 11.5–14.5)
GLUCOSE SERPL-MCNC: 115 MG/DL (ref 74–99)
GRAM STN SPEC: ABNORMAL
HCT VFR BLD AUTO: 28.6 % (ref 41–52)
HGB BLD-MCNC: 9.2 G/DL (ref 13.5–17.5)
IMM GRANULOCYTES # BLD AUTO: 0.24 X10*3/UL (ref 0–0.7)
IMM GRANULOCYTES NFR BLD AUTO: 2.7 % (ref 0–0.9)
LYMPHOCYTES # BLD AUTO: 1.1 X10*3/UL (ref 1.2–4.8)
LYMPHOCYTES NFR BLD AUTO: 12.4 %
MAGNESIUM SERPL-MCNC: 1.79 MG/DL (ref 1.6–2.4)
MCH RBC QN AUTO: 27.2 PG (ref 26–34)
MCHC RBC AUTO-ENTMCNC: 32.2 G/DL (ref 32–36)
MCV RBC AUTO: 85 FL (ref 80–100)
MONOCYTES # BLD AUTO: 0.84 X10*3/UL (ref 0.1–1)
MONOCYTES NFR BLD AUTO: 9.5 %
NEUTROPHILS # BLD AUTO: 6.61 X10*3/UL (ref 1.2–7.7)
NEUTROPHILS NFR BLD AUTO: 74.3 %
NRBC BLD-RTO: 0 /100 WBCS (ref 0–0)
PHOSPHATE SERPL-MCNC: 3.3 MG/DL (ref 2.5–4.9)
PLATELET # BLD AUTO: 347 X10*3/UL (ref 150–450)
POTASSIUM SERPL-SCNC: 3.8 MMOL/L (ref 3.5–5.3)
RBC # BLD AUTO: 3.38 X10*6/UL (ref 4.5–5.9)
SODIUM SERPL-SCNC: 136 MMOL/L (ref 136–145)
WBC # BLD AUTO: 8.9 X10*3/UL (ref 4.4–11.3)

## 2025-06-12 PROCEDURE — 71250 CT THORAX DX C-: CPT

## 2025-06-12 PROCEDURE — 1100000001 HC PRIVATE ROOM DAILY

## 2025-06-12 PROCEDURE — 2500000004 HC RX 250 GENERAL PHARMACY W/ HCPCS (ALT 636 FOR OP/ED): Performed by: NURSE PRACTITIONER

## 2025-06-12 PROCEDURE — 71045 X-RAY EXAM CHEST 1 VIEW: CPT

## 2025-06-12 PROCEDURE — 2500000005 HC RX 250 GENERAL PHARMACY W/O HCPCS: Performed by: STUDENT IN AN ORGANIZED HEALTH CARE EDUCATION/TRAINING PROGRAM

## 2025-06-12 PROCEDURE — 2500000005 HC RX 250 GENERAL PHARMACY W/O HCPCS: Performed by: NURSE PRACTITIONER

## 2025-06-12 PROCEDURE — 71045 X-RAY EXAM CHEST 1 VIEW: CPT | Performed by: RADIOLOGY

## 2025-06-12 PROCEDURE — 2500000001 HC RX 250 WO HCPCS SELF ADMINISTERED DRUGS (ALT 637 FOR MEDICARE OP): Performed by: NURSE PRACTITIONER

## 2025-06-12 PROCEDURE — 71250 CT THORAX DX C-: CPT | Performed by: RADIOLOGY

## 2025-06-12 PROCEDURE — 94668 MNPJ CHEST WALL SBSQ: CPT

## 2025-06-12 PROCEDURE — 87205 SMEAR GRAM STAIN: CPT | Performed by: NURSE PRACTITIONER

## 2025-06-12 PROCEDURE — 94669 MECHANICAL CHEST WALL OSCILL: CPT

## 2025-06-12 PROCEDURE — 2500000002 HC RX 250 W HCPCS SELF ADMINISTERED DRUGS (ALT 637 FOR MEDICARE OP, ALT 636 FOR OP/ED): Performed by: NURSE PRACTITIONER

## 2025-06-12 PROCEDURE — 83735 ASSAY OF MAGNESIUM: CPT | Performed by: NURSE PRACTITIONER

## 2025-06-12 PROCEDURE — 94762 N-INVAS EAR/PLS OXIMTRY CONT: CPT

## 2025-06-12 PROCEDURE — 99291 CRITICAL CARE FIRST HOUR: CPT | Performed by: STUDENT IN AN ORGANIZED HEALTH CARE EDUCATION/TRAINING PROGRAM

## 2025-06-12 PROCEDURE — 94640 AIRWAY INHALATION TREATMENT: CPT

## 2025-06-12 PROCEDURE — 85025 COMPLETE CBC W/AUTO DIFF WBC: CPT | Performed by: NURSE PRACTITIONER

## 2025-06-12 PROCEDURE — 36415 COLL VENOUS BLD VENIPUNCTURE: CPT | Performed by: NURSE PRACTITIONER

## 2025-06-12 PROCEDURE — 80069 RENAL FUNCTION PANEL: CPT | Performed by: NURSE PRACTITIONER

## 2025-06-12 RX ORDER — SODIUM CHLORIDE FOR INHALATION 3 %
3 VIAL, NEBULIZER (ML) INHALATION EVERY 6 HOURS SCHEDULED
Status: DISCONTINUED | OUTPATIENT
Start: 2025-06-12 | End: 2025-06-12

## 2025-06-12 RX ORDER — SODIUM CHLORIDE FOR INHALATION 3 %
3 VIAL, NEBULIZER (ML) INHALATION
Status: DISCONTINUED | OUTPATIENT
Start: 2025-06-12 | End: 2025-06-14

## 2025-06-12 RX ORDER — IPRATROPIUM BROMIDE AND ALBUTEROL SULFATE 2.5; .5 MG/3ML; MG/3ML
3 SOLUTION RESPIRATORY (INHALATION)
Status: DISCONTINUED | OUTPATIENT
Start: 2025-06-12 | End: 2025-06-14

## 2025-06-12 RX ORDER — IPRATROPIUM BROMIDE AND ALBUTEROL SULFATE 2.5; .5 MG/3ML; MG/3ML
3 SOLUTION RESPIRATORY (INHALATION)
Status: DISCONTINUED | OUTPATIENT
Start: 2025-06-12 | End: 2025-06-12

## 2025-06-12 RX ORDER — FUROSEMIDE 10 MG/ML
40 INJECTION INTRAMUSCULAR; INTRAVENOUS ONCE
Status: DISCONTINUED | OUTPATIENT
Start: 2025-06-12 | End: 2025-06-12

## 2025-06-12 RX ORDER — MAGNESIUM SULFATE HEPTAHYDRATE 40 MG/ML
2 INJECTION, SOLUTION INTRAVENOUS ONCE
Status: COMPLETED | OUTPATIENT
Start: 2025-06-12 | End: 2025-06-12

## 2025-06-12 RX ADMIN — OXYCODONE 5 MG: 5 TABLET ORAL at 12:40

## 2025-06-12 RX ADMIN — SALINE NASAL SPRAY 1 SPRAY: 1.5 SOLUTION NASAL at 05:39

## 2025-06-12 RX ADMIN — IPRATROPIUM BROMIDE AND ALBUTEROL SULFATE 3 ML: .5; 3 SOLUTION RESPIRATORY (INHALATION) at 08:38

## 2025-06-12 RX ADMIN — PIPERACILLIN SODIUM AND TAZOBACTAM SODIUM 3.38 G: 3; .375 INJECTION, SOLUTION INTRAVENOUS at 21:00

## 2025-06-12 RX ADMIN — AMOXICILLIN AND CLAVULANATE POTASSIUM 1 TABLET: 500; 125 TABLET, FILM COATED ORAL at 05:37

## 2025-06-12 RX ADMIN — DIVALPROEX SODIUM 500 MG: 500 TABLET, FILM COATED, EXTENDED RELEASE ORAL at 21:58

## 2025-06-12 RX ADMIN — LACOSAMIDE 200 MG: 100 TABLET, FILM COATED ORAL at 21:00

## 2025-06-12 RX ADMIN — IPRATROPIUM BROMIDE AND ALBUTEROL SULFATE 3 ML: .5; 3 SOLUTION RESPIRATORY (INHALATION) at 11:21

## 2025-06-12 RX ADMIN — SODIUM CHLORIDE SOLN NEBU 3% 4 ML: 3 NEBU SOLN at 01:53

## 2025-06-12 RX ADMIN — LACOSAMIDE 200 MG: 100 TABLET, FILM COATED ORAL at 08:19

## 2025-06-12 RX ADMIN — BUDESONIDE 0.5 MG: 0.5 INHALANT RESPIRATORY (INHALATION) at 08:38

## 2025-06-12 RX ADMIN — DIVALPROEX SODIUM 500 MG: 500 TABLET, FILM COATED, EXTENDED RELEASE ORAL at 09:45

## 2025-06-12 RX ADMIN — ASPIRIN 81 MG: 81 TABLET, CHEWABLE ORAL at 08:19

## 2025-06-12 RX ADMIN — SODIUM CHLORIDE SOLN NEBU 3% 3 ML: 3 NEBU SOLN at 08:38

## 2025-06-12 RX ADMIN — Medication 60 L/MIN: at 08:38

## 2025-06-12 RX ADMIN — MAGNESIUM SULFATE HEPTAHYDRATE 2 G: 40 INJECTION, SOLUTION INTRAVENOUS at 06:35

## 2025-06-12 RX ADMIN — FLUTICASONE PROPIONATE 2 SPRAY: 50 SPRAY, METERED NASAL at 08:26

## 2025-06-12 RX ADMIN — IPRATROPIUM BROMIDE AND ALBUTEROL SULFATE 3 ML: .5; 3 SOLUTION RESPIRATORY (INHALATION) at 01:02

## 2025-06-12 RX ADMIN — SODIUM CHLORIDE SOLN NEBU 3% 3 ML: 3 NEBU SOLN at 11:21

## 2025-06-12 RX ADMIN — SODIUM CHLORIDE SOLN NEBU 3% 3 ML: 3 NEBU SOLN at 20:39

## 2025-06-12 RX ADMIN — PIPERACILLIN SODIUM AND TAZOBACTAM SODIUM 3.38 G: 3; .375 INJECTION, SOLUTION INTRAVENOUS at 08:19

## 2025-06-12 RX ADMIN — IPRATROPIUM BROMIDE AND ALBUTEROL SULFATE 3 ML: .5; 3 SOLUTION RESPIRATORY (INHALATION) at 20:32

## 2025-06-12 RX ADMIN — IPRATROPIUM BROMIDE AND ALBUTEROL SULFATE 3 ML: .5; 3 SOLUTION RESPIRATORY (INHALATION) at 15:12

## 2025-06-12 RX ADMIN — BUDESONIDE 0.5 MG: 0.5 INHALANT RESPIRATORY (INHALATION) at 20:38

## 2025-06-12 RX ADMIN — SODIUM CHLORIDE SOLN NEBU 3% 3 ML: 3 NEBU SOLN at 15:12

## 2025-06-12 RX ADMIN — Medication 35 L/MIN: at 20:39

## 2025-06-12 RX ADMIN — Medication 30 L/MIN: at 02:30

## 2025-06-12 RX ADMIN — APIXABAN 5 MG: 5 TABLET, FILM COATED ORAL at 08:19

## 2025-06-12 RX ADMIN — OXYCODONE 5 MG: 5 TABLET ORAL at 06:35

## 2025-06-12 RX ADMIN — PIPERACILLIN SODIUM AND TAZOBACTAM SODIUM 3.38 G: 3; .375 INJECTION, SOLUTION INTRAVENOUS at 13:58

## 2025-06-12 ASSESSMENT — COGNITIVE AND FUNCTIONAL STATUS - GENERAL
DAILY ACTIVITIY SCORE: 19
TOILETING: A LITTLE
DRESSING REGULAR LOWER BODY CLOTHING: A LITTLE
DRESSING REGULAR UPPER BODY CLOTHING: A LITTLE
STANDING UP FROM CHAIR USING ARMS: A LITTLE
HELP NEEDED FOR BATHING: A LITTLE
MOVING TO AND FROM BED TO CHAIR: A LITTLE
MOBILITY SCORE: 19
PERSONAL GROOMING: A LITTLE
CLIMB 3 TO 5 STEPS WITH RAILING: A LOT
DRESSING REGULAR LOWER BODY CLOTHING: A LITTLE
HELP NEEDED FOR BATHING: A LITTLE
DAILY ACTIVITIY SCORE: 19
CLIMB 3 TO 5 STEPS WITH RAILING: A LOT
STANDING UP FROM CHAIR USING ARMS: A LITTLE
DRESSING REGULAR UPPER BODY CLOTHING: A LITTLE
MOVING TO AND FROM BED TO CHAIR: A LITTLE
WALKING IN HOSPITAL ROOM: A LITTLE
MOBILITY SCORE: 19
WALKING IN HOSPITAL ROOM: A LITTLE
PERSONAL GROOMING: A LITTLE
TOILETING: A LITTLE

## 2025-06-12 ASSESSMENT — PAIN - FUNCTIONAL ASSESSMENT
PAIN_FUNCTIONAL_ASSESSMENT: 0-10

## 2025-06-12 ASSESSMENT — PAIN SCALES - GENERAL
PAINLEVEL_OUTOF10: 6
PAINLEVEL_OUTOF10: 0 - NO PAIN
PAINLEVEL_OUTOF10: 3
PAINLEVEL_OUTOF10: 10 - WORST POSSIBLE PAIN
PAINLEVEL_OUTOF10: 10 - WORST POSSIBLE PAIN
PAINLEVEL_OUTOF10: 4

## 2025-06-12 ASSESSMENT — PAIN SCALES - WONG BAKER: WONGBAKER_NUMERICALRESPONSE: HURTS WORST

## 2025-06-12 ASSESSMENT — PAIN DESCRIPTION - DESCRIPTORS
DESCRIPTORS: ACHING
DESCRIPTORS: HEADACHE;ACHING
DESCRIPTORS: ACHING

## 2025-06-12 NOTE — PROGRESS NOTES
Rapid Response Nurse Note: RADAR alert: 7    Pager time: 100  Arrival time: 101  Event end time: 117  Location: [place] [id number]  [x] Triage by phone or secure messaging    Rapid response initiated by:  [] Rapid response RN [] Family [] Nursing Supervisor [] Physician   [x] RADAR auto page [] Sepsis auto-page [] RN [] RT   [] NP/PA [] Other:     Primary reason for call:   [] BAT [] New CPAP/BiPAP [] Bleeding [] Change in mental status   [] Chest pain [] Code blue [] FiO2 >/= 50% [] HR </= 40 bpm   [] HR >/= 130 bpm [] Hyperglycemia [] Hypoglycemia [x] RADAR    [] RR </= 8 bpm [] RR >/= 30 bpm [] SBP </= 90 mmHg [] SpO2 < 90%   [] Seizure [] Sepsis [] Shortness of breath  [] Staff concern: see comments     Initial VS and/or RADAR VS: T 36.6 °C; HR 74; RR 22; /70; SPO2 91%.    Providers present at bedside (if applicable): NA    Name of ICU Provider contacted (if applicable): NA    Interventions:  [x] None [] ABG/VBG [] Assist w/ICU transfer [] BAT paged    [] Bag mask [] Blood [] Cardioversion [] Code Blue   [] Code blue for intubation [] Code status changed [] Chest x-ray [] EKG   [] IV fluid/bolus [] KUB x-ray [] Labs/cultures [] Medication   [] Nebulizer treatment [] NIPPV (CPAP/BiPAP) [] Oxygen [] Oral airway   [] Peripheral IV [] Palliative care consult [] CT/MRI [] Sepsis protocol    [] Suctioned [] Other:     Outcome:  [] Coded and  [] Code blue for intubation [] Coded and transferred to ICU []  on division   [x] Remained on division (no change) [] Remained on division + additional monitoring [] Remained in ED [] Transferred to ED   [] Transferred to ICU [] Transferred to inpatient status [] Transferred for interventions (procedure) [] Transferred to ICU stepdown    [] Transferred to surgery [] Transferred to telemetry [] Sepsis protocol [] STEMI protocol   [] Stroke protocol [x] Bedside nurse instructed to page rapid response for any concerns or acute change in condition/VS     Additional  Comments: Reviewed above RADAR VS with bedside RN.  VS within patient's current trends.  Patient denied pain, shortness of breath, dizziness or lightheadedness.  No interventions by rapid response team indicated at this time.  Staff to page rapid response for any concerns or acute change in condition/VS.

## 2025-06-12 NOTE — SIGNIFICANT EVENT
Rapid Response Respiratory Therapy Note: RADAR    Start Time: 0101  End Time: 0137  Location: 01    Initial Vital Signs and O2: HR: 74    RR: 22     Type of O2: Airvo 60L/85%    SpO2: 91%  Breath Sounds:      Respiratory Concerns:  []  None []  Increased WOB []  Shallow respirations []  Irregular Respirations   []  Tachypnea []  Bradypnea [x]  Oxygen desaturation []  Cyanosis   []  Poor Secretion Clearance []  Impaired/Weak Cough []  Copious Secretions []  Thick Secretions   []  Inability to Protect Airway []  Apnea []  Respiratory Arrest []  Shortness of Breath   [] Hemodynamic Instability []  Asymmetric Chest Rise []  Adventitious Breath Sounds []  Abnormal CXR   []  Aspiration []  Other:       Interventions:  []  None []  ABG/VBG  []  Assist w/ICU transfer []  Bag-mask ventilation   []  Bronchial Hygiene []  Trach care/Suction []  ETCO2 []  CPR   []  Assist Intubation []  Venti Mask/NRB []  Chest x-ray []  CT/MRI transport    []  High Flow Therapy []  Igel  []  Nasal Airway []  NT Suctioning   []  Nebulizer treatment []  NIPPV (CPAP/BiPAP) [x]  Oxygen  Type: Airvo  FiO2: 50%  Liter Flow: 40L  SpO2: 97% []  Oral Airway   []  Oral Suctioning [x]  Other: Repositioning     Outcome:  [x]  Maintain on Division []  Transferred to ICU []  Transferred to ED [x]  Bedside nurse instructed to page Rapid Response for any concerns or acute change in condition/VS     Final Vital Signs and O2: HR: 72   RR: 26   Type of O2: Airvo 40L/50%    SpO2: 97%   Breath Sounds:     Additional Comments:   SpO2 goal 90% or greater - adjustments to treatment made by Pulm team

## 2025-06-12 NOTE — SIGNIFICANT EVENT
RRT called for patient desaturations. Patient requiring 15L of O2. RRT did BPH volara with the patient. Patient had copious amount of secretions. Patient placed on AIRVO 60L 85% with 91-93% saturations.

## 2025-06-12 NOTE — CARE PLAN
The patient's goals for the shift include      The clinical goals for the shift include Pt will remain HDS and maintain SpO2 > 90% throughout shift      Problem: Safety - Adult  Goal: Free from fall injury  Outcome: Progressing  Flowsheets (Taken 6/12/2025 0340)  Free from fall injury:   Instruct family/caregiver on patient safety   Based on caregiver fall risk screen, instruct family/caregiver to ask for assistance with transferring infant if caregiver noted to have fall risk factors  Note: Educated fall prevention measures including bed locked lowered, 3/4 side rails elevated, bed exit active, non-slip socks, intentional rounding, personal items and call light within reach.      Problem: Skin  Goal: Decreased wound size/increased tissue granulation at next dressing change  Outcome: Progressing  Goal: Participates in plan/prevention/treatment measures  Outcome: Progressing  Flowsheets (Taken 6/12/2025 0340)  Participates in plan/prevention/treatment measures: Elevate heels  Goal: Prevent/manage excess moisture  Outcome: Progressing  Goal: Prevent/minimize sheer/friction injuries  Outcome: Progressing  Flowsheets (Taken 6/12/2025 0340)  Prevent/minimize sheer/friction injuries:   Complete micro-shifts as needed if patient unable. Adjust patient position to relieve pressure points, not a full turn   Increase activity/out of bed for meals   Use pull sheet   Utilize specialty bed per algorithm   HOB 30 degrees or less   Turn/reposition every 2 hours/use positioning/transfer devices  Note: Encourage or assist reposition q2hr   Goal: Promote/optimize nutrition  Outcome: Progressing  Flowsheets (Taken 6/12/2025 0340)  Promote/optimize nutrition:   Monitor/record intake including meals   Consume > 50% meals/supplements  Goal: Promote skin healing  Outcome: Progressing  Flowsheets (Taken 6/12/2025 0340)  Promote skin healing:   Assess skin/pad under line(s)/device(s)   Protective dressings over bony prominences    Turn/reposition every 2 hours/use positioning/transfer devices   Ensure correct size (line/device) and apply per  instructions   Rotate device position/do not position patient on device

## 2025-06-12 NOTE — SIGNIFICANT EVENT
RRT called for patient desaturations. Patient desaturating to 83% on AIRVO 30L and 40%. Patient increased to 60L and 50% on AIRVO with a mask since he complained for being congested and short of breath.

## 2025-06-12 NOTE — PROGRESS NOTES
"Medical Intensive Care Stepdown Unit - Daily Progress Note   Subjective    Martínez Neri is a 64 y.o. year old male patient admitted on 6/7/2025 with Acute hypoxic resp failure 2/2 multifocal PNA    Interval History:      Over night episodes of acute desaturation with increasing O2 from 4L to eventually Airvo 60L, 70%.  CXR with worsening R effusion.  Increased bronchial hygiene - Volera, vest and 3% saline nebs.  Made NPO for potential bronch this am    Meds    Scheduled medications  Scheduled Medications[1]  Continuous medications  Continuous Medications[2]  PRN medications  PRN Medications[3]     Objective    Blood pressure 122/68, pulse 69, temperature 36.1 °C (97 °F), resp. rate 22, height 1.754 m (5' 9.06\"), weight 68.4 kg (150 lb 12.8 oz), SpO2 97%.     Constitutional: pt in NAD, alert and cooperative  Eyes: PERRL, EOMI, no icterus   ENMT: mucous membranes moist, no apparent injury, no lesions seen  Head/Neck: Neck supple, no apparent injury  Respiratory/Thorax: Lungs  david rhonchi, moist productie cough, on airvo 60L/70% in am   Cardiovascular: Regular, rate and rhythm, no murmurs, normal S1 and S2  Gastrointestinal: Nondistended, soft, non-tender, BS present x 4  Musculoskeletal: ROM intact, no joint swelling, normal strength  Extremities: normal extremities, no edema, contusions or wounds  Neurological: alert and oriented x 3, speech clear, follows commands appropriately, motor 5/5 throughout  Skin: Warm and dry, no lesions, no rashes      Intake/Output Summary (Last 24 hours) at 6/12/2025 1455  Last data filed at 6/12/2025 0355  Gross per 24 hour   Intake 600 ml   Output 575 ml   Net 25 ml     Labs:   Results from last 72 hours   Lab Units 06/12/25  0417 06/11/25  0525 06/10/25  0459   SODIUM mmol/L 136 136 138   POTASSIUM mmol/L 3.8 3.8 4.7   CHLORIDE mmol/L 99 101 103   CO2 mmol/L 30 26 26   BUN mg/dL 11 11 9   CREATININE mg/dL 0.57 0.65 0.51   GLUCOSE mg/dL 115* 108* 88   CALCIUM mg/dL 8.8 8.1* 7.8* "   ANION GAP mmol/L 11 13 14   EGFR mL/min/1.73m*2 >90 >90 >90   PHOSPHORUS mg/dL 3.3 3.1 2.8      Results from last 72 hours   Lab Units 06/12/25  0417 06/11/25  0525 06/10/25  0459   WBC AUTO x10*3/uL 8.9 7.1 7.2   HEMOGLOBIN g/dL 9.2* 9.1* 8.8*   HEMATOCRIT % 28.6* 29.4* 26.7*   PLATELETS AUTO x10*3/uL 347 293 176   NEUTROS PCT AUTO % 74.3  --  65.1   LYMPHO PCT MAN %  --  7.6  --    LYMPHS PCT AUTO % 12.4  --  14.3   MONO PCT MAN %  --  11.9  --    MONOS PCT AUTO % 9.5  --  13.8   EOSINO PCT MAN %  --  0.9  --    EOS PCT AUTO % 0.6  --  1.1        Summary of key imaging results     CT chest wo IV contrast  6/12/2025  1. Acute near complete collapse of the right upper and right middle lobes with minimal aeration which is most likely due to mucous plugging. There are also superimposed consolidation and right pleural effusion, suggestive of aspiration and/or pneumonia. Right lung injury pattern may also be seen with radiation recall pneumonitis if patient was previously irradiated along those regions.   2. Similar collapse of medial left upper lobe causing focal atelectasis with surrounding reticulations, likely representing posttreatment related changes.   3. The previously visualized solid and cavitary nodules are suboptimally evaluated, however appear grossly stable. The right lower lobe cavitary lesion is slightly increased in prominence likely due to adjacent mass effect/collapse however recommend dedicated follow-up CT upon possible resolution.   4. Remaining nonacute findings as above.        XR chest 1 view: 6/12/2025    1.  Moderate clearing right mid lower lung field infiltrate. Left mid lung infiltrate suggestive of atelectasis, unchanged.   2. Small to moderate bilateral pleural effusions persist.        XR chest 1 view 6/9/2025  1. Worsening multifocal pneumonia plus/minus atelectasis..         US renal complete  6/5/2025  The right kidney measures 11.0 cm. There is a 1.7 cm mid to lower pole right  renal cyst. The left kidney measures 11.9 cm  .     No renal stones are identified.  The renal cortical thickness and echogenicity is normal.  No hydronephrosis is identified.   Urinary bladder is nonspecific in appearance with no stones or masses identified.     Right renal cyst.       Transthoracic Echo (TTE) Complete: 6/5/2025   1. Left ventricular ejection fraction is normal by visual estimate at 55-60%.    2. Spectral Doppler shows a Grade I (impaired relaxation pattern) of left ventricular diastolic filling with normal left atrial filling pressure.    3. There is normal right ventricular global systolic function.        Assessment and Plan     Assessment: Martínez Neri 64 y.o. 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 Admitted on 6/7/2025 with Acute hypoxic resp failure 2/2 multifocal PNA    Restraints: no    Summary for 06/12/25  :  Increased O2 needs over night with righ lung collapse on imaging   NPO since midnight for possible bronch-> holding off on bronch since received Eliquis in am, holding now, possibly bronch on 6/13  F/up sputum cx   Abx changed to Zosyn  Discussed code status with patient, he wants to be DNR/DNI (will be ok for intubation for bronch - so will reverse code status prior to bronchoscopy)     Plan:  NEUROLOGY/PSYCH:  Dx: hx of ICD brain aneurysm s/p coiling, Seizures  Management:  Home Meds: Depakote  mg BID, vimpat 200 mg BID, Remeron 7.5 mg at bedtime  Continue home meds  Melatonin for sleep  PRN Tylenol or Oxy for pain  Continue Seroquel 12.5 mg QHS   OOB to chair.  No PT/OT needs    CARDIOVASCULAR:  Dx: hx of HTN, HLP. Afib/RVR at OSH, converted to NSR with IV Metop x1.  Elevated trop, Type II MI demand  ischemia  6/5 TTE: LVEF 55-60%, Diastolic dysfunction, Inferior vena cava mildly dilated with IVC ins collapse >50%  Management:  HDS  Home Meds: ASA 81 mg daily, Atorvastatin 40 mg daily  Continue Atorvastatin 40 mg daily  ASA has been held 2/2 anemia, was restarted, will hold for bronch 6/13    PULMONARY:  Dx: history of NSCLC of THOMAS s/p  chemotherapy and radiation in remission. COPD.  Former Smoker  PFTs (10/23): FVC 88, FEV1 40, FEV1/FVC 15 indicating severe airway obstruction  July '23 status post bronch/EBUS biopsies showed left upper lobe with invasive moderately differentiated non-small cell carcinoma  Completed chemo/radiation January '24  Currently on surveillance  CT chest from 6/12 with near complete right lung collapse 2/2 mucous plugging   Management:  Home Meds: Proventil HFA PRN, fluticasone 50 mcg nasal daily, DuoNebs PRN, Nicoderm, Spiriva inhl  S/p 20 mg Furosemide x1 on 6/7, 40 mg IV 6/11  O2 increased to 15 L on 6/11 with atelectasis on CXR from 6/10   Continue Budesonide, DuoNebs  Added Benny q4 hrs on 6/10   Added vest and 3% saline nebs on 6/12 d/t worsening mucous plugging  Consider bronch on 6/13 (after off Eliquis x24 hrs)   Will need CT chest in 4 weeks-- has one scheduled for July & will follow up with Dr Ng after    RENAL/GENITOURINARY:  Dx: SERINA (resolved)  Management:  Daily RFP    GASTROENTEROLOGY:  Dx: No acute issues  Management:  Diet: Regular  Bowel Regimen: Docusate BID  PPI    ENDOCRINOLOGY:  Dx: No acute issues  Management:  A1C: 5.5 (6/2025)    HEMATOLOGY:  Dx: Normocytic Anemia, Acute Thrombocytopenia- resolving (d/dx: drug induced, sepsis related, ITP), hx of 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 Last transfusion 6/7  No signs of bleeding  Management:  Thrombocytopenia work up including PF4 (neg), Hepatitis panel, HIV, peripheral smear  Continue Apixaban- holding since 6/12  (pm dose ) for potential bronch    MUSCULOSKELETAL/ SKIN:  Dx:  No acute issues    INFECTIOUS DISEASE:  Dx: Multifocal PNA, sepsis (resolved)  On admit to OSH, tachycardic, tachypnea, elevated lactate, WBC 12-16 & SERINA  Management:  Afebrile, no leukocytosis  MICRO: 6/5 MRSA PCR neg, 6/5 Sputum cx salivary contamination, 6/6 Sputum cx pending  6/5 Strep pneumoniae Ag and Legionella Ag neg  6/6 Procalc 12.79  Antimicrobials: Azithro (6/4), CTX (6/4-6/5), zosyn (6/5- 6/9), Vanc (6/5- 6/8), PO Levaquin (6/9- 6/11, Azithro 6/11, Augmentin 6/11- 6/12,Changed back to Zosyn on 6/12-    ICU/SDU Check List                  FEN  Fluids: PRN  Electrolytes: PRN  Nutrition: Regular (npo after midnight)  Prophylaxis:  DVT ppx: Apixaban (on hold)   GI ppx: PPI  Hardware:  Catheter: None  Drains: None  Lines: PIV x2  Social:  Code: DNR and No Intubation    HPOA: Keri Neri [phone]    Disposition: continue SDU care    Discharge Planning: Home when medically stable.  Pt declines SNF.  Agreeable to outpatient PT if needed, will provide script at discharge (referral placed in Epic).  Walking pulse O2 completed, home O2 orders placed for discharge.      ARIC Gaona-CNP   06/12/25 at 2:55 PM     Pt discussed with Dr. Noble seen and examined. All labs, VS and previous plan of care reviewed.  I spent 45 minutes in the professional and overall care of this patient.      Disclaimer: Documentation completed with the information available at the time of input. The times in the chart may not be reflective of actual patient care times, interventions, or procedures. Documentation occurs after the physical care of the patient.            [1] [Held by provider] apixaban, 5 mg, oral, BID  aspirin, 81 mg, oral, Daily  budesonide, 0.5 mg, nebulization, BID  divalproex, 500 mg, oral, BID  docusate sodium, 100 mg, oral, BID  fluticasone, 2 spray, Each Nostril, Daily  ipratropium-albuteroL, 3 mL, nebulization, q4h while awake  lacosamide, 200 mg, oral, BID  melatonin, 3 mg, oral, Nightly  mirtazapine,  7.5 mg, oral, Nightly  oxygen, , inhalation, Continuous - Inhalation  pantoprazole, 40 mg, oral, Daily before breakfast  piperacillin-tazobactam, 3.375 g, intravenous, q6h  QUEtiapine, 12.5 mg, oral, Nightly  sodium chloride, 3 mL, nebulization, q4h while awake     [2]    [3] PRN medications: acetaminophen, benzocaine-menthol, benzonatate, dextromethorphan-guaifenesin, dextrose, dextrose, glucagon, glucagon, guaiFENesin, ipratropium-albuteroL, nicotine polacrilex, ondansetron, oxyCODONE, polyethylene glycol, sodium chloride

## 2025-06-12 NOTE — SIGNIFICANT EVENT
Patient with acute desaturations requiring increased oxygen requirements 4L>>6L>15L>Airvo 60L/70% with SPO2 87- 91%.    CXR and ABG ordered.  Discussed with Dr. Kay as requiring max Airvo.  Acute hypoxic respiratory failure likely iso mucus plugging.  Patient repositioned on left side, percussion vest along with 3% hypertonic saline INH started with improvement; able to wean to Airvo 30L/50%.  Patient, wife, RN and RT updated on POC; all questions answered.   Will continue to monitor in SDU; c/w aggressive BPH.     VS: T: 36.6, HR 74, BP: 139/70, RR 28, SPO2 88% > 97% on Airvo    Exam:  Neuro: A&O x 3, agitated @ intervals, uncooperative @ times  CV: RRR, + pulses  Pulm:  LS: /b/l rhonchi; diminished RLL, tachypneic, moist cough on 15LHF   GI: Abd. Soft, NTND, + BS  Ext: generalized edema     A/P:  - Wean Airvo as constantino for goal SPO2 >90%  - CXR: Redemonstration of consolidations w/I lingula and right  middle lobe; c/f mucous plugging  - VB.48/41/55/30; lactate 0.9  - stop Volera Q6H; add perfusion vest Q6H  - add 3% hypertonic saline Q6H   - ,make NPO if Airvo >50L  - hold add'l Lasix as net - 1.5L  - Encourage patient to lay on left side in bed  - obtain sputum culture     I spent >20 minutes in the professional and overall care of this patient.

## 2025-06-12 NOTE — CARE PLAN
The patient's goals for the shift include      The clinical goals for the shift include Pt will remain HDS and maintain SpO2 > 90% throughout shift      Problem: Pain - Adult  Goal: Verbalizes/displays adequate comfort level or baseline comfort level  Outcome: Progressing     Problem: Safety - Adult  Goal: Free from fall injury  Outcome: Progressing     Problem: Discharge Planning  Goal: Discharge to home or other facility with appropriate resources  Outcome: Progressing     Problem: Chronic Conditions and Co-morbidities  Goal: Patient's chronic conditions and co-morbidity symptoms are monitored and maintained or improved  Outcome: Progressing     Problem: Nutrition  Goal: Nutrient intake appropriate for maintaining nutritional needs  Outcome: Progressing     Problem: Skin  Goal: Decreased wound size/increased tissue granulation at next dressing change  Outcome: Progressing  Goal: Participates in plan/prevention/treatment measures  Outcome: Progressing  Goal: Prevent/manage excess moisture  Outcome: Progressing  Goal: Prevent/minimize sheer/friction injuries  Outcome: Progressing  Goal: Promote/optimize nutrition  Outcome: Progressing  Goal: Promote skin healing  Outcome: Progressing

## 2025-06-13 ENCOUNTER — APPOINTMENT (OUTPATIENT)
Dept: RADIOLOGY | Facility: HOSPITAL | Age: 65
DRG: 177 | End: 2025-06-13
Payer: COMMERCIAL

## 2025-06-13 ENCOUNTER — ANESTHESIA (OUTPATIENT)
Dept: GASTROENTEROLOGY | Facility: HOSPITAL | Age: 65
End: 2025-06-13
Payer: COMMERCIAL

## 2025-06-13 ENCOUNTER — APPOINTMENT (OUTPATIENT)
Dept: GASTROENTEROLOGY | Facility: HOSPITAL | Age: 65
DRG: 177 | End: 2025-06-13
Payer: COMMERCIAL

## 2025-06-13 ENCOUNTER — ANESTHESIA EVENT (OUTPATIENT)
Dept: GASTROENTEROLOGY | Facility: HOSPITAL | Age: 65
End: 2025-06-13
Payer: COMMERCIAL

## 2025-06-13 LAB
ALBUMIN SERPL BCP-MCNC: 2.8 G/DL (ref 3.4–5)
ANION GAP SERPL CALC-SCNC: 13 MMOL/L (ref 10–20)
APTT PPP: 31 SECONDS (ref 26–36)
APTT PPP: 31 SECONDS (ref 26–36)
BASOPHILS # BLD AUTO: 0.05 X10*3/UL (ref 0–0.1)
BASOPHILS NFR BLD AUTO: 0.6 %
BUN SERPL-MCNC: 8 MG/DL (ref 6–23)
CALCIUM SERPL-MCNC: 8.1 MG/DL (ref 8.6–10.6)
CHLORIDE SERPL-SCNC: 101 MMOL/L (ref 98–107)
CLARITY FLD: ABNORMAL
CO2 SERPL-SCNC: 26 MMOL/L (ref 21–32)
COLOR FLD: COLORLESS
CREAT SERPL-MCNC: 0.65 MG/DL (ref 0.5–1.3)
EGFRCR SERPLBLD CKD-EPI 2021: >90 ML/MIN/1.73M*2
EOSINOPHIL # BLD AUTO: 0.11 X10*3/UL (ref 0–0.7)
EOSINOPHIL NFR BLD AUTO: 1.3 %
EOSINOPHIL NFR FLD MANUAL: NORMAL %
ERYTHROCYTE [DISTWIDTH] IN BLOOD BY AUTOMATED COUNT: 18 % (ref 11.5–14.5)
ERYTHROCYTE [DISTWIDTH] IN BLOOD BY AUTOMATED COUNT: 18.4 % (ref 11.5–14.5)
GLUCOSE SERPL-MCNC: 99 MG/DL (ref 74–99)
HCT VFR BLD AUTO: 28.4 % (ref 41–52)
HCT VFR BLD AUTO: 30.1 % (ref 41–52)
HGB BLD-MCNC: 9 G/DL (ref 13.5–17.5)
HGB BLD-MCNC: 9.1 G/DL (ref 13.5–17.5)
IMM GRANULOCYTES # BLD AUTO: 0.13 X10*3/UL (ref 0–0.7)
IMM GRANULOCYTES NFR BLD AUTO: 1.5 % (ref 0–0.9)
INR PPP: 1.1 (ref 0.9–1.1)
LYMPHOCYTES # BLD AUTO: 0.84 X10*3/UL (ref 1.2–4.8)
LYMPHOCYTES NFR BLD AUTO: 9.9 %
LYMPHOCYTES NFR FLD MANUAL: 6 %
MAGNESIUM SERPL-MCNC: 2.08 MG/DL (ref 1.6–2.4)
MCH RBC QN AUTO: 26.3 PG (ref 26–34)
MCH RBC QN AUTO: 27.4 PG (ref 26–34)
MCHC RBC AUTO-ENTMCNC: 29.9 G/DL (ref 32–36)
MCHC RBC AUTO-ENTMCNC: 32 G/DL (ref 32–36)
MCV RBC AUTO: 86 FL (ref 80–100)
MCV RBC AUTO: 88 FL (ref 80–100)
MONOCYTES # BLD AUTO: 0.58 X10*3/UL (ref 0.1–1)
MONOCYTES NFR BLD AUTO: 6.8 %
MONOS+MACROS NFR FLD MANUAL: 8 %
NEUTROPHILS # BLD AUTO: 6.78 X10*3/UL (ref 1.2–7.7)
NEUTROPHILS NFR BLD AUTO: 79.9 %
NEUTROPHILS NFR FLD MANUAL: 86 %
NRBC BLD-RTO: 0 /100 WBCS (ref 0–0)
NRBC BLD-RTO: 0 /100 WBCS (ref 0–0)
PATH REVIEW-CELL CT,FLUID: NORMAL
PHOSPHATE SERPL-MCNC: 3 MG/DL (ref 2.5–4.9)
PLATELET # BLD AUTO: 402 X10*3/UL (ref 150–450)
PLATELET # BLD AUTO: 456 X10*3/UL (ref 150–450)
POTASSIUM SERPL-SCNC: 3.9 MMOL/L (ref 3.5–5.3)
PROTHROMBIN TIME: 12.7 SECONDS (ref 9.8–12.4)
RBC # BLD AUTO: 3.32 X10*6/UL (ref 4.5–5.9)
RBC # BLD AUTO: 3.42 X10*6/UL (ref 4.5–5.9)
RBC # FLD AUTO: <2000 /UL
SODIUM SERPL-SCNC: 136 MMOL/L (ref 136–145)
TOTAL CELLS COUNTED FLD: 100
UFH PPP CHRO-ACNC: 0.2 IU/ML (ref ?–1.1)
WBC # BLD AUTO: 11 X10*3/UL (ref 4.4–11.3)
WBC # BLD AUTO: 8.5 X10*3/UL (ref 4.4–11.3)
WBC # FLD AUTO: 1128 /UL

## 2025-06-13 PROCEDURE — 0B9D8ZX DRAINAGE OF RIGHT MIDDLE LUNG LOBE, VIA NATURAL OR ARTIFICIAL OPENING ENDOSCOPIC, DIAGNOSTIC: ICD-10-PCS | Performed by: INTERNAL MEDICINE

## 2025-06-13 PROCEDURE — 7100000002 HC RECOVERY ROOM TIME - EACH INCREMENTAL 1 MINUTE

## 2025-06-13 PROCEDURE — 87205 SMEAR GRAM STAIN: CPT | Performed by: INTERNAL MEDICINE

## 2025-06-13 PROCEDURE — 87305 ASPERGILLUS AG IA: CPT | Performed by: INTERNAL MEDICINE

## 2025-06-13 PROCEDURE — 36415 COLL VENOUS BLD VENIPUNCTURE: CPT | Performed by: NURSE PRACTITIONER

## 2025-06-13 PROCEDURE — 88312 SPECIAL STAINS GROUP 1: CPT | Performed by: PATHOLOGY

## 2025-06-13 PROCEDURE — 2500000004 HC RX 250 GENERAL PHARMACY W/ HCPCS (ALT 636 FOR OP/ED): Performed by: NURSE PRACTITIONER

## 2025-06-13 PROCEDURE — 1100000001 HC PRIVATE ROOM DAILY

## 2025-06-13 PROCEDURE — 94762 N-INVAS EAR/PLS OXIMTRY CONT: CPT

## 2025-06-13 PROCEDURE — 87533 HHV-6 DNA QUANT: CPT | Performed by: INTERNAL MEDICINE

## 2025-06-13 PROCEDURE — 83735 ASSAY OF MAGNESIUM: CPT | Performed by: NURSE PRACTITIONER

## 2025-06-13 PROCEDURE — 3700000002 HC GENERAL ANESTHESIA TIME - EACH INCREMENTAL 1 MINUTE

## 2025-06-13 PROCEDURE — 87449 NOS EACH ORGANISM AG IA: CPT | Performed by: INTERNAL MEDICINE

## 2025-06-13 PROCEDURE — 2500000005 HC RX 250 GENERAL PHARMACY W/O HCPCS: Performed by: STUDENT IN AN ORGANIZED HEALTH CARE EDUCATION/TRAINING PROGRAM

## 2025-06-13 PROCEDURE — 87801 DETECT AGNT MULT DNA AMPLI: CPT | Performed by: INTERNAL MEDICINE

## 2025-06-13 PROCEDURE — 88112 CYTOPATH CELL ENHANCE TECH: CPT | Mod: TC,MCY | Performed by: INTERNAL MEDICINE

## 2025-06-13 PROCEDURE — 85730 THROMBOPLASTIN TIME PARTIAL: CPT | Performed by: NURSE PRACTITIONER

## 2025-06-13 PROCEDURE — 7100000009 HC PHASE TWO TIME - INITIAL BASE CHARGE

## 2025-06-13 PROCEDURE — 87632 RESP VIRUS 6-11 TARGETS: CPT | Performed by: INTERNAL MEDICINE

## 2025-06-13 PROCEDURE — 89051 BODY FLUID CELL COUNT: CPT | Performed by: INTERNAL MEDICINE

## 2025-06-13 PROCEDURE — 99233 SBSQ HOSP IP/OBS HIGH 50: CPT | Performed by: STUDENT IN AN ORGANIZED HEALTH CARE EDUCATION/TRAINING PROGRAM

## 2025-06-13 PROCEDURE — 2500000002 HC RX 250 W HCPCS SELF ADMINISTERED DRUGS (ALT 637 FOR MEDICARE OP, ALT 636 FOR OP/ED): Performed by: NURSE PRACTITIONER

## 2025-06-13 PROCEDURE — 87799 DETECT AGENT NOS DNA QUANT: CPT | Performed by: INTERNAL MEDICINE

## 2025-06-13 PROCEDURE — 80069 RENAL FUNCTION PANEL: CPT | Performed by: NURSE PRACTITIONER

## 2025-06-13 PROCEDURE — 2500000002 HC RX 250 W HCPCS SELF ADMINISTERED DRUGS (ALT 637 FOR MEDICARE OP, ALT 636 FOR OP/ED): Performed by: ANESTHESIOLOGY

## 2025-06-13 PROCEDURE — 85025 COMPLETE CBC W/AUTO DIFF WBC: CPT | Performed by: NURSE PRACTITIONER

## 2025-06-13 PROCEDURE — 71045 X-RAY EXAM CHEST 1 VIEW: CPT | Performed by: RADIOLOGY

## 2025-06-13 PROCEDURE — 31645 BRNCHSC W/THER ASPIR 1ST: CPT | Performed by: INTERNAL MEDICINE

## 2025-06-13 PROCEDURE — 31622 DX BRONCHOSCOPE/WASH: CPT | Performed by: INTERNAL MEDICINE

## 2025-06-13 PROCEDURE — 88112 CYTOPATH CELL ENHANCE TECH: CPT | Performed by: PATHOLOGY

## 2025-06-13 PROCEDURE — 71045 X-RAY EXAM CHEST 1 VIEW: CPT

## 2025-06-13 PROCEDURE — 85027 COMPLETE CBC AUTOMATED: CPT | Performed by: NURSE PRACTITIONER

## 2025-06-13 PROCEDURE — 2500000005 HC RX 250 GENERAL PHARMACY W/O HCPCS: Performed by: ANESTHESIOLOGY

## 2025-06-13 PROCEDURE — 2500000005 HC RX 250 GENERAL PHARMACY W/O HCPCS: Performed by: NURSE PRACTITIONER

## 2025-06-13 PROCEDURE — 3700000001 HC GENERAL ANESTHESIA TIME - INITIAL BASE CHARGE

## 2025-06-13 PROCEDURE — 7100000001 HC RECOVERY ROOM TIME - INITIAL BASE CHARGE

## 2025-06-13 PROCEDURE — 0BC48ZZ EXTIRPATION OF MATTER FROM RIGHT UPPER LOBE BRONCHUS, VIA NATURAL OR ARTIFICIAL OPENING ENDOSCOPIC: ICD-10-PCS | Performed by: INTERNAL MEDICINE

## 2025-06-13 PROCEDURE — 7100000010 HC PHASE TWO TIME - EACH INCREMENTAL 1 MINUTE

## 2025-06-13 PROCEDURE — 31624 DX BRONCHOSCOPE/LAVAGE: CPT | Performed by: INTERNAL MEDICINE

## 2025-06-13 PROCEDURE — 85610 PROTHROMBIN TIME: CPT | Performed by: NURSE PRACTITIONER

## 2025-06-13 PROCEDURE — 87385 HISTOPLASMA CAPSUL AG IA: CPT | Performed by: INTERNAL MEDICINE

## 2025-06-13 PROCEDURE — 2500000004 HC RX 250 GENERAL PHARMACY W/ HCPCS (ALT 636 FOR OP/ED)

## 2025-06-13 PROCEDURE — P9045 ALBUMIN (HUMAN), 5%, 250 ML: HCPCS | Mod: JZ,TB

## 2025-06-13 PROCEDURE — 87581 M.PNEUMON DNA AMP PROBE: CPT | Performed by: INTERNAL MEDICINE

## 2025-06-13 PROCEDURE — 2500000001 HC RX 250 WO HCPCS SELF ADMINISTERED DRUGS (ALT 637 FOR MEDICARE OP): Performed by: NURSE PRACTITIONER

## 2025-06-13 PROCEDURE — 85520 HEPARIN ASSAY: CPT | Performed by: NURSE PRACTITIONER

## 2025-06-13 PROCEDURE — 88104 CYTOPATH FL NONGYN SMEARS: CPT | Performed by: INTERNAL MEDICINE

## 2025-06-13 PROCEDURE — 87102 FUNGUS ISOLATION CULTURE: CPT | Performed by: INTERNAL MEDICINE

## 2025-06-13 PROCEDURE — 87206 SMEAR FLUORESCENT/ACID STAI: CPT | Performed by: INTERNAL MEDICINE

## 2025-06-13 PROCEDURE — 0BC58ZZ EXTIRPATION OF MATTER FROM RIGHT MIDDLE LOBE BRONCHUS, VIA NATURAL OR ARTIFICIAL OPENING ENDOSCOPIC: ICD-10-PCS | Performed by: INTERNAL MEDICINE

## 2025-06-13 PROCEDURE — 94640 AIRWAY INHALATION TREATMENT: CPT

## 2025-06-13 RX ORDER — LIDOCAINE IN NACL,ISO-OSMOT/PF 30 MG/3 ML
0.1 SYRINGE (ML) INJECTION ONCE
Status: CANCELLED | OUTPATIENT
Start: 2025-06-13 | End: 2025-06-13

## 2025-06-13 RX ORDER — SODIUM CHLORIDE, SODIUM LACTATE, POTASSIUM CHLORIDE, CALCIUM CHLORIDE 600; 310; 30; 20 MG/100ML; MG/100ML; MG/100ML; MG/100ML
125 INJECTION, SOLUTION INTRAVENOUS CONTINUOUS
Status: CANCELLED | OUTPATIENT
Start: 2025-06-13 | End: 2025-06-17

## 2025-06-13 RX ORDER — ONDANSETRON HYDROCHLORIDE 2 MG/ML
4 INJECTION, SOLUTION INTRAVENOUS ONCE AS NEEDED
Status: DISCONTINUED | OUTPATIENT
Start: 2025-06-13 | End: 2025-06-14 | Stop reason: HOSPADM

## 2025-06-13 RX ORDER — ALBUTEROL SULFATE 0.83 MG/ML
2.5 SOLUTION RESPIRATORY (INHALATION) ONCE AS NEEDED
Status: COMPLETED | OUTPATIENT
Start: 2025-06-13 | End: 2025-06-13

## 2025-06-13 RX ORDER — SODIUM CHLORIDE, SODIUM LACTATE, POTASSIUM CHLORIDE, CALCIUM CHLORIDE 600; 310; 30; 20 MG/100ML; MG/100ML; MG/100ML; MG/100ML
INJECTION, SOLUTION INTRAVENOUS CONTINUOUS PRN
Status: DISCONTINUED | OUTPATIENT
Start: 2025-06-13 | End: 2025-06-13

## 2025-06-13 RX ORDER — HYDROMORPHONE HYDROCHLORIDE 1 MG/ML
0.5 INJECTION, SOLUTION INTRAMUSCULAR; INTRAVENOUS; SUBCUTANEOUS EVERY 5 MIN PRN
Status: DISCONTINUED | OUTPATIENT
Start: 2025-06-13 | End: 2025-06-14 | Stop reason: HOSPADM

## 2025-06-13 RX ORDER — OXYCODONE HYDROCHLORIDE 5 MG/1
5 TABLET ORAL EVERY 4 HOURS PRN
Status: DISCONTINUED | OUTPATIENT
Start: 2025-06-13 | End: 2025-06-14 | Stop reason: HOSPADM

## 2025-06-13 RX ORDER — LIDOCAINE HYDROCHLORIDE 10 MG/ML
0.1 INJECTION, SOLUTION INFILTRATION; PERINEURAL ONCE
Status: DISCONTINUED | OUTPATIENT
Start: 2025-06-13 | End: 2025-06-14 | Stop reason: HOSPADM

## 2025-06-13 RX ORDER — HYDROMORPHONE HYDROCHLORIDE 1 MG/ML
0.5 INJECTION, SOLUTION INTRAMUSCULAR; INTRAVENOUS; SUBCUTANEOUS EVERY 5 MIN PRN
Status: CANCELLED | OUTPATIENT
Start: 2025-06-13

## 2025-06-13 RX ORDER — FENTANYL CITRATE 50 UG/ML
INJECTION, SOLUTION INTRAMUSCULAR; INTRAVENOUS CONTINUOUS PRN
Status: DISCONTINUED | OUTPATIENT
Start: 2025-06-13 | End: 2025-06-13

## 2025-06-13 RX ORDER — PROPOFOL 10 MG/ML
INJECTION, EMULSION INTRAVENOUS CONTINUOUS PRN
Status: DISCONTINUED | OUTPATIENT
Start: 2025-06-13 | End: 2025-06-13

## 2025-06-13 RX ORDER — HYDRALAZINE HYDROCHLORIDE 20 MG/ML
10 INJECTION INTRAMUSCULAR; INTRAVENOUS EVERY 30 MIN PRN
Status: CANCELLED | OUTPATIENT
Start: 2025-06-13

## 2025-06-13 RX ORDER — ALBUTEROL SULFATE 0.83 MG/ML
2.5 SOLUTION RESPIRATORY (INHALATION) ONCE AS NEEDED
Status: CANCELLED | OUTPATIENT
Start: 2025-06-13

## 2025-06-13 RX ORDER — MIDAZOLAM HYDROCHLORIDE 1 MG/ML
INJECTION, SOLUTION INTRAMUSCULAR; INTRAVENOUS CONTINUOUS PRN
Status: DISCONTINUED | OUTPATIENT
Start: 2025-06-13 | End: 2025-06-13

## 2025-06-13 RX ORDER — PHENYLEPHRINE HYDROCHLORIDE 10 MG/ML
INJECTION INTRAVENOUS AS NEEDED
Status: DISCONTINUED | OUTPATIENT
Start: 2025-06-13 | End: 2025-06-13

## 2025-06-13 RX ORDER — SODIUM CHLORIDE, SODIUM LACTATE, POTASSIUM CHLORIDE, CALCIUM CHLORIDE 600; 310; 30; 20 MG/100ML; MG/100ML; MG/100ML; MG/100ML
125 INJECTION, SOLUTION INTRAVENOUS CONTINUOUS
Status: DISCONTINUED | OUTPATIENT
Start: 2025-06-13 | End: 2025-06-14 | Stop reason: HOSPADM

## 2025-06-13 RX ORDER — METOCLOPRAMIDE HYDROCHLORIDE 5 MG/ML
10 INJECTION INTRAMUSCULAR; INTRAVENOUS ONCE AS NEEDED
Status: DISCONTINUED | OUTPATIENT
Start: 2025-06-13 | End: 2025-06-14 | Stop reason: HOSPADM

## 2025-06-13 RX ORDER — ONDANSETRON HYDROCHLORIDE 2 MG/ML
4 INJECTION, SOLUTION INTRAVENOUS ONCE AS NEEDED
Status: CANCELLED | OUTPATIENT
Start: 2025-06-13

## 2025-06-13 RX ORDER — FENTANYL CITRATE 50 UG/ML
50 INJECTION, SOLUTION INTRAMUSCULAR; INTRAVENOUS EVERY 5 MIN PRN
Refills: 0 | Status: CANCELLED | OUTPATIENT
Start: 2025-06-13

## 2025-06-13 RX ORDER — ROCURONIUM BROMIDE 10 MG/ML
INJECTION, SOLUTION INTRAVENOUS AS NEEDED
Status: DISCONTINUED | OUTPATIENT
Start: 2025-06-13 | End: 2025-06-13

## 2025-06-13 RX ORDER — SODIUM CHLORIDE, SODIUM LACTATE, POTASSIUM CHLORIDE, CALCIUM CHLORIDE 600; 310; 30; 20 MG/100ML; MG/100ML; MG/100ML; MG/100ML
1200 INJECTION, SOLUTION INTRAVENOUS ONCE
Status: DISCONTINUED | OUTPATIENT
Start: 2025-06-13 | End: 2025-06-13

## 2025-06-13 RX ORDER — OXYCODONE HYDROCHLORIDE 5 MG/1
5 TABLET ORAL EVERY 4 HOURS PRN
Refills: 0 | Status: CANCELLED | OUTPATIENT
Start: 2025-06-13

## 2025-06-13 RX ORDER — FENTANYL CITRATE 50 UG/ML
50 INJECTION, SOLUTION INTRAMUSCULAR; INTRAVENOUS EVERY 5 MIN PRN
Status: DISCONTINUED | OUTPATIENT
Start: 2025-06-13 | End: 2025-06-14 | Stop reason: HOSPADM

## 2025-06-13 RX ORDER — METOCLOPRAMIDE HYDROCHLORIDE 5 MG/ML
10 INJECTION INTRAMUSCULAR; INTRAVENOUS ONCE AS NEEDED
Status: CANCELLED | OUTPATIENT
Start: 2025-06-13

## 2025-06-13 RX ORDER — LIDOCAINE HYDROCHLORIDE 20 MG/ML
INJECTION, SOLUTION INFILTRATION; PERINEURAL AS NEEDED
Status: DISCONTINUED | OUTPATIENT
Start: 2025-06-13 | End: 2025-06-13

## 2025-06-13 RX ORDER — HEPARIN SODIUM 10000 [USP'U]/100ML
0-4500 INJECTION, SOLUTION INTRAVENOUS CONTINUOUS
Status: DISCONTINUED | OUTPATIENT
Start: 2025-06-13 | End: 2025-06-14

## 2025-06-13 RX ORDER — MEPERIDINE HYDROCHLORIDE 25 MG/ML
12.5 INJECTION INTRAMUSCULAR; INTRAVENOUS; SUBCUTANEOUS EVERY 10 MIN PRN
Status: CANCELLED | OUTPATIENT
Start: 2025-06-13

## 2025-06-13 RX ORDER — ALBUMIN HUMAN 50 G/1000ML
SOLUTION INTRAVENOUS AS NEEDED
Status: DISCONTINUED | OUTPATIENT
Start: 2025-06-13 | End: 2025-06-13

## 2025-06-13 RX ORDER — HYDRALAZINE HYDROCHLORIDE 20 MG/ML
10 INJECTION INTRAMUSCULAR; INTRAVENOUS EVERY 30 MIN PRN
Status: DISCONTINUED | OUTPATIENT
Start: 2025-06-13 | End: 2025-06-14 | Stop reason: HOSPADM

## 2025-06-13 RX ORDER — MAGNESIUM SULFATE HEPTAHYDRATE 40 MG/ML
2 INJECTION, SOLUTION INTRAVENOUS ONCE
Status: DISCONTINUED | OUTPATIENT
Start: 2025-06-13 | End: 2025-06-13

## 2025-06-13 RX ADMIN — PHENYLEPHRINE HYDROCHLORIDE 200 MCG: 10 INJECTION INTRAVENOUS at 08:24

## 2025-06-13 RX ADMIN — DIVALPROEX SODIUM 500 MG: 500 TABLET, FILM COATED, EXTENDED RELEASE ORAL at 20:32

## 2025-06-13 RX ADMIN — LACOSAMIDE 200 MG: 100 TABLET, FILM COATED ORAL at 20:31

## 2025-06-13 RX ADMIN — OXYCODONE 5 MG: 5 TABLET ORAL at 10:04

## 2025-06-13 RX ADMIN — PIPERACILLIN SODIUM AND TAZOBACTAM SODIUM 3.38 G: 3; .375 INJECTION, SOLUTION INTRAVENOUS at 20:32

## 2025-06-13 RX ADMIN — Medication 4 L/MIN: at 09:50

## 2025-06-13 RX ADMIN — FLUTICASONE PROPIONATE 2 SPRAY: 50 SPRAY, METERED NASAL at 09:48

## 2025-06-13 RX ADMIN — SODIUM CHLORIDE, POTASSIUM CHLORIDE, SODIUM LACTATE AND CALCIUM CHLORIDE: 600; 310; 30; 20 INJECTION, SOLUTION INTRAVENOUS at 08:02

## 2025-06-13 RX ADMIN — SODIUM CHLORIDE SOLN NEBU 3% 3 ML: 3 NEBU SOLN at 00:11

## 2025-06-13 RX ADMIN — ONDANSETRON 4 MG: 2 INJECTION, SOLUTION INTRAMUSCULAR; INTRAVENOUS at 08:23

## 2025-06-13 RX ADMIN — SUGAMMADEX 200 MG: 100 INJECTION, SOLUTION INTRAVENOUS at 08:32

## 2025-06-13 RX ADMIN — PHENYLEPHRINE HYDROCHLORIDE 200 MCG: 10 INJECTION INTRAVENOUS at 08:14

## 2025-06-13 RX ADMIN — Medication 35 PERCENT: at 20:14

## 2025-06-13 RX ADMIN — PROPOFOL 200 MG: 10 INJECTION, EMULSION INTRAVENOUS at 08:02

## 2025-06-13 RX ADMIN — ROCURONIUM BROMIDE 50 MG: 10 INJECTION, SOLUTION INTRAVENOUS at 08:01

## 2025-06-13 RX ADMIN — PROPOFOL 100 MCG/KG/MIN: 10 INJECTION, EMULSION INTRAVENOUS at 08:01

## 2025-06-13 RX ADMIN — Medication 35 PERCENT: at 20:33

## 2025-06-13 RX ADMIN — HEPARIN SODIUM 1100 UNITS/HR: 10000 INJECTION, SOLUTION INTRAVENOUS at 13:31

## 2025-06-13 RX ADMIN — IPRATROPIUM BROMIDE AND ALBUTEROL SULFATE 3 ML: .5; 3 SOLUTION RESPIRATORY (INHALATION) at 20:14

## 2025-06-13 RX ADMIN — PIPERACILLIN SODIUM AND TAZOBACTAM SODIUM 3.38 G: 3; .375 INJECTION, SOLUTION INTRAVENOUS at 09:47

## 2025-06-13 RX ADMIN — BENZOCAINE AND MENTHOL 1 LOZENGE: 15; 3.6 LOZENGE ORAL at 23:05

## 2025-06-13 RX ADMIN — SUGAMMADEX 200 MG: 100 INJECTION, SOLUTION INTRAVENOUS at 08:25

## 2025-06-13 RX ADMIN — IPRATROPIUM BROMIDE AND ALBUTEROL SULFATE 3 ML: .5; 3 SOLUTION RESPIRATORY (INHALATION) at 00:11

## 2025-06-13 RX ADMIN — LACOSAMIDE 200 MG: 100 TABLET, FILM COATED ORAL at 09:47

## 2025-06-13 RX ADMIN — PIPERACILLIN SODIUM AND TAZOBACTAM SODIUM 3.38 G: 3; .375 INJECTION, SOLUTION INTRAVENOUS at 14:51

## 2025-06-13 RX ADMIN — SODIUM CHLORIDE SOLN NEBU 3% 3 ML: 3 NEBU SOLN at 20:10

## 2025-06-13 RX ADMIN — OXYCODONE 5 MG: 5 TABLET ORAL at 14:50

## 2025-06-13 RX ADMIN — LIDOCAINE HYDROCHLORIDE 5 ML: 20 INJECTION, SOLUTION INFILTRATION; PERINEURAL at 08:02

## 2025-06-13 RX ADMIN — Medication 4 L/MIN: at 09:49

## 2025-06-13 RX ADMIN — SODIUM CHLORIDE SOLN NEBU 3% 3 ML: 3 NEBU SOLN at 13:41

## 2025-06-13 RX ADMIN — DIVALPROEX SODIUM 500 MG: 500 TABLET, FILM COATED, EXTENDED RELEASE ORAL at 09:47

## 2025-06-13 RX ADMIN — PHENYLEPHRINE HYDROCHLORIDE 160 MCG: 10 INJECTION INTRAVENOUS at 08:07

## 2025-06-13 RX ADMIN — ALBUTEROL SULFATE 2.5 MG: 2.5 SOLUTION RESPIRATORY (INHALATION) at 13:41

## 2025-06-13 RX ADMIN — PHENYLEPHRINE HYDROCHLORIDE 120 MCG: 10 INJECTION INTRAVENOUS at 08:16

## 2025-06-13 RX ADMIN — PIPERACILLIN SODIUM AND TAZOBACTAM SODIUM 3.38 G: 3; .375 INJECTION, SOLUTION INTRAVENOUS at 03:07

## 2025-06-13 RX ADMIN — DEXAMETHASONE SODIUM PHOSPHATE 10 MG: 4 INJECTION INTRA-ARTICULAR; INTRALESIONAL; INTRAMUSCULAR; INTRAVENOUS; SOFT TISSUE at 08:02

## 2025-06-13 RX ADMIN — ALBUMIN HUMAN 250 ML: 0.05 INJECTION, SOLUTION INTRAVENOUS at 08:15

## 2025-06-13 RX ADMIN — BUDESONIDE 0.5 MG: 0.5 INHALANT RESPIRATORY (INHALATION) at 20:10

## 2025-06-13 ASSESSMENT — COGNITIVE AND FUNCTIONAL STATUS - GENERAL
DRESSING REGULAR LOWER BODY CLOTHING: A LITTLE
PERSONAL GROOMING: A LITTLE
HELP NEEDED FOR BATHING: A LITTLE
MOBILITY SCORE: 19
HELP NEEDED FOR BATHING: A LITTLE
STANDING UP FROM CHAIR USING ARMS: A LITTLE
MOVING TO AND FROM BED TO CHAIR: A LITTLE
CLIMB 3 TO 5 STEPS WITH RAILING: A LOT
DAILY ACTIVITIY SCORE: 19
DAILY ACTIVITIY SCORE: 19
MOVING TO AND FROM BED TO CHAIR: A LITTLE
DRESSING REGULAR LOWER BODY CLOTHING: A LITTLE
PERSONAL GROOMING: A LITTLE
TOILETING: A LITTLE
DRESSING REGULAR UPPER BODY CLOTHING: A LITTLE
WALKING IN HOSPITAL ROOM: A LITTLE
STANDING UP FROM CHAIR USING ARMS: A LITTLE
CLIMB 3 TO 5 STEPS WITH RAILING: A LOT
TOILETING: A LITTLE
DRESSING REGULAR UPPER BODY CLOTHING: A LITTLE
WALKING IN HOSPITAL ROOM: A LITTLE
MOBILITY SCORE: 19

## 2025-06-13 ASSESSMENT — PAIN - FUNCTIONAL ASSESSMENT
PAIN_FUNCTIONAL_ASSESSMENT: 0-10

## 2025-06-13 ASSESSMENT — PAIN SCALES - GENERAL
PAINLEVEL_OUTOF10: 0 - NO PAIN
PAINLEVEL_OUTOF10: 6
PAINLEVEL_OUTOF10: 5 - MODERATE PAIN
PAINLEVEL_OUTOF10: 6
PAINLEVEL_OUTOF10: 2
PAINLEVEL_OUTOF10: 4
PAINLEVEL_OUTOF10: 3
PAINLEVEL_OUTOF10: 2

## 2025-06-13 ASSESSMENT — PAIN DESCRIPTION - DESCRIPTORS
DESCRIPTORS: SORE
DESCRIPTORS: ACHING;DISCOMFORT

## 2025-06-13 NOTE — PROGRESS NOTES
"Medical Intensive Care Stepdown Unit - Daily Progress Note   Subjective    Martínez Neri is a 64 y.o. year old male patient admitted on 6/7/2025 with Acute hypoxic resp failure 2/2 multifocal PNA    Interval History:    No acute changes over night.  S/p bronch this am with significant amount of secretions suctioned. , tolerated well. Improved CXR,     Meds    Scheduled medications  Scheduled Medications[1]  Continuous medications  Continuous Medications[2]  PRN medications  PRN Medications[3]     Objective    Blood pressure 109/63, pulse 70, temperature 36 °C (96.8 °F), temperature source Temporal, resp. rate 17, height 1.754 m (5' 9.06\"), weight 63.7 kg (140 lb 8 oz), SpO2 96%.     Constitutional: pt in NAD, alert and cooperative  Eyes: PERRL, EOMI, no icterus   ENMT: mucous membranes moist, no apparent injury, no lesions seen  Head/Neck: Neck supple, no apparent injury  Respiratory/Thorax: Lungs  david rhonchi, moist productie cough, on airvo 35L, 40%  Cardiovascular: Regular, rate and rhythm, no murmurs, normal S1 and S2  Gastrointestinal: Nondistended, soft, non-tender, BS present x 4  Musculoskeletal: ROM intact, no joint swelling, normal strength  Extremities: normal extremities, no edema, contusions or wounds  Neurological: alert and oriented x 3, speech clear, follows commands appropriately, motor 5/5 throughout  Skin: Warm and dry, no lesions, no rashes      Intake/Output Summary (Last 24 hours) at 6/13/2025 1149  Last data filed at 6/13/2025 1020  Gross per 24 hour   Intake 1220 ml   Output 725 ml   Net 495 ml     Labs:   Results from last 72 hours   Lab Units 06/13/25  0607 06/12/25  0417 06/11/25  0525   SODIUM mmol/L 136 136 136   POTASSIUM mmol/L 3.9 3.8 3.8   CHLORIDE mmol/L 101 99 101   CO2 mmol/L 26 30 26   BUN mg/dL 8 11 11   CREATININE mg/dL 0.65 0.57 0.65   GLUCOSE mg/dL 99 115* 108*   CALCIUM mg/dL 8.1* 8.8 8.1*   ANION GAP mmol/L 13 11 13   EGFR mL/min/1.73m*2 >90 >90 >90   PHOSPHORUS mg/dL 3.0 " 3.3 3.1      Results from last 72 hours   Lab Units 06/13/25  0607 06/12/25  0417 06/11/25  0525   WBC AUTO x10*3/uL 8.5 8.9 7.1   HEMOGLOBIN g/dL 9.0* 9.2* 9.1*   HEMATOCRIT % 30.1* 28.6* 29.4*   PLATELETS AUTO x10*3/uL 402 347 293   NEUTROS PCT AUTO % 79.9 74.3  --    LYMPHO PCT MAN %  --   --  7.6   LYMPHS PCT AUTO % 9.9 12.4  --    MONO PCT MAN %  --   --  11.9   MONOS PCT AUTO % 6.8 9.5  --    EOSINO PCT MAN %  --   --  0.9   EOS PCT AUTO % 1.3 0.6  --         Summary of key imaging results     XR chest 1 view 6/13/2025  1. Status post bronchoscopy with improved aeration of right middle and upper lobes.   2. Increased prominence of right basilar opacification likely reflect right lower lobe re-expansion edema.        CT chest wo IV contrast  6/12/2025  1. Acute near complete collapse of the right upper and right middle lobes with minimal aeration which is most likely due to mucous plugging. There are also superimposed consolidation and right pleural effusion, suggestive of aspiration and/or pneumonia. Right lung injury pattern may also be seen with radiation recall pneumonitis if patient was previously irradiated along those regions.   2. Similar collapse of medial left upper lobe causing focal atelectasis with surrounding reticulations, likely representing posttreatment related changes.   3. The previously visualized solid and cavitary nodules are suboptimally evaluated, however appear grossly stable. The right lower lobe cavitary lesion is slightly increased in prominence likely due to adjacent mass effect/collapse however recommend dedicated follow-up CT upon possible resolution.   4. Remaining nonacute findings as above.        XR chest 1 view: 6/12/2025    1.  Moderate clearing right mid lower lung field infiltrate. Left mid lung infiltrate suggestive of atelectasis, unchanged.   2. Small to moderate bilateral pleural effusions persist.        XR chest 1 view 6/9/2025  1. Worsening multifocal pneumonia  plus/minus atelectasis..         US renal complete  6/5/2025  The right kidney measures 11.0 cm. There is a 1.7 cm mid to lower pole right renal cyst. The left kidney measures 11.9 cm  .     No renal stones are identified.  The renal cortical thickness and echogenicity is normal.  No hydronephrosis is identified.   Urinary bladder is nonspecific in appearance with no stones or masses identified.     Right renal cyst.       Transthoracic Echo (TTE) Complete: 6/5/2025   1. Left ventricular ejection fraction is normal by visual estimate at 55-60%.    2. Spectral Doppler shows a Grade I (impaired relaxation pattern) of left ventricular diastolic filling with normal left atrial filling pressure.    3. There is normal right ventricular global systolic function.        Assessment and Plan     Assessment: Martínez Neri 64 y.o. 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 Admitted on 6/7/2025 with Acute hypoxic resp failure 2/2 multifocal PNA    Restraints: no    Summary for 06/13/25  :  S/p bronch with washout and BAL, tolerated well  Holding eliquis since 6/12 , started Heparin gtt today in the event he needs additional procedures, will switch back to eliquis before discharge    Abx changed to Zosyn, will complete on 6/14  Was made DNR/DNI on 6/`12 (reversed for bronch today, now back to DNR/DNI    Plan:  NEUROLOGY/PSYCH:  Dx: hx of ICD brain aneurysm s/p coiling, Seizures  Management:  Home Meds: Depakote  mg BID, vimpat 200 mg BID, Remeron 7.5 mg at bedtime  Continue home meds  Melatonin for sleep  PRN Tylenol or Oxy for pain  Continue Seroquel 12.5 mg QHS   OOB to chair.  No PT/OT needs    CARDIOVASCULAR:  Dx: hx of HTN, HLP. Afib/RVR at OSH, converted to  NSR with IV Metop x1.  Elevated trop, Type II MI demand ischemia  6/5 TTE: LVEF 55-60%, Diastolic dysfunction, Inferior vena cava mildly dilated with IVC ins collapse >50%  Management:  HDS  Home Meds: ASA 81 mg daily, Atorvastatin 40 mg daily, continue   Holding eliquis and started Heparin gtt  Tele while in stepdown    PULMONARY:  Dx: history of NSCLC of THOMAS s/p  chemotherapy and radiation in remission. COPD.  Former Smoker  PFTs (10/23): FVC 88, FEV1 40, FEV1/FVC 15 indicating severe airway obstruction  July '23 status post bronch/EBUS biopsies showed left upper lobe with invasive moderately differentiated non-small cell carcinoma  Completed chemo/radiation January '24  Currently on surveillance  CT chest from 6/12 with near complete right lung collapse 2/2 mucous plugging   Management:  Home Meds: Proventil HFA PRN, fluticasone 50 mcg nasal daily, DuoNebs PRN, Nicoderm, Spiriva inhl  S/p 20 mg Furosemide x1 on 6/7, 40 mg IV 6/11  O2 increased to 15 L on 6/11 with atelectasis on CXR from 6/10   Continue Budesonide, DuoNebs  Added Benny q4 hrs on 6/10   Added vest and 3% saline nebs on 6/12 d/t worsening mucous plugging  S/p bronch 6/13 with large amount of mucous removed   Will need CT chest in 4 weeks-- has one scheduled for July & will follow up with Dr Ng after    RENAL/GENITOURINARY:  Dx: SERINA (resolved)  Management:  Daily RFP    GASTROENTEROLOGY:  Dx: No acute issues  Management:  Diet: Regular  Bowel Regimen: Docusate BID  PPI    ENDOCRINOLOGY:  Dx: No acute issues  Management:  A1C: 5.5 (6/2025)    HEMATOLOGY:  Dx: Normocytic Anemia, Acute Thrombocytopenia- resolving (d/dx: drug induced, sepsis related, ITP), hx of 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 Last transfusion 6/7  No signs of bleeding  Management:  Thrombocytopenia work up including PF4 (neg), Hepatitis panel, HIV, peripheral smear  Holding eliquis for procedures and started Heparin gtt today 6/13, will  transition back to [facility] before discharge    MUSCULOSKELETAL/ SKIN:  Dx: No acute issues    INFECTIOUS DISEASE:  Dx: Multifocal PNA, sepsis (resolved)  On admit to OSH, tachycardic, tachypnea, elevated lactate, WBC 12-16 & SERINA  Management:  Afebrile, no leukocytosis  MICRO: 6/5 MRSA PCR neg, 6/5 Sputum cx salivary contamination, 6/6 Sputum cx pending  6/5 Strep pneumoniae Ag and Legionella Ag neg  6/6 Procalc 12.79  Antimicrobials: Azithro (6/4), CTX (6/4-6/5), zosyn (6/5- 6/9), Vanc (6/5- 6/8), PO Levaquin (6/9- 6/11, Azithro 6/11, Augmentin 6/11- 6/12,Changed back to Zosyn on 6/12- will stop 6/14     ICU/SDU Check List                  FEN  Fluids: PRN  Electrolytes: PRN  Nutrition: Regular   Prophylaxis:  DVT ppx: Heparin GTT  GI ppx: PPI  Hardware:  Catheter: None  Drains: None  Lines: PIV x2  Social:  Code: Full Code    HPOA: Keri Neri [phone]    Disposition: continue SDU care    Discharge Planning: Home when medically stable.  Pt declines SNF.  Agreeable to outpatient PT if needed, will provide script at discharge (referral placed in Epic).  Walking pulse O2 completed, home O2 orders placed for discharge.      ARIC Gaona-CNP   06/13/25 at 11:49 AM     Pt discussed with Dr. Noble seen and examined. All labs, VS and previous plan of care reviewed.  I spent 45 minutes in the professional and overall care of this patient.      Disclaimer: Documentation completed with the information available at the time of input. The times in the chart may not be reflective of actual patient care times, interventions, or procedures. Documentation occurs after the physical care of the patient.            [1] [Held by provider] apixaban, 5 mg, oral, BID  aspirin, 81 mg, oral, Daily  budesonide, 0.5 mg, nebulization, BID  divalproex, 500 mg, oral, BID  docusate sodium, 100 mg, oral, BID  fluticasone, 2 spray, Each Nostril, Daily  ipratropium-albuteroL, 3 mL, nebulization, q4h while awake  lacosamide, 200 mg,  oral, BID  lidocaine, 0.1 mL, subcutaneous, Once  melatonin, 3 mg, oral, Nightly  mirtazapine, 7.5 mg, oral, Nightly  oxygen, , inhalation, Continuous - Inhalation  oxygen, , inhalation, Continuous - Inhalation  pantoprazole, 40 mg, oral, Daily before breakfast  piperacillin-tazobactam, 3.375 g, intravenous, q6h  QUEtiapine, 12.5 mg, oral, Nightly  sodium chloride, 3 mL, nebulization, q4h while awake     [2] heparin, 0-4,500 Units/hr  lactated Ringer's, 125 mL/hr, Last Rate: Stopped (06/13/25 0951)     [3] PRN medications: acetaminophen, albuterol, benzocaine-menthol, benzonatate, dextromethorphan-guaifenesin, dextrose, dextrose, fentaNYL PF, glucagon, glucagon, guaiFENesin, heparin, hydrALAZINE, HYDROmorphone, ipratropium-albuteroL, meperidine, metoclopramide, nicotine polacrilex, ondansetron, ondansetron, oxyCODONE, oxyCODONE, polyethylene glycol, sodium chloride

## 2025-06-13 NOTE — SIGNIFICANT EVENT
Patient underwent bronchoscopy in Endoscopy suite- note in EPIC, briefly significant secretions in RUL and RML- suctioned, sent BAL from RML and BAW from RUL.  Will obtain CXR post procedure.  Patient may still benefit from repeat bronch in the near future to follow up CT chest abnormalities vs. Repeat CT chest and then determine.  Patient will be monitored in post bronchoscopy recovery, once meets criteria will return to SDU.  Patients wife updated post bronchoscopy of the findings.    SDU team to follow up code status limitations and re-order DNR/DNI after discussion this afternoon.

## 2025-06-13 NOTE — SIGNIFICANT EVENT
Discussed with patient and wife yesterday and again this morning regarding risks, benefits, and alternative (including delaying bronchoscopy to allow for therapeutic interventions)- patient wishes to proceed to determine if bronchoscopy today can allow for improvement in his acute respiratory failure.  Understands that there may be worsening due to procedure and accepts that risk.  Patient and wife had a bedside conversation where he told her after her questioning- that 2 weeks would be an allowable time limit to determine if respiratory status improves, should he worsen from procedure or respiratory status.  Patient and wife expressed understanding and agreement with plan.  Patient was consented yesterday for bronch.  Today's plan is for airway inspection, possible BAL- Proceed with bronchoscopy under GA. Code status has been reversed to full code after discussions and will be readdressed after procedure.

## 2025-06-13 NOTE — ANESTHESIA PROCEDURE NOTES
Airway  Date/Time: 6/13/2025 8:06 AM  Reason: elective    Airway not difficult    Staffing  Performed: CRNA   Authorized by: Michael Cobb MD    Performed by: ARIC Hinton-MICKEY  Patient location during procedure: OR    Patient Condition  Indications for airway management: anesthesia and airway protection  Patient position: sniffing  MILS maintained throughout  Planned trial extubation  Sedation level: deep     Final Airway Details   Preoxygenated: yes  Final airway type: endotracheal airway  Successful airway: ETT  Cuffed: yes   Successful intubation technique: video laryngoscopy  Adjuncts used in placement: intubating stylet  Endotracheal tube insertion site: oral  Blade: Sean  Blade size: #4  ETT size (mm): 7.5  Cormack-Lehane Classification: grade I - full view of glottis  Placement verified by: chest auscultation and capnometry   Cuff volume (mL): 7  Measured from: lips  ETT to lips (cm): 21  Number of attempts at approach: 1

## 2025-06-13 NOTE — CARE PLAN
The patient's goals for the shift include      The clinical goals for the shift include Pt will remain HDS and wean O2 while maintaining SpO2 >90% throughout shift      Problem: Skin  Goal: Decreased wound size/increased tissue granulation at next dressing change  Outcome: Progressing  Goal: Prevent/manage excess moisture  Outcome: Progressing  Flowsheets (Taken 6/13/2025 0124)  Prevent/manage excess moisture:   Cleanse incontinence/protect with barrier cream   Monitor for/manage infection if present   Use wicking fabric (obtain order)  Goal: Prevent/minimize sheer/friction injuries  Outcome: Progressing  Flowsheets (Taken 6/13/2025 0124)  Prevent/minimize sheer/friction injuries:   Complete micro-shifts as needed if patient unable. Adjust patient position to relieve pressure points, not a full turn   Increase activity/out of bed for meals   Use pull sheet   HOB 30 degrees or less   Turn/reposition every 2 hours/use positioning/transfer devices   Utilize specialty bed per algorithm  Note: Encourage or assist with reposition q2hr  Goal: Promote/optimize nutrition  Outcome: Progressing  Flowsheets (Taken 6/13/2025 0124)  Promote/optimize nutrition:   Monitor/record intake including meals   Consume > 50% meals/supplements   Offer water/supplements/favorite foods  Goal: Promote skin healing  Outcome: Progressing  Flowsheets (Taken 6/13/2025 0124)  Promote skin healing:   Assess skin/pad under line(s)/device(s)   Turn/reposition every 2 hours/use positioning/transfer devices   Protective dressings over bony prominences   Ensure correct size (line/device) and apply per  instructions   Rotate device position/do not position patient on device     Problem: Safety - Adult  Goal: Free from fall injury  Outcome: Progressing  Educate fall prevention measures including bed locked and lowered, bed exit active, 4/4 side rails elevated per patient request, non-slip socks, intentional rounding q2hr, personal items and  call light within reach.

## 2025-06-13 NOTE — ANESTHESIA POSTPROCEDURE EVALUATION
Patient: Martínez Neri    Procedure Summary       Date: 06/13/25 Room / Location: [facility]    Anesthesia Start: 0800 Anesthesia Stop: 0843    Procedure: BRONCHOSCOPY Diagnosis: Pneumonia of both lower lobes due to infectious organism    Scheduled Providers: Zulma REYES MD; Harley Fall RN; Michael Cobb MD Responsible Provider: Michael Cobb MD    Anesthesia Type: general ASA Status: 3            Anesthesia Type: general    Vitals Value Taken Time   /63 06/13/25 09:15   Temp 36 °C (96.8 °F) 06/13/25 08:45   Pulse 70 06/13/25 09:15   Resp 17 06/13/25 09:15   SpO2 96 % 06/13/25 09:15       Anesthesia Post Evaluation    Patient location during evaluation: PACU  Patient participation: complete - patient participated  Level of consciousness: awake  Pain management: adequate  Airway patency: patent  Cardiovascular status: acceptable  Respiratory status: face mask and acceptable  Hydration status: acceptable  Postoperative Nausea and Vomiting: none        There were no known notable events for this encounter.

## 2025-06-13 NOTE — ANESTHESIA PREPROCEDURE EVALUATION
Patient: Martínez eNri    Procedure Information       Date/Time: 25 4501    Scheduled providers: Zulma REYES MD    Procedure: BRONCHOSCOPY    Location: [facility]     HPI:  64 y.o. year old male PMH squamous cell carcinoma of the lung s/p chemo and radiation in remission admitted on 2025 with Acute hypoxic resp failure 2/2 multifocal PNA with acute desaturation with increasing O2 from 4L to eventually Airvo 60L, 70%. CXR with worsening R effusion. Increased bronchial hygiene: [place], vest and 3% saline nebs. Hx of seizures. On Eliquis at home. Patient was somewhat uncooperative with history and mildly belligerent.    [id number]  Normal sinus rhythm 77  Left axis deviation  Right bundle branch block  Inferior infarct , age undetermined  Abnormal ECG  When compared with ECG of 2025 02:23,  Vent. rate has decreased BY  55 BPM    Echo (TTE) Complete: 2025  1. Left ventricular ejection fraction is normal by visual estimate at 55-60%.    2. Spectral Doppler shows a Grade I (impaired relaxation pattern) of left ventricular diastolic filling with normal left atrial filling pressure.    3. There is normal right ventricular global systolic function.         Relevant Problems   Cardiac   (+) Superior mesenteric artery thrombosis (Multi)      Pulmonary   (+) Aspiration pneumonia (Multi)   (+) Emphysema lung (Multi)   (+) Malignant neoplasm of upper lobe of left lung (Multi)   (+) Multifocal pneumonia   (+) Pneumonia of both lower lobes due to infectious organism   (+) Primary cancer of left upper lobe of lung (Multi)      Neuro   (+) Brain aneurysm (HHS-HCC)   (+) Partial symptomatic epilepsy with complex partial seizures, not intractable, without status epilepticus   (+) Seizures (Multi)      /Renal   (+) Hyponatremia      Hematology   (+) Anemia   (+) Long term (current) use of anticoagulants      ID   (+) Aspiration pneumonia (Multi)   (+) Multifocal pneumonia   (+)  Pneumonia of both lower lobes due to infectious organism       Clinical information reviewed:                   NPO Detail:  No data recorded     Physical Exam    Airway  Mallampati: II  TM distance: >3 FB  Neck ROM: full  Mouth opening: 3 or more finger widths  Comments: +beard     Cardiovascular   Rhythm: regular     Dental    Pulmonary (+) decreased breath sounds, wheezes     Abdominal            Anesthesia Plan    History of general anesthesia?: yes  History of complications of general anesthesia?: no    ASA 3     general   (Plan to extubate if able. Patient is aware of possible post-procedure intubation/vent.  D/W patient and his wife and they are willing to suspend the DNR/DNI and would accept mechanical vent if needed.)  Patient was not previously instructed to abstain from smoking on day of procedure.    intravenous induction   Postoperative pain plan includes opioids.  Trial extubation is planned.  Anesthetic plan and risks discussed with spouse.  Use of blood products discussed with patient who.    Plan discussed with CAA.

## 2025-06-14 ENCOUNTER — APPOINTMENT (OUTPATIENT)
Dept: RADIOLOGY | Facility: HOSPITAL | Age: 65
DRG: 177 | End: 2025-06-14
Payer: COMMERCIAL

## 2025-06-14 VITALS
HEIGHT: 69 IN | WEIGHT: 140.7 LBS | DIASTOLIC BLOOD PRESSURE: 73 MMHG | OXYGEN SATURATION: 94 % | HEART RATE: 64 BPM | SYSTOLIC BLOOD PRESSURE: 136 MMHG | BODY MASS INDEX: 20.84 KG/M2 | TEMPERATURE: 98 F | RESPIRATION RATE: 21 BRPM

## 2025-06-14 LAB
ACID FAST STN SPEC: NORMAL
ACID FAST STN SPEC: NORMAL
ADENOVIRUS QPCR, VIRC: NOT DETECTED COPIES/ML
ADENOVIRUS RVP, VIRC: NOT DETECTED
ALBUMIN SERPL BCP-MCNC: 2.8 G/DL (ref 3.4–5)
ANION GAP SERPL CALC-SCNC: 16 MMOL/L (ref 10–20)
ASPERGILLUS GALACTOMANNAN EIA (NON-BLOOD SPECIMEN): 0.2
BASOPHILS # BLD AUTO: 0.03 X10*3/UL (ref 0–0.1)
BASOPHILS NFR BLD AUTO: 0.4 %
BUN SERPL-MCNC: 12 MG/DL (ref 6–23)
CALCIUM SERPL-MCNC: 8.2 MG/DL (ref 8.6–10.6)
CHLAMYDIA.PNEUMONIAE PCR, VIRC: NOT DETECTED
CHLORIDE SERPL-SCNC: 99 MMOL/L (ref 98–107)
CO2 SERPL-SCNC: 27 MMOL/L (ref 21–32)
CREAT SERPL-MCNC: 0.64 MG/DL (ref 0.5–1.3)
CYTOMEGALOVIRUS DNA PCR, (NON-BLOOD SPECI: NOT DETECTED IU/ML
EGFRCR SERPLBLD CKD-EPI 2021: >90 ML/MIN/1.73M*2
ENTEROVIRUS/RHINOVIRUS RVP, VIRC: NOT DETECTED
EOSINOPHIL # BLD AUTO: 0.02 X10*3/UL (ref 0–0.7)
EOSINOPHIL NFR BLD AUTO: 0.3 %
ERYTHROCYTE [DISTWIDTH] IN BLOOD BY AUTOMATED COUNT: 17.9 % (ref 11.5–14.5)
FUNGITELL BETA-D GLUCAN PCR, QUANTITATIVE (NON-BLOOD SPECIMEN): <45 PG/ML
GLUCOSE SERPL-MCNC: 129 MG/DL (ref 74–99)
HCT VFR BLD AUTO: 26 % (ref 41–52)
HGB BLD-MCNC: 8.2 G/DL (ref 13.5–17.5)
HUMAN BOCAVIRUS RVP, VIRC: NOT DETECTED
HUMAN CORONAVIRUS RVP, VIRC: NOT DETECTED
HUMAN HERPESVIRUS-6 DNA PCR, QUANTITATIVE (NON-BLOOD SPECIMEN): NOT DETECTED COPIES/ML
IMM GRANULOCYTES # BLD AUTO: 0.08 X10*3/UL (ref 0–0.7)
IMM GRANULOCYTES NFR BLD AUTO: 1.1 % (ref 0–0.9)
INFLUENZA A , VIRC: NOT DETECTED
INFLUENZA A H1N1-09 , VIRC: NOT DETECTED
INFLUENZA B PCR, VIRC: NOT DETECTED
LEGIONELLA PNEUMO PCR, VIRC: NOT DETECTED
LYMPHOCYTES # BLD AUTO: 1.22 X10*3/UL (ref 1.2–4.8)
LYMPHOCYTES NFR BLD AUTO: 16.9 %
MAGNESIUM SERPL-MCNC: 2.16 MG/DL (ref 1.6–2.4)
MCH RBC QN AUTO: 26.6 PG (ref 26–34)
MCHC RBC AUTO-ENTMCNC: 31.5 G/DL (ref 32–36)
MCV RBC AUTO: 84 FL (ref 80–100)
METAPNEUMOVIRUS , VIRC: NOT DETECTED
MONOCYTES # BLD AUTO: 0.38 X10*3/UL (ref 0.1–1)
MONOCYTES NFR BLD AUTO: 5.3 %
MYCOBACTERIUM SPEC CULT: NORMAL
MYCOBACTERIUM SPEC CULT: NORMAL
MYCOPLASMA.PNEUMONIAE PCR, VIRC: NOT DETECTED
NEUTROPHILS # BLD AUTO: 5.49 X10*3/UL (ref 1.2–7.7)
NEUTROPHILS NFR BLD AUTO: 76 %
NRBC BLD-RTO: 0 /100 WBCS (ref 0–0)
PAN.LEGIONELLA PCR, VIRC: NOT DETECTED
PARAINFLUENZA PCR, VIRC: NOT DETECTED
PHOSPHATE SERPL-MCNC: 2.6 MG/DL (ref 2.5–4.9)
PLATELET # BLD AUTO: 416 X10*3/UL (ref 150–450)
PNEUMOCYSTIS PCR,QUANTITATIVE (NON-BLOOD SPECIMEN): NOT DETECTED COPIES/ML
POTASSIUM SERPL-SCNC: 3.6 MMOL/L (ref 3.5–5.3)
RBC # BLD AUTO: 3.08 X10*6/UL (ref 4.5–5.9)
RSV PCR, RVP, VIRC: NOT DETECTED
SODIUM SERPL-SCNC: 138 MMOL/L (ref 136–145)
UFH PPP CHRO-ACNC: 0.2 IU/ML (ref ?–1.1)
UFH PPP CHRO-ACNC: 0.5 IU/ML (ref ?–1.1)
UFH PPP CHRO-ACNC: 0.8 IU/ML (ref ?–1.1)
WBC # BLD AUTO: 7.2 X10*3/UL (ref 4.4–11.3)

## 2025-06-14 PROCEDURE — 2500000004 HC RX 250 GENERAL PHARMACY W/ HCPCS (ALT 636 FOR OP/ED): Performed by: NURSE PRACTITIONER

## 2025-06-14 PROCEDURE — 85025 COMPLETE CBC W/AUTO DIFF WBC: CPT | Performed by: NURSE PRACTITIONER

## 2025-06-14 PROCEDURE — 2500000002 HC RX 250 W HCPCS SELF ADMINISTERED DRUGS (ALT 637 FOR MEDICARE OP, ALT 636 FOR OP/ED): Performed by: NURSE PRACTITIONER

## 2025-06-14 PROCEDURE — 71045 X-RAY EXAM CHEST 1 VIEW: CPT

## 2025-06-14 PROCEDURE — 99239 HOSP IP/OBS DSCHRG MGMT >30: CPT | Performed by: STUDENT IN AN ORGANIZED HEALTH CARE EDUCATION/TRAINING PROGRAM

## 2025-06-14 PROCEDURE — 2500000005 HC RX 250 GENERAL PHARMACY W/O HCPCS: Performed by: ANESTHESIOLOGY

## 2025-06-14 PROCEDURE — 83735 ASSAY OF MAGNESIUM: CPT | Performed by: NURSE PRACTITIONER

## 2025-06-14 PROCEDURE — 71045 X-RAY EXAM CHEST 1 VIEW: CPT | Performed by: RADIOLOGY

## 2025-06-14 PROCEDURE — 2500000005 HC RX 250 GENERAL PHARMACY W/O HCPCS: Performed by: STUDENT IN AN ORGANIZED HEALTH CARE EDUCATION/TRAINING PROGRAM

## 2025-06-14 PROCEDURE — 2500000001 HC RX 250 WO HCPCS SELF ADMINISTERED DRUGS (ALT 637 FOR MEDICARE OP): Performed by: NURSE PRACTITIONER

## 2025-06-14 PROCEDURE — 2500000005 HC RX 250 GENERAL PHARMACY W/O HCPCS: Performed by: NURSE PRACTITIONER

## 2025-06-14 PROCEDURE — 94668 MNPJ CHEST WALL SBSQ: CPT

## 2025-06-14 PROCEDURE — 2500000002 HC RX 250 W HCPCS SELF ADMINISTERED DRUGS (ALT 637 FOR MEDICARE OP, ALT 636 FOR OP/ED): Performed by: STUDENT IN AN ORGANIZED HEALTH CARE EDUCATION/TRAINING PROGRAM

## 2025-06-14 PROCEDURE — 94669 MECHANICAL CHEST WALL OSCILL: CPT

## 2025-06-14 PROCEDURE — 85520 HEPARIN ASSAY: CPT | Performed by: NURSE PRACTITIONER

## 2025-06-14 PROCEDURE — 94640 AIRWAY INHALATION TREATMENT: CPT

## 2025-06-14 PROCEDURE — 36415 COLL VENOUS BLD VENIPUNCTURE: CPT | Performed by: NURSE PRACTITIONER

## 2025-06-14 PROCEDURE — 80069 RENAL FUNCTION PANEL: CPT | Performed by: NURSE PRACTITIONER

## 2025-06-14 RX ORDER — TIOTROPIUM BROMIDE INHALATION SPRAY 3.12 UG/1
2 SPRAY, METERED RESPIRATORY (INHALATION) DAILY
Qty: 8 G | Refills: 11 | Status: SHIPPED | OUTPATIENT
Start: 2025-06-14

## 2025-06-14 RX ORDER — GUAIFENESIN 600 MG/1
600 TABLET, EXTENDED RELEASE ORAL 2 TIMES DAILY PRN
Qty: 60 TABLET | Refills: 0 | Status: SHIPPED | OUTPATIENT
Start: 2025-06-14

## 2025-06-14 RX ORDER — BENZONATATE 100 MG/1
100 CAPSULE ORAL 3 TIMES DAILY PRN
Qty: 20 CAPSULE | Refills: 0 | Status: SHIPPED | OUTPATIENT
Start: 2025-06-14

## 2025-06-14 RX ORDER — ATORVASTATIN CALCIUM 40 MG/1
40 TABLET, FILM COATED ORAL DAILY
Qty: 30 TABLET | Refills: 0 | Status: SHIPPED | OUTPATIENT
Start: 2025-06-14

## 2025-06-14 RX ORDER — TALC
3 POWDER (GRAM) TOPICAL NIGHTLY PRN
Qty: 30 TABLET | Refills: 0 | Status: SHIPPED | OUTPATIENT
Start: 2025-06-14

## 2025-06-14 RX ORDER — ALBUTEROL SULFATE 0.83 MG/ML
SOLUTION RESPIRATORY (INHALATION)
Status: DISCONTINUED
Start: 2025-06-14 | End: 2025-06-14 | Stop reason: HOSPADM

## 2025-06-14 RX ORDER — ALBUTEROL SULFATE 0.83 MG/ML
2.5 SOLUTION RESPIRATORY (INHALATION) EVERY 4 HOURS
Status: DISCONTINUED | OUTPATIENT
Start: 2025-06-14 | End: 2025-06-14

## 2025-06-14 RX ORDER — DOCUSATE SODIUM 100 MG/1
100 CAPSULE, LIQUID FILLED ORAL 2 TIMES DAILY PRN
Qty: 60 CAPSULE | Refills: 0 | Status: SHIPPED | OUTPATIENT
Start: 2025-06-14

## 2025-06-14 RX ORDER — ACETAMINOPHEN 325 MG/1
650 TABLET ORAL EVERY 6 HOURS PRN
COMMUNITY
Start: 2025-06-14

## 2025-06-14 RX ORDER — ALBUTEROL SULFATE 90 UG/1
2 INHALANT RESPIRATORY (INHALATION) EVERY 4 HOURS PRN
Qty: 25.5 G | Refills: 6 | Status: SHIPPED | OUTPATIENT
Start: 2025-06-14

## 2025-06-14 RX ORDER — ALBUTEROL SULFATE 0.83 MG/ML
2.5 SOLUTION RESPIRATORY (INHALATION) EVERY 6 HOURS
Status: DISCONTINUED | OUTPATIENT
Start: 2025-06-14 | End: 2025-06-14 | Stop reason: HOSPADM

## 2025-06-14 RX ORDER — SODIUM CHLORIDE FOR INHALATION 3 %
3 VIAL, NEBULIZER (ML) INHALATION EVERY 6 HOURS
Status: DISCONTINUED | OUTPATIENT
Start: 2025-06-14 | End: 2025-06-14 | Stop reason: HOSPADM

## 2025-06-14 RX ORDER — BUDESONIDE 0.5 MG/2ML
0.5 INHALANT ORAL
Qty: 60 ML | Refills: 1 | Status: SHIPPED | OUTPATIENT
Start: 2025-06-14

## 2025-06-14 RX ADMIN — HEPARIN SODIUM 1200 UNITS/HR: 10000 INJECTION, SOLUTION INTRAVENOUS at 11:16

## 2025-06-14 RX ADMIN — LACOSAMIDE 200 MG: 100 TABLET, FILM COATED ORAL at 08:41

## 2025-06-14 RX ADMIN — HEPARIN SODIUM 1300 UNITS/HR: 10000 INJECTION, SOLUTION INTRAVENOUS at 08:45

## 2025-06-14 RX ADMIN — BUDESONIDE 0.5 MG: 0.5 INHALANT RESPIRATORY (INHALATION) at 08:15

## 2025-06-14 RX ADMIN — PIPERACILLIN SODIUM AND TAZOBACTAM SODIUM 3.38 G: 3; .375 INJECTION, SOLUTION INTRAVENOUS at 04:29

## 2025-06-14 RX ADMIN — DIVALPROEX SODIUM 500 MG: 500 TABLET, FILM COATED, EXTENDED RELEASE ORAL at 08:57

## 2025-06-14 RX ADMIN — SODIUM CHLORIDE SOLN NEBU 3% 3 ML: 3 NEBU SOLN at 08:15

## 2025-06-14 RX ADMIN — ASPIRIN 81 MG: 81 TABLET, CHEWABLE ORAL at 08:41

## 2025-06-14 RX ADMIN — Medication 35 PERCENT: at 08:00

## 2025-06-14 RX ADMIN — ALBUTEROL SULFATE 2.5 MG: 2.5 SOLUTION RESPIRATORY (INHALATION) at 08:15

## 2025-06-14 RX ADMIN — OXYCODONE 5 MG: 5 TABLET ORAL at 08:41

## 2025-06-14 RX ADMIN — Medication 30 L/MIN: at 08:00

## 2025-06-14 RX ADMIN — PIPERACILLIN SODIUM AND TAZOBACTAM SODIUM 3.38 G: 3; .375 INJECTION, SOLUTION INTRAVENOUS at 10:09

## 2025-06-14 ASSESSMENT — PAIN DESCRIPTION - LOCATION: LOCATION: CHEST

## 2025-06-14 ASSESSMENT — PAIN SCALES - GENERAL: PAINLEVEL_OUTOF10: 2

## 2025-06-14 ASSESSMENT — PAIN - FUNCTIONAL ASSESSMENT: PAIN_FUNCTIONAL_ASSESSMENT: 0-10

## 2025-06-14 NOTE — PROGRESS NOTES
"Medical Intensive Care Stepdown Unit - Daily Progress Note   Subjective    Martínez Neri is a 64 y.o. year old male patient admitted on 6/7/2025 with Acute hypoxic resp failure 2/2 multifocal PNA    Interval History:    Stable over night. On airvo 30L/30%.  , weaned to 5L NC this am    Meds    Scheduled medications  Scheduled Medications[1]  Continuous medications  Continuous Medications[2]  PRN medications  PRN Medications[3]     Objective    Blood pressure 131/69, pulse 64, temperature 36.3 °C (97.4 °F), temperature source Temporal, resp. rate 21, height 1.754 m (5' 9.06\"), weight 63.8 kg (140 lb 11.2 oz), SpO2 98%.     Constitutional: pt in NAD, alert and cooperative  Eyes: PERRL, EOMI, no icterus   ENMT: mucous membranes moist, no apparent injury, no lesions seen  Head/Neck: Neck supple, no apparent injury  Respiratory/Thorax: Lungs  david rhonchi, moist productie cough, on airvo 5L NC  Cardiovascular: Regular, rate and rhythm, no murmurs, normal S1 and S2  Gastrointestinal: Nondistended, soft, non-tender, BS present x 4  Musculoskeletal: ROM intact, no joint swelling, normal strength  Extremities: normal extremities, no edema, contusions or wounds  Neurological: alert and oriented x 3, speech clear, follows commands appropriately, motor 5/5 throughout  Skin: Warm and dry, no lesions, no rashes      Intake/Output Summary (Last 24 hours) at 6/14/2025 0743  Last data filed at 6/14/2025 0429  Gross per 24 hour   Intake 2238.31 ml   Output 575 ml   Net 1663.31 ml     Labs:   Results from last 72 hours   Lab Units 06/14/25  0526 06/13/25  0607 06/12/25  0417   SODIUM mmol/L 138 136 136   POTASSIUM mmol/L 3.6 3.9 3.8   CHLORIDE mmol/L 99 101 99   CO2 mmol/L 27 26 30   BUN mg/dL 12 8 11   CREATININE mg/dL 0.64 0.65 0.57   GLUCOSE mg/dL 129* 99 115*   CALCIUM mg/dL 8.2* 8.1* 8.8   ANION GAP mmol/L 16 13 11   EGFR mL/min/1.73m*2 >90 >90 >90   PHOSPHORUS mg/dL 2.6 3.0 3.3      Results from last 72 hours   Lab Units " 06/14/25  0526 06/13/25  1235 06/13/25  0607 06/12/25  0417   WBC AUTO x10*3/uL 7.2 11.0 8.5 8.9   HEMOGLOBIN g/dL 8.2* 9.1* 9.0* 9.2*   HEMATOCRIT % 26.0* 28.4* 30.1* 28.6*   PLATELETS AUTO x10*3/uL 416 456* 402 347   NEUTROS PCT AUTO % 76.0  --  79.9 74.3   LYMPHS PCT AUTO % 16.9  --  9.9 12.4   MONOS PCT AUTO % 5.3  --  6.8 9.5   EOS PCT AUTO % 0.3  --  1.3 0.6        Summary of key imaging results     XR chest 1 view 6/13/2025  1. Status post bronchoscopy with improved aeration of right middle and upper lobes.   2. Increased prominence of right basilar opacification likely reflect right lower lobe re-expansion edema.        CT chest wo IV contrast  6/12/2025  1. Acute near complete collapse of the right upper and right middle lobes with minimal aeration which is most likely due to mucous plugging. There are also superimposed consolidation and right pleural effusion, suggestive of aspiration and/or pneumonia. Right lung injury pattern may also be seen with radiation recall pneumonitis if patient was previously irradiated along those regions.   2. Similar collapse of medial left upper lobe causing focal atelectasis with surrounding reticulations, likely representing posttreatment related changes.   3. The previously visualized solid and cavitary nodules are suboptimally evaluated, however appear grossly stable. The right lower lobe cavitary lesion is slightly increased in prominence likely due to adjacent mass effect/collapse however recommend dedicated follow-up CT upon possible resolution.   4. Remaining nonacute findings as above.        XR chest 1 view: 6/12/2025    1.  Moderate clearing right mid lower lung field infiltrate. Left mid lung infiltrate suggestive of atelectasis, unchanged.   2. Small to moderate bilateral pleural effusions persist.        XR chest 1 view 6/9/2025  1. Worsening multifocal pneumonia plus/minus atelectasis..         US renal complete  6/5/2025  The right kidney measures 11.0 cm.  There is a 1.7 cm mid to lower pole right renal cyst. The left kidney measures 11.9 cm  .     No renal stones are identified.  The renal cortical thickness and echogenicity is normal.  No hydronephrosis is identified.   Urinary bladder is nonspecific in appearance with no stones or masses identified.     Right renal cyst.       Transthoracic Echo (TTE) Complete: 6/5/2025   1. Left ventricular ejection fraction is normal by visual estimate at 55-60%.    2. Spectral Doppler shows a Grade I (impaired relaxation pattern) of left ventricular diastolic filling with normal left atrial filling pressure.    3. There is normal right ventricular global systolic function.        Assessment and Plan     Assessment: Martínez Neri 64 y.o. 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 Admitted on 6/7/2025 with Acute hypoxic resp failure 2/2 multifocal PNA    Restraints: no    Summary for 06/14/25  :  S/p bronch with washout and BAL, on 6/13, with improved resp status, weaned to 3L NC  Walking Pox on 3L  97%, patient has a O2 set up for home  Was started on Heparin while inpatient, will switch to home eliquis today in anticipation for discharge    Abx changed to Zosyn, will complete 7 day course of total ABX today 6/14    Plan:  NEUROLOGY/PSYCH:  Dx: hx of ICD brain aneurysm s/p coiling, Seizures  Management:  Home Meds: Depakote  mg BID, vimpat 200 mg BID, Remeron 7.5 mg at bedtime  Continue home meds  Melatonin for sleep  PRN Tylenol or Oxy for pain  Continue Seroquel 12.5 mg QHS   OOB to chair.  No PT/OT needs    CARDIOVASCULAR:  Dx: hx of HTN, HLP. Afib/RVR at OSH, converted to NSR with IV Metop x1.  Elevated trop, Type II MI demand ischemia  6/5 TTE: LVEF 55-60%,  Diastolic dysfunction, Inferior vena cava mildly dilated with IVC ins collapse >50%  Management:  HDS  Home Meds: ASA 81 mg daily, Atorvastatin 40 mg daily, continue   Restart home eliquis  Tele while in stepdown    PULMONARY:  Dx: history of NSCLC of THOMAS s/p  chemotherapy and radiation in remission. COPD.  Former Smoker  PFTs (10/23): FVC 88, FEV1 40, FEV1/FVC 15 indicating severe airway obstruction  July '23 status post bronch/EBUS biopsies showed left upper lobe with invasive moderately differentiated non-small cell carcinoma  Completed chemo/radiation January '24  Currently on surveillance  CT chest from 6/12 with near complete right lung collapse 2/2 mucous plugging   Management:  Home Meds: Proventil HFA PRN, fluticasone 50 mcg nasal daily, DuoNebs PRN, Nicoderm, Spiriva inhl  S/p 20 mg Furosemide x1 on 6/7, 40 mg IV 6/11  O2 increased to 15 L on 6/11 with atelectasis on CXR from 6/10   Continue Budesonide, DuoNebs  Added Albright q4 hrs on 6/10   Added vest and 3% saline nebs on 6/12 d/t worsening mucous plugging  S/p bronch 6/13 with large amount of mucous removed   Will need CT chest in 4 weeks-- has one scheduled for July & will follow up with Dr Ng after    RENAL/GENITOURINARY:  Dx: SERINA (resolved)  Management:  Daily RFP    GASTROENTEROLOGY:  Dx: No acute issues  Management:  Diet: Regular  Bowel Regimen: Docusate BID  PPI    ENDOCRINOLOGY:  Dx: No acute issues  Management:  A1C: 5.5 (6/2025)    HEMATOLOGY:  Dx: Normocytic Anemia, Acute Thrombocytopenia- resolving (d/dx: drug induced, sepsis related, ITP), hx of 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 Last transfusion 6/7  No signs of bleeding  Management:  Thrombocytopenia work up including PF4 (neg), Hepatitis panel, HIV, peripheral smear  Holding eliquis for procedures and started Heparin gtt today 6/13, will transition back to eliquis before discharge    MUSCULOSKELETAL/ SKIN:  Dx: No acute issues    INFECTIOUS  DISEASE:  Dx: Multifocal PNA, sepsis (resolved)  On admit to OSH, tachycardic, tachypnea, elevated lactate, WBC 12-16 & SERINA  Management:  Afebrile, no leukocytosis  MICRO: 6/5 MRSA PCR neg, 6/5 Sputum cx salivary contamination, 6/6 Sputum cx pending  6/5 Strep pneumoniae Ag and Legionella Ag neg  6/6 Procalc 12.79  Antimicrobials: Azithro (6/4), CTX (6/4-6/5), zosyn (6/5- 6/9), Vanc (6/5- 6/8), PO Levaquin (6/9- 6/11, Azithro 6/11, Augmentin 6/11- 6/12,Changed back to Zosyn on 6/12- will stop 6/14     ICU/SDU Check List                  FEN  Fluids: PRN  Electrolytes: PRN  Nutrition: Regular   Prophylaxis:  DVT ppx: Heparin GTT  GI ppx: PPI  Hardware:  Catheter: None  Drains: None  Lines: PIV x2  Social:  Code: DNR and No Intubation    HPOA: Keri Neri [phone]    Disposition: continue SDU care    Discharge Planning: will discharge home today    ARIC Gaona-CNP   06/14/25 at 7:43 AM     Pt discussed with Dr. Noble seen and examined. All labs, VS and previous plan of care reviewed.  I spent 45 minutes in the professional and overall care of this patient.      Disclaimer: Documentation completed with the information available at the time of input. The times in the chart may not be reflective of actual patient care times, interventions, or procedures. Documentation occurs after the physical care of the patient.            [1] [Held by provider] apixaban, 5 mg, oral, BID  aspirin, 81 mg, oral, Daily  budesonide, 0.5 mg, nebulization, BID  divalproex, 500 mg, oral, BID  docusate sodium, 100 mg, oral, BID  fluticasone, 2 spray, Each Nostril, Daily  ipratropium-albuteroL, 3 mL, nebulization, q4h while awake  lacosamide, 200 mg, oral, BID  lidocaine, 0.1 mL, subcutaneous, Once  melatonin, 3 mg, oral, Nightly  mirtazapine, 7.5 mg, oral, Nightly  oxygen, , inhalation, Continuous - Inhalation  oxygen, , inhalation, Continuous - Inhalation  pantoprazole, 40 mg, oral, Daily before  breakfast  piperacillin-tazobactam, 3.375 g, intravenous, q6h  QUEtiapine, 12.5 mg, oral, Nightly  sodium chloride, 3 mL, nebulization, q4h while awake     [2] heparin, 0-4,500 Units/hr, Last Rate: 1,300 Units/hr (06/14/25 0143)  lactated Ringer's, 125 mL/hr, Last Rate: Stopped (06/13/25 0951)     [3] PRN medications: acetaminophen, benzocaine-menthol, benzonatate, dextromethorphan-guaifenesin, dextrose, dextrose, fentaNYL PF, glucagon, glucagon, guaiFENesin, heparin, hydrALAZINE, HYDROmorphone, ipratropium-albuteroL, meperidine, metoclopramide, nicotine polacrilex, ondansetron, ondansetron, oxyCODONE, oxyCODONE, polyethylene glycol, sodium chloride

## 2025-06-14 NOTE — DISCHARGE SUMMARY
Discharge Diagnosis  Multifocal pneumonia    Issues Requiring Follow-Up  Follow up with pulmonary, oncology, cardiology    Test Results Pending At Discharge  Pending Labs       Order Current Status    Adenovirus PCR Quantitative (Non-Blood Specimen) In process    Aspergillus Galactomannan EIA (Non-Blood Specimen) In process    Atypical Pneumonia Panel In process    Bronchoalveolar Lavage Viral Panel PCR In process    CMV PCR, (Non-Blood Specimen) In process    Cytology Consultation (Non-Gynecologic) In process    HHV-6 PCR Quantitative (Non-Blood Specimen) In process    Histoplasma Antigen, Non-Blood In process    Legionella PCR Panel In process    Pneumocystis jiroveci PCR Quantitative (Non-Blood Specimen) In process    Respiratory Viral Panel In process    AFB Culture/Smear Preliminary result    AFB Culture/Smear Preliminary result    Fungal Culture/Smear Preliminary result    Fungal Culture/Smear Preliminary result    Respiratory Culture/Smear Preliminary result    Respiratory Culture/Smear Preliminary result            Hospital Course  Martínez Neri 64 y.o. 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      In the ED patient was found to have a multifocal pneumonia.  Patient was also found to have a SERINA and elevated troponin 46->67. Hb 7.7 down from 10.6 on 3/11. Lactate of 3.8.  On arrival to the ED patient was hypoxic 87% on room air and tachypneic he was initially placed on 9 L high flow nasal cannula with improvement in his O2 saturation greater than 92%.  A CT angio was obtained and did not reveal any evidence of a pulmonary embolism however there was a new consolidative airspace opacity present in L5 lung lobes and small bilateral pleural effusions,  some increase in size of right hilar lymph nodes is favored to be reactive, redemonstration of the area of volume loss/atelectasis in the left upper lobe, similar in appearance to prior exam and increase in size of some cavitary nodules in the lungs bilaterally.  He was giving a DuoNeb breathing treatment, IV azithromycin x 1, IV ceftriaxone x 1 and 125 mg IV Solu-Medrol in the ED.     Patient  admitted to [facility] MICU as a transfer from [facility] for AHRF iso mulitfocal PNA and oncology evaluation for cavitary lesion c/f re-current lung cancer.  Patient on Airvo 30L/45%, Vanco/Zosyn and heparin started (h/o SMA thrombus on Eliquis) as Hgb stable 8.5.   Patient remained HDS and transferred to SDU on 6/8 for continuation of care.  [facility] team following as patient with aggressive behavior noted to staff and wife.  Zosyn stopped on 6/9 and changed to PO Levaquin (6/9- 6/11) >Augmentin (6/11- Stop 6/16) for total 7 days from start of Levaquin.  Heparin drip stopped this evening and started home Apixiban for SMA thrombus hx .  Walking pulse O2 completed, home O2 orders placed for discharge.      Patient discharge held on 6/10 as Oxygen requirement increased to 15 L on 6/10 with CXR imaging noting atelectasis; Added Volera q4 hrs and continue with Budesonide and Duonebs,   ABX was again changed to zosyn  On 6/13 he underwent bronch with washout d/t large mucous plug.  Cultures were sent out.  No bx was done d/t patient recently received Eliquis. Respiratory function rapidly improved.  He was eventually weaned to 3L NC.  He was discharged home on 6/14  with home O2 3L NC,  after we obtained walking Pox. ( Pt declines SNF prior) .     Visit Vitals  /73   Pulse 64   Temp 36.7 °C (98 °F)   Resp 21     Vitals:    06/14/25 0541   Weight: 63.8 kg (140 lb 11.2 oz)         There is no immunization history on file for this patient.    Results        Pertinent Physical Exam At Time of Discharge    Constitutional: pt in NAD, alert  and cooperative  Eyes: PERRL, EOMI, no icterus   ENMT: mucous membranes moist, no apparent injury, no lesions seen  Head/Neck: Neck supple, no apparent injury  Respiratory/Thorax: Lungs  david rhonchi, moist productie cough, on airvo 35L, 40%  Cardiovascular: Regular, rate and rhythm, no murmurs, normal S1 and S2  Gastrointestinal: Nondistended, soft, non-tender, BS present x 4  Musculoskeletal: ROM intact, no joint swelling, normal strength  Extremities: normal extremities, no edema, contusions or wounds  Neurological: alert and oriented x 3, speech clear, follows commands appropriately, motor 5/5 throughout  Skin: Warm and dry, no lesions, no rashes    Home Medications     Medication List      START taking these medications     acetaminophen 325 mg tablet; Commonly known as: Tylenol; Take 2 tablets   (650 mg) by mouth every 6 hours if needed for mild pain (1 - 3), moderate   pain (4 - 6), headaches or fever (temp greater than 38.0 C).   benzonatate 100 mg capsule; Commonly known as: Tessalon; Take 1 capsule   (100 mg) by mouth 3 times a day as needed for cough. Do not crush or chew.   budesonide 0.5 mg/2 mL nebulizer solution; Commonly known as: Pulmicort;   Take 2 mL (0.5 mg) by nebulization 2 times a day. Rinse mouth with water   after use to reduce aftertaste and incidence of candidiasis. Do not   swallow.   docusate sodium 100 mg capsule; Commonly known as: Colace; Take 1   capsule (100 mg) by mouth 2 times a day as needed for constipation.   guaiFENesin 600 mg 12 hr tablet; Commonly known as: Mucinex; Take 1   tablet (600 mg) by mouth 2 times a day as needed for cough. Do not crush,   chew, or split.   melatonin 3 mg tablet; Take 1 tablet (3 mg) by mouth as needed at   bedtime for sleep.   oxygen gas therapy; Commonly known as: O2; Inhale 3 L/min at 180,000   mL/hr continuously.     CONTINUE taking these medications     albuterol 90 mcg/actuation inhaler; Commonly known as: Proventil HFA;   Inhale 2 puffs every  4 hours if needed for wheezing or shortness of   breath.   aspirin 81 mg chewable tablet; Chew 1 tablet (81 mg) once daily.   atorvastatin 40 mg tablet; Commonly known as: Lipitor; Take 1 tablet (40   mg) by mouth once daily.   divalproex 500 mg 24 hr tablet; Commonly known as: Depakote ER; Take 1   tablet (500 mg) by mouth 2 times a day. Do not crush, chew, or split.   Eliquis 5 mg tablet; Generic drug: apixaban; TAKE ONE TABLET BY MOUTH   TWO TIMES A DAY   fluticasone 50 mcg/actuation nasal spray; Commonly known as: Flonase;   Administer 2 sprays into each nostril once daily. Shake gently. Before   first use, prime pump. After use, clean tip and replace cap.   lacosamide 200 mg tablet tablet; Commonly known as: Vimpat; TAKE ONE   TABLET BY MOUTH TWO TIMES A DAY   mirtazapine 7.5 mg tablet; Commonly known as: Remeron; Take 1 tablet   (7.5 mg) by mouth once daily at bedtime.   multivitamin tablet   naloxone 4 mg/0.1 mL nasal spray; Commonly known as: Narcan; Administer   1 spray (4 mg) into affected nostril(s) if needed for opioid reversal. May   repeat every 2-3 minutes if needed, alternating nostrils, until medical   assistance becomes available.   nicotine 7 mg/24 hr patch; Commonly known as: Nicoderm CQ; Place 1 patch   over 24 hours on the skin once every 24 hours.   oxyCODONE 5 mg immediate release tablet; Commonly known as: Roxicodone;   Take 1 tablet (5 mg) by mouth every 6 hours if needed for severe pain (7 -   10).   Spiriva Respimat 2.5 mcg/actuation inhaler; Generic drug: tiotropium;   Inhale 2 puffs once daily.       Outpatient Follow-Up  Future Appointments   Date Time Provider Department Center   7/17/2025  9:00 AM [facility] [place]   7/21/2025  9:30 AM ARIC Lim-CNP [id number]   7/22/2025  8:40 AM Saida Ng MD [id number] [facility]   9/29/2025  8:30 AM Samantha Abreu MD [id number] [place]   11/4/2025  4:30 PM Bhavna Reed, APRN-CNP [id number] [facility]        Tamara Palencia, APRN-CNP

## 2025-06-14 NOTE — CARE PLAN
Problem: Safety - Adult  Goal: Free from fall injury  Outcome: Progressing  Flowsheets (Taken 6/14/2025 0609)  Free from fall injury: Instruct family/caregiver on patient safety  Note: Educated fall prevention measures including bed locked lowered, 3/4 side rails elevated, bed exit active, intentional rounding, personal items and call light within reach.      Problem: Skin  Goal: Decreased wound size/increased tissue granulation at next dressing change  Outcome: Progressing  Goal: Participates in plan/prevention/treatment measures  Outcome: Progressing  Flowsheets (Taken 6/14/2025 0609)  Participates in plan/prevention/treatment measures:   Elevate heels   Increase activity/out of bed for meals  Goal: Prevent/manage excess moisture  Outcome: Progressing  Flowsheets (Taken 6/14/2025 0609)  Prevent/manage excess moisture:   Monitor for/manage infection if present   Use wicking fabric (obtain order)   Moisturize dry skin  Goal: Prevent/minimize sheer/friction injuries  Outcome: Progressing  Flowsheets (Taken 6/14/2025 0609)  Prevent/minimize sheer/friction injuries:   Complete micro-shifts as needed if patient unable. Adjust patient position to relieve pressure points, not a full turn   Increase activity/out of bed for meals   Use pull sheet   HOB 30 degrees or less   Turn/reposition every 2 hours/use positioning/transfer devices   Utilize specialty bed per algorithm  Note: Encourage or assist with reposition q2hr.  Goal: Promote/optimize nutrition  Outcome: Progressing  Flowsheets (Taken 6/14/2025 0609)  Promote/optimize nutrition:   Monitor/record intake including meals   Consume > 50% meals/supplements  Goal: Promote skin healing  Outcome: Progressing  Flowsheets (Taken 6/14/2025 0609)  Promote skin healing:   Assess skin/pad under line(s)/device(s)   Protective dressings over bony prominences   Turn/reposition every 2 hours/use positioning/transfer devices   Ensure correct size (line/device) and apply per   instructions   Rotate device position/do not position patient on device

## 2025-06-15 LAB
BACTERIA SPEC RESP CULT: NORMAL
BACTERIA SPEC RESP CULT: NORMAL
FUNGUS SPEC CULT: NORMAL
FUNGUS SPEC CULT: NORMAL
FUNGUS SPEC FUNGUS STN: NORMAL
FUNGUS SPEC FUNGUS STN: NORMAL
GRAM STN SPEC: NORMAL

## 2025-06-16 LAB
FUNGUS SPEC CULT: ABNORMAL
FUNGUS SPEC FUNGUS STN: ABNORMAL

## 2025-06-17 LAB
H CAPSUL AG SPEC QL: NOT DETECTED
LABORATORY COMMENT REPORT: NORMAL
LABORATORY COMMENT REPORT: NORMAL
PATH REPORT.FINAL DX SPEC: NORMAL
PATH REPORT.GROSS SPEC: NORMAL
PATH REPORT.TOTAL CANCER: NORMAL
SCAN RESULT: NORMAL

## 2025-06-18 LAB
FUNGUS SPEC CULT: NORMAL
FUNGUS SPEC FUNGUS STN: NORMAL

## 2025-06-19 LAB
ACID FAST STN SPEC: NORMAL
ACID FAST STN SPEC: NORMAL
MYCOBACTERIUM SPEC CULT: NORMAL
MYCOBACTERIUM SPEC CULT: NORMAL

## 2025-06-19 NOTE — DOCUMENTATION CLARIFICATION NOTE
"    PATIENT:               FRANCIA GOODEN  ACCT #:                  [id number]  MRN:                       [id number]  :                       1960  ADMIT DATE:       2025 5:17 PM  DISCH DATE:        2025 3:02 PM  RESPONDING PROVIDER #:        [id number]          PROVIDER RESPONSE TEXT:    Aspiration PNA    CDI QUERY TEXT:    Clarification      Instruction:    Based on your assessment of the patient and the clinical information, please provide the requested documentation by clicking on the appropriate radio button and enter any additional information if prompted.    Question: Please further specify the type of pneumonia being treated    When answering this query, please exercise your independent professional judgment. The fact that a question is being asked, does not imply that any particular answer is desired or expected.    The patient's clinical indicators include:  Clinical Information: PT is a 64 year old with past medical history of NSCLC s/p chemotherapy and radiation in remission, who was admitted for Acute hypoxic resp failure 2/2 multifocal PNA    Clinical Indicators:    From H&P- \"Patient was hypoxic 87% on room air and tachypneic he was initially placed on 9 L high flow nasal cannula with improvement in his O2 saturation greater than 92%. \"    \"CT chest on : Acute near complete collapse of the right upper and right middle lobes with minimal aeration which is most likely due to mucous plugging. There are also superimposed consolidation and right pleural effusion, suggestive of aspiration and/or pneumonia    From discharge summary- \"Patient discharge held on 6/10 as Oxygen requirement increased to 15 L on 6/10 with CXR imaging noting atelectasis; Added Volera q4 hrs and continue with Budesonide and Duonebs,   ABX was again changed to zosyn  On  he underwent bronch with washout d/t large mucous plug.  Cultures were sent out.  No bx was done d/t patient recently received Eliquis. " "Respiratory function rapidly improved.  He was eventually weaned to 3L NC.  He was discharged home on 6/14  with home O2 3L NC,  after we obtained walking Pox. \"    \" Abx changed to Zosyn, will complete 7 day course of total ABX today 6/14\"    \"MICRO: 6/5 MRSA PCR neg, 6/5 Sputum cx salivary contamination, 6/6 Sputum cx pending  6/5 Strep pneumoniae Ag and Legionella Ag neg  6/6 Procalc 12.79\"    Treatment: Airvo 30L/45%, Vanc, Zosyn, Levaquin    Risk Factors: history of NSCLC, Current Smoker, Chest Congestion, SOB, Acute Hypoxic Respiratory Failure  Options provided:  -- Aspiration PNA  -- Gram Negative PNA  -- Other - I will add my own diagnosis  -- Refer to Clinical Documentation Reviewer    Query created by: Zuri Arrington on 6/18/2025 2:53 PM      Electronically signed by:  CHIDI HACKETT 6/19/2025 8:29 AM          "

## 2025-06-20 DIAGNOSIS — J44.9 CHRONIC OBSTRUCTIVE PULMONARY DISEASE, UNSPECIFIED COPD TYPE (MULTI): ICD-10-CM

## 2025-06-22 LAB
ATRIAL RATE: 77 BPM
P AXIS: 82 DEGREES
P OFFSET: 197 MS
P ONSET: 168 MS
PR INTERVAL: 114 MS
Q ONSET: 225 MS
QRS COUNT: 12 BEATS
QRS DURATION: 126 MS
QT INTERVAL: 390 MS
QTC CALCULATION(BAZETT): 441 MS
QTC FREDERICIA: 423 MS
R AXIS: -75 DEGREES
T AXIS: 44 DEGREES
T OFFSET: 420 MS
VENTRICULAR RATE: 77 BPM

## 2025-06-23 LAB
FUNGUS SPEC CULT: NORMAL
FUNGUS SPEC FUNGUS STN: NORMAL

## 2025-06-25 ENCOUNTER — TELEPHONE (OUTPATIENT)
Dept: HEMATOLOGY/ONCOLOGY | Facility: HOSPITAL | Age: 65
End: 2025-06-25

## 2025-06-25 ENCOUNTER — OFFICE VISIT (OUTPATIENT)
Dept: PULMONOLOGY | Facility: CLINIC | Age: 65
End: 2025-06-25
Payer: COMMERCIAL

## 2025-06-25 VITALS
HEART RATE: 57 BPM | WEIGHT: 146.3 LBS | SYSTOLIC BLOOD PRESSURE: 117 MMHG | OXYGEN SATURATION: 94 % | DIASTOLIC BLOOD PRESSURE: 68 MMHG | BODY MASS INDEX: 21.57 KG/M2

## 2025-06-25 DIAGNOSIS — J44.9 CHRONIC OBSTRUCTIVE PULMONARY DISEASE, UNSPECIFIED COPD TYPE (MULTI): ICD-10-CM

## 2025-06-25 DIAGNOSIS — C34.12 PRIMARY CANCER OF LEFT UPPER LOBE OF LUNG (MULTI): Primary | ICD-10-CM

## 2025-06-25 DIAGNOSIS — J18.9 PNEUMONIA DUE TO INFECTIOUS ORGANISM, UNSPECIFIED LATERALITY, UNSPECIFIED PART OF LUNG: ICD-10-CM

## 2025-06-25 PROCEDURE — 99215 OFFICE O/P EST HI 40 MIN: CPT | Performed by: PEDIATRICS

## 2025-06-25 PROCEDURE — 1036F TOBACCO NON-USER: CPT | Performed by: PEDIATRICS

## 2025-06-25 PROCEDURE — 1111F DSCHRG MED/CURRENT MED MERGE: CPT | Performed by: PEDIATRICS

## 2025-06-25 PROCEDURE — 1159F MED LIST DOCD IN RCRD: CPT | Performed by: PEDIATRICS

## 2025-06-25 PROCEDURE — 1160F RVW MEDS BY RX/DR IN RCRD: CPT | Performed by: PEDIATRICS

## 2025-06-25 ASSESSMENT — PATIENT HEALTH QUESTIONNAIRE - PHQ9
1. LITTLE INTEREST OR PLEASURE IN DOING THINGS: NOT AT ALL
2. FEELING DOWN, DEPRESSED OR HOPELESS: SEVERAL DAYS
SUM OF ALL RESPONSES TO PHQ9 QUESTIONS 1 AND 2: 1

## 2025-06-25 NOTE — LETTER
June 25, 2025     Jayme Casey MD  [address] [place] [address]   [facility], [address]  [place] [address]    Patient: Martínez Neri   YOB: 1960   Date of Visit: 6/25/2025       Dear Dr. Jayme Casey MD:    Thank you for referring Martínez Neir to me for evaluation. Below are my notes for this consultation.  If you have questions, please do not hesitate to call me. I look forward to following your patient along with you.       Sincerely,     Lakhwinder Banks MD      CC: No Recipients  ______________________________________________________________________________________    Subjective  Patient ID: Martínez Neri is a 64 y.o. male who presents for COPD, hypoxia, lung cancer, pneumonia    HPI    Mr Neri is a 64yr old with history of COPD, lung cancer diagnosed in 2023 s/p chemo/XRT, that was admitted 6/5-14 with multifocal pneumonia and hypoxia.  He was discharged with a new oxygen requirement of 3lpm.  He feels he is recovering slowly.  He is currently using budesonide nebulized and spiriva (he was on spiriva prior to admission) along with albuterol if needed.      I reviewed imaging from admission.    He is a former smoker 1ppd since age 14, he quit when he was admitted.      Review of Systems    See scanned documents attached to this note for review of systems, and appropriate scales/scores for this visit.     Objective  Physical Exam  Constitutional:       Appearance: Normal appearance.   HENT:      Head: Normocephalic and atraumatic.      Mouth/Throat:      Pharynx: Oropharynx is clear.   Cardiovascular:      Rate and Rhythm: Normal rate and regular rhythm.      Pulses: Normal pulses.      Heart sounds: Normal heart sounds.   Pulmonary:      Effort: Pulmonary effort is normal.      Breath sounds: Normal breath sounds. No wheezing, rhonchi or rales.   Abdominal:      General: Bowel sounds are normal.      Palpations: Abdomen is soft.   Musculoskeletal:         General: Normal  range of motion.   Skin:     General: Skin is warm and dry.   Neurological:      General: No focal deficit present.      Mental Status: He is alert and oriented to person, place, and time.   Psychiatric:         Mood and Affect: Mood normal.         Assessment/Plan    64yr old with COPD and history of lung cancer with recent admission for hypoxia and multifocal pneumonia    COPD: PFT from 2023 shows severe obstruction  - now is on budesonide nebulized and spiriva, this seems to be working for him so will continue for now.   - Dr Justice ordered pulmonary rehab, they have met with Lara at the [place] site and are scheduled to start soon.    2. Pneumonia:  will need CT to follow to resolution.  - oncology has CT scheduled every 3 months. July is next CT.  This will likely be a bit early for full resolution of the pneumonia but ok to keep that appointment,  another CT will be done in October as well, pneumonia should be resolved by then.    3. Hypoxia: now on 3lpm.  Has pulse-ox  - wean as tolerated, once there is no daytime need will get nocturnal pulse ox in room air to see if there is a continued night time need.    4. History of lung cancer:  s/p chemo XRT now in surveillance with Q3mo screening CT  - follow up with oncology    Follow up early August after July CT       Lakhwinder Banks MD 06/25/25 9:45 AM

## 2025-06-25 NOTE — TELEPHONE ENCOUNTER
Keri called in to request completion of FMLA paperwork for her to have intermittent FMLA to care for Martínez. 13 day deadline to turn paperwork in to her employer. Instructed her to please email me the paperwork and I will forward to Dr Ng and her team. She verbalized understanding and agreement with the plan.  Tressa CLEVELANDN, RN CMSRN

## 2025-06-25 NOTE — TELEPHONE ENCOUNTER
[place] paperwork received via email from Keri. I forwarded email to Dr Ng, RN Lauren Michel, and  Selina Johnson to please assist. Keri made aware.   Tressa CLEVELANDN, RN CMSRN

## 2025-06-25 NOTE — PROGRESS NOTES
Subjective   Patient ID: Martínez Neri is a 64 y.o. male who presents for COPD, hypoxia, lung cancer, pneumonia    HPI    Mr Neri is a 64yr old with history of COPD, lung cancer diagnosed in 2023 s/p chemo/XRT, that was admitted 6/5-14 with multifocal pneumonia and hypoxia.  He was discharged with a new oxygen requirement of 3lpm.  He feels he is recovering slowly.  He is currently using budesonide nebulized and spiriva (he was on spiriva prior to admission) along with albuterol if needed.      I reviewed imaging from admission.    He is a former smoker 1ppd since age 14, he quit when he was admitted.      Review of Systems    See scanned documents attached to this note for review of systems, and appropriate scales/scores for this visit.     Objective   Physical Exam  Constitutional:       Appearance: Normal appearance.   HENT:      Head: Normocephalic and atraumatic.      Mouth/Throat:      Pharynx: Oropharynx is clear.   Cardiovascular:      Rate and Rhythm: Normal rate and regular rhythm.      Pulses: Normal pulses.      Heart sounds: Normal heart sounds.   Pulmonary:      Effort: Pulmonary effort is normal.      Breath sounds: Normal breath sounds. No wheezing, rhonchi or rales.   Abdominal:      General: Bowel sounds are normal.      Palpations: Abdomen is soft.   Musculoskeletal:         General: Normal range of motion.   Skin:     General: Skin is warm and dry.   Neurological:      General: No focal deficit present.      Mental Status: He is alert and oriented to person, place, and time.   Psychiatric:         Mood and Affect: Mood normal.         Assessment/Plan     64yr old with COPD and history of lung cancer with recent admission for hypoxia and multifocal pneumonia    COPD: PFT from 2023 shows severe obstruction  - now is on budesonide nebulized and spiriva, this seems to be working for him so will continue for now.   - Dr Justice ordered pulmonary rehab, they have met with Lara at the [place] site  and are scheduled to start soon.    2. Pneumonia:  will need CT to follow to resolution.  - oncology has CT scheduled every 3 months. July is next CT.  This will likely be a bit early for full resolution of the pneumonia but ok to keep that appointment,  another CT will be done in October as well, pneumonia should be resolved by then.    3. Hypoxia: now on 3lpm.  Has pulse-ox  - wean as tolerated, once there is no daytime need will get nocturnal pulse ox in room air to see if there is a continued night time need.    4. History of lung cancer:  s/p chemo XRT now in surveillance with Q3mo screening CT  - follow up with oncology    Follow up early August after July CT       Lakhwinder Banks MD 06/25/25 9:45 AM

## 2025-06-30 ENCOUNTER — PATIENT MESSAGE (OUTPATIENT)
Dept: CARDIOLOGY | Facility: CLINIC | Age: 65
End: 2025-06-30
Payer: COMMERCIAL

## 2025-06-30 DIAGNOSIS — R00.2 PALPITATIONS: ICD-10-CM

## 2025-06-30 DIAGNOSIS — K55.069 SUPERIOR MESENTERIC ARTERY THROMBOSIS (MULTI): ICD-10-CM

## 2025-06-30 DIAGNOSIS — C34.12 PRIMARY CANCER OF LEFT UPPER LOBE OF LUNG (MULTI): ICD-10-CM

## 2025-06-30 LAB
FUNGUS SPEC CULT: NORMAL
FUNGUS SPEC FUNGUS STN: NORMAL

## 2025-07-03 ENCOUNTER — SOCIAL WORK (OUTPATIENT)
Dept: CASE MANAGEMENT | Facility: HOSPITAL | Age: 65
End: 2025-07-03
Payer: COMMERCIAL

## 2025-07-03 NOTE — PROGRESS NOTES
Work Documentation    Documentation Type: FMLA  Date Received: 06/25/25  Individual Requesting Documentation: Keri Neri, spouse  Completed By: Selina SALAS/Saida Ng MD  Documentation submitted to : [employer] via fax [phone] on 07/01/25  Additional Information: Also securely emailed to Keri Neri per her request      Documentation scanned into EMR. Pt updated. SW will continue to follow as needed.

## 2025-07-08 ENCOUNTER — TELEPHONE (OUTPATIENT)
Dept: PRIMARY CARE | Facility: CLINIC | Age: 65
End: 2025-07-08
Payer: COMMERCIAL

## 2025-07-11 ENCOUNTER — CLINICAL SUPPORT (OUTPATIENT)
Dept: CARDIAC REHAB | Facility: CLINIC | Age: 65
End: 2025-07-11
Payer: COMMERCIAL

## 2025-07-11 VITALS
BODY MASS INDEX: 21.05 KG/M2 | SYSTOLIC BLOOD PRESSURE: 112 MMHG | DIASTOLIC BLOOD PRESSURE: 64 MMHG | OXYGEN SATURATION: 97 % | WEIGHT: 147 LBS | HEIGHT: 70 IN

## 2025-07-11 DIAGNOSIS — J44.9 CHRONIC OBSTRUCTIVE PULMONARY DISEASE, UNSPECIFIED COPD TYPE (MULTI): Primary | ICD-10-CM

## 2025-07-11 ASSESSMENT — DUKE ACTIVITY SCORE INDEX (DASI)
CAN YOU CLIMB A FLIGHT OF STAIRS OR WALK UP A HILL: YES
CAN YOU DO HEAVY WORK AROUND THE HOUSE LIKE SCRUBBING FLOORS OR LIFTING AND MOVING HEAVY FURNITURE: NO
CAN YOU DO LIGHT WORK AROUND THE HOUSE LIKE DUSTING OR WASHING DISHES: YES
TOTAL_SCORE: 23.45
CAN YOU HAVE SEXUAL RELATIONS: NO
CAN YOU DO MODERATE WORK AROUND THE HOUSE LIKE VACUUMING, SWEEPING FLOORS OR CARRYING GROCERIES: YES
CAN YOU PARTICIPATE IN MODERATE RECREATIONAL ACTIVITIES LIKE GOLF, BOWLING, DANCING, DOUBLES TENNIS OR THROWING A BASEBALL OR FOOTBALL: NO
CAN YOU WALK A BLOCK OR TWO ON LEVEL GROUND: YES
CAN YOU RUN A SHORT DISTANCE: NO
CAN YOU DO YARD WORK LIKE RAKING LEAVES, WEEDING OR PUSHING A MOWER: YES
DASI METS SCORE: 5.6
CAN YOU WALK INDOORS, SUCH AS AROUND YOUR HOUSE: YES
CAN YOU PARTICIPATE IN STRENOUS SPORTS LIKE SWIMMING, SINGLES TENNIS, FOOTBALL, BASKETBALL, OR SKIING: NO
CAN YOU TAKE CARE OF YOURSELF (EAT, DRESS, BATHE, OR USE TOILET): YES

## 2025-07-11 ASSESSMENT — PATIENT HEALTH QUESTIONNAIRE - PHQ9
3. TROUBLE FALLING OR STAYING ASLEEP OR SLEEPING TOO MUCH: NOT AT ALL
6. FEELING BAD ABOUT YOURSELF - OR THAT YOU ARE A FAILURE OR HAVE LET YOURSELF OR YOUR FAMILY DOWN: SEVERAL DAYS
5. POOR APPETITE OR OVEREATING: NOT AT ALL
SUM OF ALL RESPONSES TO PHQ QUESTIONS 1-9: 5
SUM OF ALL RESPONSES TO PHQ QUESTIONS 1-9: 5
2. FEELING DOWN, DEPRESSED OR HOPELESS: SEVERAL DAYS
SUM OF ALL RESPONSES TO PHQ9 QUESTIONS 1 & 2: 2
8. MOVING OR SPEAKING SO SLOWLY THAT OTHER PEOPLE COULD HAVE NOTICED. OR THE OPPOSITE, BEING SO FIGETY OR RESTLESS THAT YOU HAVE BEEN MOVING AROUND A LOT MORE THAN USUAL: SEVERAL DAYS
4. FEELING TIRED OR HAVING LITTLE ENERGY: SEVERAL DAYS
9. THOUGHTS THAT YOU WOULD BE BETTER OFF DEAD, OR OF HURTING YOURSELF: NOT AT ALL
7. TROUBLE CONCENTRATING ON THINGS, SUCH AS READING THE NEWSPAPER OR WATCHING TELEVISION: NOT AT ALL
1. LITTLE INTEREST OR PLEASURE IN DOING THINGS: SEVERAL DAYS

## 2025-07-11 NOTE — PROGRESS NOTES
Pulmonary Rehabilitation Initial Treatment Plan    Name: Martínez Neri   Medical Record Number: [id number]  YOB: 1960   Age: 64 y.o.  Today’s Date: 7/11/2025   Primary Care Physician: Jayme Casey MD   Referring Physician: Zulma Justice MD/Lakhwinder Banks MD  Program Location: [address]  Primary Diagnosis: Chronic obstructive pulmonary disease, unspecified COPD type (Multi) [J44.9]   Onset/Date of Diagnosis:  (12/2022)   Session #: Initial treatment plan  AACVPR Risk Stratification: Moderate   Falls Risk: Low    ------------------------------------------------------------------  Psychosocial Initial Assessment:  ------------------------------------------------------------------  Stress Assessment  Pre PHQ-9: 5  Sent PH-Q 9 to MD if score > 20: No; score < 20  Quality of Life Survey:   Pre CAT score: 16   Post CAT score: Survey to be given at discharge.   Pt reported/currently experiencing stress: Yes; Stress; Severity: mild and Depression; Severity: mild  Patient uses stress management skills: No   History of: no history of anxiety or depression  Currently seeing a mental health provider: No  Social Support: Yes, Whom:Spouse    Learning Assessment  Learning assessment/barriers: None  Preferred learning method: Auditory, Visual, Reading handout, and Writing handout  Barriers: None  Comments:  Stages of Change: Preparation    Psychosocial Plan  Goal Status: Initial Assessment; goals not yet started  Psychosocial Goals: Demonstrating proper techniques for stress management, Maintain or lower PH-Q 9 score by discharge, and Identify strategies for managing depression  Psychosocial Interventions/Education:   To be done in Pulmonary Rehab.     ------------------------------------------------------------------  Oxygen Initial Assessment:  ------------------------------------------------------------------  SpO2 at rest: 100 % on room air  SpO2 with exertion: 95  "%    Oxygen Use  Supplemental O2 prescribed: Yes,   Liters at rest: room air-2 L N/C  Liters with exertion: 2-4 L N/C    SpO2 at rest: 100 % on room air  SpO2 with exertion: 95 % on room air    Using O2 as prescribed: not using all the time, keeps an eye on his pulse ox  DME: [employer]      Demonstrates appropriate knowledge of O2 use: Yes   Demonstrates appropriate knowledge of O2 safety: Yes     Home O2 System: Concentrator  Portable O2 System: Portable Oxygen Concentrator  Hypoxia managed: Yes   Home Pulse Oximeter: Yes     Pre MMRC: 1  Post MMRC: To be done at discharge.     Oxygen Plan  Goal Status: Initial Assessment; goals not yet started  Oxygen Goals: Decrease MMRC score, Learn and use pursed lip breathing as needed, and Titrate supplemental O2 as needed to keep SpO2 at 88% or greater  Oxygen Education/Interventions:   To be done in Pulmonary Rehab.    ------------------------------------------------------------------  Nutrition Initial Assessment:  ------------------------------------------------------------------  Diet Habit Survey: Picture Your Plate  Pre: Initial survey given. Pending completion and return from patient.  Post: To be done at discharge.  Survey results reviewed with Dietician: Not at this time    Lipids Assessment  Current Dietary Guidelines: no special  Barriers to dietary change: no  Hyperlipidemia: Yes   Lab Results   Component Value Date    CHOL 162 10/03/2023    HDL 67.1 10/03/2023    LDLCALC 78 (L) 10/03/2023    TRIG 87 10/03/2023     Diabetes Assessment  History of Diabetes: No  Lab Results   Component Value Date    HGBA1C 5.5 06/08/2025      Weight Management  Weight: 66.7 kg (147 lb)  Height: 176.5 cm (5' 9.5\")  BMI (Calculated): 21.4   Nutrition Plan  Goal Status: Initial Assessment; goals not yet started  Nutrition Goals: Improve Diet Habit Survey score by 5-10 points by discharge and Adapt a low-sodium, DASH diet prior to discharge, maintain or increase weight  Nutrition " Interventions/Education:   To be done in Pulmonary Rehab.    ------------------------------------------------------------------  Exercise Initial Assessment:  ------------------------------------------------------------------  Home Exercise  Current Home Exercise?: No  Mode: NA  Frequency: NA  Duration: NA   Home Exercise Prescription given: To be given prior to discharge from program.    6 Minute Walk Assessment   6 MWT distance: 1000 ft  FiO2 used during test: Room Air    Exercise Prescription  Exercise Prescription based on: 6MWT  Resistance Training: No   Frequency:  3 days/week  Mode: NuStep and Recumbent Cycle  Duration: 30 total aerobic minutes  Intensity: RPE 12-16  Target HR:  To be calculated after 6 attended sessions.  MET Level: 2.2  Patient wears supplemental O2: Yes;   O2 Flow Rate: 1 to 4 L/min  SpO2 Range: 88 to 100 %  Modality Workload METs Duration (minutes)   Pre-Exercise   2:00   NuStep 4000 3 load @ 36 gutiérrez 2.2 15 :00   Recumbent Bike 1 load @ 55 rpms 2.2 15 :00   Post-Exercise                   2:00        Exercise Plan  Goal Status: Initial Assessment; goals not yet started  Exercise Goals: Increase exercise MET level by 5-10% each week, Increase total exercise duration to 30-45 minutes, Initiate strength training 2-3 days a week, Initiate flexibility training 2-3 days a week, and Establish a home exercise program before discharge  Exercise Interventions/Education:   To be done in Pulmonary Rehab.    ------------------------------------------------------------------  Other Core Components/Risk Factor Initial Assessment:  ------------------------------------------------------------------  Medication adherence  Medication compliance: Yes  Uses pill box/organizer: No   Carries medication list: Yes   Uses Inhalers appropriately: Yes   Current Medications:   Medication Documentation Review Audit       Reviewed by Dixie Rodriguez RN (Registered Nurse) on 07/11/25 at 1334      Medication Order  Taking? Sig Documenting Provider Last Dose Status   acetaminophen (Tylenol) 325 mg tablet [id number] Yes Take 2 tablets (650 mg) by mouth every 6 hours if needed for mild pain (1 - 3), moderate pain (4 - 6), headaches or fever (temp greater than 38.0 C). LEW Gaona  Active   albuterol (Proventil HFA) 90 mcg/actuation inhaler [id number] Yes Inhale 2 puffs every 4 hours if needed for wheezing or shortness of breath. LEW Gaona  Active   apixaban (Eliquis) 5 mg tablet [id number] Yes Take 1 tablet (5 mg) by mouth 2 times a day. Samantha Abreu MD  Active   aspirin 81 mg chewable tablet [id number] Yes Chew 1 tablet (81 mg) once daily. Jayme Casey MD  Active   atorvastatin (Lipitor) 40 mg tablet [id number] Yes Take 1 tablet (40 mg) by mouth once daily. LEW Gaona  Active   benzonatate (Tessalon) 100 mg capsule [id number]  Take 1 capsule (100 mg) by mouth 3 times a day as needed for cough. Do not crush or chew.   Patient not taking: Reported on 7/11/2025    LEW Gaona  Active   budesonide (Pulmicort) 0.5 mg/2 mL nebulizer solution [id number] Yes Take 2 mL (0.5 mg) by nebulization 2 times a day. Rinse mouth with water after use to reduce aftertaste and incidence of candidiasis. Do not swallow. LEW Gaona  Active   divalproex (Depakote ER) 500 mg 24 hr tablet [id number] Yes Take 1 tablet (500 mg) by mouth 2 times a day. Do not crush, chew, or split. Radha Casey MD  Active   docusate sodium (Colace) 100 mg capsule [id number]  Take 1 capsule (100 mg) by mouth 2 times a day as needed for constipation.   Patient not taking: Reported on 7/11/2025    ARCI Gaona-CNP  Active   fluticasone (Flonase) 50 mcg/actuation nasal spray [id number] Yes Administer 2 sprays into each nostril once daily. Shake gently. Before first use, prime pump. After use, clean tip and replace cap. Jayme Casey MD  Active   guaiFENesin (Mucinex) 600 mg 12 hr tablet [id number]  Take 1  "tablet (600 mg) by mouth 2 times a day as needed for cough. Do not crush, chew, or split.   Patient not taking: Reported on 2025    LEW Gaona  Active   lacosamide (Vimpat) 200 mg tablet tablet [id number] Yes TAKE ONE TABLET BY MOUTH TWO TIMES A DAY Brianne Harrell, APRN-CNP  Active   melatonin 3 mg tablet [id number]  Take 1 tablet (3 mg) by mouth as needed at bedtime for sleep.   Patient not taking: Reported on 2025    LEW Gaona  Active   mirtazapine (Remeron) 7.5 mg tablet [id number]  Take 1 tablet (7.5 mg) by mouth once daily at bedtime.   Patient not taking: Reported on 2025    LEW Lim   25 2359   multivitamin tablet [id number] Yes Take 1 tablet by mouth once daily. Historical Provider, MD  Active   naloxone (Narcan) 4 mg/0.1 mL nasal spray [id number] Yes Administer 1 spray (4 mg) into affected nostril(s) if needed for opioid reversal. May repeat every 2-3 minutes if needed, alternating nostrils, until medical assistance becomes available. Ned Rock MD  Active   nicotine (Nicoderm CQ) 7 mg/24 hr patch [id number]  Place 1 patch over 24 hours on the skin once every 24 hours.   Patient not taking: Reported on 2025    Ned Rock MD   25 2359   oxygen (O2) gas therapy [id number] Yes Inhale 3 L/min at 180,000 mL/hr continuously. LEW Gaona  Active   Spiriva Respimat 2.5 mcg/actuation inhaler [id number] Yes Inhale 2 puffs once daily. LEW Gaona  Active                   Blood Pressure Management  History of Hypertension: No   Medication Changes: No   Resting BP:  /64   Ht 1.765 m (5' 9.5\")   Wt 66.7 kg (147 lb)   SpO2 97%   BMI 21.40 kg/m²    Heart Failure Management  Hx of Heart Failure: No  Smoking/Tobacco Assessment  Social History[1]   Other Core Component Plan  Goal Status: Initial Assessment; goals not yet started  Other Core Component Goals: Medication compliance, Verbalize " medication usage and drug actions by discharge, Begin tobacco cessation program, Set tobacco cessation quit date while enrolled, and Achieve resting BP of < 130/80 by discharge  Other Core Component Interventions/Education:   To be done in Pulmonary Rehab.    Individual Patient Goals:  Structured home exercise by discharge  Meet with smoking counselor by discharge  Goal Status: Initial Assessment; goals not yet started    Staff Comments:  Initial assessment done In Person at [facility].    Pt is not wearing oxygen at home except at night. 6MWT performed on room air. Pt did not go below 88% with exertion. Lowest saturation on test: 95 %. Will cont to assess    Rehab Staff Signature: Dixie Rodriguez RN           [1]   Social History  Tobacco Use    Smoking status: Former     Current packs/day: 1.00     Average packs/day: 1 pack/day for 50.0 years (50.0 ttl pk-yrs)     Types: Cigarettes     Passive exposure: Current    Smokeless tobacco: Never   Vaping Use    Vaping status: Never Used   Substance Use Topics    Alcohol use: Not Currently     Comment: quit 7 years ago    Drug use: Yes     Frequency: 4.0 times per week     Types: Marijuana

## 2025-07-14 ENCOUNTER — APPOINTMENT (OUTPATIENT)
Dept: PALLIATIVE MEDICINE | Facility: CLINIC | Age: 65
End: 2025-07-14
Payer: COMMERCIAL

## 2025-07-14 DIAGNOSIS — J44.9 CHRONIC OBSTRUCTIVE PULMONARY DISEASE, UNSPECIFIED COPD TYPE (MULTI): Primary | ICD-10-CM

## 2025-07-15 ENCOUNTER — CLINICAL SUPPORT (OUTPATIENT)
Dept: CARDIAC REHAB | Facility: CLINIC | Age: 65
End: 2025-07-15
Payer: COMMERCIAL

## 2025-07-15 DIAGNOSIS — J44.9 CHRONIC OBSTRUCTIVE PULMONARY DISEASE, UNSPECIFIED COPD TYPE (MULTI): ICD-10-CM

## 2025-07-15 PROCEDURE — 94626 PHY/QHP OP PULM RHB W/MNTR: CPT | Mod: KX | Performed by: INTERNAL MEDICINE

## 2025-07-17 ENCOUNTER — LAB (OUTPATIENT)
Dept: LAB | Facility: HOSPITAL | Age: 65
End: 2025-07-17
Payer: COMMERCIAL

## 2025-07-17 ENCOUNTER — HOSPITAL ENCOUNTER (OUTPATIENT)
Dept: RADIOLOGY | Facility: HOSPITAL | Age: 65
Discharge: HOME | End: 2025-07-17
Payer: COMMERCIAL

## 2025-07-17 DIAGNOSIS — C34.12 PRIMARY CANCER OF LEFT UPPER LOBE OF LUNG (MULTI): ICD-10-CM

## 2025-07-17 LAB
ALBUMIN SERPL BCP-MCNC: 4 G/DL (ref 3.4–5)
ALP SERPL-CCNC: 59 U/L (ref 33–136)
ALT SERPL W P-5'-P-CCNC: 17 U/L (ref 10–52)
ANION GAP SERPL CALC-SCNC: 11 MMOL/L (ref 10–20)
AST SERPL W P-5'-P-CCNC: 20 U/L (ref 9–39)
BASOPHILS # BLD AUTO: 0.07 X10*3/UL (ref 0–0.1)
BASOPHILS NFR BLD AUTO: 1.4 %
BILIRUB SERPL-MCNC: 0.4 MG/DL (ref 0–1.2)
BUN SERPL-MCNC: 13 MG/DL (ref 6–23)
CALCIUM SERPL-MCNC: 9.3 MG/DL (ref 8.6–10.3)
CHLORIDE SERPL-SCNC: 105 MMOL/L (ref 98–107)
CHOLEST SERPL-MCNC: 142 MG/DL (ref 0–199)
CHOLESTEROL/HDL RATIO: 2.6
CO2 SERPL-SCNC: 30 MMOL/L (ref 21–32)
CREAT SERPL-MCNC: 0.76 MG/DL (ref 0.5–1.3)
EGFRCR SERPLBLD CKD-EPI 2021: >90 ML/MIN/1.73M*2
EOSINOPHIL # BLD AUTO: 0.15 X10*3/UL (ref 0–0.7)
EOSINOPHIL NFR BLD AUTO: 2.9 %
ERYTHROCYTE [DISTWIDTH] IN BLOOD BY AUTOMATED COUNT: 21.5 % (ref 11.5–14.5)
GLUCOSE SERPL-MCNC: 85 MG/DL (ref 74–99)
HCT VFR BLD AUTO: 33.5 % (ref 41–52)
HDLC SERPL-MCNC: 54.5 MG/DL
HGB BLD-MCNC: 10.4 G/DL (ref 13.5–17.5)
IMM GRANULOCYTES # BLD AUTO: 0.01 X10*3/UL (ref 0–0.7)
IMM GRANULOCYTES NFR BLD AUTO: 0.2 % (ref 0–0.9)
LDLC SERPL CALC-MCNC: 73 MG/DL
LYMPHOCYTES # BLD AUTO: 1.51 X10*3/UL (ref 1.2–4.8)
LYMPHOCYTES NFR BLD AUTO: 29.6 %
MCH RBC QN AUTO: 28.8 PG (ref 26–34)
MCHC RBC AUTO-ENTMCNC: 31 G/DL (ref 32–36)
MCV RBC AUTO: 93 FL (ref 80–100)
MONOCYTES # BLD AUTO: 0.36 X10*3/UL (ref 0.1–1)
MONOCYTES NFR BLD AUTO: 7.1 %
NEUTROPHILS # BLD AUTO: 3 X10*3/UL (ref 1.2–7.7)
NEUTROPHILS NFR BLD AUTO: 58.8 %
NON HDL CHOLESTEROL: 88 MG/DL (ref 0–149)
NRBC BLD-RTO: 0 /100 WBCS (ref 0–0)
OVALOCYTES BLD QL SMEAR: NORMAL
PLATELET # BLD AUTO: 346 X10*3/UL (ref 150–450)
PLATELET CLUMP BLD QL SMEAR: PRESENT
POLYCHROMASIA BLD QL SMEAR: NORMAL
POTASSIUM SERPL-SCNC: 4.5 MMOL/L (ref 3.5–5.3)
PROT SERPL-MCNC: 7.2 G/DL (ref 6.4–8.2)
RBC # BLD AUTO: 3.61 X10*6/UL (ref 4.5–5.9)
RBC MORPH BLD: NORMAL
SCHISTOCYTES BLD QL SMEAR: NORMAL
SODIUM SERPL-SCNC: 141 MMOL/L (ref 136–145)
TRIGL SERPL-MCNC: 74 MG/DL (ref 0–149)
VALPROATE SERPL-MCNC: 77 UG/ML (ref 50–100)
VLDL: 15 MG/DL (ref 0–40)
WBC # BLD AUTO: 5.1 X10*3/UL (ref 4.4–11.3)

## 2025-07-17 PROCEDURE — 80164 ASSAY DIPROPYLACETIC ACD TOT: CPT | Performed by: INTERNAL MEDICINE

## 2025-07-17 PROCEDURE — 80061 LIPID PANEL: CPT | Performed by: INTERNAL MEDICINE

## 2025-07-17 PROCEDURE — 2550000001 HC RX 255 CONTRASTS: Performed by: STUDENT IN AN ORGANIZED HEALTH CARE EDUCATION/TRAINING PROGRAM

## 2025-07-17 PROCEDURE — 80053 COMPREHEN METABOLIC PANEL: CPT

## 2025-07-17 PROCEDURE — 71260 CT THORAX DX C+: CPT | Performed by: RADIOLOGY

## 2025-07-17 PROCEDURE — 71260 CT THORAX DX C+: CPT

## 2025-07-17 PROCEDURE — 85025 COMPLETE CBC W/AUTO DIFF WBC: CPT

## 2025-07-17 RX ADMIN — IOHEXOL 75 ML: 350 INJECTION, SOLUTION INTRAVENOUS at 08:52

## 2025-07-18 ENCOUNTER — CLINICAL SUPPORT (OUTPATIENT)
Dept: CARDIAC REHAB | Facility: CLINIC | Age: 65
End: 2025-07-18
Payer: COMMERCIAL

## 2025-07-18 DIAGNOSIS — J44.9 CHRONIC OBSTRUCTIVE PULMONARY DISEASE, UNSPECIFIED COPD TYPE (MULTI): ICD-10-CM

## 2025-07-18 PROCEDURE — 94626 PHY/QHP OP PULM RHB W/MNTR: CPT | Mod: KX | Performed by: INTERNAL MEDICINE

## 2025-07-18 NOTE — PROGRESS NOTES
Pulmonary Rehab Education  Martínez Neri   [id number]    7/18/2025  Education on sodium intake was done. Discussed with patient the risks of excess levels of sodium and how to decrease dietary intake with provided list of substitutions. Handouts were given for home reference.            Signature Dixie Rodriguez RN

## 2025-07-21 ENCOUNTER — CLINICAL SUPPORT (OUTPATIENT)
Dept: CARDIAC REHAB | Facility: CLINIC | Age: 65
End: 2025-07-21
Payer: COMMERCIAL

## 2025-07-21 ENCOUNTER — OFFICE VISIT (OUTPATIENT)
Dept: PALLIATIVE MEDICINE | Facility: CLINIC | Age: 65
End: 2025-07-21
Payer: COMMERCIAL

## 2025-07-21 VITALS
RESPIRATION RATE: 18 BRPM | DIASTOLIC BLOOD PRESSURE: 74 MMHG | TEMPERATURE: 97 F | SYSTOLIC BLOOD PRESSURE: 152 MMHG | OXYGEN SATURATION: 98 % | WEIGHT: 147.16 LBS | HEART RATE: 64 BPM | BODY MASS INDEX: 21.42 KG/M2

## 2025-07-21 DIAGNOSIS — Z51.5 PALLIATIVE CARE ENCOUNTER: Primary | ICD-10-CM

## 2025-07-21 DIAGNOSIS — Z79.891 ENCOUNTER FOR MONITORING OPIOID MAINTENANCE THERAPY: ICD-10-CM

## 2025-07-21 DIAGNOSIS — G89.3 CANCER RELATED PAIN: ICD-10-CM

## 2025-07-21 DIAGNOSIS — J44.9 CHRONIC OBSTRUCTIVE PULMONARY DISEASE, UNSPECIFIED COPD TYPE (MULTI): ICD-10-CM

## 2025-07-21 DIAGNOSIS — Z51.81 ENCOUNTER FOR MONITORING OPIOID MAINTENANCE THERAPY: ICD-10-CM

## 2025-07-21 DIAGNOSIS — M79.2 NEUROPATHIC PAIN: ICD-10-CM

## 2025-07-21 PROCEDURE — 1159F MED LIST DOCD IN RCRD: CPT | Performed by: NURSE PRACTITIONER

## 2025-07-21 PROCEDURE — 99213 OFFICE O/P EST LOW 20 MIN: CPT | Performed by: NURSE PRACTITIONER

## 2025-07-21 PROCEDURE — 1125F AMNT PAIN NOTED PAIN PRSNT: CPT | Performed by: NURSE PRACTITIONER

## 2025-07-21 PROCEDURE — 94626 PHY/QHP OP PULM RHB W/MNTR: CPT | Performed by: INTERNAL MEDICINE

## 2025-07-21 RX ORDER — OXYCODONE HYDROCHLORIDE 5 MG/1
5 TABLET ORAL EVERY 6 HOURS PRN
Qty: 120 TABLET | Refills: 0 | Status: SHIPPED | OUTPATIENT
Start: 2025-07-21 | End: 2025-08-20

## 2025-07-21 ASSESSMENT — PAIN SCALES - GENERAL: PAINLEVEL_OUTOF10: 5

## 2025-07-21 NOTE — PROGRESS NOTES
SUPPORTIVE AND PALLIATIVE ONCOLOGY OUTPATIENT FOLLOW-UP      SERVICE DATE: 7/21/2025    Cancer History  NSCL CA THOMAS dx June 2023  -7/27/23: bronch/ EBUS performed, biopsies of THOMAS demonstrated invasive moderately differentiated SCC.   -was living in [place] at the time, relocated to [place]  -1/5/24: completed definitive chemoRT 60 Gy in 30 fxs with concurrent weekly carbotaxol  -currently on surveillance     Onc: Dr. Tasha Zuluaga Onc: NICOLETTE Reed CNP      Subjective   HISTORY OF PRESENT ILLNESS: Martínez Neri is a 65 y.o. male with Pmhx of emphysema/ COPD, ETOH misuse, seizure disorder, and SCC of THOMAS.     He presents to supportive oncology for follow up for pain and symptom management.     Presents for FUV accompanied by wife, Keri. C/o generalized pain, had hospital admission/MICU stay last month for PNA, still recovering. Taking oxycodone 1-2x/day. Moving bowels regularly. No N/V, occasional reflux following a meal. Eating okay, maintaining weight. Mood is up and down, anxious about most recent scan results. Sleeping okay, off mirtazapine since hospital admission. Has not regained energy levels since PNA.     Pain Assessment:  Pain Score:   5  Location: Generalized  Education:      Symptom Assessment:  Pain:somewhat  Headache: none  Dizziness:none  Lack of energy: somewhat  Difficulty sleeping: none  Worrying: a little  Anxiety: a little  Depression: a little  Pain in mouth/swallowing: none  Dry mouth: none  Taste changes: none  Shortness of breath: none  Lack of appetite: none   Nausea: none  Vomiting: none  Constipation: none  Diarrhea: none  Sore muscles: a little  Numbness or tingling in hands/feet/other: somewhat  Weight loss: none      Information obtained from: chart review, interview of patient, and interview of family  ______________________________________________________________________        Objective                PHYSICAL EXAMINATION   Vital Signs:   Vital signs reviewed  Visit Vitals  /74 (BP  Location: Left arm, Patient Position: Sitting, BP Cuff Size: Adult)   Pulse 64   Temp 36.1 °C (97 °F) (Temporal)   Resp 18        Pain Score:  5     Physical Exam  Vitals reviewed.   Constitutional:       Appearance: Normal appearance.   HENT:      Head: Normocephalic.     Cardiovascular:      Rate and Rhythm: Normal rate.   Pulmonary:      Effort: Pulmonary effort is normal.   Abdominal:      Palpations: Abdomen is soft.     Musculoskeletal:         General: Normal range of motion.     Skin:     General: Skin is warm and dry.      Coloration: Skin is pale.     Neurological:      General: No focal deficit present.      Mental Status: He is alert and oriented to person, place, and time.     Psychiatric:         Mood and Affect: Mood normal.         Judgment: Judgment normal.         ASSESSMENT/PLAN    Pain  Pain is: cancer related pain  Type: visceral and neuropathic  Pain control: well-controlled  Home regimen:   -continue oxycodone 5mg q6hrs PRN. Rx sent today.    -continue daily ASA (ok per vascular provider)  -avoid NSAIDs d/t concurrent use of Eliquis   Intolerances/previously tried:     Opioid Use  Medication Management:   - OARRS report reviewed with no aberrant behavior; consistent with  prescriptions/records and patient history  - MED 15.  Overdose Risk Score 170.   This has been discussed with patient.   - We will continue to closely monitor the patient for signs of prescription misuse including UDS, OARRS review and subjective reports at each visit.  - no concurrent benzodiazepine use   - I am a provider who either is or has consulted and collaborated with a provider certified in Hospice and Palliative Medicine and have conducted a face-face visit and examination for this patient.  - Routine Urine Drug Screen completed deferred (MED <90) appropriately positive for opioids and negative for illicit substances  - Controlled Substance Agreement completed 2/21/24  - Specifically discussed that controlled  substance prescriptions will only be provided by our group as outlined in the completed agreement  - Prescribed naloxone 2/21/24  - Red Flags: history of ETOH misuse, in recovery since 2016    Constipation  At risk for constipation related to opioids,  currently not constipated  Usual bowel pattern: daily  Home regimen:   -educated pt on importance of maintaining regular bowel schedule  -continue colace 100mg bid PRN for hard stools  -continue senna 8.6mg, 1-2tabs, 1-2x/day PRN    Altered Mood  Chronic depression related to health concerns   controlled with home regimen  Home regimen:   -continue depakote ER 500mg bid. (Managed by neurologist- also treatment for seizure disorder)    Sleeping Difficulty:  Impaired sleep related to insomnia  Home regimen:    -discontinued mirtazapine 7.5mg at bedtime during hospital admission 6/2025    --pt not interested in restarting at this time    Decreased appetite  Related to malignancy and disease process  Home regimen:    -encouraged smaller, more frequent meals through out the day  -encouraged use of supplements such as Boost, Ensure, etc.    -discussed goal for diet high in protein/calories  -discontinued mirtazapine 7.5mg at bedtime during hospital admission 6/2025    Supportive Interventions:    Supportive and Palliative Oncology encounter:  Emotional support provided  Coordination of care  We will continue to follow and address symptoms as needed    Advance Directives  Existence of Advance Directives:Unknown  Decision maker: not formally discussed this visit   Code Status: DNR comfort care arrest          Next Follow-Up Visit:  Return to clinic in 8 weeks     Signature and billing  Medical complexity was low level due to due to complexity of problems, extensive data review, and high risk of management/treatment.  Time was spent on the following: Prep Time, Time Directly with Patient/Family/Caregiver, Documentation Time. Total time spent: 20 mins      Data  Diagnostic  tests and information reviewed for today's visit:  Most recent labs         Some elements copied from my note on 6/2/25, the elements have been updated and all reflect current decision making from today, 7/21/2025.      Plan of Care discussed with: Patient, wife    SIGNATURE: ARIC Lim-CNP    Contact information:  Supportive and Palliative Oncology  Monday-Friday 8 AM-5 PM  Phone:  [phone], press option #5, then option #1.   Or Epic Secure Chat

## 2025-07-22 ENCOUNTER — OFFICE VISIT (OUTPATIENT)
Dept: HEMATOLOGY/ONCOLOGY | Facility: HOSPITAL | Age: 65
End: 2025-07-22
Payer: COMMERCIAL

## 2025-07-22 ENCOUNTER — APPOINTMENT (OUTPATIENT)
Dept: CARDIAC REHAB | Facility: CLINIC | Age: 65
End: 2025-07-22
Payer: COMMERCIAL

## 2025-07-22 VITALS
HEART RATE: 62 BPM | WEIGHT: 145.8 LBS | BODY MASS INDEX: 21.22 KG/M2 | SYSTOLIC BLOOD PRESSURE: 129 MMHG | TEMPERATURE: 95.5 F | RESPIRATION RATE: 18 BRPM | DIASTOLIC BLOOD PRESSURE: 63 MMHG | OXYGEN SATURATION: 99 %

## 2025-07-22 DIAGNOSIS — C34.12 PRIMARY CANCER OF LEFT UPPER LOBE OF LUNG (MULTI): ICD-10-CM

## 2025-07-22 PROCEDURE — G2211 COMPLEX E/M VISIT ADD ON: HCPCS | Performed by: STUDENT IN AN ORGANIZED HEALTH CARE EDUCATION/TRAINING PROGRAM

## 2025-07-22 PROCEDURE — 99215 OFFICE O/P EST HI 40 MIN: CPT | Performed by: STUDENT IN AN ORGANIZED HEALTH CARE EDUCATION/TRAINING PROGRAM

## 2025-07-22 PROCEDURE — 1159F MED LIST DOCD IN RCRD: CPT | Performed by: STUDENT IN AN ORGANIZED HEALTH CARE EDUCATION/TRAINING PROGRAM

## 2025-07-22 PROCEDURE — 1125F AMNT PAIN NOTED PAIN PRSNT: CPT | Performed by: STUDENT IN AN ORGANIZED HEALTH CARE EDUCATION/TRAINING PROGRAM

## 2025-07-22 ASSESSMENT — PAIN SCALES - GENERAL: PAINLEVEL_OUTOF10: 4

## 2025-07-22 NOTE — PROGRESS NOTES
Patient ID: Martínez Neri is a 65 y.o. 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    PD-L1 TPS 20%      PRIOR THERAPIES  Concurrent chemo RT using weekly CarboTaxol, completed 60 Gy in 30 fractions on 01/05/2024     CURRENT THERAPY  Surveillance        CURRENT ONCOLOGICAL PROBLEMS  Cough, productive  Oxygen dependence  Radiation-induced esophagitis        HISTORY OF PRESENT ILLNESS:  Current smoker (50-pack-year history), with history of COPD and seizures, who was diagnosed with a squamous cell carcinoma of the left upper lobe in [place], never treated. On 06/17/2023 the patient had a CT chest for ongoing respiratory symptoms and was found to have a left suprahilar mass involving the left upper lobe bronchi with postobstructive pneumonia/collapse.  On 06/25/2023 CT chest/abdomen/pelvis demonstrated persistent masslike consolidation in the left upper lobe with no signs of extrathoracic metastatic disease. On 06/26/2023 the patient underwent a bronchoscopy, and pathology was compatible with a squamous cell carcinoma at least in situ, with a foci highly suspicious for invasive squamous cell carcinoma. On 07/18/2023 a PET/CT obtained, demonstrating a 6.6 cm left upper lobe mass with an SUV of 8.3 and no evidence of distant metastatic disease. On 07/18/2023 to brain MRI was negative for intracranial metastasis.     On 07/27/2023 bronchoscopy/EBUS was performed.  Endobronchial biopsies of left upper lobe demonstrated an invasive moderately differentiated squamous cell carcinoma.  Left upper lobe hilar FNA showed atypical cells with squamous differentiation, suspicious for malignancy. Subcarinal FNA was  indeterminate for malignancy with extremely rare atypical cells with squamous differentiation.  Molecular profile: Lack of BRAF, EGFR, HER2, KRAS, MET, ALK, NTRK 1/2/3, RET or ROS1 alterations.  PD-L1 TPS 20%.  The patient was seen by medical oncology and did not too frail for combined chemo RT (there is a performance status ECOG 3) documented on 08/17/2023.  At that time, he lived with some friends, while his family was primarily located in [place]. His daughter traveled and was able to relocate him to [place].     Reported molecular and ancillary testing performed upon diagnosis in [place]:   BRAF negative  XVCUU87M negative  EGFR classic mutations negative  HER2 negative  MET negative  ALK negative  In tract 1/2/3 negative  RET negative  ROS1 negative  PD-L1 20%     Referred to surgery - PFTs were considered prohibitive for surgical resection-FEV1 30%, DLCO 30%.     10/31/23: Repeat EBUS for systematic staging:  Final Cytological Interpretation      A. LYMPH NODE 7 PULMONARY FINE NEEDLE ASPIRATION, CYTOLOGY AND CELL BLOCK:  --  No malignant cells identified.  --  Lymphoid sample.     B. LYMPH NODE 4 L PULMONARY FINE NEEDLE ASPIRATION, CYTOLOGY AND CELL BLOCK:  --  No malignant cells identified.  --  Lymphoid sample.           C. LYMPH NODE 11 L PULMONARY FINE NEEDLE ASPIRATION, CYTOLOGY AND CELL BLOCK:  --  A rare cluster of malignant cells derived from squamous cell carcinoma, see note  -- Also, please refer to concurrent biopsy surgical pathology report ([id number]).     Note:  The malignant squamous cell carcinoma cells are represented in the cell block only.  Immunostain for p40 is positive. The cytomorphology and immunoprofile support the above diagnosis.  The material is not sufficient for molecular testing.      Component     FINAL DIAGNOSIS   Left lung, upper lobe, biopsy:  -- Superficial fragment of squamous cell carcinoma, keratinizing; see note.     Note: According to clinician's note, PD-L1 and NGS have  been performed at an outside institution. These studies can be repeated, if clinically indicated.       01/05/2024: Completes definitive chemo RT using 60 Gray in 30 fractions with concurrent weekly CarboTaxol    01/08/2024: Admitted with neutropenic fevers, dehydration.  CT abdomen/pelvis demonstrated a partial thrombus of superior mesenteric artery.  He was a started on heparin and switched to aspirin at discharge.  CT chest was concerning for aspiration pneumonia, he was treated with antibiotics.  He was discharged on oxygen, 2 L at rest, 4 L on exertion.  He was also discharged on Augmentin 875 mg twice daily for 14 days.  Also sucralfate, 10 mL every 6 hours.  While hospitalized, he had episodes of intermittent agitation and was found to have elevated ammonia levels.  On 01/11/2024, ALT was 63, AST 60.    01/29/2024: CT chest with IV contrast demonstrates the development of multiple pulmonary nodules, including a 3 cm spiculated left lower lobe nodule, concerning for progression of his disease.  A PET scan was ordered STAT for subsequent evaluation, and a referral was placed to IR for CT-guided biopsy of most assessable lung nodule (right lower lobe, posterior, image #[id number]).    02/12/2024: Pathology from RLL biopsy not diagnostic    04/01/24: The contrast obtained for navigational bronchoscopy demonstrated significant progression of the lung parenchyma abnormalities, and procedure was canceled.  Patient will remain on surveillance.    07/08/24: CT chest showing likely post radiation changes in THOMAS but with slight increase few pulmonary nodules    07/18/24: Overall patient is clinically doing well so will hold off on a bronch at this time    PAST MEDICAL HISTORY  Brain aneurysm  COPD (chronic obstructive pulmonary disease) (CMS/HCC)  Epilepsy (CMS/HCC)  High cholesterol  History of alcohol use  Hypertension     PAST SURGICAL HISTORY:  CAROTID ENDARTERECTOMY  COLONOSCOPY  HERNIA REPAIR      SOCIAL  HISTORY  Tobacco (50 pack-year history) use - quit after cancer dx      CURRENT MEDS REVIEWED:  Scheduled medications  Continuous medications  PRN medications     ALLERGIES  NKDA     FAMILY HISTORY  Father had prostate CA     SUBJECTIVE:  He is here today with his wife. Hospitalized a couple times in  for multifocal pneumonia including in MICU. BAL with negative cytology. No biopsy done due to recent eliquis use.  Still in pulmonary rehab 3 days per week. Breathing is good - doesn't need the oxygen. Just a little cough. Feels like breathing is back to normal.       OBJECTIVE:  Vitals:    25 0844   BP: 129/63   Pulse: 62   Resp: 18   Temp: 35.3 °C (95.5 °F)   SpO2: 99%          Body surface area is 1.8 meters squared.     Wt Readings from Last 5 Encounters:   25 66.1 kg (145 lb 12.8 oz)   25 66.7 kg (147 lb 2.5 oz)   25 66.7 kg (147 lb)   25 66.4 kg (146 lb 4.8 oz)   25 63.8 kg (140 lb 11.2 oz)     ECO    Physical Exam  Constitutional:       Appearance: Normal appearance.   HENT:      Head: Normocephalic and atraumatic.     Eyes:      General: No scleral icterus.      Cardiovascular:      Rate and Rhythm: Normal rate and regular rhythm.      Heart sounds: Normal heart sounds.   Pulmonary:      Effort: Pulmonary effort is normal. No respiratory distress.      Breath sounds: Rhonchi present. No wheezing.   Abdominal:      General: There is no distension.      Palpations: Abdomen is soft.      Tenderness: There is no abdominal tenderness. There is no guarding.     Musculoskeletal:      Right lower leg: No edema.      Left lower leg: No edema.   Lymphadenopathy:      Cervical: No cervical adenopathy.     Skin:     General: Skin is warm.      Coloration: Skin is not pale.      Findings: No erythema or rash.     Neurological:      General: No focal deficit present.      Mental Status: He is alert and oriented to person, place, and time. Mental status is at baseline.      Motor:  No weakness.      Gait: Gait normal.     Psychiatric:         Mood and Affect: Mood normal.         Behavior: Behavior normal.         Thought Content: Thought content normal.         Judgment: Judgment normal.          Diagnostic Results   Results:  Labs:  Lab Results   Component Value Date    WBC 5.1 07/17/2025    HGB 10.4 (L) 07/17/2025    HCT 33.5 (L) 07/17/2025    MCV 93 07/17/2025     07/17/2025      Lab Results   Component Value Date    NEUTROABS 3.00 07/17/2025        Lab Results   Component Value Date    GLUCOSE 85 07/17/2025    CALCIUM 9.3 07/17/2025     07/17/2025    K 4.5 07/17/2025    CO2 30 07/17/2025     07/17/2025    BUN 13 07/17/2025    CREATININE 0.76 07/17/2025    MG 2.16 06/14/2025     Lab Results   Component Value Date    ALT 17 07/17/2025    AST 20 07/17/2025    ALKPHOS 59 07/17/2025    BILITOT 0.4 07/17/2025    BILIDIR 0.1 06/08/2025      Lab Results   Component Value Date    ACTH 26.6 02/01/2024    CORTISOL 11.6 02/01/2024    TSH 2.50 10/09/2024    FREET4 0.90 01/11/2024     Imaging:  I have personally reviewed the below imaging and concur with the reported findings unless otherwise stated:    CT chest w IV contrast  Result Date: 7/17/2025  Impression: 1.  Interval worsening of now near complete collapse/consolidation of the left upper lobe with volume loss. Correlate with concern for superimposed mucous plugging on likely background of left suprahilar obstructing lesion. 2. Interval resolution of the right upper lobe collapse and partial resolution of the right middle lobe collapse. Residual consolidation in the medial right middle lobe which might be due to residual atelectasis with infectious infiltrate not excluded. 3. Thick walled cystic lesion in the right lower lobe measuring 1.5 cm, slightly enlarged compared to prior study, concerning for metastatic disease. 4. Additional findings as described above   MACRO: None   Signed by: Trixie Duffy 7/17/2025 11:27  AM Dictation workstation:   [id number]      Assessment/Plan     Primary cancer of left upper lobe of lung (Multi), Clinical: Stage IIB (cT2, cN1, cM0)    #Stage IIB squamous cell carcinoma of the left upper lobe  - treated with concurrent chemo RT using weekly CarboTaxol, completed 60 Gray in 30 fractions on 01/05/2024.  -Subsequently hospitalized with neutropenic fevers on 01/08/2024.  -Suspicion for aspiration during the workup performed at outside hospital as well as SMA partial thrombus.  -He has seen pulmonology in [facility] Dr. Sahu and underwent RLL biopsy by IR which was not diagnostic.  -He was then scheduled for a navigational bronchoscopy but the lung lesions that were supposed to be biopsied pretty much resolved in the planning scans - This is c/w with inflammatory changes   -Due to multiple complications as described above, he missed the window for consolidative durvalumab.    -There was also concern in regards to high risk of toxicities due to his underlying pulmonary challenges.    -We are specifically concerned about increased risk for treatment related pneumonitis.   -Being followed closely with scans for now  - personally reviewed CT scan with near resolution of pneumonia, worsening THOMAS aeration in region of previous radiation, unclear if there is new lesion underneath radiation changes. Will continue to monitor closely, and will repeat CT scan in 2-3 months.

## 2025-07-25 ENCOUNTER — CLINICAL SUPPORT (OUTPATIENT)
Dept: CARDIAC REHAB | Facility: CLINIC | Age: 65
End: 2025-07-25
Payer: COMMERCIAL

## 2025-07-25 DIAGNOSIS — J44.9 CHRONIC OBSTRUCTIVE PULMONARY DISEASE, UNSPECIFIED COPD TYPE (MULTI): ICD-10-CM

## 2025-07-25 PROCEDURE — 94626 PHY/QHP OP PULM RHB W/MNTR: CPT | Mod: KX | Performed by: INTERNAL MEDICINE

## 2025-07-25 NOTE — PROGRESS NOTES
Pulmonary Rehab Education  Martínez Neri   [id number]    7/25/2025  Clinical pharmacist provided information and brochure regarding  pharmacy resources, medication finances and patient support available at .     Signature Dixie Rodriguez RN

## 2025-07-28 ENCOUNTER — CLINICAL SUPPORT (OUTPATIENT)
Dept: CARDIAC REHAB | Facility: CLINIC | Age: 65
End: 2025-07-28
Payer: COMMERCIAL

## 2025-07-28 DIAGNOSIS — J44.9 CHRONIC OBSTRUCTIVE PULMONARY DISEASE, UNSPECIFIED COPD TYPE (MULTI): ICD-10-CM

## 2025-07-28 PROCEDURE — 94626 PHY/QHP OP PULM RHB W/MNTR: CPT | Mod: KX | Performed by: INTERNAL MEDICINE

## 2025-07-29 ENCOUNTER — CLINICAL SUPPORT (OUTPATIENT)
Dept: CARDIAC REHAB | Facility: CLINIC | Age: 65
End: 2025-07-29
Payer: COMMERCIAL

## 2025-07-29 DIAGNOSIS — J44.9 CHRONIC OBSTRUCTIVE PULMONARY DISEASE, UNSPECIFIED COPD TYPE (MULTI): ICD-10-CM

## 2025-07-29 PROCEDURE — 94626 PHY/QHP OP PULM RHB W/MNTR: CPT | Performed by: INTERNAL MEDICINE

## 2025-07-29 NOTE — PROGRESS NOTES
Pulmonary Rehab Education  Martínez Neri   [id number]    7/29/2025  Discussion of prescribed drug names, doses, side effects, and medication uses was done with patient. Patient's medication list was reviewed with patient and copy of current medication list was given to patient in addition to education handout, Medications Used for Pulmonary Conditions. Patient was instructed to come back with any questions.             Signature Dixie Rodriguez RN

## 2025-08-01 ENCOUNTER — APPOINTMENT (OUTPATIENT)
Dept: CARDIAC REHAB | Facility: CLINIC | Age: 65
End: 2025-08-01
Payer: COMMERCIAL

## 2025-08-01 DIAGNOSIS — E78.00 HYPERCHOLESTEROLEMIA: ICD-10-CM

## 2025-08-01 RX ORDER — ATORVASTATIN CALCIUM 40 MG/1
40 TABLET, FILM COATED ORAL DAILY
Qty: 90 TABLET | Refills: 1 | Status: SHIPPED | OUTPATIENT
Start: 2025-08-01

## 2025-08-04 ENCOUNTER — CLINICAL SUPPORT (OUTPATIENT)
Dept: CARDIAC REHAB | Facility: CLINIC | Age: 65
End: 2025-08-04
Payer: COMMERCIAL

## 2025-08-04 DIAGNOSIS — J44.9 CHRONIC OBSTRUCTIVE PULMONARY DISEASE, UNSPECIFIED COPD TYPE (MULTI): ICD-10-CM

## 2025-08-04 PROCEDURE — 94626 PHY/QHP OP PULM RHB W/MNTR: CPT | Performed by: INTERNAL MEDICINE

## 2025-08-04 NOTE — PROGRESS NOTES
Pulmonary Rehab Education  Martínez Neri   [id number]    8/4/2025  Encouraged to check out local fitness centers.    Signature Dixie Rodriguez RN

## 2025-08-05 ENCOUNTER — CLINICAL SUPPORT (OUTPATIENT)
Dept: CARDIAC REHAB | Facility: CLINIC | Age: 65
End: 2025-08-05
Payer: COMMERCIAL

## 2025-08-05 DIAGNOSIS — J44.9 CHRONIC OBSTRUCTIVE PULMONARY DISEASE, UNSPECIFIED COPD TYPE (MULTI): ICD-10-CM

## 2025-08-05 PROCEDURE — 94626 PHY/QHP OP PULM RHB W/MNTR: CPT | Mod: KX | Performed by: INTERNAL MEDICINE

## 2025-08-05 NOTE — PROGRESS NOTES
Pulmonary Rehab Education  Martínez Neri   [id number]    8/5/2025  verbalizes proper demonstration of inhalers.    Signature Dixie Rodriguez RN

## 2025-08-08 ENCOUNTER — DOCUMENTATION (OUTPATIENT)
Dept: CARDIAC REHAB | Facility: CLINIC | Age: 65
End: 2025-08-08
Payer: COMMERCIAL

## 2025-08-08 ENCOUNTER — APPOINTMENT (OUTPATIENT)
Dept: CARDIAC REHAB | Facility: CLINIC | Age: 65
End: 2025-08-08
Payer: COMMERCIAL

## 2025-08-08 NOTE — PROGRESS NOTES
Pulmonary Rehabilitation Initial Treatment Plan    Name: Martínez Neri   Medical Record Number: [id number]  YOB: 1960   Age: 65 y.o.  Today’s Date: 8/8/2025   Primary Care Physician: Jayme Casey MD   Referring Physician: Lakhwinder Banks MD  [id number]  Primary Diagnosis: Chronic obstructive pulmonary disease, unspecified COPD type (Multi) [J44.9]   Onset/Date of Diagnosis:  (12/2022)   Session #: Initial treatment plan  AACVPR Risk Stratification: Moderate   Falls Risk: Low    ------------------------------------------------------------------  Psychosocial Reassessment:   ------------------------------------------------------------------  Stress Assessment  Pre PHQ-9: 5  Sent PH-Q 9 to MD if score > 20: No; score < 20  Quality of Life Survey:   Pre CAT score: 16   Post CAT score: Survey to be given at discharge.   Pt reported/currently experiencing stress: Yes; Stress; Severity: mild and Depression; Severity: mild  Patient uses stress management skills: No   History of: no history of anxiety or depression  Currently seeing a mental health provider: No  Social Support: Yes, Whom:Spouse    Learning Assessment  Learning assessment/barriers: None  Preferred learning method: Auditory, Visual, Reading handout, and Writing handout  Barriers: None  Comments:  Stages of Change: Action    Psychosocial Plan  Goal Status: In Progress  Psychosocial Goals: Demonstrating proper techniques for stress management, Maintain or lower PH-Q 9 score by discharge, and Identify strategies for managing depression  Psychosocial Interventions/Education:   7/11/25 reviewed initial PHQ-9 score w/ pt/MS     ------------------------------------------------------------------  Oxygen Reassessment:   ------------------------------------------------------------------  SpO2 at rest: 100 % on room air  SpO2 with exertion: 95 %    Oxygen Use  Supplemental O2 prescribed: Yes,   Liters at  rest: room air-2 L N/C  Liters with exertion: 2-4 L N/C    SpO2 at rest: 100 % on room air  SpO2 with exertion: 95 % on room air    Using O2 as prescribed: not using all the time, keeps an eye on his pulse ox  DME: [employer]      Demonstrates appropriate knowledge of O2 use: Yes   Demonstrates appropriate knowledge of O2 safety: Yes     Home O2 System: Concentrator  Portable O2 System: Portable Oxygen Concentrator  Hypoxia managed: Yes   Home Pulse Oximeter: Yes     Pre MMRC: 1  Post MMRC: To be done at discharge.     Oxygen Plan  Goal Status: In Progress  Oxygen Goals: Decrease MMRC score, Learn and use pursed lip breathing as needed, and Titrate supplemental O2 as needed to keep SpO2 at 88% or greater  Oxygen Education/Interventions:   7/28/2025  Gave [facility] Living Well With COPD booklet and reviewed resources for pulmonary rehabilitation and exercise. Introduced pursed lip breathing and diaphragmatic breathing during exercise session. Reviewed the benefits of breathing exercise for oxygenation and control with exertion. Practiced with patient and she was able to review back. With booklet, reviewed COPD action plan as well with patient. Dixie Rodriguez RN      ------------------------------------------------------------------  Nutrition Reassessment:   ------------------------------------------------------------------  Diet Habit Survey: Picture Your Plate  Pre: Initial survey given. Pending completion and return from patient.  Post: To be done at discharge.  Survey results reviewed with Dietician: Not at this time    Lipids Assessment  Current Dietary Guidelines: no special  Barriers to dietary change: no  Hyperlipidemia: Yes   Lab Results   Component Value Date    CHOL 142 07/17/2025    HDL 54.5 07/17/2025    LDLCALC 73 07/17/2025    TRIG 74 07/17/2025     Diabetes Assessment  History of Diabetes: No  Lab Results   Component Value Date    HGBA1C 5.5 06/08/2025      Weight Management  Weight: 66.7 kg (147  "lb)  Height: 176.5 cm (5' 9.5\")  BMI (Calculated): 21.4   Nutrition Plan  Goal Status: In Progress  Nutrition Goals: Improve Diet Habit Survey score by 5-10 points by discharge and Adapt a low-sodium, DASH diet prior to discharge, maintain or increase weight  Nutrition Interventions/Education:   7/18/2025  Education on sodium intake was done. Discussed with patient the risks of excess levels of sodium and how to decrease dietary intake with provided list of substitutions. Handouts were given for home reference. Melchor Rodriguez RN    ------------------------------------------------------------------  Exercise Reassessment:   ------------------------------------------------------------------  Home Exercise  Current Home Exercise?: No  Mode: NA  Frequency: NA  Duration: NA   Home Exercise Prescription given: yes    6 Minute Walk Assessment   6 MWT distance: 1000 ft  FiO2 used during test: Room Air    Exercise Prescription  Exercise Prescription based on: 6MWT  Resistance Training: No   Frequency:  3 days/week  Mode: NuStep and Recumbent Cycle  Duration: 34 total aerobic minutes  Intensity: RPE 12-16  Target HR:  RPD  MET Level: 2.2  Patient wears supplemental O2: Yes;   O2 Flow Rate: 1 to 4 L/min  SpO2 Range: 88 to 100 %  Modality Workload METs Duration (minutes)   Pre-Exercise   2:00   NuStep 4000 5 load @ 68 gutiérrez 3.0 17 :00   Recumbent Bike 3 load @ 55 rpms 2.8 17 :00   Post-Exercise                   5 :00        Exercise Plan  Goal Status: In Progress  Exercise Goals: Increase exercise MET level by 5-10% each week, Increase total exercise duration to 30-45 minutes, Initiate strength training 2-3 days a week, Initiate flexibility training 2-3 days a week, and Establish a home exercise program before discharge  Exercise Interventions/Education:   8/4/2025  Encouraged to check out local fitness centers.Signature Dixie Rodriguez, " RN    ------------------------------------------------------------------  Other Core Components/Risk Factor Reassessment:   ------------------------------------------------------------------  Medication adherence  Medication compliance: Yes  Uses pill box/organizer: No   Carries medication list: Yes   Uses Inhalers appropriately: Yes   Current Medications:   Medication Documentation Review Audit       Reviewed by Bhavna Michel RN (Registered Nurse) on 07/22/25 at 0849      Medication Order Taking? Sig Documenting Provider Last Dose Status   acetaminophen (Tylenol) 325 mg tablet [id number]  Take 2 tablets (650 mg) by mouth every 6 hours if needed for mild pain (1 - 3), moderate pain (4 - 6), headaches or fever (temp greater than 38.0 C).   Patient not taking: Reported on 7/22/2025    LEW Gaona  Active   albuterol (Proventil HFA) 90 mcg/actuation inhaler [id number] Yes Inhale 2 puffs every 4 hours if needed for wheezing or shortness of breath. LEW Gaona  Active   apixaban (Eliquis) 5 mg tablet [id number] Yes Take 1 tablet (5 mg) by mouth 2 times a day. Samantha Abreu MD  Active   aspirin 81 mg chewable tablet [id number] Yes Chew 1 tablet (81 mg) once daily. Jayme Casey MD  Active   atorvastatin (Lipitor) 40 mg tablet [id number] Yes Take 1 tablet (40 mg) by mouth once daily. LEW Gaona  Active   benzonatate (Tessalon) 100 mg capsule [id number]  Take 1 capsule (100 mg) by mouth 3 times a day as needed for cough. Do not crush or chew.   Patient not taking: Reported on 7/22/2025    Tamara Bartram, APRN-CNP  Active   budesonide (Pulmicort) 0.5 mg/2 mL nebulizer solution [id number] Yes Take 2 mL (0.5 mg) by nebulization 2 times a day. Rinse mouth with water after use to reduce aftertaste and incidence of candidiasis. Do not swallow. LEW Gaona  Active   divalproex (Depakote ER) 500 mg 24 hr tablet [id number] Yes Take 1 tablet (500 mg) by mouth 2 times a day. Do not  crush, chew, or split. Radha Casey MD  Active   docusate sodium (Colace) 100 mg capsule [id number] Yes Take 1 capsule (100 mg) by mouth 2 times a day as needed for constipation. LEW Gaona  Active   fluticasone (Flonase) 50 mcg/actuation nasal spray [id number] Yes Administer 2 sprays into each nostril once daily. Shake gently. Before first use, prime pump. After use, clean tip and replace cap. Jayme Casey MD  Active   guaiFENesin (Mucinex) 600 mg 12 hr tablet [id number] Yes Take 1 tablet (600 mg) by mouth 2 times a day as needed for cough. Do not crush, chew, or split. LEW Gaona  Active   lacosamide (Vimpat) 200 mg tablet tablet [id number] Yes TAKE ONE TABLET BY MOUTH TWO TIMES A DAY LEW Brower  Active   melatonin 3 mg tablet [id number] Yes Take 1 tablet (3 mg) by mouth as needed at bedtime for sleep. LEW Gaona  Active    Patient not taking:   Discontinued 25 0930   multivitamin tablet [id number] Yes Take 1 tablet by mouth once daily. Historical MD Duane  Active   naloxone (Narcan) 4 mg/0.1 mL nasal spray [id number] Yes Administer 1 spray (4 mg) into affected nostril(s) if needed for opioid reversal. May repeat every 2-3 minutes if needed, alternating nostrils, until medical assistance becomes available. Ned Rock MD  Active   nicotine (Nicoderm CQ) 7 mg/24 hr patch [id number]  Place 1 patch over 24 hours on the skin once every 24 hours.   Patient not taking: Reported on 2025    Ned Rock MD   25 9198   oxyCODONE (Roxicodone) 5 mg immediate release tablet [id number] Yes Take 1 tablet (5 mg) by mouth every 6 hours if needed for severe pain (7 - 10). LEW Lim  Active   oxygen (O2) gas therapy [id number] Yes Inhale 3 L/min at 180,000 mL/hr continuously.   Patient taking differently: Inhale 3 L/min at 180,000 mL/hr if needed.    Tamara Palencia, APRN-CNP  Active   Spiriva Respimat 2.5 mcg/actuation inhaler  "[id number] Yes Inhale 2 puffs once daily. Tamara Meehanmeredith, APRN-CNP  Active                   Blood Pressure Management  History of Hypertension: No   Medication Changes: No   Resting BP:  /64   Ht 1.765 m (5' 9.5\")   Wt 66.7 kg (147 lb)   SpO2 97%   BMI 21.40 kg/m²    Heart Failure Management  Hx of Heart Failure: No  Smoking/Tobacco Assessment  Social History[1]   Other Core Component Plan  Goal Status: In Progress  Other Core Component Goals: Medication compliance, Verbalize medication usage and drug actions by discharge, Begin tobacco cessation program, Set tobacco cessation quit date while enrolled, and Achieve resting BP of < 130/80 by discharge  Other Core Component Interventions/Education:   7/25/2025  Clinical pharmacist provided information and brochure regarding  pharmacy resources, medication finances and patient support available at .   Signature Dixie Rodriguez RN  7/29/2025  Discussion of prescribed drug names, doses, side effects, and medication uses was done with patient. Patient's medication list was reviewed with patient and copy of current medication list was given to patient in addition to education handout, Medications Used for Pulmonary Conditions. Patient was instructed to come back with any questions. Signature Dixie Rodriguez RN  8/5/2025  verbalizes proper demonstration of inhalers.Signature Dixie Rodriguez RN      Individual Patient Goals:  Structured home exercise by discharge  Meet with smoking counselor by discharge  Goal Status: In progress    Staff Comments:  Mr. Neri has been adherent to his pulmonary rehab exercise sessions since beginning the program.  He has begun to make adequate progress on the outcomes set for him as well as his personal goals.  No respiratory complaints voiced.    Rehab Staff Signature: Rosie Elizabeth MS, AACVPR-CCRP           [1]   Social History  Tobacco Use    Smoking status: Former     Current packs/day: 1.00     Average " packs/day: 1 pack/day for 50.0 years (50.0 ttl pk-yrs)     Types: Cigarettes     Passive exposure: Current    Smokeless tobacco: Never   Vaping Use    Vaping status: Never Used   Substance Use Topics    Alcohol use: Not Currently     Comment: quit 7 years ago    Drug use: Yes     Frequency: 4.0 times per week     Types: Marijuana

## 2025-08-11 ENCOUNTER — CLINICAL SUPPORT (OUTPATIENT)
Dept: CARDIAC REHAB | Facility: CLINIC | Age: 65
End: 2025-08-11
Payer: COMMERCIAL

## 2025-08-11 DIAGNOSIS — J44.9 CHRONIC OBSTRUCTIVE PULMONARY DISEASE, UNSPECIFIED COPD TYPE (MULTI): ICD-10-CM

## 2025-08-11 PROCEDURE — 94626 PHY/QHP OP PULM RHB W/MNTR: CPT | Performed by: INTERNAL MEDICINE

## 2025-08-12 ENCOUNTER — APPOINTMENT (OUTPATIENT)
Dept: CARDIAC REHAB | Facility: CLINIC | Age: 65
End: 2025-08-12
Payer: COMMERCIAL

## 2025-08-15 ENCOUNTER — CLINICAL SUPPORT (OUTPATIENT)
Dept: CARDIAC REHAB | Facility: CLINIC | Age: 65
End: 2025-08-15
Payer: COMMERCIAL

## 2025-08-15 DIAGNOSIS — J44.9 CHRONIC OBSTRUCTIVE PULMONARY DISEASE (MULTI): Primary | ICD-10-CM

## 2025-08-15 PROCEDURE — 94626 PHY/QHP OP PULM RHB W/MNTR: CPT | Mod: KX | Performed by: INTERNAL MEDICINE

## 2025-08-18 ENCOUNTER — CLINICAL SUPPORT (OUTPATIENT)
Dept: CARDIAC REHAB | Facility: CLINIC | Age: 65
End: 2025-08-18
Payer: COMMERCIAL

## 2025-08-18 DIAGNOSIS — J44.9 CHRONIC OBSTRUCTIVE PULMONARY DISEASE, UNSPECIFIED COPD TYPE (MULTI): ICD-10-CM

## 2025-08-18 PROCEDURE — 94626 PHY/QHP OP PULM RHB W/MNTR: CPT | Performed by: INTERNAL MEDICINE

## 2025-08-19 ENCOUNTER — APPOINTMENT (OUTPATIENT)
Dept: CARDIAC REHAB | Facility: CLINIC | Age: 65
End: 2025-08-19
Payer: COMMERCIAL

## 2025-08-22 ENCOUNTER — CLINICAL SUPPORT (OUTPATIENT)
Dept: CARDIAC REHAB | Facility: CLINIC | Age: 65
End: 2025-08-22
Payer: COMMERCIAL

## 2025-08-22 DIAGNOSIS — J44.9 CHRONIC OBSTRUCTIVE PULMONARY DISEASE, UNSPECIFIED COPD TYPE (MULTI): ICD-10-CM

## 2025-08-22 PROCEDURE — 94626 PHY/QHP OP PULM RHB W/MNTR: CPT | Performed by: INTERNAL MEDICINE

## 2025-08-25 ENCOUNTER — CLINICAL SUPPORT (OUTPATIENT)
Dept: CARDIAC REHAB | Facility: CLINIC | Age: 65
End: 2025-08-25
Payer: COMMERCIAL

## 2025-08-25 DIAGNOSIS — J44.9 CHRONIC OBSTRUCTIVE PULMONARY DISEASE, UNSPECIFIED COPD TYPE (MULTI): ICD-10-CM

## 2025-08-25 PROCEDURE — 94626 PHY/QHP OP PULM RHB W/MNTR: CPT | Performed by: INTERNAL MEDICINE

## 2025-08-26 ENCOUNTER — CLINICAL SUPPORT (OUTPATIENT)
Dept: CARDIAC REHAB | Facility: CLINIC | Age: 65
End: 2025-08-26
Payer: COMMERCIAL

## 2025-08-26 DIAGNOSIS — J44.9 CHRONIC OBSTRUCTIVE PULMONARY DISEASE, UNSPECIFIED COPD TYPE (MULTI): ICD-10-CM

## 2025-08-26 PROCEDURE — 94626 PHY/QHP OP PULM RHB W/MNTR: CPT | Mod: KX | Performed by: INTERNAL MEDICINE

## 2025-08-29 ENCOUNTER — APPOINTMENT (OUTPATIENT)
Dept: PULMONOLOGY | Facility: CLINIC | Age: 65
End: 2025-08-29
Payer: COMMERCIAL

## 2025-08-29 ENCOUNTER — CLINICAL SUPPORT (OUTPATIENT)
Dept: CARDIAC REHAB | Facility: CLINIC | Age: 65
End: 2025-08-29
Payer: COMMERCIAL

## 2025-08-29 DIAGNOSIS — J44.9 CHRONIC OBSTRUCTIVE PULMONARY DISEASE, UNSPECIFIED COPD TYPE (MULTI): ICD-10-CM

## 2025-08-29 PROCEDURE — 94626 PHY/QHP OP PULM RHB W/MNTR: CPT | Performed by: INTERNAL MEDICINE

## 2025-09-02 ENCOUNTER — CLINICAL SUPPORT (OUTPATIENT)
Dept: CARDIAC REHAB | Facility: CLINIC | Age: 65
End: 2025-09-02
Payer: COMMERCIAL

## 2025-09-02 DIAGNOSIS — J44.9 CHRONIC OBSTRUCTIVE PULMONARY DISEASE, UNSPECIFIED COPD TYPE (MULTI): ICD-10-CM

## 2025-09-02 DIAGNOSIS — J44.9 CHRONIC OBSTRUCTIVE PULMONARY DISEASE, UNSPECIFIED COPD TYPE (MULTI): Primary | ICD-10-CM

## 2025-09-02 PROCEDURE — 94626 PHY/QHP OP PULM RHB W/MNTR: CPT | Performed by: INTERNAL MEDICINE

## 2025-09-05 ENCOUNTER — DOCUMENTATION (OUTPATIENT)
Dept: CARDIAC REHAB | Facility: CLINIC | Age: 65
End: 2025-09-05

## 2025-09-05 ENCOUNTER — CLINICAL SUPPORT (OUTPATIENT)
Dept: CARDIAC REHAB | Facility: CLINIC | Age: 65
End: 2025-09-05
Payer: COMMERCIAL

## 2025-09-05 DIAGNOSIS — J44.9 CHRONIC OBSTRUCTIVE PULMONARY DISEASE, UNSPECIFIED COPD TYPE (MULTI): ICD-10-CM

## 2025-09-05 PROCEDURE — 94626 PHY/QHP OP PULM RHB W/MNTR: CPT | Mod: KX | Performed by: INTERNAL MEDICINE

## 2025-10-22 ENCOUNTER — APPOINTMENT (OUTPATIENT)
Dept: PULMONOLOGY | Facility: CLINIC | Age: 65
End: 2025-10-22
Payer: COMMERCIAL